# Patient Record
Sex: MALE | Race: WHITE | NOT HISPANIC OR LATINO | Employment: OTHER | URBAN - METROPOLITAN AREA
[De-identification: names, ages, dates, MRNs, and addresses within clinical notes are randomized per-mention and may not be internally consistent; named-entity substitution may affect disease eponyms.]

---

## 2017-01-18 RX ORDER — AMLODIPINE BESYLATE AND OLMESARTAN MEDOXOMIL 10; 20 MG/1; MG/1
TABLET, FILM COATED ORAL
Qty: 30 TABLET | Refills: 0 | Status: SHIPPED | OUTPATIENT
Start: 2017-01-18 | End: 2017-02-18 | Stop reason: SDUPTHER

## 2017-02-18 RX ORDER — AMLODIPINE AND OLMESARTAN MEDOXOMIL 10; 20 MG/1; MG/1
TABLET ORAL
Qty: 30 TABLET | Refills: 0 | Status: SHIPPED | OUTPATIENT
Start: 2017-02-18 | End: 2017-03-28 | Stop reason: SDUPTHER

## 2017-03-28 RX ORDER — AMLODIPINE AND OLMESARTAN MEDOXOMIL 10; 20 MG/1; MG/1
TABLET ORAL
Qty: 30 TABLET | Refills: 0 | Status: SHIPPED | OUTPATIENT
Start: 2017-03-28 | End: 2017-05-22 | Stop reason: SDUPTHER

## 2017-05-22 ENCOUNTER — TELEPHONE (OUTPATIENT)
Dept: FAMILY MEDICINE CLINIC | Facility: CLINIC | Age: 62
End: 2017-05-22

## 2017-05-22 RX ORDER — AMLODIPINE AND OLMESARTAN MEDOXOMIL 10; 20 MG/1; MG/1
1 TABLET ORAL DAILY
Qty: 30 TABLET | Refills: 0 | Status: SHIPPED | OUTPATIENT
Start: 2017-05-22 | End: 2017-05-31 | Stop reason: SDUPTHER

## 2017-05-31 ENCOUNTER — OFFICE VISIT (OUTPATIENT)
Dept: FAMILY MEDICINE CLINIC | Facility: CLINIC | Age: 62
End: 2017-05-31

## 2017-05-31 VITALS
BODY MASS INDEX: 38.94 KG/M2 | SYSTOLIC BLOOD PRESSURE: 130 MMHG | OXYGEN SATURATION: 98 % | TEMPERATURE: 98 F | HEIGHT: 62 IN | WEIGHT: 211.6 LBS | HEART RATE: 84 BPM | DIASTOLIC BLOOD PRESSURE: 78 MMHG

## 2017-05-31 DIAGNOSIS — I10 ESSENTIAL HYPERTENSION: Primary | ICD-10-CM

## 2017-05-31 DIAGNOSIS — G89.29 CHRONIC PAIN OF BOTH KNEES: ICD-10-CM

## 2017-05-31 DIAGNOSIS — Z12.11 COLON CANCER SCREENING: ICD-10-CM

## 2017-05-31 DIAGNOSIS — R73.03 PREDIABETES: ICD-10-CM

## 2017-05-31 DIAGNOSIS — E66.09 NON MORBID OBESITY DUE TO EXCESS CALORIES: ICD-10-CM

## 2017-05-31 DIAGNOSIS — M25.561 CHRONIC PAIN OF BOTH KNEES: ICD-10-CM

## 2017-05-31 DIAGNOSIS — M25.562 CHRONIC PAIN OF BOTH KNEES: ICD-10-CM

## 2017-05-31 DIAGNOSIS — M51.36 DDD (DEGENERATIVE DISC DISEASE), LUMBAR: ICD-10-CM

## 2017-05-31 DIAGNOSIS — E78.5 HYPERLIPIDEMIA, UNSPECIFIED HYPERLIPIDEMIA TYPE: ICD-10-CM

## 2017-05-31 DIAGNOSIS — G56.03 BILATERAL CARPAL TUNNEL SYNDROME: ICD-10-CM

## 2017-05-31 DIAGNOSIS — R29.898 DECREASED GRIP STRENGTH: ICD-10-CM

## 2017-05-31 PROCEDURE — 99214 OFFICE O/P EST MOD 30 MIN: CPT | Performed by: NURSE PRACTITIONER

## 2017-05-31 RX ORDER — MELOXICAM 15 MG/1
15 TABLET ORAL DAILY
Qty: 30 TABLET | Refills: 2 | Status: SHIPPED | OUTPATIENT
Start: 2017-05-31 | End: 2017-11-07 | Stop reason: SDUPTHER

## 2017-05-31 RX ORDER — AMLODIPINE AND OLMESARTAN MEDOXOMIL 10; 20 MG/1; MG/1
1 TABLET ORAL DAILY
Qty: 90 TABLET | Refills: 3 | Status: SHIPPED | OUTPATIENT
Start: 2017-05-31 | End: 2019-03-21

## 2017-06-08 ENCOUNTER — TELEPHONE (OUTPATIENT)
Dept: FAMILY MEDICINE CLINIC | Facility: CLINIC | Age: 62
End: 2017-06-08

## 2017-06-08 DIAGNOSIS — R74.8 ELEVATED LIVER ENZYMES: Primary | ICD-10-CM

## 2017-06-08 LAB
ALBUMIN SERPL-MCNC: 4.4 G/DL (ref 3.5–5.2)
ALBUMIN/GLOB SERPL: 1.3 G/DL
ALP SERPL-CCNC: 76 U/L (ref 39–117)
ALT SERPL W P-5'-P-CCNC: 93 U/L (ref 1–41)
ANION GAP SERPL CALCULATED.3IONS-SCNC: 13.2 MMOL/L
AST SERPL-CCNC: 66 U/L (ref 1–40)
BILIRUB SERPL-MCNC: 0.6 MG/DL (ref 0.1–1.2)
BUN BLD-MCNC: 12 MG/DL (ref 8–23)
BUN/CREAT SERPL: 12.6 (ref 7–25)
CALCIUM SPEC-SCNC: 9.6 MG/DL (ref 8.6–10.5)
CHLORIDE SERPL-SCNC: 98 MMOL/L (ref 98–107)
CHOLEST SERPL-MCNC: 227 MG/DL (ref 0–200)
CO2 SERPL-SCNC: 25.8 MMOL/L (ref 22–29)
CREAT BLD-MCNC: 0.95 MG/DL (ref 0.76–1.27)
ERYTHROCYTE [DISTWIDTH] IN BLOOD BY AUTOMATED COUNT: 14.6 % (ref 4.5–15)
GFR SERPL CREATININE-BSD FRML MDRD: 80 ML/MIN/1.73
GLOBULIN UR ELPH-MCNC: 3.3 GM/DL
GLUCOSE BLD-MCNC: 112 MG/DL (ref 65–99)
HBA1C MFR BLD: 6.2 % (ref 4.8–5.6)
HCT VFR BLD AUTO: 47.3 % (ref 35–60)
HDLC SERPL-MCNC: 24 MG/DL (ref 40–60)
HGB BLD-MCNC: 15.2 G/DL (ref 13.5–18)
LDLC SERPL CALC-MCNC: 154 MG/DL (ref 0–100)
LDLC/HDLC SERPL: 6.41 {RATIO}
LYMPHOCYTES # BLD AUTO: 2.6 10*3/MM3 (ref 1.2–3.4)
LYMPHOCYTES NFR BLD AUTO: 31.7 % (ref 21–51)
MCH RBC QN AUTO: 29.5 PG (ref 26.1–33.1)
MCHC RBC AUTO-ENTMCNC: 32.3 G/DL (ref 33–37)
MCV RBC AUTO: 91.4 FL (ref 80–99)
MONOCYTES # BLD AUTO: 0.5 10*3/MM3 (ref 0.1–0.6)
MONOCYTES NFR BLD AUTO: 6.5 % (ref 2–9)
NEUTROPHILS # BLD AUTO: 5.1 10*3/MM3 (ref 1.4–6.5)
NEUTROPHILS NFR BLD AUTO: 61.8 % (ref 42–75)
PLATELET # BLD AUTO: 242 10*3/MM3 (ref 150–450)
PMV BLD AUTO: 7.7 FL (ref 7.1–10.5)
POTASSIUM BLD-SCNC: 4.2 MMOL/L (ref 3.5–5.2)
PROT SERPL-MCNC: 7.7 G/DL (ref 6–8.5)
RBC # BLD AUTO: 5.17 10*6/MM3 (ref 4–6)
SODIUM BLD-SCNC: 137 MMOL/L (ref 136–145)
TRIGL SERPL-MCNC: 246 MG/DL (ref 0–150)
VLDLC SERPL-MCNC: 49.2 MG/DL (ref 5–40)
WBC NRBC COR # BLD: 8.2 10*3/MM3 (ref 4.5–10)

## 2017-06-08 PROCEDURE — 85025 COMPLETE CBC W/AUTO DIFF WBC: CPT | Performed by: NURSE PRACTITIONER

## 2017-06-08 PROCEDURE — 80061 LIPID PANEL: CPT | Performed by: NURSE PRACTITIONER

## 2017-06-08 PROCEDURE — 80053 COMPREHEN METABOLIC PANEL: CPT | Performed by: NURSE PRACTITIONER

## 2017-06-08 PROCEDURE — 83036 HEMOGLOBIN GLYCOSYLATED A1C: CPT | Performed by: NURSE PRACTITIONER

## 2017-06-08 NOTE — TELEPHONE ENCOUNTER
Chol increased 227 goal < 200  goal < 100 triglycerides 246 goal < 150. Recommend you start fish oil 1000 mg BID and lipitor 10 mg HS, can I erx?  Liver enzymes are elevated, RTC lab only recheck.  BS elevated 112, still prediabetic but improving, cont healthy diet and regular exercise for wt loss.

## 2017-06-09 RX ORDER — ATORVASTATIN CALCIUM 10 MG/1
10 TABLET, FILM COATED ORAL NIGHTLY
Qty: 30 TABLET | Refills: 2 | Status: SHIPPED | OUTPATIENT
Start: 2017-06-09 | End: 2019-03-21 | Stop reason: SDUPTHER

## 2017-11-07 RX ORDER — MELOXICAM 15 MG/1
TABLET ORAL
Qty: 30 TABLET | Refills: 0 | Status: SHIPPED | OUTPATIENT
Start: 2017-11-07 | End: 2018-05-23 | Stop reason: SDUPTHER

## 2017-11-13 ENCOUNTER — TELEPHONE (OUTPATIENT)
Dept: FAMILY MEDICINE CLINIC | Facility: CLINIC | Age: 62
End: 2017-11-13

## 2017-11-13 RX ORDER — LOSARTAN POTASSIUM 50 MG/1
50 TABLET ORAL DAILY
Qty: 30 TABLET | Refills: 0 | Status: SHIPPED | OUTPATIENT
Start: 2017-11-13 | End: 2017-11-13 | Stop reason: SDUPTHER

## 2017-11-13 RX ORDER — AMLODIPINE BESYLATE 10 MG/1
10 TABLET ORAL DAILY
Qty: 30 TABLET | Refills: 0 | Status: SHIPPED | OUTPATIENT
Start: 2017-11-13 | End: 2017-11-13 | Stop reason: SDUPTHER

## 2017-11-13 RX ORDER — AMLODIPINE BESYLATE 10 MG/1
TABLET ORAL
Qty: 90 TABLET | Refills: 0 | Status: SHIPPED | OUTPATIENT
Start: 2017-11-13 | End: 2018-02-20 | Stop reason: SDUPTHER

## 2017-11-13 RX ORDER — LOSARTAN POTASSIUM 50 MG/1
TABLET ORAL
Qty: 90 TABLET | Refills: 0 | Status: SHIPPED | OUTPATIENT
Start: 2017-11-13 | End: 2018-02-20 | Stop reason: SDUPTHER

## 2017-11-13 NOTE — TELEPHONE ENCOUNTER
Patient informed insurance will not pay for combo medication amlodipine/olmes 10-20mg anymore . I spoke with Meli she said it was ok to send over two separate RX's Amlodipine 10mg QD and losartin 50 mg QD.

## 2018-02-20 RX ORDER — LOSARTAN POTASSIUM 50 MG/1
TABLET ORAL
Qty: 90 TABLET | Refills: 0 | Status: SHIPPED | OUTPATIENT
Start: 2018-02-20 | End: 2018-05-17 | Stop reason: SDUPTHER

## 2018-02-20 RX ORDER — AMLODIPINE BESYLATE 10 MG/1
TABLET ORAL
Qty: 90 TABLET | Refills: 0 | Status: SHIPPED | OUTPATIENT
Start: 2018-02-20 | End: 2018-05-17 | Stop reason: SDUPTHER

## 2018-05-17 RX ORDER — AMLODIPINE BESYLATE 10 MG/1
TABLET ORAL
Qty: 90 TABLET | Refills: 0 | Status: SHIPPED | OUTPATIENT
Start: 2018-05-17 | End: 2018-09-21 | Stop reason: SDUPTHER

## 2018-05-17 RX ORDER — LOSARTAN POTASSIUM 50 MG/1
TABLET ORAL
Qty: 90 TABLET | Refills: 0 | Status: SHIPPED | OUTPATIENT
Start: 2018-05-17 | End: 2018-09-21 | Stop reason: SDUPTHER

## 2018-05-24 RX ORDER — MELOXICAM 15 MG/1
TABLET ORAL
Qty: 30 TABLET | Refills: 0 | Status: SHIPPED | OUTPATIENT
Start: 2018-05-24 | End: 2018-09-21 | Stop reason: SDUPTHER

## 2018-09-21 RX ORDER — LOSARTAN POTASSIUM 50 MG/1
TABLET ORAL
Qty: 21 TABLET | Refills: 0 | Status: SHIPPED | OUTPATIENT
Start: 2018-09-21 | End: 2018-09-21 | Stop reason: SDUPTHER

## 2018-09-21 RX ORDER — AMLODIPINE BESYLATE 10 MG/1
TABLET ORAL
Qty: 21 TABLET | Refills: 0 | Status: SHIPPED | OUTPATIENT
Start: 2018-09-21 | End: 2018-09-21 | Stop reason: SDUPTHER

## 2018-09-21 RX ORDER — MELOXICAM 15 MG/1
TABLET ORAL
Qty: 21 TABLET | Refills: 0 | Status: SHIPPED | OUTPATIENT
Start: 2018-09-21 | End: 2018-10-16 | Stop reason: SDUPTHER

## 2018-09-24 RX ORDER — LOSARTAN POTASSIUM 50 MG/1
TABLET ORAL
Qty: 90 TABLET | Refills: 0 | Status: SHIPPED | OUTPATIENT
Start: 2018-09-24 | End: 2018-12-26 | Stop reason: SDUPTHER

## 2018-09-24 RX ORDER — AMLODIPINE BESYLATE 10 MG/1
TABLET ORAL
Qty: 90 TABLET | Refills: 0 | Status: SHIPPED | OUTPATIENT
Start: 2018-09-24 | End: 2018-12-26 | Stop reason: SDUPTHER

## 2018-10-17 RX ORDER — MELOXICAM 15 MG/1
TABLET ORAL
Qty: 21 TABLET | Refills: 0 | Status: SHIPPED | OUTPATIENT
Start: 2018-10-17 | End: 2019-02-02 | Stop reason: SDUPTHER

## 2018-12-27 RX ORDER — AMLODIPINE BESYLATE 10 MG/1
TABLET ORAL
Qty: 30 TABLET | Refills: 0 | Status: SHIPPED | OUTPATIENT
Start: 2018-12-27 | End: 2019-02-02 | Stop reason: SDUPTHER

## 2018-12-27 RX ORDER — LOSARTAN POTASSIUM 50 MG/1
TABLET ORAL
Qty: 30 TABLET | Refills: 0 | Status: SHIPPED | OUTPATIENT
Start: 2018-12-27 | End: 2019-02-02 | Stop reason: SDUPTHER

## 2019-02-04 RX ORDER — AMLODIPINE BESYLATE 10 MG/1
TABLET ORAL
Qty: 14 TABLET | Refills: 0 | Status: SHIPPED | OUTPATIENT
Start: 2019-02-04 | End: 2019-02-28 | Stop reason: SDUPTHER

## 2019-02-04 RX ORDER — MELOXICAM 15 MG/1
TABLET ORAL
Qty: 14 TABLET | Refills: 0 | Status: SHIPPED | OUTPATIENT
Start: 2019-02-04 | End: 2019-02-28 | Stop reason: SDUPTHER

## 2019-02-04 RX ORDER — LOSARTAN POTASSIUM 50 MG/1
TABLET ORAL
Qty: 14 TABLET | Refills: 0 | Status: SHIPPED | OUTPATIENT
Start: 2019-02-04 | End: 2019-02-28 | Stop reason: SDUPTHER

## 2019-02-28 RX ORDER — MELOXICAM 15 MG/1
TABLET ORAL
Qty: 14 TABLET | Refills: 0 | Status: CANCELLED | OUTPATIENT
Start: 2019-02-28

## 2019-02-28 RX ORDER — LOSARTAN POTASSIUM 50 MG/1
TABLET ORAL
Qty: 14 TABLET | Refills: 0 | Status: CANCELLED | OUTPATIENT
Start: 2019-02-28

## 2019-02-28 RX ORDER — LOSARTAN POTASSIUM 50 MG/1
50 TABLET ORAL DAILY
Qty: 14 TABLET | Refills: 0 | Status: SHIPPED | OUTPATIENT
Start: 2019-02-28 | End: 2019-03-19 | Stop reason: SDUPTHER

## 2019-02-28 RX ORDER — AMLODIPINE BESYLATE 10 MG/1
10 TABLET ORAL DAILY
Qty: 14 TABLET | Refills: 0 | Status: SHIPPED | OUTPATIENT
Start: 2019-02-28 | End: 2019-03-19 | Stop reason: SDUPTHER

## 2019-02-28 RX ORDER — MELOXICAM 15 MG/1
15 TABLET ORAL DAILY
Qty: 14 TABLET | Refills: 0 | Status: SHIPPED | OUTPATIENT
Start: 2019-02-28 | End: 2019-03-19 | Stop reason: SDUPTHER

## 2019-02-28 RX ORDER — AMLODIPINE BESYLATE 10 MG/1
TABLET ORAL
Qty: 14 TABLET | Refills: 0 | Status: CANCELLED | OUTPATIENT
Start: 2019-02-28

## 2019-03-19 RX ORDER — LOSARTAN POTASSIUM 50 MG/1
50 TABLET ORAL DAILY
Qty: 14 TABLET | Refills: 0 | Status: CANCELLED | OUTPATIENT
Start: 2019-03-19

## 2019-03-19 RX ORDER — MELOXICAM 15 MG/1
15 TABLET ORAL DAILY
Qty: 14 TABLET | Refills: 0 | Status: CANCELLED | OUTPATIENT
Start: 2019-03-19

## 2019-03-19 RX ORDER — AMLODIPINE BESYLATE 10 MG/1
10 TABLET ORAL DAILY
Qty: 7 TABLET | Refills: 0 | Status: SHIPPED | OUTPATIENT
Start: 2019-03-19 | End: 2019-03-21 | Stop reason: SDUPTHER

## 2019-03-19 RX ORDER — MELOXICAM 15 MG/1
15 TABLET ORAL DAILY
Qty: 7 TABLET | Refills: 0 | Status: SHIPPED | OUTPATIENT
Start: 2019-03-19 | End: 2019-07-24

## 2019-03-19 RX ORDER — AMLODIPINE BESYLATE 10 MG/1
10 TABLET ORAL DAILY
Qty: 14 TABLET | Refills: 0 | Status: CANCELLED | OUTPATIENT
Start: 2019-03-19

## 2019-03-19 RX ORDER — LOSARTAN POTASSIUM 50 MG/1
50 TABLET ORAL DAILY
Qty: 7 TABLET | Refills: 0 | Status: SHIPPED | OUTPATIENT
Start: 2019-03-19 | End: 2019-03-21 | Stop reason: SDUPTHER

## 2019-03-19 NOTE — TELEPHONE ENCOUNTER
Gave patient #7 on refills. PATIENT MUST MAKE APPT NO EXCEPTIONS LAST SEEN 5/31/17 [facility]

## 2019-03-21 ENCOUNTER — OFFICE VISIT (OUTPATIENT)
Dept: FAMILY MEDICINE CLINIC | Facility: CLINIC | Age: 64
End: 2019-03-21

## 2019-03-21 VITALS
DIASTOLIC BLOOD PRESSURE: 80 MMHG | HEIGHT: 65 IN | HEART RATE: 83 BPM | OXYGEN SATURATION: 95 % | WEIGHT: 210 LBS | BODY MASS INDEX: 34.99 KG/M2 | SYSTOLIC BLOOD PRESSURE: 144 MMHG | TEMPERATURE: 97.7 F

## 2019-03-21 DIAGNOSIS — Z12.11 ENCOUNTER FOR SCREENING COLONOSCOPY: ICD-10-CM

## 2019-03-21 DIAGNOSIS — I10 ESSENTIAL HYPERTENSION: ICD-10-CM

## 2019-03-21 DIAGNOSIS — E78.5 HYPERLIPIDEMIA, UNSPECIFIED HYPERLIPIDEMIA TYPE: ICD-10-CM

## 2019-03-21 DIAGNOSIS — R07.9 CHEST PAIN, UNSPECIFIED TYPE: Primary | ICD-10-CM

## 2019-03-21 PROCEDURE — 99214 OFFICE O/P EST MOD 30 MIN: CPT | Performed by: NURSE PRACTITIONER

## 2019-03-21 RX ORDER — AMLODIPINE BESYLATE 10 MG/1
10 TABLET ORAL DAILY
Qty: 90 TABLET | Refills: 0 | Status: SHIPPED | OUTPATIENT
Start: 2019-03-21 | End: 2019-07-24 | Stop reason: SDUPTHER

## 2019-03-21 RX ORDER — LOSARTAN POTASSIUM 50 MG/1
50 TABLET ORAL DAILY
Qty: 90 TABLET | Refills: 0 | Status: SHIPPED | OUTPATIENT
Start: 2019-03-21 | End: 2019-07-08 | Stop reason: SDUPTHER

## 2019-03-21 RX ORDER — ATORVASTATIN CALCIUM 10 MG/1
10 TABLET, FILM COATED ORAL NIGHTLY
Qty: 30 TABLET | Refills: 2 | Status: SHIPPED | OUTPATIENT
Start: 2019-03-21 | End: 2019-07-24

## 2019-03-21 NOTE — PATIENT INSTRUCTIONS
Monitor BP at home call with elevated readings >140/90.  Refilled meds, cont BP meds as prescribed,   He will return for fasting labs.   He will william lto schedule over due f/u with cardiology and urology, he will call as he is established,   Refer for screening colonoscopy.   Increase fluid intake, get plenty of rest.   Patient agrees with plan of care and understands instructions. Call if worsening symptoms or any problems or concerns.

## 2019-03-21 NOTE — PROGRESS NOTES
Subjective   Kevin Garsia is a 63 y.o. male.     History of Present Illness   Here today to f/u from ER visit, he went to [facility] ER yesterday after having SOA when he woke up yesterday, BP in /90, cxr normal, EKG with no changes, hx of RBBB, please refer to H&P and d/c mike for details. troponins and BNP negative. Taking amlodipine 10mg daily and losartan 50mg daily. Last visit in office 5/2017, no recent labs. He also went to [facility] in 1/19 for chest pain, please refer to H&P and d/c mike for details. He was found to have RBBB, normal stress test, given amlodipine and losartan increase at that time, denies CP, SOA. HA, LE edema. States BP at home usually 170s/80s. He was told to f/u outpatient with cardiology but sharma not made appt d/t finances, motivated for changes today. Denies smoking. He has been out of his medications.   he also had low hemoglobin, told to see GI, never had colonoscopy. Would like referral today. He is not fasting today.   History of prostate cancer, sees Dr. Chaudhari, over due for f/u.     The following portions of the patient's history were reviewed and updated as appropriate: allergies, current medications, past family history, past medical history, past social history, past surgical history and problem list.    Review of Systems   Constitutional: Negative for chills, diaphoresis and fever.   Respiratory: Negative for cough, shortness of breath and wheezing.    Cardiovascular: Negative for chest pain, palpitations and leg swelling.   Musculoskeletal: Negative for arthralgias and myalgias.   Neurological: Negative for dizziness, light-headedness and headache.   All other systems reviewed and are negative.      Objective   Physical Exam   Constitutional: He is oriented to person, place, and time. He appears well-developed and well-nourished.   HENT:   Head: Normocephalic.   Eyes: Pupils are equal, round, and reactive to light.   Neck: Normal range of motion.    Cardiovascular: Normal rate, regular rhythm, normal heart sounds and intact distal pulses.   Pulses:       Radial pulses are 2+ on the right side, and 2+ on the left side.        Dorsalis pedis pulses are 2+ on the right side, and 2+ on the left side.        Posterior tibial pulses are 2+ on the right side, and 2+ on the left side.   Pulmonary/Chest: Effort normal and breath sounds normal.   Musculoskeletal: Normal range of motion.   Lymphadenopathy:     He has no cervical adenopathy.   Neurological: He is alert and oriented to person, place, and time.   Skin: Skin is warm and dry.   Psychiatric: He has a normal mood and affect. His behavior is normal.   Nursing note and vitals reviewed.        Assessment/Plan   Kevin was seen today for follow-up, med dose change and colonoscopy.    Diagnoses and all orders for this visit:    Chest pain, unspecified type  -     CBC & Differential; Future  -     Comprehensive Metabolic Panel; Future  -     TSH; Future  -     Lipid Panel; Future  -     Urinalysis With Microscopic - Urine, Clean Catch; Future  -     Vitamin D 25 Hydroxy; Future    Hyperlipidemia, unspecified hyperlipidemia type  -     CBC & Differential; Future  -     Comprehensive Metabolic Panel; Future  -     TSH; Future  -     Lipid Panel; Future  -     Urinalysis With Microscopic - Urine, Clean Catch; Future  -     Vitamin D 25 Hydroxy; Future    Essential hypertension  -     CBC & Differential; Future  -     Comprehensive Metabolic Panel; Future  -     TSH; Future  -     Lipid Panel; Future  -     Urinalysis With Microscopic - Urine, Clean Catch; Future  -     Vitamin D 25 Hydroxy; Future    Encounter for screening colonoscopy  -     Ambulatory Referral For Screening Colonoscopy    Other orders  -     Cancel: PSA Screen; Future  -     amLODIPine (NORVASC) 10 MG tablet; Take 1 tablet by mouth Daily.  -     atorvastatin (LIPITOR) 10 MG tablet; Take 1 tablet by mouth Every Night.  -     losartan (COZAAR) 50 MG  tablet; Take 1 tablet by mouth Daily.      Monitor BP at home call with elevated readings >140/90.  Refilled meds, cont BP meds as prescribed,   He will return for fasting labs.   He will william lto schedule over due f/u with cardiology and urology, he will call as he is established,   Refer for screening colonoscopy.   Increase fluid intake, get plenty of rest.   Patient agrees with plan of care and understands instructions. Call if worsening symptoms or any problems or concerns.

## 2019-04-04 ENCOUNTER — LAB (OUTPATIENT)
Dept: FAMILY MEDICINE CLINIC | Facility: CLINIC | Age: 64
End: 2019-04-04

## 2019-04-04 ENCOUNTER — TELEPHONE (OUTPATIENT)
Dept: FAMILY MEDICINE CLINIC | Facility: CLINIC | Age: 64
End: 2019-04-04

## 2019-04-04 DIAGNOSIS — I10 ESSENTIAL HYPERTENSION: ICD-10-CM

## 2019-04-04 DIAGNOSIS — R73.9 ELEVATED BLOOD SUGAR: ICD-10-CM

## 2019-04-04 DIAGNOSIS — E55.9 VITAMIN D DEFICIENCY: Primary | ICD-10-CM

## 2019-04-04 DIAGNOSIS — E78.5 HYPERLIPIDEMIA, UNSPECIFIED HYPERLIPIDEMIA TYPE: ICD-10-CM

## 2019-04-04 DIAGNOSIS — R07.9 CHEST PAIN, UNSPECIFIED TYPE: ICD-10-CM

## 2019-04-04 LAB
25(OH)D3 SERPL-MCNC: 18.4 NG/ML (ref 30–100)
ALBUMIN SERPL-MCNC: 4.3 G/DL (ref 3.5–5.2)
ALBUMIN/GLOB SERPL: 1.3 G/DL
ALP SERPL-CCNC: 74 U/L (ref 39–117)
ALT SERPL W P-5'-P-CCNC: 30 U/L (ref 1–41)
ANION GAP SERPL CALCULATED.3IONS-SCNC: 9.4 MMOL/L
AST SERPL-CCNC: 20 U/L (ref 1–40)
BACTERIA UR QL AUTO: NORMAL /HPF
BILIRUB SERPL-MCNC: 0.5 MG/DL (ref 0.2–1.2)
BILIRUB UR QL STRIP: NEGATIVE
BUN BLD-MCNC: 11 MG/DL (ref 8–23)
BUN/CREAT SERPL: 10.9 (ref 7–25)
CALCIUM SPEC-SCNC: 10 MG/DL (ref 8.6–10.5)
CHLORIDE SERPL-SCNC: 103 MMOL/L (ref 98–107)
CHOLEST SERPL-MCNC: 146 MG/DL (ref 0–200)
CLARITY UR: CLEAR
CO2 SERPL-SCNC: 26.6 MMOL/L (ref 22–29)
COLOR UR: YELLOW
CREAT BLD-MCNC: 1.01 MG/DL (ref 0.76–1.27)
ERYTHROCYTE [DISTWIDTH] IN BLOOD BY AUTOMATED COUNT: 14.5 % (ref 12.3–15.4)
GFR SERPL CREATININE-BSD FRML MDRD: 74 ML/MIN/1.73
GLOBULIN UR ELPH-MCNC: 3.3 GM/DL
GLUCOSE BLD-MCNC: 124 MG/DL (ref 65–99)
GLUCOSE UR STRIP-MCNC: NEGATIVE MG/DL
HCT VFR BLD AUTO: 44.2 % (ref 37.5–51)
HDLC SERPL-MCNC: 31 MG/DL (ref 40–60)
HGB BLD-MCNC: 14.8 G/DL (ref 13–17.7)
HGB UR QL STRIP.AUTO: ABNORMAL
KETONES UR QL STRIP: NEGATIVE
LDLC SERPL CALC-MCNC: 89 MG/DL (ref 0–100)
LDLC/HDLC SERPL: 2.86 {RATIO}
LEUKOCYTE ESTERASE UR QL STRIP.AUTO: NEGATIVE
LYMPHOCYTES # BLD AUTO: 2.1 10*3/MM3 (ref 0.7–3.1)
LYMPHOCYTES NFR BLD AUTO: 27.5 % (ref 19.6–45.3)
MCH RBC QN AUTO: 30.4 PG (ref 26.6–33)
MCHC RBC AUTO-ENTMCNC: 33.5 G/DL (ref 31.5–35.7)
MCV RBC AUTO: 90.5 FL (ref 79–97)
MONOCYTES # BLD AUTO: 0.4 10*3/MM3 (ref 0.1–0.9)
MONOCYTES NFR BLD AUTO: 4.7 % (ref 5–12)
NEUTROPHILS # BLD AUTO: 5.3 10*3/MM3 (ref 1.4–7)
NEUTROPHILS NFR BLD AUTO: 67.8 % (ref 42.7–76)
NITRITE UR QL STRIP: NEGATIVE
PH UR STRIP.AUTO: 5.5 [PH] (ref 4.6–8)
PLATELET # BLD AUTO: 247 10*3/MM3 (ref 140–450)
PMV BLD AUTO: 8.1 FL (ref 6–12)
POTASSIUM BLD-SCNC: 4.8 MMOL/L (ref 3.5–5.2)
PROT SERPL-MCNC: 7.6 G/DL (ref 6–8.5)
PROT UR QL STRIP: ABNORMAL
RBC # BLD AUTO: 4.89 10*6/MM3 (ref 4.14–5.8)
RBC # UR: NORMAL /HPF
REF LAB TEST METHOD: NORMAL
SODIUM BLD-SCNC: 139 MMOL/L (ref 136–145)
SP GR UR STRIP: 1.02 (ref 1–1.03)
SQUAMOUS #/AREA URNS HPF: NORMAL /HPF
TRIGL SERPL-MCNC: 132 MG/DL (ref 0–150)
TSH SERPL DL<=0.05 MIU/L-ACNC: 3.59 MIU/ML (ref 0.27–4.2)
UROBILINOGEN UR QL STRIP: ABNORMAL
VLDLC SERPL-MCNC: 26.4 MG/DL (ref 5–40)
WBC NRBC COR # BLD: 7.8 10*3/MM3 (ref 3.4–10.8)
WBC UR QL AUTO: NORMAL /HPF

## 2019-04-04 PROCEDURE — 81001 URINALYSIS AUTO W/SCOPE: CPT | Performed by: NURSE PRACTITIONER

## 2019-04-04 PROCEDURE — 82306 VITAMIN D 25 HYDROXY: CPT | Performed by: NURSE PRACTITIONER

## 2019-04-04 PROCEDURE — 80061 LIPID PANEL: CPT | Performed by: NURSE PRACTITIONER

## 2019-04-04 PROCEDURE — 36415 COLL VENOUS BLD VENIPUNCTURE: CPT | Performed by: NURSE PRACTITIONER

## 2019-04-04 PROCEDURE — 80053 COMPREHEN METABOLIC PANEL: CPT | Performed by: NURSE PRACTITIONER

## 2019-04-04 PROCEDURE — 85025 COMPLETE CBC W/AUTO DIFF WBC: CPT | Performed by: NURSE PRACTITIONER

## 2019-04-04 PROCEDURE — 84443 ASSAY THYROID STIM HORMONE: CPT | Performed by: NURSE PRACTITIONER

## 2019-04-04 RX ORDER — ERGOCALCIFEROL 1.25 MG/1
50000 CAPSULE ORAL WEEKLY
Qty: 12 CAPSULE | Refills: 0 | Status: SHIPPED | OUTPATIENT
Start: 2019-04-04 | End: 2021-02-23 | Stop reason: SDUPTHER

## 2019-04-04 NOTE — TELEPHONE ENCOUNTER
No answer  ----- Message from BOBBY Gomez sent at 4/4/2019 12:57 PM EDT -----  Thyroid normal, vit D is low. Called in vit d 50,000 IU weekly for 12 weeks, kidney and liver func normal, BS elevated will check a1c, chol good.

## 2019-07-05 ENCOUNTER — TELEPHONE (OUTPATIENT)
Dept: FAMILY MEDICINE CLINIC | Facility: CLINIC | Age: 64
End: 2019-07-05

## 2019-07-05 NOTE — TELEPHONE ENCOUNTER
PATIENTS BLOOD PRESSURE YESTERDAY WAS 99/100. PATIENT TOOK HIS BLOOD PRESSURE MEDICATION AND IT WENT DOWN A LITTLE AND THIS MORNING ITS BACK TO 99/100. WHAT DOES PATIENT NEED TO DO TO BRING BLOOD PRESSURE DOWN

## 2019-07-05 NOTE — TELEPHONE ENCOUNTER
Please check the blood pressure values do you mean 199/100 if so let me know also see what his current blood pressure medicines are both what he is on an milligrams strength.

## 2019-07-08 RX ORDER — LOSARTAN POTASSIUM 50 MG/1
50 TABLET ORAL DAILY
Qty: 90 TABLET | Refills: 1 | Status: SHIPPED | OUTPATIENT
Start: 2019-07-08 | End: 2019-07-24

## 2019-07-09 ENCOUNTER — TELEPHONE (OUTPATIENT)
Dept: FAMILY MEDICINE CLINIC | Facility: CLINIC | Age: 64
End: 2019-07-09

## 2019-07-16 ENCOUNTER — TELEPHONE (OUTPATIENT)
Dept: FAMILY MEDICINE CLINIC | Facility: CLINIC | Age: 64
End: 2019-07-16

## 2019-07-16 NOTE — TELEPHONE ENCOUNTER
IMER  Pt had order for physical therapy. He doesn't feel that he needs it at the time. He will let you know if that changes.

## 2019-07-24 ENCOUNTER — OFFICE VISIT (OUTPATIENT)
Dept: FAMILY MEDICINE CLINIC | Facility: CLINIC | Age: 64
End: 2019-07-24

## 2019-07-24 VITALS
WEIGHT: 202.8 LBS | DIASTOLIC BLOOD PRESSURE: 78 MMHG | HEART RATE: 105 BPM | HEIGHT: 65 IN | OXYGEN SATURATION: 97 % | SYSTOLIC BLOOD PRESSURE: 156 MMHG | TEMPERATURE: 98.1 F | BODY MASS INDEX: 33.79 KG/M2

## 2019-07-24 DIAGNOSIS — I10 ESSENTIAL HYPERTENSION: ICD-10-CM

## 2019-07-24 DIAGNOSIS — G45.9 TIA (TRANSIENT ISCHEMIC ATTACK): ICD-10-CM

## 2019-07-24 DIAGNOSIS — E78.5 HYPERLIPIDEMIA, UNSPECIFIED HYPERLIPIDEMIA TYPE: Primary | ICD-10-CM

## 2019-07-24 DIAGNOSIS — Z98.890 S/P CAROTID ENDARTERECTOMY: ICD-10-CM

## 2019-07-24 DIAGNOSIS — E11.9 TYPE 2 DIABETES MELLITUS WITHOUT COMPLICATION, WITHOUT LONG-TERM CURRENT USE OF INSULIN (HCC): ICD-10-CM

## 2019-07-24 PROCEDURE — 99214 OFFICE O/P EST MOD 30 MIN: CPT | Performed by: NURSE PRACTITIONER

## 2019-07-24 RX ORDER — AMLODIPINE BESYLATE 10 MG/1
10 TABLET ORAL DAILY
Qty: 90 TABLET | Refills: 1 | Status: SHIPPED | OUTPATIENT
Start: 2019-07-24 | End: 2020-02-03

## 2019-07-24 RX ORDER — LOSARTAN POTASSIUM 100 MG/1
100 TABLET ORAL DAILY
Qty: 90 TABLET | Refills: 1 | Status: SHIPPED | OUTPATIENT
Start: 2019-07-24 | End: 2020-02-03

## 2019-07-24 RX ORDER — LOSARTAN POTASSIUM 100 MG/1
TABLET ORAL
COMMUNITY
Start: 2019-07-12 | End: 2019-07-24 | Stop reason: SDUPTHER

## 2019-07-24 RX ORDER — BLOOD-GLUCOSE METER
1 EACH MISCELLANEOUS 4 TIMES DAILY
Qty: 1 KIT | Refills: 1 | Status: SHIPPED | OUTPATIENT
Start: 2019-07-24

## 2019-07-24 RX ORDER — ATORVASTATIN CALCIUM 40 MG/1
40 TABLET, FILM COATED ORAL DAILY
Qty: 90 TABLET | Refills: 1 | Status: SHIPPED | OUTPATIENT
Start: 2019-07-24 | End: 2020-02-03

## 2019-07-24 RX ORDER — ASPIRIN 81 MG/1
TABLET, CHEWABLE ORAL
COMMUNITY
Start: 2019-07-12

## 2019-07-24 RX ORDER — ATORVASTATIN CALCIUM 40 MG/1
TABLET, FILM COATED ORAL
COMMUNITY
Start: 2019-07-12 | End: 2019-07-24 | Stop reason: SDUPTHER

## 2019-07-24 NOTE — PROGRESS NOTES
Subjective   eKvin Garsia is a 64 y.o. male.     History of Present Illness   Here today for f/u, with hx of HTN, taking amlodipine 10mg daily, losartan 100mg daily. States BP at home usually 150s-170s/80s, denies CP, SOA, HA, LE edema.   Hx of DM taking metformin 1000mg bid, lipitor 40mg daily, last a1c 7/11/19 6.80, he does not check BS at home.   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 Started on asa and plavix, sees neurology . D/c home on eliquis 5mg bid. Has f/u with neurology on 7/29/19, he denies dizziness, confusion slurred speech, he is no longer taking PT.   Hx of vit d def, needs refill today.   Hx of sleep apnea, does not wear cpap. Saw pulm in hospital. was told to f/u with pulm. Needs fmla completed today  Also c/o left great toe pain, stands a lot at work. Had xray at [place], degenerative changes in foot, states uric normal. States pain at base of toes, pain with walking, can no longer take meloxicam does not see podiatry. States he changed shoes.       The following portions of the patient's history were reviewed and updated as appropriate: allergies, current medications, past family history, past medical history, past social history, past surgical history and problem list.    Review of Systems   Constitutional: Negative for chills, diaphoresis and fever.   Respiratory: Negative for cough and shortness of breath.    Cardiovascular: Negative for chest pain.   Musculoskeletal: Positive for arthralgias. Negative for myalgias.   Neurological: Negative for dizziness, seizures, syncope, facial asymmetry, speech difficulty, weakness, light-headedness, numbness, headache, memory problem and confusion.   All other systems reviewed and are  negative.      Objective   Physical Exam   Constitutional: He is oriented to person, place, and time. He appears well-developed and well-nourished.   HENT:   Head: Normocephalic.   Eyes: Pupils are equal, round, and reactive to light.   Neck: Normal range of motion.   Cardiovascular: Normal rate, regular rhythm, normal heart sounds and intact distal pulses.   Pulses:       Radial pulses are 2+ on the right side, and 2+ on the left side.        Dorsalis pedis pulses are 2+ on the right side, and 2+ on the left side.        Posterior tibial pulses are 2+ on the right side, and 2+ on the left side.   Pulmonary/Chest: Effort normal and breath sounds normal. He has no decreased breath sounds. He has no wheezes. He has no rhonchi. He has no rales.   Musculoskeletal: Normal range of motion.     Skin Integrity  - His left foot skin is intact..     Neurological: He is alert and oriented to person, place, and time. He has normal strength. No cranial nerve deficit or sensory deficit. He displays a negative Romberg sign.   No facial asymmetry or weakness, no speech changes noted today.    Skin: Skin is warm and dry.   Psychiatric: He has a normal mood and affect. His behavior is normal.   Nursing note and vitals reviewed.        Assessment/Plan   Kevin was seen today for follow-up, diabetes, hypertension and toe pain.    Diagnoses and all orders for this visit:    Hyperlipidemia, unspecified hyperlipidemia type    Essential hypertension    TIA (transient ischemic attack)    S/P carotid endarterectomy    Type 2 diabetes mellitus without complication, without long-term current use of insulin (CMS/[facility])    Other orders  -     Blood Glucose Monitoring Suppl (ACCU-CHEK DIONTE PLUS) w/Device kit; 1 kit 4 (Four) Times a Day.  -     amLODIPine (NORVASC) 10 MG tablet; Take 1 tablet by mouth Daily.  -     atorvastatin (LIPITOR) 40 MG tablet; Take 1 tablet by mouth Daily.  -     losartan (COZAAR) 100 MG tablet; Take 1 tablet by mouth  Daily.  -     metFORMIN (GLUCOPHAGE) 1000 MG tablet; Take 1 tablet by mouth 2 (Two) Times a Day With Meals.  -     metoprolol tartrate (LOPRESSOR) 25 MG tablet; Take 1 tablet by mouth Daily.      Deferred labs today,   FMLA completed today,   Cont amlodipine and losartan, begin metoprolol 25mg daily in am, monitor BP at home call with elevated readings,   Refilled chronic meds,   Cont f/u with neurology as scheduled.   Low sugar and chol diet, monitor BS at home, device kit called in,   F/u in office for recheck 1 month,   He will call for f/u with pulm.  Educated about s/s stroke, advised ER or call 911 with any worsening symptoms.   Increase fluid intake, get plenty of rest.   Patient agrees with plan of care and understands instructions. Call if worsening symptoms or any problems or concerns.

## 2019-07-24 NOTE — PATIENT INSTRUCTIONS
Deferred labs today,   FMLA completed today,   Cont amlodipine and losartan, begin metoprolol 25mg daily in am, monitor BP at home call with elevated readings,   Refilled chronic meds,   Cont f/u with neurology as scheduled.   Low sugar and chol diet, monitor BS at home, device kit called in,   F/u in office for recheck 1 month,   He will call for f/u with pulm.  Educated about s/s stroke, advised ER or call 911 with any worsening symptoms.   Increase fluid intake, get plenty of rest.   Patient agrees with plan of care and understands instructions. Call if worsening symptoms or any problems or concerns.

## 2019-09-25 RX ORDER — ERGOCALCIFEROL 1.25 MG/1
CAPSULE ORAL
Qty: 12 CAPSULE | Refills: 0 | OUTPATIENT
Start: 2019-09-25

## 2020-02-03 RX ORDER — LOSARTAN POTASSIUM 100 MG/1
100 TABLET ORAL DAILY
Qty: 90 TABLET | Refills: 1 | Status: SHIPPED | OUTPATIENT
Start: 2020-02-03 | End: 2020-08-03

## 2020-02-03 RX ORDER — AMLODIPINE BESYLATE 10 MG/1
10 TABLET ORAL DAILY
Qty: 90 TABLET | Refills: 1 | Status: SHIPPED | OUTPATIENT
Start: 2020-02-03 | End: 2020-08-03

## 2020-02-03 RX ORDER — ATORVASTATIN CALCIUM 40 MG/1
40 TABLET, FILM COATED ORAL DAILY
Qty: 90 TABLET | Refills: 1 | Status: SHIPPED | OUTPATIENT
Start: 2020-02-03 | End: 2020-08-03

## 2020-08-03 RX ORDER — ATORVASTATIN CALCIUM 40 MG/1
40 TABLET, FILM COATED ORAL DAILY
Qty: 90 TABLET | Refills: 1 | Status: SHIPPED | OUTPATIENT
Start: 2020-08-03 | End: 2021-02-03

## 2020-08-03 RX ORDER — LOSARTAN POTASSIUM 100 MG/1
100 TABLET ORAL DAILY
Qty: 90 TABLET | Refills: 1 | Status: SHIPPED | OUTPATIENT
Start: 2020-08-03 | End: 2021-02-03

## 2020-08-03 RX ORDER — AMLODIPINE BESYLATE 10 MG/1
10 TABLET ORAL DAILY
Qty: 90 TABLET | Refills: 1 | Status: SHIPPED | OUTPATIENT
Start: 2020-08-03 | End: 2021-02-03

## 2020-08-17 ENCOUNTER — OFFICE VISIT (OUTPATIENT)
Dept: FAMILY MEDICINE CLINIC | Facility: CLINIC | Age: 65
End: 2020-08-17

## 2020-08-17 VITALS
TEMPERATURE: 97.5 F | SYSTOLIC BLOOD PRESSURE: 120 MMHG | BODY MASS INDEX: 30.86 KG/M2 | HEIGHT: 65 IN | DIASTOLIC BLOOD PRESSURE: 60 MMHG | HEART RATE: 71 BPM | WEIGHT: 185.2 LBS | OXYGEN SATURATION: 96 %

## 2020-08-17 DIAGNOSIS — Z11.3 SCREEN FOR STD (SEXUALLY TRANSMITTED DISEASE): ICD-10-CM

## 2020-08-17 DIAGNOSIS — E11.9 TYPE 2 DIABETES MELLITUS WITHOUT COMPLICATION, WITHOUT LONG-TERM CURRENT USE OF INSULIN (HCC): ICD-10-CM

## 2020-08-17 DIAGNOSIS — Z00.00 MEDICARE ANNUAL WELLNESS VISIT, SUBSEQUENT: Primary | ICD-10-CM

## 2020-08-17 DIAGNOSIS — F33.8 OTHER RECURRENT DEPRESSIVE DISORDERS (HCC): ICD-10-CM

## 2020-08-17 DIAGNOSIS — F17.211 CIGARETTE NICOTINE DEPENDENCE IN REMISSION: ICD-10-CM

## 2020-08-17 DIAGNOSIS — E55.9 VITAMIN D DEFICIENCY: ICD-10-CM

## 2020-08-17 DIAGNOSIS — Z87.891 FORMER SMOKER: ICD-10-CM

## 2020-08-17 DIAGNOSIS — I10 ESSENTIAL HYPERTENSION: ICD-10-CM

## 2020-08-17 DIAGNOSIS — Z11.59 NEED FOR HEPATITIS C SCREENING TEST: ICD-10-CM

## 2020-08-17 DIAGNOSIS — E78.5 HYPERLIPIDEMIA, UNSPECIFIED HYPERLIPIDEMIA TYPE: ICD-10-CM

## 2020-08-17 DIAGNOSIS — G47.33 OSA (OBSTRUCTIVE SLEEP APNEA): ICD-10-CM

## 2020-08-17 DIAGNOSIS — Z72.53 HIGH RISK BISEXUAL BEHAVIOR: ICD-10-CM

## 2020-08-17 DIAGNOSIS — G45.9 TIA (TRANSIENT ISCHEMIC ATTACK): ICD-10-CM

## 2020-08-17 DIAGNOSIS — Z12.11 SCREEN FOR COLON CANCER: ICD-10-CM

## 2020-08-17 DIAGNOSIS — Z23 NEED FOR VACCINATION: ICD-10-CM

## 2020-08-17 DIAGNOSIS — R80.9 PROTEINURIA, UNSPECIFIED TYPE: Primary | ICD-10-CM

## 2020-08-17 LAB
25(OH)D3 SERPL-MCNC: 29.2 NG/ML (ref 30–100)
ALBUMIN SERPL-MCNC: 4.8 G/DL (ref 3.5–5.2)
ALBUMIN UR-MCNC: 50 MG/L (ref 0–20)
ALBUMIN/GLOB SERPL: 1.6 G/DL
ALP SERPL-CCNC: 86 U/L (ref 39–117)
ALT SERPL W P-5'-P-CCNC: 20 U/L (ref 1–41)
ANION GAP SERPL CALCULATED.3IONS-SCNC: 9.8 MMOL/L (ref 5–15)
AST SERPL-CCNC: 15 U/L (ref 1–40)
BACTERIA UR QL AUTO: NORMAL /HPF
BILIRUB SERPL-MCNC: 0.6 MG/DL (ref 0–1.2)
BILIRUB UR QL STRIP: NEGATIVE
BUN SERPL-MCNC: 14 MG/DL (ref 8–23)
BUN/CREAT SERPL: 13.9 (ref 7–25)
CALCIUM SPEC-SCNC: 10.3 MG/DL (ref 8.6–10.5)
CHLORIDE SERPL-SCNC: 100 MMOL/L (ref 98–107)
CHOLEST SERPL-MCNC: 91 MG/DL (ref 0–200)
CLARITY UR: CLEAR
CO2 SERPL-SCNC: 30.2 MMOL/L (ref 22–29)
COLOR UR: YELLOW
CREAT SERPL-MCNC: 1.01 MG/DL (ref 0.76–1.27)
ERYTHROCYTE [DISTWIDTH] IN BLOOD BY AUTOMATED COUNT: 14.9 % (ref 12.3–15.4)
GFR SERPL CREATININE-BSD FRML MDRD: 74 ML/MIN/1.73
GLOBULIN UR ELPH-MCNC: 3 GM/DL
GLUCOSE SERPL-MCNC: 105 MG/DL (ref 65–99)
GLUCOSE UR STRIP-MCNC: NEGATIVE MG/DL
HBA1C MFR BLD: 6.08 % (ref 4.8–5.6)
HCT VFR BLD AUTO: 41.7 % (ref 37.5–51)
HCV AB SER DONR QL: NORMAL
HDLC SERPL-MCNC: 31 MG/DL (ref 40–60)
HGB BLD-MCNC: 14 G/DL (ref 13–17.7)
HGB UR QL STRIP.AUTO: ABNORMAL
HIV1+2 AB SER QL: NORMAL
KETONES UR QL STRIP: NEGATIVE
LDLC SERPL CALC-MCNC: 30 MG/DL (ref 0–100)
LDLC/HDLC SERPL: 0.97 {RATIO}
LEUKOCYTE ESTERASE UR QL STRIP.AUTO: NEGATIVE
LYMPHOCYTES # BLD AUTO: 2.4 10*3/MM3 (ref 0.7–3.1)
LYMPHOCYTES NFR BLD AUTO: 29.4 % (ref 19.6–45.3)
MCH RBC QN AUTO: 30.1 PG (ref 26.6–33)
MCHC RBC AUTO-ENTMCNC: 33.5 G/DL (ref 31.5–35.7)
MCV RBC AUTO: 89.6 FL (ref 79–97)
MONOCYTES # BLD AUTO: 0.6 10*3/MM3 (ref 0.1–0.9)
MONOCYTES NFR BLD AUTO: 7.5 % (ref 5–12)
NEUTROPHILS NFR BLD AUTO: 5.1 10*3/MM3 (ref 1.7–7)
NEUTROPHILS NFR BLD AUTO: 63.1 % (ref 42.7–76)
NITRITE UR QL STRIP: NEGATIVE
PH UR STRIP.AUTO: 6.5 [PH] (ref 4.6–8)
PLATELET # BLD AUTO: 301 10*3/MM3 (ref 140–450)
PMV BLD AUTO: 7 FL (ref 6–12)
POTASSIUM SERPL-SCNC: 4.5 MMOL/L (ref 3.5–5.2)
PROT SERPL-MCNC: 7.8 G/DL (ref 6–8.5)
PROT UR QL STRIP: ABNORMAL
RBC # BLD AUTO: 4.65 10*6/MM3 (ref 4.14–5.8)
RBC # UR: NORMAL /HPF
REF LAB TEST METHOD: NORMAL
RPR SER QL: NORMAL
SODIUM SERPL-SCNC: 140 MMOL/L (ref 136–145)
SP GR UR STRIP: 1.02 (ref 1–1.03)
SQUAMOUS #/AREA URNS HPF: NORMAL /HPF
TRIGL SERPL-MCNC: 150 MG/DL (ref 0–150)
TSH SERPL DL<=0.05 MIU/L-ACNC: 2.3 UIU/ML (ref 0.27–4.2)
UROBILINOGEN UR QL STRIP: ABNORMAL
VLDLC SERPL-MCNC: 30 MG/DL (ref 5–40)
WBC # BLD AUTO: 8.1 10*3/MM3 (ref 3.4–10.8)
WBC UR QL AUTO: NORMAL /HPF

## 2020-08-17 PROCEDURE — 36415 COLL VENOUS BLD VENIPUNCTURE: CPT | Performed by: NURSE PRACTITIONER

## 2020-08-17 PROCEDURE — 99214 OFFICE O/P EST MOD 30 MIN: CPT | Performed by: NURSE PRACTITIONER

## 2020-08-17 PROCEDURE — 84443 ASSAY THYROID STIM HORMONE: CPT | Performed by: NURSE PRACTITIONER

## 2020-08-17 PROCEDURE — 83036 HEMOGLOBIN GLYCOSYLATED A1C: CPT | Performed by: NURSE PRACTITIONER

## 2020-08-17 PROCEDURE — 90732 PPSV23 VACC 2 YRS+ SUBQ/IM: CPT | Performed by: NURSE PRACTITIONER

## 2020-08-17 PROCEDURE — 82043 UR ALBUMIN QUANTITATIVE: CPT | Performed by: NURSE PRACTITIONER

## 2020-08-17 PROCEDURE — G0009 ADMIN PNEUMOCOCCAL VACCINE: HCPCS | Performed by: NURSE PRACTITIONER

## 2020-08-17 PROCEDURE — 80053 COMPREHEN METABOLIC PANEL: CPT | Performed by: NURSE PRACTITIONER

## 2020-08-17 PROCEDURE — 81001 URINALYSIS AUTO W/SCOPE: CPT | Performed by: NURSE PRACTITIONER

## 2020-08-17 PROCEDURE — G0439 PPPS, SUBSEQ VISIT: HCPCS | Performed by: NURSE PRACTITIONER

## 2020-08-17 PROCEDURE — 82306 VITAMIN D 25 HYDROXY: CPT | Performed by: NURSE PRACTITIONER

## 2020-08-17 PROCEDURE — 80061 LIPID PANEL: CPT | Performed by: NURSE PRACTITIONER

## 2020-08-17 PROCEDURE — 86592 SYPHILIS TEST NON-TREP QUAL: CPT | Performed by: NURSE PRACTITIONER

## 2020-08-17 PROCEDURE — 85025 COMPLETE CBC W/AUTO DIFF WBC: CPT | Performed by: NURSE PRACTITIONER

## 2020-08-17 PROCEDURE — G0432 EIA HIV-1/HIV-2 SCREEN: HCPCS | Performed by: NURSE PRACTITIONER

## 2020-08-17 PROCEDURE — 86803 HEPATITIS C AB TEST: CPT | Performed by: NURSE PRACTITIONER

## 2020-08-17 RX ORDER — TAMSULOSIN HYDROCHLORIDE 0.4 MG/1
1 CAPSULE ORAL DAILY
COMMUNITY
End: 2022-09-14

## 2020-08-17 RX ORDER — CIPROFLOXACIN 500 MG/1
500 TABLET, FILM COATED ORAL 2 TIMES DAILY
Qty: 28 TABLET | Refills: 0 | Status: SHIPPED | OUTPATIENT
Start: 2020-08-17 | End: 2021-02-22

## 2020-08-17 NOTE — PROGRESS NOTES
The ABCs of the Annual Wellness Visit  Subsequent Medicare Wellness Visit    Chief Complaint   Patient presents with   • discuss urologist visit   • Med Refill   • colonoscopy referal   • Hyperlipidemia   • Hypertension   • Diabetes       Subjective   History of Present Illness:  Kevin Garsia is a 65 y.o. male who presents for a Subsequent Medicare Wellness Visit.    HEALTH RISK ASSESSMENT    Recent Hospitalizations:  No hospitalization(s) within the last year.    Current Medical Providers:  Patient Care Team:  Meli Felipe APRN as PCP - General (Family Medicine)  Wellington Chaudhari MD as Consulting Physician (Urology)    Smoking Status:  Social History     Tobacco Use   Smoking Status Former Smoker   • Packs/day: 3.00   • Years: 30.00   • Pack years: 90.00   Smokeless Tobacco Never Used       Alcohol Consumption:  Social History     Substance and Sexual Activity   Alcohol Use No       Depression Screen:   PHQ-2/PHQ-9 Depression Screening 8/17/2020   Little interest or pleasure in doing things 3   Feeling down, depressed, or hopeless 3   Trouble falling or staying asleep, or sleeping too much 3   Feeling tired or having little energy 3   Poor appetite or overeating 0   Feeling bad about yourself - or that you are a failure or have let yourself or your family down 3   Trouble concentrating on things, such as reading the newspaper or watching television 0   Moving or speaking so slowly that other people could have noticed. Or the opposite - being so fidgety or restless that you have been moving around a lot more than usual 0   Thoughts that you would be better off dead, or of hurting yourself in some way 0   Total Score 15   If you checked off any problems, how difficult have these problems made it for you to do your work, take care of things at home, or get along with other people? Not difficult at all       Fall Risk Screen:  STEADI Fall Risk Assessment was completed, and patient is at LOW risk for  falls.Assessment completed on:8/17/2020    Health Habits and Functional and Cognitive Screening:  Functional & Cognitive Status 8/17/2020   Do you have difficulty preparing food and eating? No   Do you have difficulty bathing yourself, getting dressed or grooming yourself? No   Do you have difficulty using the toilet? No   Do you have difficulty moving around from place to place? No   Do you have trouble with steps or getting out of a bed or a chair? No   Current Diet Well Balanced Diet   Dental Exam Not up to date   Eye Exam Not up to date   Exercise (times per week) 5 times per week   Current Exercise Activities Include Walking   Do you need help using the phone?  No   Are you deaf or do you have serious difficulty hearing?  No   Do you need help with transportation? Yes   Do you need help shopping? No   Do you need help preparing meals?  No   Do you need help with housework?  No   Do you need help with laundry? No   Do you need help taking your medications? No   Do you need help managing money? No   Do you ever drive or ride in a car without wearing a seat belt? No   Have you felt unusual stress, anger or loneliness in the last month? No   Who do you live with? Alone   If you need help, do you have trouble finding someone available to you? No   Have you been bothered in the last four weeks by sexual problems? No   Do you have difficulty concentrating, remembering or making decisions? No         Does the patient have evidence of cognitive impairment? No    Asprin use counseling:Taking ASA appropriately as indicated    Age-appropriate Screening Schedule:  Refer to the list below for future screening recommendations based on patient's age, sex and/or medical conditions. Orders for these recommended tests are listed in the plan section. The patient has been provided with a written plan.    Health Maintenance   Topic Date Due   • TDAP/TD VACCINES (1 - Tdap) 06/24/1964   • ZOSTER VACCINE (1 of 2) 06/24/2003   •  DIABETIC FOOT EXAM  06/06/2016   • COLONOSCOPY  06/06/2016   • URINE MICROALBUMIN  06/07/2017   • HEMOGLOBIN A1C  01/11/2020   • LIPID PANEL  07/09/2020   • INFLUENZA VACCINE  08/01/2020   • DIABETIC EYE EXAM  10/05/2020 (Originally 6/6/2016)          The following portions of the patient's history were reviewed and updated as appropriate: allergies, current medications, past family history, past medical history, past social history, past surgical history and problem list.    Outpatient Medications Prior to Visit   Medication Sig Dispense Refill   • amLODIPine (NORVASC) 10 MG tablet TAKE 1 TABLET BY MOUTH DAILY 90 tablet 1   • apixaban (ELIQUIS) 5 MG tablet tablet Take 5 mg by mouth Every 12 (Twelve) Hours.     • aspirin 81 MG chewable tablet      • atorvastatin (LIPITOR) 40 MG tablet TAKE 1 TABLET BY MOUTH DAILY 90 tablet 1   • Blood Glucose Monitoring Suppl (ACCU-CHEK DIONTE PLUS) w/Device kit 1 kit 4 (Four) Times a Day. 1 kit 1   • CBD (cannabidiol) oral oil Take  by mouth 2 (Two) Times a Day. Half a dropper twice daily     • losartan (COZAAR) 100 MG tablet TAKE 1 TABLET BY MOUTH DAILY 90 tablet 1   • metFORMIN (GLUCOPHAGE) 1000 MG tablet TAKE 1 TABLET BY MOUTH TWICE DAILY WITH MEALS 120 tablet 0   • metoprolol tartrate (LOPRESSOR) 25 MG tablet TAKE 1 TABLET BY MOUTH DAILY 14 tablet 0   • tamsulosin (FLOMAX) 0.4 MG capsule 24 hr capsule Take 1 capsule by mouth Daily.     • vitamin D (ERGOCALCIFEROL) 90758 units capsule capsule Take 1 capsule by mouth 1 (One) Time Per Week. 12 capsule 0     No facility-administered medications prior to visit.        Patient Active Problem List   Diagnosis   • Hypertension   • Hyperlipidemia   • History of prostate cancer   • Non morbid obesity due to excess calories   • Vitamin D deficiency   • TIA (transient ischemic attack)   • S/P carotid endarterectomy   • Type 2 diabetes mellitus without complication, without long-term current use of insulin (CMS/[facility])       Advanced Care  "Planning:  ACP discussion was held with the patient during this visit. Patient does not have an advance directive, information provided.    Review of Systems    Compared to one year ago, the patient feels his physical health is better.  Compared to one year ago, the patient feels his mental health is better.    Reviewed chart for potential of high risk medication in the elderly: yes  Reviewed chart for potential of harmful drug interactions in the elderly:yes    Objective         Vitals:    08/17/20 1050   BP: 148/68   Pulse: 71   Temp: 97.5 °F (36.4 °C)   SpO2: 96%   Weight: 84 kg (185 lb 3.2 oz)   Height: 165.1 cm (65\")       Body mass index is 30.82 kg/m².  Discussed the patient's BMI with him. The BMI is in the acceptable range.    Physical Exam          Assessment/Plan   Medicare Risks and Personalized Health Plan  CMS Preventative Services Quick Reference  Advance Directive Discussion  Colon Cancer Screening  Depression/Dysphoria  Diabetic Lab Screening   Fall Risk  Prostate Cancer Screening     The above risks/problems have been discussed with the patient.  Pertinent information has been shared with the patient in the After Visit Summary.  Follow up plans and orders are seen below in the Assessment/Plan Section.    Diagnoses and all orders for this visit:    1. Hyperlipidemia, unspecified hyperlipidemia type (Primary)    2. Essential hypertension    3. TIA (transient ischemic attack)    4. Type 2 diabetes mellitus without complication, without long-term current use of insulin (CMS/[facility])    5. Screen for colon cancer    Other orders  -     Comprehensive Metabolic Panel  -     CBC & Differential  -     Lipid Panel  -     TSH  -     Vitamin D 25 Hydroxy  -     Hemoglobin A1c  -     MicroAlbumin, Urine, Random - Urine, Clean Catch      Follow Up:  No follow-ups on file.     An After Visit Summary and PPPS were given to the patient.             "

## 2020-08-17 NOTE — PATIENT INSTRUCTIONS
Advance Care Planning and Advance Directives     You make decisions on a daily basis - decisions about where you want to live, your career, your home, your life. Perhaps one of the most important decisions you face is your choice for future medical care. Take time to talk with your family and your healthcare team and start planning today.  Advance Care Planning is a process that can help you:  · Understand possible future healthcare decisions in light of your own experiences  · Reflect on those decision in light of your goals and values  · Discuss your decisions with those closest to you and the healthcare professionals that care for you  · Make a plan by creating a document that reflects your wishes    Surrogate Decision Maker  In the event of a medical emergency, which has left you unable to communicate or to make your own decisions, you would need someone to make decisions for you.  It is important to discuss your preferences for medical treatment with this person while you are in good health.     Qualities of a surrogate decision maker:  • Willing to take on this role and responsibility  • Knows what you want for future medical care  • Willing to follow your wishes even if they don't agree with them  • Able to make difficult medical decisions under stressful circumstances    Advance Directives  These are legal documents you can create that will guide your healthcare team and decision maker(s) when needed. These documents can be stored in the electronic medical record.    · Living Will - a legal document to guide your care if you have a terminal condition or a serious illness and are unable to communicate. States vary by statute in document names/types, but most forms may include one or more of the following:        -  Directions regarding life-prolonging treatments        -  Directions regarding artificially provided nutrition/hydration        -  Choosing a healthcare decision maker        -  Direction  regarding organ/tissue donation    · Durable Power of  for Healthcare - this document names an -in-fact to make medical decisions for you, but it may also allow this person to make personal and financial decisions for you. Please seek the advice of an  if you need this type of document.    **Advance Directives are not required and no one may discriminate against you if you do not sign one.    Medical Orders  Many states allow specific forms/orders signed by your physician to record your wishes for medical treatment in your current state of health. This form, signed in personal communication with your physician, addresses resuscitation and other medical interventions that you may or may not want.      For more information or to schedule a time with a [facility] Advance Care Planning Facilitator contact: [facility], [place] or call [phone] and someone will contact you directly.  Medicare Wellness  Personal Prevention Plan of Service     Date of Office Visit:  2020  Encounter Provider:  BOBBY Gomez  Place of Service:  [facility]  Patient Name: Kevin Garsia  :  1955    As part of the Medicare Wellness portion of your visit today, we are providing you with this personalized preventive plan of services (PPPS). This plan is based upon recommendations of the [place] Preventive Services Task Force (USPSTF) and the Advisory Committee on Immunization Practices (ACIP).    This lists the preventive care services that should be considered, and provides dates of when you are due. Items listed as completed are up-to-date and do not require any further intervention.    Health Maintenance   Topic Date Due   • TDAP/TD VACCINES (1 - Tdap) 1964   • ZOSTER VACCINE (1 of 2) 2003   • HEPATITIS C SCREENING  2016   • COLONOSCOPY  2016   • URINE MICROALBUMIN  2017   • AAA SCREEN (ONE-TIME)  2019   •  HEMOGLOBIN A1C  01/11/2020   • LIPID PANEL  07/09/2020   • INFLUENZA VACCINE  08/01/2020   • DIABETIC EYE EXAM  10/05/2020 (Originally 6/6/2016)   • MEDICARE ANNUAL WELLNESS  08/17/2021   • DIABETIC FOOT EXAM  08/17/2021   • Pneumococcal Vaccine Once at 65 Years Old  Completed       Orders Placed This Encounter   Procedures   • Chlamydia trachomatis, Neisseria gonorrhoeae, PCR - , Urine, Clean Catch   • CT Chest Low Dose Wo     Standing Status:   Future     Standing Expiration Date:   8/17/2021     Order Specific Question:   The patient is age 55-77:     Answer:   65     Order Specific Question:   The patient is a current smoker?     Answer:   No     Order Specific Question:   The patient is a former smoker who has quit within the last 15 years?     Answer:   No     Order Specific Question:   The patient has a smoking history of 30 pack-years or greater:     Answer:   Yes     Order Specific Question:   Actual pack - year smoking history (number):     Answer:   60     Order Specific Question:   Has the Patient had a Chest CT scan within the past 12 months?     Answer:   No     Order Specific Question:   Does the patient have any clinical signs/symptoms of lung cancer?     Answer:   No     Order Specific Question:   The patient was engaged in shared decision-making for this test:     Answer:   Yes   • US AAA Screen Limited     Standing Status:   Future     Standing Expiration Date:   8/17/2021     Order Specific Question:   Is the patient a male between 65-75 years old and have smoked at least 100 cigarettes in their lifetime OR a male or female Medicare patient who has a family history of abdominal aoritc aneurysm?     Answer:   Yes     Order Specific Question:   Reason for Exam:     Answer:   screening, former smoker   • Comprehensive Metabolic Panel   • Lipid Panel   • TSH   • Vitamin D 25 Hydroxy   • Hemoglobin A1c   • MicroAlbumin, Urine, Random - Urine, Clean Catch   • HIV-1 / O / 2 Ag / Antibody 4th Generation    • RPR   • Hepatitis C Antibody   • Ambulatory Referral to Sleep Medicine     Referral Priority:   Routine     Referral Type:   Consultation     Referral Reason:   Specialty Services Required     Referred to Provider:   Kemi Fontenot APRN     Requested Specialty:   Sleep Medicine     Number of Visits Requested:   1   • Ambulatory Referral For Screening Colonoscopy     Referral Priority:   Routine     Referral Type:   Diagnostic Medical     Referral Reason:   Specialty Services Required     Number of Visits Requested:   1   • Ambulatory Referral to Infectious Disease     Referral Priority:   Urgent     Referral Type:   Consultation     Referral Reason:   Specialty Services Required     Requested Specialty:   Infectious Diseases     Number of Visits Requested:   1   • Ambulatory Referral to Neurology     Referral Priority:   Urgent     Referral Type:   Consultation     Referral Reason:   Specialty Services Required     Referred to Provider:   Emmett Coffman II, MD     Requested Specialty:   Neurology     Number of Visits Requested:   1   • CBC & Differential     Order Specific Question:   Manual Differential     Answer:   No   • Urinalysis With Microscopic - Urine, Clean Catch       No follow-ups on file.      Labs today will call with results.   Medicare wellness today,   Pneumovax 23 today, he will check with pharmacy for tdap and shingles vaccines.   Refer to ID, neurology, sleep med will call with appt.   Cont f/u wit urology as scheduled.   Refer for screening colonoscopy,   AAA screen and lung cancer screenings ordered.   Discussed truvada or prep options, encouraged protection with intercourse every encounter, refer to ID for consult.   Cont diet and exercise,   Cont to monitor BS and BP call with problems. He will make eye appt f/u.   Increase fluid intake, get plenty of rest.   Patient agrees with plan of care and understands instructions. Call if worsening symptoms or any problems or concerns.

## 2020-08-17 NOTE — PROGRESS NOTES
Subjective   Kevin Garsia is a 65 y.o. male.     History of Present Illness   Here today for f/u, with HTN, taking amlodpine 10mg daily, losartan 100mg daily, metoprolol 25mg daily, he does not check BP at home, denies CP, SOA, HA, LE edema. He does exercise with resistance bands and walking daily, he states he lost about 30lbs in the past year.   With HLD, taking lipitor 40mg daily, hx of TIA, also taking asa 81mg daily, he is fasting today.   With IGOR, sees pulm, no longer using cpap, states he does not have machine.   With DM, taking metformin 1000mg bid, last a1c 7/2019 6.8. He does not check BS at home, he does have machine.   With vit d def, prev taking vit d 50,000 weekly dose. No longer taking this.   Sees urology Dr. Chaudhari, hx of prostate cancer, UTD psa per urology, now taking flomax, states recently had US testicle and cystoscopy, states normal from urology.   He has never had colonoscopy, would like referral today.   States going through divorce, worried about covid, lost job due to covid, does have some depression, phq 9 score 15, he states handling well. Never taken medications before, he has tried counselor before. He states he tried OTC sleep aid.   States he is wondering about Truvada, states he does have high risk sex at times. He state wife also high risk sex. He does have new partners, no recent std screening. Denies any urinary symptoms.       The following portions of the patient's history were reviewed and updated as appropriate: allergies, current medications, past family history, past medical history, past social history, past surgical history and problem list.    Review of Systems   Constitutional: Negative for chills, diaphoresis and fever.   Respiratory: Negative for cough and shortness of breath.    Cardiovascular: Negative for chest pain, palpitations and leg swelling.   Musculoskeletal: Negative for arthralgias and myalgias.   Neurological: Negative for dizziness,  light-headedness and headache.   All other systems reviewed and are negative.      Objective   Physical Exam   Constitutional: He is oriented to person, place, and time. He appears well-developed and well-nourished.   HENT:   Head: Normocephalic.   Eyes: Pupils are equal, round, and reactive to light.   Cardiovascular: Normal rate, regular rhythm and normal heart sounds.   Pulmonary/Chest: Effort normal and breath sounds normal.   Musculoskeletal: Normal range of motion.    Kevin had a diabetic foot exam performed today.  Vascular Status -  His right foot exhibits normal foot vasculature  and no edema. His left foot exhibits normal foot vasculature  and no edema.  Skin Integrity  -  His right foot skin is intact.His left foot skin is intact..  Neurological: He is alert and oriented to person, place, and time.   Skin: Skin is warm and dry.   Psychiatric: He has a normal mood and affect. His behavior is normal.   Nursing note and vitals reviewed.        Assessment/Plan   Kevin was seen today for discuss urologist visit, med refill, colonoscopy referal, hyperlipidemia, hypertension and diabetes.    Diagnoses and all orders for this visit:    Medicare annual wellness visit, subsequent  -     Comprehensive Metabolic Panel  -     CBC & Differential  -     Lipid Panel  -     TSH  -     Urinalysis With Microscopic - Urine, Clean Catch  -     CBC Auto Differential  -     Urinalysis without microscopic (no culture) - Urine, Clean Catch  -     Urinalysis, Microscopic Only - Urine, Clean Catch    Hyperlipidemia, unspecified hyperlipidemia type  -     Comprehensive Metabolic Panel  -     CBC & Differential  -     Lipid Panel  -     TSH  -     Urinalysis With Microscopic - Urine, Clean Catch  -     CBC Auto Differential  -     Urinalysis without microscopic (no culture) - Urine, Clean Catch  -     Urinalysis, Microscopic Only - Urine, Clean Catch    Essential hypertension  -     Comprehensive Metabolic Panel  -     CBC &  Differential  -     Lipid Panel  -     TSH  -     Urinalysis With Microscopic - Urine, Clean Catch  -     Miscellaneous DME  -     CBC Auto Differential  -     Urinalysis without microscopic (no culture) - Urine, Clean Catch  -     Urinalysis, Microscopic Only - Urine, Clean Catch    TIA (transient ischemic attack)  -     Comprehensive Metabolic Panel  -     CBC & Differential  -     Lipid Panel  -     TSH  -     Urinalysis With Microscopic - Urine, Clean Catch  -     Ambulatory Referral to Neurology  -     CBC Auto Differential  -     Urinalysis without microscopic (no culture) - Urine, Clean Catch  -     Urinalysis, Microscopic Only - Urine, Clean Catch    Type 2 diabetes mellitus without complication, without long-term current use of insulin (CMS/[facility])  -     Comprehensive Metabolic Panel  -     CBC & Differential  -     Lipid Panel  -     TSH  -     Hemoglobin A1c  -     MicroAlbumin, Urine, Random - Urine, Clean Catch  -     Urinalysis With Microscopic - Urine, Clean Catch  -     CBC Auto Differential  -     Urinalysis without microscopic (no culture) - Urine, Clean Catch  -     Urinalysis, Microscopic Only - Urine, Clean Catch    Screen for colon cancer  -     Ambulatory Referral For Screening Colonoscopy  -     Urinalysis With Microscopic - Urine, Clean Catch  -     Urinalysis without microscopic (no culture) - Urine, Clean Catch  -     Urinalysis, Microscopic Only - Urine, Clean Catch    Screen for STD (sexually transmitted disease)  -     Chlamydia trachomatis, Neisseria gonorrhoeae, PCR - , Urine, Clean Catch  -     HIV-1 / O / 2 Ag / Antibody 4th Generation  -     RPR  -     Urinalysis With Microscopic - Urine, Clean Catch  -     Urinalysis without microscopic (no culture) - Urine, Clean Catch  -     Urinalysis, Microscopic Only - Urine, Clean Catch    Vitamin D deficiency  -     Vitamin D 25 Hydroxy  -     Urinalysis With Microscopic - Urine, Clean Catch  -     Urinalysis without microscopic (no culture)  - Urine, Clean Catch  -     Urinalysis, Microscopic Only - Urine, Clean Catch    High risk bisexual behavior  -     Urinalysis With Microscopic - Urine, Clean Catch  -     Ambulatory Referral to Infectious Disease  -     Urinalysis without microscopic (no culture) - Urine, Clean Catch  -     Urinalysis, Microscopic Only - Urine, Clean Catch    IGOR (obstructive sleep apnea)  -     Ambulatory Referral to Sleep Medicine  -     Urinalysis With Microscopic - Urine, Clean Catch  -     Urinalysis without microscopic (no culture) - Urine, Clean Catch  -     Urinalysis, Microscopic Only - Urine, Clean Catch    Former smoker  -     CT Chest Low Dose Wo; Future  -     [place] AAA Screen Limited; Future    Cigarette nicotine dependence in remission  -     CT Chest Low Dose Wo; Future  -     [place] AAA Screen Limited; Future    Other recurrent depressive disorders (CMS/HCC)    Need for vaccination    Need for hepatitis C screening test  -     Hepatitis C Antibody    Other orders  -     metoprolol tartrate (LOPRESSOR) 25 MG tablet; Take 1 tablet by mouth Daily.  -     Pneumococcal Polysaccharide Vaccine 23-Valent (PPSV23) Greater Than or Equal To 1yo Subcutaneous / IM        Labs today will call with results.   Medicare wellness today,   Pneumovax 23 today, he will check with pharmacy for tdap and shingles vaccines.   Refer to ID, neurology, sleep med will call with appt.   Cont f/u with urology as scheduled.   Refer for screening colonoscopy,   AAA screen and lung cancer screenings ordered.   Discussed truvada or prep options, encouraged protection with intercourse every encounter, refer to ID for consult.   Cont diet and exercise,   Cont to monitor BS and BP call with problems. He will make eye appt f/u.   Increase fluid intake, get plenty of rest.   Patient agrees with plan of care and understands instructions. Call if worsening symptoms or any problems or concerns.       Patient screened positive for depression based on a PHQ-9 score  of 15 on 8/17/2020. Follow-up recommendations include: PCP managing depression.

## 2020-08-18 LAB
C TRACH RRNA SPEC DONR QL NAA+PROBE: NEGATIVE
N GONORRHOEA DNA SPEC QL NAA+PROBE: NEGATIVE

## 2020-08-20 ENCOUNTER — DOCUMENTATION (OUTPATIENT)
Dept: FAMILY MEDICINE CLINIC | Facility: CLINIC | Age: 65
End: 2020-08-20

## 2020-08-25 ENCOUNTER — TELEPHONE (OUTPATIENT)
Dept: FAMILY MEDICINE CLINIC | Facility: CLINIC | Age: 65
End: 2020-08-25

## 2020-08-25 NOTE — TELEPHONE ENCOUNTER
Caller: Kevin Garsia    Relationship: Self    Best call back number: [phone]    What test was performed: BLOOD WORK    When was the test performed: 8/17    Where was the test performed: [facility]

## 2020-09-24 ENCOUNTER — APPOINTMENT (OUTPATIENT)
Dept: SLEEP MEDICINE | Facility: HOSPITAL | Age: 65
End: 2020-09-24

## 2020-10-29 ENCOUNTER — APPOINTMENT (OUTPATIENT)
Dept: SLEEP MEDICINE | Facility: HOSPITAL | Age: 65
End: 2020-10-29

## 2020-11-05 ENCOUNTER — APPOINTMENT (OUTPATIENT)
Dept: SLEEP MEDICINE | Facility: HOSPITAL | Age: 65
End: 2020-11-05

## 2021-02-01 ENCOUNTER — OFFICE VISIT (OUTPATIENT)
Dept: INFECTIOUS DISEASES | Facility: CLINIC | Age: 66
End: 2021-02-01

## 2021-02-01 ENCOUNTER — LAB (OUTPATIENT)
Dept: LAB | Facility: HOSPITAL | Age: 66
End: 2021-02-01

## 2021-02-01 VITALS
SYSTOLIC BLOOD PRESSURE: 158 MMHG | WEIGHT: 190.6 LBS | HEART RATE: 76 BPM | BODY MASS INDEX: 31.75 KG/M2 | TEMPERATURE: 97.7 F | DIASTOLIC BLOOD PRESSURE: 80 MMHG | HEIGHT: 65 IN

## 2021-02-01 DIAGNOSIS — Z11.59 ENCOUNTER FOR SCREENING FOR OTHER VIRAL DISEASES: ICD-10-CM

## 2021-02-01 DIAGNOSIS — Z72.53 HIGH RISK BISEXUAL BEHAVIOR: Primary | ICD-10-CM

## 2021-02-01 LAB
CREAT SERPL-MCNC: 0.87 MG/DL (ref 0.76–1.27)
GFR SERPL CREATININE-BSD FRML MDRD: 88 ML/MIN/1.73
HCV AB SER DONR QL: NORMAL
HIV1+2 AB SER QL: NORMAL
RPR SER QL: NORMAL

## 2021-02-01 PROCEDURE — 36415 COLL VENOUS BLD VENIPUNCTURE: CPT | Performed by: INTERNAL MEDICINE

## 2021-02-01 PROCEDURE — 87491 CHLMYD TRACH DNA AMP PROBE: CPT | Performed by: INTERNAL MEDICINE

## 2021-02-01 PROCEDURE — 86707 HEPATITIS BE ANTIBODY: CPT | Performed by: INTERNAL MEDICINE

## 2021-02-01 PROCEDURE — 82565 ASSAY OF CREATININE: CPT | Performed by: INTERNAL MEDICINE

## 2021-02-01 PROCEDURE — 86592 SYPHILIS TEST NON-TREP QUAL: CPT | Performed by: INTERNAL MEDICINE

## 2021-02-01 PROCEDURE — 87350 HEPATITIS BE AG IA: CPT | Performed by: INTERNAL MEDICINE

## 2021-02-01 PROCEDURE — 80074 ACUTE HEPATITIS PANEL: CPT | Performed by: INTERNAL MEDICINE

## 2021-02-01 PROCEDURE — 87591 N.GONORRHOEAE DNA AMP PROB: CPT | Performed by: INTERNAL MEDICINE

## 2021-02-01 PROCEDURE — G0499 HEPB SCREEN HIGH RISK INDIV: HCPCS | Performed by: INTERNAL MEDICINE

## 2021-02-01 PROCEDURE — 99204 OFFICE O/P NEW MOD 45 MIN: CPT | Performed by: INTERNAL MEDICINE

## 2021-02-01 PROCEDURE — G0432 EIA HIV-1/HIV-2 SCREEN: HCPCS | Performed by: INTERNAL MEDICINE

## 2021-02-01 RX ORDER — EMTRICITABINE AND TENOFOVIR DISOPROXIL FUMARATE 200; 300 MG/1; MG/1
1 TABLET, FILM COATED ORAL DAILY
Qty: 30 TABLET | Refills: 2 | Status: SHIPPED | OUTPATIENT
Start: 2021-02-01 | End: 2021-05-11 | Stop reason: SDUPTHER

## 2021-02-01 NOTE — PROGRESS NOTES
"cc: Here for evaluation preexposure prophylaxis    Patient reports he is in his usual state of health.  He is recently  from his wife and is looking to engage in sexual activity.  Says he has sex with men and women both.  He has a history of chlamydia but tested negative for HIV, chlamydia and hepatitis C back in August 2020.  He has not been sexually active for about 6 months due to COVID but thinks there is a reasonable certainty that he is going to start resuming high risk sexual activity here in the next month or 2.  He has not had any fevers or chills or night sweats or discharge.  He feels well.  His ex-wife is recommended that he start preexposure prophylaxis since they may run in the same circles.  She is tolerating it well.  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  He  from his wife.  Former smoker but quit 10 years ago.  No IV drug use.  No alcohol use.      Review of Systems: All other reviewed and negative except as per HPI    Blood pressure 158/80, pulse 76, temperature 97.7 °F (36.5 °C), height 165.3 cm (65.06\"), weight 86.5 kg (190 lb 9.6 oz).  GENERAL: Awake and alert, in no acute distress.   HEENT: Oropharynx is clear. Hearing is grossly normal.   EYES: PERRL. No conjunctival injection. No lid lag.   LYMPHATICS: No lymphadenopathy of the neck or axillary regions.   HEART: Regular rate and rhythm. No peripheral edema.   LUNGS: Clear to auscultation anteriorly with normal respiratory effort.   ABDOMEN: Soft, nontender, nondistended. No appreciable organomegaly.   SKIN: Warm and dry without cutaneous eruptions   PSYCHIATRIC: Appropriate mood, affect, insight, " and judgment.       Assessment and Plan  High risk bisexual behavior  -     Chlamydia trachomatis, Neisseria gonorrhoeae, PCR - Urine, Urine, Clean Catch  -     Hepatitis C Antibody  -     HIV-1 / O / 2 Ag / Antibody 4th Generation  -     RPR  -     Creatinine, Serum  -     Hepatitis B Virus Profile  -     Hepatitis A Antibody, Total    Encounter for screening for other viral diseases   -     Hepatitis B Virus Profile      Discussed with patient preexposure prophylaxis.  He understands that this is not a guarantee to protect against HIV and he must take it every day.  We will prescribe Truvada since that is what his insurance will pay for he says.  He understands common side effects including dyspepsia as well as bone mineral density problems as well as nephrotoxicity.  He also understands that he should not abandon safe sex practices and will not protect against other STIs.  Having carefully considered all the above, he is agreeable to proceed.  We therefore prescribed Truvada 30-day fill with 2 refills and we will see him back in clinic in 3 months or sooner if necessary

## 2021-02-02 LAB
HAV AB SER QL IA: NEGATIVE
HBV CORE AB SERPL QL IA: NEGATIVE
HBV CORE IGM SERPL QL IA: NEGATIVE
HBV E AB SERPL QL IA: NEGATIVE
HBV E AG SERPL QL IA: NEGATIVE
HBV SURFACE AB SER QL: NON REACTIVE
HBV SURFACE AG SERPL QL IA: NEGATIVE

## 2021-02-03 LAB
C TRACH RRNA SPEC QL NAA+PROBE: NEGATIVE
N GONORRHOEA RRNA SPEC QL NAA+PROBE: NEGATIVE

## 2021-02-03 RX ORDER — LOSARTAN POTASSIUM 100 MG/1
100 TABLET ORAL DAILY
Qty: 90 TABLET | Refills: 1 | Status: SHIPPED | OUTPATIENT
Start: 2021-02-03 | End: 2021-07-26

## 2021-02-03 RX ORDER — AMLODIPINE BESYLATE 10 MG/1
10 TABLET ORAL DAILY
Qty: 90 TABLET | Refills: 1 | Status: SHIPPED | OUTPATIENT
Start: 2021-02-03 | End: 2021-08-26

## 2021-02-03 RX ORDER — ATORVASTATIN CALCIUM 40 MG/1
40 TABLET, FILM COATED ORAL DAILY
Qty: 90 TABLET | Refills: 1 | Status: SHIPPED | OUTPATIENT
Start: 2021-02-03 | End: 2021-07-26

## 2021-02-22 ENCOUNTER — OFFICE VISIT (OUTPATIENT)
Dept: FAMILY MEDICINE CLINIC | Facility: CLINIC | Age: 66
End: 2021-02-22

## 2021-02-22 VITALS
SYSTOLIC BLOOD PRESSURE: 134 MMHG | HEIGHT: 65 IN | WEIGHT: 189 LBS | RESPIRATION RATE: 20 BRPM | OXYGEN SATURATION: 98 % | TEMPERATURE: 97.8 F | DIASTOLIC BLOOD PRESSURE: 68 MMHG | HEART RATE: 78 BPM | BODY MASS INDEX: 31.49 KG/M2

## 2021-02-22 DIAGNOSIS — Z72.820 POOR SLEEP: ICD-10-CM

## 2021-02-22 DIAGNOSIS — E66.09 CLASS 1 OBESITY DUE TO EXCESS CALORIES WITH SERIOUS COMORBIDITY AND BODY MASS INDEX (BMI) OF 31.0 TO 31.9 IN ADULT: ICD-10-CM

## 2021-02-22 DIAGNOSIS — Z99.89 OSA ON CPAP: ICD-10-CM

## 2021-02-22 DIAGNOSIS — E78.5 HYPERLIPIDEMIA, UNSPECIFIED HYPERLIPIDEMIA TYPE: ICD-10-CM

## 2021-02-22 DIAGNOSIS — G47.33 OSA ON CPAP: ICD-10-CM

## 2021-02-22 DIAGNOSIS — E11.9 TYPE 2 DIABETES MELLITUS WITHOUT COMPLICATION, WITHOUT LONG-TERM CURRENT USE OF INSULIN (HCC): ICD-10-CM

## 2021-02-22 DIAGNOSIS — E55.9 VITAMIN D DEFICIENCY: ICD-10-CM

## 2021-02-22 DIAGNOSIS — I10 ESSENTIAL HYPERTENSION: Primary | ICD-10-CM

## 2021-02-22 PROBLEM — I50.32 CHRONIC HEART FAILURE WITH PRESERVED EJECTION FRACTION (HCC): Status: ACTIVE | Noted: 2019-07-06

## 2021-02-22 LAB
25(OH)D3 SERPL-MCNC: 25.8 NG/ML (ref 30–100)
ALBUMIN SERPL-MCNC: 4.8 G/DL (ref 3.5–5.2)
ALBUMIN/GLOB SERPL: 1.8 G/DL
ALP SERPL-CCNC: 97 U/L (ref 39–117)
ALT SERPL W P-5'-P-CCNC: 18 U/L (ref 1–41)
ANION GAP SERPL CALCULATED.3IONS-SCNC: 13.3 MMOL/L (ref 5–15)
AST SERPL-CCNC: 16 U/L (ref 1–40)
BILIRUB SERPL-MCNC: 0.4 MG/DL (ref 0–1.2)
BUN SERPL-MCNC: 15 MG/DL (ref 8–23)
BUN/CREAT SERPL: 14.7 (ref 7–25)
CALCIUM SPEC-SCNC: 10.3 MG/DL (ref 8.6–10.5)
CHLORIDE SERPL-SCNC: 101 MMOL/L (ref 98–107)
CHOLEST SERPL-MCNC: 117 MG/DL (ref 0–200)
CO2 SERPL-SCNC: 25.7 MMOL/L (ref 22–29)
CREAT SERPL-MCNC: 1.02 MG/DL (ref 0.76–1.27)
ERYTHROCYTE [DISTWIDTH] IN BLOOD BY AUTOMATED COUNT: 14.4 % (ref 12.3–15.4)
GFR SERPL CREATININE-BSD FRML MDRD: 73 ML/MIN/1.73
GLOBULIN UR ELPH-MCNC: 2.6 GM/DL
GLUCOSE SERPL-MCNC: 96 MG/DL (ref 65–99)
HBA1C MFR BLD: 6.08 % (ref 4.8–5.6)
HCT VFR BLD AUTO: 41.9 % (ref 37.5–51)
HDLC SERPL-MCNC: 30 MG/DL (ref 40–60)
HGB BLD-MCNC: 13.8 G/DL (ref 13–17.7)
LDLC SERPL CALC-MCNC: 49 MG/DL (ref 0–100)
LDLC/HDLC SERPL: 1.31 {RATIO}
LYMPHOCYTES # BLD AUTO: 2.5 10*3/MM3 (ref 0.7–3.1)
LYMPHOCYTES NFR BLD AUTO: 27.5 % (ref 19.6–45.3)
MCH RBC QN AUTO: 29.6 PG (ref 26.6–33)
MCHC RBC AUTO-ENTMCNC: 32.9 G/DL (ref 31.5–35.7)
MCV RBC AUTO: 89.8 FL (ref 79–97)
MONOCYTES # BLD AUTO: 0.8 10*3/MM3 (ref 0.1–0.9)
MONOCYTES NFR BLD AUTO: 8.7 % (ref 5–12)
NEUTROPHILS NFR BLD AUTO: 5.9 10*3/MM3 (ref 1.7–7)
NEUTROPHILS NFR BLD AUTO: 63.8 % (ref 42.7–76)
PLATELET # BLD AUTO: 311 10*3/MM3 (ref 140–450)
PMV BLD AUTO: 7.3 FL (ref 6–12)
POTASSIUM SERPL-SCNC: 5 MMOL/L (ref 3.5–5.2)
PROT SERPL-MCNC: 7.4 G/DL (ref 6–8.5)
RBC # BLD AUTO: 4.67 10*6/MM3 (ref 4.14–5.8)
SODIUM SERPL-SCNC: 140 MMOL/L (ref 136–145)
TRIGL SERPL-MCNC: 238 MG/DL (ref 0–150)
VLDLC SERPL-MCNC: 38 MG/DL (ref 5–40)
WBC # BLD AUTO: 9.2 10*3/MM3 (ref 3.4–10.8)

## 2021-02-22 PROCEDURE — 83036 HEMOGLOBIN GLYCOSYLATED A1C: CPT | Performed by: NURSE PRACTITIONER

## 2021-02-22 PROCEDURE — 80061 LIPID PANEL: CPT | Performed by: NURSE PRACTITIONER

## 2021-02-22 PROCEDURE — 85025 COMPLETE CBC W/AUTO DIFF WBC: CPT | Performed by: NURSE PRACTITIONER

## 2021-02-22 PROCEDURE — 80053 COMPREHEN METABOLIC PANEL: CPT | Performed by: NURSE PRACTITIONER

## 2021-02-22 PROCEDURE — 82306 VITAMIN D 25 HYDROXY: CPT | Performed by: NURSE PRACTITIONER

## 2021-02-22 PROCEDURE — 99214 OFFICE O/P EST MOD 30 MIN: CPT | Performed by: NURSE PRACTITIONER

## 2021-02-22 RX ORDER — HYDROXYZINE HYDROCHLORIDE 10 MG/1
TABLET, FILM COATED ORAL
Qty: 60 TABLET | Refills: 1 | Status: SHIPPED | OUTPATIENT
Start: 2021-02-22 | End: 2022-04-11

## 2021-02-22 NOTE — PATIENT INSTRUCTIONS
Check labs and call with results, cont chronic dz meds and check BP and BS at home, DM foot exam today encourage overdue DM eye exam, Enc healthy diet and regular exercise for wt loss, discussed sleep hygiene and recommend mediation and yoga, defer counseling, trial hydroxyzine prn and FU with sleep medicine overdue, encourage overdue colonoscopy

## 2021-02-22 NOTE — PROGRESS NOTES
Chief Complaint  Hypertension, Insomnia (trouble falling to sleep/ has tried Melatonin and other otc RX), and Shoulder Pain (right/ worse at night)    Subjective          Kevin Garsia presents to [facility]  History of Present Illness  Here to FU on chronic well controlled HTN, chol and TIA on amlodipine 10 mg, ASA 81 mg, losartan 100 mg, metoprolol 25 mg and lipitor 10 mg, no missed doses, no CP dizziness HA LE edema, fasting for recheck, with chronic well controlled DM2 on metformin 1000 mg BID last A1C 6 08/20, Pending eye apt this month, no feet pain, plans to schedule overdue colonoscopy, PGF hx of colon ca  Saw Dr Kwon ID not taking truvada d/t cost, recently  and living alone  C/o poor sleep and difficulty falling asleep has tried melatonin not much help, with chronic IGOR on CPAP overdue FU apt, no prev mood or sleep medicine and notes has improved with meditation, increasing exercise, 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    Cardiovascular: Negative for chest pain and palpitations.   Gastrointestinal: Negative for abdominal distention, abdominal pain, anal bleeding, blood in stool, constipation, diarrhea, nausea, rectal pain and vomiting.   Endocrine: Negative for cold intolerance, heat intolerance, polydipsia, polyphagia and polyuria.   Musculoskeletal: Positive for arthralgias.   Neurological: Negative for dizziness and confusion.   Psychiatric/Behavioral: Positive for behavioral problems, dysphoric mood and sleep disturbance. Negative for agitation, decreased concentration, hallucinations, self-injury and suicidal ideas. The  "patient is nervous/anxious. The patient is not hyperactive.    All other systems reviewed and are negative.    Objective   Vital Signs:   /68 (BP Location: Left arm, Patient Position: Sitting)   Pulse 78   Temp 97.8 °F (36.6 °C) (Infrared)   Resp 20   Ht 165.1 cm (65\")   Wt 85.7 kg (189 lb)   SpO2 98%   BMI 31.45 kg/m²     Physical Exam  Vitals signs reviewed.   Constitutional:       Appearance: He is well-developed.   HENT:      Head: Normocephalic and atraumatic.   Eyes:      Conjunctiva/sclera: Conjunctivae normal.      Pupils: Pupils are equal, round, and reactive to light.   Neck:      Musculoskeletal: Normal range of motion.   Cardiovascular:      Rate and Rhythm: Normal rate and regular rhythm.      Pulses: Normal pulses.      Heart sounds: Normal heart sounds.   Pulmonary:      Effort: Pulmonary effort is normal.      Breath sounds: Normal breath sounds.   Abdominal:      General: There is no distension.      Palpations: Abdomen is soft. There is no mass.      Tenderness: There is no abdominal tenderness. There is no right CVA tenderness, left CVA tenderness, guarding or rebound.      Hernia: No hernia is present.   Musculoskeletal: Normal range of motion.   Skin:     General: Skin is warm and dry.   Neurological:      Mental Status: He is alert and oriented to person, place, and time.   Psychiatric:         Mood and Affect: Mood normal.         Behavior: Behavior normal.         Thought Content: Thought content normal.         Judgment: Judgment normal.     Diabetic foot exam: sensation intact, pulses strong, well hydrated no ulcers or fungal toenails    Result Review :                 Assessment and Plan    Diagnoses and all orders for this visit:    1. Essential hypertension (Primary)  -     CBC & Differential  -     Lipid Panel  -     Comprehensive Metabolic Panel  -     CBC Auto Differential    2. Hyperlipidemia, unspecified hyperlipidemia type  -     CBC & Differential  -     Lipid " Panel  -     Comprehensive Metabolic Panel  -     CBC Auto Differential    3. Vitamin D deficiency  -     Vitamin D 25 Hydroxy    4. Type 2 diabetes mellitus without complication, without long-term current use of insulin (CMS/[facility])  -     CBC & Differential  -     Lipid Panel  -     Comprehensive Metabolic Panel  -     Hemoglobin A1c  -     CBC Auto Differential    5. IGOR on CPAP    6. Poor sleep    7. Class 1 obesity due to excess calories with serious comorbidity and body mass index (BMI) of 31.0 to 31.9 in adult    Other orders  -     hydrOXYzine (ATARAX) 10 MG tablet; 1-2  PO HS  Dispense: 60 tablet; Refill: 1        Follow Up   Return in about 6 months (around 8/22/2021).  Patient was given instructions and counseling regarding his condition or for health maintenance advice. Please see specific information pulled into the AVS if appropriate.   Check labs and call with results, cont chronic dz meds and check BP and BS at home, DM foot exam today encourage overdue DM eye exam, Enc healthy diet and regular exercise for wt loss, discussed sleep hygiene and recommend mediation and yoga, defer counseling, trial hydroxyzine prn and FU with sleep medicine overdue, encourage overdue colonoscopy

## 2021-02-23 RX ORDER — ERGOCALCIFEROL 1.25 MG/1
50000 CAPSULE ORAL WEEKLY
Qty: 12 CAPSULE | Refills: 0 | Status: SHIPPED | OUTPATIENT
Start: 2021-02-23 | End: 2021-05-17

## 2021-04-30 ENCOUNTER — TELEPHONE (OUTPATIENT)
Dept: INFECTIOUS DISEASES | Facility: CLINIC | Age: 66
End: 2021-04-30

## 2021-05-07 ENCOUNTER — OFFICE VISIT (OUTPATIENT)
Dept: FAMILY MEDICINE CLINIC | Facility: CLINIC | Age: 66
End: 2021-05-07

## 2021-05-07 VITALS
BODY MASS INDEX: 29.32 KG/M2 | HEART RATE: 59 BPM | TEMPERATURE: 97.3 F | WEIGHT: 176 LBS | SYSTOLIC BLOOD PRESSURE: 140 MMHG | OXYGEN SATURATION: 99 % | HEIGHT: 65 IN | DIASTOLIC BLOOD PRESSURE: 80 MMHG | RESPIRATION RATE: 16 BRPM

## 2021-05-07 DIAGNOSIS — G45.9 TIA (TRANSIENT ISCHEMIC ATTACK): ICD-10-CM

## 2021-05-07 DIAGNOSIS — R42 DIZZINESS: Primary | ICD-10-CM

## 2021-05-07 DIAGNOSIS — E11.9 TYPE 2 DIABETES MELLITUS WITHOUT COMPLICATION, WITHOUT LONG-TERM CURRENT USE OF INSULIN (HCC): ICD-10-CM

## 2021-05-07 DIAGNOSIS — I10 ESSENTIAL HYPERTENSION: ICD-10-CM

## 2021-05-07 PROCEDURE — 93000 ELECTROCARDIOGRAM COMPLETE: CPT | Performed by: INTERNAL MEDICINE

## 2021-05-07 PROCEDURE — 99213 OFFICE O/P EST LOW 20 MIN: CPT | Performed by: INTERNAL MEDICINE

## 2021-05-07 NOTE — PROGRESS NOTES
ECG 12 Lead    Date/Time: 5/7/2021 5:51 PM  Performed by: Paul Palma MD  Authorized by: Paul Palma MD   Rhythm: sinus bradycardia  Rate: bradycardic  Conduction: right bundle branch block  QRS axis: left  Other: no other findings    Clinical impression: abnormal EKG  Comments: EKG Interpretation Report    Heart rate:    59 beats/min, ME interval:  151 msec, QRS duration:  166 msec  QTu:476 msec, QTc:  476 msec

## 2021-05-07 NOTE — PROGRESS NOTES
Subjective   Kevin Garsia is a 65 y.o. male.     Vitals:    05/07/21 1308   BP: 140/80   Pulse: 59   Resp: 16   Temp: 97.3 °F (36.3 °C)   SpO2: 99%      Body mass index is 29.29 kg/m².     History of Present Illness   Patient was seen for dizziness.  Patient had an episode of dizziness when he went to work.  Patient did not have any vertigo or chest pain or palpitations.  Patient was ataxic and was leaning to his left.  This occurred a week ago.  Patient's had no symptoms before or after.  Patient went home and checked his blood pressure which was 120/80 and blood sugar was 90.  Patient was seen in the office today orthostatic blood pressures were lying down blood pressure 120/70 pulse 68 and standing blood pressure 152/70 pulse 65.  Patient's EKG showed a right bundle branch block which according to the patient was not new.  Patient did have a prior TIA but cranial nerve exam was all normal.  Patient had good motor strength in both lower extremities.  Patient had a CT scan ordered and will follow-up after scan.  Patient's blood sugar has been running in the 120s and he was following a regular diet and exercise activity.  Patient's blood pressures been running 140s over 80s.  Patient will continue to monitor blood pressure at home.  Patient will follow-up after CT scan.    Dictated utilizing Dragon dictation. If there are questions or for further clarification, please contact me.  The following portions of the patient's history were reviewed and updated as appropriate: allergies, current medications, past family history, past medical history, past social history, past surgical history and problem list.    Review of Systems   Constitutional: Negative for fatigue and fever.   HENT: Positive for congestion. Negative for trouble swallowing.    Eyes: Negative for discharge and visual disturbance.   Respiratory: Negative for choking and shortness of breath.    Cardiovascular: Negative for chest pain and palpitations.    Gastrointestinal: Negative for abdominal pain and blood in stool.   Endocrine: Negative.    Genitourinary: Negative for genital sores and hematuria.   Musculoskeletal: Negative for gait problem and joint swelling.   Skin: Negative for color change, pallor, rash and wound.   Allergic/Immunologic: Positive for environmental allergies. Negative for immunocompromised state.   Neurological: Positive for dizziness. Negative for facial asymmetry and speech difficulty.   Psychiatric/Behavioral: Negative for hallucinations and suicidal ideas.       Objective   Physical Exam  Vitals and nursing note reviewed.   Constitutional:       Appearance: Normal appearance. He is well-developed.   HENT:      Head: Normocephalic and atraumatic.      Nose: Nose normal.      Mouth/Throat:      Mouth: Mucous membranes are moist.      Pharynx: Oropharynx is clear.   Eyes:      Extraocular Movements: Extraocular movements intact.      Conjunctiva/sclera: Conjunctivae normal.      Pupils: Pupils are equal, round, and reactive to light.   Cardiovascular:      Rate and Rhythm: Normal rate and regular rhythm.      Heart sounds: Normal heart sounds. No murmur heard.   No friction rub. No gallop.    Pulmonary:      Effort: Pulmonary effort is normal. No respiratory distress.      Breath sounds: Normal breath sounds. No stridor. No wheezing, rhonchi or rales.   Chest:      Chest wall: No tenderness.   Abdominal:      General: Bowel sounds are normal.      Palpations: Abdomen is soft.   Musculoskeletal:         General: Normal range of motion.      Cervical back: Normal range of motion and neck supple.   Skin:     General: Skin is warm and dry.   Neurological:      General: No focal deficit present.      Mental Status: He is alert and oriented to person, place, and time. Mental status is at baseline.   Psychiatric:         Mood and Affect: Mood normal.         Behavior: Behavior normal.         Thought Content: Thought content normal.          Judgment: Judgment normal.         Assessment/Plan #1 CT scan of the head #2 monitor blood pressure blood sugar #3 continue aspirin  Problems Addressed this Visit     Cardiac and Vasculature            Hypertension          Endocrine and Metabolic            Type 2 diabetes mellitus without complication, without long-term current use of insulin (CMS/[facility])          Neuro            TIA (transient ischemic attack)    Relevant Orders    CT Head Without Contrast            Other Visit Diagnoses     Dizziness    -  Primary    Relevant Orders    CT Head Without Contrast      Diagnoses     Diagnosis Codes Comments    Dizziness    -  Primary ICD-10-CM: R42  ICD-9-CM: 780.4     Essential hypertension     ICD-10-CM: I10  ICD-9-CM: 401.9     Type 2 diabetes mellitus without complication, without long-term current use of insulin (CMS/HCC)     ICD-10-CM: E11.9  ICD-9-CM: 250.00     TIA (transient ischemic attack)     ICD-10-CM: G45.9  ICD-9-CM: 435.9

## 2021-05-10 NOTE — TELEPHONE ENCOUNTER
Pt states that he is okay for Dr. Kwon to submit a new script to [facility] and for Tabatha to obtain his financials to try to get Truvada covered at a much lower cost.

## 2021-05-11 RX ORDER — EMTRICITABINE AND TENOFOVIR DISOPROXIL FUMARATE 200; 300 MG/1; MG/1
1 TABLET, FILM COATED ORAL DAILY
Qty: 30 TABLET | Refills: 2 | Status: SHIPPED | OUTPATIENT
Start: 2021-05-11 | End: 2021-08-25

## 2021-05-11 NOTE — TELEPHONE ENCOUNTER
Katherine w/Tabatha munoz/[facility].  She states pts copay is only $34.64.  She is going to contact the patient to see if he can afford this amount.

## 2021-05-17 RX ORDER — ERGOCALCIFEROL 1.25 MG/1
CAPSULE ORAL
Qty: 12 CAPSULE | Refills: 0 | Status: SHIPPED | OUTPATIENT
Start: 2021-05-17 | End: 2021-07-26

## 2021-05-19 ENCOUNTER — TELEPHONE (OUTPATIENT)
Dept: INFECTIOUS DISEASES | Facility: CLINIC | Age: 66
End: 2021-05-19

## 2021-05-19 NOTE — TELEPHONE ENCOUNTER
Per Tabatha munoz/[facility] pt was approved for assistance and will receive shipment of Truvada on 05/21/21

## 2021-06-07 ENCOUNTER — HOSPITAL ENCOUNTER (OUTPATIENT)
Dept: CT IMAGING | Facility: HOSPITAL | Age: 66
Discharge: HOME | End: 2021-06-07
Admitting: INTERNAL MEDICINE

## 2021-06-07 DIAGNOSIS — G45.9 TIA (TRANSIENT ISCHEMIC ATTACK): ICD-10-CM

## 2021-06-07 DIAGNOSIS — R42 DIZZINESS: ICD-10-CM

## 2021-06-07 PROCEDURE — 70450 CT HEAD/BRAIN W/O DYE: CPT

## 2021-06-08 DIAGNOSIS — R42 DIZZINESS: Primary | ICD-10-CM

## 2021-06-08 DIAGNOSIS — G45.9 TIA (TRANSIENT ISCHEMIC ATTACK): ICD-10-CM

## 2021-07-09 ENCOUNTER — APPOINTMENT (OUTPATIENT)
Dept: MRI IMAGING | Facility: HOSPITAL | Age: 66
End: 2021-07-09

## 2021-07-19 ENCOUNTER — HOSPITAL ENCOUNTER (OUTPATIENT)
Dept: MRI IMAGING | Facility: HOSPITAL | Age: 66
Discharge: HOME | End: 2021-07-19
Admitting: INTERNAL MEDICINE

## 2021-07-19 DIAGNOSIS — R42 DIZZINESS: ICD-10-CM

## 2021-07-19 DIAGNOSIS — G45.9 TIA (TRANSIENT ISCHEMIC ATTACK): Primary | ICD-10-CM

## 2021-07-19 DIAGNOSIS — G45.9 TIA (TRANSIENT ISCHEMIC ATTACK): ICD-10-CM

## 2021-07-19 PROCEDURE — A9577 INJ MULTIHANCE: HCPCS | Performed by: INTERNAL MEDICINE

## 2021-07-19 PROCEDURE — 70553 MRI BRAIN STEM W/O & W/DYE: CPT

## 2021-07-19 PROCEDURE — 0 GADOBENATE DIMEGLUMINE 529 MG/ML SOLUTION: Performed by: INTERNAL MEDICINE

## 2021-07-19 PROCEDURE — 82565 ASSAY OF CREATININE: CPT

## 2021-07-19 RX ADMIN — GADOBENATE DIMEGLUMINE 16 ML: 529 INJECTION, SOLUTION INTRAVENOUS at 17:19

## 2021-07-24 LAB — CREAT BLDA-MCNC: 1.1 MG/DL (ref 0.6–1.3)

## 2021-07-26 RX ORDER — LOSARTAN POTASSIUM 100 MG/1
100 TABLET ORAL DAILY
Qty: 90 TABLET | Refills: 1 | Status: SHIPPED | OUTPATIENT
Start: 2021-07-26 | End: 2022-02-07 | Stop reason: SDUPTHER

## 2021-07-26 RX ORDER — ATORVASTATIN CALCIUM 40 MG/1
40 TABLET, FILM COATED ORAL DAILY
Qty: 90 TABLET | Refills: 1 | Status: SHIPPED | OUTPATIENT
Start: 2021-07-26 | End: 2021-08-24 | Stop reason: DRUGHIGH

## 2021-07-26 RX ORDER — ERGOCALCIFEROL 1.25 MG/1
CAPSULE ORAL
Qty: 12 CAPSULE | Refills: 0 | Status: SHIPPED | OUTPATIENT
Start: 2021-07-26 | End: 2022-02-07 | Stop reason: SDUPTHER

## 2021-08-24 ENCOUNTER — OFFICE VISIT (OUTPATIENT)
Dept: FAMILY MEDICINE CLINIC | Facility: CLINIC | Age: 66
End: 2021-08-24

## 2021-08-24 VITALS
BODY MASS INDEX: 28.02 KG/M2 | HEIGHT: 65 IN | HEART RATE: 77 BPM | TEMPERATURE: 98 F | SYSTOLIC BLOOD PRESSURE: 120 MMHG | DIASTOLIC BLOOD PRESSURE: 60 MMHG | OXYGEN SATURATION: 98 % | WEIGHT: 168.2 LBS

## 2021-08-24 DIAGNOSIS — I10 ESSENTIAL HYPERTENSION: ICD-10-CM

## 2021-08-24 DIAGNOSIS — G47.33 OSA (OBSTRUCTIVE SLEEP APNEA): ICD-10-CM

## 2021-08-24 DIAGNOSIS — E11.9 TYPE 2 DIABETES MELLITUS WITHOUT COMPLICATION, WITHOUT LONG-TERM CURRENT USE OF INSULIN (HCC): ICD-10-CM

## 2021-08-24 DIAGNOSIS — G45.9 TIA (TRANSIENT ISCHEMIC ATTACK): ICD-10-CM

## 2021-08-24 DIAGNOSIS — E78.5 HYPERLIPIDEMIA, UNSPECIFIED HYPERLIPIDEMIA TYPE: ICD-10-CM

## 2021-08-24 DIAGNOSIS — Z00.00 MEDICARE ANNUAL WELLNESS VISIT, SUBSEQUENT: Primary | ICD-10-CM

## 2021-08-24 DIAGNOSIS — E55.9 VITAMIN D DEFICIENCY: ICD-10-CM

## 2021-08-24 PROBLEM — G47.30 SLEEP APNEA: Status: ACTIVE | Noted: 2019-07-30

## 2021-08-24 PROBLEM — M19.90 ARTHRITIS: Status: ACTIVE | Noted: 2019-07-30

## 2021-08-24 PROCEDURE — 1170F FXNL STATUS ASSESSED: CPT | Performed by: NURSE PRACTITIONER

## 2021-08-24 PROCEDURE — 99214 OFFICE O/P EST MOD 30 MIN: CPT | Performed by: NURSE PRACTITIONER

## 2021-08-24 PROCEDURE — G0439 PPPS, SUBSEQ VISIT: HCPCS | Performed by: NURSE PRACTITIONER

## 2021-08-24 PROCEDURE — 1159F MED LIST DOCD IN RCRD: CPT | Performed by: NURSE PRACTITIONER

## 2021-08-24 PROCEDURE — 1126F AMNT PAIN NOTED NONE PRSNT: CPT | Performed by: NURSE PRACTITIONER

## 2021-08-24 RX ORDER — ATORVASTATIN CALCIUM 80 MG/1
80 TABLET, FILM COATED ORAL DAILY
Qty: 90 TABLET | Refills: 3 | Status: SHIPPED | OUTPATIENT
Start: 2021-08-24 | End: 2022-02-07 | Stop reason: SDUPTHER

## 2021-08-24 NOTE — PROGRESS NOTES
"Chief Complaint  Follow-up (MRI AND CT)    Subjective          Kevin Garsia presents to [facility]  History of Present Illness  Here to FU on recent episode of dizziness 05/21 with leaning to the left, was at work and went home but didn't go to hospital, saw Dr Palma in office a week later without sx with hx of CVA and TIA and ordered update CT head 06/21 and MRI 07/21 showing chronic infarcts but no current neurologist, With chronic HTN and chol on ASA 81 mg, amlodipine 10 mg, losartan 100 mg, metoprolol 25 mg and lipitor 40 mg last LDL 48 02/21 nonfasting today, Checking BP at home 134/71 HR 60-80s and notes BP elevated more in AM, weight loss 10 lbs since 05/21 d/t healthy diet and portion control and increased walking and biking, with sleep apnea and hasn't seen scheduled sleep study overdue, overdue colonoscopy plans to schedule overdue vision and dental    Objective   Vital Signs:   /60 (BP Location: Left arm, Patient Position: Sitting, Cuff Size: Adult)   Pulse 77   Temp 98 °F (36.7 °C) (Temporal)   Ht 165.1 cm (65\")   Wt 76.3 kg (168 lb 3.2 oz)   SpO2 98%   BMI 27.99 kg/m²     Physical Exam  Vitals reviewed.   Constitutional:       Appearance: He is well-developed.   HENT:      Head: Normocephalic and atraumatic.   Eyes:      Conjunctiva/sclera: Conjunctivae normal.      Pupils: Pupils are equal, round, and reactive to light.   Cardiovascular:      Rate and Rhythm: Normal rate and regular rhythm.      Pulses: Normal pulses.      Heart sounds: Normal heart sounds.   Pulmonary:      Effort: Pulmonary effort is normal.      Breath sounds: Normal breath sounds.   Musculoskeletal:         General: Normal range of motion.      Cervical back: Normal range of motion.      Right lower leg: No edema.      Left lower leg: No edema.   Skin:     General: Skin is warm and dry.   Neurological:      Mental Status: He is alert and oriented to person, place, and time. "   Psychiatric:         Mood and Affect: Mood normal.         Behavior: Behavior normal.         Thought Content: Thought content normal.         Judgment: Judgment normal.        Result Review :{Labs  Result Review  Imaging  Med Tab  Media  Procedures :23}                 Assessment and Plan    Diagnoses and all orders for this visit:    1. Medicare annual wellness visit, subsequent (Primary)    2. TIA (transient ischemic attack)  -     Ambulatory Referral to Neurology    3. Essential hypertension  -     Ambulatory Referral to Neurology  -     Comprehensive Metabolic Panel  -     Lipid Panel  -     TSH  -     Urinalysis With Microscopic - Urine, Clean Catch    4. Hyperlipidemia, unspecified hyperlipidemia type  -     Ambulatory Referral to Neurology  -     Comprehensive Metabolic Panel  -     Lipid Panel    5. Type 2 diabetes mellitus without complication, without long-term current use of insulin (CMS/[facility])  -     Comprehensive Metabolic Panel  -     Lipid Panel  -     TSH  -     Urinalysis With Microscopic - Urine, Clean Catch  -     Hemoglobin A1c  -     MicroAlbumin, Urine, Random - Urine, Clean Catch    6. Vitamin D deficiency  -     Vitamin D 25 Hydroxy    7. IGOR (obstructive sleep apnea)    Other orders  -     atorvastatin (Lipitor) 80 MG tablet; Take 1 tablet by mouth Daily. New dose for cholesterol  Dispense: 90 tablet; Refill: 3        Follow Up   No follow-ups on file.  Patient was given instructions and counseling regarding his condition or for health maintenance advice. Please see specific information pulled into the AVS if appropriate.   Medicare wellness today, reviewed MRI and CT head refer neurology and increase lipitor 80 mg HS, discussed untreated sleep apnea and encourage schedule overdue sleep medicine and colonoscopy, check BP and BS at home, congratulated on weight loss continue healthy diet and exercise RTC fasting couple months check labs and call with results

## 2021-08-24 NOTE — PATIENT INSTRUCTIONS
Medicare wellness today, reviewed MRI and CT head refer neurology and increase lipitor 80 mg HS, discussed untreated sleep apnea and encourage schedule overdue sleep medicine and colonoscopy, check BP and BS at home, congratulated on weight loss continue healthy diet and exercise RTC fasting couple months check labs and call with results  Advance Care Planning and Advance Directives     You make decisions on a daily basis - decisions about where you want to live, your career, your home, your life. Perhaps one of the most important decisions you face is your choice for future medical care. Take time to talk with your family and your healthcare team and start planning today.  Advance Care Planning is a process that can help you:  · Understand possible future healthcare decisions in light of your own experiences  · Reflect on those decision in light of your goals and values  · Discuss your decisions with those closest to you and the healthcare professionals that care for you  · Make a plan by creating a document that reflects your wishes    Surrogate Decision Maker  In the event of a medical emergency, which has left you unable to communicate or to make your own decisions, you would need someone to make decisions for you.  It is important to discuss your preferences for medical treatment with this person while you are in good health.     Qualities of a surrogate decision maker:  • Willing to take on this role and responsibility  • Knows what you want for future medical care  • Willing to follow your wishes even if they don't agree with them  • Able to make difficult medical decisions under stressful circumstances    Advance Directives  These are legal documents you can create that will guide your healthcare team and decision maker(s) when needed. These documents can be stored in the electronic medical record.    · Living Will - a legal document to guide your care if you have a terminal condition or a serious illness  and are unable to communicate. States vary by statute in document names/types, but most forms may include one or more of the following:        -  Directions regarding life-prolonging treatments        -  Directions regarding artificially provided nutrition/hydration        -  Choosing a healthcare decision maker        -  Direction regarding organ/tissue donation    · Durable Power of  for Healthcare - this document names an -in-fact to make medical decisions for you, but it may also allow this person to make personal and financial decisions for you. Please seek the advice of an  if you need this type of document.    **Advance Directives are not required and no one may discriminate against you if you do not sign one.    Medical Orders  Many states allow specific forms/orders signed by your physician to record your wishes for medical treatment in your current state of health. This form, signed in personal communication with your physician, addresses resuscitation and other medical interventions that you may or may not want.      For more information or to schedule a time with a [facility] Advance Care Planning Facilitator contact: [facility] or call [phone] and someone will contact you directly.    Medicare Wellness  Personal Prevention Plan of Service     Date of Office Visit:  2021  Encounter Provider:  BBOBY Bass  Place of Service:  [facility]  Patient Name: Kevin Garsia  :  1955    As part of the Medicare Wellness portion of your visit today, we are providing you with this personalized preventive plan of services (PPPS). This plan is based upon recommendations of the [place] Preventive Services Task Force (USPSTF) and the Advisory Committee on Immunization Practices (ACIP).    This lists the preventive care services that should be considered, and provides dates of when you are due. Items listed as  completed are up-to-date and do not require any further intervention.    Health Maintenance   Topic Date Due   • COLORECTAL CANCER SCREENING  Never done   • TDAP/TD VACCINES (1 - Tdap) Never done   • ZOSTER VACCINE (1 of 2) Never done   • DIABETIC EYE EXAM  Never done   • AAA SCREEN (ONE-TIME)  Never done   • DIABETIC FOOT EXAM  08/17/2021   • URINE MICROALBUMIN  08/17/2021   • HEMOGLOBIN A1C  08/22/2021   • INFLUENZA VACCINE  10/01/2021   • LIPID PANEL  02/22/2022   • ANNUAL WELLNESS VISIT  08/24/2022   • HEPATITIS C SCREENING  Completed   • COVID-19 Vaccine  Completed   • Pneumococcal Vaccine 65+  Completed       Orders Placed This Encounter   Procedures   • Comprehensive Metabolic Panel     Order Specific Question:   Release to patient     Answer:   Immediate   • Lipid Panel   • TSH     Order Specific Question:   Release to patient     Answer:   Immediate   • Hemoglobin A1c     Order Specific Question:   Release to patient     Answer:   Immediate   • MicroAlbumin, Urine, Random - Urine, Clean Catch     Order Specific Question:   Release to patient     Answer:   Immediate   • Vitamin D 25 Hydroxy     Order Specific Question:   Release to patient     Answer:   Immediate   • Ambulatory Referral to Neurology     Referral Priority:   Routine     Referral Type:   Consultation     Referral Reason:   Specialty Services Required     Referred to Provider:   Tiffani Ward MD     Requested Specialty:   Neurology     Number of Visits Requested:   1   • Urinalysis With Microscopic - Urine, Clean Catch     Order Specific Question:   Release to patient     Answer:   Immediate       No follow-ups on file.

## 2021-08-24 NOTE — PROGRESS NOTES
The ABCs of the Annual Wellness Visit  Subsequent Medicare Wellness Visit    Chief Complaint   Patient presents with   • Follow-up     MRI AND CT     Subjective   History of Present Illness:  Kevin Garsia is a 66 y.o. male who presents for a Subsequent Medicare Wellness Visit.    HEALTH RISK ASSESSMENT    Recent Hospitalizations:  No hospitalization(s) within the last year.    Current Medical Providers:  Patient Care Team:  Meli Felipe APRN as PCP - General (Family Medicine)  Wellington Chaudhari MD as Consulting Physician (Urology)  Jimmy Kwon MD as Consulting Physician (Infectious Diseases)    Smoking Status:  Social History     Tobacco Use   Smoking Status Former Smoker   • Packs/day: 3.00   • Years: 30.00   • Pack years: 90.00   Smokeless Tobacco Never Used     Alcohol Consumption:  Social History     Substance and Sexual Activity   Alcohol Use No     Depression Screen:   PHQ-2/PHQ-9 Depression Screening 8/24/2021   Little interest or pleasure in doing things 0   Feeling down, depressed, or hopeless 0   Trouble falling or staying asleep, or sleeping too much 3   Feeling tired or having little energy 0   Poor appetite or overeating 0   Feeling bad about yourself - or that you are a failure or have let yourself or your family down 0   Trouble concentrating on things, such as reading the newspaper or watching television 0   Moving or speaking so slowly that other people could have noticed. Or the opposite - being so fidgety or restless that you have been moving around a lot more than usual 0   Thoughts that you would be better off dead, or of hurting yourself in some way 0   Total Score 3   If you checked off any problems, how difficult have these problems made it for you to do your work, take care of things at home, or get along with other people? Not difficult at all     Fall Risk Screen:  STEADI Fall Risk Assessment was completed, and patient is at LOW risk for falls.Assessment completed  on:2/22/2021    Health Habits and Functional and Cognitive Screening:  Functional & Cognitive Status 8/24/2021   Do you have difficulty preparing food and eating? No   Do you have difficulty bathing yourself, getting dressed or grooming yourself? No   Do you have difficulty using the toilet? No   Do you have difficulty moving around from place to place? No   Do you have trouble with steps or getting out of a bed or a chair? No   Current Diet Well Balanced Diet   Dental Exam Not up to date   Eye Exam Not up to date   Exercise (times per week) 5 times per week   Current Exercises Include Walking   Current Exercise Activities Include -   Do you need help using the phone?  No   Are you deaf or do you have serious difficulty hearing?  No   Do you need help with transportation? No   Do you need help shopping? No   Do you need help preparing meals?  No   Do you need help with housework?  No   Do you need help with laundry? No   Do you need help taking your medications? No   Do you need help managing money? No   Do you ever drive or ride in a car without wearing a seat belt? No   Have you felt unusual stress, anger or loneliness in the last month? No   Who do you live with? Alone   If you need help, do you have trouble finding someone available to you? No   Have you been bothered in the last four weeks by sexual problems? No   Do you have difficulty concentrating, remembering or making decisions? No     Does the patient have evidence of cognitive impairment? No    Asprin use counseling:Taking ASA appropriately as indicated    Age-appropriate Screening Schedule:  Refer to the list below for future screening recommendations based on patient's age, sex and/or medical conditions. Orders for these recommended tests are listed in the plan section. The patient has been provided with a written plan.    Health Maintenance   Topic Date Due   • TDAP/TD VACCINES (1 - Tdap) Never done   • ZOSTER VACCINE (1 of 2) Never done   • DIABETIC  EYE EXAM  Never done   • DIABETIC FOOT EXAM  08/17/2021   • URINE MICROALBUMIN  08/17/2021   • HEMOGLOBIN A1C  08/22/2021   • INFLUENZA VACCINE  10/01/2021   • LIPID PANEL  02/22/2022        The following portions of the patient's history were reviewed and updated as appropriate: allergies, current medications, past family history, past medical history, past social history, past surgical history and problem list.    Outpatient Medications Prior to Visit   Medication Sig Dispense Refill   • amLODIPine (NORVASC) 10 MG tablet TAKE 1 TABLET BY MOUTH DAILY 90 tablet 1   • aspirin 81 MG chewable tablet      • Blood Glucose Monitoring Suppl (ACCU-CHEK DIONTE PLUS) w/Device kit 1 kit 4 (Four) Times a Day. 1 kit 1   • CBD (cannabidiol) oral oil Take  by mouth 2 (Two) Times a Day. Half a dropper twice daily     • emtricitabine-tenofovir (TRUVADA) 200-300 MG per tablet Take 1 tablet by mouth Daily. 30 tablet 2   • hydrOXYzine (ATARAX) 10 MG tablet 1-2  PO HS 60 tablet 1   • losartan (COZAAR) 100 MG tablet TAKE 1 TABLET BY MOUTH DAILY 90 tablet 1   • metFORMIN (GLUCOPHAGE) 1000 MG tablet TAKE 1 TABLET BY MOUTH TWICE DAILY WITH MEALS 120 tablet 5   • metoprolol tartrate (LOPRESSOR) 25 MG tablet TAKE 1 TABLET BY MOUTH DAILY 90 tablet 1   • vitamin D (ERGOCALCIFEROL) 1.25 MG (26191 UT) capsule capsule TAKE 1 CAPSULE BY MOUTH 1 TIME EVERY WEEK 12 capsule 0   • atorvastatin (LIPITOR) 40 MG tablet TAKE 1 TABLET BY MOUTH DAILY 90 tablet 1   • tamsulosin (FLOMAX) 0.4 MG capsule 24 hr capsule Take 1 capsule by mouth Daily.       No facility-administered medications prior to visit.     Patient Active Problem List   Diagnosis   • Hypertension   • Hyperlipidemia   • History of prostate cancer   • Non morbid obesity due to excess calories   • Vitamin D deficiency   • TIA (transient ischemic attack)   • S/P carotid endarterectomy   • Type 2 diabetes mellitus without complication, without long-term current use of insulin (CMS/HCC)   • Chronic  "heart failure with preserved ejection fraction (CMS/[facility])   • Arthritis   • Sleep apnea     Advanced Care Planning:  ACP discussion was held with the patient during this visit. Patient does not have an advance directive, information provided.    Review of Systems    Compared to one year ago, the patient feels his physical health is better. Mobility and weight loss  Compared to one year ago, the patient feels his mental health is better. Working     Reviewed chart for potential of high risk medication in the elderly: yes  Reviewed chart for potential of harmful drug interactions in the elderly:yes    Objective         Vitals:    08/24/21 1601   BP: 120/60   BP Location: Left arm   Patient Position: Sitting   Cuff Size: Adult   Pulse: 77   Temp: 98 °F (36.7 °C)   TempSrc: Temporal   SpO2: 98%   Weight: 76.3 kg (168 lb 3.2 oz)   Height: 165.1 cm (65\")   PainSc: 0-No pain       Body mass index is 27.99 kg/m².  Discussed the patient's BMI with him. The BMI is above average; BMI management plan is completed.    Physical Exam          Assessment/Plan   Medicare Risks and Personalized Health Plan  CMS Preventative Services Quick Reference  Advance Directive Discussion  Cardiovascular risk  Diabetic Lab Screening   Obesity/Overweight     The above risks/problems have been discussed with the patient.  Pertinent information has been shared with the patient in the After Visit Summary.  Follow up plans and orders are seen below in the Assessment/Plan Section.    Diagnoses and all orders for this visit:    1. Medicare annual wellness visit, subsequent (Primary)    2. TIA (transient ischemic attack)  -     Ambulatory Referral to Neurology    3. Essential hypertension  -     Ambulatory Referral to Neurology  -     Comprehensive Metabolic Panel  -     Lipid Panel  -     TSH  -     Urinalysis With Microscopic - Urine, Clean Catch    4. Hyperlipidemia, unspecified hyperlipidemia type  -     Ambulatory Referral to Neurology  -     " Comprehensive Metabolic Panel  -     Lipid Panel    5. Type 2 diabetes mellitus without complication, without long-term current use of insulin (CMS/[facility])  -     Comprehensive Metabolic Panel  -     Lipid Panel  -     TSH  -     Urinalysis With Microscopic - Urine, Clean Catch  -     Hemoglobin A1c  -     MicroAlbumin, Urine, Random - Urine, Clean Catch    6. Vitamin D deficiency  -     Vitamin D 25 Hydroxy    7. IGOR (obstructive sleep apnea)    Other orders  -     atorvastatin (Lipitor) 80 MG tablet; Take 1 tablet by mouth Daily. New dose for cholesterol  Dispense: 90 tablet; Refill: 3      Follow Up:  No follow-ups on file.     An After Visit Summary and PPPS were given to the patient.

## 2021-08-25 RX ORDER — EMTRICITABINE AND TENOFOVIR DISOPROXIL FUMARATE 200; 300 MG/1; MG/1
1 TABLET, FILM COATED ORAL DAILY
Qty: 30 TABLET | Refills: 0 | Status: SHIPPED | OUTPATIENT
Start: 2021-08-25 | End: 2021-09-13 | Stop reason: SDUPTHER

## 2021-08-26 RX ORDER — AMLODIPINE BESYLATE 10 MG/1
10 TABLET ORAL DAILY
Qty: 90 TABLET | Refills: 1 | Status: SHIPPED | OUTPATIENT
Start: 2021-08-26 | End: 2022-02-07 | Stop reason: SDUPTHER

## 2021-09-13 ENCOUNTER — OFFICE VISIT (OUTPATIENT)
Dept: INFECTIOUS DISEASES | Facility: CLINIC | Age: 66
End: 2021-09-13

## 2021-09-13 ENCOUNTER — LAB (OUTPATIENT)
Dept: LAB | Facility: HOSPITAL | Age: 66
End: 2021-09-13

## 2021-09-13 VITALS
BODY MASS INDEX: 28.46 KG/M2 | TEMPERATURE: 98.1 F | HEIGHT: 65 IN | DIASTOLIC BLOOD PRESSURE: 78 MMHG | WEIGHT: 170.8 LBS | SYSTOLIC BLOOD PRESSURE: 118 MMHG

## 2021-09-13 DIAGNOSIS — Z79.2 LONG TERM (CURRENT) USE OF ANTIBIOTICS: ICD-10-CM

## 2021-09-13 DIAGNOSIS — Z72.53 HIGH RISK BISEXUAL BEHAVIOR: Primary | ICD-10-CM

## 2021-09-13 LAB
CREAT SERPL-MCNC: 1.02 MG/DL (ref 0.76–1.27)
GFR SERPL CREATININE-BSD FRML MDRD: 73 ML/MIN/1.73
HCV AB SER DONR QL: NORMAL
HIV1+2 AB SER QL: NORMAL
RPR SER QL: NORMAL

## 2021-09-13 PROCEDURE — 99214 OFFICE O/P EST MOD 30 MIN: CPT | Performed by: INTERNAL MEDICINE

## 2021-09-13 PROCEDURE — 87491 CHLMYD TRACH DNA AMP PROBE: CPT | Performed by: INTERNAL MEDICINE

## 2021-09-13 PROCEDURE — 87591 N.GONORRHOEAE DNA AMP PROB: CPT | Performed by: INTERNAL MEDICINE

## 2021-09-13 PROCEDURE — 86592 SYPHILIS TEST NON-TREP QUAL: CPT | Performed by: INTERNAL MEDICINE

## 2021-09-13 PROCEDURE — G0432 EIA HIV-1/HIV-2 SCREEN: HCPCS | Performed by: INTERNAL MEDICINE

## 2021-09-13 PROCEDURE — 82565 ASSAY OF CREATININE: CPT | Performed by: INTERNAL MEDICINE

## 2021-09-13 PROCEDURE — 86803 HEPATITIS C AB TEST: CPT | Performed by: INTERNAL MEDICINE

## 2021-09-13 PROCEDURE — 36415 COLL VENOUS BLD VENIPUNCTURE: CPT | Performed by: INTERNAL MEDICINE

## 2021-09-13 RX ORDER — EMTRICITABINE AND TENOFOVIR DISOPROXIL FUMARATE 200; 300 MG/1; MG/1
1 TABLET, FILM COATED ORAL DAILY
Qty: 30 TABLET | Refills: 2 | Status: SHIPPED | OUTPATIENT
Start: 2021-09-13 | End: 2022-02-21 | Stop reason: SDUPTHER

## 2021-09-13 NOTE — PROGRESS NOTES
"cc: Here for evaluation preexposure prophylaxis    Per initial clinic note from February 2021: Patient reports he is in his usual state of health.  He is recently  from his wife and is looking to engage in sexual activity.  Says he has sex with men and women both.  He has a history of chlamydia but tested negative for HIV, chlamydia and hepatitis C back in August 2020.  He has not been sexually active for about 6 months due to COVID but thinks there is a reasonable certainty that he is going to start resuming high risk sexual activity here in the next month or 2.  He has not had any fevers or chills or night sweats or discharge.  He feels well.  His ex-wife is recommended that he start preexposure prophylaxis since they may run in the same circles.  She is tolerating it well.  He is acutely concerned because he is potentially having sex with with commercial sex workers\"    At initial clinic visit he was started on Truvada.  He denies any missed doses or side effects.  No STI symptoms.  He has been intermittently sexually active, but is thinking that he may become more active here lately.     Past medical history: Hypertension, peripheral vascular disease, diabetes mellitus type 2, hyperlipidemia, peptic ulcer disease, prostate cancer, chlamydia, cervical stenosis, stroke, anxiety/depression, CVA  No family history of infectious diseases  Social history: Retired.  He  from his wife.  Former smoker but quit 10 years ago.  No IV drug use.  No alcohol use.      Review of Systems: All other reviewed and negative except as per HPI    Blood pressure 118/78, temperature 98.1 °F (36.7 °C), height 165.1 cm (65\"), weight 77.5 kg (170 lb 12.8 oz).  GENERAL: Awake and alert, in no acute distress.   HEENT: Oropharynx is clear. Hearing is grossly normal.   EYES: . No conjunctival injection. No lid lag.   LUNGS: normal respiratory effort.   ABDOMEN: Soft, nontender, nondistended. No appreciable organomegaly. "   SKIN: Warm and dry without cutaneous eruptions   PSYCHIATRIC: Appropriate mood, affect, insight, and judgment.     2/1/2021 HIV, RPR, urine GC and hepatitis C antibody negative; creatinine 0.87  Hepatitis B profile nonreactive; hepatitis A total antibody negative    Assessment and Plan  High risk bisexual behavior    Vaccines: Consider hepatitis A and B vaccine  Long-term current use antibiotics          Continue preexposure prophylaxis with Truvada.  Checking urine GC, RPR, hep C and serum creatinine along with HIV today  He understands that this is not a guarantee to protect against HIV and he must take it every day.  Also understands that he should not abandon safe sex practices and will not protect against other STIs. We therefore prescribed Truvada 30-day fill with 2 refills and we will see him back in clinic in 3 months or sooner if necessary

## 2021-09-15 LAB
C TRACH RRNA SPEC QL NAA+PROBE: NEGATIVE
N GONORRHOEA RRNA SPEC QL NAA+PROBE: NEGATIVE

## 2021-09-17 ENCOUNTER — TELEPHONE (OUTPATIENT)
Dept: INFECTIOUS DISEASES | Facility: CLINIC | Age: 66
End: 2021-09-17

## 2021-09-17 NOTE — TELEPHONE ENCOUNTER
----- Message from Jimmy Kwon MD sent at 9/17/2021  2:13 PM EDT -----  Can we let him know that his lab work was all acceptable and to keep up the good work

## 2021-11-03 ENCOUNTER — DOCUMENTATION (OUTPATIENT)
Dept: FAMILY MEDICINE CLINIC | Facility: CLINIC | Age: 66
End: 2021-11-03

## 2021-11-03 NOTE — PROGRESS NOTES
11/3/2021 4:45 PM    Patient called and had an elevated blood pressure of 150/85 this morning.  Patient try to calm down and get his blood pressure down to 120/70.  Patient states he has fullness in his head and does not feel right.  Patient had a prior history of hypertension with TIA.  Patient was advised to go the emergency room.

## 2021-12-20 ENCOUNTER — TELEPHONE (OUTPATIENT)
Dept: NEUROLOGY | Facility: CLINIC | Age: 66
End: 2021-12-20

## 2021-12-20 NOTE — TELEPHONE ENCOUNTER
LM FOR PATIENT , WE ARE NEEDING TO R/S HIS 12.28.21 APPT WITH DR YEBOAH - HE IS SCHEDULED FOR TIA AND SHE DOES NOT SEE THAT.

## 2022-01-10 ENCOUNTER — LAB (OUTPATIENT)
Dept: LAB | Facility: HOSPITAL | Age: 67
End: 2022-01-10

## 2022-01-10 ENCOUNTER — OFFICE VISIT (OUTPATIENT)
Dept: NEUROLOGY | Facility: CLINIC | Age: 67
End: 2022-01-10

## 2022-01-10 VITALS
RESPIRATION RATE: 16 BRPM | WEIGHT: 170 LBS | HEIGHT: 65 IN | HEART RATE: 72 BPM | DIASTOLIC BLOOD PRESSURE: 66 MMHG | BODY MASS INDEX: 28.32 KG/M2 | SYSTOLIC BLOOD PRESSURE: 120 MMHG

## 2022-01-10 DIAGNOSIS — E11.9 TYPE 2 DIABETES MELLITUS WITHOUT COMPLICATION, WITHOUT LONG-TERM CURRENT USE OF INSULIN: ICD-10-CM

## 2022-01-10 DIAGNOSIS — G56.03 BILATERAL CARPAL TUNNEL SYNDROME: ICD-10-CM

## 2022-01-10 DIAGNOSIS — G45.9 TIA (TRANSIENT ISCHEMIC ATTACK): ICD-10-CM

## 2022-01-10 DIAGNOSIS — G45.9 TIA (TRANSIENT ISCHEMIC ATTACK): Primary | ICD-10-CM

## 2022-01-10 DIAGNOSIS — M54.10 RADICULOPATHY, UNSPECIFIED SPINAL REGION: ICD-10-CM

## 2022-01-10 LAB
CHOLEST SERPL-MCNC: 83 MG/DL (ref 0–200)
HBA1C MFR BLD: 6.64 % (ref 4.8–5.6)
HDLC SERPL-MCNC: 29 MG/DL (ref 40–60)
LDLC SERPL CALC-MCNC: 26 MG/DL (ref 0–100)
LDLC/HDLC SERPL: 0.69 {RATIO}
TRIGL SERPL-MCNC: 170 MG/DL (ref 0–150)
VLDLC SERPL-MCNC: 28 MG/DL (ref 5–40)

## 2022-01-10 PROCEDURE — 83036 HEMOGLOBIN GLYCOSYLATED A1C: CPT

## 2022-01-10 PROCEDURE — 36415 COLL VENOUS BLD VENIPUNCTURE: CPT | Performed by: STUDENT IN AN ORGANIZED HEALTH CARE EDUCATION/TRAINING PROGRAM

## 2022-01-10 PROCEDURE — 80061 LIPID PANEL: CPT | Performed by: STUDENT IN AN ORGANIZED HEALTH CARE EDUCATION/TRAINING PROGRAM

## 2022-01-10 PROCEDURE — 99205 OFFICE O/P NEW HI 60 MIN: CPT | Performed by: STUDENT IN AN ORGANIZED HEALTH CARE EDUCATION/TRAINING PROGRAM

## 2022-01-10 NOTE — PROGRESS NOTES
Chief Complaint   Patient presents with   • Transient Ischemic Attack     patient states the last time he had on middle of last year ,Was seen in the past by Galen and had a TIA        Patient ID: Kevin Garsia is a 66 y.o. male.    HPI:    The following portions of the patient's history were reviewed and updated as appropriate: allergies, current medications, past family history, past medical history, past social history, past surgical history and problem list.    Review of Systems   Constitutional: Negative for activity change, chills, fatigue and unexpected weight change.   HENT: Negative for ear pain, hearing loss, nosebleeds, sinus pressure, sinus pain, sneezing, sore throat, tinnitus and trouble swallowing.    Eyes: Positive for photophobia. Negative for visual disturbance.   Respiratory: Positive for shortness of breath. Negative for cough, choking, chest tightness and wheezing.    Cardiovascular: Negative for chest pain, palpitations and leg swelling.   Gastrointestinal: Negative for abdominal pain, blood in stool, constipation, diarrhea, nausea and vomiting.   Endocrine: Negative for cold intolerance and heat intolerance.   Genitourinary: Positive for flank pain, frequency and urgency. Negative for decreased urine volume, difficulty urinating, penile discharge and penile swelling.   Musculoskeletal: Positive for back pain, neck pain and neck stiffness. Negative for gait problem.   Neurological: Positive for headaches. Negative for dizziness, tremors, seizures, syncope, weakness and light-headedness.   Psychiatric/Behavioral: Positive for sleep disturbance. Negative for agitation, behavioral problems, confusion and hallucinations. The patient is nervous/anxious. The patient is not hyperactive.    Mr. Garsia is a 67 yo male who presents for evaluation of  TIA like event, prior stroke, and neck and back issues.   In 2019, slurred a few words and sought medical care. Was found to have 80% blockage in  carotid. In the recovery room, had stroke like symptoms and found to have strokes. Last carotid ultrasound was middle of last year, and was not told he needed another CEA. Was at work in July 2021 and lost balance and kept listing over to left side as well as his head didn't feel right for a few hours. No definite vision loss events.  Has mild aphasia at times.   Takes aspirin. BP monitor 2-3 times has detected irregular heartbeat in the last year. For few weeks to the month before that had left-sided headaches around December 2021; has never had headaches like that before and not currently having headache; BP was not abnormally high. Averages out to around 130 systolic.  Works as a .    ECHO 2019   Right ventricular systolic pressure of 17 mmHg.    The ejection fraction biplane was calculated at 57%.    Left ventricle size is normal.    Mild left ventricular hypertrophy.    Global left ventricular wall motion and contractility are within normal    limits.    Abnormal left ventricular diastolic filling consistent with impaired    relaxation.    There is no evidence of intra-atrial shunting by agitated saline injections.       Has had spinal stenosis for years. When he noticed this first, he was reaching and had pain from right shoulder down to arm. Had PT for this. Referred to neurosurgeon and had specialized PT. Was told he had issues with 3 cervical vertebra. Can have lower back pain with maneuvering. Difficulty lifting objects sometimes. Pain in the right wrist when tapping. Notes history of carpal tunnel and that splints help.    Vitals:    01/10/22 1412   BP: 120/66   Pulse: 72   Resp: 16       Neurologic Exam     Mental Status   Attention: normal. Concentration: normal.   Level of consciousness: alert  Word finding difficulty - describes definition atrophy     Cranial Nerves     CN II   Visual fields full to confrontation.   Visual acuity: normal    CN III, IV, VI   Pupils are equal, round, and reactive to  light.  Extraocular motions are normal.     CN V   Facial sensation intact.     CN VII   Facial expression full, symmetric.     CN VIII   Hearing: intact    CN IX, X   Palate: symmetric    CN XI   Right trapezius strength: normal  Left trapezius strength: normal    CN XII   Tongue: not atrophic  Fasciculations: absent  Tongue deviation: none    Motor Exam   Muscle bulk: normal    Strength   Right deltoid: 5/5  Left deltoid: 5/5  Right biceps: 5/5  Left biceps: 5/5  Right triceps: 5/5  Left triceps: 5/5  Right iliopsoas: 5/5  Left iliopsoas: 5/5  Right quadriceps: 5/5  Left quadriceps: 5/5  Right hamstrin/5  Left hamstrin/5  Right anterior tibial: 5/5  Left anterior tibial: 5/5  Right gastroc: 5/5  Left gastroc: 5/5Grip 5 out of 5 bilaterally     Sensory Exam   Right arm light touch: normal  Left arm light touch: normal  Left leg light touch: normal  Numbness to light touch at top of right thigh     Gait, Coordination, and Reflexes     Coordination   Finger to nose coordination: normal  Heel to shin coordination: normal    Reflexes   Right brachioradialis: 1+  Left brachioradialis: 1+  Right biceps: 2+  Left biceps: 1+  Right patellar: 0  Left patellar: 0  Right achilles: 0  Left achilles: 0Shaking note with right finger as it touches the nose       Physical Exam  Eyes:      Extraocular Movements: EOM normal.      Pupils: Pupils are equal, round, and reactive to light.   Neurological:      Coordination: Finger-Nose-Finger Test and Heel to Shin Test normal.      Deep Tendon Reflexes:      Reflex Scores:       Bicep reflexes are 2+ on the right side and 1+ on the left side.       Brachioradialis reflexes are 1+ on the right side and 1+ on the left side.       Patellar reflexes are 0 on the right side and 0 on the left side.       Achilles reflexes are 0 on the right side and 0 on the left side.        Procedures    Assessment/Plan:    Pt with TIA with possible left sided weakness after left CEA; would expect  localization to right side in contrast to prior strokes on left.   -cardiac monitor  -repeat cardiac echo to look for cardiac change  -A1c  -Lipid panel  -optimization of A1c, LDL<70, BP per PCP  -continue aspirin    Concern from his clinical history about potential radiculopathy and carpal tunnel syndrome  -recommended nightly splinting of wrists  -MRI cervical and lumbar spine w and wo contrast; cervical spine MRI may reveal information about radiculopathy and carpal tunnel if symptoms are due to more upstream causes  -EMG/NCS    RTC 3 months    I spent at least 63 minutes caring for this patient on this date of service. This time includes time spent by me in the following activities as necessary: preparing for the visit, reviewing tests, medical records and previous visits, obtaining and/or reviewing a separately obtained history, performing a medically appropriate exam and/or evaluation, counseling and educating the patient, family, caregiver, referring and/or communicating with other healthcare professionals, documenting information in the medical record, independently interpreting results and communicating that information with the patient, family, caregiver, and developing a medically appropriate treatment plan with consideration of other conditions, medications, and treatments.         Diagnoses and all orders for this visit:    1. TIA (transient ischemic attack) (Primary)  -     Adult Transthoracic Echo Complete W/ Cont if Necessary Per Protocol; Future  -     Cardiac Event Monitor; Future  -     Hemoglobin A1c; Future  -     Lipid Panel    2. Radiculopathy, unspecified spinal region  -     MRI Cervical Spine With & Without Contrast; Future  -     MRI Lumbar Spine With & Without Contrast; Future  -     EMG & Nerve Conduction Test; Future    3. Bilateral carpal tunnel syndrome  -     MRI Cervical Spine With & Without Contrast; Future  -     EMG & Nerve Conduction Test; Future    4. Type 2 diabetes mellitus  without complication, without long-term current use of insulin (HCC)   -     Hemoglobin A1c; Future           Jd Hay MD

## 2022-01-18 ENCOUNTER — TELEPHONE (OUTPATIENT)
Dept: NEUROLOGY | Facility: CLINIC | Age: 67
End: 2022-01-18

## 2022-01-18 DIAGNOSIS — R51.9 NONINTRACTABLE HEADACHE, UNSPECIFIED CHRONICITY PATTERN, UNSPECIFIED HEADACHE TYPE: ICD-10-CM

## 2022-01-18 DIAGNOSIS — M54.10 RADICULOPATHY, UNSPECIFIED SPINAL REGION: Primary | ICD-10-CM

## 2022-01-18 RX ORDER — METHYLPREDNISOLONE 4 MG/1
TABLET ORAL
Qty: 1 EACH | Refills: 0 | Status: SHIPPED | OUTPATIENT
Start: 2022-01-18 | End: 2022-02-07

## 2022-01-18 NOTE — TELEPHONE ENCOUNTER
The patient was called and states that he has bad arm pain on the right side and it goes into his chest ,he explains he has trouble focusing and he is feeling weak. His headache is suddenly constant ,I advised the patient with these kind symptome's to go to the ER and get checked because you never know if you have a heart problem that developed .I informed Dr Hay that I send him to  ER to get him self checked out ,Patient voiced understanding ,Kira Omalley

## 2022-01-18 NOTE — TELEPHONE ENCOUNTER
Caller: JULI Eagle call back number: [phone]    What was the call regarding: PT IS HAVING MORE NECK AND  SHOULDER PAIN  ALSO A CONSTANT  HEADACHE (ABOUT A 7-8 ) MOSTLY RT SIDE HE HAS NOT HAD THAT BEFORE . ALSO TROUBLE FOCUSING WITH BOTH EYES / WEAKNESS IN RT ARM   WONDERING IF CAN PRESCRIBE A  ANI INFLAMMATORY RX ? ALSO  IS THERE ANY SUGGESTION ON GETTING  A MRI SOONER THAT 02.28.22 ?     Do you require a callback:  YES     [facility] [address] [place], [place] - [address] - [phone]  - [phone]   [phone]

## 2022-01-20 NOTE — TELEPHONE ENCOUNTER
Thanks, agree that if he has pain involving safest option is to get this checked out via ER evaluation in case it is due to a serious cardiac cause.

## 2022-01-24 RX ORDER — EMTRICITABINE AND TENOFOVIR DISOPROXIL FUMARATE 200; 300 MG/1; MG/1
1 TABLET, FILM COATED ORAL DAILY
Qty: 30 TABLET | Refills: 2 | OUTPATIENT
Start: 2022-01-24

## 2022-02-04 DIAGNOSIS — M54.12 RADICULOPATHY OF CERVICAL REGION: Primary | ICD-10-CM

## 2022-02-04 RX ORDER — METHYLPREDNISOLONE 4 MG/1
TABLET ORAL
Qty: 1 EACH | Refills: 0 | OUTPATIENT
Start: 2022-02-04

## 2022-02-04 RX ORDER — GABAPENTIN 300 MG/1
300 CAPSULE ORAL 3 TIMES DAILY
Qty: 90 CAPSULE | Refills: 3 | Status: SHIPPED | OUTPATIENT
Start: 2022-02-04 | End: 2022-09-12 | Stop reason: ALTCHOICE

## 2022-02-04 NOTE — TELEPHONE ENCOUNTER
Caller: JULI Eagle call back number: [phone]    Requested Prescriptions: MEDROL 4 MG PACK   Requested Prescriptions      No prescriptions requested or ordered in this encounter        Pharmacy where request should be [facility] [address] [place], [place] - [address] - [phone] PH - [phone] FX  [phone]:      Additional details provided by patient: PACK  WORKS GREAT AND WOULD LIKE TO GET MORE IF POSSIBLE? WHILE ON RX  NO PAIN , BUT NOW SINCE OFF OF RX  PAIN IS COMING BACK.     Does the patient have less than a 3 day supply:  [x] Yes  [] No    PLEASE ADVISE     Raji BOLIVAR Rep   02/04/22 13:55 EST

## 2022-02-04 NOTE — TELEPHONE ENCOUNTER
Yes he would love to try the Gebapentin, I explained the side effects and told him ,to call me if he has any problems ,He thanks you micah Omalley

## 2022-02-07 ENCOUNTER — OFFICE VISIT (OUTPATIENT)
Dept: FAMILY MEDICINE CLINIC | Facility: CLINIC | Age: 67
End: 2022-02-07

## 2022-02-07 VITALS
HEIGHT: 65 IN | BODY MASS INDEX: 27.99 KG/M2 | SYSTOLIC BLOOD PRESSURE: 120 MMHG | HEART RATE: 62 BPM | OXYGEN SATURATION: 99 % | TEMPERATURE: 97.1 F | WEIGHT: 168 LBS | DIASTOLIC BLOOD PRESSURE: 60 MMHG

## 2022-02-07 DIAGNOSIS — M48.02 CERVICAL SPINAL STENOSIS: ICD-10-CM

## 2022-02-07 DIAGNOSIS — E55.9 VITAMIN D DEFICIENCY: ICD-10-CM

## 2022-02-07 DIAGNOSIS — Z09 HOSPITAL DISCHARGE FOLLOW-UP: Primary | ICD-10-CM

## 2022-02-07 DIAGNOSIS — I10 ESSENTIAL HYPERTENSION: ICD-10-CM

## 2022-02-07 DIAGNOSIS — E78.5 HYPERLIPIDEMIA, UNSPECIFIED HYPERLIPIDEMIA TYPE: ICD-10-CM

## 2022-02-07 DIAGNOSIS — E11.9 TYPE 2 DIABETES MELLITUS WITHOUT COMPLICATION, WITHOUT LONG-TERM CURRENT USE OF INSULIN: ICD-10-CM

## 2022-02-07 PROCEDURE — 99214 OFFICE O/P EST MOD 30 MIN: CPT | Performed by: NURSE PRACTITIONER

## 2022-02-07 RX ORDER — LOSARTAN POTASSIUM 100 MG/1
100 TABLET ORAL DAILY
Qty: 90 TABLET | Refills: 1 | Status: SHIPPED | OUTPATIENT
Start: 2022-02-07 | End: 2023-01-05 | Stop reason: SDUPTHER

## 2022-02-07 RX ORDER — ATORVASTATIN CALCIUM 80 MG/1
80 TABLET, FILM COATED ORAL DAILY
Qty: 90 TABLET | Refills: 3 | Status: SHIPPED | OUTPATIENT
Start: 2022-02-07

## 2022-02-07 RX ORDER — AMLODIPINE BESYLATE 10 MG/1
10 TABLET ORAL DAILY
Qty: 90 TABLET | Refills: 1 | Status: SHIPPED | OUTPATIENT
Start: 2022-02-07 | End: 2023-01-05 | Stop reason: SDUPTHER

## 2022-02-07 RX ORDER — ERGOCALCIFEROL 1.25 MG/1
50000 CAPSULE ORAL WEEKLY
Qty: 12 CAPSULE | Refills: 1 | Status: SHIPPED | OUTPATIENT
Start: 2022-02-07 | End: 2022-11-01 | Stop reason: SDUPTHER

## 2022-02-07 NOTE — PROGRESS NOTES
"Chief Complaint  Follow-up ([facility] - neck pain)    Subjective          Kevin Garsia presents to [facility]  Patient presents to the office for a follow up on recent hospital admission at [facility]. Patient was admitted on 1/1822-1/20/22 for chronic neck pain and to rule out TIA.   Patient had multiple imaging of MRI brain, CT neck.   Patient's pain has improved. Patient denies cp soa.blood pressure is normal at 120/60.              Objective   Vital Signs:   /60 (BP Location: Left arm, Patient Position: Sitting, Cuff Size: Adult)   Pulse 62   Temp 97.1 °F (36.2 °C) (Infrared)   Ht 165.1 cm (65\")   Wt 76.2 kg (168 lb)   SpO2 99%   BMI 27.96 kg/m²     Physical Exam  Constitutional:       General: He is not in acute distress.     Appearance: Normal appearance.   Eyes:      Pupils: Pupils are equal, round, and reactive to light.   Neck:      Thyroid: No thyroid mass or thyroid tenderness.   Cardiovascular:      Rate and Rhythm: Normal rate and regular rhythm.      Pulses: Normal pulses.      Heart sounds: Normal heart sounds.   Pulmonary:      Effort: Pulmonary effort is normal.      Breath sounds: Normal breath sounds. No wheezing or rales.   Musculoskeletal:         General: Tenderness present.      Cervical back: No rigidity or crepitus. Decreased range of motion.   Lymphadenopathy:      Cervical: No cervical adenopathy.   Neurological:      Mental Status: He is alert.   Psychiatric:         Mood and Affect: Mood normal.         Behavior: Behavior normal.        Result Review :                 Assessment and Plan    Diagnoses and all orders for this visit:    1. Hospital discharge follow-up (Primary)  Assessment & Plan:  1/18/22-1/20/22   Pt reports symptoms with improvement      2. Essential hypertension  -     amLODIPine (NORVASC) 10 MG tablet; Take 1 tablet by mouth Daily.  Dispense: 90 tablet; Refill: 1  -     losartan (COZAAR) 100 MG tablet; Take 1 tablet by " mouth Daily.  Dispense: 90 tablet; Refill: 1  -     metoprolol tartrate (LOPRESSOR) 25 MG tablet; Take 1 tablet by mouth Daily.  Dispense: 90 tablet; Refill: 1    3. Hyperlipidemia, unspecified hyperlipidemia type  Assessment & Plan:  Discussed with patient to avoid fried fatty foods, eat a healthy well-balanced diet, frequent aerobic exercise, take medication as prescribed, and return to office for lab follow-up.      Orders:  -     atorvastatin (Lipitor) 80 MG tablet; Take 1 tablet by mouth Daily. New dose for cholesterol  Dispense: 90 tablet; Refill: 3    4. Type 2 diabetes mellitus without complication, without long-term current use of insulin (HCC)  -     metFORMIN (GLUCOPHAGE) 1000 MG tablet; Take 1 tablet by mouth 2 (Two) Times a Day With Meals.  Dispense: 120 tablet; Refill: 5    5. Vitamin D deficiency  -     vitamin D (ERGOCALCIFEROL) 1.25 MG (68717 UT) capsule capsule; Take 1 capsule by mouth 1 (One) Time Per Week.  Dispense: 12 capsule; Refill: 1    6. Cervical spinal stenosis  Assessment & Plan:  Stable; takes gabapentin for neuropathy        Follow Up   Return in about 3 months (around 5/7/2022).  Patient was given instructions and counseling regarding his condition or for health maintenance advice. Please see specific information pulled into the AVS if appropriate.

## 2022-02-14 ENCOUNTER — APPOINTMENT (OUTPATIENT)
Dept: CARDIOLOGY | Facility: HOSPITAL | Age: 67
End: 2022-02-14

## 2022-02-21 ENCOUNTER — LAB (OUTPATIENT)
Dept: LAB | Facility: HOSPITAL | Age: 67
End: 2022-02-21

## 2022-02-21 ENCOUNTER — OFFICE VISIT (OUTPATIENT)
Dept: INFECTIOUS DISEASES | Facility: CLINIC | Age: 67
End: 2022-02-21

## 2022-02-21 VITALS
DIASTOLIC BLOOD PRESSURE: 71 MMHG | BODY MASS INDEX: 27.99 KG/M2 | SYSTOLIC BLOOD PRESSURE: 125 MMHG | RESPIRATION RATE: 18 BRPM | HEIGHT: 65 IN | WEIGHT: 168 LBS | HEART RATE: 79 BPM | TEMPERATURE: 97.9 F

## 2022-02-21 DIAGNOSIS — Z72.53 HIGH RISK BISEXUAL BEHAVIOR: Primary | ICD-10-CM

## 2022-02-21 DIAGNOSIS — Z79.2 LONG TERM (CURRENT) USE OF ANTIBIOTICS: ICD-10-CM

## 2022-02-21 LAB
CREAT SERPL-MCNC: 1.05 MG/DL (ref 0.76–1.27)
GFR SERPL CREATININE-BSD FRML MDRD: 70 ML/MIN/1.73
HCV AB SER DONR QL: NORMAL
HIV1+2 AB SER QL: NORMAL

## 2022-02-21 PROCEDURE — 86592 SYPHILIS TEST NON-TREP QUAL: CPT | Performed by: INTERNAL MEDICINE

## 2022-02-21 PROCEDURE — 99214 OFFICE O/P EST MOD 30 MIN: CPT | Performed by: INTERNAL MEDICINE

## 2022-02-21 PROCEDURE — 86803 HEPATITIS C AB TEST: CPT | Performed by: INTERNAL MEDICINE

## 2022-02-21 PROCEDURE — G0432 EIA HIV-1/HIV-2 SCREEN: HCPCS | Performed by: INTERNAL MEDICINE

## 2022-02-21 PROCEDURE — 87591 N.GONORRHOEAE DNA AMP PROB: CPT | Performed by: INTERNAL MEDICINE

## 2022-02-21 PROCEDURE — 87491 CHLMYD TRACH DNA AMP PROBE: CPT | Performed by: INTERNAL MEDICINE

## 2022-02-21 PROCEDURE — 82565 ASSAY OF CREATININE: CPT | Performed by: INTERNAL MEDICINE

## 2022-02-21 PROCEDURE — 36415 COLL VENOUS BLD VENIPUNCTURE: CPT | Performed by: INTERNAL MEDICINE

## 2022-02-21 RX ORDER — EMTRICITABINE AND TENOFOVIR DISOPROXIL FUMARATE 200; 300 MG/1; MG/1
1 TABLET, FILM COATED ORAL DAILY
Qty: 30 TABLET | Refills: 2 | Status: SHIPPED | OUTPATIENT
Start: 2022-02-21 | End: 2022-06-08

## 2022-02-21 NOTE — PROGRESS NOTES
"cc: Here for evaluation preexposure prophylaxis    Per initial clinic note from February 2021: Patient reports he is in his usual state of health.  He is recently  from his wife and is looking to engage in sexual activity.  Says he has sex with men and women both.  He has a history of chlamydia but tested negative for HIV, chlamydia and hepatitis C back in August 2020.  He has not been sexually active for about 6 months due to COVID but thinks there is a reasonable certainty that he is going to start resuming high risk sexual activity here in the next month or 2.  He has not had any fevers or chills or night sweats or discharge.  He feels well.  His ex-wife is recommended that he start preexposure prophylaxis since they may run in the same circles.  She is tolerating it well.  He is acutely concerned because he is potentially having sex with with commercial sex workers\"    At initial clinic visit he was started on Truvada.  He denies any missed doses (until he ran out one week ago) or side effects.  No STI symptoms.  He has been sexually active and would like to continue.  He has had some spinal stenosis and joint back pain and also required hospitalization for headaches but this turned out to be benign    Past medical history: Hypertension, peripheral vascular disease, diabetes mellitus type 2, hyperlipidemia, peptic ulcer disease, prostate cancer, chlamydia, cervical stenosis, stroke, anxiety/depression, CVA  No family history of infectious diseases  Social history: Retired.  He  from his wife.  Former smoker but quit 10 years ago.  No IV drug use.  No alcohol use. Works as  in [employer].      Review of Systems: All other reviewed and negative except as per HPI    Blood pressure 125/71, pulse 79, temperature 97.9 °F (36.6 °C), resp. rate 18, height 165.1 cm (65\"), weight 76.2 kg (168 lb).  GENERAL: Awake and alert, in no acute distress.   HEENT: . Hearing is grossly normal.   LUNGS: " LCTAB normal respiratory effort.   SKIN: Warm and dry without cutaneous eruptions   PSYCHIATRIC: Appropriate mood, affect, insight, and judgment.     9/13/2021 HIV, RPR, urine GC and hepatitis C antibody negative; creatinine 1.02  Hepatitis B profile nonreactive; hepatitis A total antibody negative    Assessment and Plan  High risk bisexual behavior  Vaccines: Consider hepatitis A and B vaccine  Long-term current use antibiotics          Continue preexposure prophylaxis with Truvada.  Checking urine GC, RPR, hep C and serum creatinine along with HIV today  He understands that this is not a guarantee to protect against HIV and he must take it every day.  Also understands that he should not abandon safe sex practices and will not protect against other STIs. We therefore prescribed Truvada 30-day fill with 2 refills and we will see him back in clinic in 3 months or sooner if necessary

## 2022-02-22 LAB
C TRACH RRNA SPEC QL NAA+PROBE: NEGATIVE
N GONORRHOEA RRNA SPEC QL NAA+PROBE: NEGATIVE
RPR SER QL: NORMAL

## 2022-02-23 ENCOUNTER — TELEPHONE (OUTPATIENT)
Dept: INFECTIOUS DISEASES | Facility: CLINIC | Age: 67
End: 2022-02-23

## 2022-02-23 ENCOUNTER — TELEPHONE (OUTPATIENT)
Dept: NEUROLOGY | Facility: CLINIC | Age: 67
End: 2022-02-23

## 2022-02-23 NOTE — TELEPHONE ENCOUNTER
Provider: CECI  Caller: PATIENT  Relationship to Patient: SELF  Pharmacy: N/A  Phone Number: [phone]  Reason for Call: PATIENT TELEPHONED TO ADVISE HE WAS RECENTLY IN [facility] & HAD IMAGING COMPLETED ON 1/18/2022 FOR THE SAME AREAS THAT PROVIDER HAS ORDERED.    IMAGING REPORTS ARE IN CARE EVERYWHERE.    WILL PATIENT HAVE TO HAVE THESE MRI'S REPEATED AS PER PROVIDER ORDERS?    PLEASE ADVISE.    THANK YOU.

## 2022-02-23 NOTE — TELEPHONE ENCOUNTER
Phone with patient. Informed him, per Dr Kwon's request, that his lab tests were normal and to keep up the good work. Keep follow up appt as scheduled. RAYA, RN

## 2022-02-23 NOTE — TELEPHONE ENCOUNTER
----- Message from Jimmy Kwon MD sent at 2/23/2022  9:56 AM EST -----  Can we let him know his labs look good and to keep up the good work?

## 2022-02-24 NOTE — TELEPHONE ENCOUNTER
I send a message per My Chart to the patient and let him know that he does not have to repeat the testing right now ,Kira Omalley

## 2022-02-28 ENCOUNTER — APPOINTMENT (OUTPATIENT)
Dept: MRI IMAGING | Facility: HOSPITAL | Age: 67
End: 2022-02-28

## 2022-03-28 ENCOUNTER — HOSPITAL ENCOUNTER (OUTPATIENT)
Dept: CARDIOLOGY | Facility: HOSPITAL | Age: 67
Discharge: HOME | End: 2022-03-28
Admitting: STUDENT IN AN ORGANIZED HEALTH CARE EDUCATION/TRAINING PROGRAM

## 2022-03-28 VITALS — BODY MASS INDEX: 27.99 KG/M2 | WEIGHT: 168 LBS | HEIGHT: 65 IN

## 2022-03-28 DIAGNOSIS — G45.9 TIA (TRANSIENT ISCHEMIC ATTACK): ICD-10-CM

## 2022-03-28 LAB
AORTIC DIMENSIONLESS INDEX: 0.8 (DI)
ASCENDING AORTA: 3.1 CM
BH CV ECHO MEAS - ACS: 1.82 CM
BH CV ECHO MEAS - AO MAX PG: 11 MMHG
BH CV ECHO MEAS - AO MEAN PG: 5.4 MMHG
BH CV ECHO MEAS - AO ROOT DIAM: 3.3 CM
BH CV ECHO MEAS - AO V2 MAX: 166 CM/SEC
BH CV ECHO MEAS - AO V2 VTI: 33.5 CM
BH CV ECHO MEAS - AVA(I,D): 1.94 CM2
BH CV ECHO MEAS - CONTRAST EF 4CH: 60 CM2
BH CV ECHO MEAS - EDV(CUBED): 90.9 ML
BH CV ECHO MEAS - EDV(MOD-SP2): 126 ML
BH CV ECHO MEAS - EDV(MOD-SP4): 125 ML
BH CV ECHO MEAS - EF(MOD-BP): 59 %
BH CV ECHO MEAS - EF(MOD-SP2): 58.7 %
BH CV ECHO MEAS - EF(MOD-SP4): 60 %
BH CV ECHO MEAS - ESV(CUBED): 28.6 ML
BH CV ECHO MEAS - ESV(MOD-SP2): 52 ML
BH CV ECHO MEAS - ESV(MOD-SP4): 50 ML
BH CV ECHO MEAS - FS: 31.9 %
BH CV ECHO MEAS - IVS/LVPW: 1.04 CM
BH CV ECHO MEAS - IVSD: 0.85 CM
BH CV ECHO MEAS - LAT PEAK E' VEL: 7.1 CM/SEC
BH CV ECHO MEAS - LV MASS(C)D: 119.7 GRAMS
BH CV ECHO MEAS - LV MAX PG: 4.5 MMHG
BH CV ECHO MEAS - LV MEAN PG: 2.18 MMHG
BH CV ECHO MEAS - LV V1 MAX: 105.8 CM/SEC
BH CV ECHO MEAS - LV V1 VTI: 22.8 CM
BH CV ECHO MEAS - LVIDD: 4.5 CM
BH CV ECHO MEAS - LVIDS: 3.1 CM
BH CV ECHO MEAS - LVOT AREA: 2.9 CM2
BH CV ECHO MEAS - LVOT DIAM: 1.91 CM
BH CV ECHO MEAS - LVPWD: 0.82 CM
BH CV ECHO MEAS - MED PEAK E' VEL: 8.6 CM/SEC
BH CV ECHO MEAS - MV A DUR: 0.18 SEC
BH CV ECHO MEAS - MV A MAX VEL: 78 CM/SEC
BH CV ECHO MEAS - MV DEC SLOPE: 352.6 CM/SEC2
BH CV ECHO MEAS - MV DEC TIME: 280 MSEC
BH CV ECHO MEAS - MV E MAX VEL: 78.4 CM/SEC
BH CV ECHO MEAS - MV E/A: 1.01
BH CV ECHO MEAS - MV MAX PG: 2.8 MMHG
BH CV ECHO MEAS - MV MEAN PG: 1.2 MMHG
BH CV ECHO MEAS - MV V2 VTI: 29.6 CM
BH CV ECHO MEAS - MVA(VTI): 2.2 CM2
BH CV ECHO MEAS - PA ACC TIME: 0.13 SEC
BH CV ECHO MEAS - PA PR(ACCEL): 19.6 MMHG
BH CV ECHO MEAS - PA V2 MAX: 74.7 CM/SEC
BH CV ECHO MEAS - PULM A REVS DUR: 0.13 SEC
BH CV ECHO MEAS - PULM A REVS VEL: 29 CM/SEC
BH CV ECHO MEAS - PULM DIAS VEL: 37.6 CM/SEC
BH CV ECHO MEAS - PULM SYS VEL: 51.9 CM/SEC
BH CV ECHO MEAS - RAP SYSTOLE: 3 MMHG
BH CV ECHO MEAS - RV MAX PG: 1.5 MMHG
BH CV ECHO MEAS - RV V1 MAX: 61.3 CM/SEC
BH CV ECHO MEAS - RV V1 VTI: 13.6 CM
BH CV ECHO MEAS - RVOT DIAM: 1.61 CM
BH CV ECHO MEAS - RVSP: 16 MMHG
BH CV ECHO MEAS - SV(LVOT): 65.1 ML
BH CV ECHO MEAS - SV(MOD-SP2): 74 ML
BH CV ECHO MEAS - SV(MOD-SP4): 75 ML
BH CV ECHO MEAS - SV(RVOT): 27.6 ML
BH CV ECHO MEAS - TAPSE (>1.6): 1.9 CM
BH CV ECHO MEAS - TR MAX PG: 13.3 MMHG
BH CV ECHO MEAS - TR MAX VEL: 182.3 CM/SEC
BH CV ECHO MEASUREMENTS AVERAGE E/E' RATIO: 9.99
BH CV VAS BP LEFT ARM: NORMAL MMHG
BH CV XLRA - RV BASE: 3.5 CM
BH CV XLRA - RV LENGTH: 5.9 CM
BH CV XLRA - RV MID: 2.19 CM
BH CV XLRA - TDI S': 11.6 CM/SEC
LEFT ATRIUM VOLUME INDEX: 32.1 ML/M2
MAXIMAL PREDICTED HEART RATE: 152 BPM
SINUS: 2.9 CM
STJ: 2.8 CM
STRESS TARGET HR: 129 BPM

## 2022-03-28 PROCEDURE — 93306 TTE W/DOPPLER COMPLETE: CPT | Performed by: INTERNAL MEDICINE

## 2022-03-28 PROCEDURE — 25010000002 PERFLUTREN (DEFINITY) 8.476 MG IN SODIUM CHLORIDE (PF) 0.9 % 10 ML INJECTION: Performed by: STUDENT IN AN ORGANIZED HEALTH CARE EDUCATION/TRAINING PROGRAM

## 2022-03-28 PROCEDURE — 93306 TTE W/DOPPLER COMPLETE: CPT

## 2022-03-28 RX ADMIN — SODIUM CHLORIDE 1.5 ML: 9 INJECTION INTRAMUSCULAR; INTRAVENOUS; SUBCUTANEOUS at 15:42

## 2022-04-11 RX ORDER — HYDROXYZINE HYDROCHLORIDE 10 MG/1
TABLET, FILM COATED ORAL
Qty: 60 TABLET | Refills: 1 | Status: SHIPPED | OUTPATIENT
Start: 2022-04-11 | End: 2022-06-10

## 2022-04-15 ENCOUNTER — TELEPHONE (OUTPATIENT)
Dept: NEUROLOGY | Facility: CLINIC | Age: 67
End: 2022-04-15

## 2022-04-18 ENCOUNTER — HOSPITAL ENCOUNTER (OUTPATIENT)
Dept: INFUSION THERAPY | Facility: HOSPITAL | Age: 67
Discharge: HOME | End: 2022-04-18
Admitting: PSYCHIATRY & NEUROLOGY

## 2022-04-18 DIAGNOSIS — G56.03 BILATERAL CARPAL TUNNEL SYNDROME: ICD-10-CM

## 2022-04-18 DIAGNOSIS — M54.10 RADICULOPATHY, UNSPECIFIED SPINAL REGION: ICD-10-CM

## 2022-04-18 PROCEDURE — 95913 NRV CNDJ TEST 13/> STUDIES: CPT

## 2022-04-18 PROCEDURE — 95913 NRV CNDJ TEST 13/> STUDIES: CPT | Performed by: PSYCHIATRY & NEUROLOGY

## 2022-04-18 PROCEDURE — 95886 MUSC TEST DONE W/N TEST COMP: CPT | Performed by: PSYCHIATRY & NEUROLOGY

## 2022-04-18 PROCEDURE — 95886 MUSC TEST DONE W/N TEST COMP: CPT

## 2022-04-18 NOTE — PROCEDURES
EMG and Nerve Conduction Studies    I.      Instrument used: Neuromax 1002  II.     Please see data sheets for tabular summary of NCS and details on methods, temperatures and lab standards.   III.    EMG muscles tested for upper extremity studies include the deltoid, biceps, triceps, pronator teres, extensor digitorum communis, first dorsal interosseous and abductor pollicis brevis.    IV.   EMG muscles tested for lower extremity studies include the vastus lateralis, tibialis anterior, peroneus longus, medial gastrocnemius and extensor digitorum brevis.    V.    Additional muscles tested as needed.  Paraspinal muscles tested as needed.   VI.   Please see data sheets for tabular summary of EMG findings.   VII. The complete report includes the data sheets.      Indication: Numbness in the hands and the right lateral thigh  History: 66-year-old male with numbness in both hands as well as in the right lateral thigh.  He was diagnosed previously with carpal tunnel syndrome.  He has more trouble on the right than on the left.  He has been prediabetic for several years.  He has chronic neck and bilateral shoulder pain with worse shoulder pain with movement of the shoulders.  His back pain has been progressively worsening sometimes radiating into the right leg.    Dr. Hay requests a 3 extremity study including both arms and in the right leg.    Ht: 165.1 cm  Wt: 76.2 kg; BMI 27.96  HbA1C:   Lab Results   Component Value Date    HGBA1C 5.9 (H) 01/19/2022     TSH:   Lab Results   Component Value Date    TSH 2.300 08/17/2020       Technical summary:  Nerve conduction studies were obtained in both arms and in the right leg.  Skin temperatures were initially quite cold.  The hands were warmed prior to study and temperatures were at or above 32 °C measured on the palms.  Temperatures were cold around the foot and I was not able to correct fully but temperature correction factors were not needed since the distal latencies were  normal.  Needle examination was obtained on selected muscles in both arms and in the right leg.    Results:  1.  Severely prolonged median antidromic sensory distal latencies bilaterally at 7.3 ms on the left and 7.7 ms on the right with low amplitudes of 16.2 µV on the left and 8 µV on the right.  2.  Normal ulnar antidromic sensory distal latencies and amplitudes bilaterally.  3.  Normal right radial sensory distal latency and amplitude.  4.  Severely prolonged median motor distal latencies bilaterally at 6.5 ms on the left and 9.3 ms on the right.  The conduction velocities were also slow at 46.7 m/s on the left and slower on the right at 39.7 m/s.  The amplitudes were low at 4.1 mV on the left and 1.2 mV on the right measured at the wrists.  5.  Normal ulnar motor conduction velocities, distal latencies and amplitudes bilaterally.  6.  Normal right sural sensory distal latency and amplitude.  7.  Normal right superficial peroneal sensory distal latency and amplitude.  8.  Normal right peroneal motor conduction velocities, distal latency and amplitudes.  9.  Normal right tibial motor conduction velocity, distal latency and amplitudes.  10.  Needle examination of selected muscles in both arms and in the right leg showed a few positive sharp waves in the right abductor pollicis brevis with an increased number of large motor units increased firing rate and reduced interference pattern.  The left abductor pollicis brevis showed normal insertional activity with a mild increased number of large motor units increased firing rate and reduced interference pattern.  All other muscles tested in both arms and in the right leg showed normal insertional activities, motor units and recruitment patterns.  Cervical paraspinals at C7 showed no abnormality on either side    Impression:  Abnormal study showing bilateral median neuropathies at the wrists, severe on the left and very severe on the right.  There was slowing of the  conduction velocities in the forearms but no needle exam changes in the pronator teres or flexor pollicis longus to suggest a proximal median neuropathy on either side.  The slow conduction velocity is therefore interpreted as retrograde demyelination.  There was no evidence of a more diffuse polyneuropathy and no evidence of a cervical radiculopathy on either side or a lumbosacral radiculopathy on the right side.  Study results were discussed with the patient.    Piotr Cade M.D.      Addendum:  The patient's numbness on the right lateral thigh could be meralgia paresthetica; however, the lateral femoral cutaneous nerve is very difficult to study and was not attempted.  GNS        Dictated utilizing Dragon dictation.

## 2022-04-22 ENCOUNTER — TELEPHONE (OUTPATIENT)
Dept: NEUROLOGY | Facility: CLINIC | Age: 67
End: 2022-04-22

## 2022-04-22 ENCOUNTER — DOCUMENTATION (OUTPATIENT)
Dept: NEUROLOGY | Facility: CLINIC | Age: 67
End: 2022-04-22

## 2022-04-22 DIAGNOSIS — M54.12 RADICULOPATHY OF CERVICAL REGION: Primary | ICD-10-CM

## 2022-04-22 DIAGNOSIS — G62.9 NEUROPATHY: ICD-10-CM

## 2022-04-22 RX ORDER — GABAPENTIN 300 MG/1
600 CAPSULE ORAL 3 TIMES DAILY
Qty: 180 CAPSULE | Refills: 5 | Status: SHIPPED | OUTPATIENT
Start: 2022-04-22 | End: 2022-11-07

## 2022-04-22 NOTE — TELEPHONE ENCOUNTER
The patient's echo was done and I reviewed it.  He has no signs of severe heart failure, his left atrial size is normal, and he does not have any signs of atrial shunting on his saline test during the echo.

## 2022-04-22 NOTE — TELEPHONE ENCOUNTER
Caller: JULI    Relationship: SELF    Best call back number: [phone]    What medications are you currently taking:   Current Outpatient Medications on File Prior to Visit   Medication Sig Dispense Refill   • amLODIPine (NORVASC) 10 MG tablet Take 1 tablet by mouth Daily. 90 tablet 1   • aspirin 81 MG chewable tablet      • atorvastatin (Lipitor) 80 MG tablet Take 1 tablet by mouth Daily. New dose for cholesterol 90 tablet 3   • Blood Glucose Monitoring Suppl (ACCU-CHEK DIONTE PLUS) w/Device kit 1 kit 4 (Four) Times a Day. 1 kit 1   • CBD (cannabidiol) oral oil Take  by mouth 2 (Two) Times a Day. Half a dropper twice daily     • emtricitabine-tenofovir (TRUVADA) 200-300 MG per tablet Take 1 tablet by mouth Daily. 30 tablet 2   • gabapentin (NEURONTIN) 300 MG capsule Take 1 capsule by mouth 3 (Three) Times a Day. For the first 3 days take 1 capsule nightly, then take 1 capsule BID for 3 days, then take as prescribed 90 capsule 3   • hydrOXYzine (ATARAX) 10 MG tablet TAKE 1 TO 2 TABLETS BY MOUTH EVERY NIGHT AT BEDTIME 60 tablet 1   • losartan (COZAAR) 100 MG tablet Take 1 tablet by mouth Daily. 90 tablet 1   • metFORMIN (GLUCOPHAGE) 1000 MG tablet Take 1 tablet by mouth 2 (Two) Times a Day With Meals. 120 tablet 5   • metoprolol tartrate (LOPRESSOR) 25 MG tablet Take 1 tablet by mouth Daily. 90 tablet 1   • tamsulosin (FLOMAX) 0.4 MG capsule 24 hr capsule Take 1 capsule by mouth Daily.     • vitamin D (ERGOCALCIFEROL) 1.25 MG (92851 UT) capsule capsule Take 1 capsule by mouth 1 (One) Time Per Week. 12 capsule 1     No current facility-administered medications on file prior to visit.            Which medication are you concerned about: gabapentin (NEURONTIN) 300 MG capsule    What are your concerns: PT STATES HE IS STILL IN CONSTANT PAIN, HE WAS WONDERING IF HIS MEDICATION CAN BE INCREASED    - STIFFNESS AND PAIN IN NECK RADIATES DOWN HIS SHOULDER AND LEFT ARM TO THE ELBOW. HE IS ALSO EXPERIENCING WEAKNESS IN  BOTH ARMS LEFT GREATER THEN RIGHT.     - PAIN IN LUMBAR REGION OF HIS BACK    How long have you had these concerns: COUPLE OF WEEKS NOW

## 2022-04-25 NOTE — TELEPHONE ENCOUNTER
Called the patient and left a message on his VM that his Echo is normal and if he needs anything please to call me back ,Kira Omalley

## 2022-06-07 ENCOUNTER — OFFICE VISIT (OUTPATIENT)
Dept: NEUROLOGY | Facility: CLINIC | Age: 67
End: 2022-06-07

## 2022-06-07 VITALS
HEART RATE: 68 BPM | HEIGHT: 65 IN | SYSTOLIC BLOOD PRESSURE: 142 MMHG | RESPIRATION RATE: 16 BRPM | DIASTOLIC BLOOD PRESSURE: 68 MMHG | BODY MASS INDEX: 27.99 KG/M2 | WEIGHT: 168 LBS

## 2022-06-07 DIAGNOSIS — M48.061 SPINAL STENOSIS OF LUMBAR REGION, UNSPECIFIED WHETHER NEUROGENIC CLAUDICATION PRESENT: Primary | ICD-10-CM

## 2022-06-07 DIAGNOSIS — I47.29 VENTRICULAR TACHYCARDIA (PAROXYSMAL): ICD-10-CM

## 2022-06-07 PROCEDURE — 99212 OFFICE O/P EST SF 10 MIN: CPT | Performed by: STUDENT IN AN ORGANIZED HEALTH CARE EDUCATION/TRAINING PROGRAM

## 2022-06-07 NOTE — PROGRESS NOTES
Chief Complaint   Patient presents with   • TIA     Patient doing fine        Patient ID: Kevin Garsia is a 67 y.o. male.    HPI:    The following portions of the patient's history were reviewed and updated as appropriate: allergies, current medications, past family history, past medical history, past social history, past surgical history and problem list.    Interval history:    Review of Systems   Constitutional: Negative for activity change, chills and fatigue.   HENT: Negative for congestion, nosebleeds, sinus pain and tinnitus.    Eyes: Negative for photophobia and visual disturbance.   Respiratory: Negative for cough, chest tightness and shortness of breath.    Cardiovascular: Negative for chest pain, palpitations and leg swelling.   Gastrointestinal: Negative for abdominal pain, constipation and rectal pain.   Endocrine: Negative for cold intolerance and heat intolerance.   Genitourinary: Negative for difficulty urinating, frequency, penile swelling and urgency.   Musculoskeletal: Positive for back pain. Negative for gait problem, neck pain and neck stiffness.   Allergic/Immunologic: Negative for food allergies.   Neurological: Negative for dizziness, tremors, syncope, light-headedness and headaches.   Psychiatric/Behavioral: Negative for agitation, confusion, decreased concentration and sleep disturbance.      Mr. Garsia is a 66-year-old male with history of hypertension hyperlipidemia, diabetes, CHF, IGOR, prior TIA/stroke status post left carotid endarterectomy, bilateral severe carpal tunnel syndrome, chronic back pain with paresthesias of the right thigh, cervical stenosis. He presents back to neurology clinic for follow-up.  He was seen at [facility] for worsening neck pain and evaluated by neurosurgery with outpt follow up recommended. Neck pain is nonexistent currently. Last week had right sided lower back pain with numbness in the right thigh. Was painful to stand or walk for 2 days. Subsided but has  positional lower back pain pain when straightening back after bending over. Gabapentin helps with nerve pain significantly particularly with the neck.  In terms of his carpal tunnel syndrome, he continues to have occasional numbness in his hands.  I brought up the idea of surgery again as his findings were quite severe on the nerve conduction studies.  He states it's nothing he can't live with and when he was not working as much. He plans to use a keyboard more and will let me know if his symptoms worsen.    Vitals:    22 0959   BP: 142/68   Pulse: 68   Resp: 16       Neurologic Exam     Mental Status   Attention: normal. Concentration: normal.   Level of consciousness: alert    Cranial Nerves     CN II   Visual fields full to confrontation.   Visual acuity: normal    CN III, IV, VI   Pupils are equal, round, and reactive to light.  Extraocular motions are normal.     CN V   Facial sensation intact.     CN VII   Facial expression full, symmetric.     CN VIII   Hearing: intact    CN IX, X   Palate: symmetric    CN XI   Right trapezius strength: normal  Left trapezius strength: normal    CN XII   Tongue: not atrophic  Fasciculations: absent  Tongue deviation: none    Motor Exam   Muscle bulk: normal    Strength   Right deltoid: 5/5  Left deltoid: 5/5  Right biceps: 5/5  Left biceps: 5/5  Right triceps: 5/5  Left triceps: 5/5  Right iliopsoas: 5/5  Left iliopsoas: 5/5  Right quadriceps: 5/5  Left quadriceps: 5/5  Right hamstrin/5  Left hamstrin/5  Right anterior tibial: 5/5  Left anterior tibial: 5/5  Right gastroc: 5/5  Left gastroc: 5/5Grip 5 out of 5 bilaterally     Sensory Exam   Right arm light touch: normal  Left arm light touch: normal  Left leg light touch: normal  Numbness to light touch at top of right thigh     Gait, Coordination, and Reflexes     Coordination   Finger to nose coordination: normal  Heel to shin coordination: normal    Reflexes   Right brachioradialis: 2+  Left brachioradialis:  1+  Right biceps: 1+  Left biceps: 1+  Right patellar: 0  Left patellar: 0  Right achilles: 0  Left achilles: 0      Physical Exam  Eyes:      Extraocular Movements: EOM normal.      Pupils: Pupils are equal, round, and reactive to light.   Neurological:      Coordination: Finger-Nose-Finger Test and Heel to Shin Test normal.      Deep Tendon Reflexes:      Reflex Scores:       Bicep reflexes are 1+ on the right side and 1+ on the left side.       Brachioradialis reflexes are 2+ on the right side and 1+ on the left side.       Patellar reflexes are 0 on the right side and 0 on the left side.       Achilles reflexes are 0 on the right side and 0 on the left side.        Procedures    Assessment/Plan:    In terms of his carpal tunnel syndrome, he continues to have occasional numbness in his hands.  I brought up the idea of surgery again as his findings were quite severe on the nerve conduction studies.  He states it's nothing he can't live with and when he was not working as much. He plans to use a keyboard more and will let me know if his symptoms worsen.  -PT and NSGY referral for lumbar spinal stenosis.  - cardiology referral for abnormal heart monitor  Return in about 4 months (around 10/7/2022).  I spent 18 minutes caring for this patient on this date of service. This time includes time spent by me in the following activities as necessary: preparing for the visit, reviewing tests, medical records and previous visits, obtaining and/or reviewing a separately obtained history, performing a medically appropriate exam and/or evaluation, counseling and educating the patient, and/or communicating with other healthcare professionals, documenting information in the medical record, independently interpreting results and communicating that information with the patient, and developing a medically appropriate treatment plan with consideration of other conditions, medications, and treatments.         Diagnoses and all orders  for this visit:    1. Spinal stenosis of lumbar region, unspecified whether neurogenic claudication present (Primary)  -     Ambulatory Referral to Physical Therapy Evaluate and treat, Neuro  -     Ambulatory Referral to Neurosurgery    2. Ventricular tachycardia (paroxysmal) (HCC)  -     Ambulatory Referral to Cardiology           Jd Hay MD

## 2022-06-08 RX ORDER — EMTRICITABINE AND TENOFOVIR DISOPROXIL FUMARATE 200; 300 MG/1; MG/1
1 TABLET, FILM COATED ORAL DAILY
Qty: 30 TABLET | Refills: 0 | Status: SHIPPED | OUTPATIENT
Start: 2022-06-08 | End: 2022-07-06

## 2022-06-10 RX ORDER — HYDROXYZINE HYDROCHLORIDE 10 MG/1
TABLET, FILM COATED ORAL
Qty: 60 TABLET | Refills: 4 | Status: SHIPPED | OUTPATIENT
Start: 2022-06-10 | End: 2023-02-09

## 2022-07-06 RX ORDER — EMTRICITABINE AND TENOFOVIR DISOPROXIL FUMARATE 200; 300 MG/1; MG/1
1 TABLET, FILM COATED ORAL DAILY
Qty: 30 TABLET | Refills: 0 | Status: SHIPPED | OUTPATIENT
Start: 2022-07-06 | End: 2022-08-09

## 2022-08-05 ENCOUNTER — LAB (OUTPATIENT)
Dept: LAB | Facility: HOSPITAL | Age: 67
End: 2022-08-05

## 2022-08-05 ENCOUNTER — OFFICE VISIT (OUTPATIENT)
Dept: INFECTIOUS DISEASES | Facility: CLINIC | Age: 67
End: 2022-08-05

## 2022-08-05 VITALS
WEIGHT: 180.8 LBS | RESPIRATION RATE: 18 BRPM | SYSTOLIC BLOOD PRESSURE: 125 MMHG | DIASTOLIC BLOOD PRESSURE: 71 MMHG | HEIGHT: 65 IN | TEMPERATURE: 97.8 F | HEART RATE: 70 BPM | BODY MASS INDEX: 30.12 KG/M2

## 2022-08-05 DIAGNOSIS — Z79.2 LONG TERM (CURRENT) USE OF ANTIBIOTICS: ICD-10-CM

## 2022-08-05 DIAGNOSIS — Z72.53 HIGH RISK BISEXUAL BEHAVIOR: Primary | ICD-10-CM

## 2022-08-05 LAB
CREAT SERPL-MCNC: 1.12 MG/DL (ref 0.76–1.27)
EGFRCR SERPLBLD CKD-EPI 2021: 71.1 ML/MIN/1.73
HCV AB SER DONR QL: NORMAL
HIV1+2 AB SER QL: NORMAL
RPR SER QL: NORMAL

## 2022-08-05 PROCEDURE — 86592 SYPHILIS TEST NON-TREP QUAL: CPT | Performed by: INTERNAL MEDICINE

## 2022-08-05 PROCEDURE — 87591 N.GONORRHOEAE DNA AMP PROB: CPT | Performed by: INTERNAL MEDICINE

## 2022-08-05 PROCEDURE — 87491 CHLMYD TRACH DNA AMP PROBE: CPT | Performed by: INTERNAL MEDICINE

## 2022-08-05 PROCEDURE — 86803 HEPATITIS C AB TEST: CPT | Performed by: INTERNAL MEDICINE

## 2022-08-05 PROCEDURE — G0432 EIA HIV-1/HIV-2 SCREEN: HCPCS | Performed by: INTERNAL MEDICINE

## 2022-08-05 PROCEDURE — 36415 COLL VENOUS BLD VENIPUNCTURE: CPT | Performed by: INTERNAL MEDICINE

## 2022-08-05 PROCEDURE — 99214 OFFICE O/P EST MOD 30 MIN: CPT | Performed by: INTERNAL MEDICINE

## 2022-08-05 PROCEDURE — 82565 ASSAY OF CREATININE: CPT | Performed by: INTERNAL MEDICINE

## 2022-08-05 NOTE — PROGRESS NOTES
"cc: Here for evaluation preexposure prophylaxis    Per initial clinic note from February 2021: Patient reports he is in his usual state of health.  He is recently  from his wife and is looking to engage in sexual activity.  Says he has sex with men and women both.  He has a history of chlamydia but tested negative for HIV, chlamydia and hepatitis C back in August 2020.  He has not been sexually active for about 6 months due to COVID but thinks there is a reasonable certainty that he is going to start resuming high risk sexual activity here in the next month or 2.  He has not had any fevers or chills or night sweats or discharge.  He feels well.  His ex-wife is recommended that he start preexposure prophylaxis since they may run in the same circles.  She is tolerating it well.  He is acutely concerned because he is potentially having sex with with commercial sex workers\"    At initial clinic visit he was started on Truvada.  He denies any missed doses  or side effects.  No STI symptoms.  He has been sexually monogamous with a female partner but is interested in opening relationship to male partners and therefore would like to cont on truvada.      Past medical history: Hypertension, peripheral vascular disease, diabetes mellitus type 2, hyperlipidemia, peptic ulcer disease, prostate cancer, chlamydia, cervical stenosis, stroke, anxiety/depression, CVA  No family history of infectious diseases  Social history: Retired.  He  from his wife.  Former smoker but quit 10 years ago.  No IV drug use.  No alcohol use. Works as  in [employer].      Blood pressure 125/71, pulse 70, temperature 97.8 °F (36.6 °C), resp. rate 18, height 165.1 cm (65\"), weight 82 kg (180 lb 12.8 oz).  GENERAL: Awake and alert, in no acute distress.   HEENT: . Hearing is grossly normal.   LUNGS: LCTAB normal respiratory effort.   SKIN: Warm and dry without cutaneous eruptions   PSYCHIATRIC: Appropriate mood, affect, " insight, and judgment.     2/21/2021 HIV, RPR, urine GC and hepatitis C antibody negative; creatinine 1.05      Assessment and Plan  High risk bisexual behavior  Vaccines: Consider hepatitis A and B vaccine  Long-term current use antibiotics      Continue preexposure prophylaxis with Truvada.  Checking urine GC, RPR, hep C and serum creatinine along with HIV today  He understands that this is not a guarantee to protect against HIV and he must take it every day.  Also understands that he should not abandon safe sex practices and will not protect against other STIs.  Discussed with him possibly discontinuing preexposure prophylaxis since he is with 1 partner now, but since he would like to open the relationship to more partners, he wishes to continue.  I also discussed with him potential monkey pox vaccination should he become more sexually active.  We will see him back in clinic in 3 months or sooner if necessary

## 2022-08-08 LAB
C TRACH RRNA SPEC QL NAA+PROBE: NEGATIVE
N GONORRHOEA RRNA SPEC QL NAA+PROBE: NEGATIVE

## 2022-08-09 ENCOUNTER — TELEPHONE (OUTPATIENT)
Dept: INFECTIOUS DISEASES | Facility: CLINIC | Age: 67
End: 2022-08-09

## 2022-08-09 ENCOUNTER — HOSPITAL ENCOUNTER (OUTPATIENT)
Dept: PHYSICAL THERAPY | Facility: HOSPITAL | Age: 67
Setting detail: THERAPIES SERIES
Discharge: HOME | End: 2022-08-09

## 2022-08-09 DIAGNOSIS — M54.50 CHRONIC BILATERAL LOW BACK PAIN, UNSPECIFIED WHETHER SCIATICA PRESENT: Primary | ICD-10-CM

## 2022-08-09 DIAGNOSIS — G89.29 CHRONIC BILATERAL LOW BACK PAIN, UNSPECIFIED WHETHER SCIATICA PRESENT: Primary | ICD-10-CM

## 2022-08-09 DIAGNOSIS — R26.2 DIFFICULTY WALKING: ICD-10-CM

## 2022-08-09 PROCEDURE — 97110 THERAPEUTIC EXERCISES: CPT

## 2022-08-09 PROCEDURE — 97161 PT EVAL LOW COMPLEX 20 MIN: CPT

## 2022-08-09 RX ORDER — EMTRICITABINE AND TENOFOVIR DISOPROXIL FUMARATE 200; 300 MG/1; MG/1
1 TABLET, FILM COATED ORAL DAILY
Qty: 30 TABLET | Refills: 2 | Status: SHIPPED | OUTPATIENT
Start: 2022-08-09 | End: 2022-10-24

## 2022-08-09 NOTE — TELEPHONE ENCOUNTER
----- Message from Jimmy Kwon MD sent at 8/9/2022  1:26 PM EDT -----  Can we let him know his labs looked good and to keep up the good work?

## 2022-08-09 NOTE — TELEPHONE ENCOUNTER
Per Dr. Kwon's request, I called patient and informed him that Dr. Kwon reviewed his labwork and asked me to tell him that his labs looked good and to keep the good work. Patient stated understanding and denied having any questions.

## 2022-08-09 NOTE — THERAPY EVALUATION
Outpatient Physical Therapy Ortho Initial Evaluation  [facility]     Patient Name: Kevin Garsia  : 1955  MRN: [id number]  Today's Date: 2022      Visit Date: 2022    Patient Active Problem List   Diagnosis   • Essential hypertension   • Hyperlipidemia   • History of prostate cancer   • Non morbid obesity due to excess calories   • Vitamin D deficiency   • TIA (transient ischemic attack)   • S/P carotid endarterectomy   • Type 2 diabetes mellitus without complication, without long-term current use of insulin (HCC)   • Chronic heart failure with preserved ejection fraction (HCC)   • Arthritis   • Sleep apnea   • Hospital discharge follow-up   • Cervical spinal stenosis        Past Medical History:   Diagnosis Date   • Cancer (HCC)     prostate   • Diabetes mellitus (HCC)    • Hyperlipidemia    • Hypertension         Past Surgical History:   Procedure Laterality Date   • ANGIOPLASTY CAROTID ARTERY     • PROSTATE SURGERY      cancer removal       Visit Dx:     ICD-10-CM ICD-9-CM   1. Chronic bilateral low back pain, unspecified whether sciatica present  M54.50 724.2    G89.29 338.29   2. Difficulty walking  R26.2 719.7          Patient History     Row Name 22 1300             History    Chief Complaint Pain  -CC      Type of Pain Back pain  -CC      Brief Description of Current Complaint Kevin Garsia is a 67 y.o. male who presents today with low back pain. Reports history of lumbar MRI remarkable for spinal stenosis. Pt reports pain chronic in nature, with recent exacerbation a couple of weeks ago. 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 Pt reports radicular s/s into R LE and occasional numbness in anterior R thigh.. Kevin Garsia reports difficulty/increased pain with walking (immediately), sitting for long periods of time,  twisting (getting out of car), rolling over in bed. Pain relieving factors include long hot showers, heating pad. PMH includes neck pain, chronic back pain.  -CC      Patient/Caregiver Goals Relieve pain  -CC      Occupation/sports/leisure activities  in a kitchen  -CC      What clinical tests have you had for this problem? MRI  -CC      Results of Clinical Tests reports lumbar spinal stenosis  -CC              Pain     Pain Location Back  -CC      Pain at Present 3  -CC      Pain at Best 0  -CC      Pain at Worst 10  -CC      Tolerance Time- Walking 0 min without pain  -CC      Is your sleep disturbed? No  not from back pain  -CC              Fall Risk Assessment    Any falls in the past year: No  -CC              Daily Activities    Primary Language English  -CC      Pt Participated in POC and Goals Yes  -CC            User Key  (r) = Recorded By, (t) = Taken By, (c) = Cosigned By    Initials Name Provider Type    CC Kirtsen Person, PT Physical Therapist                 PT Ortho     Row Name 08/09/22 1300       Posture/Observations    Posture/Observations Comments mildly rounded shoulders/incr thoracic kyphosis  -CC       Neural Tension Signs- Lower Quarter Clearing    Slump Bilateral:;Negative  significant decr posterior chain flexibility  -CC       Sensory Screen for Light Touch- Lower Quarter Clearing    L2 (anterior mid thigh) Intact  -CC    L3 (distal anterior thigh) Intact  -CC    L4 (medial lower leg/foot) Intact  -CC    L5 (lateral lower leg/great toe) Intact  -CC    S1 (bottom of foot) Intact  -CC       Myotomal Screen- Lower Quarter Clearing    Hip flexion (L2) Bilateral:;5 (Normal)  -CC    Knee extension (L3) Bilateral:;5 (Normal)  -CC    Ankle DF (L4) Bilateral:;5 (Normal)  -CC    Knee flexion (S2) Bilateral:;5 (Normal)  -CC       Lumbar ROM Screen- Lower Quarter Clearing    Lumbar Flexion Normal  75% ROM  -CC    Lumbar Extension Impaired  <25% ROM, pain  -CC    Lumbar Lateral Flexion  Impaired  <25% ROM, pain on R with L lateral flex  -CC    Lumbar Rotation Impaired  <25%  ROM, pain with R rot  -CC       SI/Hip Screen- Lower Quarter Clearing    Viki's/Tu's test Right:;Positive  -CC       Lumbosacral Palpation    Lumbosacral Palpation? No Tenderness/Abnormality  -CC       General ROM    RT Lower Ext Rt Hip External Rotation;Rt Hip Internal Rotation  -CC    LT Lower Ext Lt Hip External Rotation;Lt Hip Internal Rotation  -CC       Right Lower Ext    Rt Hip External Rotation PROM significantly limited  -CC    Rt Hip Internal Rotation PROM <5 deg in supine  -CC       Left Lower Ext    Lt Hip External Rotation PROM significantly limited  -CC    Lt Hip Internal Rotation PROM <5 deg in supine  -CC       MMT (Manual Muscle Testing)    Rt Lower Ext Rt Hip ABduction  -CC    Lt Lower Ext Lt Hip ABduction  -CC       MMT Right Lower Ext    Rt Hip ABduction MMT, Gross Movement (3+/5) fair plus  -CC       MMT Left Lower Ext    Lt Hip ABduction MMT, Gross Movement (3+/5) fair plus  -CC       Sensation    Sensation WNL? 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   -CC     STG 1 Progress New  -     STG 2 Pt will report/demo ability to perform bed mobility with </=2/10 LBP  -CC     STG 2 Progress New  -            Long Term Goals    LTG Date to Achieve 10/08/22  -     LTG 1 Patient will be independent with education for symptom management and advanced HEP to decrease LBP pain.  -CC     LTG 1 Progress New  -CC     LTG 2 Pt will improve B hip strength to at least 4/5.  -CC     LTG 2 Progress New  -CC     LTG 3 Patient will improve walking tolerance from 0 min to >20 min with </=2/10 LBP to improve participation in community activities.  -CC     LTG 3 Progress New  -     LTG 4 Pt will report ability to participate in one full shift at work with </=3/10 LBP.  -     LTG 4 Progress New  -     LTG 5 Pt will report ability to perform car transfers with </=3/10.  -     LTG 5 Progress New  -            Time Calculation    PT Goal Re-Cert Due Date 11/07/22  -           User Key  (r) = Recorded By, (t) = Taken By, (c) = Cosigned By    Initials Name Provider Type    CC Kirsten Person, PT Physical Therapist                 PT Assessment/Plan     Row Name 08/09/22 1300          PT Assessment    Functional Limitations Performance in work activities;Performance in self-care ADL;Performance in leisure activities;Limitations in functional capacity and performance;Limitations in community activities;Limitation in home management  -     Impairments Range of motion;Posture;Poor body mechanics;Pain;Muscle strength;Joint mobility;Impaired flexibility  -     Assessment Comments Kevin Garsia is a 67 y.o. male referred to physical therapy for chronic low back pain. He presents with a stable clinical presentation, along with no remarkable comorbidities and personal factors of chronicity of pain that may impact his progress in the plan of care. Pt presents today with decreased lumbar ROM, significantly decreased hip mobility and posterior chain flexibility, and impairments  in core and hip strength . His signs and symptoms are consistent with referring diagnosis. 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 Pt will benefit from skilled PT to address the previous impairments and return to OF.  -CC     Please refer to paper survey for additional self-reported information Yes  -CC     Rehab Potential Good  -CC     Patient/caregiver participated in establishment of treatment plan and goals Yes  -CC     Patient would benefit from skilled therapy intervention Yes  -CC            PT Plan    PT Frequency 1x/week;2x/week  -CC     Predicted Duration of Therapy Intervention (PT) 4-8 visits  -CC     Planned CPT's? PT EVAL LOW COMPLEXITY: 81895;PT RE-EVAL: 49288;PT THER PROC EA 15 MIN: 39117;PT THER ACT EA 15 MIN: 66221;PT MANUAL THERAPY EA 15 MIN: 91769;PT NEUROMUSC RE-EDUCATION EA 15 MIN: 34643;PT GAIT TRAINING EA 15 MIN: 78441;PT SELF CARE/HOME MGMT/TRAIN EA 15: 53869;PT HOT OR COLD PACK TREAT MCARE;PT ELECTRICAL STIM UNATTEND:   -CC     PT Plan Comments next visit: nu step, review HEP, add HL hip abd and PPT, s/l hip abd, SB roll out  -CC           User Key  (r) = Recorded By, (t) = Taken By, (c) = Cosigned By    Initials Name Provider Type    Kirsten Muhammad, PT Physical Therapist                   OP Exercises     Row Name 08/09/22 1300             Total Minutes    71504 - PT Therapeutic Exercise Minutes 15  -CC              Exercise 1    Exercise Name 1 nu step  -CC      Additional Comments next visit  -CC              Exercise 2    Exercise Name 2 LTR  -CC      Cueing 2 Verbal  -CC      Reps 2 10 kofi  -CC      Time 2 3 sec  -CC              Exercise 3    Exercise Name 3 piriformis str  -CC      Cueing 3 Verbal  -CC      Reps 3 3b  -CC      Time 3 20 sec  -CC              Exercise 4    Exercise Name 4 seated HS str  -CC      Cueing 4 Verbal  -CC      Reps 4 3b  -CC       Time 4 20 sec  -CC              Exercise 5    Exercise Name 5 seated lumbar flex  -CC      Cueing 5 Verbal  -CC      Reps 5 10  -CC      Time 5 3 sec  -CC            User Key  (r) = Recorded By, (t) = Taken By, (c) = Cosigned By    Initials Name Provider Type    CC Kirsten Person, PT Physical Therapist                              Outcome Measure Options: Modified Oswestry  Modified Oswestry  Modified Oswestry Score/Comments: 24/50 = 38% disability      Time Calculation:     Start Time: 1300  Stop Time: 1330  Time Calculation (min): 30 min  Total Timed Code Minutes- PT: 15 minute(s)  Timed Charges  43433 - PT Therapeutic Exercise Minutes: 15  Untimed Charges  PT Eval/Re-eval Minutes: 15  Total Minutes  Timed Charges Total Minutes: 15  Untimed Charges Total Minutes: 15   Total Minutes: 30     Therapy Charges for Today     Code Description Service Date Service Provider Modifiers Qty    92365904174 HC PT THER PROC EA 15 MIN 8/9/2022 Kirsten Person, PT GP 1    17813384475 HC PT EVAL LOW COMPLEXITY 1 8/9/2022 Kirsten Person, PT GP 1          PT G-Codes  Outcome Measure Options: Modified Oswestry  Modified Oswestry Score/Comments: 24/50 = 38% disability         Kirsten Person, GENOVEVA  8/9/2022

## 2022-08-22 ENCOUNTER — HOSPITAL ENCOUNTER (OUTPATIENT)
Dept: PHYSICAL THERAPY | Facility: HOSPITAL | Age: 67
Setting detail: THERAPIES SERIES
Discharge: HOME | End: 2022-08-22

## 2022-08-22 DIAGNOSIS — R26.2 DIFFICULTY WALKING: ICD-10-CM

## 2022-08-22 DIAGNOSIS — G89.29 CHRONIC BILATERAL LOW BACK PAIN, UNSPECIFIED WHETHER SCIATICA PRESENT: Primary | ICD-10-CM

## 2022-08-22 DIAGNOSIS — M54.50 CHRONIC BILATERAL LOW BACK PAIN, UNSPECIFIED WHETHER SCIATICA PRESENT: Primary | ICD-10-CM

## 2022-08-22 PROCEDURE — 97110 THERAPEUTIC EXERCISES: CPT

## 2022-08-22 NOTE — THERAPY TREATMENT NOTE
Outpatient Physical Therapy Ortho Treatment Note  [facility]     Patient Name: Kevin Garsia  : 1955  MRN: [id number]  Today's Date: 2022      Visit Date: 2022    Visit Dx:    ICD-10-CM ICD-9-CM   1. Chronic bilateral low back pain, unspecified whether sciatica present  M54.50 724.2    G89.29 338.29   2. Difficulty walking  R26.2 719.7       Patient Active Problem List   Diagnosis   • Essential hypertension   • Hyperlipidemia   • History of prostate cancer   • Non morbid obesity due to excess calories   • Vitamin D deficiency   • TIA (transient ischemic attack)   • S/P carotid endarterectomy   • Type 2 diabetes mellitus without complication, without long-term current use of insulin (HCC)   • Chronic heart failure with preserved ejection fraction (HCC)   • Arthritis   • Sleep apnea   • Hospital discharge follow-up   • Cervical spinal stenosis        Past Medical History:   Diagnosis Date   • Cancer (HCC)     prostate   • Diabetes mellitus (HCC)    • Hyperlipidemia    • Hypertension         Past Surgical History:   Procedure Laterality Date   • ANGIOPLASTY CAROTID ARTERY     • PROSTATE SURGERY      cancer removal                        PT Assessment/Plan     Row Name 22 1200          PT Assessment    Assessment Comments Pt returns for first f/u visit since eval, reports good HEP compliance, although no noted changes since starting. Reports incr pain since eval, and has now taken off work indefinitely. Had to cancel appt with neurosurgeon as still attempted to obtain copy of MRI from [facility]. Reviewed current HEP with only minor cues for form, and initiated hip and core strength challenges.  -CC            PT Plan    PT Plan Comments Add s/l hip abd, AR Press. F/u on appt rescheduled w/ neurosurgery?  -CC           User Key  (r) = Recorded By, (t) = Taken By, (c) = Cosigned By    Initials Name Provider Type    CC Kirsten Person, PT Physical Therapist                   OP  Exercises     Row Name 08/22/22 1200             Subjective Comments    Subjective Comments Pt reports pain has worsened since last time, had to take off work indefinitely. Had to cancel his appt with his neurosurgeon as he is still trying to obtain MRI records from [place] to take to appt.  -CC              Subjective Pain    Able to rate subjective pain? yes  -CC      Pre-Treatment Pain Level 3  -CC      Subjective Pain Comment decr pain at end of session  -CC              Total Minutes    67836 - PT Therapeutic Exercise Minutes 38  -CC              Exercise 1    Exercise Name 1 nu step  -CC      Time 1 5 min, lvl 4  -CC              Exercise 2    Exercise Name 2 LTR  -CC      Cueing 2 Verbal  -CC      Reps 2 10 kofi  -CC      Time 2 3 sec  -CC              Exercise 3    Exercise Name 3 piriformis str  -CC      Cueing 3 Verbal  -CC      Reps 3 3b  -CC      Time 3 20 sec  -CC              Exercise 4    Exercise Name 4 seated HS str  -CC      Cueing 4 Verbal  -CC      Reps 4 3b  -CC      Time 4 20 sec  -CC              Exercise 5    Exercise Name 5 SB roll out  -CC      Cueing 5 Verbal  -CC      Reps 5 10 fwd  -CC      Time 5 3 sec  -CC              Exercise 6    Exercise Name 6 HL hip abd  -CC      Cueing 6 Verbal  -CC      Sets 6 2  -CC      Reps 6 10  -CC      Time 6 GTB  -CC              Exercise 7    Exercise Name 7 bridge w/ Tband  -CC      Cueing 7 Verbal  -CC      Reps 7 10  -CC      Time 7 GTB  -CC            User Key  (r) = Recorded By, (t) = Taken By, (c) = Cosigned By    Initials Name Provider Type    Kirsten Muhammad, PT Physical Therapist                              PT OP Goals     Row Name 08/22/22 1200          PT Short Term Goals    STG Date to Achieve 09/08/22  -CC     STG 1 Patient will be independent with education for symptom management and initial HEP to decrease LBP pain.  -CC     STG 1 Progress Ongoing  -CC     STG 2 Pt will report/demo ability to perform bed mobility with </=2/10 LBP   -     STG 2 Progress Ongoing  -            Long Term Goals    LTG Date to Achieve 10/08/22  -     LTG 1 Patient will be independent with education for symptom management and advanced HEP to decrease LBP pain.  -     LTG 1 Progress Ongoing  -     LTG 2 Pt will improve B hip strength to at least 4/5.  -     LTG 2 Progress Ongoing  -     LTG 3 Patient will improve walking tolerance from 0 min to >20 min with </=2/10 LBP to improve participation in community activities.  -     LTG 3 Progress Ongoing  -     LTG 4 Pt will report ability to participate in one full shift at work with </=3/10 LBP.  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  -     LTG 5 Progress Ongoing  -           User Key  (r) = Recorded By, (t) = Taken By, (c) = Cosigned By    Initials Name Provider Type    Kirsten Muhammad, PT Physical Therapist                Therapy Education  Education Details: updated HEP; issued GTB  Given: HEP  Program: Reinforced, Progressed  How Provided: Verbal, Demonstration, Written  Provided to: Patient  Level of Understanding: Teach back education performed, Verbalized, Demonstrated              Time Calculation:   Start Time: 1218  Stop Time: 1256  Time Calculation (min): 38 min  Total Timed Code Minutes- PT: 38 minute(s)  Timed Charges  70207 - PT Therapeutic Exercise Minutes: 38  Total Minutes  Timed Charges Total Minutes: 38   Total Minutes: 38  Therapy Charges for Today     Code Description Service Date Service Provider Modifiers Qty    11503832201 HC PT THER PROC EA 15 MIN 8/22/2022 Kirsten Person, PT GP 3                    Kirsten Person, GENOVEVA  8/22/2022

## 2022-08-29 ENCOUNTER — TELEPHONE (OUTPATIENT)
Dept: NEUROLOGY | Facility: CLINIC | Age: 67
End: 2022-08-29

## 2022-08-29 NOTE — TELEPHONE ENCOUNTER
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

## 2022-08-29 NOTE — TELEPHONE ENCOUNTER
Caller: Kevin Garsia    Relationship: Self    Best call back number: [phone]    What is the best time to reach you: ANYTIME    What was the call regarding: PATIENT PHONED TO PROVIDE THE FAX NUMBER FOR HIS [facility] PAPERWORK THAT WAS DROPPED OFF LAST WEEK. YOU MAY FAX THE [facility] PAPER WORK -[phone]. PLEASE REVIEW AND ADVISE. THANK YOU.     Do you require a callback: NO, UNLESS FAX DOES NOT GO THROUGH.

## 2022-08-30 ENCOUNTER — TELEPHONE (OUTPATIENT)
Dept: NEUROLOGY | Facility: CLINIC | Age: 67
End: 2022-08-30

## 2022-08-30 NOTE — TELEPHONE ENCOUNTER
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     Patient understood and will make copies can shred paperwork

## 2022-09-06 ENCOUNTER — TELEPHONE (OUTPATIENT)
Dept: FAMILY MEDICINE CLINIC | Facility: CLINIC | Age: 67
End: 2022-09-06

## 2022-09-06 ENCOUNTER — HOSPITAL ENCOUNTER (OUTPATIENT)
Dept: PHYSICAL THERAPY | Facility: HOSPITAL | Age: 67
Setting detail: THERAPIES SERIES
Discharge: HOME | End: 2022-09-06

## 2022-09-06 DIAGNOSIS — M54.50 CHRONIC BILATERAL LOW BACK PAIN, UNSPECIFIED WHETHER SCIATICA PRESENT: Primary | ICD-10-CM

## 2022-09-06 DIAGNOSIS — G89.29 CHRONIC BILATERAL LOW BACK PAIN, UNSPECIFIED WHETHER SCIATICA PRESENT: Primary | ICD-10-CM

## 2022-09-06 DIAGNOSIS — R26.2 DIFFICULTY WALKING: ICD-10-CM

## 2022-09-06 PROCEDURE — 97110 THERAPEUTIC EXERCISES: CPT

## 2022-09-06 NOTE — TELEPHONE ENCOUNTER
Caller: Kevin Garsia    Relationship: Self    Best call back number: [phone]    Who are you requesting to speak with (clinical staff, provider,  specific staff member): CLINICAL STAFF     What was the call regarding: PATIENT DROOPED OFF PAPERWORK ON WEDNESDAY LAST WEEK TO BE FILLED OUT AND WANTING TO KNOW THE STATUS OF IT BEING FILLED OUT     Do you require a callback:YES

## 2022-09-06 NOTE — THERAPY PROGRESS REPORT/RE-CERT
Outpatient Physical Therapy Ortho Progress Note  [facility]     Patient Name: Kevin Garsia  : 1955  MRN: [id number]  Today's Date: 2022      Visit Date: 2022    Visit Dx:    ICD-10-CM ICD-9-CM   1. Chronic bilateral low back pain, unspecified whether sciatica present  M54.50 724.2    G89.29 338.29   2. Difficulty walking  R26.2 719.7       Patient Active Problem List   Diagnosis   • Essential hypertension   • Hyperlipidemia   • History of prostate cancer   • Non morbid obesity due to excess calories   • Vitamin D deficiency   • TIA (transient ischemic attack)   • S/P carotid endarterectomy   • Type 2 diabetes mellitus without complication, without long-term current use of insulin (HCC)   • Chronic heart failure with preserved ejection fraction (HCC)   • Arthritis   • Sleep apnea   • Hospital discharge follow-up   • Cervical spinal stenosis        Past Medical History:   Diagnosis Date   • Cancer (HCC)     prostate   • Diabetes mellitus (HCC)    • Hyperlipidemia    • Hypertension         Past Surgical History:   Procedure Laterality Date   • ANGIOPLASTY CAROTID ARTERY     • PROSTATE SURGERY      cancer removal                        PT Assessment/Plan     Row Name 22 1500          PT Assessment    Functional Limitations Performance in work activities;Performance in self-care ADL;Performance in leisure activities;Limitations in functional capacity and performance;Limitations in community activities;Limitation in home management  -CC     Impairments Range of motion;Posture;Poor body mechanics;Pain;Muscle strength;Joint mobility;Impaired flexibility  -CC     Assessment Comments Kevin Garsia has been seen for 3 physical therapy sessions including initial evaluation for low back pain.  Treatment has included therapeutic exercise and patient education with home exercise program . Progress to physical therapy goals is fair. Pt has met 2/2 STG and 1/5 LTG. Pt reports improvements in  function and pain since start of therapy, although some progress limited due to several appt cancellations. Continues to report limitations in sitting or walking for longer periods of time, as well as demos decreased core and kofi hip strength, as well as decreased mobility/flexibility. He will benefit from continued skilled physical therapy to address remaining impairments and functional limitations.  -CC     Please refer to paper survey for additional self-reported information No  -CC     Rehab Potential Good  -CC     Patient/caregiver participated in establishment of treatment plan and goals Yes  -CC     Patient would benefit from skilled therapy intervention Yes  -CC            PT Plan    PT Frequency 1x/week  -CC     Predicted Duration of Therapy Intervention (PT) 2-5 more visits  -CC     PT Plan Comments Add s/l clam, review monster walks  -CC           User Key  (r) = Recorded By, (t) = Taken By, (c) = Cosigned By    Initials Name Provider Type    Kirsten Muhammad, PT Physical Therapist                   OP Exercises     Row Name 09/06/22 1400             Subjective Comments    Subjective Comments Feels pain is slowly improving. Reports still difficulty with walking longer distances or sitting for long periods of time.  -CC              Subjective Pain    Able to rate subjective pain? yes  -CC      Pre-Treatment Pain Level 1  -CC              Total Minutes    07136 - PT Therapeutic Exercise Minutes 38  -CC              Exercise 1    Exercise Name 1 nu step  -CC      Time 1 5 min, lvl 4  -CC              Exercise 2    Exercise Name 2 LTR  -CC      Cueing 2 Verbal  -CC      Reps 2 10 kofi  -CC      Time 2 3 sec  -CC              Exercise 3    Exercise Name 3 piriformis str  -CC      Cueing 3 Verbal  -CC      Reps 3 3b  -CC      Time 3 20 sec  -CC              Exercise 4    Exercise Name 4 seated HS str  -CC      Cueing 4 Verbal  -CC      Reps 4 3b  -CC      Time 4 20 sec  -CC              Exercise 5     Exercise Name 5 SB roll out  -CC      Cueing 5 Verbal  -CC      Reps 5 10 fwd  -CC      Time 5 3 sec  -CC              Exercise 6    Exercise Name 6 HL hip abd  -CC      Cueing 6 Verbal  -CC      Sets 6 1 kofi, and uni ea  -CC      Reps 6 20  -CC      Time 6 GTB  -CC              Exercise 7    Exercise Name 7 bridge w/ Tband  -CC      Cueing 7 Verbal  -CC      Reps 7 10  -CC      Time 7 GTB  -CC              Exercise 8    Exercise Name 8 AR Press  -CC      Cueing 8 Verbal  -CC      Reps 8 15 kofi  -CC      Time 8 GTB doubled  -CC              Exercise 9    Exercise Name 9 tband shldr ext  -CC      Cueing 9 Verbal  -CC      Sets 9 2  -CC      Reps 9 10 alternating  -CC      Time 9 GTB  -CC      Additional Comments cues for TrA and upright posture  -CC              Exercise 10    Exercise Name 10 side steps  -CC      Cueing 10 Verbal  -CC      Reps 10 3 laps  -CC      Time 10 GTB  -CC      Additional Comments much cueing for upright posture, toes fwd  -CC            User Key  (r) = Recorded By, (t) = Taken By, (c) = Cosigned By    Initials Name Provider Type    CC Kirsten Person, PT Physical Therapist                              PT OP Goals     Row Name 09/06/22 1400          PT Short Term Goals    STG Date to Achieve 09/08/22  -CC     STG 1 Patient will be independent with education for symptom management and initial HEP to decrease LBP pain.  -CC     STG 1 Progress Met  -CC     STG 2 Pt will report/demo ability to perform bed mobility with </=2/10 LBP  -CC     STG 2 Progress Met  -CC            Long Term Goals    LTG Date to Achieve 10/08/22  -CC     LTG 1 Patient will be independent with education for symptom management and advanced HEP to decrease LBP pain.  -CC     LTG 1 Progress Ongoing  -CC     LTG 2 Pt will improve B hip strength to at least 4/5.  -CC     LTG 2 Progress Ongoing  -CC     LTG 3 Patient will improve walking tolerance from 0 min to >20 min with </=2/10 LBP to improve participation in  community activities.  -CC     LTG 3 Progress Ongoing  -     LTG 4 Pt will report ability to participate in one full shift at work with </=3/10 LBP.  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  -     LTG 5 Progress Met  -           User Key  (r) = Recorded By, (t) = Taken By, (c) = Cosigned By    Initials Name Provider Type    CC Kirsten Person, PT Physical Therapist                               Time Calculation:   Start Time: 1449  Stop Time: 1527  Time Calculation (min): 38 min  Total Timed Code Minutes- PT: 38 minute(s)  Timed Charges  50275 - PT Therapeutic Exercise Minutes: 38  Total Minutes  Timed Charges Total Minutes: 38   Total Minutes: 38  Therapy Charges for Today     Code Description Service Date Service Provider Modifiers Qty    83286045523 HC PT THER PROC EA 15 MIN 9/6/2022 Kirsten Person, PT GP 3                    Kirsten Person PT  9/6/2022

## 2022-09-12 ENCOUNTER — OFFICE VISIT (OUTPATIENT)
Dept: FAMILY MEDICINE CLINIC | Facility: CLINIC | Age: 67
End: 2022-09-12

## 2022-09-12 VITALS
WEIGHT: 181.4 LBS | BODY MASS INDEX: 30.22 KG/M2 | HEART RATE: 76 BPM | DIASTOLIC BLOOD PRESSURE: 64 MMHG | OXYGEN SATURATION: 98 % | HEIGHT: 65 IN | TEMPERATURE: 97.5 F | SYSTOLIC BLOOD PRESSURE: 118 MMHG

## 2022-09-12 DIAGNOSIS — M51.26 PROTRUDED LUMBAR DISC: ICD-10-CM

## 2022-09-12 DIAGNOSIS — M48.02 CERVICAL SPINAL STENOSIS: ICD-10-CM

## 2022-09-12 DIAGNOSIS — E78.5 HYPERLIPIDEMIA, UNSPECIFIED HYPERLIPIDEMIA TYPE: ICD-10-CM

## 2022-09-12 DIAGNOSIS — I10 ESSENTIAL HYPERTENSION: ICD-10-CM

## 2022-09-12 DIAGNOSIS — M48.062 SPINAL STENOSIS OF LUMBAR REGION WITH NEUROGENIC CLAUDICATION: Primary | ICD-10-CM

## 2022-09-12 PROBLEM — R29.90 STROKE-LIKE SYMPTOMS: Status: ACTIVE | Noted: 2022-06-07

## 2022-09-12 PROCEDURE — 99214 OFFICE O/P EST MOD 30 MIN: CPT | Performed by: NURSE PRACTITIONER

## 2022-09-12 NOTE — PROGRESS NOTES
"Chief Complaint  FMLA, Hypertension, and Hyperlipidemia    Subjective        Kevin Garsia presents to [facility]  Patient presents the office today for a follow-up on chronic back pain, hypertension and hyperlipidemia.  Patient is requesting FMLA paperwork completed at this time.  His last day of work was August 19, 2022.  Patient is experiencing back pain, decreased range of motion, difficulty with standing for long periods of time.  Patient had a recent MRI in January 2022.  Patient is to be cleared by the neurosurgeon before he can return to work.  No return to work dated.  Patient is taking Tylenol as needed.  Blood pressure is 118/64.  He denies chest pain shortness of air.              Objective   Vital Signs:  /64   Pulse 76   Temp 97.5 °F (36.4 °C)   Ht 165.1 cm (65\")   Wt 82.3 kg (181 lb 6.4 oz)   SpO2 98%   BMI 30.19 kg/m²   Estimated body mass index is 30.19 kg/m² as calculated from the following:    Height as of this encounter: 165.1 cm (65\").    Weight as of this encounter: 82.3 kg (181 lb 6.4 oz).    BMI is >= 30 and <35. (Class 1 Obesity). The following options were offered after discussion;: weight loss educational material (shared in after visit summary) and exercise counseling/recommendations      Physical Exam  Constitutional:       Appearance: Normal appearance.   HENT:      Head: Normocephalic.   Cardiovascular:      Rate and Rhythm: Normal rate and regular rhythm.   Pulmonary:      Effort: Pulmonary effort is normal. No respiratory distress.      Breath sounds: Normal breath sounds. No wheezing.   Abdominal:      General: Abdomen is flat.      Palpations: Abdomen is soft. There is no mass.      Tenderness: There is no abdominal tenderness.      Hernia: No hernia is present.   Musculoskeletal:         General: Tenderness present.      Cervical back: Normal. No spasms or tenderness. Normal range of motion.      Thoracic back: Normal. No spasms or " tenderness. Normal range of motion.      Lumbar back: Spasms and tenderness present. No edema or bony tenderness. Decreased range of motion. No scoliosis.   Neurological:      Mental Status: He is alert.        Result Review :  The following data was reviewed by: BOBBY Portillo on 09/12/2022:  Common labs    Common Labsle 6/7/22 6/8/22 6/8/22 8/5/22     0308 0308    Creatinine    1.12   WBC 9.56 7.85     Hemoglobin 12.9 (A) 11.8 (A)     Hematocrit 40.8 (A) 36.6 (A)     Platelets 334 315     Hemoglobin A1C   6.2 (A)    (A) Abnormal value       Comments are available for some flowsheets but are not being displayed.           Data reviewed: Radiologic studies MRI lumbar spine          Assessment and Plan   Diagnoses and all orders for this visit:    1. Spinal stenosis of lumbar region with neurogenic claudication (Primary)  Assessment & Plan:  FMLA completed MRI lumbar spine reviewed patient to follow-up with neurosurgeon before returning to work.      2. Essential hypertension  Assessment & Plan:  Hypertension is improving with treatment.  Continue current treatment regimen.  Dietary sodium restriction.  Weight loss.  Regular aerobic exercise.  Blood pressure will be reassessed at the next regular appointment.      3. Cervical spinal stenosis    4. Protruded lumbar disc    5. Hyperlipidemia, unspecified hyperlipidemia type  Assessment & Plan:  Patient taking Lipitor 80 mg continue healthy diet and exercise.           I spent 30 minutes caring for Kevin on this date of service. This time includes time spent by me in the following activities:preparing for the visit, reviewing tests, obtaining and/or reviewing a separately obtained history, performing a medically appropriate examination and/or evaluation , counseling and educating the patient/family/caregiver, ordering medications, tests, or procedures, referring and communicating with other health care professionals , documenting information in the medical  record, independently interpreting results and communicating that information with the patient/family/caregiver and care coordination  Follow Up   Return in about 1 month (around 10/12/2022) for Next scheduled follow up.  Patient was given instructions and counseling regarding his condition or for health maintenance advice. Please see specific information pulled into the AVS if appropriate.

## 2022-09-14 NOTE — ASSESSMENT & PLAN NOTE
FMLA completed MRI lumbar spine reviewed patient to follow-up with neurosurgeon before returning to work.

## 2022-09-15 ENCOUNTER — HOSPITAL ENCOUNTER (OUTPATIENT)
Dept: PHYSICAL THERAPY | Facility: HOSPITAL | Age: 67
Setting detail: THERAPIES SERIES
Discharge: HOME | End: 2022-09-15

## 2022-09-15 DIAGNOSIS — R26.2 DIFFICULTY WALKING: ICD-10-CM

## 2022-09-15 DIAGNOSIS — M54.50 CHRONIC BILATERAL LOW BACK PAIN, UNSPECIFIED WHETHER SCIATICA PRESENT: Primary | ICD-10-CM

## 2022-09-15 DIAGNOSIS — G89.29 CHRONIC BILATERAL LOW BACK PAIN, UNSPECIFIED WHETHER SCIATICA PRESENT: Primary | ICD-10-CM

## 2022-09-15 PROCEDURE — 97110 THERAPEUTIC EXERCISES: CPT | Performed by: PHYSICAL THERAPIST

## 2022-09-15 NOTE — THERAPY TREATMENT NOTE
Outpatient Physical Therapy Ortho Treatment Note  [facility]     Patient Name: Kevin Garsia  : 1955  MRN: [id number]  Today's Date: 9/15/2022      Visit Date: 09/15/2022    Visit Dx:    ICD-10-CM ICD-9-CM   1. Chronic bilateral low back pain, unspecified whether sciatica present  M54.50 724.2    G89.29 338.29   2. Difficulty walking  R26.2 719.7       Patient Active Problem List   Diagnosis   • Essential hypertension   • Hyperlipidemia   • History of prostate cancer   • Non morbid obesity due to excess calories   • Vitamin D deficiency   • TIA (transient ischemic attack)   • S/P carotid endarterectomy   • Type 2 diabetes mellitus without complication, without long-term current use of insulin (HCC)   • Chronic heart failure with preserved ejection fraction (HCC)   • Arthritis   • Sleep apnea   • Hospital discharge follow-up   • Cervical spinal stenosis   • Stroke-like symptoms   • Spinal stenosis of lumbar region with neurogenic claudication   • Protruded lumbar disc        Past Medical History:   Diagnosis Date   • Cancer (HCC)     prostate   • Diabetes mellitus (HCC)    • Hyperlipidemia    • Hypertension         Past Surgical History:   Procedure Laterality Date   • ANGIOPLASTY CAROTID ARTERY     • PROSTATE SURGERY      cancer removal        PT Ortho     Row Name 09/15/22 105       Subjective Comments    Subjective Comments Doing well. Decreased pain, increased ease in putting on socks.  -JS       Subjective Pain    Able to rate subjective pain? yes  -JS    Pre-Treatment Pain Level 1  -JS          User Key  (r) = Recorded By, (t) = Taken By, (c) = Cosigned By    Initials Name Provider Type    Nery Donohue, PT Physical Therapist                             PT Assessment/Plan     Row Name 09/15/22 1055          PT Assessment    Assessment Comments Pt presents with ongoing improvement in symptoms. Progression of exercises without exacerbation of symptoms. Intermittent cues for core stabilization.   Encouraged pt to initiate walking program for exercise (see education).  -JS            PT Plan    PT Plan Comments Continue PT. Consider backward monster walk.  Inquire if pt began walking for exercise.  -JS           User Key  (r) = Recorded By, (t) = Taken By, (c) = Cosigned By    Initials Name Provider Type    Nery Donohue, PT Physical Therapist                   OP Exercises     Row Name 09/15/22 1050             Subjective Comments    Subjective Comments Doing well. Decreased pain, increased ease in putting on socks.  -JS              Subjective Pain    Able to rate subjective pain? yes  -JS      Pre-Treatment Pain Level 1  -JS              Total Minutes    65879 - PT Therapeutic Exercise Minutes 40  -JS              Exercise 1    Exercise Name 1 nu step  -JS      Time 1 5 min, lvl 5  -JS              Exercise 2    Exercise Name 2 LTR  -JS      Cueing 2 Verbal  -JS      Reps 2 10 kofi  -JS      Time 2 3 sec  -JS              Exercise 3    Exercise Name 3 piriformis str  -JS      Cueing 3 Verbal  -JS      Reps 3 3b  -JS      Time 3 20 sec  -JS              Exercise 4    Exercise Name 4 seated HS str  -JS      Cueing 4 Verbal  -JS      Reps 4 3b  -JS      Time 4 20 sec  -JS              Exercise 5    Exercise Name 5 SB roll out  -JS      Cueing 5 Verbal  -JS      Reps 5 10 fwd  -JS      Time 5 3 sec  -JS              Exercise 6    Exercise Name 6 HL hip abd  -JS      Cueing 6 Verbal  -JS      Sets 6 1 kofi, and uni ea  -JS      Reps 6 20  -JS      Time 6 GTB  -JS              Exercise 7    Exercise Name 7 bridge w/ Tband  -JS      Cueing 7 Verbal  -JS      Reps 7 10  -JS      Time 7 GTB  -JS              Exercise 8    Exercise Name 8 AR Press  -JS      Cueing 8 Verbal  -JS      Reps 8 15 kofi  -JS      Time 8 GTB doubled  -JS              Exercise 9    Exercise Name 9 tband shldr ext  -JS      Cueing 9 Verbal  -JS      Sets 9 2  -JS      Reps 9 10 alternating  -JS      Time 9 GTB  -JS      Additional Comments  cues for TrA and upright posture  -JS              Exercise 10    Exercise Name 10 side steps  -JS      Cueing 10 Verbal  -JS      Reps 10 3 laps  -JS      Time 10 GTB at knees  -JS              Exercise 11    Exercise Name 11 monster walk (fwd)  -JS      Cueing 11 Verbal;Demo  -JS      Reps 11 3 laps  -JS      Time 11 GTB at knees  -JS              Exercise 12    Exercise Name 12 theraband scapular rows  -JS      Cueing 12 Verbal;Demo  -JS      Reps 12 10x2  -JS      Time 12 GTB  -JS      Additional Comments cues for upright posture, TrA  -JS              Exercise 13    Exercise Name 13 swiss ball wall squats at wall  -JS      Cueing 13 Verbal;Demo  -JS      Reps 13 10  -JS      Time 13 cues for TrA, small range squats  -JS            User Key  (r) = Recorded By, (t) = Taken By, (c) = Cosigned By    Initials Name Provider Type    Nery Donohue, PT Physical Therapist                              PT OP Goals     Row Name 09/15/22 1050          PT Short Term Goals    STG Date to Achieve 09/08/22  -JS     STG 1 Patient will be independent with education for symptom management and initial HEP to decrease LBP pain.  -JS     STG 1 Progress Met  -JS     STG 2 Pt will report/demo ability to perform bed mobility with </=2/10 LBP  -JS     STG 2 Progress Met  -JS            Long Term Goals    LTG Date to Achieve 10/08/22  -JS     LTG 1 Patient will be independent with education for symptom management and advanced HEP to decrease LBP pain.  -JS     LTG 1 Progress Ongoing  -JS     LTG 2 Pt will improve B hip strength to at least 4/5.  -JS     LTG 2 Progress Ongoing  -JS     LTG 3 Patient will improve walking tolerance from 0 min to >20 min with </=2/10 LBP to improve participation in community activities.  -JS     LTG 3 Progress Ongoing  -JS     LTG 3 Progress Comments Discussed initiating walking program  -JS     LTG 4 Pt will report ability to participate in one full shift at work with </=3/10 LBP.  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  -AKASH     LTG 5 Progress Met  -AKASH           User Key  (r) = Recorded By, (t) = Taken By, (c) = Cosigned By    Initials Name Provider Type    Nery Donohue, PT Physical Therapist                Therapy Education  Education Details: Reviewed & progressed HEP. Discussed initiating walking program, education for proper shoewear and gradual increase in time.  Given: HEP  Program: Reinforced, Progressed  How Provided: Verbal, Demonstration  Provided to: Patient  Level of Understanding: Teach back education performed, Verbalized, Demonstrated              Time Calculation:   Start Time: 1050  Stop Time: 1130  Time Calculation (min): 40 min  Timed Charges  82619 - PT Therapeutic Exercise Minutes: 40  Total Minutes  Timed Charges Total Minutes: 40   Total Minutes: 40  Therapy Charges for Today     Code Description Service Date Service Provider Modifiers Qty    23665894662 HC PT THER PROC EA 15 MIN 9/15/2022 Nery Gates, PT GP 3                    Nery Gates PT  9/15/2022

## 2022-09-22 ENCOUNTER — HOSPITAL ENCOUNTER (OUTPATIENT)
Dept: PHYSICAL THERAPY | Facility: HOSPITAL | Age: 67
Setting detail: THERAPIES SERIES
Discharge: HOME | End: 2022-09-22

## 2022-09-22 DIAGNOSIS — R26.2 DIFFICULTY WALKING: ICD-10-CM

## 2022-09-22 DIAGNOSIS — M54.50 CHRONIC BILATERAL LOW BACK PAIN, UNSPECIFIED WHETHER SCIATICA PRESENT: Primary | ICD-10-CM

## 2022-09-22 DIAGNOSIS — G89.29 CHRONIC BILATERAL LOW BACK PAIN, UNSPECIFIED WHETHER SCIATICA PRESENT: Primary | ICD-10-CM

## 2022-09-22 PROCEDURE — 97110 THERAPEUTIC EXERCISES: CPT

## 2022-09-22 NOTE — THERAPY TREATMENT NOTE
Outpatient Physical Therapy Ortho Treatment Note  [facility]     Patient Name: Kevin Garsia  : 1955  MRN: [id number]  Today's Date: 2022      Visit Date: 2022    Visit Dx:    ICD-10-CM ICD-9-CM   1. Chronic bilateral low back pain, unspecified whether sciatica present  M54.50 724.2    G89.29 338.29   2. Difficulty walking  R26.2 719.7       Patient Active Problem List   Diagnosis   • Essential hypertension   • Hyperlipidemia   • History of prostate cancer   • Non morbid obesity due to excess calories   • Vitamin D deficiency   • TIA (transient ischemic attack)   • S/P carotid endarterectomy   • Type 2 diabetes mellitus without complication, without long-term current use of insulin (HCC)   • Chronic heart failure with preserved ejection fraction (HCC)   • Arthritis   • Sleep apnea   • Hospital discharge follow-up   • Cervical spinal stenosis   • Stroke-like symptoms   • Spinal stenosis of lumbar region with neurogenic claudication   • Protruded lumbar disc        Past Medical History:   Diagnosis Date   • Cancer (HCC)     prostate   • Diabetes mellitus (HCC)    • Hyperlipidemia    • Hypertension         Past Surgical History:   Procedure Laterality Date   • ANGIOPLASTY CAROTID ARTERY     • PROSTATE SURGERY      cancer removal                        PT Assessment/Plan     Row Name 22 1100          PT Assessment    Assessment Comments Pt continues to repot overall improvement in back pain with PT interventions, thus increased strength challenges today. Pt denies increased pain at end of session, thus updated core/hip strength challenges for HEP.  -CC            PT Plan    PT Plan Comments Discuss possible trial of independent management/discharge if s/s cont to be well managed.  -CC           User Key  (r) = Recorded By, (t) = Taken By, (c) = Cosigned By    Initials Name Provider Type    Kirsten Muhammad, PT Physical Therapist                   OP Exercises     Row Name  "09/22/22 1000             Subjective Comments    Subjective Comments Reports back pain improving  -CC              Subjective Pain    Subjective Pain Comment minimal to no pain today  -CC              Total Minutes    14717 - PT Therapeutic Exercise Minutes 38  -CC              Exercise 1    Exercise Name 1 nu step  -CC      Time 1 5 min, lvl 5  -CC              Exercise 2    Exercise Name 2 LTR  -CC      Cueing 2 Verbal  -CC      Reps 2 10 kofi  -CC      Time 2 3 sec  -CC              Exercise 3    Exercise Name 3 piriformis str  -CC      Cueing 3 Verbal  -CC      Reps 3 3b  -CC      Time 3 20 sec  -CC              Exercise 4    Exercise Name 4 seated HS str  -CC      Cueing 4 Verbal  -CC      Reps 4 3b  -CC      Time 4 20 sec  -CC              Exercise 5    Exercise Name 5 hip hinge, UE support at barre  -CC      Cueing 5 Verbal  -CC      Sets 5 2  -CC      Reps 5 10  -CC      Additional Comments cues for \"back straight, butt out\" and engage glutes  -CC              Exercise 6    Exercise Name 6 s/l clam  -CC      Cueing 6 Verbal  -CC      Sets 6 2 kofi  -CC      Reps 6 10  -CC      Time 6 GTB  -CC              Exercise 7    Exercise Name 7 bridge w/ Tband  -CC      Cueing 7 Verbal  -CC      Sets 7 2  -CC      Reps 7 10  -CC      Time 7 GTB  -CC              Exercise 8    Exercise Name 8 AR Press  -CC      Cueing 8 Verbal  -CC      Reps 8 15 kofi  -CC      Time 8 GTB doubled  -CC              Exercise 9    Exercise Name 9 tband shldr ext  -CC      Cueing 9 Verbal  -CC      Sets 9 2  -CC      Reps 9 10 alternating  -CC      Time 9 GTB  -CC              Exercise 10    Exercise Name 10 side steps  -CC      Cueing 10 Verbal  -CC      Reps 10 3 laps  -CC      Time 10 GTB at knees  -CC              Exercise 11    Exercise Name 11 monster walk fwd/bkwd  -CC      Cueing 11 Verbal;Demo  -CC      Reps 11 3 laps  -CC      Time 11 GTB at knees  -CC              Exercise 13    Exercise Name 13 swiss ball wall squats at wall  -CC  "     Cueing 13 Verbal;Demo  -CC      Reps 13 10  -CC      Time 13 cues for TrA, small range squats  -CC              Exercise 14    Exercise Name 14 mountain climber at wall  -CC      Cueing 14 Verbal  -CC      Reps 14 20 alternating LE  -CC            User Key  (r) = Recorded By, (t) = Taken By, (c) = Cosigned By    Initials Name Provider Type    CC Kirsten Person, PT Physical Therapist                                                Time Calculation:   Start Time: 1052  Stop Time: 1130  Time Calculation (min): 38 min  Total Timed Code Minutes- PT: 38 minute(s)  Timed Charges  34744 - PT Therapeutic Exercise Minutes: 38  Total Minutes  Timed Charges Total Minutes: 38   Total Minutes: 38  Therapy Charges for Today     Code Description Service Date Service Provider Modifiers Qty    [id number]  PT THER PROC EA 15 MIN 9/22/2022 Kirsten Person, PT GP 3                    Kirsten Person, PT  9/22/2022

## 2022-09-29 ENCOUNTER — HOSPITAL ENCOUNTER (OUTPATIENT)
Dept: PHYSICAL THERAPY | Facility: HOSPITAL | Age: 67
Setting detail: THERAPIES SERIES
Discharge: HOME | End: 2022-09-29

## 2022-09-29 DIAGNOSIS — G89.29 CHRONIC BILATERAL LOW BACK PAIN, UNSPECIFIED WHETHER SCIATICA PRESENT: Primary | ICD-10-CM

## 2022-09-29 DIAGNOSIS — R26.2 DIFFICULTY WALKING: ICD-10-CM

## 2022-09-29 DIAGNOSIS — M54.50 CHRONIC BILATERAL LOW BACK PAIN, UNSPECIFIED WHETHER SCIATICA PRESENT: Primary | ICD-10-CM

## 2022-09-29 PROCEDURE — 97110 THERAPEUTIC EXERCISES: CPT | Performed by: PHYSICAL THERAPIST

## 2022-09-29 NOTE — THERAPY DISCHARGE NOTE
Outpatient Physical Therapy Ortho Treatment Note/Discharge Summary  [facility]     Patient Name: Kevin Garsia  : 1955  MRN: [id number]  Today's Date: 2022      Visit Date: 2022    Visit Dx:    ICD-10-CM ICD-9-CM   1. Chronic bilateral low back pain, unspecified whether sciatica present  M54.50 724.2    G89.29 338.29   2. Difficulty walking  R26.2 719.7       Patient Active Problem List   Diagnosis   • Essential hypertension   • Hyperlipidemia   • History of prostate cancer   • Non morbid obesity due to excess calories   • Vitamin D deficiency   • TIA (transient ischemic attack)   • S/P carotid endarterectomy   • Type 2 diabetes mellitus without complication, without long-term current use of insulin (HCC)   • Chronic heart failure with preserved ejection fraction (HCC)   • Arthritis   • Sleep apnea   • Hospital discharge follow-up   • Cervical spinal stenosis   • Stroke-like symptoms   • Spinal stenosis of lumbar region with neurogenic claudication   • Protruded lumbar disc        Past Medical History:   Diagnosis Date   • Cancer (HCC)     prostate   • Diabetes mellitus (HCC)    • Hyperlipidemia    • Hypertension         Past Surgical History:   Procedure Laterality Date   • ANGIOPLASTY CAROTID ARTERY     • PROSTATE SURGERY      cancer removal        PT Ortho     Row Name 22 1020       Subjective Comments    Subjective Comments No significant back pain. Walking 10-15 min/day without symptoms.  -JS       Subjective Pain    Able to rate subjective pain? yes  -JS    Pre-Treatment Pain Level 0  -JS       MMT Right Lower Ext    Rt Hip ABduction MMT, Gross Movement (4+/5) good plus  -JS       MMT Left Lower Ext    Lt Hip ABduction MMT, Gross Movement (5/5) normal  -JS          User Key  (r) = Recorded By, (t) = Taken By, (c) = Cosigned By    Initials Name Provider Type    Nery Donohue, PT Physical Therapist                             PT Assessment/Plan     Row Name 22 1020           PT Assessment    Assessment Comments Kevin Garsia was seen for 6 physical therapy sessions for LBP.  Treatment included therapeutic exercise, neuro-muscular retraining  and patient education with home exercise program . Progress to physical therapy goals was excellent with all goals met/partially met.  He was discharged to an independent HEP and provided patient education to self-manage condition.  -JS            PT Plan    PT Plan Comments D/c to continue HEP independently.  -JS           User Key  (r) = Recorded By, (t) = Taken By, (c) = Cosigned By    Initials Name Provider Type    Nery Donohue, PT Physical Therapist                     OP Exercises     Row Name 09/29/22 1020             Subjective Comments    Subjective Comments No significant back pain. Walking 10-15 min/day without symptoms.  -JS              Subjective Pain    Able to rate subjective pain? yes  -JS      Pre-Treatment Pain Level 0  -JS              Total Minutes    14386 - PT Therapeutic Exercise Minutes 45  -JS              Exercise 1    Exercise Name 1 nu step  -JS      Time 1 5 min, lvl 5  -JS              Exercise 2    Exercise Name 2 LTR  -JS      Cueing 2 Verbal  -JS      Reps 2 10 kofi  -JS      Time 2 3 sec  -JS              Exercise 3    Exercise Name 3 piriformis str  -JS      Cueing 3 Verbal  -JS      Reps 3 3b  -JS      Time 3 20 sec  -JS              Exercise 4    Exercise Name 4 seated HS str  -JS      Cueing 4 Verbal  -JS      Reps 4 3b  -JS      Time 4 20 sec  -JS              Exercise 6    Exercise Name 6 s/l clam  -JS      Cueing 6 Verbal  -JS      Sets 6 2 kofi  -JS      Reps 6 10  -JS      Time 6 GTB  -JS              Exercise 7    Exercise Name 7 bridge w/ Tband  -JS      Cueing 7 Verbal  -JS      Sets 7 2  -JS      Reps 7 10  -JS      Time 7 GTB  -JS              Exercise 8    Exercise Name 8 AR Press  -JS      Cueing 8 Verbal  -JS      Reps 8 15 kofi  -JS      Time 8 GTB doubled  -JS              Exercise 9     Exercise Name 9 tband shldr ext  -JS      Cueing 9 Verbal  -JS      Sets 9 2  -JS      Reps 9 10 alternating  -JS      Time 9 GTB  -JS              Exercise 10    Exercise Name 10 side steps  -JS      Cueing 10 Verbal  -JS      Reps 10 3 laps  -JS      Time 10 GTB at knees  -JS              Exercise 11    Exercise Name 11 monster walk fwd/bkwd  -JS      Cueing 11 Verbal;Demo  -JS      Reps 11 3 laps  -JS      Time 11 GTB at knees  -JS              Exercise 13    Exercise Name 13 swiss ball wall squats at wall  -JS      Cueing 13 Verbal;Demo  -JS      Reps 13 10  -JS      Time 13 cues for TrA, small range squats  -JS              Exercise 14    Exercise Name 14 mountain climber at wall  -JS      Cueing 14 Verbal  -JS      Reps 14 20 alternating LE  -JS            User Key  (r) = Recorded By, (t) = Taken By, (c) = Cosigned By    Initials Name Provider Type    Nery Donohue, PT Physical Therapist                                PT OP Goals     Row Name 09/29/22 1020          PT Short Term Goals    STG Date to Achieve 09/08/22  -JS     STG 1 Patient will be independent with education for symptom management and initial HEP to decrease LBP pain.  -JS     STG 1 Progress Met  -JS     STG 2 Pt will report/demo ability to perform bed mobility with </=2/10 LBP  -JS     STG 2 Progress Met  -JS            Long Term Goals    LTG Date to Achieve 10/08/22  -JS     LTG 1 Patient will be independent with education for symptom management and advanced HEP to decrease LBP pain.  -JS     LTG 1 Progress Met  -JS     LTG 2 Pt will improve B hip strength to at least 4/5.  -JS     LTG 2 Progress Met  -JS     LTG 2 Progress Comments R 5/5, L 4+/5  -JS     LTG 3 Patient will improve walking tolerance from 0 min to >20 min with </=2/10 LBP to improve participation in community activities.  -JS     LTG 3 Progress Partially Met  -JS     LTG 3 Progress Comments Walking 15-20 min without pain  -JS     LTG 4 Pt will report ability to participate in  one full shift at work with </=3/10 LBP.  -AKASH     LTG 4 Progress Partially Met  -     LTG 4 Progress Comments Pt remains on disability at this time. Working at home without issue.  -AKASH     LTG 5 Pt will report ability to perform car transfers with </=3/10.  -AKASH     LTG 5 Progress Met  -           User Key  (r) = Recorded By, (t) = Taken By, (c) = Cosigned By    Initials Name Provider Type    Nery Donohue, PT Physical Therapist                Therapy Education  Education Details: Reviewed all HEP with proper understanding of exercises  Given: HEP  Program: Reinforced  How Provided: Verbal, Demonstration  Provided to: Patient  Level of Understanding: Teach back education performed, Verbalized, Demonstrated    Outcome Measure Options: Modified Oswestry  Modified Oswestry  Modified Oswestry Score/Comments: 21/50=42%      Time Calculation:   Start Time: 1020  Stop Time: 1105  Time Calculation (min): 45 min  Timed Charges  32636 - PT Therapeutic Exercise Minutes: 45  Total Minutes  Timed Charges Total Minutes: 45   Total Minutes: 45  Therapy Charges for Today     Code Description Service Date Service Provider Modifiers Qty    35012427455 HC PT THER PROC EA 15 MIN 9/29/2022 Nery Gates, PT GP 3          PT G-Codes  Outcome Measure Options: Modified Oswestry  Modified Oswestry Score/Comments: 21/50=42%     OP PT Discharge Summary  Date of Discharge: 09/29/22  Reason for Discharge: Independent  Outcomes Achieved: Able to achieve all goals within established timeline, Patient able to partially acheive established goals  Discharge Destination: Home with home program      Nery Gates PT  9/29/2022

## 2022-10-24 RX ORDER — EMTRICITABINE AND TENOFOVIR DISOPROXIL FUMARATE 200; 300 MG/1; MG/1
1 TABLET, FILM COATED ORAL DAILY
Qty: 30 TABLET | Refills: 0 | Status: SHIPPED | OUTPATIENT
Start: 2022-10-24 | End: 2022-12-02

## 2022-11-01 DIAGNOSIS — E55.9 VITAMIN D DEFICIENCY: ICD-10-CM

## 2022-11-01 DIAGNOSIS — I10 ESSENTIAL HYPERTENSION: ICD-10-CM

## 2022-11-01 RX ORDER — ERGOCALCIFEROL 1.25 MG/1
50000 CAPSULE ORAL WEEKLY
Qty: 12 CAPSULE | Refills: 1 | Status: SHIPPED | OUTPATIENT
Start: 2022-11-01

## 2022-11-01 NOTE — TELEPHONE ENCOUNTER
Caller: Kevin Garsia    Relationship: Self    Best call back number: [phone]    Requested Prescriptions:   Requested Prescriptions     Pending Prescriptions Disp Refills   • vitamin D (ERGOCALCIFEROL) 1.25 MG (20184 UT) capsule capsule 12 capsule 1     Sig: Take 1 capsule by mouth 1 (One) Time Per Week.   • metoprolol tartrate (LOPRESSOR) 25 MG tablet 90 tablet 1     Sig: Take 1 tablet by mouth Daily.        Pharmacy where request should be sent: [facility] [id number] [facility] [address]  - [phone] [facility] [phone] FX     Additional details provided by patient: PATIENT IS OUT OF VITAMIN D    Does the patient have less than a 3 day supply:  [x] Yes  [] No    Raji Noel Rep   11/01/22 12:41 EDT

## 2022-11-04 DIAGNOSIS — M54.12 RADICULOPATHY OF CERVICAL REGION: ICD-10-CM

## 2022-11-07 ENCOUNTER — OFFICE VISIT (OUTPATIENT)
Dept: INFECTIOUS DISEASES | Facility: CLINIC | Age: 67
End: 2022-11-07

## 2022-11-07 VITALS
WEIGHT: 181.6 LBS | TEMPERATURE: 97.8 F | BODY MASS INDEX: 30.26 KG/M2 | DIASTOLIC BLOOD PRESSURE: 70 MMHG | SYSTOLIC BLOOD PRESSURE: 132 MMHG | RESPIRATION RATE: 18 BRPM | HEART RATE: 80 BPM | HEIGHT: 65 IN

## 2022-11-07 DIAGNOSIS — Z72.53 HIGH RISK BISEXUAL BEHAVIOR: Primary | ICD-10-CM

## 2022-11-07 DIAGNOSIS — Z79.2 LONG TERM (CURRENT) USE OF ANTIBIOTICS: ICD-10-CM

## 2022-11-07 PROCEDURE — 99214 OFFICE O/P EST MOD 30 MIN: CPT | Performed by: INTERNAL MEDICINE

## 2022-11-07 RX ORDER — GABAPENTIN 300 MG/1
CAPSULE ORAL
Qty: 180 CAPSULE | Refills: 5 | Status: SHIPPED | OUTPATIENT
Start: 2022-11-07

## 2022-11-07 NOTE — PROGRESS NOTES
"cc: Here for evaluation preexposure prophylaxis    Per initial clinic note from February 2021: Patient reports he is in his usual state of health.  He is recently  from his wife and is looking to engage in sexual activity.  Says he has sex with men and women both.  He has a history of chlamydia but tested negative for HIV, chlamydia and hepatitis C back in August 2020.  He has not been sexually active for about 6 months due to COVID but thinks there is a reasonable certainty that he is going to start resuming high risk sexual activity here in the next month or 2.  He has not had any fevers or chills or night sweats or discharge.  He feels well.  His ex-wife is recommended that he start preexposure prophylaxis since they may run in the same circles.  She is tolerating it well.  He is acutely concerned because he is potentially having sex with with commercial sex workers\"    At initial clinic visit he was started on Truvada.  He denies any missed doses  or side effects.  No STI symptoms.  He has been sexually active with male and female partners and therefore would like to cont on truvada.  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  He did have a flare of his degenerative disc disease/stenosis and had to leave working in the kitchen.  Otherwise has had some issues with his carpal tunnel and peripheral arterial disease but overall seems to be doing well.        Past medical history: Hypertension, peripheral vascular disease, diabetes mellitus type 2, hyperlipidemia, peptic ulcer disease, prostate cancer, chlamydia, cervical stenosis, stroke, anxiety/depression, CVA  No family history of infectious diseases  Social history: Retired.  He  from his wife.  Former smoker but quit 10 years ago.  No IV drug use.  No alcohol use.      Blood pressure 132/70, pulse 80, temperature 97.8 °F (36.6 °C), temperature source Temporal, resp. rate 18, height 165.1 cm (65\"), weight 82.4 kg (181 lb 9.6 " oz).  GENERAL: Awake and alert, in no acute distress.   HEENT: . Hearing is grossly normal.   LUNGS: LCTAB normal respiratory effort.   SKIN: Warm and dry without cutaneous eruptions   PSYCHIATRIC: Appropriate mood, affect, insight, and judgment.     8/5/2022 HIV, RPR, urine GC and hepatitis C antibody negative; creatinine 1.12      Assessment and Plan  High risk bisexual behavior  Vaccines: Consider hepatitis A and B vaccine  Long-term current use antibiotics      Continue preexposure prophylaxis with Truvada.  Checking urine GC, RPR, hep C and serum creatinine along with HIV today  He understands that this is not a guarantee to protect against HIV and he must take it every day.  Also understands that he should not abandon safe sex practices and will not protect against other STIs.      We will see him back in clinic in 3 months or sooner if necessary

## 2022-11-08 ENCOUNTER — LAB (OUTPATIENT)
Dept: LAB | Facility: HOSPITAL | Age: 67
End: 2022-11-08

## 2022-11-08 LAB
CREAT SERPL-MCNC: 1.05 MG/DL (ref 0.76–1.27)
EGFRCR SERPLBLD CKD-EPI 2021: 76.8 ML/MIN/1.73
HCV AB SER DONR QL: NORMAL
HIV1+2 AB SER QL: NORMAL
RPR SER QL: NORMAL

## 2022-11-08 PROCEDURE — 86803 HEPATITIS C AB TEST: CPT | Performed by: INTERNAL MEDICINE

## 2022-11-08 PROCEDURE — 87491 CHLMYD TRACH DNA AMP PROBE: CPT | Performed by: INTERNAL MEDICINE

## 2022-11-08 PROCEDURE — 86592 SYPHILIS TEST NON-TREP QUAL: CPT | Performed by: INTERNAL MEDICINE

## 2022-11-08 PROCEDURE — 87591 N.GONORRHOEAE DNA AMP PROB: CPT | Performed by: INTERNAL MEDICINE

## 2022-11-08 PROCEDURE — 82565 ASSAY OF CREATININE: CPT | Performed by: INTERNAL MEDICINE

## 2022-11-08 PROCEDURE — G0432 EIA HIV-1/HIV-2 SCREEN: HCPCS | Performed by: INTERNAL MEDICINE

## 2022-11-08 PROCEDURE — 36415 COLL VENOUS BLD VENIPUNCTURE: CPT | Performed by: INTERNAL MEDICINE

## 2022-11-09 LAB
C TRACH RRNA SPEC QL NAA+PROBE: NEGATIVE
N GONORRHOEA RRNA SPEC QL NAA+PROBE: NEGATIVE

## 2022-11-10 ENCOUNTER — TELEPHONE (OUTPATIENT)
Dept: INFECTIOUS DISEASES | Facility: CLINIC | Age: 67
End: 2022-11-10

## 2022-11-10 NOTE — TELEPHONE ENCOUNTER
----- Message from Jimmy Kwon MD sent at 11/10/2022  9:42 AM EST -----  Can we let him know his lab work was great and keep up the good work.

## 2022-11-10 NOTE — TELEPHONE ENCOUNTER
Patient notified lab results were good. Continue current meds and follow up as scheduled. RAYA, RN

## 2022-11-22 ENCOUNTER — OFFICE VISIT (OUTPATIENT)
Dept: NEUROSURGERY | Facility: CLINIC | Age: 67
End: 2022-11-22

## 2022-11-22 VITALS
OXYGEN SATURATION: 98 % | WEIGHT: 181.66 LBS | DIASTOLIC BLOOD PRESSURE: 80 MMHG | BODY MASS INDEX: 30.27 KG/M2 | HEART RATE: 70 BPM | HEIGHT: 65 IN | SYSTOLIC BLOOD PRESSURE: 142 MMHG

## 2022-11-22 DIAGNOSIS — G89.29 CHRONIC RIGHT-SIDED LOW BACK PAIN WITHOUT SCIATICA: ICD-10-CM

## 2022-11-22 DIAGNOSIS — M47.812 CERVICAL SPONDYLOSIS WITHOUT MYELOPATHY: ICD-10-CM

## 2022-11-22 DIAGNOSIS — G56.03 BILATERAL CARPAL TUNNEL SYNDROME: ICD-10-CM

## 2022-11-22 DIAGNOSIS — M54.50 CHRONIC RIGHT-SIDED LOW BACK PAIN WITHOUT SCIATICA: ICD-10-CM

## 2022-11-22 DIAGNOSIS — M54.2 NECK PAIN: Primary | ICD-10-CM

## 2022-11-22 DIAGNOSIS — M48.061 SPINAL STENOSIS, LUMBAR REGION, WITHOUT NEUROGENIC CLAUDICATION: ICD-10-CM

## 2022-11-22 PROCEDURE — 99204 OFFICE O/P NEW MOD 45 MIN: CPT | Performed by: NEUROLOGICAL SURGERY

## 2022-11-22 NOTE — PROGRESS NOTES
Subjective   History of Present Illness: Kevin Garsia is a 67 y.o. male is being seen for consultation today at the request of Jd Hay MD for intermittent left shoulder pain. He reports numbness and tingling in his bilateral forearms due to carpal tunnel. He reports back pain with tingling in his right thigh. He denies any loss of bowel/bladder control. He has tried physical therapy a great deal of success and he still does the exercises and some of the strengthening treatments at his local gym.  He states that he is since he has gotten out of the work environment and focused on physical therapy treatments he is really quite comfortable.    While in the room and during my examination of the patient I wore a mask and eye protection.  I washed my hands before and after this patient encounter.  The patient was also wearing a mask.    Arm Pain   The pain is present in the left clavicle. Associated symptoms include numbness and tingling. Pertinent negatives include no chest pain.   Back Pain  The problem occurs intermittently. The pain is present in the lumbar spine. The quality of the pain is described as aching. The pain does not radiate. Associated symptoms include numbness and tingling. Pertinent negatives include no bladder incontinence, bowel incontinence or chest pain.       Tobacco Use: Medium Risk   • Smoking Tobacco Use: Former   • Smokeless Tobacco Use: Never   • Passive Exposure: Not on file        The following portions of the patient's history were reviewed and updated as appropriate: allergies, current medications, past family history, past medical history, past social history, past surgical history and problem list.    Review of Systems   Respiratory: Negative for chest tightness and shortness of breath.    Cardiovascular: Negative for chest pain.   Gastrointestinal: Negative for bowel incontinence.   Genitourinary: Negative for bladder incontinence.   Musculoskeletal: Positive for back pain.  "  Neurological: Positive for tingling and numbness.       Objective     Vitals:    11/22/22 0910   BP: 142/80   BP Location: Left arm   Pulse: 70   SpO2: 98%   Weight: 82.4 kg (181 lb 10.5 oz)   Height: 165.1 cm (65\")     Body mass index is 30.23 kg/m².      Physical Exam  Neurologic Exam    Physical Exam:    CONSTITUTIONAL: This 67 year old  male appears well developed, well-nourished and in no acute distress.    HEAD & FACE: the head and face are symmetric, normocephalic and atraumatic.  Patient is bald and has a large tattoo of an insect on the back of his right head.    EYES: Inspection of the conjunctivae and lids reveals no swelling, erythema or discharge.  Pupils are round, equal and reactive to light and there is no scleral icterus.    EARS, NOSE, MOUTH & THROAT: On inspection, the ears and nose are within normal limits.    NECK: the neck is supple and symmetric. The trachea is midline with no masses.  Good range of motion of neck without exacerbation of pain.    PULMONARY: Respiratory effort is normal with no increased work of breathing or signs of respiratory distress.    CARDIOVASCULAR: Pedal pulses are +2/4 bilaterally. Examination of the extremities shows no edema or varicosities.    MUSCULOSKELETAL: Gait and station are within normal limits. The spine has no tenderness to palpation of the midline or SI joints.    SKIN: The skin is warm, dry and intact    NEUROLOGIC:   Cranial Nerves 2-12 intact  Normal motor strength noted. Muscle bulk and tone are normal.  Sensory exam is normal to fine touch to confrontational testing bilaterally  Reflexes on the right side demonstrates 1/4 Triceps Reflex, 1/4 Biceps Reflex, 0/4 Brachioradialis Reflex, 1/4 Knee Jerk Reflex, 0/4 Ankle Jerk Reflex and no ankle clonus on the right.   Reflexes on the left side demonstrates 1/4 Triceps Reflex, 1/4 Biceps Reflex, 0/4 Brachioradialis Reflex, 1/4 Knee Jerk Reflex, 0/4 Ankle Jerk Reflex and no ankle clonus on the " left.  Superficial/Primitive Reflexes: primitive reflexes were absent.  Maldonado's, Babinski, and Clonus signs all negative.  No coordination deficit observed.  Radicular testing showed a negative Tu (JHON) test and negative straight leg raise.  Spurling's maneuver is also negative.  Cortical function is intact and without deficits. Speech is normal.    PSYCHIATRIC: oriented to person, place and time. Patient's mood and affect are normal.    Assessment & Plan   Independent Review of Radiographic Studies:      EMG/NCV study done on April 18, 2022 at [facility] showed bilateral median neuropathies at the wrist severe on the left and very severe on the right.  There was no evidence of polyneuropathy, no cervical radiculopathy and no lumbosacral radiculopathy.    I personally reviewed the images from the following studies.    MRI of the cervical spine done on January 19, 2022 at [facility] reveals degenerative disc changes throughout the cervical spine without any evidence of cord signal abnormality or cord compression.  There is moderate bilateral neuroforaminal narrowing noted at C4-C5.  There is moderate to severe right neuroforaminal narrowing at C5-C6    MRI of the lumbar spine done at [facility] on January 19, 2022 reveals 3 mm anterolisthesis of L4 and L5 secondary to facet degenerative change resulting in moderate to severe left and right neuroforaminal narrowing and moderate overall spinal stenosis.    MRI of the brain without contrast done on January 18, 2022 at [facility] reveals no acute intracranial abnormality.    Medical Decision Making:      Patient states that since he has made activity modifications and gotten consistent with physical therapy exercises his neck pain and low back pain is well controlled.  He does not have any specific radicular pain other than a little bit of numbness in the right anterior lateral thigh and which might be meralgia paresthetica given the  normal EMG/NCV study.  He certainly does not have a cervical radiculopathy clinically or electrodiagnostically nor does he have a lumbar radiculopathy clinically or electrodiagnostically.  I explained to him that his cervical stenosis is fairly modest that he has more significant neuroforaminal narrowing described as moderate in the cervical spine without clinical or electrodiagnostic evidence of radiculopathy.  In the lumbar spine he does have significant lumbar stenosis also described as moderate with neuroforaminal narrowing at the L4-5 level but he does not have neurogenic claudication or radiculopathy at this time.    Therefore, in the absence of radiculopathy or myelopathy there is no role for any neurosurgical intervention.  I offered him a consultation with an interventional pain management specialist but he says that he does not feel that is necessary at this time since he is able to manage the pattern of his pain with activity modification and physical therapy exercise.  Should he develop accelerated neck or low back pain his primary physician can refer him to a and interventional pain management specialist.    He does have carpal tunnel syndrome but also feels like he is controlling the symptoms with wrist splints and does not need a hand surgery evaluation at this time.    No follow-ups on file.    Diagnoses and all orders for this visit:    1. Neck pain (Primary)    2. Chronic right-sided low back pain without sciatica    3. Spinal stenosis, lumbar region, without neurogenic claudication    4. Cervical spondylosis without myelopathy    5. Bilateral carpal tunnel syndrome             Trace Meredith MD FACS [facility]  Neurological Surgery

## 2022-11-23 ENCOUNTER — PATIENT ROUNDING (BHMG ONLY) (OUTPATIENT)
Dept: NEUROSURGERY | Facility: CLINIC | Age: 67
End: 2022-11-23

## 2022-12-02 RX ORDER — EMTRICITABINE AND TENOFOVIR DISOPROXIL FUMARATE 200; 300 MG/1; MG/1
1 TABLET, FILM COATED ORAL DAILY
Qty: 30 TABLET | Refills: 2 | Status: SHIPPED | OUTPATIENT
Start: 2022-12-02

## 2023-01-05 DIAGNOSIS — I10 ESSENTIAL HYPERTENSION: ICD-10-CM

## 2023-01-05 RX ORDER — LOSARTAN POTASSIUM 100 MG/1
100 TABLET ORAL DAILY
Qty: 90 TABLET | Refills: 1 | Status: SHIPPED | OUTPATIENT
Start: 2023-01-05

## 2023-01-05 RX ORDER — AMLODIPINE BESYLATE 10 MG/1
10 TABLET ORAL DAILY
Qty: 90 TABLET | Refills: 1 | Status: SHIPPED | OUTPATIENT
Start: 2023-01-05

## 2023-01-05 NOTE — TELEPHONE ENCOUNTER
Caller: Kevin Garsia    Relationship: Self    Best call back number: [phone]    Requested Prescriptions:   Requested Prescriptions     Pending Prescriptions Disp Refills   • amLODIPine (NORVASC) 10 MG tablet 90 tablet 1     Sig: Take 1 tablet by mouth Daily.   • losartan (COZAAR) 100 MG tablet 90 tablet 1     Sig: Take 1 tablet by mouth Daily.        Pharmacy where request should be sent: [facility] [id number] [place], [place] - [address] [facility] [phone] [place] [phone] FX     Does the patient have less than a 3 day supply:  [x] Yes  [] No    Would you like a call back once the refill request has been completed: [x] Yes [] No    If the office needs to give you a call back, can they leave a voicemail: [x] Yes [] No    Raji Giles Rep   01/05/23 15:01 EST

## 2023-02-09 RX ORDER — HYDROXYZINE HYDROCHLORIDE 10 MG/1
TABLET, FILM COATED ORAL
Qty: 60 TABLET | Refills: 4 | Status: SHIPPED | OUTPATIENT
Start: 2023-02-09

## 2023-02-16 ENCOUNTER — LAB (OUTPATIENT)
Dept: FAMILY MEDICINE CLINIC | Facility: CLINIC | Age: 68
End: 2023-02-16
Payer: MEDICARE

## 2023-02-16 ENCOUNTER — TELEPHONE (OUTPATIENT)
Dept: FAMILY MEDICINE CLINIC | Facility: CLINIC | Age: 68
End: 2023-02-16

## 2023-02-16 DIAGNOSIS — Z20.822 EXPOSURE TO COVID-19 VIRUS: Primary | ICD-10-CM

## 2023-02-16 DIAGNOSIS — Z20.822 CLOSE EXPOSURE TO COVID-19 VIRUS: Primary | ICD-10-CM

## 2023-02-16 LAB
EXPIRATION DATE: NORMAL
FLUAV AG UPPER RESP QL IA.RAPID: NOT DETECTED
FLUBV AG UPPER RESP QL IA.RAPID: NOT DETECTED
INTERNAL CONTROL: NORMAL
Lab: NORMAL
SARS-COV-2 AG UPPER RESP QL IA.RAPID: NOT DETECTED

## 2023-02-16 PROCEDURE — 87428 SARSCOV & INF VIR A&B AG IA: CPT | Performed by: INTERNAL MEDICINE

## 2023-02-16 NOTE — TELEPHONE ENCOUNTER
Caller: Kevin Garsia    Relationship: Self    Best call back number: [phone]    What orders are you requesting (i.e. lab or imaging): COVID TEST    In what timeframe would the patient need to come in: TODAY 02/16/23    Where will you receive your lab/imaging services: IN OFFICE    Additional notes: PLEASE CALL TO SCHEDULE.

## 2023-03-27 DIAGNOSIS — E11.9 TYPE 2 DIABETES MELLITUS WITHOUT COMPLICATION, WITHOUT LONG-TERM CURRENT USE OF INSULIN: ICD-10-CM

## 2023-03-28 ENCOUNTER — OFFICE VISIT (OUTPATIENT)
Dept: FAMILY MEDICINE CLINIC | Facility: CLINIC | Age: 68
End: 2023-03-28
Payer: MEDICARE

## 2023-03-28 VITALS
RESPIRATION RATE: 18 BRPM | BODY MASS INDEX: 31.32 KG/M2 | DIASTOLIC BLOOD PRESSURE: 70 MMHG | TEMPERATURE: 98 F | HEIGHT: 65 IN | WEIGHT: 188 LBS | OXYGEN SATURATION: 95 % | SYSTOLIC BLOOD PRESSURE: 132 MMHG | HEART RATE: 90 BPM

## 2023-03-28 DIAGNOSIS — G47.30 SLEEP APNEA, UNSPECIFIED TYPE: ICD-10-CM

## 2023-03-28 DIAGNOSIS — M25.50 GENERALIZED JOINT PAIN: Primary | ICD-10-CM

## 2023-03-28 DIAGNOSIS — M19.90 ARTHRITIS: ICD-10-CM

## 2023-03-28 DIAGNOSIS — Z12.11 SCREEN FOR COLON CANCER: ICD-10-CM

## 2023-03-28 DIAGNOSIS — Z00.00 ENCOUNTER FOR MEDICAL EXAMINATION TO ESTABLISH CARE: ICD-10-CM

## 2023-03-28 DIAGNOSIS — I50.32 CHRONIC HEART FAILURE WITH PRESERVED EJECTION FRACTION: ICD-10-CM

## 2023-03-28 DIAGNOSIS — M48.061 SPINAL STENOSIS, LUMBAR REGION, WITHOUT NEUROGENIC CLAUDICATION: ICD-10-CM

## 2023-03-28 DIAGNOSIS — I10 ESSENTIAL HYPERTENSION: ICD-10-CM

## 2023-03-28 DIAGNOSIS — B20 HIV INFECTION, UNSPECIFIED SYMPTOM STATUS: ICD-10-CM

## 2023-03-28 PROBLEM — Z21 HIV INFECTION: Status: ACTIVE | Noted: 2023-03-28

## 2023-03-28 RX ORDER — EMTRICITABINE AND TENOFOVIR DISOPROXIL FUMARATE 200; 300 MG/1; MG/1
1 TABLET, FILM COATED ORAL DAILY
Qty: 30 TABLET | Refills: 2 | OUTPATIENT
Start: 2023-03-28

## 2023-03-28 NOTE — PROGRESS NOTES
"Chief Complaint  Hypertension and recurring generalized pain (Days that Gabapentin doesn't help/grandaughter dx with fibromyalgia he is concerned he may have same)    Subjective        Kevin Garsia presents to [facility]  History of Present Illness  Patient presents office today for to establish care with me.  Patient is here with complaints of generalized joint pain/spinal stenosis lumbar region.  I would like to further work this up with an arthritis panel.  Patient is currently taking gabapentin without complete relief.  Patient's pain is controlled today with level of 1-2.  Patient has sleep apnea and is needing a referral to sleep study today.  Patient is due for colon cancer screening will send for colonoscopy.  Patient is following infectious disease for HIV suppression.  Blood pressure stable today 132/70.  Patient has heart failure follows cardiology.            Objective   Vital Signs:  /70 (BP Location: Left arm, Patient Position: Sitting, Cuff Size: Adult)   Pulse 90   Temp 98 °F (36.7 °C) (Infrared)   Resp 18   Ht 165.1 cm (65\")   Wt 85.3 kg (188 lb)   SpO2 95%   BMI 31.28 kg/m²   Estimated body mass index is 31.28 kg/m² as calculated from the following:    Height as of this encounter: 165.1 cm (65\").    Weight as of this encounter: 85.3 kg (188 lb).       BMI is >= 30 and <35. (Class 1 Obesity). The following options were offered after discussion;: weight loss educational material (shared in after visit summary)      Physical Exam  Constitutional:       General: He is not in acute distress.     Appearance: Normal appearance.   HENT:      Head: Normocephalic.   Eyes:      Pupils: Pupils are equal, round, and reactive to light.   Cardiovascular:      Rate and Rhythm: Normal rate.      Pulses: Normal pulses.      Heart sounds: Normal heart sounds.   Pulmonary:      Effort: Pulmonary effort is normal.      Breath sounds: Normal breath sounds.   Abdominal:     "  General: Bowel sounds are normal.      Palpations: Abdomen is soft.   Musculoskeletal:         General: Normal range of motion.      Cervical back: Normal range of motion and neck supple.   Skin:     General: Skin is warm.   Neurological:      General: No focal deficit present.      Mental Status: He is alert and oriented to person, place, and time.   Psychiatric:         Mood and Affect: Mood normal.         Behavior: Behavior normal.         Thought Content: Thought content normal.         Judgment: Judgment normal.        Result Review :  The following data was reviewed by: BOBBY Portillo on 03/28/2023:  Common labs    Common Labs 6/8/22 6/8/22 8/5/22 11/8/22    [id number]     Creatinine   1.12 1.05   WBC 7.85      Hemoglobin 11.8 (A)      Hematocrit 36.6 (A)      Platelets 315      Hemoglobin A1C  6.2 (A)     (A) Abnormal value       Comments are available for some flowsheets but are not being displayed.                        Assessment and Plan   Diagnoses and all orders for this visit:    1. Generalized joint pain (Primary)  -     FAUSTO; Future  -     C-reactive Protein; Future  -     Rheumatoid Factor; Future  -     Uric Acid; Future  -     Sedimentation Rate; Future    2. Essential hypertension    3. Arthritis  -     FAUSTO; Future  -     C-reactive Protein; Future  -     Rheumatoid Factor; Future  -     Uric Acid; Future  -     Sedimentation Rate; Future    4. Chronic heart failure with preserved ejection fraction (HCC)    5. Spinal stenosis, lumbar region, without neurogenic claudication    6. HIV infection, unspecified symptom status (HCC)    7. Sleep apnea, unspecified type  -     Ambulatory Referral to Sleep Medicine    8. Screen for colon cancer  -     Ambulatory Referral For Screening Colonoscopy    9. Encounter for medical examination to establish care    Generalized joint pain check arthritis panel.    Hypertension Disussed with patient the importance of maintaining a normal BMI.  Reviewed the  Dash diet, dietary sodium restriction.  Encourage the patient to engage in 30 minutes of regular aerobic physical activity at least 3 days a week.  Limit alcohol consumption to no more than 2 drinks per day for men, 1 drink per day for women.  Patient voiced understanding all questions answered.      Arthritis check arthritis panel    Heart failure- follows cardiology      Spinal stenosis check arthritis panel currently rates pain between 1 and 10 today.    HIV suppression follows infectious disease.           I spent 30 minutes caring for Kevin on this date of service. This time includes time spent by me in the following activities:preparing for the visit, reviewing tests, obtaining and/or reviewing a separately obtained history, performing a medically appropriate examination and/or evaluation , counseling and educating the patient/family/caregiver, ordering medications, tests, or procedures, referring and communicating with other health care professionals , documenting information in the medical record, independently interpreting results and communicating that information with the patient/family/caregiver and care coordination  Follow Up   Return in about 4 weeks (around 4/25/2023) for Medicare Wellness.  Patient was given instructions and counseling regarding his condition or for health maintenance advice. Please see specific information pulled into the AVS if appropriate.

## 2023-03-29 PROBLEM — Z12.11 SCREEN FOR COLON CANCER: Status: ACTIVE | Noted: 2023-03-29

## 2023-03-29 PROBLEM — Z00.00 ENCOUNTER FOR MEDICAL EXAMINATION TO ESTABLISH CARE: Status: ACTIVE | Noted: 2023-03-29

## 2023-04-04 ENCOUNTER — LAB (OUTPATIENT)
Dept: LAB | Facility: HOSPITAL | Age: 68
End: 2023-04-04
Payer: MEDICARE

## 2023-04-04 ENCOUNTER — OFFICE VISIT (OUTPATIENT)
Dept: INFECTIOUS DISEASES | Facility: CLINIC | Age: 68
End: 2023-04-04
Payer: MEDICARE

## 2023-04-04 VITALS
SYSTOLIC BLOOD PRESSURE: 133 MMHG | WEIGHT: 190 LBS | TEMPERATURE: 97.1 F | BODY MASS INDEX: 31.62 KG/M2 | DIASTOLIC BLOOD PRESSURE: 78 MMHG | HEART RATE: 76 BPM | RESPIRATION RATE: 20 BRPM

## 2023-04-04 DIAGNOSIS — Z79.2 LONG TERM (CURRENT) USE OF ANTIBIOTICS: ICD-10-CM

## 2023-04-04 DIAGNOSIS — Z72.53 HIGH RISK BISEXUAL BEHAVIOR: Primary | ICD-10-CM

## 2023-04-04 LAB
CREAT SERPL-MCNC: 1.22 MG/DL (ref 0.76–1.27)
EGFRCR SERPLBLD CKD-EPI 2021: 64.2 ML/MIN/1.73
HCV AB SER DONR QL: NORMAL
HIV1+2 AB SER QL: NORMAL
RPR SER QL: NORMAL

## 2023-04-04 PROCEDURE — 87591 N.GONORRHOEAE DNA AMP PROB: CPT | Performed by: INTERNAL MEDICINE

## 2023-04-04 PROCEDURE — 82565 ASSAY OF CREATININE: CPT | Performed by: INTERNAL MEDICINE

## 2023-04-04 PROCEDURE — 1159F MED LIST DOCD IN RCRD: CPT | Performed by: INTERNAL MEDICINE

## 2023-04-04 PROCEDURE — 99214 OFFICE O/P EST MOD 30 MIN: CPT | Performed by: INTERNAL MEDICINE

## 2023-04-04 PROCEDURE — 3078F DIAST BP <80 MM HG: CPT | Performed by: INTERNAL MEDICINE

## 2023-04-04 PROCEDURE — 86592 SYPHILIS TEST NON-TREP QUAL: CPT | Performed by: INTERNAL MEDICINE

## 2023-04-04 PROCEDURE — 1160F RVW MEDS BY RX/DR IN RCRD: CPT | Performed by: INTERNAL MEDICINE

## 2023-04-04 PROCEDURE — G0432 EIA HIV-1/HIV-2 SCREEN: HCPCS | Performed by: INTERNAL MEDICINE

## 2023-04-04 PROCEDURE — 87491 CHLMYD TRACH DNA AMP PROBE: CPT | Performed by: INTERNAL MEDICINE

## 2023-04-04 PROCEDURE — 3075F SYST BP GE 130 - 139MM HG: CPT | Performed by: INTERNAL MEDICINE

## 2023-04-04 PROCEDURE — 36415 COLL VENOUS BLD VENIPUNCTURE: CPT | Performed by: INTERNAL MEDICINE

## 2023-04-04 PROCEDURE — 86803 HEPATITIS C AB TEST: CPT | Performed by: INTERNAL MEDICINE

## 2023-04-04 RX ORDER — EMTRICITABINE AND TENOFOVIR ALAFENAMIDE 200; 25 MG/1; MG/1
1 TABLET ORAL DAILY
Qty: 30 TABLET | Refills: 2 | Status: SHIPPED | OUTPATIENT
Start: 2023-04-04

## 2023-04-04 NOTE — PROGRESS NOTES
"cc: Here for evaluation preexposure prophylaxis    Per initial clinic note from February 2021: Patient reports he is in his usual state of health.  He is recently  from his wife and is looking to engage in sexual activity.  Says he has sex with men and women both.  He has a history of chlamydia but tested negative for HIV, chlamydia and hepatitis C back in August 2020.  He has not been sexually active for about 6 months due to COVID but thinks there is a reasonable certainty that he is going to start resuming high risk sexual activity here in the next month or 2.  He has not had any fevers or chills or night sweats or discharge.  He feels well.  His ex-wife is recommended that he start preexposure prophylaxis since they may run in the same circles.  She is tolerating it well.  He is acutely concerned because he is potentially having sex with with commercial sex workers\"       Since his last visit in November 2022 he is done well.  He is now working for the [employer].  He denies any constitutional symptoms of infections of fever, chills, night sweats.  Main issue is a going back pain.  He is now taking a job with the [employer] which he finds very fruitful.  He is now in a monogamous relationship with a woman but would like to continue on preexposure prophylaxis until this is more stable and to make sure there are no changes.  He says he is also exposed to potentially dirty needles as part of his work      Past medical history: Hypertension, peripheral vascular disease, diabetes mellitus type 2, hyperlipidemia, peptic ulcer disease, prostate cancer, chlamydia, cervical stenosis, stroke, anxiety/depression, CVA  No family history of infectious diseases  Social history: Retired.  He  from his wife.  Former smoker but quit 10 years ago.  No IV drug use.  No alcohol use.      Blood pressure 133/78, pulse 76, temperature 97.1 °F (36.2 °C), resp. rate 20, " weight 86.2 kg (190 lb).  GENERAL: Awake and alert, in no acute distress.   HEENT: . Hearing is grossly normal.   LUNGS: LCTAB normal respiratory effort.   SKIN: Warm and dry without cutaneous eruptions   PSYCHIATRIC: Appropriate mood, affect, insight, and judgment.     1/8/2022 HIV, RPR, urine GC and hepatitis C antibody negative; creatinine 1.05      Assessment and Plan  High risk bisexual behavior  Vaccines: Consider hepatitis A and B vaccine  Long-term current use antibiotics      Continue preexposure prophylaxis.  We will submit a prescription for descovy, if not covered by insurance, can cont on truvada.  Checking urine GC, RPR, hep C and serum creatinine along with HIV today     We will see him back in clinic in 3 months or sooner if necessary

## 2023-04-05 LAB
C TRACH RRNA SPEC QL NAA+PROBE: NEGATIVE
N GONORRHOEA RRNA SPEC QL NAA+PROBE: NEGATIVE

## 2023-04-06 DIAGNOSIS — I10 ESSENTIAL HYPERTENSION: ICD-10-CM

## 2023-04-10 DIAGNOSIS — E78.5 HYPERLIPIDEMIA, UNSPECIFIED HYPERLIPIDEMIA TYPE: ICD-10-CM

## 2023-04-10 RX ORDER — ATORVASTATIN CALCIUM 80 MG/1
80 TABLET, FILM COATED ORAL DAILY
Qty: 90 TABLET | Refills: 3 | Status: SHIPPED | OUTPATIENT
Start: 2023-04-10

## 2023-04-11 ENCOUNTER — TELEPHONE (OUTPATIENT)
Dept: INFECTIOUS DISEASES | Facility: CLINIC | Age: 68
End: 2023-04-11
Payer: MEDICARE

## 2023-04-11 NOTE — TELEPHONE ENCOUNTER
Phone with patient to inform him labs are good and continue same meds. Keep up the good work and follow up as scheduled. RAYA, RN

## 2023-04-11 NOTE — TELEPHONE ENCOUNTER
----- Message from Jimmy Kwon MD sent at 4/11/2023  8:42 AM EDT -----  Can we let him know his labs were okay and to keep up the good work, thanks

## 2023-05-16 ENCOUNTER — OFFICE VISIT (OUTPATIENT)
Dept: FAMILY MEDICINE CLINIC | Facility: CLINIC | Age: 68
End: 2023-05-16
Payer: MEDICARE

## 2023-05-16 VITALS
TEMPERATURE: 97.5 F | OXYGEN SATURATION: 95 % | WEIGHT: 187.8 LBS | HEART RATE: 71 BPM | HEIGHT: 65 IN | DIASTOLIC BLOOD PRESSURE: 80 MMHG | SYSTOLIC BLOOD PRESSURE: 122 MMHG | BODY MASS INDEX: 31.29 KG/M2

## 2023-05-16 DIAGNOSIS — E11.9 TYPE 2 DIABETES MELLITUS WITHOUT COMPLICATION, WITHOUT LONG-TERM CURRENT USE OF INSULIN: ICD-10-CM

## 2023-05-16 DIAGNOSIS — G89.29 CHRONIC RIGHT-SIDED LOW BACK PAIN WITHOUT SCIATICA: ICD-10-CM

## 2023-05-16 DIAGNOSIS — G89.29 CHRONIC LEFT SHOULDER PAIN: Primary | ICD-10-CM

## 2023-05-16 DIAGNOSIS — B20 HIV INFECTION, UNSPECIFIED SYMPTOM STATUS: ICD-10-CM

## 2023-05-16 DIAGNOSIS — E78.5 HYPERLIPIDEMIA, UNSPECIFIED HYPERLIPIDEMIA TYPE: ICD-10-CM

## 2023-05-16 DIAGNOSIS — Z00.00 MEDICARE ANNUAL WELLNESS VISIT, SUBSEQUENT: ICD-10-CM

## 2023-05-16 DIAGNOSIS — R53.83 OTHER FATIGUE: ICD-10-CM

## 2023-05-16 DIAGNOSIS — M19.90 ARTHRITIS: ICD-10-CM

## 2023-05-16 DIAGNOSIS — M25.512 CHRONIC LEFT SHOULDER PAIN: Primary | ICD-10-CM

## 2023-05-16 DIAGNOSIS — I10 ESSENTIAL HYPERTENSION: ICD-10-CM

## 2023-05-16 DIAGNOSIS — M54.50 CHRONIC RIGHT-SIDED LOW BACK PAIN WITHOUT SCIATICA: ICD-10-CM

## 2023-05-16 DIAGNOSIS — E55.9 VITAMIN D DEFICIENCY: ICD-10-CM

## 2023-05-16 RX ORDER — ERGOCALCIFEROL 1.25 MG/1
50000 CAPSULE ORAL WEEKLY
Qty: 12 CAPSULE | Refills: 1 | Status: SHIPPED | OUTPATIENT
Start: 2023-05-16

## 2023-05-16 NOTE — PROGRESS NOTES
The ABCs of the Annual Wellness Visit  Subsequent Medicare Wellness Visit    Subjective    Kevin Garsia is a 67 y.o. male who presents for a Subsequent Medicare Wellness Visit.    The following portions of the patient's history were reviewed and   updated as appropriate: allergies, current medications, past family history, past medical history, past social history, past surgical history and problem list.    Compared to one year ago, the patient feels his physical   health is the same.    Compared to one year ago, the patient feels his mental   health is the same.    Recent Hospitalizations:  He was not admitted to the hospital during the last year.       Current Medical Providers:  Patient Care Team:  Priti Oliveira APRN as PCP - General (Nurse Practitioner)  Wellington Chaudhari MD as Consulting Physician (Urology)  Jimmy Kwon MD as Consulting Physician (Infectious Diseases)    Outpatient Medications Prior to Visit   Medication Sig Dispense Refill   • amLODIPine (NORVASC) 10 MG tablet Take 1 tablet by mouth Daily. 90 tablet 1   • aspirin 81 MG chewable tablet      • atorvastatin (Lipitor) 80 MG tablet Take 1 tablet by mouth Daily. New dose for cholesterol 90 tablet 3   • Blood Glucose Monitoring Suppl (ACCU-CHEK DIONTE PLUS) w/Device kit 1 kit 4 (Four) Times a Day. 1 kit 1   • CBD (cannabidiol) oral oil Take  by mouth 2 (Two) Times a Day. Half a dropper twice daily     • Emtricitabine-Tenofovir AF (Descovy) 200-25 MG per tablet Take 1 tablet by mouth Daily. 30 tablet 2   • gabapentin (NEURONTIN) 300 MG capsule TAKE 2 CAPSULES BY MOUTH THREE TIMES DAILY 180 capsule 5   • hydrOXYzine (ATARAX) 10 MG tablet TAKE 1 TO 2 TABLETS BY MOUTH EVERY NIGHT AT BEDTIME 60 tablet 4   • losartan (COZAAR) 100 MG tablet Take 1 tablet by mouth Daily. 90 tablet 1   • metoprolol tartrate (LOPRESSOR) 25 MG tablet TAKE 1 TABLET BY MOUTH DAILY 90 tablet 1   • metFORMIN (GLUCOPHAGE) 1000 MG tablet Take 1 tablet by  mouth 2 (Two) Times a Day With Meals. 120 tablet 5   • vitamin D (ERGOCALCIFEROL) 1.25 MG (53577 UT) capsule capsule Take 1 capsule by mouth 1 (One) Time Per Week. 12 capsule 1   • emtricitabine-tenofovir (TRUVADA) 200-300 MG per tablet TAKE 1 TABLET BY MOUTH DAILY (Patient not taking: Reported on 5/16/2023) 30 tablet 2     No facility-administered medications prior to visit.       No opioid medication identified on active medication list. I have reviewed chart for other potential  high risk medication/s and harmful drug interactions in the elderly.          Aspirin is on active medication list. Aspirin use is not indicated based on review of current medical condition/s. Risk of harm outweighs potential benefits. Patient instructed to discontinue this medication.  .      Patient Active Problem List   Diagnosis   • Essential hypertension   • Hyperlipidemia   • History of prostate cancer   • Non morbid obesity due to excess calories   • Vitamin D deficiency   • TIA (transient ischemic attack)   • S/P carotid endarterectomy   • Type 2 diabetes mellitus without complication, without long-term current use of insulin   • Chronic heart failure with preserved ejection fraction   • Arthritis   • Sleep apnea   • Hospital discharge follow-up   • Cervical spinal stenosis   • Stroke-like symptoms   • Spinal stenosis, lumbar region, without neurogenic claudication   • Protruded lumbar disc   • Chronic right-sided low back pain without sciatica   • Neck pain   • Cervical spondylosis without myelopathy   • Bilateral carpal tunnel syndrome   • Generalized joint pain   • HIV infection   • Screen for colon cancer   • Other fatigue   • Chronic left shoulder pain     Advance Care Planning   Advance Care Planning     Advance Directive is not on file.  ACP discussion was held with the patient during this visit. Patient has an advance directive (not in EMR), copy requested.     Objective    Vitals:    05/16/23 0813   BP: 122/80   Pulse: 71  "  Temp: 97.5 °F (36.4 °C)   SpO2: 95%   Weight: 85.2 kg (187 lb 12.8 oz)   Height: 165.1 cm (65\")   PainSc:   4   PainLoc: Comment: everywhere     Estimated body mass index is 31.25 kg/m² as calculated from the following:    Height as of this encounter: 165.1 cm (65\").    Weight as of this encounter: 85.2 kg (187 lb 12.8 oz).    BMI is >= 30 and <35. (Class 1 Obesity). The following options were offered after discussion;: weight loss educational material (shared in after visit summary) and exercise counseling/recommendations      Does the patient have evidence of cognitive impairment? No          HEALTH RISK ASSESSMENT    Smoking Status:  Social History     Tobacco Use   Smoking Status Former   • Packs/day: 3.00   • Years: 30.00   • Pack years: 90.00   • Types: Cigarettes   Smokeless Tobacco Never     Alcohol Consumption:  Social History     Substance and Sexual Activity   Alcohol Use No     Fall Risk Screen:    EVAADI Fall Risk Assessment has not been completed.    Depression Screenin/16/2023     8:00 AM   PHQ-2/PHQ-9 Depression Screening   Little Interest or Pleasure in Doing Things 1-->several days   Feeling Down, Depressed or Hopeless 1-->several days   Trouble Falling or Staying Asleep, or Sleeping Too Much 0-->not at all   Feeling Tired or Having Little Energy 3-->nearly every day   Poor Appetite or Overeating 1-->several days   Feeling Bad about Yourself - or that You are a Failure or Have Let Yourself or Your Family Down 1-->several days   Trouble Concentrating on Things, Such as Reading the Newspaper or Watching Television 1-->several days   Moving or Speaking So Slowly that Other People Could Have Noticed? Or the Opposite - Being So Fidgety 1-->several days   Thoughts that You Would be Better Off Dead or of Hurting Yourself in Some Way 0-->not at all   PHQ-9: Brief Depression Severity Measure Score 9       Health Habits and Functional and Cognitive Screenin/16/2023     8:00 AM "   Functional & Cognitive Status   Do you have difficulty preparing food and eating? No   Do you have difficulty bathing yourself, getting dressed or grooming yourself? Yes   Do you have difficulty using the toilet? No   Do you have difficulty moving around from place to place? No   Do you have trouble with steps or getting out of a bed or a chair? Yes   Current Diet Frequent Junk Food   Dental Exam Not up to date   Eye Exam Up to date   Exercise (times per week) 0 times per week   Do you need help using the phone?  No   Are you deaf or do you have serious difficulty hearing?  No   Do you need help with transportation? No   Do you need help shopping? No   Do you need help preparing meals?  No   Do you need help with housework?  No   Do you need help with laundry? No   Do you need help taking your medications? No   Do you need help managing money? No   Do you ever drive or ride in a car without wearing a seat belt? No   Have you felt unusual stress, anger or loneliness in the last month? Yes   Who do you live with? Other   If you need help, do you have trouble finding someone available to you? No   Have you been bothered in the last four weeks by sexual problems? Yes   Do you have difficulty concentrating, remembering or making decisions? Yes       Age-appropriate Screening Schedule:  Refer to the list below for future screening recommendations based on patient's age, sex and/or medical conditions. Orders for these recommended tests are listed in the plan section. The patient has been provided with a written plan.    Health Maintenance   Topic Date Due   • COLORECTAL CANCER SCREENING  Never done   • ZOSTER VACCINE (1 of 2) Never done   • AAA SCREEN (ONE-TIME)  Never done   • Pneumococcal Vaccine 65+ (2 - PCV) 08/17/2021   • DIABETIC FOOT EXAM  08/17/2021   • URINE MICROALBUMIN  08/17/2021   • COVID-19 Vaccine (4 - Booster for Moderna series) 11/17/2022   • HEMOGLOBIN A1C  12/08/2022   • Hepatitis B (1 of 3 - Risk  3-dose series) 05/16/2024 (Originally 6/24/2013)   • TDAP/TD VACCINES (1 - Tdap) 05/16/2024 (Originally 6/24/1974)   • LIPID PANEL  06/08/2023   • INFLUENZA VACCINE  08/01/2023   • DIABETIC EYE EXAM  08/20/2023   • ANNUAL WELLNESS VISIT  05/16/2024   • HEPATITIS C SCREENING  Completed                  CMS Preventative Services Quick Reference  Risk Factors Identified During Encounter  None Identified  The above risks/problems have been discussed with the patient.  Pertinent information has been shared with the patient in the After Visit Summary.  An After Visit Summary and PPPS were made available to the patient.    Follow Up:   Next Medicare Wellness visit to be scheduled in 1 year.       Additional E&M Note during same encounter follows:  Patient has multiple medical problems which are significant and separately identifiable that require additional work above and beyond the Medicare Wellness Visit.      Chief Complaint  Medicare Wellness-subsequent (Pt is here today for annual medicare visit 8/24/21. Diabetic foot exam.Pt is interested in shingles shot and covid vaccine and pneumo)    Subjective        Patient presents to the office today for left  shoulder pain and back pain. He had a normal arthritis panel.  He is not seeing anyone for pain.  He saw neurosurgeon around a year ago without treatment plan.  He has not had imaging on left shoulder. He reports pain greater than 6 months. Hot shower will help briefly but comes right back. He has decreased range of motion.,  He would like second opinion to neurosurgeon.  Last MRI January 2022.  Blood pressure 122/80.  Normal denies chest pain or shortness of air.          Kevin Garsia is also being seen today for left shoulder pain, neck and back pain. See HPI    Review of Systems   Constitutional: Negative for activity change and appetite change.   HENT: Negative for congestion, postnasal drip and sinus pressure.    Eyes: Negative for photophobia and visual  "disturbance.   Respiratory: Negative for cough, shortness of breath and wheezing.    Cardiovascular: Negative for chest pain, palpitations and leg swelling.   Gastrointestinal: Negative for abdominal pain, anal bleeding and constipation.   Endocrine: Negative for cold intolerance and heat intolerance.   Genitourinary: Negative for flank pain and frequency.   Musculoskeletal: Positive for arthralgias, back pain, neck pain and neck stiffness. Negative for gait problem, joint swelling and myalgias.   Skin: Negative for rash.   Allergic/Immunologic: Negative for environmental allergies and food allergies.   Neurological: Negative for dizziness.   Hematological: Negative for adenopathy. Does not bruise/bleed easily.   Psychiatric/Behavioral: The patient is not nervous/anxious.        Objective   Vital Signs:  /80   Pulse 71   Temp 97.5 °F (36.4 °C)   Ht 165.1 cm (65\")   Wt 85.2 kg (187 lb 12.8 oz)   SpO2 95%   BMI 31.25 kg/m²     Physical Exam  Constitutional:       General: He is not in acute distress.     Appearance: Normal appearance.   HENT:      Head: Normocephalic.      Right Ear: Tympanic membrane, ear canal and external ear normal.      Left Ear: Tympanic membrane, ear canal and external ear normal.      Nose: Nose normal. No congestion or rhinorrhea.   Eyes:      Pupils: Pupils are equal, round, and reactive to light.   Cardiovascular:      Rate and Rhythm: Normal rate.      Pulses: Normal pulses.      Heart sounds: Normal heart sounds.   Pulmonary:      Effort: Pulmonary effort is normal.      Breath sounds: Normal breath sounds.   Abdominal:      General: Bowel sounds are normal. There is no distension.      Palpations: Abdomen is soft.      Tenderness: There is no abdominal tenderness.   Musculoskeletal:      Right shoulder: Normal.      Left shoulder: Tenderness present. Decreased range of motion.      Cervical back: Neck supple. Spasms and tenderness present. Decreased range of motion.      " Lumbar back: Laceration, spasms and tenderness present. Decreased range of motion.   Skin:     General: Skin is warm.   Neurological:      General: No focal deficit present.      Mental Status: He is alert and oriented to person, place, and time.   Psychiatric:         Mood and Affect: Mood normal.         Behavior: Behavior normal.         Thought Content: Thought content normal.         Judgment: Judgment normal.          The following data was reviewed by: BOBBY Portillo on 05/16/2023:  Common labs        11/8/2022    14:16 4/4/2023    13:49 5/1/2023    08:12   Common Labs   Creatinine 1.05   1.22      Uric Acid   6.5       Data reviewed: Radiologic studies MRI lumbar spine           Assessment and Plan   Diagnoses and all orders for this visit:    1. Chronic left shoulder pain (Primary)  -     Cancel: XR Shoulder 2+ View Left; Future  -     XR Shoulder 2+ View Left; Future    2. Other fatigue  -     CBC & Differential  -     TSH    3. Medicare annual wellness visit, subsequent    4. Essential hypertension  -     Comprehensive Metabolic Panel    5. Hyperlipidemia, unspecified hyperlipidemia type  -     Lipid Panel    6. Type 2 diabetes mellitus without complication, without long-term current use of insulin  -     Hemoglobin A1c  -     MicroAlbumin, Urine, Random - Urine, Clean Catch  -     metFORMIN (GLUCOPHAGE) 1000 MG tablet; Take 1 tablet by mouth 2 (Two) Times a Day With Meals.  Dispense: 120 tablet; Refill: 5    7. HIV infection, unspecified symptom status    8. Arthritis    9. Vitamin D deficiency  -     vitamin D (ERGOCALCIFEROL) 1.25 MG (19614 UT) capsule capsule; Take 1 capsule by mouth 1 (One) Time Per Week.  Dispense: 12 capsule; Refill: 1    10. Chronic right-sided low back pain without sciatica  -     Ambulatory Referral to Neurosurgery    Chronic left shoulder pain patient to go for x-ray continue heat and cold application.    Fatigue check TSH  Hypertension controlled 122/80 continue DASH  diet    Hyperlipidemia check lipid panel    Diabetes mellitus A1c controlled continue metformin    HIV infection following infectious disease    Arthritis stable arthritis panel was negative for RA    Chronic lumbar pain-patient is requesting a new referral to neurosurgery.  Last MRI was January 2022.           I spent 30 minutes caring for Kevin on this date of service. This time includes time spent by me in the following activities:preparing for the visit, reviewing tests, obtaining and/or reviewing a separately obtained history, performing a medically appropriate examination and/or evaluation , counseling and educating the patient/family/caregiver, ordering medications, tests, or procedures, referring and communicating with other health care professionals , documenting information in the medical record, independently interpreting results and communicating that information with the patient/family/caregiver and care coordination  Follow Up   Return in about 4 months (around 9/16/2023) for Recheck.  Patient was given instructions and counseling regarding his condition or for health maintenance advice. Please see specific information pulled into the AVS if appropriate.

## 2023-05-17 LAB
ALBUMIN SERPL-MCNC: 4.8 G/DL (ref 3.5–5.2)
ALBUMIN/GLOB SERPL: 2.1 G/DL
ALP SERPL-CCNC: 117 U/L (ref 39–117)
ALT SERPL-CCNC: 18 U/L (ref 1–41)
AST SERPL-CCNC: 13 U/L (ref 1–40)
BASOPHILS # BLD AUTO: 0.08 10*3/MM3 (ref 0–0.2)
BASOPHILS NFR BLD AUTO: 1.1 % (ref 0–1.5)
BILIRUB SERPL-MCNC: 0.7 MG/DL (ref 0–1.2)
BUN SERPL-MCNC: 16 MG/DL (ref 8–23)
BUN/CREAT SERPL: 12.6 (ref 7–25)
CALCIUM SERPL-MCNC: 10.1 MG/DL (ref 8.6–10.5)
CHLORIDE SERPL-SCNC: 107 MMOL/L (ref 98–107)
CHOLEST SERPL-MCNC: 97 MG/DL (ref 0–200)
CO2 SERPL-SCNC: 30.8 MMOL/L (ref 22–29)
CREAT SERPL-MCNC: 1.27 MG/DL (ref 0.76–1.27)
EGFRCR SERPLBLD CKD-EPI 2021: 61.2 ML/MIN/1.73
EOSINOPHIL # BLD AUTO: 0.37 10*3/MM3 (ref 0–0.4)
EOSINOPHIL NFR BLD AUTO: 5.3 % (ref 0.3–6.2)
ERYTHROCYTE [DISTWIDTH] IN BLOOD BY AUTOMATED COUNT: 14.4 % (ref 12.3–15.4)
GLOBULIN SER CALC-MCNC: 2.3 GM/DL
GLUCOSE SERPL-MCNC: 102 MG/DL (ref 65–99)
HBA1C MFR BLD: 6 % (ref 4.8–5.6)
HCT VFR BLD AUTO: 41.6 % (ref 37.5–51)
HDLC SERPL-MCNC: 27 MG/DL (ref 40–60)
HGB BLD-MCNC: 13.4 G/DL (ref 13–17.7)
IMM GRANULOCYTES # BLD AUTO: 0.01 10*3/MM3 (ref 0–0.05)
IMM GRANULOCYTES NFR BLD AUTO: 0.1 % (ref 0–0.5)
LDLC SERPL CALC-MCNC: 43 MG/DL (ref 0–100)
LYMPHOCYTES # BLD AUTO: 1.88 10*3/MM3 (ref 0.7–3.1)
LYMPHOCYTES NFR BLD AUTO: 26.8 % (ref 19.6–45.3)
MCH RBC QN AUTO: 29.7 PG (ref 26.6–33)
MCHC RBC AUTO-ENTMCNC: 32.2 G/DL (ref 31.5–35.7)
MCV RBC AUTO: 92.2 FL (ref 79–97)
MICROALBUMIN UR-MCNC: 24.4 UG/ML
MONOCYTES # BLD AUTO: 0.43 10*3/MM3 (ref 0.1–0.9)
MONOCYTES NFR BLD AUTO: 6.1 % (ref 5–12)
NEUTROPHILS # BLD AUTO: 4.25 10*3/MM3 (ref 1.7–7)
NEUTROPHILS NFR BLD AUTO: 60.6 % (ref 42.7–76)
NRBC BLD AUTO-RTO: 0.1 /100 WBC (ref 0–0.2)
PLATELET # BLD AUTO: 303 10*3/MM3 (ref 140–450)
POTASSIUM SERPL-SCNC: 5.2 MMOL/L (ref 3.5–5.2)
PROT SERPL-MCNC: 7.1 G/DL (ref 6–8.5)
RBC # BLD AUTO: 4.51 10*6/MM3 (ref 4.14–5.8)
SODIUM SERPL-SCNC: 146 MMOL/L (ref 136–145)
TRIGL SERPL-MCNC: 159 MG/DL (ref 0–150)
TSH SERPL DL<=0.005 MIU/L-ACNC: 2.26 UIU/ML (ref 0.27–4.2)
VLDLC SERPL CALC-MCNC: 27 MG/DL (ref 5–40)
WBC # BLD AUTO: 7.02 10*3/MM3 (ref 3.4–10.8)

## 2023-05-29 ENCOUNTER — HOSPITAL ENCOUNTER (OUTPATIENT)
Dept: GENERAL RADIOLOGY | Facility: HOSPITAL | Age: 68
Discharge: HOME | End: 2023-05-29
Admitting: NURSE PRACTITIONER

## 2023-05-29 DIAGNOSIS — M25.512 CHRONIC LEFT SHOULDER PAIN: ICD-10-CM

## 2023-05-29 DIAGNOSIS — G89.29 CHRONIC LEFT SHOULDER PAIN: ICD-10-CM

## 2023-05-29 PROCEDURE — 73030 X-RAY EXAM OF SHOULDER: CPT

## 2023-07-24 ENCOUNTER — OFFICE VISIT (OUTPATIENT)
Dept: SLEEP MEDICINE | Facility: HOSPITAL | Age: 68
End: 2023-07-24
Payer: MEDICARE

## 2023-07-24 VITALS
HEIGHT: 65 IN | WEIGHT: 184 LBS | HEART RATE: 68 BPM | OXYGEN SATURATION: 97 % | SYSTOLIC BLOOD PRESSURE: 139 MMHG | DIASTOLIC BLOOD PRESSURE: 75 MMHG | BODY MASS INDEX: 30.66 KG/M2

## 2023-07-24 DIAGNOSIS — G47.30 SLEEP APNEA, UNSPECIFIED TYPE: Primary | ICD-10-CM

## 2023-07-24 PROCEDURE — G0463 HOSPITAL OUTPT CLINIC VISIT: HCPCS

## 2023-07-24 NOTE — PROGRESS NOTES
[facility]      Patient Care Team:  Priti Oliveira APRN as PCP - General (Nurse Practitioner)  Wellington Chaudhari MD as Consulting Physician (Urology)  Jimmy Kwon MD as Consulting Physician (Infectious Diseases)    Referring Provider: Priti Oliveira APRN    Chief complaint:   Waking up often at night and gasping for breath    History of present illness:    Subjective   This is a 68 year old male patient with hx of HTN  and prediabetes.     He reported loud snoring for any years (as long as he remembers he said). Snoring sometimes awaken him from sleep and disturbs his partners who have told him that he stops breathing. He also reported waking up with a dry mouth, restless sleep, frequent leg jerking and grinding teeth.    Sleep schedule:  -Bedtime: 11- midnight  -Sleep latency: Not long  -Wake up time: 6-7 AM , does not feel refreshed  -Nocturnal awakenin-3 times because of nocturia.  No difficulties going back to sleep.  -Perceived sleep hours: 7      ESS: Total score: 7     Review of Systems  Constitutional: No fever or chills. No changes in appetite.   ENMT: No sinus congestion, postnasal drip, hoarsness  Cardiovascular: No chest pain, palpitation or legs swelling.    Respiratory: No dyspnea, cough or wheezing.  Gastrointestinal: No constipation, diarrhea, abdominal pain but acid reflux  Neurology: No headache, weakness, numbness or dizziness.   Musculoskeletal: No joints pain, stiffness or swelling.   Psychiatry: Anxiety and depression  Hem/Lymphatic: No swollen glands or easy bruising.  Integumentary: No rash.  Endocrinology: No excessive thirst, cold or warm intolerance.   Urinary: No dysuria, bloody urine but frequent urination.     History  Past Medical History:   Diagnosis Date    Cancer     prostate    Diabetes mellitus     Hyperlipidemia     Hypertension    ,   Past Surgical History:   Procedure Laterality Date     ANGIOPLASTY CAROTID ARTERY      PROSTATE SURGERY      cancer removal   ,   Family History   Problem Relation Age of Onset    Bone cancer Mother     COPD Father     Hypertension Father     Stroke Father         TIA    Bone cancer Father         smoker    Lung cancer Father     Diabetes Father     No Known Problems Maternal Grandmother     Colon cancer Paternal Grandfather     No Known Problems Sister     No Known Problems Brother     No Known Problems Maternal Grandfather     No Known Problems Paternal Grandmother    ,   Social History     Socioeconomic History    Marital status: Significant Other   Tobacco Use    Smoking status: Former     Packs/day: 3.00     Years: 30.00     Pack years: 90.00     Types: Cigarettes    Smokeless tobacco: Never   Vaping Use    Vaping Use: Some days   Substance and Sexual Activity    Alcohol use: No    Drug use: No     E-cigarette/Vaping    E-cigarette/Vaping Use Current Some Day User     Comments cbd oil      E-cigarette/Vaping Substances     E-cigarette/Vaping Devices         , (Not in a hospital admission) , and Allergies:  Patient has no known allergies.    Medications:    Current Outpatient Medications:     amLODIPine (NORVASC) 10 MG tablet, Take 1 tablet by mouth Daily., Disp: 90 tablet, Rfl: 1    aspirin 81 MG chewable tablet, , Disp: , Rfl:     atorvastatin (Lipitor) 80 MG tablet, Take 1 tablet by mouth Daily. New dose for cholesterol, Disp: 90 tablet, Rfl: 3    Blood Glucose Monitoring Suppl (ACCU-CHEK DIONTE PLUS) w/Device kit, 1 kit 4 (Four) Times a Day., Disp: 1 kit, Rfl: 1    CBD (cannabidiol) oral oil, Take  by mouth 2 (Two) Times a Day. Half a dropper twice daily, Disp: , Rfl:     Emtricitabine-Tenofovir AF (Descovy) 200-25 MG per tablet, Take 1 tablet by mouth Daily for 90 days., Disp: 30 tablet, Rfl: 2    gabapentin (NEURONTIN) 300 MG capsule, TAKE 2 CAPSULES BY MOUTH THREE TIMES DAILY, Disp: 180 capsule, Rfl: 5    hydrOXYzine (ATARAX) 10 MG tablet, TAKE 1 TO 2 TABLETS  "BY MOUTH EVERY NIGHT AT BEDTIME, Disp: 60 tablet, Rfl: 4    losartan (COZAAR) 100 MG tablet, Take 1 tablet by mouth Daily., Disp: 90 tablet, Rfl: 1    metFORMIN (GLUCOPHAGE) 1000 MG tablet, Take 1 tablet by mouth 2 (Two) Times a Day With Meals., Disp: 120 tablet, Rfl: 5    metoprolol tartrate (LOPRESSOR) 25 MG tablet, TAKE 1 TABLET BY MOUTH DAILY, Disp: 90 tablet, Rfl: 1    vitamin D (ERGOCALCIFEROL) 1.25 MG (40320 UT) capsule capsule, Take 1 capsule by mouth 1 (One) Time Per Week., Disp: 12 capsule, Rfl: 1      Objective   Vital Signs:  Vitals:    07/24/23 1100   BP: 139/75   Pulse: 68   SpO2: 97%   Weight: 83.5 kg (184 lb)   Height: 165.1 cm (65\")     Body mass index is 30.62 kg/m².  Neck Circumference: 17.5 inches     Physical Exam:  Neck Circumference: 17.5 inches     Constitutional: Not in acute distress.  Eyes: Injected conjunctiva, EOMI. pupils equal reactive to light.  ENMT: Chaudhary score 3. Mallampati score 3. No oral thrush. Tonsils grade 1. Narrow distance in between the posterior pharyngeal pillars (<50 %). Scalloped tongue.  Neck:  No thyromegaly.  Trachea midline.  Heart: Regular rhythm and rate, no murmur  Lungs: Good and equal air entry bilaterally. No crackles or wheezing.  Nonlabored breathing.       Abdomen: Obese.  Soft.  No tenderness.  Positive bowel sounds.  Extremities: No cyanosis, clubbing or pitting edema.  Warm extremities and well-perfused.  Neuro: Conscious, alert, oriented x3.  Gait is normal.  Strength 5/5 in arms and legs.  Psych: Appropriate mood and affect.    Integumentary: No rash.  Normal skin turgor.  Lymphatic: No palpable cervical or supraclavicular lymph nodes.    Diagnostic data:  Lab Results   Component Value Date    HGBA1C 6.00 (H) 05/16/2023     Total Cholesterol   Date Value Ref Range Status   01/10/2022 83 0 - 200 mg/dL Final     Triglycerides   Date Value Ref Range Status   05/16/2023 159 (H) 0 - 150 mg/dL Final   01/10/2022 170 (H) 0 - 150 mg/dL Final   07/09/2019 " 166 (H) <150 mg/dL Final     Comment:     Triglycerides Reference Ranges:  Borderline High: 150-199  High: 200-499  Very High: >500     HDL Cholesterol   Date Value Ref Range Status   05/16/2023 27 (L) 40 - 60 mg/dL Final   01/10/2022 29 (L) 40 - 60 mg/dL Final   07/09/2019 25 (L) >40 mg/dL Final     Hemoglobin   Date Value Ref Range Status   05/16/2023 13.4 13.0 - 17.7 g/dL Final   06/08/2022 11.8 (L) 13.5 - 17.5 g/dL Final     CO2   Date Value Ref Range Status   02/22/2021 25.7 22.0 - 29.0 mmol/L Final     Total CO2   Date Value Ref Range Status   05/16/2023 30.8 (H) 22.0 - 29.0 mmol/L Final   07/12/2019 31 (H) 22 - 30 mmol/L Final        Assessment   Snoring  Obesity, BMI 30  HTN      Plan:  Check HST. We discussed the pathophysiology of sleep apnea, testing and therapy which include CPAP and weight loss.  Patient is agreeable with CPAP therapy if needed.    Counseled for weight loss.  Encouraged to exercise regularly and cut down on carbohydrates.  Discussed that losing weight may decrease the severity of sleep apnea and obviate the need of CPAP therapy.    Discussed the association between obstructive sleep apnea and cardiovascular disease including HTN and the beneficial effect of Pap therapy in reducing the risk of major cardiovascular events.          Nayana Baltazar MD  07/24/23  11:26 EDT    This note was dictated utilizing Dragon dictation

## 2023-08-03 ENCOUNTER — HOSPITAL ENCOUNTER (OUTPATIENT)
Dept: SLEEP MEDICINE | Facility: HOSPITAL | Age: 68
End: 2023-08-03
Payer: MEDICARE

## 2023-08-03 DIAGNOSIS — G47.30 SLEEP APNEA, UNSPECIFIED TYPE: ICD-10-CM

## 2023-08-03 PROCEDURE — 95806 SLEEP STUDY UNATT&RESP EFFT: CPT

## 2023-08-07 DIAGNOSIS — G47.33 OSA (OBSTRUCTIVE SLEEP APNEA): Primary | ICD-10-CM

## 2023-08-15 ENCOUNTER — TELEPHONE (OUTPATIENT)
Dept: SLEEP MEDICINE | Facility: HOSPITAL | Age: 68
End: 2023-08-15
Payer: MEDICARE

## 2023-08-15 NOTE — TELEPHONE ENCOUNTER
LV for patient to call , sending orders to [facility] unless pt requests otherwise . Will need follow up once set up

## 2023-08-29 ENCOUNTER — TELEPHONE (OUTPATIENT)
Dept: INFECTIOUS DISEASES | Facility: CLINIC | Age: 68
End: 2023-08-29
Payer: MEDICARE

## 2023-08-29 NOTE — TELEPHONE ENCOUNTER
Patient was contacted and reminded about the overdue labs that still need to be obtained per Dr. Kwon's order. Patient stated that he had forgotten and that he will have the labs done soon.

## 2023-10-03 ENCOUNTER — OFFICE VISIT (OUTPATIENT)
Dept: NEUROLOGY | Facility: CLINIC | Age: 68
End: 2023-10-03
Payer: MEDICARE

## 2023-10-03 ENCOUNTER — LAB (OUTPATIENT)
Dept: LAB | Facility: HOSPITAL | Age: 68
End: 2023-10-03
Payer: MEDICARE

## 2023-10-03 VITALS
DIASTOLIC BLOOD PRESSURE: 88 MMHG | HEIGHT: 65 IN | WEIGHT: 186 LBS | HEART RATE: 72 BPM | BODY MASS INDEX: 30.99 KG/M2 | SYSTOLIC BLOOD PRESSURE: 158 MMHG | OXYGEN SATURATION: 95 %

## 2023-10-03 DIAGNOSIS — M54.12 RADICULOPATHY OF CERVICAL REGION: ICD-10-CM

## 2023-10-03 PROCEDURE — 3079F DIAST BP 80-89 MM HG: CPT | Performed by: STUDENT IN AN ORGANIZED HEALTH CARE EDUCATION/TRAINING PROGRAM

## 2023-10-03 PROCEDURE — 99214 OFFICE O/P EST MOD 30 MIN: CPT | Performed by: STUDENT IN AN ORGANIZED HEALTH CARE EDUCATION/TRAINING PROGRAM

## 2023-10-03 PROCEDURE — 3077F SYST BP >= 140 MM HG: CPT | Performed by: STUDENT IN AN ORGANIZED HEALTH CARE EDUCATION/TRAINING PROGRAM

## 2023-10-03 PROCEDURE — 1160F RVW MEDS BY RX/DR IN RCRD: CPT | Performed by: STUDENT IN AN ORGANIZED HEALTH CARE EDUCATION/TRAINING PROGRAM

## 2023-10-03 PROCEDURE — 82565 ASSAY OF CREATININE: CPT | Performed by: INTERNAL MEDICINE

## 2023-10-03 PROCEDURE — G0432 EIA HIV-1/HIV-2 SCREEN: HCPCS | Performed by: INTERNAL MEDICINE

## 2023-10-03 PROCEDURE — 1159F MED LIST DOCD IN RCRD: CPT | Performed by: STUDENT IN AN ORGANIZED HEALTH CARE EDUCATION/TRAINING PROGRAM

## 2023-10-03 PROCEDURE — 86592 SYPHILIS TEST NON-TREP QUAL: CPT | Performed by: INTERNAL MEDICINE

## 2023-10-03 PROCEDURE — 87491 CHLMYD TRACH DNA AMP PROBE: CPT | Performed by: INTERNAL MEDICINE

## 2023-10-03 PROCEDURE — 87591 N.GONORRHOEAE DNA AMP PROB: CPT | Performed by: INTERNAL MEDICINE

## 2023-10-03 PROCEDURE — 86803 HEPATITIS C AB TEST: CPT | Performed by: INTERNAL MEDICINE

## 2023-10-03 RX ORDER — GABAPENTIN 300 MG/1
600 CAPSULE ORAL 3 TIMES DAILY
Qty: 540 CAPSULE | Refills: 3 | Status: SHIPPED | OUTPATIENT
Start: 2023-10-03

## 2023-10-03 NOTE — PROGRESS NOTES
Chief Complaint   Patient presents with    Spinal stenosis of lumbar region, unspecified whether neuro       Patient ID: Kevin Garsia is a 68 y.o. male.    HPI:    The following portions of the patient's history were reviewed and updated as appropriate: allergies, current medications, past family history, past medical history, past social history, past surgical history and problem list.    Interval history:  Mr. Garsia presents for follow-up of low back pain and bilateral carpal tunnel syndrome.  He feels like his hands and lower back overall has been the same over the course of the interval since I last saw him.  However he got the flu and COVID shot simultaneously and afterwards he had potentially due to positioning lower back pain for 1 week which is now improved.  The pain was significant though.  He feels like his hands are the same.  He feels like the symptoms in his hands are not enough to have surgery and declined surgical referral at this time.  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  He is here today with his partner Belem.  He has tolerated gabapentin well and feels like this helps with his pain.  He is requesting a refill of gabapentin today at the same dose.    Review of Systems   Neurological:  Positive for speech difficulty, weakness (hands), light-headedness, numbness (bi lat hands and legs) and headaches. Negative for dizziness, tremors, seizures, syncope and facial asymmetry.   Psychiatric/Behavioral:  Positive for agitation, decreased concentration (due to pain) and dysphoric mood. Negative for behavioral problems, confusion, hallucinations, self-injury, sleep disturbance and suicidal ideas. The patient is not nervous/anxious and is not hyperactive.         There were no vitals filed for this visit.    Neurologic Exam      Mental Status   Attention: normal. Concentration: normal.   Level of consciousness: alert    Cranial Nerves     CN II   Visual fields full to confrontation.   Visual acuity: normal    CN III, IV, VI   Pupils are equal, round, and reactive to light.  Extraocular motions are normal.     CN V   Facial sensation intact.     CN VII   Facial expression full, symmetric.     CN VIII   Hearing: intact    CN IX, X   Palate: symmetric    CN XI   Right trapezius strength: normal  Left trapezius strength: normal    CN XII   Tongue: not atrophic  Fasciculations: absent  Tongue deviation: none    Motor Exam   Muscle bulk: normal    Strength   Right deltoid: 5/5  Left deltoid: 5/5  Right biceps: 5/5  Left biceps: 5/5  Right triceps: 5/5  Left triceps: 5/5  Right iliopsoas: 5/5  Left iliopsoas: 5/5  Right quadriceps: 5/5  Left quadriceps: 5/5  Right hamstrin/5  Left hamstrin/5  Right anterior tibial: 5/5  Left anterior tibial: 5/5  Right gastroc: 5/5  Left gastroc: 5/5Grip 5 out of 5 bilaterally     Sensory Exam   Right arm light touch: normal  Left arm light touch: normal  Left leg light touch: normal  Numbness to light touch at top of right thigh     Gait, Coordination, and Reflexes     Coordination   Finger to nose coordination: normal  Heel to shin coordination: normal    Reflexes   Right brachioradialis: 2+  Left brachioradialis: 1+  Right biceps: 0  Left biceps: 0  Right patellar: 0  Left patellar: 1+  Right achilles: 0  Left achilles: 0    Physical Exam  Eyes:      Extraocular Movements: EOM normal.      Pupils: Pupils are equal, round, and reactive to light.   Neurological:      Coordination: Finger-Nose-Finger Test and Heel to Shin Test normal.      Deep Tendon Reflexes:      Reflex Scores:       Bicep reflexes are 0 on the right side and 0 on the left side.       Brachioradialis reflexes are 2+ on the right side and 1+ on the left side.       Patellar reflexes are 0 on the right side and 1+ on the left side.        Achilles reflexes are 0 on the right side and 0 on the left side.      Procedures    Assessment/Plan:    His exam appears similar with some variation in reflexes.  He has carpal tunnel syndrome that is severe but he does not want surgery at this time.  As he is no longer working as a  some of his symptoms may improve with the lack of repetitive prep but I noted that I would refer him to hand surgery if he ever felt like his symptoms are getting worse.  I discussed that the goal of surgery since he has severe carpal tunnel syndrome would be to hopefully stop or slow progression and any improvement would be a welcome benefit but not necessarily an expected one.  The patient endorsed understanding.  Regarding the lower back pain it seems like it has been well controlled for the most part before the vaccinations he received.  I would encourage him to monitor for this and I would refill his gabapentin today and be willing to refer him to pain management if he feels like his pain is worse.  I discussed that if he had acute on chronic exacerbation that was intolerable he could present to the emergency room to see if they would help treat any acute exacerbation of pain.  I discussed spinal emergency symptoms including worsening lower extremity numbness, lower extremity weakness, saddle paresthesias, and incontinence.  He should go to the emergency room if he has the symptoms.    Return in about 11 months (around 9/3/2024).  I spent 33 minutes caring for this patient on this date of service. This time includes time spent by me in the following activities as necessary: preparing for the visit, reviewing tests, medical records and previous visits, obtaining and/or reviewing a separately obtained history, performing a medically appropriate exam and/or evaluation, counseling and educating the patient, and/or communicating with other healthcare professionals, documenting information in the medical record, independently  interpreting results and communicating that information with the patient, and developing a medically appropriate treatment plan with consideration of other conditions, medications, and treatments.       There are no diagnoses linked to this encounter.       Omayra Aguiar MA

## 2023-10-16 ENCOUNTER — OFFICE VISIT (OUTPATIENT)
Dept: INFECTIOUS DISEASES | Facility: CLINIC | Age: 68
End: 2023-10-16
Payer: MEDICARE

## 2023-10-16 VITALS
BODY MASS INDEX: 31.82 KG/M2 | SYSTOLIC BLOOD PRESSURE: 146 MMHG | RESPIRATION RATE: 20 BRPM | TEMPERATURE: 97.1 F | DIASTOLIC BLOOD PRESSURE: 87 MMHG | WEIGHT: 191.2 LBS | HEART RATE: 72 BPM

## 2023-10-16 DIAGNOSIS — Z72.53 HIGH RISK BISEXUAL BEHAVIOR: Primary | ICD-10-CM

## 2023-10-16 DIAGNOSIS — Z79.2 LONG TERM (CURRENT) USE OF ANTIBIOTICS: ICD-10-CM

## 2023-10-16 RX ORDER — EMTRICITABINE AND TENOFOVIR ALAFENAMIDE 200; 25 MG/1; MG/1
1 TABLET ORAL DAILY
Qty: 30 TABLET | Refills: 2 | Status: SHIPPED | OUTPATIENT
Start: 2023-10-16

## 2023-10-16 NOTE — PROGRESS NOTES
"cc: Here for evaluation preexposure prophylaxis    Per initial clinic note from February 2021: Patient reports he is in his usual state of health.  He is recently  from his wife and is looking to engage in sexual activity.  Says he has sex with men and women both.  He has a history of chlamydia but tested negative for HIV, chlamydia and hepatitis C back in August 2020.  He has not been sexually active for about 6 months due to COVID but thinks there is a reasonable certainty that he is going to start resuming high risk sexual activity here in the next month or 2.  He has not had any fevers or chills or night sweats or discharge.  He feels well.  His ex-wife is recommended that he start preexposure prophylaxis since they may run in the same circles.  She is tolerating it well.  He is acutely concerned because he is potentially having sex with with commercial sex workers\"       Since his last visit in July 2023, he says he is doing well.    Still in relationship with his partner  Having worsening carpal tunnel and sciatica and contemplating surgery/pain mgmt  He denies any side effects or missed doses of descovy  No c/f infeciton    Past medical history: Hypertension, peripheral vascular disease, diabetes mellitus type 2, hyperlipidemia, peptic ulcer disease, prostate cancer, chlamydia, cervical stenosis, stroke, anxiety/depression, CVA  No family history of infectious diseases  Social history: Retired.  He  from his wife.  Former smoker but quit 10 years ago.  No IV drug use.  No alcohol use.      Resp. rate 20, weight 86.7 kg (191 lb 3.2 oz).  GENERAL: Awake and alert, in no acute distress.   HEENT: . Hearing is grossly normal.   LUNGS: LCTAB normal respiratory effort.   SKIN: Warm and dry without cutaneous eruptions   PSYCHIATRIC: Appropriate mood, affect, insight, and judgment.     10/3/2023 HIV, RPR, urine GC and hepatitis C antibody negative; creatinine 1.07      Assessment and Plan  High risk " bisexual behavior  Vaccines: Consider hepatitis A and B vaccine  Long-term current use antibiotics      Continue preexposure prophylaxis.  We will  descovy for 3 months.  Urine GC, RPR, hep C and serum creatinine along with HIV all okay earlier this month.  Repeat those same tests in 3 mo     We will see him back in clinic in 3 months or sooner if necessary

## 2023-10-24 ENCOUNTER — TELEPHONE (OUTPATIENT)
Dept: FAMILY MEDICINE CLINIC | Facility: CLINIC | Age: 68
End: 2023-10-24

## 2023-10-24 NOTE — TELEPHONE ENCOUNTER
Caller: Kevin Garsia    Relationship: Self    Best call back number: [phone]     What is the medical concern/diagnosis: SCREENING FOR CANCEROUS MOLES    What specialty or service is being requested: DERMATOLOGY    Any additional details: PATIENT STATED HE HAS PREVIOUSLY HAD PRECANCEROUS MOLES REMOVED, AND WOULD LIKE TO BE SCREENED FOR ANY AGAIN.    PLEASE CALL TO DISCUSS.

## 2023-10-25 DIAGNOSIS — D22.9 CHANGE IN MOLE: Primary | ICD-10-CM

## 2023-12-13 DIAGNOSIS — E55.9 VITAMIN D DEFICIENCY: ICD-10-CM

## 2023-12-13 DIAGNOSIS — I10 ESSENTIAL HYPERTENSION: ICD-10-CM

## 2023-12-13 RX ORDER — HYDROXYZINE HYDROCHLORIDE 10 MG/1
TABLET, FILM COATED ORAL
Qty: 60 TABLET | Refills: 0 | Status: SHIPPED | OUTPATIENT
Start: 2023-12-13

## 2023-12-13 RX ORDER — ERGOCALCIFEROL 1.25 MG/1
50000 CAPSULE ORAL WEEKLY
Qty: 12 CAPSULE | Refills: 0 | Status: SHIPPED | OUTPATIENT
Start: 2023-12-13

## 2023-12-13 NOTE — TELEPHONE ENCOUNTER
Caller: Kevin Garsia    Relationship: Self    Best call back number: [phone]     Requested Prescriptions:   Requested Prescriptions     Pending Prescriptions Disp Refills    hydrOXYzine (ATARAX) 10 MG tablet 60 tablet 4     Sig: TAKE 1 TO 2 TABLETS BY MOUTH EVERY NIGHT AT BEDTIME    metoprolol tartrate (LOPRESSOR) 25 MG tablet 90 tablet 1     Sig: Take 1 tablet by mouth Daily.    vitamin D (ERGOCALCIFEROL) 1.25 MG (82761 UT) capsule capsule 12 capsule 1     Sig: Take 1 capsule by mouth 1 (One) Time Per Week.        Pharmacy where request should be sent: [facility] [id number] [place], [place] - [address]  - [phone]  - [phone]     Last office visit with prescribing clinician: 5/16/2023   Last telemedicine visit with prescribing clinician: Visit date not found   Next office visit with prescribing clinician: Visit date not found     Additional details provided by patient: PHARMACY NEEDS PRIOR AUTHORIZATION.     Does the patient have less than a 3 day supply:  [x] Yes  [] No    Raji Quiroz Rep   12/13/23 09:49 EST

## 2024-01-22 RX ORDER — HYDROXYZINE HYDROCHLORIDE 10 MG/1
TABLET, FILM COATED ORAL
Qty: 60 TABLET | Refills: 1 | Status: SHIPPED | OUTPATIENT
Start: 2024-01-22

## 2024-01-23 ENCOUNTER — TELEPHONE (OUTPATIENT)
Dept: INFECTIOUS DISEASES | Facility: CLINIC | Age: 69
End: 2024-01-23
Payer: MEDICARE

## 2024-01-23 NOTE — TELEPHONE ENCOUNTER
1/23/24 Message left for patient that I saw that he had cancelled his recent appt w/ dr. Kwon and that if he is able to call back and reschedule the appt that he please have the labs done prior to his next appt w/ Dr. Kwon .

## 2024-03-13 ENCOUNTER — TELEPHONE (OUTPATIENT)
Dept: INFECTIOUS DISEASES | Facility: CLINIC | Age: 69
End: 2024-03-13
Payer: MEDICARE

## 2024-03-13 NOTE — TELEPHONE ENCOUNTER
Message left for patient that he still had overdue labs due for Dr. Kwon and that if he planned to reschedule his office appt since he had had to cancel the last appt in January that he should just call back to the office to reschedule and to please have his labs done prior to the appt.I left our office phone # for patient if he had any questions.

## 2024-03-19 DIAGNOSIS — E55.9 VITAMIN D DEFICIENCY: ICD-10-CM

## 2024-03-19 RX ORDER — ERGOCALCIFEROL 1.25 MG/1
50000 CAPSULE ORAL WEEKLY
Qty: 12 CAPSULE | Refills: 0 | Status: SHIPPED | OUTPATIENT
Start: 2024-03-19

## 2024-03-19 RX ORDER — EMTRICITABINE AND TENOFOVIR ALAFENAMIDE 200; 25 MG/1; MG/1
1 TABLET ORAL DAILY
Qty: 30 TABLET | Refills: 2 | Status: SHIPPED | OUTPATIENT
Start: 2024-03-19

## 2024-04-15 ENCOUNTER — LAB (OUTPATIENT)
Dept: LAB | Facility: HOSPITAL | Age: 69
End: 2024-04-15
Payer: MEDICARE

## 2024-04-15 ENCOUNTER — OFFICE VISIT (OUTPATIENT)
Dept: INFECTIOUS DISEASES | Facility: CLINIC | Age: 69
End: 2024-04-15
Payer: MEDICARE

## 2024-04-15 VITALS
HEART RATE: 72 BPM | HEIGHT: 65 IN | BODY MASS INDEX: 32.89 KG/M2 | RESPIRATION RATE: 18 BRPM | TEMPERATURE: 98.2 F | DIASTOLIC BLOOD PRESSURE: 74 MMHG | SYSTOLIC BLOOD PRESSURE: 148 MMHG | WEIGHT: 197.4 LBS

## 2024-04-15 DIAGNOSIS — Z72.53 HIGH RISK BISEXUAL BEHAVIOR: Primary | ICD-10-CM

## 2024-04-15 DIAGNOSIS — I10 ESSENTIAL HYPERTENSION: ICD-10-CM

## 2024-04-15 DIAGNOSIS — Z72.53 HIGH RISK BISEXUAL BEHAVIOR: ICD-10-CM

## 2024-04-15 DIAGNOSIS — Z79.2 LONG TERM (CURRENT) USE OF ANTIBIOTICS: ICD-10-CM

## 2024-04-15 LAB
CREAT SERPL-MCNC: 1.15 MG/DL (ref 0.76–1.27)
EGFRCR SERPLBLD CKD-EPI 2021: 68.5 ML/MIN/1.73
HCV AB SER DONR QL: NORMAL
HIV 1+2 AB+HIV1 P24 AG SERPL QL IA: NORMAL
RPR SER QL: NORMAL

## 2024-04-15 PROCEDURE — 36415 COLL VENOUS BLD VENIPUNCTURE: CPT

## 2024-04-15 PROCEDURE — G0432 EIA HIV-1/HIV-2 SCREEN: HCPCS

## 2024-04-15 PROCEDURE — 87491 CHLMYD TRACH DNA AMP PROBE: CPT

## 2024-04-15 PROCEDURE — 86592 SYPHILIS TEST NON-TREP QUAL: CPT

## 2024-04-15 PROCEDURE — 86803 HEPATITIS C AB TEST: CPT

## 2024-04-15 PROCEDURE — 3078F DIAST BP <80 MM HG: CPT | Performed by: INTERNAL MEDICINE

## 2024-04-15 PROCEDURE — 87591 N.GONORRHOEAE DNA AMP PROB: CPT

## 2024-04-15 PROCEDURE — 99214 OFFICE O/P EST MOD 30 MIN: CPT | Performed by: INTERNAL MEDICINE

## 2024-04-15 PROCEDURE — 3077F SYST BP >= 140 MM HG: CPT | Performed by: INTERNAL MEDICINE

## 2024-04-15 PROCEDURE — 82565 ASSAY OF CREATININE: CPT

## 2024-04-15 NOTE — PROGRESS NOTES
"cc: Here for evaluation preexposure prophylaxis    Per initial clinic note from February 2021: Patient reports he is in his usual state of health.  He is recently  from his wife and is looking to engage in sexual activity.  Says he has sex with men and women both.  He has a history of chlamydia but tested negative for HIV, chlamydia and hepatitis C back in August 2020.  He has not been sexually active for about 6 months due to COVID but thinks there is a reasonable certainty that he is going to start resuming high risk sexual activity here in the next month or 2.  He has not had any fevers or chills or night sweats or discharge.  He feels well.  His ex-wife is recommended that he start preexposure prophylaxis since they may run in the same circles.  She is tolerating it well.  He is acutely concerned because he is potentially having sex with with commercial sex workers\"       Since his last visit in Oct  2023, he says he is doing well.    Still in relationship with his partner, currently monogamous but interested in exploring options  He denies any side effects or missed doses of descovy  No c/f infeciton    Past medical history: Hypertension, peripheral vascular disease, diabetes mellitus type 2, hyperlipidemia, peptic ulcer disease, prostate cancer, chlamydia, cervical stenosis, stroke, anxiety/depression, CVA  No family history of infectious diseases  Social history: Retired.  He  from his wife.  Former smoker but quit 10 years ago.  No IV drug use.  No alcohol use.      Blood pressure 148/74, pulse 72, temperature 98.2 °F (36.8 °C), temperature source Oral, resp. rate 18, height 165.1 cm (65\"), weight 89.5 kg (197 lb 6.4 oz).  GENERAL: Awake and alert, in no acute distress.   HEENT: . Hearing is grossly normal.   LUNGS: LCTAB normal respiratory effort.   SKIN: Warm and dry without cutaneous eruptions   PSYCHIATRIC: Appropriate mood, affect, insight, and judgment.     4/15/24 HIV, RPR, urine GC " and pending; hepatitis C antibody negative; creatinine 1.15      Assessment and Plan  High risk bisexual behavior  Vaccines: Consider hepatitis A and B vaccine  Long-term current use antibiotics      Continue preexposure prophylaxis.  We will  descovy for 3 months.  Urine GC, RPR, hep C and serum creatinine along with HIV pending today  Repeat those same tests in 3 mo     We will see him back in clinic in 3 months or sooner if necessary

## 2024-04-17 LAB
C TRACH RRNA SPEC QL NAA+PROBE: NEGATIVE
N GONORRHOEA RRNA SPEC QL NAA+PROBE: NEGATIVE

## 2024-04-25 RX ORDER — HYDROXYZINE HYDROCHLORIDE 10 MG/1
TABLET, FILM COATED ORAL
Qty: 60 TABLET | Refills: 1 | Status: SHIPPED | OUTPATIENT
Start: 2024-04-25

## 2024-05-22 RX ORDER — EMTRICITABINE AND TENOFOVIR ALAFENAMIDE 200; 25 MG/1; MG/1
1 TABLET ORAL DAILY
Qty: 30 TABLET | Refills: 2 | Status: SHIPPED | OUTPATIENT
Start: 2024-05-22

## 2024-06-06 DIAGNOSIS — E11.9 TYPE 2 DIABETES MELLITUS WITHOUT COMPLICATION, WITHOUT LONG-TERM CURRENT USE OF INSULIN: ICD-10-CM

## 2024-06-13 DIAGNOSIS — E11.9 TYPE 2 DIABETES MELLITUS WITHOUT COMPLICATION, WITHOUT LONG-TERM CURRENT USE OF INSULIN: ICD-10-CM

## 2024-06-24 DIAGNOSIS — M54.12 RADICULOPATHY OF CERVICAL REGION: ICD-10-CM

## 2024-06-24 RX ORDER — GABAPENTIN 300 MG/1
600 CAPSULE ORAL 3 TIMES DAILY
Qty: 540 CAPSULE | Refills: 1 | Status: SHIPPED | OUTPATIENT
Start: 2024-06-24

## 2024-06-25 DIAGNOSIS — E78.5 HYPERLIPIDEMIA, UNSPECIFIED HYPERLIPIDEMIA TYPE: ICD-10-CM

## 2024-06-26 RX ORDER — ATORVASTATIN CALCIUM 80 MG/1
80 TABLET, FILM COATED ORAL DAILY
Qty: 30 TABLET | Refills: 0 | Status: SHIPPED | OUTPATIENT
Start: 2024-06-26

## 2024-07-01 ENCOUNTER — TELEPHONE (OUTPATIENT)
Dept: INFECTIOUS DISEASES | Facility: CLINIC | Age: 69
End: 2024-07-01
Payer: MEDICARE

## 2024-07-01 NOTE — TELEPHONE ENCOUNTER
I spoke w/ patient to remind him to have the labs that Dr. Kwon had placed that he could have done prior to his appt w/ him 7/15/24. Patient stated understanding and said he would have them done.

## 2024-07-09 DIAGNOSIS — I10 ESSENTIAL HYPERTENSION: ICD-10-CM

## 2024-07-11 ENCOUNTER — LAB (OUTPATIENT)
Dept: LAB | Facility: HOSPITAL | Age: 69
End: 2024-07-11
Payer: MEDICARE

## 2024-07-11 DIAGNOSIS — Z72.53 HIGH RISK BISEXUAL BEHAVIOR: ICD-10-CM

## 2024-07-11 DIAGNOSIS — Z79.2 LONG TERM (CURRENT) USE OF ANTIBIOTICS: ICD-10-CM

## 2024-07-11 LAB
CREAT SERPL-MCNC: 1.18 MG/DL (ref 0.76–1.27)
EGFRCR SERPLBLD CKD-EPI 2021: 66 ML/MIN/1.73
HCV AB SER QL: NORMAL
HIV 1+2 AB+HIV1 P24 AG SERPL QL IA: NORMAL
RPR SER QL: NORMAL

## 2024-07-11 PROCEDURE — 82565 ASSAY OF CREATININE: CPT

## 2024-07-11 PROCEDURE — 87491 CHLMYD TRACH DNA AMP PROBE: CPT

## 2024-07-11 PROCEDURE — 36415 COLL VENOUS BLD VENIPUNCTURE: CPT

## 2024-07-11 PROCEDURE — 86592 SYPHILIS TEST NON-TREP QUAL: CPT

## 2024-07-11 PROCEDURE — 87591 N.GONORRHOEAE DNA AMP PROB: CPT

## 2024-07-11 PROCEDURE — G0432 EIA HIV-1/HIV-2 SCREEN: HCPCS

## 2024-07-11 PROCEDURE — 86803 HEPATITIS C AB TEST: CPT

## 2024-07-12 LAB
C TRACH RRNA SPEC QL NAA+PROBE: NEGATIVE
N GONORRHOEA RRNA SPEC QL NAA+PROBE: NEGATIVE

## 2024-07-15 ENCOUNTER — OFFICE VISIT (OUTPATIENT)
Dept: INFECTIOUS DISEASES | Facility: CLINIC | Age: 69
End: 2024-07-15
Payer: MEDICARE

## 2024-07-15 VITALS
DIASTOLIC BLOOD PRESSURE: 74 MMHG | RESPIRATION RATE: 20 BRPM | BODY MASS INDEX: 32.28 KG/M2 | HEART RATE: 69 BPM | TEMPERATURE: 97.3 F | WEIGHT: 194 LBS | SYSTOLIC BLOOD PRESSURE: 147 MMHG

## 2024-07-15 DIAGNOSIS — Z72.53 HIGH RISK BISEXUAL BEHAVIOR: Primary | ICD-10-CM

## 2024-07-15 DIAGNOSIS — Z79.2 LONG TERM (CURRENT) USE OF ANTIBIOTICS: ICD-10-CM

## 2024-07-15 PROCEDURE — 3078F DIAST BP <80 MM HG: CPT | Performed by: INTERNAL MEDICINE

## 2024-07-15 PROCEDURE — 99214 OFFICE O/P EST MOD 30 MIN: CPT | Performed by: INTERNAL MEDICINE

## 2024-07-15 PROCEDURE — 3077F SYST BP >= 140 MM HG: CPT | Performed by: INTERNAL MEDICINE

## 2024-07-15 RX ORDER — EMTRICITABINE AND TENOFOVIR ALAFENAMIDE 200; 25 MG/1; MG/1
1 TABLET ORAL DAILY
Qty: 30 TABLET | Refills: 2 | Status: SHIPPED | OUTPATIENT
Start: 2024-07-15

## 2024-07-15 NOTE — PROGRESS NOTES
"cc: Here for evaluation preexposure prophylaxis    Per initial clinic note from February 2021: Patient reports he is in his usual state of health.  He is recently  from his wife and is looking to engage in sexual activity.  Says he has sex with men and women both.  He has a history of chlamydia but tested negative for HIV, chlamydia and hepatitis C back in August 2020.  He has not been sexually active for about 6 months due to COVID but thinks there is a reasonable certainty that he is going to start resuming high risk sexual activity here in the next month or 2.  He has not had any fevers or chills or night sweats or discharge.  He feels well.  His ex-wife is recommended that he start preexposure prophylaxis since they may run in the same circles.  She is tolerating it well.  He is acutely concerned because he is potentially having sex with with commercial sex workers\"     Since his last visit in April 2024, he says he is doing well.    Still in relationship with his partner, currently monogamous but interested in exploring options  He denies any side effects or missed doses of descovy  No c/f infection  Having some battles with depression but feeling better. no si/hi    Past medical history: Hypertension, peripheral vascular disease, diabetes mellitus type 2, hyperlipidemia, peptic ulcer disease, prostate cancer, chlamydia, cervical stenosis, stroke, anxiety/depression, CVA  No family history of infectious diseases  Social history: Retired.  He  from his wife.  Former smoker but quit 10 years ago.  No IV drug use.  No alcohol use.      Blood pressure 147/74, pulse 69, temperature 97.3 °F (36.3 °C), resp. rate 20, weight 88 kg (194 lb).  GENERAL: Awake and alert, in no acute distress.   HEENT: . Hearing is grossly normal.   LUNGS: normal respiratory effort.   SKIN: Warm and dry without cutaneous eruptions   PSYCHIATRIC: Appropriate mood, affect, insight, and judgment.     7/11/24 HIV, RPR, urine " GC and pending; hepatitis C antibody negative; creatinine 1.18      Assessment and Plan  High risk bisexual behavior  Vaccines: Consider hepatitis A and B vaccine  Long-term current use antibiotics      Continue preexposure prophylaxis.  We will refill descovy for 3 months.  Urine GC, RPR, hep C and serum creatinine all okay last week;   Repeat those same tests in 3 mo     We will see him back in clinic in 3 months or sooner if necessary

## 2024-07-16 ENCOUNTER — OFFICE VISIT (OUTPATIENT)
Dept: FAMILY MEDICINE CLINIC | Facility: CLINIC | Age: 69
End: 2024-07-16
Payer: MEDICARE

## 2024-07-16 VITALS
OXYGEN SATURATION: 96 % | BODY MASS INDEX: 32.42 KG/M2 | HEART RATE: 77 BPM | WEIGHT: 194.6 LBS | HEIGHT: 65 IN | SYSTOLIC BLOOD PRESSURE: 136 MMHG | DIASTOLIC BLOOD PRESSURE: 72 MMHG | TEMPERATURE: 98.2 F

## 2024-07-16 DIAGNOSIS — G89.29 CHRONIC RIGHT SHOULDER PAIN: ICD-10-CM

## 2024-07-16 DIAGNOSIS — M48.061 SPINAL STENOSIS, LUMBAR REGION, WITHOUT NEUROGENIC CLAUDICATION: ICD-10-CM

## 2024-07-16 DIAGNOSIS — M25.511 CHRONIC RIGHT SHOULDER PAIN: ICD-10-CM

## 2024-07-16 DIAGNOSIS — Z00.00 MEDICARE ANNUAL WELLNESS VISIT, SUBSEQUENT: Primary | ICD-10-CM

## 2024-07-16 DIAGNOSIS — J01.00 ACUTE NON-RECURRENT MAXILLARY SINUSITIS: ICD-10-CM

## 2024-07-16 DIAGNOSIS — E11.9 TYPE 2 DIABETES MELLITUS WITHOUT COMPLICATION, WITHOUT LONG-TERM CURRENT USE OF INSULIN: ICD-10-CM

## 2024-07-16 DIAGNOSIS — I10 ESSENTIAL HYPERTENSION: ICD-10-CM

## 2024-07-16 DIAGNOSIS — E78.5 HYPERLIPIDEMIA, UNSPECIFIED HYPERLIPIDEMIA TYPE: ICD-10-CM

## 2024-07-16 DIAGNOSIS — J30.2 SEASONAL ALLERGIES: ICD-10-CM

## 2024-07-16 DIAGNOSIS — E55.9 VITAMIN D DEFICIENCY: ICD-10-CM

## 2024-07-16 PROCEDURE — 3075F SYST BP GE 130 - 139MM HG: CPT | Performed by: NURSE PRACTITIONER

## 2024-07-16 PROCEDURE — 99214 OFFICE O/P EST MOD 30 MIN: CPT | Performed by: NURSE PRACTITIONER

## 2024-07-16 PROCEDURE — 1160F RVW MEDS BY RX/DR IN RCRD: CPT | Performed by: NURSE PRACTITIONER

## 2024-07-16 PROCEDURE — 1159F MED LIST DOCD IN RCRD: CPT | Performed by: NURSE PRACTITIONER

## 2024-07-16 PROCEDURE — G0439 PPPS, SUBSEQ VISIT: HCPCS | Performed by: NURSE PRACTITIONER

## 2024-07-16 PROCEDURE — 3044F HG A1C LEVEL LT 7.0%: CPT | Performed by: NURSE PRACTITIONER

## 2024-07-16 PROCEDURE — 3078F DIAST BP <80 MM HG: CPT | Performed by: NURSE PRACTITIONER

## 2024-07-16 PROCEDURE — 1125F AMNT PAIN NOTED PAIN PRSNT: CPT | Performed by: NURSE PRACTITIONER

## 2024-07-16 RX ORDER — ERGOCALCIFEROL 1.25 MG/1
50000 CAPSULE ORAL WEEKLY
Qty: 12 CAPSULE | Refills: 0 | Status: SHIPPED | OUTPATIENT
Start: 2024-07-16

## 2024-07-16 RX ORDER — AZITHROMYCIN 250 MG/1
TABLET, FILM COATED ORAL
Qty: 6 TABLET | Refills: 0 | Status: SHIPPED | OUTPATIENT
Start: 2024-07-16

## 2024-07-16 NOTE — PROGRESS NOTES
Subjective   The ABCs of the Annual Wellness Visit  Medicare Wellness Visit      Kevin Garsia is a 69 y.o. patient who presents for a Medicare Wellness Visit.    The following portions of the patient's history were reviewed and   updated as appropriate: allergies, current medications, past family history, past medical history, past social history, past surgical history, and problem list.    Compared to one year ago, the patient's physical   health is the same.  Compared to one year ago, the patient's mental   health is the same.    Recent Hospitalizations:  He was not admitted to the hospital during the last year.     Current Medical Providers:  Patient Care Team:  Priti Oliveira APRN as PCP - General (Nurse Practitioner)  Wellington Chaudhari MD as Consulting Physician (Urology)  Jimmy Kwon MD as Consulting Physician (Infectious Diseases)    Outpatient Medications Prior to Visit   Medication Sig Dispense Refill    aspirin 81 MG chewable tablet       atorvastatin (LIPITOR) 80 MG tablet TAKE 1 TABLET BY MOUTH DAILY 30 tablet 0    CBD (cannabidiol) oral oil Take  by mouth 2 (Two) Times a Day. Half a dropper twice daily      Emtricitabine-Tenofovir AF (Descovy) 200-25 MG per tablet Take 1 tablet by mouth Daily. 30 tablet 2    gabapentin (NEURONTIN) 300 MG capsule TAKE 2 CAPSULES BY MOUTH THREE TIMES DAILY 540 capsule 1    hydrOXYzine (ATARAX) 10 MG tablet TAKE 1 TO 2 TABLETS BY MOUTH EVERY NIGHT AT BEDTIME 60 tablet 1    losartan (COZAAR) 100 MG tablet Take 1 tablet by mouth Daily. 90 tablet 1    metFORMIN (GLUCOPHAGE) 1000 MG tablet Take 1 tablet by mouth 2 (Two) Times a Day With Meals. 30 tablet 0    metoprolol tartrate (LOPRESSOR) 25 MG tablet TAKE 1 TABLET BY MOUTH DAILY 90 tablet 0    vitamin D (ERGOCALCIFEROL) 1.25 MG (16008 UT) capsule capsule TAKE 1 CAPSULE BY MOUTH 1 TIME EVERY WEEK 12 capsule 0    amLODIPine (NORVASC) 10 MG tablet Take 1 tablet by mouth Daily. (Patient not taking:  Reported on 7/16/2024) 90 tablet 1    Blood Glucose Monitoring Suppl (ACCU-CHEK DIONTE PLUS) w/Device kit 1 kit 4 (Four) Times a Day. (Patient not taking: Reported on 7/16/2024) 1 kit 1     No facility-administered medications prior to visit.     No opioid medication identified on active medication list. I have reviewed chart for other potential  high risk medication/s and harmful drug interactions in the elderly.      Aspirin is on active medication list. Aspirin use is indicated based on review of current medical condition/s. Pros and cons of this therapy have been discussed today. Benefits of this medication outweigh potential harm.  Patient has been encouraged to continue taking this medication.  .      Patient Active Problem List   Diagnosis    Essential hypertension    Hyperlipidemia    History of prostate cancer    Non morbid obesity due to excess calories    Vitamin D deficiency    TIA (transient ischemic attack)    S/P carotid endarterectomy    Type 2 diabetes mellitus without complication, without long-term current use of insulin    Chronic heart failure with preserved ejection fraction    Arthritis    Sleep apnea    Hospital discharge follow-up    Cervical spinal stenosis    Stroke-like symptoms    Spinal stenosis, lumbar region, without neurogenic claudication    Protruded lumbar disc    Chronic right-sided low back pain without sciatica    Neck pain    Cervical spondylosis without myelopathy    Bilateral carpal tunnel syndrome    Generalized joint pain    HIV infection    Medicare annual wellness visit, subsequent    Other fatigue    Chronic left shoulder pain    Change in mole    Chronic right shoulder pain    Seasonal allergies    Acute non-recurrent maxillary sinusitis     Advance Care Planning (Click this link to access ACP Navigator)  Advance Directive is not on file.  ACP discussion was held with the patient during this visit. Patient has an advance directive (not in EMR), copy requested.   "      Objective   Vitals:    24 1308   BP: 136/72   BP Location: Left arm   Patient Position: Sitting   Cuff Size: Large Adult   Pulse: 77   Temp: 98.2 °F (36.8 °C)   SpO2: 96%   Weight: 88.3 kg (194 lb 9.6 oz)   Height: 165.1 cm (65\")       Estimated body mass index is 32.38 kg/m² as calculated from the following:    Height as of this encounter: 165.1 cm (65\").    Weight as of this encounter: 88.3 kg (194 lb 9.6 oz).    BMI is >= 30 and <35. (Class 1 Obesity). The following options were offered after discussion;: weight loss educational material (shared in after visit summary) and exercise counseling/recommendations        Does the patient have evidence of cognitive impairment? No  Lab Results   Component Value Date    CHLPL 99 (L) 2024    TRIG 232 (H) 2024    HDL 25 (L) 2024    LDL 37 2024    VLDL 37 2024    HGBA1C 6.7 (H) 2024                                                                                                Health  Risk Assessment    Smoking Status:  Social History     Tobacco Use   Smoking Status Former    Current packs/day: 3.00    Average packs/day: 3.0 packs/day for 30.0 years (90.0 ttl pk-yrs)    Types: Cigarettes   Smokeless Tobacco Never     Alcohol Consumption:  Social History     Substance and Sexual Activity   Alcohol Use No     Fall Risk Screen:  STEADI Fall Risk Assessment was completed, and patient is at LOW risk for falls.Assessment completed on:2024    Depression Screenin/16/2024     1:16 PM   PHQ-2/PHQ-9 Depression Screening   Little Interest or Pleasure in Doing Things 0-->not at all   Feeling Down, Depressed or Hopeless 2-->more than half the days   Trouble Falling or Staying Asleep, or Sleeping Too Much 0-->not at all   Feeling Tired or Having Little Energy 3-->nearly every day   Poor Appetite or Overeating 0-->not at all   Feeling Bad about Yourself - or that You are a Failure or Have Let Yourself or Your Family Down 0-->not " at all   Trouble Concentrating on Things, Such as Reading the Newspaper or Watching Television 0-->not at all   Moving or Speaking So Slowly that Other People Could Have Noticed? Or the Opposite - Being So Fidgety 0-->not at all   Thoughts that You Would be Better Off Dead or of Hurting Yourself in Some Way 0-->not at all   PHQ-9: Brief Depression Severity Measure Score 5   If You Checked Off Any Problems, How Difficult Have These Problems Made It For You to Do Your Work, Take Care of Things at Home, or Get Along with Other People? somewhat difficult     Health Habits and Functional and Cognitive Screenin/16/2024     1:18 PM   Functional & Cognitive Status   Do you have difficulty preparing food and eating? No   Do you have difficulty bathing yourself, getting dressed or grooming yourself? No   Do you have difficulty using the toilet? No   Do you have difficulty moving around from place to place? No   Do you have trouble with steps or getting out of a bed or a chair? No   Current Diet Limited Junk Food   Dental Exam Not up to date   Eye Exam Not up to date   Exercise (times per week) 3 times per week   Current Exercises Include Treadmill;Stationary Bicycling/Spin Class   Do you need help using the phone?  No   Are you deaf or do you have serious difficulty hearing?  No   Do you need help to go to places out of walking distance? Yes   Do you need help shopping? No   Do you need help preparing meals?  No   Do you need help with housework?  No   Do you need help with laundry? No   Do you need help taking your medications? No   Do you need help managing money? No   Do you ever drive or ride in a car without wearing a seat belt? No   Have you felt unusual stress, anger or loneliness in the last month? Yes   Who do you live with? Other   If you need help, do you have trouble finding someone available to you? No   Have you been bothered in the last four weeks by sexual problems? Yes   Do you have difficulty  concentrating, remembering or making decisions? No             Age-appropriate Screening Schedule:  Refer to the list below for future screening recommendations based on patient's age, sex and/or medical conditions. Orders for these recommended tests are listed in the plan section. The patient has been provided with a written plan.    Health Maintenance List  Health Maintenance   Topic Date Due    COLORECTAL CANCER SCREENING  Never done    TDAP/TD VACCINES (1 - Tdap) Never done    ZOSTER VACCINE (1 of 2) Never done    Hepatitis B (1 of 3 - Risk 3-dose series) Never done    AAA SCREEN (ONE-TIME)  Never done    Pneumococcal Vaccine 65+ (2 of 2 - PCV) 08/17/2021    DIABETIC FOOT EXAM  08/17/2021    DIABETIC EYE EXAM  08/20/2023    COVID-19 Vaccine (5 - 2023-24 season) 11/17/2023    BMI FOLLOWUP  05/16/2024    INFLUENZA VACCINE  08/01/2024    HEMOGLOBIN A1C  01/16/2025    ANNUAL WELLNESS VISIT  07/16/2025    LIPID PANEL  07/16/2025    URINE MICROALBUMIN  07/16/2025    HEPATITIS C SCREENING  Completed                                                                                                                                                CMS Preventative Services Quick Reference  Risk Factors Identified During Encounter  Immunizations Discussed/Encouraged: Influenza, Prevnar 20 (Pneumococcal 20-valent conjugate), COVID19, and RSV (Respiratory Syncytial Virus)  Dental Screening Recommended  Vision Screening Recommended    The above risks/problems have been discussed with the patient.  Pertinent information has been shared with the patient in the After Visit Summary.  An After Visit Summary and PPPS were made available to the patient.    Follow Up:   Next Medicare Wellness visit to be scheduled in 1 year.         Additional E&M Note during same encounter follows:  Patient has additional, significant, and separately identifiable condition(s)/problem(s) that require work above and beyond the Medicare Wellness Visit  "    Chief Complaint  Medicare Wellness-subsequent (Not fasting/Discuss amlodipine/fatigue)    Subjective   Patient presents to the office today with acute sinusitis. He is having sinus pressure/with minimal sputum. He denies fever or chills.  Blood pressure is 136/72.  With lumbar pain having more pain riding in car.   Chronic right shoulder pain. He is not able to raise arm over head.                 Kevin is also being seen today for additional medical problem/s.    Review of Systems   Constitutional:  Negative for activity change.   HENT:  Positive for congestion and postnasal drip.    Eyes:  Negative for discharge.   Gastrointestinal:  Negative for abdominal pain.   Endocrine: Negative for cold intolerance and heat intolerance.   Genitourinary:  Negative for dysuria and frequency.   Musculoskeletal:  Positive for arthralgias and back pain.   Skin:  Negative for rash.   Allergic/Immunologic: Negative for environmental allergies.   Psychiatric/Behavioral:  The patient is not nervous/anxious.               Objective   Vital Signs:  /72 (BP Location: Left arm, Patient Position: Sitting, Cuff Size: Large Adult)   Pulse 77   Temp 98.2 °F (36.8 °C)   Ht 165.1 cm (65\")   Wt 88.3 kg (194 lb 9.6 oz)   SpO2 96%   BMI 32.38 kg/m²   Physical Exam  Constitutional:       Appearance: Normal appearance.   HENT:      Head: Normocephalic.      Right Ear: Tympanic membrane, ear canal and external ear normal. No decreased hearing noted. No drainage or tenderness. No foreign body. Tympanic membrane is not perforated or erythematous.      Left Ear: Tympanic membrane and external ear normal. No decreased hearing noted. No drainage or tenderness. No foreign body. Tympanic membrane is not perforated or erythematous.      Nose: Nasal tenderness, mucosal edema, congestion and rhinorrhea present.      Right Turbinates: Not enlarged.      Left Turbinates: Not enlarged.      Right Sinus: Maxillary sinus tenderness and frontal " sinus tenderness present.      Left Sinus: Maxillary sinus tenderness and frontal sinus tenderness present.      Mouth/Throat:      Mouth: Mucous membranes are moist.      Pharynx: Oropharyngeal exudate and posterior oropharyngeal erythema present.   Eyes:      General:         Right eye: No discharge.         Left eye: No discharge.      Conjunctiva/sclera: Conjunctivae normal.   Cardiovascular:      Rate and Rhythm: Normal rate and regular rhythm.      Pulses: Normal pulses.      Heart sounds: Normal heart sounds. No murmur heard.  Pulmonary:      Effort: Pulmonary effort is normal. No respiratory distress.      Breath sounds: Normal breath sounds. No wheezing or rhonchi.   Chest:      Chest wall: No tenderness.   Abdominal:      General: Abdomen is flat.      Palpations: Abdomen is soft. There is no mass.      Tenderness: There is no abdominal tenderness.      Hernia: No hernia is present.   Musculoskeletal:         General: Tenderness present.      Right shoulder: Tenderness present. Decreased range of motion.      Cervical back: Normal. No rigidity, spasms or tenderness. Normal range of motion.      Thoracic back: Normal. No spasms or tenderness. Normal range of motion.      Lumbar back: Spasms and tenderness present. No edema or bony tenderness. Decreased range of motion. No scoliosis.   Skin:     General: Skin is warm and dry.      Coloration: Skin is not pale.      Findings: No erythema.   Neurological:      Mental Status: He is alert.         The following data was reviewed by: BOBBY Portillo on 07/16/2024:        Assessment and Plan Additional age appropriate preventative wellness advice topics were discussed during today's preventative wellness exam(some topics already addressed during AWV portion of the note above):                 Vitamin D deficiency    Medicare annual wellness visit, subsequent  Counseling was provided on nutrition, physical activity, development, and injury prevention,  dental health, and safe sex practices patient verbalizes understanding no additional questions were asked.    Essential hypertension  Hypertension is stable and controlled  Continue current treatment regimen.  Blood pressure will be reassessed in 6 months.  Hyperlipidemia, unspecified hyperlipidemia type   Lipid abnormalities are stable    Plan:  Continue same medication/s without change.      Discussed medication dosage, use, side effects, and goals of treatment in detail.    Counseled patient on lifestyle modifications to help control hyperlipidemia.     Patient Treatment Goals:   LDL goal is less than 70    Followup in 6 months.  Type 2 diabetes mellitus without complication, without long-term current use of insulin  Diabetes is stable.   Continue current treatment regimen.  Diabetes will be reassessed in 6 months  Seasonal allergies    Spinal stenosis, lumbar region, without neurogenic claudication  Taking gabapentin  Chronic right shoulder pain    Acute non-recurrent maxillary sinusitis    Advised patient to rest and to increase fluid intake.  Tylenol or Motrin for fever, pain or discomfort as directed.  Discussed medication instructions and side effects with patient.  Follow-up if symptoms persist or worsen.    Orders Placed This Encounter   Procedures    XR Spine Lumbar 4+ View     Standing Status:   Future     Standing Expiration Date:   7/16/2025     Order Specific Question:   Reason for Exam:     Answer:   chronic lumbar pain     Order Specific Question:   Release to patient     Answer:   Routine Release [[id number]]    XR shoulder 2+ vw right     Standing Status:   Future     Standing Expiration Date:   7/16/2025     Order Specific Question:   Reason for Exam:     Answer:   right shoulder pain     Order Specific Question:   Release to patient     Answer:   Routine Release [[id number]]    Comprehensive Metabolic Panel     Order Specific Question:   Release to patient     Answer:   Routine Release  [[id number]]     Order Specific Question:   [facility] Has the patient fasted?     Answer:   No    Lipid Panel     Order Specific Question:   Release to patient     Answer:   Routine Release [[id number]]     Order Specific Question:   [facility] Has the patient fasted?     Answer:   No    Hemoglobin A1c     Order Specific Question:   Release to patient     Answer:   Routine Release [[id number]]     Order Specific Question:   [facility] Has the patient fasted?     Answer:   No    MicroAlbumin, Urine, Random - Urine, Clean Catch     Order Specific Question:   Release to patient     Answer:   Routine Release [[id number]]     Order Specific Question:   [facility] Has the patient fasted?     Answer:   No    CBC & Differential     Order Specific Question:   Manual Differential     Answer:   No     Order Specific Question:   Release to patient     Answer:   Routine Release [[id number]]     Order Specific Question:   [facility] Has the patient fasted?     Answer:   No     New Medications Ordered This Visit   Medications    azithromycin (Zithromax Z-Luis) 250 MG tablet     Sig: Take 2 tablets by mouth on day 1, then 1 tablet daily on days 2-5     Dispense:  6 tablet     Refill:  0    vitamin D (ERGOCALCIFEROL) 1.25 MG (42929 UT) capsule capsule     Sig: Take 1 capsule by mouth 1 (One) Time Per Week.     Dispense:  12 capsule     Refill:  0        I spent 30 minutes caring for Kevin on this date of service. This time includes time spent by me in the following activities:preparing for the visit, reviewing tests, obtaining and/or reviewing a separately obtained history, performing a medically appropriate examination and/or evaluation , counseling and educating the patient/family/caregiver, ordering medications, tests, or procedures, documenting information in the medical record, independently interpreting results and communicating that information with the patient/family/caregiver, and care coordination  Follow Up   Return in about 3 months  (around 10/18/2024) for Recheck.  Patient was given instructions and counseling regarding his condition or for health maintenance advice. Please see specific information pulled into the AVS if appropriate.

## 2024-07-17 LAB
ALBUMIN SERPL-MCNC: 4.7 G/DL (ref 3.8–4.8)
ALP SERPL-CCNC: 135 IU/L (ref 44–121)
ALT SERPL-CCNC: 16 IU/L (ref 0–44)
AST SERPL-CCNC: 17 IU/L (ref 0–40)
BASOPHILS # BLD AUTO: 0.1 X10E3/UL (ref 0–0.2)
BASOPHILS NFR BLD AUTO: 1 %
BILIRUB SERPL-MCNC: 0.6 MG/DL (ref 0–1.2)
BUN SERPL-MCNC: 12 MG/DL (ref 8–27)
BUN/CREAT SERPL: 12 (ref 10–24)
CALCIUM SERPL-MCNC: 10.3 MG/DL (ref 8.6–10.2)
CHLORIDE SERPL-SCNC: 101 MMOL/L (ref 96–106)
CHOLEST SERPL-MCNC: 99 MG/DL (ref 100–199)
CO2 SERPL-SCNC: 27 MMOL/L (ref 20–29)
CREAT SERPL-MCNC: 1.02 MG/DL (ref 0.76–1.27)
EGFRCR SERPLBLD CKD-EPI 2021: 79 ML/MIN/1.73
EOSINOPHIL # BLD AUTO: 0.3 X10E3/UL (ref 0–0.4)
EOSINOPHIL NFR BLD AUTO: 3 %
ERYTHROCYTE [DISTWIDTH] IN BLOOD BY AUTOMATED COUNT: 15.6 % (ref 11.6–15.4)
GLOBULIN SER CALC-MCNC: 2.7 G/DL (ref 1.5–4.5)
GLUCOSE SERPL-MCNC: 98 MG/DL (ref 70–99)
HBA1C MFR BLD: 6.7 % (ref 4.8–5.6)
HCT VFR BLD AUTO: 40 % (ref 37.5–51)
HDLC SERPL-MCNC: 25 MG/DL
HGB BLD-MCNC: 12.3 G/DL (ref 13–17.7)
IMM GRANULOCYTES # BLD AUTO: 0 X10E3/UL (ref 0–0.1)
IMM GRANULOCYTES NFR BLD AUTO: 0 %
LDLC SERPL CALC-MCNC: 37 MG/DL (ref 0–99)
LYMPHOCYTES # BLD AUTO: 1.8 X10E3/UL (ref 0.7–3.1)
LYMPHOCYTES NFR BLD AUTO: 21 %
MCH RBC QN AUTO: 26.8 PG (ref 26.6–33)
MCHC RBC AUTO-ENTMCNC: 30.8 G/DL (ref 31.5–35.7)
MCV RBC AUTO: 87 FL (ref 79–97)
MICROALBUMIN UR-MCNC: 99.1 UG/ML
MONOCYTES # BLD AUTO: 0.7 X10E3/UL (ref 0.1–0.9)
MONOCYTES NFR BLD AUTO: 8 %
NEUTROPHILS # BLD AUTO: 5.9 X10E3/UL (ref 1.4–7)
NEUTROPHILS NFR BLD AUTO: 67 %
PLATELET # BLD AUTO: 350 X10E3/UL (ref 150–450)
POTASSIUM SERPL-SCNC: 4.7 MMOL/L (ref 3.5–5.2)
PROT SERPL-MCNC: 7.4 G/DL (ref 6–8.5)
RBC # BLD AUTO: 4.59 X10E6/UL (ref 4.14–5.8)
SODIUM SERPL-SCNC: 141 MMOL/L (ref 134–144)
TRIGL SERPL-MCNC: 232 MG/DL (ref 0–149)
VLDLC SERPL CALC-MCNC: 37 MG/DL (ref 5–40)
WBC # BLD AUTO: 8.9 X10E3/UL (ref 3.4–10.8)

## 2024-07-21 PROBLEM — J01.00 ACUTE NON-RECURRENT MAXILLARY SINUSITIS: Status: ACTIVE | Noted: 2024-07-21

## 2024-07-23 DIAGNOSIS — E11.9 TYPE 2 DIABETES MELLITUS WITHOUT COMPLICATION, WITHOUT LONG-TERM CURRENT USE OF INSULIN: ICD-10-CM

## 2024-07-24 DIAGNOSIS — E83.52 HYPERCALCEMIA: Primary | ICD-10-CM

## 2024-07-25 ENCOUNTER — PREP FOR SURGERY (OUTPATIENT)
Dept: OTHER | Facility: HOSPITAL | Age: 69
End: 2024-07-25
Payer: MEDICARE

## 2024-07-25 DIAGNOSIS — Z12.11 SCREENING FOR COLON CANCER: Primary | ICD-10-CM

## 2024-08-01 DIAGNOSIS — E78.5 HYPERLIPIDEMIA, UNSPECIFIED HYPERLIPIDEMIA TYPE: ICD-10-CM

## 2024-08-01 RX ORDER — ATORVASTATIN CALCIUM 80 MG/1
80 TABLET, FILM COATED ORAL DAILY
Qty: 30 TABLET | Refills: 3 | Status: SHIPPED | OUTPATIENT
Start: 2024-08-01

## 2024-08-08 ENCOUNTER — HOSPITAL ENCOUNTER (OUTPATIENT)
Dept: GENERAL RADIOLOGY | Facility: HOSPITAL | Age: 69
Discharge: HOME | End: 2024-08-08
Payer: MEDICARE

## 2024-08-08 ENCOUNTER — APPOINTMENT (OUTPATIENT)
Facility: HOSPITAL | Age: 69
End: 2024-08-08
Payer: MEDICARE

## 2024-08-08 DIAGNOSIS — G89.29 CHRONIC RIGHT SHOULDER PAIN: ICD-10-CM

## 2024-08-08 DIAGNOSIS — M25.511 CHRONIC RIGHT SHOULDER PAIN: ICD-10-CM

## 2024-08-08 DIAGNOSIS — M48.061 SPINAL STENOSIS, LUMBAR REGION, WITHOUT NEUROGENIC CLAUDICATION: ICD-10-CM

## 2024-08-08 PROCEDURE — 73110 X-RAY EXAM OF WRIST: CPT

## 2024-08-08 PROCEDURE — 72110 X-RAY EXAM L-2 SPINE 4/>VWS: CPT

## 2024-08-08 PROCEDURE — 73030 X-RAY EXAM OF SHOULDER: CPT

## 2024-08-14 DIAGNOSIS — M25.532 LEFT WRIST PAIN: Primary | ICD-10-CM

## 2024-08-14 DIAGNOSIS — M54.50 LUMBAR PAIN: ICD-10-CM

## 2024-08-14 DIAGNOSIS — M54.2 CERVICAL PAIN (NECK): ICD-10-CM

## 2024-08-21 PROBLEM — Z12.11 SCREENING FOR COLON CANCER: Status: ACTIVE | Noted: 2024-07-25

## 2024-09-03 ENCOUNTER — OFFICE VISIT (OUTPATIENT)
Dept: NEUROLOGY | Facility: CLINIC | Age: 69
End: 2024-09-03
Payer: MEDICARE

## 2024-09-03 VITALS
BODY MASS INDEX: 32.32 KG/M2 | SYSTOLIC BLOOD PRESSURE: 118 MMHG | WEIGHT: 194 LBS | OXYGEN SATURATION: 98 % | DIASTOLIC BLOOD PRESSURE: 64 MMHG | HEIGHT: 65 IN | HEART RATE: 65 BPM

## 2024-09-03 DIAGNOSIS — M54.50 CHRONIC LOW BACK PAIN, UNSPECIFIED BACK PAIN LATERALITY, UNSPECIFIED WHETHER SCIATICA PRESENT: ICD-10-CM

## 2024-09-03 DIAGNOSIS — G89.29 CHRONIC LOW BACK PAIN, UNSPECIFIED BACK PAIN LATERALITY, UNSPECIFIED WHETHER SCIATICA PRESENT: ICD-10-CM

## 2024-09-03 DIAGNOSIS — M54.12 RADICULOPATHY OF CERVICAL REGION: Primary | ICD-10-CM

## 2024-09-03 PROCEDURE — 3078F DIAST BP <80 MM HG: CPT | Performed by: STUDENT IN AN ORGANIZED HEALTH CARE EDUCATION/TRAINING PROGRAM

## 2024-09-03 PROCEDURE — 3074F SYST BP LT 130 MM HG: CPT | Performed by: STUDENT IN AN ORGANIZED HEALTH CARE EDUCATION/TRAINING PROGRAM

## 2024-09-03 PROCEDURE — 1159F MED LIST DOCD IN RCRD: CPT | Performed by: STUDENT IN AN ORGANIZED HEALTH CARE EDUCATION/TRAINING PROGRAM

## 2024-09-03 PROCEDURE — 99214 OFFICE O/P EST MOD 30 MIN: CPT | Performed by: STUDENT IN AN ORGANIZED HEALTH CARE EDUCATION/TRAINING PROGRAM

## 2024-09-03 PROCEDURE — 1160F RVW MEDS BY RX/DR IN RCRD: CPT | Performed by: STUDENT IN AN ORGANIZED HEALTH CARE EDUCATION/TRAINING PROGRAM

## 2024-09-03 RX ORDER — DULOXETIN HYDROCHLORIDE 60 MG/1
60 CAPSULE, DELAYED RELEASE ORAL DAILY
Qty: 90 CAPSULE | Refills: 1 | Status: SHIPPED | OUTPATIENT
Start: 2024-10-03 | End: 2025-04-01

## 2024-09-03 RX ORDER — DULOXETIN HYDROCHLORIDE 30 MG/1
30 CAPSULE, DELAYED RELEASE ORAL DAILY
Qty: 30 CAPSULE | Refills: 0 | Status: SHIPPED | OUTPATIENT
Start: 2024-09-03

## 2024-09-03 NOTE — PROGRESS NOTES
Chief Complaint   Patient presents with    Radiculopathy of cervical region       Patient ID: Kevin Garsia is a 69 y.o. male.    HPI:    The following portions of the patient's history were reviewed and updated as appropriate: allergies, current medications, past family history, past medical history, past social history, past surgical history and problem list.    Interval history:    Review of Systems   Neurological:  Positive for weakness and numbness (getting worse). Negative for dizziness, tremors, seizures, syncope, facial asymmetry, speech difficulty, light-headedness and headaches.   Psychiatric/Behavioral:  Positive for decreased concentration (worse). Negative for confusion and sleep disturbance. The patient is nervous/anxious.      History of Present Illness  The patient presents to the neurology clinic for follow-up of lower back pain and bilateral carpal tunnel syndrome.    He reports that lifting a gallon of milk exacerbated his carpal tunnel symptoms. He sustained a wrist injury while lifting the jug, which led to the discovery of a cyst in his left wrist and since his last visit he has had slight progression of his carpal tunnel syndrome which was already severe. He has been referred to a hand surgeon and has an upcoming appointment. Initially, he was hesitant about surgery due to his involvement in activities that could worsen the condition, but he is now more open to the idea.    His lower back pain has intensified. An x-ray has been conducted, and an MRI of his lumbar region is scheduled. He also plans to have his neck x-rayed, and an MRI appointment will be arranged. His last imaging was a few years ago.  He believes it's time to revisit pain management. Gabapentin has been effective in managing his lumbar pain in the past.     He also experiences shoulder discomfort. An x-ray revealed no injury but showed signs of arthritis. He has not started physical therapy yet as he wants a comprehensive  understanding of his condition. He has started experiencing issues with his left shoulder, which he attributes to poor muscle development and arthritis. He acknowledges the need for regular shoulder exercises. He has not scheduled an appointment with pain management yet.    He is struggling with the aging process and depression. He is aware of unresolved past issues that may be impacting his mental state more than he realizes. He has previously undergone intensive outpatient treatment at [facility]. He found cognitive behavioral therapy (CBT) and art therapy beneficial. He had suicidal tendencies during his early recovery period, but the thought of the impact on his children deterred him from acting on these impulses. He keeps a reminder of a friend who committed suicide to prevent him from considering such actions. He spent over 5 years in intensive outpatient treatment, which was a positive experience. He has trust issues and some anger issues. He values his current relationship with his partner and wants to handle it carefully.         SOCIAL HISTORY  He is retired.      There were no vitals filed for this visit.    Neurologic Exam     Mental Status   Level of consciousness: alert  Focus of visit was on medication adjustment, history taking, and counseling.     Motor Exam Antigravity in all extremities       Physical Exam    Procedures    Assessment/Plan:    Assessment & Plan  1. Lower back pain.  His lower back pain has worsened. A referral to pain management was made. He will continue taking gabapentin and we will start duloxetine 60mg, 30mg daily for first month.  He was advised to monitor his blood pressure regularly, as duloextine can increase blood pressure.He was advised to monitor for side effects such as changes in mood, blood pressure, and heart rate. He was also informed that duloxetine might cause erectile dysfunction and to report any issues if they are not tolerable.    2. Bilateral carpal  tunnel syndrome.  His carpal tunnel symptoms have worsened, especially after lifting a gallon of milk. He has been referred to a hand surgeon and has an appointment scheduled. The possibility of a carpal tunnel procedure was discussed to help stop or slow down the progression of symptoms.    Follow-up  The patient will follow up in 6 months.   I spent 33 minutes caring for this patient on this date of service. This time includes time spent by me in the following activities as necessary: preparing for the visit, reviewing tests, medical records and previous visits, obtaining and/or reviewing a separately obtained history, performing a medically appropriate exam and/or evaluation, counseling and educating the patient, and/or communicating with other healthcare professionals, documenting information in the medical record, independently interpreting results and communicating that information with the patient, and developing a medically appropriate treatment plan with consideration of other conditions, medications, and treatments.    Patient or patient representative verbalized consent for the use of Ambient Listening during the visit with  Jd Hay MD for chart documentation. 9/3/2024  13:10 EDT     There are no diagnoses linked to this encounter.       Yessi Reyes MA

## 2024-09-07 ENCOUNTER — HOSPITAL ENCOUNTER (OUTPATIENT)
Facility: HOSPITAL | Age: 69
Discharge: HOME | End: 2024-09-07
Payer: MEDICARE

## 2024-09-07 DIAGNOSIS — M54.50 LUMBAR PAIN: ICD-10-CM

## 2024-09-07 PROCEDURE — 72148 MRI LUMBAR SPINE W/O DYE: CPT

## 2024-09-13 ENCOUNTER — HOSPITAL ENCOUNTER (OUTPATIENT)
Dept: GENERAL RADIOLOGY | Facility: HOSPITAL | Age: 69
Discharge: HOME | End: 2024-09-13
Payer: MEDICARE

## 2024-09-13 DIAGNOSIS — M54.2 CERVICAL PAIN (NECK): ICD-10-CM

## 2024-09-13 PROCEDURE — 72040 X-RAY EXAM NECK SPINE 2-3 VW: CPT

## 2024-09-17 ENCOUNTER — OFFICE VISIT (OUTPATIENT)
Dept: FAMILY MEDICINE CLINIC | Facility: CLINIC | Age: 69
End: 2024-09-17
Payer: MEDICARE

## 2024-09-17 VITALS
TEMPERATURE: 98.4 F | HEIGHT: 65 IN | HEART RATE: 78 BPM | DIASTOLIC BLOOD PRESSURE: 70 MMHG | OXYGEN SATURATION: 95 % | SYSTOLIC BLOOD PRESSURE: 138 MMHG | BODY MASS INDEX: 31.59 KG/M2 | WEIGHT: 189.6 LBS

## 2024-09-17 DIAGNOSIS — I10 ESSENTIAL HYPERTENSION: ICD-10-CM

## 2024-09-17 DIAGNOSIS — M48.02 CERVICAL SPINAL STENOSIS: ICD-10-CM

## 2024-09-17 DIAGNOSIS — R22.0 LOCALIZED SWELLING, MASS, AND LUMP OF HEAD: Primary | ICD-10-CM

## 2024-09-17 DIAGNOSIS — M48.061 SPINAL STENOSIS, LUMBAR REGION, WITHOUT NEUROGENIC CLAUDICATION: ICD-10-CM

## 2024-09-17 PROCEDURE — 1125F AMNT PAIN NOTED PAIN PRSNT: CPT | Performed by: NURSE PRACTITIONER

## 2024-09-17 PROCEDURE — 3075F SYST BP GE 130 - 139MM HG: CPT | Performed by: NURSE PRACTITIONER

## 2024-09-17 PROCEDURE — 3078F DIAST BP <80 MM HG: CPT | Performed by: NURSE PRACTITIONER

## 2024-09-17 PROCEDURE — 3044F HG A1C LEVEL LT 7.0%: CPT | Performed by: NURSE PRACTITIONER

## 2024-09-17 PROCEDURE — 1159F MED LIST DOCD IN RCRD: CPT | Performed by: NURSE PRACTITIONER

## 2024-09-17 PROCEDURE — 99214 OFFICE O/P EST MOD 30 MIN: CPT | Performed by: NURSE PRACTITIONER

## 2024-09-17 PROCEDURE — G2211 COMPLEX E/M VISIT ADD ON: HCPCS | Performed by: NURSE PRACTITIONER

## 2024-09-17 PROCEDURE — 1160F RVW MEDS BY RX/DR IN RCRD: CPT | Performed by: NURSE PRACTITIONER

## 2024-09-17 RX ORDER — TADALAFIL 5 MG/1
5 TABLET ORAL DAILY PRN
COMMUNITY

## 2024-09-18 LAB
BASOPHILS # BLD AUTO: 0.07 10*3/MM3 (ref 0–0.2)
BASOPHILS NFR BLD AUTO: 1 % (ref 0–1.5)
EOSINOPHIL # BLD AUTO: 0.3 10*3/MM3 (ref 0–0.4)
EOSINOPHIL NFR BLD AUTO: 4.3 % (ref 0.3–6.2)
ERYTHROCYTE [DISTWIDTH] IN BLOOD BY AUTOMATED COUNT: 14.7 % (ref 12.3–15.4)
HCT VFR BLD AUTO: 37.4 % (ref 37.5–51)
HGB BLD-MCNC: 11.6 G/DL (ref 13–17.7)
IMM GRANULOCYTES # BLD AUTO: 0.03 10*3/MM3 (ref 0–0.05)
IMM GRANULOCYTES NFR BLD AUTO: 0.4 % (ref 0–0.5)
LYMPHOCYTES # BLD AUTO: 1.7 10*3/MM3 (ref 0.7–3.1)
LYMPHOCYTES NFR BLD AUTO: 24.2 % (ref 19.6–45.3)
MCH RBC QN AUTO: 26.9 PG (ref 26.6–33)
MCHC RBC AUTO-ENTMCNC: 31 G/DL (ref 31.5–35.7)
MCV RBC AUTO: 86.8 FL (ref 79–97)
MONOCYTES # BLD AUTO: 0.46 10*3/MM3 (ref 0.1–0.9)
MONOCYTES NFR BLD AUTO: 6.6 % (ref 5–12)
NEUTROPHILS # BLD AUTO: 4.46 10*3/MM3 (ref 1.7–7)
NEUTROPHILS NFR BLD AUTO: 63.5 % (ref 42.7–76)
NRBC BLD AUTO-RTO: 0 /100 WBC (ref 0–0.2)
PLATELET # BLD AUTO: 335 10*3/MM3 (ref 140–450)
RBC # BLD AUTO: 4.31 10*6/MM3 (ref 4.14–5.8)
WBC # BLD AUTO: 7.02 10*3/MM3 (ref 3.4–10.8)

## 2024-09-23 ENCOUNTER — OFFICE VISIT (OUTPATIENT)
Dept: SLEEP MEDICINE | Facility: HOSPITAL | Age: 69
End: 2024-09-23
Payer: MEDICARE

## 2024-09-23 VITALS
HEIGHT: 65 IN | SYSTOLIC BLOOD PRESSURE: 136 MMHG | WEIGHT: 188 LBS | DIASTOLIC BLOOD PRESSURE: 80 MMHG | OXYGEN SATURATION: 94 % | BODY MASS INDEX: 31.32 KG/M2 | HEART RATE: 67 BPM

## 2024-09-23 DIAGNOSIS — G47.33 OBSTRUCTIVE SLEEP APNEA, ADULT: Primary | ICD-10-CM

## 2024-09-23 PROCEDURE — G0463 HOSPITAL OUTPT CLINIC VISIT: HCPCS

## 2024-10-03 ENCOUNTER — HOSPITAL ENCOUNTER (OUTPATIENT)
Dept: ULTRASOUND IMAGING | Facility: HOSPITAL | Age: 69
Discharge: HOME | End: 2024-10-03
Payer: MEDICARE

## 2024-10-03 ENCOUNTER — HOSPITAL ENCOUNTER (OUTPATIENT)
Dept: MRI IMAGING | Facility: HOSPITAL | Age: 69
Discharge: HOME | End: 2024-10-03
Payer: MEDICARE

## 2024-10-03 DIAGNOSIS — R22.0 LOCALIZED SWELLING, MASS, AND LUMP OF HEAD: ICD-10-CM

## 2024-10-03 DIAGNOSIS — M48.02 CERVICAL SPINAL STENOSIS: ICD-10-CM

## 2024-10-03 PROCEDURE — 76536 US EXAM OF HEAD AND NECK: CPT

## 2024-10-03 PROCEDURE — 72141 MRI NECK SPINE W/O DYE: CPT

## 2024-10-08 RX ORDER — HYDROXYZINE HYDROCHLORIDE 10 MG/1
TABLET, FILM COATED ORAL
Qty: 60 TABLET | Refills: 1 | Status: SHIPPED | OUTPATIENT
Start: 2024-10-08

## 2024-10-09 ENCOUNTER — TELEPHONE (OUTPATIENT)
Dept: FAMILY MEDICINE CLINIC | Facility: CLINIC | Age: 69
End: 2024-10-09
Payer: MEDICARE

## 2024-10-09 DIAGNOSIS — I10 ESSENTIAL HYPERTENSION: ICD-10-CM

## 2024-10-09 RX ORDER — LOSARTAN POTASSIUM 100 MG/1
100 TABLET ORAL DAILY
Qty: 90 TABLET | Refills: 1 | Status: SHIPPED | OUTPATIENT
Start: 2024-10-09

## 2024-10-16 ENCOUNTER — OFFICE VISIT (OUTPATIENT)
Dept: PAIN MEDICINE | Facility: CLINIC | Age: 69
End: 2024-10-16
Payer: MEDICARE

## 2024-10-16 VITALS
HEART RATE: 70 BPM | HEIGHT: 65 IN | SYSTOLIC BLOOD PRESSURE: 149 MMHG | BODY MASS INDEX: 30.82 KG/M2 | TEMPERATURE: 98.6 F | WEIGHT: 185 LBS | RESPIRATION RATE: 12 BRPM | DIASTOLIC BLOOD PRESSURE: 72 MMHG | OXYGEN SATURATION: 97 %

## 2024-10-16 DIAGNOSIS — M48.02 CERVICAL SPINAL STENOSIS: Primary | ICD-10-CM

## 2024-10-16 DIAGNOSIS — M48.061 SPINAL STENOSIS, LUMBAR REGION, WITHOUT NEUROGENIC CLAUDICATION: ICD-10-CM

## 2024-10-16 PROCEDURE — 1125F AMNT PAIN NOTED PAIN PRSNT: CPT | Performed by: NURSE PRACTITIONER

## 2024-10-16 PROCEDURE — 3077F SYST BP >= 140 MM HG: CPT | Performed by: NURSE PRACTITIONER

## 2024-10-16 PROCEDURE — 1159F MED LIST DOCD IN RCRD: CPT | Performed by: NURSE PRACTITIONER

## 2024-10-16 PROCEDURE — 1160F RVW MEDS BY RX/DR IN RCRD: CPT | Performed by: NURSE PRACTITIONER

## 2024-10-16 PROCEDURE — 3078F DIAST BP <80 MM HG: CPT | Performed by: NURSE PRACTITIONER

## 2024-10-16 PROCEDURE — 99214 OFFICE O/P EST MOD 30 MIN: CPT | Performed by: NURSE PRACTITIONER

## 2024-10-16 NOTE — PROGRESS NOTES
CHIEF COMPLAINT  Lumbar and cervical spine pain    Subjective   Kevin Garsia is a 69 y.o. male.   He presents to the office for evaluation of neck and back pain. He was referred here by Dr. Jd Hay ([facility]).    Patient complains of chronic pain of both the neck and low back.  Reports he has had pain for the past 30 years.  No history of spine surgery.  No interventional pain management.  He has tried physical therapy for this problem without benefit.      He rates his pain today as a 1-2/10 VAS, currently manageable.  Primary pain complaint is low back pain > neck pain.   The pain is in the right lower lumbosacral area but can spread across the low back.  He complains of numbness/tingling of the right anterior/lateral thigh.  Occasional pain down the posterior legs bilaterally (left > right).  The pain is aggravated by prolonged sitting, prolonged standing, walking.  He is currently taking Gabapentin 600 tid + Cymbalta 60 mg once daily both prescribed by Neurology.  He has noticed a significant improvement of pain levels since starting the Cymbalta.  Lumbar support helps as well.      Seeing hand specialist for bilateral carpal tunnel.  Recent injections have been beneficial.      He is not on a blood thinner    Diabetes - yes - last A1c 6.7     History of Present Illness     PEG Assessment   What number best describes your pain on average in the past week?8  What number best describes how, during the past week, pain has interfered with your enjoyment of life?9  What number best describes how, during the past week, pain has interfered with your general activity?  9        Current Outpatient Medications:     aspirin 81 MG chewable tablet, , Disp: , Rfl:     atorvastatin (LIPITOR) 80 MG tablet, Take 1 tablet by mouth Daily., Disp: 30 tablet, Rfl: 3    CBD (cannabidiol) oral oil, Take  by mouth 2 (Two) Times a Day. Half a dropper twice daily, Disp: , Rfl:     Cyanocobalamin (VITAMIN B 12 PO),  Take  by mouth., Disp: , Rfl:     DULoxetine (Cymbalta) 30 MG capsule, Take 1 capsule by mouth Daily. (Patient taking differently: Take 1 capsule by mouth Daily. Take daily for 30 and then begin 60 mg dose per neurologist), Disp: 30 capsule, Rfl: 0    DULoxetine (Cymbalta) 60 MG capsule, Take 1 capsule by mouth Daily for 180 days., Disp: 90 capsule, Rfl: 1    Emtricitabine-Tenofovir AF (Descovy) 200-25 MG per tablet, Take 1 tablet by mouth Daily., Disp: 30 tablet, Rfl: 2    gabapentin (NEURONTIN) 300 MG capsule, TAKE 2 CAPSULES BY MOUTH THREE TIMES DAILY, Disp: 540 capsule, Rfl: 1    hydrOXYzine (ATARAX) 10 MG tablet, TAKE 1 TO 2 TABLETS BY MOUTH EVERY NIGHT AT BEDTIME, Disp: 60 tablet, Rfl: 1    losartan (COZAAR) 100 MG tablet, Take 1 tablet by mouth Daily., Disp: 90 tablet, Rfl: 1    metFORMIN (GLUCOPHAGE) 1000 MG tablet, Take 1 tablet by mouth 2 (Two) Times a Day With Meals., Disp: 90 tablet, Rfl: 2    metoprolol tartrate (LOPRESSOR) 25 MG tablet, TAKE 1 TABLET BY MOUTH DAILY, Disp: 90 tablet, Rfl: 0    tadalafil (CIALIS) 5 MG tablet, Take 1 tablet by mouth Daily As Needed for Erectile Dysfunction., Disp: , Rfl:     tamsulosin (FLOMAX) 0.4 MG capsule 24 hr capsule, Take 1 capsule by mouth every night at bedtime., Disp: , Rfl:     vitamin D (ERGOCALCIFEROL) 1.25 MG (21050 UT) capsule capsule, Take 1 capsule by mouth 1 (One) Time Per Week., Disp: 12 capsule, Rfl: 0    The following portions of the patient's history were reviewed and updated as appropriate: allergies, current medications, past family history, past medical history, past social history, past surgical history, and problem list.      REVIEW OF PERTINENT MEDICAL DATA            Review of Systems   Constitutional:  Negative for activity change (more), fatigue and fever.   Respiratory:  Negative for cough and chest tightness.    Cardiovascular:  Negative for chest pain.   Gastrointestinal:  Positive for constipation and diarrhea. Negative for abdominal  "pain.   Genitourinary:  Negative for difficulty urinating and dysuria.   Musculoskeletal:  Positive for back pain and neck pain.   Neurological:  Positive for weakness, numbness (hands,right thigh) and headaches. Negative for dizziness and light-headedness.   Psychiatric/Behavioral:  Positive for agitation. Negative for sleep disturbance and suicidal ideas. The patient is nervous/anxious.        I have reviewed and confirmed the accuracy of the ROS as documented by the MA/LPN/RN BOBBY Reagan      Vitals:    10/16/24 0924   BP: 149/72   BP Location: Right arm   Patient Position: Sitting   Cuff Size: Adult   Pulse: 70   Resp: 12   Temp: 98.6 °F (37 °C)   TempSrc: Oral   SpO2: 97%   Weight: 83.9 kg (185 lb)   Height: 165.1 cm (65\")   PainSc: 10-Worst pain ever         Objective       Physical Exam  Vitals and nursing note reviewed.   Constitutional:       General: He is not in acute distress.     Appearance: Normal appearance. He is not ill-appearing.   Pulmonary:      Effort: Pulmonary effort is normal. No respiratory distress.   Musculoskeletal:      Cervical back: Tenderness and bony tenderness present. Pain with movement present.      Lumbar back: Tenderness and bony tenderness present. Positive right straight leg raise test and positive left straight leg raise test.   Neurological:      Mental Status: He is alert and oriented to person, place, and time.      Motor: Weakness (4+/5 bilateral plantar flexion) present.      Gait: Gait abnormal (slowed).   Psychiatric:         Mood and Affect: Mood normal.         Behavior: Behavior normal.       Assessment & Plan   Diagnoses and all orders for this visit:    1. Cervical spinal stenosis (Primary)    2. Spinal stenosis, lumbar region, without neurogenic claudication        --- Consider LESI L4/L5 versus bilateral LTFESI.  Patient has appointment with neurosurgery next week. He prefers to wait until after this evaluation before scheduling a procedure.  We did " discuss the procedure in detail today.    --- Medications are currently managed by neurology. We can assist with this in the future if preferred. He seems to be stable with current regimen at this time.     --- Kevin Garsia reports a pain score of 10.  Given his pain assessment as noted, treatment options were discussed and the following options were decided upon as a follow-up plan to address the patient's pain: continuation of current treatment plan for pain.    --- Follow-up PRN         Dictated utilizing Dragon dictation.     I spent 34 minutes caring for Kevin on this date of service. This time includes time spent by me in the following activities: preparing for the visit, reviewing tests, performing a medically appropriate examination and/or evaluation, counseling and educating the patient/family/caregiver, and documenting information in the medical record

## 2024-10-17 RX ORDER — PNV NO.95/FERROUS FUM/FOLIC AC 28MG-0.8MG
65 TABLET ORAL
COMMUNITY

## 2024-10-18 ENCOUNTER — ANESTHESIA (OUTPATIENT)
Dept: GASTROENTEROLOGY | Facility: HOSPITAL | Age: 69
End: 2024-10-18
Payer: MEDICARE

## 2024-10-18 ENCOUNTER — ANESTHESIA EVENT (OUTPATIENT)
Dept: GASTROENTEROLOGY | Facility: HOSPITAL | Age: 69
End: 2024-10-18
Payer: MEDICARE

## 2024-10-18 ENCOUNTER — HOSPITAL ENCOUNTER (OUTPATIENT)
Facility: HOSPITAL | Age: 69
Setting detail: HOSPITAL OUTPATIENT SURGERY
Discharge: HOME | End: 2024-10-18
Attending: SURGERY | Admitting: SURGERY
Payer: MEDICARE

## 2024-10-18 VITALS
DIASTOLIC BLOOD PRESSURE: 93 MMHG | SYSTOLIC BLOOD PRESSURE: 162 MMHG | RESPIRATION RATE: 16 BRPM | HEART RATE: 71 BPM | OXYGEN SATURATION: 100 % | HEIGHT: 65 IN | WEIGHT: 184.3 LBS | BODY MASS INDEX: 30.71 KG/M2

## 2024-10-18 DIAGNOSIS — C20 RECTAL CANCER: Primary | ICD-10-CM

## 2024-10-18 DIAGNOSIS — Z12.11 SCREENING FOR COLON CANCER: ICD-10-CM

## 2024-10-18 LAB — GLUCOSE BLDC GLUCOMTR-MCNC: 86 MG/DL (ref 70–130)

## 2024-10-18 PROCEDURE — 82948 REAGENT STRIP/BLOOD GLUCOSE: CPT

## 2024-10-18 PROCEDURE — S0260 H&P FOR SURGERY: HCPCS | Performed by: SURGERY

## 2024-10-18 PROCEDURE — 25010000002 LIDOCAINE 2% SOLUTION: Performed by: NURSE ANESTHETIST, CERTIFIED REGISTERED

## 2024-10-18 PROCEDURE — 25010000002 PROPOFOL 1000 MG/100ML EMULSION: Performed by: NURSE ANESTHETIST, CERTIFIED REGISTERED

## 2024-10-18 PROCEDURE — 45380 COLONOSCOPY AND BIOPSY: CPT | Performed by: SURGERY

## 2024-10-18 PROCEDURE — 88305 TISSUE EXAM BY PATHOLOGIST: CPT | Performed by: SURGERY

## 2024-10-18 PROCEDURE — 25810000003 SODIUM CHLORIDE 0.9 % SOLUTION: Performed by: SURGERY

## 2024-10-18 RX ORDER — SODIUM CHLORIDE 9 MG/ML
50 INJECTION, SOLUTION INTRAVENOUS CONTINUOUS
Status: DISCONTINUED | OUTPATIENT
Start: 2024-10-18 | End: 2024-10-18 | Stop reason: HOSPADM

## 2024-10-18 RX ORDER — PROPOFOL 10 MG/ML
INJECTION, EMULSION INTRAVENOUS AS NEEDED
Status: DISCONTINUED | OUTPATIENT
Start: 2024-10-18 | End: 2024-10-18 | Stop reason: SURG

## 2024-10-18 RX ORDER — LIDOCAINE HYDROCHLORIDE 20 MG/ML
INJECTION, SOLUTION INFILTRATION; PERINEURAL AS NEEDED
Status: DISCONTINUED | OUTPATIENT
Start: 2024-10-18 | End: 2024-10-18 | Stop reason: SURG

## 2024-10-18 RX ADMIN — PROPOFOL INJECTABLE EMULSION 150 MG: 10 INJECTION, EMULSION INTRAVENOUS at 08:41

## 2024-10-18 RX ADMIN — SODIUM CHLORIDE 50 ML/HR: 9 INJECTION, SOLUTION INTRAVENOUS at 08:14

## 2024-10-18 RX ADMIN — LIDOCAINE HYDROCHLORIDE 100 MG: 20 INJECTION, SOLUTION INFILTRATION; PERINEURAL at 08:41

## 2024-10-18 RX ADMIN — PROPOFOL INJECTABLE EMULSION 160 MCG/KG/MIN: 10 INJECTION, EMULSION INTRAVENOUS at 08:42

## 2024-10-18 NOTE — OP NOTE
Surgeon:     Marcus Christine Jr., M.D.    Preoperative Diagnosis:     1.  Need for screening colonoscopy    Postoperative Diagnosis:     1.  Rectal cancer  2.  Diverticulosis    Procedure Performed:     1.  Colonoscopy with biopsy of rectal mass    Indications:     The patient is a pleasant 69-year-old male who presents for screening colonoscopy.  The patient understands the indications, alternatives, risks, and benefits of the procedure and wishes to proceed.    Procedure:     The patient was identified, taken to the endoscopy suite, and place in the left side down decubitus position.  The patient underwent a MAC anesthesia and was appropriately monitored through the case by the anesthesia personnel.  A rectal exam was performed that was normal on initial exam.  The colonoscope was introduced into the rectum and advanced very carefully to the cecum that was identified by the cecal strap, ileocecal valve, and the appendiceal orifice.  The scope was then slowly withdrawn with careful circumferential examination of the mucosa performed.  The bowel prep was good allowing adequate visualization of mucosal surfaces.  The scope was withdrawn.  A rectal exam was repeated and the inferior margin of the mass was palpable on aggressive finger insertion.  The patient tolerated the procedure well and was transferred the recovery area in stable condition.     Findings:    There was a circumferential mass involving the rectum that extended from about 10 cm to about 12 cm.  Multiple biopsies were taken.    There is diverticulosis involving the sigmoid colon.    Recommendations:     1.  CT scan of the abdomen and pelvis for evaluation of the rectal mass.  2.  Treatment for rectal cancer will need to be initiated based on staging.    Marcus Christine Jr., M.D.

## 2024-10-18 NOTE — DISCHARGE INSTRUCTIONS
For the next 24 hours patient needs to be with a responsible adult.    For 24 hours DO NOT drive, operate machinery, appliances, drink alcohol, make important decisions or sign legal documents.    Start with a light or bland diet if you are feeling sick to your stomach otherwise advance to regular diet as tolerated.    Follow recommendations on procedure report if provided by your doctor.    Call Dr Christine for problems [phone]    Problems may include but not limited to: large amounts of bleeding, trouble breathing, repeated vomiting, severe unrelieved pain, fever or chills.

## 2024-10-18 NOTE — ANESTHESIA POSTPROCEDURE EVALUATION
Patient: Kevin Garsia    Procedure Summary       Date: 10/18/24 Room / Location:  [facility]    Anesthesia Start: 0838 Anesthesia Stop: 0912    Procedure: COLONOSCOPY TO CECUM/TI WITH BIOPSIES Diagnosis:       Screening for colon cancer      (Screening for colon cancer [Z12.11])    Surgeons: Marcus Christine Jr., MD Provider: Adriana Nix MD    Anesthesia Type: MAC ASA Status: 3            Anesthesia Type: MAC    Vitals  Vitals Value Taken Time   /93 10/18/24 0931   Temp     Pulse 70 10/18/24 0937   Resp 16 10/18/24 0930   SpO2 94 % 10/18/24 0937   Vitals shown include unfiled device data.        Post Anesthesia Care and Evaluation    Patient location during evaluation: bedside  Patient participation: complete - patient participated  Level of consciousness: awake and alert  Pain management: adequate    Airway patency: patent  Anesthetic complications: No anesthetic complications  PONV Status: controlled  Cardiovascular status: acceptable  Respiratory status: acceptable  Hydration status: acceptable

## 2024-10-18 NOTE — ANESTHESIA PREPROCEDURE EVALUATION
Anesthesia Evaluation     Patient summary reviewed and Nursing notes reviewed   no history of anesthetic complications:   NPO Solid Status: > 8 hours  NPO Liquid Status: > 2 hours           Airway   Mallampati: II  TM distance: >3 FB  Neck ROM: full  No difficulty expected  Dental    (+) poor dentition        Pulmonary - normal exam   (+) a smoker Former,sleep apnea  Cardiovascular - normal exam    ECG reviewed  Patient on routine beta blocker    (+) hypertension, CHF (cHFpEF) , PVD, hyperlipidemia,  carotid artery disease      Neuro/Psych  (+) TIA, numbness  GI/Hepatic/Renal/Endo    (+) obesity, PUD, diabetes mellitus (6.7) type 2    Musculoskeletal     (+) neck pain  Abdominal    Substance History      OB/GYN          Other   arthritis,   history of cancer    ROS/Med Hx Other: HIV    ECHO 3/22    · Left ventricular ejection fraction appears to be 56 - 60%. Left ventricular systolic function is normal.  · Left ventricular diastolic function was normal.  · There is a false tendon consistent with a normal variant located in the left ventricular chamber.  · Normal right ventricular cavity size and systolic function noted.  · Normal left atrial cavity size noted  · Saline test results are negative.  · Normal right atrial cavity size noted. A prominent Chiari network is present.  · Calculated right ventricular systolic pressure from tricuspid regurgitation is 16 mmHg.  · There is a trivial pericardial effusion.                     Anesthesia Plan    ASA 3     MAC     (I have reviewed the patient's history and chart with the patient, including all pertinent laboratory results and imaging. I have explained the risks of anesthesia including but not limited to dental damage, corneal abrasion, nerve injury, MI, stroke, aspiration, and death. Patient has agreed to proceed.      There were no vitals taken for this visit.  )  intravenous induction     Anesthetic plan, risks, benefits, and alternatives have been provided,  discussed and informed consent has been obtained with: patient.    CODE STATUS:

## 2024-10-18 NOTE — H&P
[facility]   HISTORY AND PHYSICAL    Patient Name: Kevin Garsia  : 1955  MRN: [id number]  Primary Care Physician:  Priti Oliveira APRN  Date of admission: 10/18/2024    Subjective   Subjective     Chief Complaint: Need for screening colonoscopy    History of Present Illness    The patient is a pleasant 69-year-old male who is in need of a screening colonoscopy.  He has never had a previous colonoscopy.  He has no significant GI symptoms.  He has a family history of colon cancer in his grandfather.    Review of Systems   Constitutional:  Negative for fatigue and fever.   Respiratory:  Negative for chest tightness and shortness of breath.    Cardiovascular:  Negative for chest pain and palpitations.   Gastrointestinal:  Negative for abdominal pain, blood in stool, constipation, diarrhea, nausea and vomiting.        Personal History     Past Medical History:   Diagnosis Date    Aphasia     Cancer     prostate    Diabetes mellitus     History of radiation therapy     Hyperlipidemia     Hypertension     Multiple gastric ulcers     Sleep apnea     cpap    Stroke     TIA (transient ischemic attack)        Past Surgical History:   Procedure Laterality Date    ANGIOPLASTY CAROTID ARTERY         Family History: family history includes Bone cancer in his father and mother; COPD in his father; Colon cancer in his paternal grandfather; Diabetes in his father; Hypertension in his father; Lung cancer in his father; No Known Problems in his brother, maternal grandfather, maternal grandmother, paternal grandmother, and sister; Stroke in his father. Otherwise pertinent FHx was reviewed and not pertinent to current issue.    Social History:  reports that he has quit smoking. His smoking use included cigarettes. He has a 90 pack-year smoking history. He has never used smokeless tobacco. He reports that he does not drink alcohol and does not use drugs.    Home Medications:  CBD, Cyanocobalamin, DULoxetine,  Emtricitabine-Tenofovir AF, aspirin, atorvastatin, ferrous sulfate, gabapentin, hydrOXYzine, losartan, metFORMIN, metoprolol tartrate, tadalafil, tamsulosin, and vitamin D    Allergies:  No Known Allergies    Objective    Objective     Vitals:   Heart Rate:  [67] 67  Resp:  [18] 18  BP: (176)/(83) 176/83    Physical Exam  Constitutional:       Appearance: Normal appearance. He is well-developed. He is not toxic-appearing.   Eyes:      General: No scleral icterus.  Pulmonary:      Effort: Pulmonary effort is normal. No respiratory distress.   Abdominal:      Palpations: Abdomen is soft.      Tenderness: There is no abdominal tenderness.   Skin:     General: Skin is warm and dry.   Neurological:      Mental Status: He is alert and oriented to person, place, and time.   Psychiatric:         Behavior: Behavior normal.         Thought Content: Thought content normal.         Judgment: Judgment normal.           Assessment & Plan   Assessment / Plan     Brief Patient Summary:  Kevin Garsia is a 69 y.o. male who is in need of a screening colonoscopy.    Active Hospital Problems:  Active Hospital Problems    Diagnosis     **Screening for colon cancer      Plan: Colonoscopy.  The patient understands the indications, alternatives, risks, and benefits of the procedure and wishes to proceed.       Marcus Christine Jr., MD

## 2024-10-21 ENCOUNTER — TELEPHONE (OUTPATIENT)
Dept: SURGERY | Facility: CLINIC | Age: 69
End: 2024-10-21
Payer: MEDICARE

## 2024-10-21 NOTE — TELEPHONE ENCOUNTER
The patient was called on telephone to discuss his pathology report from the colonoscopy.  He did not answer.  He will be called back at a later time.

## 2024-10-22 ENCOUNTER — HOSPITAL ENCOUNTER (OUTPATIENT)
Dept: CT IMAGING | Facility: HOSPITAL | Age: 69
Discharge: HOME | End: 2024-10-22
Admitting: SURGERY
Payer: MEDICARE

## 2024-10-22 ENCOUNTER — TELEPHONE (OUTPATIENT)
Dept: SURGERY | Facility: CLINIC | Age: 69
End: 2024-10-22
Payer: MEDICARE

## 2024-10-22 DIAGNOSIS — C20 RECTAL CANCER: ICD-10-CM

## 2024-10-22 LAB — CREAT BLDA-MCNC: 1.2 MG/DL (ref 0.6–1.3)

## 2024-10-22 PROCEDURE — 74177 CT ABD & PELVIS W/CONTRAST: CPT

## 2024-10-22 PROCEDURE — 25510000001 IOPAMIDOL 61 % SOLUTION: Performed by: SURGERY

## 2024-10-22 PROCEDURE — 0 DIATRIZOATE MEGLUMINE & SODIUM PER 1 ML: Performed by: SURGERY

## 2024-10-22 PROCEDURE — 82565 ASSAY OF CREATININE: CPT

## 2024-10-22 RX ORDER — IOPAMIDOL 612 MG/ML
100 INJECTION, SOLUTION INTRAVASCULAR
Status: COMPLETED | OUTPATIENT
Start: 2024-10-22 | End: 2024-10-22

## 2024-10-22 RX ORDER — DIATRIZOATE MEGLUMINE AND DIATRIZOATE SODIUM 660; 100 MG/ML; MG/ML
30 SOLUTION ORAL; RECTAL
Status: COMPLETED | OUTPATIENT
Start: 2024-10-22 | End: 2024-10-22

## 2024-10-22 RX ADMIN — DIATRIZOATE MEGLUMINE AND DIATRIZOATE SODIUM 30 ML: 660; 100 LIQUID ORAL; RECTAL at 09:08

## 2024-10-22 RX ADMIN — IOPAMIDOL 85 ML: 612 INJECTION, SOLUTION INTRAVENOUS at 09:09

## 2024-10-22 NOTE — TELEPHONE ENCOUNTER
The patient was called on the telephone to discuss the pathology results as well as the CT scan of the abdomen and pelvis.  He did not answer.  I will call him later.

## 2024-10-23 ENCOUNTER — TELEPHONE (OUTPATIENT)
Dept: SURGERY | Facility: CLINIC | Age: 69
End: 2024-10-23
Payer: MEDICARE

## 2024-10-23 DIAGNOSIS — C20 RECTAL ADENOCARCINOMA: Primary | ICD-10-CM

## 2024-10-23 DIAGNOSIS — I10 ESSENTIAL HYPERTENSION: ICD-10-CM

## 2024-10-23 RX ORDER — METOPROLOL TARTRATE 25 MG/1
25 TABLET, FILM COATED ORAL DAILY
Qty: 90 TABLET | Refills: 0 | Status: SHIPPED | OUTPATIENT
Start: 2024-10-23

## 2024-10-23 NOTE — TELEPHONE ENCOUNTER
The patient was called on the telephone and the pathology from the colonoscopy was discussed with him.  The CT scan of the abdomen and pelvis is still pending but I see no liver metastases.  He will need to see colorectal surgery due to the rectal location of the tumor.

## 2024-10-24 ENCOUNTER — EXTERNAL PBMM DATA (OUTPATIENT)
Dept: PHARMACY | Facility: OTHER | Age: 69
End: 2024-10-24
Payer: MEDICARE

## 2024-10-24 RX ORDER — EMTRICITABINE AND TENOFOVIR ALAFENAMIDE 200; 25 MG/1; MG/1
1 TABLET ORAL DAILY
Qty: 30 TABLET | Refills: 0 | Status: SHIPPED | OUTPATIENT
Start: 2024-10-24

## 2024-10-25 DIAGNOSIS — E55.9 VITAMIN D DEFICIENCY: ICD-10-CM

## 2024-10-25 DIAGNOSIS — E78.5 HYPERLIPIDEMIA, UNSPECIFIED HYPERLIPIDEMIA TYPE: ICD-10-CM

## 2024-10-25 DIAGNOSIS — E11.9 TYPE 2 DIABETES MELLITUS WITHOUT COMPLICATION, WITHOUT LONG-TERM CURRENT USE OF INSULIN: ICD-10-CM

## 2024-10-25 LAB
CYTO UR: NORMAL
LAB AP CASE REPORT: NORMAL
LAB AP DIAGNOSIS COMMENT: NORMAL
LAB AP INTRADEPARTMENTAL CONSULT: NORMAL
Lab: NORMAL
PATH REPORT.ADDENDUM SPEC: NORMAL
PATH REPORT.FINAL DX SPEC: NORMAL
PATH REPORT.GROSS SPEC: NORMAL

## 2024-10-25 RX ORDER — ATORVASTATIN CALCIUM 80 MG/1
80 TABLET, FILM COATED ORAL DAILY
Qty: 30 TABLET | Refills: 3 | Status: SHIPPED | OUTPATIENT
Start: 2024-10-25

## 2024-10-25 RX ORDER — ERGOCALCIFEROL 1.25 MG/1
50000 CAPSULE, LIQUID FILLED ORAL WEEKLY
Qty: 12 CAPSULE | Refills: 0 | Status: SHIPPED | OUTPATIENT
Start: 2024-10-25

## 2024-10-25 NOTE — TELEPHONE ENCOUNTER
Caller: [facility] #[id number] @ [facility] [address] [phone] [facility] [phone] FX    Relationship: Pharmacy    Best call back number: [phone]      Requested Prescriptions:   Requested Prescriptions     Pending Prescriptions Disp Refills    metFORMIN (GLUCOPHAGE) 1000 MG tablet 90 tablet 2     Sig: Take 1 tablet by mouth 2 (Two) Times a Day With Meals.    atorvastatin (LIPITOR) 80 MG tablet 30 tablet 3     Sig: Take 1 tablet by mouth Daily.    vitamin D (ERGOCALCIFEROL) 1.25 MG (90722 UT) capsule capsule 12 capsule 0     Sig: Take 1 capsule by mouth 1 (One) Time Per Week.        Pharmacy where request should be sent: [facility] #[id number] @ [facility] [address] [place] [address] [phone] [facility] [phone] FX     Last office visit with prescribing clinician: 9/17/2024   Last telemedicine visit with prescribing clinician: Visit date not found   Next office visit with prescribing clinician: 12/17/2024     Additional details provided by patient:     Does the patient have less than a 3 day supply:  [] Yes  [x] No    Would you like a call back once the refill request has been completed: [] Yes [x] No    If the office needs to give you a call back, can they leave a voicemail: [] Yes [x] No    Raji Almendarez Rep   10/25/24 11:45 EDT

## 2024-10-30 ENCOUNTER — LAB (OUTPATIENT)
Dept: LAB | Facility: HOSPITAL | Age: 69
End: 2024-10-30
Payer: MEDICARE

## 2024-10-30 DIAGNOSIS — Z72.53 HIGH RISK BISEXUAL BEHAVIOR: ICD-10-CM

## 2024-10-30 DIAGNOSIS — Z79.2 LONG TERM (CURRENT) USE OF ANTIBIOTICS: ICD-10-CM

## 2024-10-30 DIAGNOSIS — C20 RECTAL ADENOCARCINOMA: ICD-10-CM

## 2024-10-30 LAB
ALBUMIN SERPL-MCNC: 4.2 G/DL (ref 3.5–5.2)
ALBUMIN/GLOB SERPL: 1.4 G/DL
ALP SERPL-CCNC: 130 U/L (ref 39–117)
ALT SERPL W P-5'-P-CCNC: 10 U/L (ref 1–41)
ANION GAP SERPL CALCULATED.3IONS-SCNC: 9.3 MMOL/L (ref 5–15)
AST SERPL-CCNC: 13 U/L (ref 1–40)
BILIRUB SERPL-MCNC: 0.6 MG/DL (ref 0–1.2)
BUN SERPL-MCNC: 8 MG/DL (ref 8–23)
BUN/CREAT SERPL: 7.7 (ref 7–25)
CALCIUM SPEC-SCNC: 9.9 MG/DL (ref 8.6–10.5)
CEA SERPL-MCNC: 42.4 NG/ML
CHLORIDE SERPL-SCNC: 101 MMOL/L (ref 98–107)
CO2 SERPL-SCNC: 29.7 MMOL/L (ref 22–29)
CREAT SERPL-MCNC: 1.04 MG/DL (ref 0.76–1.27)
DEPRECATED RDW RBC AUTO: 48.8 FL (ref 37–54)
EGFRCR SERPLBLD CKD-EPI 2021: 76.8 ML/MIN/1.73
ERYTHROCYTE [DISTWIDTH] IN BLOOD BY AUTOMATED COUNT: 15.5 % (ref 12.3–15.4)
GLOBULIN UR ELPH-MCNC: 3.1 GM/DL
GLUCOSE SERPL-MCNC: 105 MG/DL (ref 65–99)
HCT VFR BLD AUTO: 39.2 % (ref 37.5–51)
HCV AB SER QL: NORMAL
HGB BLD-MCNC: 12 G/DL (ref 13–17.7)
HIV 1+2 AB+HIV1 P24 AG SERPL QL IA: NORMAL
MCH RBC QN AUTO: 26.2 PG (ref 26.6–33)
MCHC RBC AUTO-ENTMCNC: 30.6 G/DL (ref 31.5–35.7)
MCV RBC AUTO: 85.6 FL (ref 79–97)
PLATELET # BLD AUTO: 353 10*3/MM3 (ref 140–450)
PMV BLD AUTO: 9.3 FL (ref 6–12)
POTASSIUM SERPL-SCNC: 5.2 MMOL/L (ref 3.5–5.2)
PROT SERPL-MCNC: 7.3 G/DL (ref 6–8.5)
RBC # BLD AUTO: 4.58 10*6/MM3 (ref 4.14–5.8)
RPR SER QL: NORMAL
SODIUM SERPL-SCNC: 140 MMOL/L (ref 136–145)
WBC NRBC COR # BLD AUTO: 8.37 10*3/MM3 (ref 3.4–10.8)

## 2024-10-30 PROCEDURE — 85027 COMPLETE CBC AUTOMATED: CPT

## 2024-10-30 PROCEDURE — 36415 COLL VENOUS BLD VENIPUNCTURE: CPT

## 2024-10-30 PROCEDURE — 86592 SYPHILIS TEST NON-TREP QUAL: CPT

## 2024-10-30 PROCEDURE — G0432 EIA HIV-1/HIV-2 SCREEN: HCPCS

## 2024-10-30 PROCEDURE — 86803 HEPATITIS C AB TEST: CPT

## 2024-10-30 PROCEDURE — 80053 COMPREHEN METABOLIC PANEL: CPT

## 2024-10-30 PROCEDURE — 87591 N.GONORRHOEAE DNA AMP PROB: CPT

## 2024-10-30 PROCEDURE — 87491 CHLMYD TRACH DNA AMP PROBE: CPT

## 2024-10-30 PROCEDURE — 82378 CARCINOEMBRYONIC ANTIGEN: CPT

## 2024-11-01 ENCOUNTER — OFFICE VISIT (OUTPATIENT)
Dept: INFECTIOUS DISEASES | Facility: CLINIC | Age: 69
End: 2024-11-01
Payer: MEDICARE

## 2024-11-01 VITALS
HEART RATE: 91 BPM | BODY MASS INDEX: 30.55 KG/M2 | DIASTOLIC BLOOD PRESSURE: 76 MMHG | SYSTOLIC BLOOD PRESSURE: 151 MMHG | TEMPERATURE: 97.1 F | RESPIRATION RATE: 14 BRPM | WEIGHT: 183.6 LBS

## 2024-11-01 DIAGNOSIS — Z79.2 LONG TERM (CURRENT) USE OF ANTIBIOTICS: ICD-10-CM

## 2024-11-01 DIAGNOSIS — Z72.53 HIGH RISK BISEXUAL BEHAVIOR: Primary | ICD-10-CM

## 2024-11-01 LAB
C TRACH RRNA SPEC QL NAA+PROBE: NEGATIVE
N GONORRHOEA RRNA SPEC QL NAA+PROBE: NEGATIVE

## 2024-11-01 NOTE — PROGRESS NOTES
"cc: Here for evaluation preexposure prophylaxis    Per initial clinic note from February 2021: Patient reports he is in his usual state of health.  He is recently  from his wife and is looking to engage in sexual activity.  Says he has sex with men and women both.  He has a history of chlamydia but tested negative for HIV, chlamydia and hepatitis C back in August 2020.  He has not been sexually active for about 6 months due to COVID but thinks there is a reasonable certainty that he is going to start resuming high risk sexual activity here in the next month or 2.  He has not had any fevers or chills or night sweats or discharge.  He feels well.  His ex-wife is recommended that he start preexposure prophylaxis since they may run in the same circles.  She is tolerating it well.  He is acutely concerned because he is potentially having sex with with commercial sex workers\"     Last seen in July 2024 and was continued on descovy.  No side effects or missed doses.  Overall he is feeling pretty well.  He was started on Cymbalta for depression and neuropathic pain in his back pain is basically resolved.  He still does have some intermittent pain and pain management is planning epidural injections eventually.  Still in relationship with his partner, currently monogamous    He had some bowel changes and underwent colonoscopy which unfortunately showed colorectal cancer.  He is going to have to have surgery and is meeting with the surgeon later this month.    Past medical history: Hypertension, peripheral vascular disease, diabetes mellitus type 2, hyperlipidemia, peptic ulcer disease, prostate cancer, chlamydia, cervical stenosis, stroke, anxiety/depression, CVA  No family history of infectious diseases  Social history: Retired.  He  from his wife.  Former smoker but quit 10 years ago.  No IV drug use.  No alcohol use.      Blood pressure 151/76, pulse 91, temperature 97.1 °F (36.2 °C), resp. rate 14, " weight 83.3 kg (183 lb 9.6 oz).  GENERAL: Awake and alert, in no acute distress.   HEENT: . Hearing is grossly normal.   LUNGS: normal respiratory effort.   SKIN: Warm and dry without cutaneous eruptions   PSYCHIATRIC: Appropriate mood, affect, insight, and judgment.     10/24 HIV, RPR, urine GC and pending; hepatitis C antibody negative; creatinine 1.04    Assessment and Plan  High risk bisexual behavior  Vaccines: Consider hepatitis A and B vaccine  Long-term current use antibiotics      With his new diagnosis of colorectal cancer, he is not going to be very active socially.  We discussed temporarily holding the Descovy and he can resume once he recovers from the surgery and the cancer treatment.  He will let me know when he is ready to resume get him scheduled in clinic at that time.  Hopefully has surgery and any treatment that he needs to go well and he can make a quick recovery  Urine GC, RPR, hep C and serum creatinine all okay last week;

## 2024-11-04 ENCOUNTER — HOSPITAL ENCOUNTER (OUTPATIENT)
Dept: MRI IMAGING | Facility: HOSPITAL | Age: 69
Discharge: HOME | End: 2024-11-04
Admitting: SURGERY
Payer: MEDICARE

## 2024-11-04 DIAGNOSIS — C20 RECTAL ADENOCARCINOMA: ICD-10-CM

## 2024-11-04 PROCEDURE — 72197 MRI PELVIS W/O & W/DYE: CPT

## 2024-11-04 PROCEDURE — A9577 INJ MULTIHANCE: HCPCS | Performed by: SURGERY

## 2024-11-04 PROCEDURE — 0 GADOBENATE DIMEGLUMINE 529 MG/ML SOLUTION: Performed by: SURGERY

## 2024-11-04 PROCEDURE — 82565 ASSAY OF CREATININE: CPT

## 2024-11-04 RX ADMIN — GADOBENATE DIMEGLUMINE 20 ML: 529 INJECTION, SOLUTION INTRAVENOUS at 19:37

## 2024-11-07 LAB — CREAT BLDA-MCNC: 1 MG/DL (ref 0.6–1.3)

## 2024-11-08 ENCOUNTER — HOSPITAL ENCOUNTER (OUTPATIENT)
Facility: HOSPITAL | Age: 69
Discharge: HOME | End: 2024-11-08
Payer: MEDICARE

## 2024-11-08 DIAGNOSIS — C20 RECTAL ADENOCARCINOMA: ICD-10-CM

## 2024-11-08 PROCEDURE — 25510000001 IOPAMIDOL 61 % SOLUTION: Performed by: SURGERY

## 2024-11-08 PROCEDURE — 71260 CT THORAX DX C+: CPT

## 2024-11-08 RX ORDER — IOPAMIDOL 612 MG/ML
100 INJECTION, SOLUTION INTRAVASCULAR
Status: COMPLETED | OUTPATIENT
Start: 2024-11-08 | End: 2024-11-08

## 2024-11-08 RX ADMIN — IOPAMIDOL 75 ML: 612 INJECTION, SOLUTION INTRAVENOUS at 16:09

## 2024-11-13 ENCOUNTER — POP HEALTH PHARMACY (OUTPATIENT)
Dept: PHARMACY | Facility: OTHER | Age: 69
End: 2024-11-13
Payer: MEDICARE

## 2024-11-14 ENCOUNTER — OFFICE VISIT (OUTPATIENT)
Dept: SURGERY | Facility: CLINIC | Age: 69
End: 2024-11-14
Payer: MEDICARE

## 2024-11-14 VITALS
BODY MASS INDEX: 31.09 KG/M2 | WEIGHT: 186.6 LBS | OXYGEN SATURATION: 93 % | HEART RATE: 74 BPM | HEIGHT: 65 IN | DIASTOLIC BLOOD PRESSURE: 88 MMHG | SYSTOLIC BLOOD PRESSURE: 140 MMHG

## 2024-11-14 DIAGNOSIS — C20 RECTAL ADENOCARCINOMA: Primary | ICD-10-CM

## 2024-11-14 PROCEDURE — 1160F RVW MEDS BY RX/DR IN RCRD: CPT | Performed by: SURGERY

## 2024-11-14 PROCEDURE — 99215 OFFICE O/P EST HI 40 MIN: CPT | Performed by: SURGERY

## 2024-11-14 PROCEDURE — 3079F DIAST BP 80-89 MM HG: CPT | Performed by: SURGERY

## 2024-11-14 PROCEDURE — 3077F SYST BP >= 140 MM HG: CPT | Performed by: SURGERY

## 2024-11-14 PROCEDURE — 1159F MED LIST DOCD IN RCRD: CPT | Performed by: SURGERY

## 2024-11-15 ENCOUNTER — PREP FOR SURGERY (OUTPATIENT)
Age: 69
End: 2024-11-15
Payer: MEDICARE

## 2024-11-15 DIAGNOSIS — C20 RECTAL ADENOCARCINOMA: Primary | ICD-10-CM

## 2024-11-18 ENCOUNTER — ANESTHESIA EVENT (OUTPATIENT)
Age: 69
End: 2024-11-18
Payer: MEDICARE

## 2024-11-18 NOTE — PROGRESS NOTES
Colorectal & General Surgery  Consultation    Patient: Kevin Garsia  YOB: 1955  MRN: [id number]      Assessment  Kevin Garsia is a 69 y.o. male with stage III mid to upper well-differentiated rectal adenocarcinoma (cT3d cN2 cM0).     From a functional standpoint, there is no indication today for emergent surgical intervention as he does not demonstrate any signs of obstruction or bleeding.    I discussed with him multiple modalities with which we treat rectal cancer, including surgery, chemotherapy, and chemoradiation.  In his situation, he has a locally advanced mid upper rectal cancer with no evidence of metastatic disease.  He is an excellent candidate for neoadjuvant chemotherapy followed by total mesorectal excision and adjuvant chemotherapy (PROSPECT Trial).  We discussed the importance of treating him with chemotherapy first given the presence of lymph node disease.  Given the location of the tumor primarily within the upper rectum but extending down into the mid rectum, I do not think that radiation would be in his best interest, especially since the tumor appears to be resectable at this point.  I discussed the role of laparoscopic low anterior resection with total mesorectal excision and the creation of a temporary diverting loop ileostomy with him.  We discussed the risk, benefits, alternatives of surgery briefly as well as the risk of permanent ostomy formation, anastomotic leak, damage to surrounding structures, including the ureter.  After undergoing resection, I would anticipate that we could close his diverting loop ileostomy within 6 to 8 weeks after surgery as long as everything goes to plan.  After that operation, he could then undergo adjuvant chemotherapy.    I will refer him to our medical oncology team for discussions of neoadjuvant chemotherapy as well as planned for port placement next week.  We discussed the risk, benefits, alternatives to port placement, including  the risk of pneumothorax.    All of his and his partner's questions were answered today.     I have discussed his care with Dr. Marcus Christine as well as Dr. Asaf Baxter today.    Plan  Refer to medical oncology  Plan for port placement next week    Referring Provider: Marcus Christine MD    Reason for Consultation: Mid rectal adenocarcinoma    History of Present Illness   Kevin Garsia is a 69 y.o. male who presents in consultation regarding newly diagnosed mid rectal adenocarcinoma.    He initially presented to Dr. Christine for screening colonoscopy and was found to have a low rectal mass.  At the time, he denied any tenesmus, hematochezia, melena, change in bowel habits, unintentional weight loss, abdominal pain.  Dr. Christine referred him to me secondary to the low location of the tumor for colorectal expertise.    Today, he does not have any complaints.    Oncologic history is significant for prostate cancer that was treated with radiation.    No prior abdominal surgery.    Most recent colonoscopy: 2024    Past Medical History   Past Medical History:   Diagnosis Date    Aphasia     Cancer     prostate    CHF (congestive heart failure)     Diabetes mellitus     GERD (gastroesophageal reflux disease)     History of radiation therapy     History of UTI     Hyperlipidemia     Hypertension     Multiple gastric ulcers     Seasonal allergies     Sleep apnea     cpap    Stroke     TIA (transient ischemic attack)         Past Surgical History   Past Surgical History:   Procedure Laterality Date    ANGIOPLASTY CAROTID ARTERY      CAROTID ENDARTERECTOMY      COLONOSCOPY N/A 10/18/2024    Procedure: COLONOSCOPY TO CECUM/TI WITH BIOPSIES;  Surgeon: Marcus Christine Jr., MD;  Location: [facility] ENDOSCOPY;  Service: General;  Laterality: N/A;  PRE-SCREENING, FAMILY HX COLON CANCER  POST-DIVERTICULOSIS, RECTAL MASS    FOOT SURGERY      PROSTATECTOMY         Social History  Social History     Socioeconomic History    Marital  status: Significant Other   Tobacco Use    Smoking status: Former     Current packs/day: 3.00     Average packs/day: 3.0 packs/day for 30.0 years (90.0 ttl pk-yrs)     Types: Cigarettes    Smokeless tobacco: Never   Vaping Use    Vaping status: Some Days    Substances: CBD    Devices: Disposable   Substance and Sexual Activity    Alcohol use: No    Drug use: No    Sexual activity: Defer       Family History  Family History   Problem Relation Age of Onset    Bone cancer Mother     COPD Father     Hypertension Father     Stroke Father         TIA    Bone cancer Father         smoker    Lung cancer Father     Diabetes Father     No Known Problems Sister     No Known Problems Brother     No Known Problems Maternal Grandmother     No Known Problems Maternal Grandfather     No Known Problems Paternal Grandmother     Colon cancer Paternal Grandfather     Malig Hyperthermia Neg Hx        Colorectal cancer family history: Paternal grandfather    Review of Systems  Negative except as documented in the HPI.     Allergies  No Known Allergies    Medications    Current Outpatient Medications:     aspirin 81 MG chewable tablet, Chew 1 tablet Daily., Disp: , Rfl:     atorvastatin (LIPITOR) 80 MG tablet, Take 1 tablet by mouth Daily., Disp: 30 tablet, Rfl: 3    CBD (cannabidiol) oral oil, Take 1 drop by mouth 2 (Two) Times a Day. Half a dropper twice daily, Disp: , Rfl:     Cyanocobalamin (VITAMIN B 12 PO), Take 1 tablet by mouth Daily., Disp: , Rfl:     DULoxetine (Cymbalta) 60 MG capsule, Take 1 capsule by mouth Daily for 180 days. (Patient taking differently: Take 1 capsule by mouth Every Night.), Disp: 90 capsule, Rfl: 1    ferrous sulfate 325 (65 Fe) MG tablet, Take 65 mg by mouth Daily With Breakfast., Disp: , Rfl:     gabapentin (NEURONTIN) 300 MG capsule, TAKE 2 CAPSULES BY MOUTH THREE TIMES DAILY, Disp: 540 capsule, Rfl: 1    losartan (COZAAR) 100 MG tablet, Take 1 tablet by mouth Daily., Disp: 90 tablet, Rfl: 1     metFORMIN (GLUCOPHAGE) 1000 MG tablet, Take 1 tablet by mouth 2 (Two) Times a Day With Meals., Disp: 90 tablet, Rfl: 2    metoprolol tartrate (LOPRESSOR) 25 MG tablet, TAKE 1 TABLET BY MOUTH DAILY, Disp: 90 tablet, Rfl: 0    tadalafil (CIALIS) 5 MG tablet, Take 1 tablet by mouth Daily As Needed for Erectile Dysfunction., Disp: , Rfl:     tamsulosin (FLOMAX) 0.4 MG capsule 24 hr capsule, Take 1 capsule by mouth every night at bedtime., Disp: , Rfl:     vitamin D (ERGOCALCIFEROL) 1.25 MG (73474 UT) capsule capsule, Take 1 capsule by mouth 1 (One) Time Per Week., Disp: 12 capsule, Rfl: 0    hydrOXYzine (ATARAX) 10 MG tablet, TAKE 1 TO 2 TABLETS BY MOUTH EVERY NIGHT AT BEDTIME (Patient not taking: Reported on 11/14/2024), Disp: 60 tablet, Rfl: 1    Vital Signs  Vitals:    11/14/24 1517   BP: 140/88   Pulse: 74   SpO2: 93%        Physical Exam  Constitutional: Resting comfortably, no acute distress  Neck: Supple, trachea midline  Respiratory: No increased work of breathing, Symmetric excursion  Cardiovascular: Well pefursed, no jugular venous distention evident   Abdominal: Soft, non-tender, non-distended  Lymphatics: No cervical or suprascapular adenopathy  Skin: Warm, dry, no rash on visualized skin surfaces  Musculoskeletal: Symmetric strength, no obvious gross abnormalities  Psychiatric: Alert and oriented ×3, normal affect   Rectal exam deferred today.  Per Dr. Christine's report, the tumor is palpable.    Laboratory Results  I have personally reviewed CBC with WBC 8, hemoglobin 12, platelets 353.  CMP with creatinine 1.0, bicarb 29, albumin 4.2.  CEA 42.4.    I have personally reviewed the pathology report from Dr. Weiner colonoscopy which demonstrates invasive well-differentiated adenocarcinoma with ulceration.    Radiology  I have personally reviewed CT scan of the chest which demonstrates no evidence of metastatic disease.    I have personally reviewed the CT scan of the abdomen pelvis which demonstrates no  hepatic disease and a tumor located within the upper to mid rectum straddling the anterior peritoneal reflection at its distal extent.  No evidence of bowel obstruction.    I have also personally reviewed the MRI of his pelvis which demonstrates a mid to upper rectal mass with multiple abnormal appearing lymph nodes consistent with T3d and 2 disease.  The tumor abuts the peritoneal reflection.    Endoscopy  I have personally reviewed Dr. Christine's colonoscopy report from October 18, 2024 which demonstrates a circumferential mass in the rectum extending from 10 to 12 cm as well as sigmoid diverticulosis.      Red Rai MD  Colorectal & General Surgery  [facility]    [address], [address]  [place], [place], [address]  P: [phone]  F: [phone]  n

## 2024-11-18 NOTE — H&P (VIEW-ONLY)
Colorectal & General Surgery  Consultation    Patient: Kevin Garsia  YOB: 1955  MRN: [id number]      Assessment  Kevin Garsia is a 69 y.o. male with stage III mid to upper well-differentiated rectal adenocarcinoma (cT3d cN2 cM0).     From a functional standpoint, there is no indication today for emergent surgical intervention as he does not demonstrate any signs of obstruction or bleeding.    I discussed with him multiple modalities with which we treat rectal cancer, including surgery, chemotherapy, and chemoradiation.  In his situation, he has a locally advanced mid upper rectal cancer with no evidence of metastatic disease.  He is an excellent candidate for neoadjuvant chemotherapy followed by total mesorectal excision and adjuvant chemotherapy (PROSPECT Trial).  We discussed the importance of treating him with chemotherapy first given the presence of lymph node disease.  Given the location of the tumor primarily within the upper rectum but extending down into the mid rectum, I do not think that radiation would be in his best interest, especially since the tumor appears to be resectable at this point.  I discussed the role of laparoscopic low anterior resection with total mesorectal excision and the creation of a temporary diverting loop ileostomy with him.  We discussed the risk, benefits, alternatives of surgery briefly as well as the risk of permanent ostomy formation, anastomotic leak, damage to surrounding structures, including the ureter.  After undergoing resection, I would anticipate that we could close his diverting loop ileostomy within 6 to 8 weeks after surgery as long as everything goes to plan.  After that operation, he could then undergo adjuvant chemotherapy.    I will refer him to our medical oncology team for discussions of neoadjuvant chemotherapy as well as planned for port placement next week.  We discussed the risk, benefits, alternatives to port placement, including  the risk of pneumothorax.    All of his and his partner's questions were answered today.     I have discussed his care with Dr. Marcus Christine as well as Dr. Asaf Baxter today.    Plan  Refer to medical oncology  Plan for port placement next week    Referring Provider: Marcus Christine MD    Reason for Consultation: Mid rectal adenocarcinoma    History of Present Illness   Kevin Garsia is a 69 y.o. male who presents in consultation regarding newly diagnosed mid rectal adenocarcinoma.    He initially presented to Dr. Christine for screening colonoscopy and was found to have a low rectal mass.  At the time, he denied any tenesmus, hematochezia, melena, change in bowel habits, unintentional weight loss, abdominal pain.  Dr. Christine referred him to me secondary to the low location of the tumor for colorectal expertise.    Today, he does not have any complaints.    Oncologic history is significant for prostate cancer that was treated with radiation.    No prior abdominal surgery.    Most recent colonoscopy: 2024    Past Medical History   Past Medical History:   Diagnosis Date    Aphasia     Cancer     prostate    CHF (congestive heart failure)     Diabetes mellitus     GERD (gastroesophageal reflux disease)     History of radiation therapy     History of UTI     Hyperlipidemia     Hypertension     Multiple gastric ulcers     Seasonal allergies     Sleep apnea     cpap    Stroke     TIA (transient ischemic attack)         Past Surgical History   Past Surgical History:   Procedure Laterality Date    ANGIOPLASTY CAROTID ARTERY      CAROTID ENDARTERECTOMY      COLONOSCOPY N/A 10/18/2024    Procedure: COLONOSCOPY TO CECUM/TI WITH BIOPSIES;  Surgeon: Marcus Christine Jr., MD;  Location: [facility] ENDOSCOPY;  Service: General;  Laterality: N/A;  PRE-SCREENING, FAMILY HX COLON CANCER  POST-DIVERTICULOSIS, RECTAL MASS    FOOT SURGERY      PROSTATECTOMY         Social History  Social History     Socioeconomic History    Marital  status: Significant Other   Tobacco Use    Smoking status: Former     Current packs/day: 3.00     Average packs/day: 3.0 packs/day for 30.0 years (90.0 ttl pk-yrs)     Types: Cigarettes    Smokeless tobacco: Never   Vaping Use    Vaping status: Some Days    Substances: CBD    Devices: Disposable   Substance and Sexual Activity    Alcohol use: No    Drug use: No    Sexual activity: Defer       Family History  Family History   Problem Relation Age of Onset    Bone cancer Mother     COPD Father     Hypertension Father     Stroke Father         TIA    Bone cancer Father         smoker    Lung cancer Father     Diabetes Father     No Known Problems Sister     No Known Problems Brother     No Known Problems Maternal Grandmother     No Known Problems Maternal Grandfather     No Known Problems Paternal Grandmother     Colon cancer Paternal Grandfather     Malig Hyperthermia Neg Hx        Colorectal cancer family history: Paternal grandfather    Review of Systems  Negative except as documented in the HPI.     Allergies  No Known Allergies    Medications    Current Outpatient Medications:     aspirin 81 MG chewable tablet, Chew 1 tablet Daily., Disp: , Rfl:     atorvastatin (LIPITOR) 80 MG tablet, Take 1 tablet by mouth Daily., Disp: 30 tablet, Rfl: 3    CBD (cannabidiol) oral oil, Take 1 drop by mouth 2 (Two) Times a Day. Half a dropper twice daily, Disp: , Rfl:     Cyanocobalamin (VITAMIN B 12 PO), Take 1 tablet by mouth Daily., Disp: , Rfl:     DULoxetine (Cymbalta) 60 MG capsule, Take 1 capsule by mouth Daily for 180 days. (Patient taking differently: Take 1 capsule by mouth Every Night.), Disp: 90 capsule, Rfl: 1    ferrous sulfate 325 (65 Fe) MG tablet, Take 65 mg by mouth Daily With Breakfast., Disp: , Rfl:     gabapentin (NEURONTIN) 300 MG capsule, TAKE 2 CAPSULES BY MOUTH THREE TIMES DAILY, Disp: 540 capsule, Rfl: 1    losartan (COZAAR) 100 MG tablet, Take 1 tablet by mouth Daily., Disp: 90 tablet, Rfl: 1     metFORMIN (GLUCOPHAGE) 1000 MG tablet, Take 1 tablet by mouth 2 (Two) Times a Day With Meals., Disp: 90 tablet, Rfl: 2    metoprolol tartrate (LOPRESSOR) 25 MG tablet, TAKE 1 TABLET BY MOUTH DAILY, Disp: 90 tablet, Rfl: 0    tadalafil (CIALIS) 5 MG tablet, Take 1 tablet by mouth Daily As Needed for Erectile Dysfunction., Disp: , Rfl:     tamsulosin (FLOMAX) 0.4 MG capsule 24 hr capsule, Take 1 capsule by mouth every night at bedtime., Disp: , Rfl:     vitamin D (ERGOCALCIFEROL) 1.25 MG (28278 UT) capsule capsule, Take 1 capsule by mouth 1 (One) Time Per Week., Disp: 12 capsule, Rfl: 0    hydrOXYzine (ATARAX) 10 MG tablet, TAKE 1 TO 2 TABLETS BY MOUTH EVERY NIGHT AT BEDTIME (Patient not taking: Reported on 11/14/2024), Disp: 60 tablet, Rfl: 1    Vital Signs  Vitals:    11/14/24 1517   BP: 140/88   Pulse: 74   SpO2: 93%        Physical Exam  Constitutional: Resting comfortably, no acute distress  Neck: Supple, trachea midline  Respiratory: No increased work of breathing, Symmetric excursion  Cardiovascular: Well pefursed, no jugular venous distention evident   Abdominal: Soft, non-tender, non-distended  Lymphatics: No cervical or suprascapular adenopathy  Skin: Warm, dry, no rash on visualized skin surfaces  Musculoskeletal: Symmetric strength, no obvious gross abnormalities  Psychiatric: Alert and oriented ×3, normal affect   Rectal exam deferred today.  Per Dr. Christine's report, the tumor is palpable.    Laboratory Results  I have personally reviewed CBC with WBC 8, hemoglobin 12, platelets 353.  CMP with creatinine 1.0, bicarb 29, albumin 4.2.  CEA 42.4.    I have personally reviewed the pathology report from Dr. Weiner colonoscopy which demonstrates invasive well-differentiated adenocarcinoma with ulceration.    Radiology  I have personally reviewed CT scan of the chest which demonstrates no evidence of metastatic disease.    I have personally reviewed the CT scan of the abdomen pelvis which demonstrates no  hepatic disease and a tumor located within the upper to mid rectum straddling the anterior peritoneal reflection at its distal extent.  No evidence of bowel obstruction.    I have also personally reviewed the MRI of his pelvis which demonstrates a mid to upper rectal mass with multiple abnormal appearing lymph nodes consistent with T3d and 2 disease.  The tumor abuts the peritoneal reflection.    Endoscopy  I have personally reviewed Dr. Christine's colonoscopy report from October 18, 2024 which demonstrates a circumferential mass in the rectum extending from 10 to 12 cm as well as sigmoid diverticulosis.      Red Rai MD  Colorectal & General Surgery  [facility]    [address], [address]  [place], [place], [address]  P: [phone]  F: [phone]  n

## 2024-11-19 ENCOUNTER — HOSPITAL ENCOUNTER (OUTPATIENT)
Dept: CARDIOLOGY | Facility: HOSPITAL | Age: 69
Discharge: HOME | End: 2024-11-19
Admitting: STUDENT IN AN ORGANIZED HEALTH CARE EDUCATION/TRAINING PROGRAM
Payer: MEDICARE

## 2024-11-19 ENCOUNTER — TELEPHONE (OUTPATIENT)
Dept: ONCOLOGY | Facility: CLINIC | Age: 69
End: 2024-11-19

## 2024-11-19 DIAGNOSIS — Z01.818 PRE-OP EVALUATION: ICD-10-CM

## 2024-11-19 LAB
QT INTERVAL: 417 MS
QTC INTERVAL: 469 MS

## 2024-11-19 PROCEDURE — 93005 ELECTROCARDIOGRAM TRACING: CPT | Performed by: STUDENT IN AN ORGANIZED HEALTH CARE EDUCATION/TRAINING PROGRAM

## 2024-11-19 NOTE — PRE-PROCEDURE INSTRUCTIONS
PAT call complete. Education provided to the patient on the following:    - Nothing to eat after 11:00 PM the night before your procedure, water and black coffee okay up to 4:30 AM, 2 hours before arrival time.  - If diabetic and procedure is after noon: No food 8 hours prior to arrival time, and only then only clear liquids 2 hours before arrival time.   - You will need to have someone drive you home after your procedure and remain with you for 24 hours after. The  will need to remain on site during your visit.  - Please remove all jewelry, including body piercing's, and leave any valuables at home. Only bring your drivers license and insurance card on day of procedure.  - Do not wear contact lenses; wear glasses and bring your case.  - Do not use any tobacco products on morning of procedure.  - Wash with antibacterial soap (such as Dial) the night before and morning of procedure.  - Be prepared to provide your last dose of all home medications.  - Coffee and vending available on the 1st and 5th floors; no cafeteria on site.  - You will need to arrive at 0630 on 11/22/24 at [facility] located at [address]. We are located on the 5th floor.  -Please be aware that arrival times may be subject to change up until the day of surgery.   - Feel free to contact us at: [phone] with any additional questions/concerns.    Patient instructed to hold Aspirin, vitamins and supplements for procedure. Patient instructed to take Gabapentin and Metoprolol the morning of the procedure and hold Losartan the morning of the procedure. Patient verbalized understanding.

## 2024-11-19 NOTE — TELEPHONE ENCOUNTER
Caller: Kevin Garsia    Relationship: Self    Best call back number: [phone]    Who are you requesting to speak with (clinical staff, provider,  specific staff member): SCHEDULING    What was the call regarding: PT RETURNING MISSED CALL TO [facility] HIS 11/22 APPT

## 2024-11-19 NOTE — DISCHARGE INSTRUCTIONS
POST OP RECOMMENDATIONS  Dr. Red Rai  [phone]  Discharge Instructions for Port Placement    Go home, rest and take it easy today; however, you should get up and move about several times today to reduce the risk of developing a blood clot in your legs.   You may experience some dizziness or memory loss from the anesthesia. This may last for the next 24 hours. Someone should plan on staying with you for the first 24 hours for your safety.   Do not make any important legal decisions or sign any legal papers for the next 24 hours.   Eat and drink lightly today. Start off with liquids, jello, soup, crackers or other bland foods at first. You may advance your diet tomorrow as tolerated as long as you do not experience any nausea or vomiting.   If present, you may remove your outer dressings in 3 days. The white tapes called steri-strips should stay in place. They will fall off on their own in 1-2 weeks. Do not worry if they come off sooner. Otherwise, glue covering your incisions will fall off in the next 1-2 weeks.  You may notice some bleeding/drainage on your outer dressings. A little bloody drainage is normal. If the bleeding/drainage is such that the bandage cannot absorb it, remove the dressing, apply clean gauze and apply firm pressure for a full 15 minutes. If the bleeding continues, please call me.   You may shower tomorrow. No tub baths until your incisions are completely healed.   You will have some pain and discomfort after surgery.  You will not be totally pain free, but tylenol and ibuprofen should make the pain more tolerable.  Unless you have been previously instructed to not take tylenol or ibuprofen, you can alternate these medications every 4 hours and take as instructed on each bottle. Please take any ibuprofen with food to avoid nausea and GI upset. If you are having severe pain that cannot be controlled by OTC pain medicine, please contact me.   You have also received a prescription for an  anti-nausea medicine. Please take this as prescribed for any nausea or vomiting. Nausea could be a result of the anesthesia. If you experience severe nausea and vomiting that cannot be controlled by the nausea medicine, please call me.   No driving for 24 hours and until you fell comfortable making sudden movements with your neck and arms.   If the port is to be used within 10-14 days of placement, no surgical follow-up is needed. Otherwise, you should call the office at [phone] to schedule a follow-up in about 1 week.   Remember to contact me for any of the following:   Fever > 101 degrees  Severe pain that cannot be controlled by taking your pain pills  Severe nausea or vomiting that cannot be controlled by taking your nausea pills  Significant bleeding of your incisions  Drainage that has a bad smell or is yellow or green in appearance  Any other questions or concerns

## 2024-11-19 NOTE — PRE-PROCEDURE NOTE
AA review per Dr. Melendez. OK to proceed at [facility] once EKG obtained and reviewed. Patient states he will go today to [facility] for EKG.

## 2024-11-19 NOTE — PROGRESS NOTES
REASON FOR CONSULTATION:  stage III mid to upper well-differentiated rectal adenocarcinoma (cT3, cN2 CM0.).  Provide an opinion on any further workup or treatment                             REQUESTING PHYSICIAN: BOBBY Portillo, Naeem Rai MD    RECORDS OBTAINED:  Records of the patients history including those obtained from the referring provider were reviewed and summarized in detail.    HISTORY OF PRESENT ILLNESS:  The patient is a 69 y.o. year old male who is here for an opinion about the above issue.    History of Present Illness   The patient is a 69-year-old male who is referred for recently diagnosed stage III mid to upper well-differentiated rectal adenocarcinoma (cT3, cN2 CM0.).    Patient undergone a screening colonoscopy 10/18/2024 demonstrating a circumferential mass involving the rectum extending from 10 cm to 12 cm with biopsy of 15 cm consistent with invasive well-differentiated adenocarcinoma with ulceration necroinflammatory debris.    This was felt to be microsatellite stable by IHC as well as including MSI by PCR.    If follow-up with infectious disease 11/1/2024 there being concern about resuming hypersexual activity and that he start preexposure prophylaxis.  He last been seen July 2024 on Descovy still in relationship with his partner and currently monogamous with been having bowel changes underwent colonoscopy with the above diagnosis.  As result of his need for therapy Descovy was discontinued and the issue to be reevaluated once he was successfully treated.    MRI of the pelvis performed 11/4/2024 demonstrates a high rectal mass extending to the proximal sigmoid colon representing a T3d N2 rectal tumor, side effect invasion on the superior aspect of the mass approximates between the mesorectum and peritoneal cavity?    CT chest 11/8 demonstrates pleural plaques along the peripheral aspect of both the right lower lobes.  These are of uncertain significance.    The patient  was next seen 11/14/2024 by colorectal surgery without evidence of obstruction or bleeding.  He was thought a excellent candidate for neoadjuvant chemotherapy followed by mesorectal excision and adjuvant chemotherapy-comparable to the prospect trial.  There was concern about the location of the tumor extending down to the rectum and the avoidance of radiation therapy since the tumor appeared to be resectable.  Was the role of laparoscopic low anterior resection total mesorectal excision and creation of a diverting loop ileostomy temporarily discussed.    As resulted above the patient is now referred to discuss this above approach.  He is seen with his significant other and we have discussed his findings in great detail from initial diagnosis and and subsequent surgical assessment leading to the recommendation of neoadjuvant chemotherapy given in the fashion of the PROSPECT Trial which was designed to determine whether neoadjuvant chemotherapy could be used as an alternative to neoadjuvant chemoRT.  This study demonstrated that similar disease-free survival and overall survival was similar to neoadjuvant CRT alone for eligible patients.  This would include the use of 6 cycles of FOLFOX chemotherapy followed by reassessment and if a clinical response and 20% or more was seen then RT would be admitted, proceeding to surgical resection and subsequent adjuvant chemotherapy.        Past Medical History:   Diagnosis Date    Aphasia     Cancer     prostate    CHF (congestive heart failure)     Diabetes mellitus     GERD (gastroesophageal reflux disease)     History of radiation therapy     History of UTI     Hyperlipidemia     Hypertension     Multiple gastric ulcers     Seasonal allergies     Sleep apnea     cpap    Stroke     TIA (transient ischemic attack)         Past Surgical History:   Procedure Laterality Date    ANGIOPLASTY CAROTID ARTERY      CAROTID ENDARTERECTOMY      COLONOSCOPY N/A 10/18/2024    Procedure:  COLONOSCOPY TO CECUM/TI WITH BIOPSIES;  Surgeon: Marcus Christine Jr., MD;  Location: [facility] ENDOSCOPY;  Service: General;  Laterality: N/A;  PRE-SCREENING, FAMILY HX COLON CANCER  POST-DIVERTICULOSIS, RECTAL MASS    FOOT SURGERY          Current Outpatient Medications on File Prior to Visit   Medication Sig Dispense Refill    aspirin 81 MG chewable tablet Chew 1 tablet Daily.      atorvastatin (LIPITOR) 80 MG tablet Take 1 tablet by mouth Daily. 30 tablet 3    CBD (cannabidiol) oral oil Take 1 drop by mouth 2 (Two) Times a Day. Half a dropper twice daily      Cyanocobalamin (VITAMIN B 12 PO) Take 1 tablet by mouth Daily.      DULoxetine (Cymbalta) 60 MG capsule Take 1 capsule by mouth Daily for 180 days. (Patient taking differently: Take 1 capsule by mouth Every Night.) 90 capsule 1    ferrous sulfate 325 (65 Fe) MG tablet Take 65 mg by mouth Daily With Breakfast.      gabapentin (NEURONTIN) 300 MG capsule TAKE 2 CAPSULES BY MOUTH THREE TIMES DAILY 540 capsule 1    hydrOXYzine (ATARAX) 10 MG tablet TAKE 1 TO 2 TABLETS BY MOUTH EVERY NIGHT AT BEDTIME 60 tablet 1    losartan (COZAAR) 100 MG tablet Take 1 tablet by mouth Daily. 90 tablet 1    metFORMIN (GLUCOPHAGE) 1000 MG tablet Take 1 tablet by mouth 2 (Two) Times a Day With Meals. 90 tablet 2    metoprolol tartrate (LOPRESSOR) 25 MG tablet TAKE 1 TABLET BY MOUTH DAILY 90 tablet 0    tadalafil (CIALIS) 5 MG tablet Take 1 tablet by mouth Daily As Needed for Erectile Dysfunction.      tamsulosin (FLOMAX) 0.4 MG capsule 24 hr capsule Take 1 capsule by mouth every night at bedtime.      vitamin D (ERGOCALCIFEROL) 1.25 MG (70325 UT) capsule capsule Take 1 capsule by mouth 1 (One) Time Per Week. 12 capsule 0     No current facility-administered medications on file prior to visit.        ALLERGIES:  No Known Allergies     Social History     Socioeconomic History    Marital status: Significant Other   Tobacco Use    Smoking status: Former     Current packs/day: 3.00      "Average packs/day: 3.0 packs/day for 30.0 years (90.0 ttl pk-yrs)     Types: Cigarettes    Smokeless tobacco: Never   Vaping Use    Vaping status: Some Days    Substances: CBD    Devices: Disposable   Substance and Sexual Activity    Alcohol use: No    Drug use: No    Sexual activity: Defer        Family History   Problem Relation Age of Onset    Bone cancer Mother     COPD Father     Hypertension Father     Stroke Father         TIA    Bone cancer Father         smoker    Lung cancer Father     Diabetes Father     No Known Problems Sister     No Known Problems Brother     No Known Problems Maternal Grandmother     No Known Problems Maternal Grandfather     No Known Problems Paternal Grandmother     Colon cancer Paternal Grandfather     Malig Hyperthermia Neg Hx         Review of Systems   Constitutional:  Negative for activity change and unexpected weight change.   HENT: Negative.     Eyes: Negative.    Respiratory: Negative.     Cardiovascular: Negative.    Gastrointestinal:  Positive for constipation.   Genitourinary: Negative.    Musculoskeletal: Negative.    Neurological: Negative.    Psychiatric/Behavioral: Negative.          Objective     Vitals:    11/20/24 1521   BP: 161/82   Pulse: 81   Resp: 16   Temp: 98.1 °F (36.7 °C)   TempSrc: Oral   SpO2: 95%   Weight: 84.6 kg (186 lb 6.4 oz)   Height: 165.1 cm (65\")   PainSc:   2   PainLoc: Back         11/20/2024     3:22 PM   Current Status   ECOG score 0       Physical Exam  Constitutional:       Appearance: He is obese.   HENT:      Head: Normocephalic and atraumatic.      Nose: Nose normal.      Mouth/Throat:      Mouth: Mucous membranes are moist.      Pharynx: Oropharynx is clear.   Eyes:      Extraocular Movements: Extraocular movements intact.      Conjunctiva/sclera: Conjunctivae normal.      Pupils: Pupils are equal, round, and reactive to light.   Cardiovascular:      Rate and Rhythm: Normal rate and regular rhythm.      Pulses: Normal pulses.      Heart " sounds: Normal heart sounds.   Pulmonary:      Effort: Pulmonary effort is normal.      Breath sounds: Normal breath sounds.   Abdominal:      General: Bowel sounds are normal.      Palpations: Abdomen is soft.   Musculoskeletal:         General: Normal range of motion.      Cervical back: Normal range of motion and neck supple.   Skin:     General: Skin is warm and dry.   Neurological:      General: No focal deficit present.      Mental Status: He is oriented to person, place, and time.   Psychiatric:         Mood and Affect: Mood normal.         Behavior: Behavior normal.           RECENT LABS:  Hematology WBC   Date Value Ref Range Status   11/20/2024 9.69 3.40 - 10.80 10*3/mm3 Final   09/17/2024 7.02 3.40 - 10.80 10*3/mm3 Final   06/08/2022 7.85 4.5 - 11.0 10*3/uL Final     RBC   Date Value Ref Range Status   11/20/2024 4.21 4.14 - 5.80 10*6/mm3 Final   09/17/2024 4.31 4.14 - 5.80 10*6/mm3 Final   06/08/2022 4.02 (L) 4.5 - 5.9 10*6/uL Final     Hemoglobin   Date Value Ref Range Status   11/20/2024 11.2 (L) 13.0 - 17.7 g/dL Final   06/08/2022 11.8 (L) 13.5 - 17.5 g/dL Final     Hematocrit   Date Value Ref Range Status   11/20/2024 36.0 (L) 37.5 - 51.0 % Final   06/08/2022 36.6 (L) 41.0 - 53.0 % Final     Platelets   Date Value Ref Range Status   11/20/2024 328 140 - 450 10*3/mm3 Final   06/08/2022 315 140 - 440 10*3/uL Final          Assessment & Plan        69-year-old male with a history of of diabetes, CHF GE reflux hyperlipidemia, hypertension, TIA, possible CVA as well as a history of prostate cancer status post XRT.  He had been recently having a change in bowel habit ultimately leading to additional assessment.  This led to a screening colonoscopy 10/18/2024demonstrating a circumferential mass involving the rectum extending from 10 cm to 12 cm with biopsy of 15 cm consistent with invasive well-differentiated adenocarcinoma with ulceration necroinflammatory debris.       This was felt to be microsatellite  stable by IHC as well as including MSI by PCR.       MRI of the pelvis performed 11/4/2024 demonstrates a high rectal mass extending to the proximal sigmoid colon representing a T3d N2 rectal tumor, side effect invasion on the superior aspect of the mass approximates between the mesorectum and peritoneal cavity?         The patient was next seen 11/14/2024 by colorectal surgery, Dr. Naeem Rai, without evidence of obstruction or bleeding.  He was thought a excellent candidate for neoadjuvant chemotherapy followed by mesorectal excision and adjuvant chemotherapy-comparable to the PROSPECT trial.  There was concern about the location of the tumor extending down to the rectum and the avoidance of radiation therapy since the tumor appeared to be resectable.  There was also the concern of his previous history of radiation therapy.  Further discussed was the role of laparoscopic low anterior resection, total mesorectal excision, and creation of a diverting loop ileostomy temporarily utilized also discussed.       The patient is seen 11/20/2024 and we have reviewed the PROSPECT Trial which was designed to determine whether neoadjuvant chemotherapy could be used as an alternative to neoadjuvant chemoRT.  This study demonstrated that similar disease-free survival and overall survival was similar to neoadjuvant CRT alone for eligible patients.  This would include the use of 6 cycles of FOLFOX chemotherapy followed by reassessment and if a clinical response and 20% or more was seen then RT would be admitted, proceeding to surgical resection and subsequent adjuvant chemotherapy.  After discussion the patient agrees to proceed.      Additional staging 11/8/2024 includes a CT of the chest demonstrating small pleural plaques along the posterior aspect of both the right lower lobes of uncertain significance.  There are no other substantial findings though we have discussed this as leading to a PET/CT to complete his  staging.      Plan:  *Return to laboratory today-CBC, CMP,DPYD genotyping    *PET/CT next available    *Teach next week-FOLFOX chemotherapy given neoadjuvantly anticipating 6 cycles followed by repeat MRI of the pelvis to be compared to study-11/24    *Port placement anticipated 11/22/2024    *FOLFOX chemotherapy due initiated the week of December 2, 2024    *The patient and his significant other agreed to this plan and follow-up.I spent 65 minutes caring for Kevin on this date of service. This time includes time spent by me in the following activities: preparing for the visit, reviewing tests, obtaining and/or reviewing a separately obtained history, performing a medically appropriate examination and/or evaluation, counseling and educating the patient/family/caregiver, ordering medications, tests, or procedures, referring and communicating with other health care professionals, documenting information in the medical record, independently interpreting results and communicating that information with the patient/family/caregiver, and care coordination.

## 2024-11-20 ENCOUNTER — LAB (OUTPATIENT)
Dept: LAB | Facility: HOSPITAL | Age: 69
End: 2024-11-20
Payer: MEDICARE

## 2024-11-20 ENCOUNTER — CONSULT (OUTPATIENT)
Dept: ONCOLOGY | Facility: CLINIC | Age: 69
End: 2024-11-20
Payer: MEDICARE

## 2024-11-20 VITALS
WEIGHT: 186.4 LBS | HEIGHT: 65 IN | HEART RATE: 81 BPM | DIASTOLIC BLOOD PRESSURE: 82 MMHG | TEMPERATURE: 98.1 F | BODY MASS INDEX: 31.06 KG/M2 | SYSTOLIC BLOOD PRESSURE: 161 MMHG | OXYGEN SATURATION: 95 % | RESPIRATION RATE: 16 BRPM

## 2024-11-20 DIAGNOSIS — R79.89 ABNORMAL CBC: Primary | ICD-10-CM

## 2024-11-20 DIAGNOSIS — C20 RECTAL CANCER: Primary | ICD-10-CM

## 2024-11-20 DIAGNOSIS — C20 RECTAL ADENOCARCINOMA: ICD-10-CM

## 2024-11-20 LAB
ALBUMIN SERPL-MCNC: 4.5 G/DL (ref 3.5–5.2)
ALBUMIN/GLOB SERPL: 1.5 G/DL
ALP SERPL-CCNC: 122 U/L (ref 39–117)
ALT SERPL W P-5'-P-CCNC: 18 U/L (ref 1–41)
ANION GAP SERPL CALCULATED.3IONS-SCNC: 10 MMOL/L (ref 5–15)
AST SERPL-CCNC: 23 U/L (ref 1–40)
BASOPHILS # BLD AUTO: 0.11 10*3/MM3 (ref 0–0.2)
BASOPHILS NFR BLD AUTO: 1.1 % (ref 0–1.5)
BILIRUB SERPL-MCNC: 0.5 MG/DL (ref 0–1.2)
BUN SERPL-MCNC: 13 MG/DL (ref 8–23)
BUN/CREAT SERPL: 13.7 (ref 7–25)
CALCIUM SPEC-SCNC: 10.4 MG/DL (ref 8.6–10.5)
CHLORIDE SERPL-SCNC: 102 MMOL/L (ref 98–107)
CO2 SERPL-SCNC: 30 MMOL/L (ref 22–29)
CREAT SERPL-MCNC: 0.95 MG/DL (ref 0.76–1.27)
DEPRECATED RDW RBC AUTO: 49.9 FL (ref 37–54)
EGFRCR SERPLBLD CKD-EPI 2021: 85.6 ML/MIN/1.73
EOSINOPHIL # BLD AUTO: 0.37 10*3/MM3 (ref 0–0.4)
EOSINOPHIL NFR BLD AUTO: 3.8 % (ref 0.3–6.2)
ERYTHROCYTE [DISTWIDTH] IN BLOOD BY AUTOMATED COUNT: 15.9 % (ref 12.3–15.4)
GLOBULIN UR ELPH-MCNC: 3.1 GM/DL
GLUCOSE SERPL-MCNC: 92 MG/DL (ref 65–99)
HCT VFR BLD AUTO: 36 % (ref 37.5–51)
HGB BLD-MCNC: 11.2 G/DL (ref 13–17.7)
IMM GRANULOCYTES # BLD AUTO: 0.08 10*3/MM3 (ref 0–0.05)
IMM GRANULOCYTES NFR BLD AUTO: 0.8 % (ref 0–0.5)
LYMPHOCYTES # BLD AUTO: 2.17 10*3/MM3 (ref 0.7–3.1)
LYMPHOCYTES NFR BLD AUTO: 22.4 % (ref 19.6–45.3)
MCH RBC QN AUTO: 26.6 PG (ref 26.6–33)
MCHC RBC AUTO-ENTMCNC: 31.1 G/DL (ref 31.5–35.7)
MCV RBC AUTO: 85.5 FL (ref 79–97)
MONOCYTES # BLD AUTO: 0.75 10*3/MM3 (ref 0.1–0.9)
MONOCYTES NFR BLD AUTO: 7.7 % (ref 5–12)
NEUTROPHILS NFR BLD AUTO: 6.21 10*3/MM3 (ref 1.7–7)
NEUTROPHILS NFR BLD AUTO: 64.2 % (ref 42.7–76)
NRBC BLD AUTO-RTO: 0.2 /100 WBC (ref 0–0.2)
PLATELET # BLD AUTO: 328 10*3/MM3 (ref 140–450)
PMV BLD AUTO: 9.5 FL (ref 6–12)
POTASSIUM SERPL-SCNC: 5.4 MMOL/L (ref 3.5–5.2)
PROT SERPL-MCNC: 7.6 G/DL (ref 6–8.5)
RBC # BLD AUTO: 4.21 10*6/MM3 (ref 4.14–5.8)
SODIUM SERPL-SCNC: 142 MMOL/L (ref 136–145)
WBC NRBC COR # BLD AUTO: 9.69 10*3/MM3 (ref 3.4–10.8)

## 2024-11-20 PROCEDURE — 82378 CARCINOEMBRYONIC ANTIGEN: CPT | Performed by: INTERNAL MEDICINE

## 2024-11-20 PROCEDURE — 85025 COMPLETE CBC W/AUTO DIFF WBC: CPT

## 2024-11-20 PROCEDURE — 36415 COLL VENOUS BLD VENIPUNCTURE: CPT

## 2024-11-20 PROCEDURE — 80053 COMPREHEN METABOLIC PANEL: CPT | Performed by: INTERNAL MEDICINE

## 2024-11-21 PROBLEM — Z45.2 FITTING AND ADJUSTMENT OF VASCULAR CATHETER: Status: ACTIVE | Noted: 2024-11-21

## 2024-11-21 LAB — CEA SERPL-MCNC: 44.7 NG/ML

## 2024-11-21 RX ORDER — SODIUM CHLORIDE 0.9 % (FLUSH) 0.9 %
10 SYRINGE (ML) INJECTION AS NEEDED
OUTPATIENT
Start: 2024-11-21

## 2024-11-21 RX ORDER — HEPARIN SODIUM (PORCINE) LOCK FLUSH IV SOLN 100 UNIT/ML 100 UNIT/ML
500 SOLUTION INTRAVENOUS AS NEEDED
OUTPATIENT
Start: 2024-11-21

## 2024-11-22 ENCOUNTER — ANESTHESIA (OUTPATIENT)
Age: 69
End: 2024-11-22
Payer: MEDICARE

## 2024-11-22 ENCOUNTER — LAB (OUTPATIENT)
Dept: LAB | Facility: HOSPITAL | Age: 69
End: 2024-11-22
Payer: MEDICARE

## 2024-11-22 ENCOUNTER — OFFICE VISIT (OUTPATIENT)
Dept: ONCOLOGY | Facility: CLINIC | Age: 69
End: 2024-11-22
Payer: MEDICARE

## 2024-11-22 ENCOUNTER — APPOINTMENT (OUTPATIENT)
Dept: LAB | Facility: HOSPITAL | Age: 69
End: 2024-11-22
Payer: MEDICARE

## 2024-11-22 ENCOUNTER — HOSPITAL ENCOUNTER (OUTPATIENT)
Age: 69
Setting detail: HOSPITAL OUTPATIENT SURGERY
Discharge: HOME | End: 2024-11-22
Attending: SURGERY | Admitting: SURGERY
Payer: MEDICARE

## 2024-11-22 ENCOUNTER — APPOINTMENT (OUTPATIENT)
Age: 69
End: 2024-11-22
Payer: MEDICARE

## 2024-11-22 ENCOUNTER — APPOINTMENT (OUTPATIENT)
Facility: HOSPITAL | Age: 69
End: 2024-11-22
Payer: MEDICARE

## 2024-11-22 VITALS
SYSTOLIC BLOOD PRESSURE: 160 MMHG | WEIGHT: 185.9 LBS | HEART RATE: 62 BPM | RESPIRATION RATE: 16 BRPM | BODY MASS INDEX: 30.97 KG/M2 | DIASTOLIC BLOOD PRESSURE: 76 MMHG | HEIGHT: 65 IN | TEMPERATURE: 97.8 F | OXYGEN SATURATION: 96 %

## 2024-11-22 VITALS
BODY MASS INDEX: 30.49 KG/M2 | OXYGEN SATURATION: 96 % | SYSTOLIC BLOOD PRESSURE: 146 MMHG | HEIGHT: 65 IN | HEART RATE: 73 BPM | WEIGHT: 183 LBS | TEMPERATURE: 97.8 F | RESPIRATION RATE: 20 BRPM | DIASTOLIC BLOOD PRESSURE: 74 MMHG

## 2024-11-22 DIAGNOSIS — D64.9 ANEMIA, UNSPECIFIED TYPE: ICD-10-CM

## 2024-11-22 DIAGNOSIS — C20 RECTAL CANCER: Primary | ICD-10-CM

## 2024-11-22 DIAGNOSIS — Z01.818 PRE-OP EVALUATION: Primary | ICD-10-CM

## 2024-11-22 DIAGNOSIS — C20 RECTAL ADENOCARCINOMA: ICD-10-CM

## 2024-11-22 LAB
FERRITIN SERPL-MCNC: 114 NG/ML (ref 30–400)
FOLATE SERPL-MCNC: 7.93 NG/ML (ref 4.78–24.2)
GLUCOSE BLDC GLUCOMTR-MCNC: 112 MG/DL (ref 70–130)
IRON 24H UR-MRATE: 42 MCG/DL (ref 59–158)
IRON SATN MFR SERPL: 10 % (ref 20–50)
TIBC SERPL-MCNC: 426 MCG/DL (ref 298–536)
TRANSFERRIN SERPL-MCNC: 286 MG/DL (ref 200–360)
VIT B12 BLD-MCNC: 604 PG/ML (ref 211–946)

## 2024-11-22 PROCEDURE — 36561 INSERT TUNNELED CV CATH: CPT | Performed by: SURGERY

## 2024-11-22 PROCEDURE — 25010000002 CEFAZOLIN PER 500 MG: Performed by: SURGERY

## 2024-11-22 PROCEDURE — 76000 FLUOROSCOPY <1 HR PHYS/QHP: CPT

## 2024-11-22 PROCEDURE — 36415 COLL VENOUS BLD VENIPUNCTURE: CPT | Performed by: NURSE PRACTITIONER

## 2024-11-22 PROCEDURE — 77001 FLUOROGUIDE FOR VEIN DEVICE: CPT | Performed by: SURGERY

## 2024-11-22 PROCEDURE — 25010000003 LABETALOL 5 MG/ML SOLUTION: Performed by: ANESTHESIOLOGY

## 2024-11-22 PROCEDURE — 25010000002 PROPOFOL 10 MG/ML EMULSION: Performed by: ANESTHESIOLOGY

## 2024-11-22 PROCEDURE — 82607 VITAMIN B-12: CPT | Performed by: NURSE PRACTITIONER

## 2024-11-22 PROCEDURE — 82746 ASSAY OF FOLIC ACID SERUM: CPT | Performed by: NURSE PRACTITIONER

## 2024-11-22 PROCEDURE — 84466 ASSAY OF TRANSFERRIN: CPT | Performed by: NURSE PRACTITIONER

## 2024-11-22 PROCEDURE — 25010000002 LIDOCAINE 2% SOLUTION: Performed by: ANESTHESIOLOGY

## 2024-11-22 PROCEDURE — 76937 US GUIDE VASCULAR ACCESS: CPT | Performed by: SURGERY

## 2024-11-22 PROCEDURE — 25810000003 LACTATED RINGERS PER 1000 ML: Performed by: ANESTHESIOLOGY

## 2024-11-22 PROCEDURE — C1788 PORT, INDWELLING, IMP: HCPCS | Performed by: SURGERY

## 2024-11-22 PROCEDURE — 25010000002 HEPARIN (PORCINE) PER 1000 UNITS: Performed by: SURGERY

## 2024-11-22 PROCEDURE — 83540 ASSAY OF IRON: CPT | Performed by: NURSE PRACTITIONER

## 2024-11-22 PROCEDURE — 82728 ASSAY OF FERRITIN: CPT | Performed by: NURSE PRACTITIONER

## 2024-11-22 RX ORDER — NALOXONE HCL 0.4 MG/ML
0.2 VIAL (ML) INJECTION AS NEEDED
Status: DISCONTINUED | OUTPATIENT
Start: 2024-11-22 | End: 2024-11-22 | Stop reason: HOSPADM

## 2024-11-22 RX ORDER — FAMOTIDINE 10 MG/ML
20 INJECTION, SOLUTION INTRAVENOUS ONCE
Status: COMPLETED | OUTPATIENT
Start: 2024-11-22 | End: 2024-11-22

## 2024-11-22 RX ORDER — SODIUM CHLORIDE, SODIUM LACTATE, POTASSIUM CHLORIDE, CALCIUM CHLORIDE 600; 310; 30; 20 MG/100ML; MG/100ML; MG/100ML; MG/100ML
9 INJECTION, SOLUTION INTRAVENOUS CONTINUOUS
Status: DISCONTINUED | OUTPATIENT
Start: 2024-11-22 | End: 2024-11-22 | Stop reason: HOSPADM

## 2024-11-22 RX ORDER — HYDRALAZINE HYDROCHLORIDE 20 MG/ML
5 INJECTION INTRAMUSCULAR; INTRAVENOUS
Status: DISCONTINUED | OUTPATIENT
Start: 2024-11-22 | End: 2024-11-22 | Stop reason: HOSPADM

## 2024-11-22 RX ORDER — HYDROMORPHONE HYDROCHLORIDE 1 MG/ML
0.25 INJECTION, SOLUTION INTRAMUSCULAR; INTRAVENOUS; SUBCUTANEOUS
Status: DISCONTINUED | OUTPATIENT
Start: 2024-11-22 | End: 2024-11-22 | Stop reason: HOSPADM

## 2024-11-22 RX ORDER — LIDOCAINE HYDROCHLORIDE 20 MG/ML
INJECTION, SOLUTION INFILTRATION; PERINEURAL AS NEEDED
Status: DISCONTINUED | OUTPATIENT
Start: 2024-11-22 | End: 2024-11-22 | Stop reason: SURG

## 2024-11-22 RX ORDER — FENTANYL CITRATE 50 UG/ML
50 INJECTION, SOLUTION INTRAMUSCULAR; INTRAVENOUS ONCE AS NEEDED
Status: DISCONTINUED | OUTPATIENT
Start: 2024-11-22 | End: 2024-11-22 | Stop reason: HOSPADM

## 2024-11-22 RX ORDER — DROPERIDOL 2.5 MG/ML
0.62 INJECTION, SOLUTION INTRAMUSCULAR; INTRAVENOUS
Status: DISCONTINUED | OUTPATIENT
Start: 2024-11-22 | End: 2024-11-22 | Stop reason: HOSPADM

## 2024-11-22 RX ORDER — FENTANYL CITRATE 50 UG/ML
25 INJECTION, SOLUTION INTRAMUSCULAR; INTRAVENOUS
Status: DISCONTINUED | OUTPATIENT
Start: 2024-11-22 | End: 2024-11-22 | Stop reason: HOSPADM

## 2024-11-22 RX ORDER — PROMETHAZINE HYDROCHLORIDE 12.5 MG/1
25 TABLET ORAL ONCE AS NEEDED
Status: DISCONTINUED | OUTPATIENT
Start: 2024-11-22 | End: 2024-11-22 | Stop reason: HOSPADM

## 2024-11-22 RX ORDER — METOPROLOL TARTRATE 1 MG/ML
INJECTION, SOLUTION INTRAVENOUS AS NEEDED
Status: DISCONTINUED | OUTPATIENT
Start: 2024-11-22 | End: 2024-11-22 | Stop reason: SURG

## 2024-11-22 RX ORDER — LABETALOL HYDROCHLORIDE 5 MG/ML
INJECTION, SOLUTION INTRAVENOUS AS NEEDED
Status: DISCONTINUED | OUTPATIENT
Start: 2024-11-22 | End: 2024-11-22 | Stop reason: SURG

## 2024-11-22 RX ORDER — SODIUM CHLORIDE 0.9 % (FLUSH) 0.9 %
3-10 SYRINGE (ML) INJECTION AS NEEDED
Status: DISCONTINUED | OUTPATIENT
Start: 2024-11-22 | End: 2024-11-22 | Stop reason: HOSPADM

## 2024-11-22 RX ORDER — FLUMAZENIL 0.1 MG/ML
0.2 INJECTION INTRAVENOUS AS NEEDED
Status: DISCONTINUED | OUTPATIENT
Start: 2024-11-22 | End: 2024-11-22 | Stop reason: HOSPADM

## 2024-11-22 RX ORDER — PROMETHAZINE HYDROCHLORIDE 25 MG/1
25 SUPPOSITORY RECTAL ONCE AS NEEDED
Status: DISCONTINUED | OUTPATIENT
Start: 2024-11-22 | End: 2024-11-22 | Stop reason: HOSPADM

## 2024-11-22 RX ORDER — ONDANSETRON 2 MG/ML
4 INJECTION INTRAMUSCULAR; INTRAVENOUS ONCE AS NEEDED
Status: DISCONTINUED | OUTPATIENT
Start: 2024-11-22 | End: 2024-11-22 | Stop reason: HOSPADM

## 2024-11-22 RX ORDER — HYDROCODONE BITARTRATE AND ACETAMINOPHEN 7.5; 325 MG/1; MG/1
1 TABLET ORAL EVERY 4 HOURS PRN
Status: DISCONTINUED | OUTPATIENT
Start: 2024-11-22 | End: 2024-11-22 | Stop reason: HOSPADM

## 2024-11-22 RX ORDER — LABETALOL HYDROCHLORIDE 5 MG/ML
5 INJECTION, SOLUTION INTRAVENOUS
Status: DISCONTINUED | OUTPATIENT
Start: 2024-11-22 | End: 2024-11-22 | Stop reason: HOSPADM

## 2024-11-22 RX ORDER — DIPHENHYDRAMINE HYDROCHLORIDE 50 MG/ML
12.5 INJECTION INTRAMUSCULAR; INTRAVENOUS
Status: DISCONTINUED | OUTPATIENT
Start: 2024-11-22 | End: 2024-11-22 | Stop reason: HOSPADM

## 2024-11-22 RX ORDER — HYDROCODONE BITARTRATE AND ACETAMINOPHEN 5; 325 MG/1; MG/1
1 TABLET ORAL ONCE AS NEEDED
Status: DISCONTINUED | OUTPATIENT
Start: 2024-11-22 | End: 2024-11-22 | Stop reason: HOSPADM

## 2024-11-22 RX ORDER — SODIUM CHLORIDE 0.9 % (FLUSH) 0.9 %
3 SYRINGE (ML) INJECTION EVERY 12 HOURS SCHEDULED
Status: DISCONTINUED | OUTPATIENT
Start: 2024-11-22 | End: 2024-11-22 | Stop reason: HOSPADM

## 2024-11-22 RX ORDER — ONDANSETRON 8 MG/1
8 TABLET, FILM COATED ORAL 3 TIMES DAILY PRN
Qty: 30 TABLET | Refills: 5 | Status: SHIPPED | OUTPATIENT
Start: 2024-12-01

## 2024-11-22 RX ORDER — PROPOFOL 10 MG/ML
VIAL (ML) INTRAVENOUS AS NEEDED
Status: DISCONTINUED | OUTPATIENT
Start: 2024-11-22 | End: 2024-11-22 | Stop reason: SURG

## 2024-11-22 RX ORDER — BUPIVACAINE HYDROCHLORIDE AND EPINEPHRINE 5; 5 MG/ML; UG/ML
INJECTION, SOLUTION EPIDURAL; INTRACAUDAL; PERINEURAL AS NEEDED
Status: DISCONTINUED | OUTPATIENT
Start: 2024-11-22 | End: 2024-11-22 | Stop reason: HOSPADM

## 2024-11-22 RX ADMIN — METOROPROLOL TARTRATE 2.5 MG: 5 INJECTION, SOLUTION INTRAVENOUS at 07:58

## 2024-11-22 RX ADMIN — FAMOTIDINE 20 MG: 10 INJECTION, SOLUTION INTRAVENOUS at 07:41

## 2024-11-22 RX ADMIN — PROPOFOL 75 MCG/KG/MIN: 10 INJECTION, EMULSION INTRAVENOUS at 07:52

## 2024-11-22 RX ADMIN — SODIUM CHLORIDE, POTASSIUM CHLORIDE, SODIUM LACTATE AND CALCIUM CHLORIDE 9 ML/HR: 600; 310; 30; 20 INJECTION, SOLUTION INTRAVENOUS at 06:54

## 2024-11-22 RX ADMIN — PROPOFOL 100 MG: 10 INJECTION, EMULSION INTRAVENOUS at 07:52

## 2024-11-22 RX ADMIN — SODIUM CHLORIDE 2000 MG: 900 INJECTION INTRAVENOUS at 07:41

## 2024-11-22 RX ADMIN — LIDOCAINE HYDROCHLORIDE 50 MG: 20 INJECTION, SOLUTION INFILTRATION; PERINEURAL at 07:52

## 2024-11-22 RX ADMIN — LABETALOL HYDROCHLORIDE 5 MG: 5 INJECTION, SOLUTION INTRAVENOUS at 08:17

## 2024-11-22 NOTE — PROGRESS NOTES
[facility] Hematology/Oncology Treatment Plan Summary    [id number]  MD: : Shona    Diagnosis:     ICD-10-CM ICD-9-CM   1. Rectal cancer  C20 154.1     Stage: III      Goal of treatment: neoadjuvant    Treatment Medication(s):   Fluorouracil  Oxaliplatin  Leucovorin    Frequency: Every 2 weeks    Number of cycles: 6 followed by imaging    Starting on: 12/2/2024    Repeat after 6 cycles:  MRI pelvis    Items for home use: Thermometer    Rx written for: [x] Nausea    [] Pre-Treatment   ondansetron 8 mg by mouth every 8 hours as needed for nausea    Notes:     Next Steps: PORT     Completing Provider: BOBBY Cervantes           Date/time: 11/22/2024      Please note: You will be seen by a provider frequently with your treatment plan. This plan may change depending on many factors, if so, this will be discussed with you by your physician.  Last update 03/2022.

## 2024-11-22 NOTE — PROGRESS NOTES
TREATMENT  PREPARATION    Kevin Garsia  [id number]  1955    Chief Complaint: Treatment preparation and needs assessment    History of present illness:  Kevin Garsia is a 69 y.o. year old male who is here today for treatment preparation and needs assessment.  The patient has been diagnosed with   Encounter Diagnosis   Name Primary?    Rectal cancer Yes    and is scheduled to begin treatment with:     Oncology History:    Oncology/Hematology History   Rectal adenocarcinoma   11/14/2024 Cancer Staged    Staging form: Colon And Rectum, AJCC 8th Edition  - Clinical stage from 11/14/2024: cT3, cN2, cM0 - Signed by Naeem Rai MD on 11/18/2024     11/15/2024 Initial Diagnosis    Rectal adenocarcinoma     12/2/2024 -  Chemotherapy    OP COLON mFOLFOX6 OXALIplatin / Leucovorin / Fluorouracil         The current medication list and allergy list were reviewed and reconciled.     Past Medical History, Past Surgical History, Social History, Family History have been reviewed and are without significant changes except as mentioned.    Physical Exam:    Vitals:    11/22/24 1457   BP: 160/76   Pulse: 62   Resp: 16   Temp: 97.8 °F (36.6 °C)   SpO2: 96%     Vitals:    11/22/24 1457   PainSc: 0-No pain        ECOG score: 0             Physical Exam  HENT:      Head: Normocephalic and atraumatic.   Eyes:      Extraocular Movements: Extraocular movements intact.      Conjunctiva/sclera: Conjunctivae normal.   Pulmonary:      Effort: Pulmonary effort is normal. No respiratory distress.   Neurological:      General: No focal deficit present.      Mental Status: He is alert and oriented to person, place, and time.   Psychiatric:         Mood and Affect: Mood normal.         Behavior: Behavior normal.           NEEDS ASSESSMENTS    Genetics  The patient's new diagnosis and family history have been reviewed for genetic counseling needs. The patient will not be referred..     Psychosocial and Barriers to care  The  patient has completed a PHQ-9 Depression Screening and the Distress Thermometer (DT) today.  PHQ-9 results show PHQ-2 Total Score:   PHQ-9 Total Score:        The patient scored their distress today as Distress Level: 2 on a scale of 0-10 with 0 being no distress and 10 being extreme distress. Problems marked by the patient as being an issue for them within the last week include Physical concerns  Fatigue: Yes  Pain: Yes  Sleep: Yes .      Results were reviewed along with psychosocial resources offered by our cancer center.  Our Supportive Oncology team will be flagged for a score of 4 or above, and a same day call will be made for a score of 9 or 10.  A mental health referral is offered at that time. Patients who score less than 4 have been educated on our support services and can be referred to our  upon request.  The patient will not be referred to our .       Nutrition  The patient has completed the malnutrition screening today. They scored Malnutrition Screening Tool  Have you recently lost weight without trying?  If yes, how much weight have you lost?: 0--> No  Have you been eating poorly because of a decreased appetite?: 0--> No  MST score: 0   with a score of 0-1 meaning not at risk in a score of 2 or greater meaning at risk.  Patients with a score of 3 or higher will be referred to our oncology dietitian for support. Patients beginning at risk treatment regimens or who have dietary concerns will also be referred to our oncology dietitian. The patient will be referred.    Functional Assessment  Persons who are age 70 or greater will be screened for qualification of a comprehensive geriatric assessment by our survivorship nurse practitioner.  Older adults with cancer face unique challenges. These may include an increased risk of drug reactions, financial burdens, and caregiver stress. The patient scored G8 Questionnaire  Has food intake declined over the past 3 months due to loss of  appetite, digestive problems, chewing or swallowing difficulties?: No decrease in food intake  Weight loss during the last 3 months: No weight loss  Mobility: Goes out  Neuropsychological Problems: No psychological problems  Body Mass Index (BMI (weight in kg) / (height in m2)): BMI 23 and > 23  Takes more than 3 medications a day: Yes, takes more than 3 prescription drugs per day  In comparison with other people of the same age, how does the patient consider his/her health status?: Not as good  Age: < 80  Total Score: 14 . Patients scoring 14 or lower will referred for an older adult functional assessment with the survivorship advanced practice registered nurse to ensure all needed support is provided as patients plan for their treatments. The patient will be referred.    Intravenous Access Assessment  The patient and I discussed planned intravenous chemo/biotherapy as well as other IV treatments that are often needed throughout the course of treatment. These may include, but are not limited to blood transfusions, antibiotics, and IV hydration. Discussed that depending on selected treatment and vein assessment, patient may require venous access device (VAD) which could include but not limited to a Mediport or PICC line. Risks and benefits of VADs reviewed. The patient will be treated via Port.    Reproductive/Sexual Activity   People should avoid becoming pregnant and should not get a partner pregnant while undergoing chemo/biotherapy.  People of childbearing age should use effective contraception during active therapy. The best recommendation for all people is to use a barrier method for a minimum of 1 week after the last infusion of chemo/biotherapy to prevent your partner being exposed to byproducts from treatment medications in bodily fluids. Effective contraception should be discussed with your oncology team to make sure it is safe to take based on your diagnosis. Possible options include oral contraceptives,  "barrier methods. Chemo/biotherapy can change your ability to reproduce children in the future.  There are options for fertility preservation. NOT APPLICABLE    Advanced Care Planning  Advance Care Planning   The patient and I discussed advanced care planning, \"Conversations that Matter\".   This service is offered for development of advance directives with a certified ACP facilitator.  The patient does not have an up-to-date advanced directive. This document is not on file with our office. The patient is interested in an appointment with one of our facilitators to create or update their advanced directives.    Have you reviewed your Advance Directive and is it valid for this stay?: Not applicable          Smoking cessation  Tobacco Use: Medium Risk (11/22/2024)    Patient History     Smoking Tobacco Use: Former     Smokeless Tobacco Use: Never     Passive Exposure: Not on file       Patient and I discussed their tobacco use history. Referral will not be made for smoking cessation.      Palliative Care  When appropriate, the patient and I discussed the availability palliative care services and when appropriate Hospice care. Palliative care is not the same as Hospice care which was explained to the patient.NOT APPLICABLE.    Survivorship   When appropriate, we discussed that we will refer the patient to survivorship clinic to discuss next steps following completion of planned treatment.  Reviewed this visit will include assessment of your physical, psychological, functional, and spiritual needs as a survivor and the need at attend this visit when scheduled.    TREATMENT EDUCATION    Today I met with the patient to discuss the chemo/biotherapy regimen recommended for treatment of Rectal cancer  .  The patient was given explanation of treatment premed side effects including office policy that prohibits patients to drive if sedating medications are administered, MD explanation given regarding benefits, side effects, " toxicities and goals of treatment.  The patient received a Chemotherapy/Biotherapy Plan Summary including diagnosis and explanation of specific treatment plan.    SIDE EFFECTS:  Common side effects were discussed with the patient and/or significant other.  Discussion included where applicable hair loss/discoloration, anemia/fatigue, infection/chills/fever, appetite, bleeding risk/precautions, constipation, diarrhea, mouth sores, taste alteration, loss of appetite, nausea/vomiting, peripheral neuropathy, skin/nail changes, rash, muscle aches/weakness, photosensitivity, weight gain/loss, hearing loss, dizziness, menopausal symptoms, menstrual irregularity, sterility, high blood pressure, heart damage, liver damage, lung damage, kidney damage, DVT/PE risk, fluid retention, pleural/pericardial effusion, somnolence, electrolyte/LFT imbalance, vein exercises and/or the possible need for vascular access/port placement.  The patient was advised that although uncommon, leakage of an infused medication from the vein or venous access device may lead to skin breakdown and/or other tissue damage.  The patient was advised that he/she may have pain, bleeding, and/or bruising from the insertion of a needle in their vein or venous access device (port).  The patient was further advised that, in spite of proper technique, infection with redness and irritation may rarely occur at the site where the needle was inserted.  The patient was advised that if complications occur, additional medical treatment is available.  Finally, where applicable we have reviewed rare but potential immune mediated side effects including shortness of breath, cough, chest pain (pneumonitis), abdominal pain, diarrhea (colitis), thyroiditis (hypothyroid or hyperthyroid), hepatitis and liver dysfunction, nephritis and renal dysfunction.    Discussion also included side effects specific to drugs in the treatment plan, specifically:    Treatment Plans       Name  Type Hold Status Plan Dates Plan Provider       Active    OP COLON mFOLFOX6 OXALIplatin / Leucovorin / Fluorouracil ONCOLOGY TREATMENT On Automatic Hold 12/1/2024 - Present Kevin Nagel MD                     Questions answered and additional information discussed on topics including:  Anemia, Thrombocytopenia, Neutropenia, Nutrition and appetite changes, Constipation, Diarrhea, Nausea & vomiting, Mouth sores, Alopecia, Intimate activity, Nervous system changes, Skin & nail changes, and Organ toxicities       Assessment and Plan:    Diagnoses and all orders for this visit:    1. Rectal cancer (Primary)      No orders of the defined types were placed in this encounter.        The patient and I have reviewed their diagnosis and scheduled treatment plan. Needs assessment was completed where applicable including genetics, psychosocial needs, barriers to care, VAD evaluation, advanced care planning, survivorship, and palliative care services where indicated. Referrals have been ordered as appropriate based upon evaluation today and patient desires.   Chemo/biotherapy teaching was completed today and consent obtained. See separate documentation for further details.  Adequate time was given to answer questions.  Patient made aware of their care team members and contact information if they have questions or problems throughout the treatment course.  Discussion held and written information provided describing frequency of office visits and ongoing monitoring throughout the treatment plan.     Reviewed with patient any prescribed medication sent to pharmacy.  Education provided regarding proper storage, safe handling, and proper disposal of unused medication.  Proper handling of body fluids and waste discussed and written information provided.  If appropriate, patient had pretreatment labs drawn today.    Learning assessment completed at initial patient encounter. See separate flowsheet. Chemo/biotherapy education  comprehension assessed at today's visit.    I spent 45 minutes caring for Kevin on this date of service. This time includes time spent by me in the following activities: preparing for the visit, reviewing tests, obtaining and/or reviewing a separately obtained history, performing a medically appropriate examination and/or evaluation, counseling and educating the patient/family/caregiver, ordering medications, tests, or procedures, referring and communicating with other health care professionals, and documenting information in the medical record.     Lynda Figueroa, APRN   11/22/24

## 2024-11-22 NOTE — OP NOTE
Colorectal & General Surgery  Operative Report    Patient: Kevin Garsia  YOB: 1955  MRN: [id number]  DATE OF PROCEDURE: 11/22/24     PREOPERATIVE DIAGNOSIS:  Rectal adenocarcinoma    POSTOPERATIVE DIAGNOSIS:  Same    PROCEDURE:  Powerport placement with fluoroscopic guidance  Ultrasound guided access of the right internal jugular vein    SURGEON:  Red Rai MD    ASSISTANT:  None    ANESTHESIA:  Monitored anesthesia care    EBL:  Minimal    SPECIMEN:  None    DESCRIPTION:  All risks, benefits, and alternatives were explained and informed consent was obtained.  The patient was taken to the operating room under the care of the nursing staff.  The patient was placed on the operating room table in the supine position where anesthesia was induced.  The patient was then prepped and draped in the usual sterile fashion.  Local anesthesic was administered.  The right internal jugular vein was accessed with an 18-gauge needle under ultrasound guiddance, and a guidewire was inserted under fluoroscopic guidance into the superior vena cava.  A subcutaneous pocket was then created with electrocautery on the right chest wall. The port was placed in the pocket and secured in two points with 2-0 PDS. The tubing was then tunneled from the subcutaneous pocket to the location of the wire. The vein dilator and sheath were introduced, and the dilator and guidewire were removed.  The port tubing was inserted to the cavoatrial junction, and position of tip was confirmed with fluoroscopic guidance.  Venous blood was easily aspirated from the port, and the port was flushed with heparinized saline. There was good hemostasis noted. The skin was then closed with 3-0 Vicryl deep dermal and 4-0 monocryl subcuticular sutures. Dermabond was placed over the wound. The patient tolerated the procedure well and was transferred to the recovery area in stable condition. Recovery room CXR was ordered to confirm placement.    Red  ANGELIKA Rai M.D.  Colorectal & General Surgery  [facility]    [address], [address]  [place], [place], [address]  P: [phone]  F: [phone]

## 2024-11-22 NOTE — ANESTHESIA POSTPROCEDURE EVALUATION
Patient: Kevin Garsia    Procedure Summary       Date: 11/22/24 Room / Location: [facility] OR 03 / [facility]    Anesthesia Start: 0748 Anesthesia Stop: 0831    Procedure: INSERTION VENOUS ACCESS DEVICE Diagnosis:       Rectal adenocarcinoma      (Rectal adenocarcinoma [C20])    Surgeons: Naeem Rai MD Provider: Tex Sellers MD    Anesthesia Type: MAC ASA Status: 3            Anesthesia Type: MAC    Vitals  Vitals Value Taken Time   /74 11/22/24 0843   Temp 36.6 °C (97.8 °F) 11/22/24 0833   Pulse 73 11/22/24 0843   Resp 20 11/22/24 0843   SpO2 96 % 11/22/24 0843           Post Anesthesia Care and Evaluation    Patient location during evaluation: bedside  Patient participation: complete - patient participated  Level of consciousness: awake  Pain management: adequate    Airway patency: patent  Anesthetic complications: No anesthetic complications  PONV Status: none  Cardiovascular status: acceptable  Respiratory status: acceptable  Hydration status: acceptable  Post Neuraxial Block status: Motor and sensory function returned to baseline

## 2024-11-22 NOTE — ANESTHESIA PREPROCEDURE EVALUATION
Anesthesia Evaluation     Patient summary reviewed   NPO Solid Status: > 8 hours  NPO Liquid Status: > 2 hours           Airway   Mallampati: III  TM distance: >3 FB  Neck ROM: full  Possible difficult intubation  Dental - normal exam     Pulmonary    (+) ,sleep apnea on CPAP  Cardiovascular   Exercise tolerance: good (4-7 METS)    Rhythm: regular    (+) hypertension, CHF Systolic <55%, hyperlipidemia      Neuro/Psych  (+) TIA  GI/Hepatic/Renal/Endo    (+) obesity, GERD, PUD, diabetes mellitus    Musculoskeletal     (+) neck pain  Abdominal    Substance History      OB/GYN          Other      history of cancer active                Anesthesia Plan    ASA 3     MAC     intravenous induction     Anesthetic plan, risks, benefits, and alternatives have been provided, discussed and informed consent has been obtained with: patient.    CODE STATUS:

## 2024-11-25 ENCOUNTER — HOSPITAL ENCOUNTER (OUTPATIENT)
Dept: PET IMAGING | Facility: HOSPITAL | Age: 69
Discharge: HOME | End: 2024-11-25
Payer: MEDICARE

## 2024-11-25 ENCOUNTER — TELEPHONE (OUTPATIENT)
Dept: OTHER | Facility: HOSPITAL | Age: 69
End: 2024-11-25
Payer: MEDICARE

## 2024-11-25 DIAGNOSIS — C20 RECTAL CANCER: ICD-10-CM

## 2024-11-25 LAB
CLINICAL INFO: NORMAL
DPYD GENE MUT ANL BLD/T: NORMAL
DPYD GENE PROD MET ACT IMP BLD/T-IMP: NORMAL
GLUCOSE BLDC GLUCOMTR-MCNC: 87 MG/DL (ref 70–130)
IMP & REVIEW OF LAB RESULTS: NORMAL
MEDICAL DIRECTOR REVIEW: NORMAL

## 2024-11-25 PROCEDURE — 78815 PET IMAGE W/CT SKULL-THIGH: CPT

## 2024-11-25 PROCEDURE — A9552 F18 FDG: HCPCS | Performed by: INTERNAL MEDICINE

## 2024-11-25 PROCEDURE — 34310000005 FLUDEOXYGLUCOSE F18 SOLUTION: Performed by: INTERNAL MEDICINE

## 2024-11-25 PROCEDURE — 82948 REAGENT STRIP/BLOOD GLUCOSE: CPT

## 2024-11-25 RX ADMIN — FLUDEOXYGLUCOSE F 18 1 DOSE: 200 INJECTION, SOLUTION INTRAVENOUS at 12:13

## 2024-11-25 NOTE — TELEPHONE ENCOUNTER
[facility]  SURVIVORSHIP SERVICES CARE COORDINATION NOTE  PHONE      Call placed to patient  RE: open referral for older adult functional assessment visit.    Left voice mail     Introduced myself and reviewed purpose and goals of functional assessment and what to expect..     older adult functional assessment information and self-report tools.

## 2024-12-02 ENCOUNTER — CLINICAL SUPPORT (OUTPATIENT)
Dept: OTHER | Facility: HOSPITAL | Age: 69
End: 2024-12-02
Payer: MEDICARE

## 2024-12-02 ENCOUNTER — INFUSION (OUTPATIENT)
Dept: ONCOLOGY | Facility: HOSPITAL | Age: 69
End: 2024-12-02
Payer: MEDICARE

## 2024-12-02 ENCOUNTER — OFFICE VISIT (OUTPATIENT)
Dept: ONCOLOGY | Facility: CLINIC | Age: 69
End: 2024-12-02
Payer: MEDICARE

## 2024-12-02 VITALS
BODY MASS INDEX: 31.54 KG/M2 | TEMPERATURE: 98 F | RESPIRATION RATE: 16 BRPM | OXYGEN SATURATION: 97 % | SYSTOLIC BLOOD PRESSURE: 167 MMHG | HEIGHT: 65 IN | WEIGHT: 189.3 LBS | DIASTOLIC BLOOD PRESSURE: 75 MMHG | HEART RATE: 82 BPM

## 2024-12-02 DIAGNOSIS — D64.9 ANEMIA, UNSPECIFIED TYPE: ICD-10-CM

## 2024-12-02 DIAGNOSIS — C20 RECTAL ADENOCARCINOMA: Primary | ICD-10-CM

## 2024-12-02 DIAGNOSIS — C20 RECTAL ADENOCARCINOMA: ICD-10-CM

## 2024-12-02 PROBLEM — D50.9 IRON DEFICIENCY ANEMIA: Status: ACTIVE | Noted: 2024-12-02

## 2024-12-02 PROBLEM — T45.4X5A ADVERSE EFFECT OF IRON: Status: ACTIVE | Noted: 2024-12-02

## 2024-12-02 LAB
ALBUMIN SERPL-MCNC: 3.9 G/DL (ref 3.5–5.2)
ALBUMIN/GLOB SERPL: 1.4 G/DL
ALP SERPL-CCNC: 106 U/L (ref 39–117)
ALT SERPL W P-5'-P-CCNC: 17 U/L (ref 1–41)
ANION GAP SERPL CALCULATED.3IONS-SCNC: 11 MMOL/L (ref 5–15)
AST SERPL-CCNC: 22 U/L (ref 1–40)
BASOPHILS # BLD AUTO: 0.07 10*3/MM3 (ref 0–0.2)
BASOPHILS NFR BLD AUTO: 0.9 % (ref 0–1.5)
BILIRUB SERPL-MCNC: 0.3 MG/DL (ref 0–1.2)
BUN SERPL-MCNC: 9 MG/DL (ref 8–23)
BUN/CREAT SERPL: 9.9 (ref 7–25)
CALCIUM SPEC-SCNC: 9 MG/DL (ref 8.6–10.5)
CHLORIDE SERPL-SCNC: 100 MMOL/L (ref 98–107)
CO2 SERPL-SCNC: 29 MMOL/L (ref 22–29)
CREAT SERPL-MCNC: 0.91 MG/DL (ref 0.76–1.27)
DEPRECATED RDW RBC AUTO: 50.8 FL (ref 37–54)
EGFRCR SERPLBLD CKD-EPI 2021: 90.1 ML/MIN/1.73
EOSINOPHIL # BLD AUTO: 0.37 10*3/MM3 (ref 0–0.4)
EOSINOPHIL NFR BLD AUTO: 4.9 % (ref 0.3–6.2)
ERYTHROCYTE [DISTWIDTH] IN BLOOD BY AUTOMATED COUNT: 16.1 % (ref 12.3–15.4)
GLOBULIN UR ELPH-MCNC: 2.8 GM/DL
GLUCOSE SERPL-MCNC: 122 MG/DL (ref 65–99)
HCT VFR BLD AUTO: 33 % (ref 37.5–51)
HGB BLD-MCNC: 9.9 G/DL (ref 13–17.7)
IMM GRANULOCYTES # BLD AUTO: 0.02 10*3/MM3 (ref 0–0.05)
IMM GRANULOCYTES NFR BLD AUTO: 0.3 % (ref 0–0.5)
LYMPHOCYTES # BLD AUTO: 1.51 10*3/MM3 (ref 0.7–3.1)
LYMPHOCYTES NFR BLD AUTO: 20.1 % (ref 19.6–45.3)
MCH RBC QN AUTO: 26.3 PG (ref 26.6–33)
MCHC RBC AUTO-ENTMCNC: 30 G/DL (ref 31.5–35.7)
MCV RBC AUTO: 87.5 FL (ref 79–97)
MONOCYTES # BLD AUTO: 0.63 10*3/MM3 (ref 0.1–0.9)
MONOCYTES NFR BLD AUTO: 8.4 % (ref 5–12)
NEUTROPHILS NFR BLD AUTO: 4.9 10*3/MM3 (ref 1.7–7)
NEUTROPHILS NFR BLD AUTO: 65.4 % (ref 42.7–76)
NRBC BLD AUTO-RTO: 0 /100 WBC (ref 0–0.2)
PLATELET # BLD AUTO: 296 10*3/MM3 (ref 140–450)
PMV BLD AUTO: 9.3 FL (ref 6–12)
POTASSIUM SERPL-SCNC: 4.1 MMOL/L (ref 3.5–5.2)
PROT SERPL-MCNC: 6.7 G/DL (ref 6–8.5)
RBC # BLD AUTO: 3.77 10*6/MM3 (ref 4.14–5.8)
SODIUM SERPL-SCNC: 140 MMOL/L (ref 136–145)
WBC NRBC COR # BLD AUTO: 7.5 10*3/MM3 (ref 3.4–10.8)

## 2024-12-02 PROCEDURE — 96411 CHEMO IV PUSH ADDL DRUG: CPT

## 2024-12-02 PROCEDURE — 25810000003 SODIUM CHLORIDE 0.9 % SOLUTION 250 ML FLEX CONT: Performed by: INTERNAL MEDICINE

## 2024-12-02 PROCEDURE — 1125F AMNT PAIN NOTED PAIN PRSNT: CPT

## 2024-12-02 PROCEDURE — G0498 CHEMO EXTEND IV INFUS W/PUMP: HCPCS

## 2024-12-02 PROCEDURE — 85025 COMPLETE CBC W/AUTO DIFF WBC: CPT

## 2024-12-02 PROCEDURE — 3078F DIAST BP <80 MM HG: CPT

## 2024-12-02 PROCEDURE — 25010000002 FLUOROURACIL PER 500 MG: Performed by: INTERNAL MEDICINE

## 2024-12-02 PROCEDURE — 25010000003 DEXTROSE 5 % SOLUTION: Performed by: INTERNAL MEDICINE

## 2024-12-02 PROCEDURE — 25010000002 FOSAPREPITANT PER 1 MG: Performed by: INTERNAL MEDICINE

## 2024-12-02 PROCEDURE — 25010000002 PALONOSETRON PER 25 MCG: Performed by: INTERNAL MEDICINE

## 2024-12-02 PROCEDURE — 80053 COMPREHEN METABOLIC PANEL: CPT

## 2024-12-02 PROCEDURE — 25010000003 DEXTROSE 5 % SOLUTION 250 ML FLEX CONT: Performed by: INTERNAL MEDICINE

## 2024-12-02 PROCEDURE — 96413 CHEMO IV INFUSION 1 HR: CPT

## 2024-12-02 PROCEDURE — 99214 OFFICE O/P EST MOD 30 MIN: CPT

## 2024-12-02 PROCEDURE — 3077F SYST BP >= 140 MM HG: CPT

## 2024-12-02 PROCEDURE — 96415 CHEMO IV INFUSION ADDL HR: CPT

## 2024-12-02 PROCEDURE — 96368 THER/DIAG CONCURRENT INF: CPT

## 2024-12-02 PROCEDURE — 25010000002 DEXAMETHASONE SODIUM PHOSPHATE 100 MG/10ML SOLUTION: Performed by: INTERNAL MEDICINE

## 2024-12-02 PROCEDURE — 25010000002 LEUCOVORIN CALCIUM PER 50 MG: Performed by: INTERNAL MEDICINE

## 2024-12-02 PROCEDURE — 96367 TX/PROPH/DG ADDL SEQ IV INF: CPT

## 2024-12-02 PROCEDURE — 96375 TX/PRO/DX INJ NEW DRUG ADDON: CPT

## 2024-12-02 PROCEDURE — 25010000002 OXALIPLATIN PER 0.5 MG: Performed by: INTERNAL MEDICINE

## 2024-12-02 RX ORDER — DIPHENHYDRAMINE HYDROCHLORIDE 50 MG/ML
50 INJECTION INTRAMUSCULAR; INTRAVENOUS AS NEEDED
Status: CANCELLED | OUTPATIENT
Start: 2024-12-02

## 2024-12-02 RX ORDER — DEXTROSE MONOHYDRATE 50 MG/ML
20 INJECTION, SOLUTION INTRAVENOUS ONCE
Status: CANCELLED | OUTPATIENT
Start: 2024-12-02

## 2024-12-02 RX ORDER — PALONOSETRON 0.05 MG/ML
0.25 INJECTION, SOLUTION INTRAVENOUS ONCE
Status: CANCELLED | OUTPATIENT
Start: 2024-12-02

## 2024-12-02 RX ORDER — PALONOSETRON 0.05 MG/ML
0.25 INJECTION, SOLUTION INTRAVENOUS ONCE
Status: COMPLETED | OUTPATIENT
Start: 2024-12-02 | End: 2024-12-02

## 2024-12-02 RX ORDER — DEXTROSE MONOHYDRATE 50 MG/ML
20 INJECTION, SOLUTION INTRAVENOUS ONCE
Status: COMPLETED | OUTPATIENT
Start: 2024-12-02 | End: 2024-12-02

## 2024-12-02 RX ORDER — FLUOROURACIL 50 MG/ML
400 INJECTION, SOLUTION INTRAVENOUS ONCE
Status: COMPLETED | OUTPATIENT
Start: 2024-12-02 | End: 2024-12-02

## 2024-12-02 RX ORDER — HYDROCORTISONE SODIUM SUCCINATE 100 MG/2ML
100 INJECTION INTRAMUSCULAR; INTRAVENOUS AS NEEDED
Status: CANCELLED | OUTPATIENT
Start: 2024-12-02

## 2024-12-02 RX ORDER — FLUOROURACIL 50 MG/ML
400 INJECTION, SOLUTION INTRAVENOUS ONCE
Status: CANCELLED | OUTPATIENT
Start: 2024-12-02

## 2024-12-02 RX ORDER — FAMOTIDINE 10 MG/ML
20 INJECTION, SOLUTION INTRAVENOUS AS NEEDED
Status: CANCELLED | OUTPATIENT
Start: 2024-12-02

## 2024-12-02 RX ADMIN — DEXTROSE MONOHYDRATE 20 ML/HR: 50 INJECTION, SOLUTION INTRAVENOUS at 09:22

## 2024-12-02 RX ADMIN — FLUOROURACIL 770 MG: 50 INJECTION, SOLUTION INTRAVENOUS at 12:22

## 2024-12-02 RX ADMIN — LEUCOVORIN CALCIUM 770 MG: 350 INJECTION, POWDER, LYOPHILIZED, FOR SUSPENSION INTRAMUSCULAR; INTRAVENOUS at 10:11

## 2024-12-02 RX ADMIN — DEXAMETHASONE SODIUM PHOSPHATE 12 MG: 10 INJECTION, SOLUTION INTRAMUSCULAR; INTRAVENOUS at 09:22

## 2024-12-02 RX ADMIN — OXALIPLATIN 165 MG: 5 INJECTION, SOLUTION INTRAVENOUS at 10:11

## 2024-12-02 RX ADMIN — FLUOROURACIL 4610 MG: 50 INJECTION, SOLUTION INTRAVENOUS at 12:22

## 2024-12-02 RX ADMIN — FOSAPREPITANT 100 ML: 150 INJECTION, POWDER, LYOPHILIZED, FOR SOLUTION INTRAVENOUS at 09:40

## 2024-12-02 RX ADMIN — PALONOSETRON HYDROCHLORIDE 0.25 MG: 0.25 INJECTION INTRAVENOUS at 09:22

## 2024-12-02 NOTE — PROGRESS NOTES
"OUTPATIENT ONCOLOGY NUTRITION ASSESSMENT    Patient Name: Kevin Garsia  YOB: 1955  MRN: [id number]  Assessment Date: 12/2/2024    COMMENTS:  Initial nutrition assessment for pt with stage III rectal adenocarcinoma. Other pertinent medical history includes anemia.  Pt is receiving his FOLFOX infusion ( # 1 of 6 treatments).  Labs/Meds from today reviewed. Hgb 9.9.    Met with pt and his wife today.  They report  a good appetite and no current GI issues. Weight hx reviewed, stable.    Explained RD role and the importance of adequate nutrition and hydration during treatment. Reinforced importance of having regular bowel movement, no constipation. Encouraged patient to focus on getting adequate calories, protein and nutrients in order to support immune function and nutrition needs. Reviewed examples of high protein foods to include at meals and snacks. Suggested small more frequent meals if appetite decreases.  Reviewed current and potential future side effects:  N/V/D, constipation, mouth sores, poor appetite, fatigue and taste alteration.     Provided the [employer] booklet \"Nutrition during Cancer Treatment\" as a resource as well as RD contact info for any questions. Will follow throughout treatment course and be available as needed. Pt very appreciative of visit.        Reason for Assessment New assessment, Nurse Practitioner referral      Diagnosis/Problem   Stage III rectal cancer   Treatment Plan Chemotherapy FOLFOX   Frequency Q 2 weeks x 6 weeks   Goal of cancer treatment Neoadjuvant   Comments:          Encounter Information        Nutrition/Diet History:  Eating a regular diet, no issues   Oral Nutrition Supplements:    Factors/Symptoms Affecting Intake: No factors at this time   Comments:      Medical/Surgical History Past Medical History:   Diagnosis Date    Aphasia     Arthritis     Cancer     prostate    CHF (congestive heart failure)     Diabetes mellitus     GERD " "(gastroesophageal reflux disease)     History of radiation therapy     History of UTI     Hyperlipidemia     Hypertension     Multiple gastric ulcers     Rectal cancer 2024    Seasonal allergies     Sleep apnea     cpap    Stroke     Tattoos     TIA (transient ischemic attack)        Past Surgical History:   Procedure Laterality Date    ANGIOPLASTY CAROTID ARTERY      CAROTID ENDARTERECTOMY      COLONOSCOPY N/A 10/18/2024    Procedure: COLONOSCOPY TO CECUM/TI WITH BIOPSIES;  Surgeon: Marcus Christine Jr., MD;  Location: [facility] ENDOSCOPY;  Service: General;  Laterality: N/A;  PRE-SCREENING, FAMILY HX COLON CANCER  POST-DIVERTICULOSIS, RECTAL MASS    FOOT SURGERY                 Anthropometrics        Current Height Ht Readings from Last 1 Encounters:   12/02/24 165.1 cm (65\")      Current Weight Wt Readings from Last 1 Encounters:   12/02/24 85.9 kg (189 lb 4.8 oz)      BMI  31.5   Usual Body Weight (UBW) 185-190lb   Weight Change/Trend Stable   Weight History Wt Readings from Last 30 Encounters:   12/02/24 0837 85.9 kg (189 lb 4.8 oz)   11/22/24 1457 84.3 kg (185 lb 14.4 oz)   11/19/24 0951 83 kg (183 lb)   11/20/24 1521 84.6 kg (186 lb 6.4 oz)   11/14/24 1517 84.6 kg (186 lb 9.6 oz)   11/01/24 1239 83.3 kg (183 lb 9.6 oz)   11/04/24 1904 83 kg (183 lb)   10/18/24 0754 83.6 kg (184 lb 4.8 oz)   10/16/24 0924 83.9 kg (185 lb)   09/23/24 1100 85.3 kg (188 lb)   09/17/24 0900 86 kg (189 lb 9.6 oz)   09/03/24 1139 88 kg (194 lb)   07/16/24 1308 88.3 kg (194 lb 9.6 oz)   07/15/24 1305 88 kg (194 lb)   04/15/24 1242 89.5 kg (197 lb 6.4 oz)   10/16/23 1302 86.7 kg (191 lb 3.2 oz)   10/03/23 1300 84.4 kg (186 lb)   07/24/23 1100 83.5 kg (184 lb)   07/14/23 1256 84.3 kg (185 lb 12.8 oz)   05/16/23 0813 85.2 kg (187 lb 12.8 oz)   04/04/23 1248 86.2 kg (190 lb)   03/28/23 1527 85.3 kg (188 lb)   11/22/22 0910 82.4 kg (181 lb 10.5 oz)   11/07/22 1259 82.4 kg (181 lb 9.6 oz)   09/12/22 1512 82.3 kg (181 lb 6.4 oz)   08/05/22 1258 " "82 kg (180 lb 12.8 oz)   06/07/22 0959 76.2 kg (168 lb)   03/28/22 1541 76.2 kg (168 lb)   02/21/22 1343 76.2 kg (168 lb)   02/07/22 1346 76.2 kg (168 lb)          Medications           Current medications: CBD, Cyanocobalamin, DULoxetine, aspirin, atorvastatin, ferrous sulfate, gabapentin, hydrOXYzine, losartan, metFORMIN, metoprolol tartrate, ondansetron, tadalafil, tamsulosin, and vitamin D                 Tests/Procedures        Tests/Procedures Chemotherapy     Labs       Pertinent Labs    Results from last 7 days   Lab Units 12/02/24  0816   SODIUM mmol/L 140   POTASSIUM mmol/L 4.1   CHLORIDE mmol/L 100   CO2 mmol/L 29.0   BUN mg/dL 9   CREATININE mg/dL 0.91   CALCIUM mg/dL 9.0   BILIRUBIN mg/dL 0.3   ALK PHOS U/L 106   ALT (SGPT) U/L 17   AST (SGOT) U/L 22   GLUCOSE mg/dL 122*     Results from last 7 days   Lab Units 12/02/24  0816   HEMOGLOBIN g/dL 9.9*   HEMATOCRIT % 33.0*   WBC 10*3/mm3 7.50   ALBUMIN g/dL 3.9     Results from last 7 days   Lab Units 12/02/24  0816   PLATELETS 10*3/mm3 296     No results found for: \"COVID19\"  Lab Results   Component Value Date    HGBA1C 6.7 (H) 07/16/2024          Physical Findings        Physical Appearance alert, oriented     Edema  not assessed   Gastrointestinal None   Tubes/Lines/Drains Implantable Port   Oral/Mouth Cavity WNL   Skin Intact       Estimated/Assessed Needs        Energy Requirements    Height for Calculation      Weight for Calculation 86 kg   Method for Estimation  25 kcal/kg   EST Needs (kcal/day) 2150 kcals per day       Protein Requirements    Weight for Calculation 86 kg   EST Protein Needs (g/kg) 1.0 - 1.2 gm/kg   EST Daily Needs (g/day)  gms per day       Fluid Requirements     Method for Estimation 1 mL/kcal    Estimated Needs (mL/day) 2100 mLs per day         NUTRITION INTERVENTION / PLAN OF CARE      Intervention Goal(s) Maintain nutrition status, Provide information, Meet estimated needs, Disease management/therapy, Tolerate PO , " Maintain intake, and No significant weight loss         RD Intervention/Action Encouraged intake, Follow Tx progress, Recommended/ordered         Recommendations:       PO Diet Consume calorie and protein dense healthy foods, 6 small meals      Supplements Boost or Ensure if needed      Snacks       Other    New Prescription Ordered? No, recommend   --      Monitor/Evaluation Follow up as needed   Education Education provided   --    Electronically signed by:  Shirley Patterson RD  12/02/24 13:20 EST

## 2024-12-02 NOTE — PROGRESS NOTES
REASON FOR CONSULTATION:  stage III mid to upper well-differentiated rectal adenocarcinoma (cT3, cN2 CM0.).  Provide an opinion on any further workup or treatment                             REQUESTING PHYSICIAN: BOBBY Portillo, Naeem Rai MD    RECORDS OBTAINED:  Records of the patients history including those obtained from the referring provider were reviewed and summarized in detail.    HISTORY OF PRESENT ILLNESS:  The patient is a 69 y.o. year old male who is here for an opinion about the above issue.    History of Present Illness   The patient is a 69-year-old male who is referred for recently diagnosed stage III mid to upper well-differentiated rectal adenocarcinoma (cT3, cN2 CM0.).    Patient undergone a screening colonoscopy 10/18/2024 demonstrating a circumferential mass involving the rectum extending from 10 cm to 12 cm with biopsy of 15 cm consistent with invasive well-differentiated adenocarcinoma with ulceration necroinflammatory debris.    This was felt to be microsatellite stable by IHC as well as including MSI by PCR.    If follow-up with infectious disease 11/1/2024 there being concern about resuming hypersexual activity and that he start preexposure prophylaxis.  He last been seen July 2024 on Descovy still in relationship with his partner and currently monogamous with been having bowel changes underwent colonoscopy with the above diagnosis.  As result of his need for therapy Descovy was discontinued and the issue to be reevaluated once he was successfully treated.    MRI of the pelvis performed 11/4/2024 demonstrates a high rectal mass extending to the proximal sigmoid colon representing a T3d N2 rectal tumor, side effect invasion on the superior aspect of the mass approximates between the mesorectum and peritoneal cavity?    CT chest 11/8 demonstrates pleural plaques along the peripheral aspect of both the right lower lobes.  These are of uncertain significance.    The patient was  next seen 11/14/2024 by colorectal surgery without evidence of obstruction or bleeding.  He was thought a excellent candidate for neoadjuvant chemotherapy followed by mesorectal excision and adjuvant chemotherapy-comparable to the prospect trial.  There was concern about the location of the tumor extending down to the rectum and the avoidance of radiation therapy since the tumor appeared to be resectable.  Was the role of laparoscopic low anterior resection total mesorectal excision and creation of a diverting loop ileostomy temporarily discussed.    As resulted above the patient is now referred to discuss this above approach.  He is seen with his significant other and we have discussed his findings in great detail from initial diagnosis and and subsequent surgical assessment leading to the recommendation of neoadjuvant chemotherapy given in the fashion of the PROSPECT Trial which was designed to determine whether neoadjuvant chemotherapy could be used as an alternative to neoadjuvant chemoRT.  This study demonstrated that similar disease-free survival and overall survival was similar to neoadjuvant CRT alone for eligible patients.  This would include the use of 6 cycles of FOLFOX chemotherapy followed by reassessment and if a clinical response and 20% or more was seen then RT would be admitted, proceeding to surgical resection and subsequent adjuvant chemotherapy.      He returns today 12/2/2024, for Cycle 1 Day 1 FOLFOX. He does not have any new concerns today. He remains fatigued and experiences intermittent lightheadedness. He denies shortness of breath. He denies abdominal pain. His stools are black, but he denies rome blood. Despite starting oral iron, his hemoglobin has decreased further. He is not tolerating the oral iron at this time.       Past Medical History:   Diagnosis Date    Aphasia     Arthritis     Cancer     prostate    CHF (congestive heart failure)     Diabetes mellitus     GERD  (gastroesophageal reflux disease)     History of radiation therapy     History of UTI     Hyperlipidemia     Hypertension     Multiple gastric ulcers     Rectal cancer 2024    Seasonal allergies     Sleep apnea     cpap    Stroke     Tattoos     TIA (transient ischemic attack)         Past Surgical History:   Procedure Laterality Date    ANGIOPLASTY CAROTID ARTERY      CAROTID ENDARTERECTOMY      COLONOSCOPY N/A 10/18/2024    Procedure: COLONOSCOPY TO CECUM/TI WITH BIOPSIES;  Surgeon: Marcus Christine Jr., MD;  Location: [facility] ENDOSCOPY;  Service: General;  Laterality: N/A;  PRE-SCREENING, FAMILY HX COLON CANCER  POST-DIVERTICULOSIS, RECTAL MASS    FOOT SURGERY          Current Outpatient Medications on File Prior to Visit   Medication Sig Dispense Refill    aspirin 81 MG chewable tablet Chew 1 tablet Daily.      atorvastatin (LIPITOR) 80 MG tablet Take 1 tablet by mouth Daily. 30 tablet 3    CBD (cannabidiol) oral oil Take 1 drop by mouth 2 (Two) Times a Day. Half a dropper twice daily      Cyanocobalamin (VITAMIN B 12 PO) Take 1 tablet by mouth Daily.      DULoxetine (Cymbalta) 60 MG capsule Take 1 capsule by mouth Daily for 180 days. (Patient taking differently: Take 1 capsule by mouth Every Night.) 90 capsule 1    ferrous sulfate 325 (65 Fe) MG tablet Take 65 mg by mouth Daily With Breakfast.      gabapentin (NEURONTIN) 300 MG capsule TAKE 2 CAPSULES BY MOUTH THREE TIMES DAILY 540 capsule 1    hydrOXYzine (ATARAX) 10 MG tablet TAKE 1 TO 2 TABLETS BY MOUTH EVERY NIGHT AT BEDTIME 60 tablet 1    losartan (COZAAR) 100 MG tablet Take 1 tablet by mouth Daily. 90 tablet 1    metFORMIN (GLUCOPHAGE) 1000 MG tablet Take 1 tablet by mouth 2 (Two) Times a Day With Meals. 90 tablet 2    metoprolol tartrate (LOPRESSOR) 25 MG tablet TAKE 1 TABLET BY MOUTH DAILY 90 tablet 0    ondansetron (ZOFRAN) 8 MG tablet Take 1 tablet by mouth 3 (Three) Times a Day As Needed for Nausea or Vomiting. 30 tablet 5    tadalafil (CIALIS) 5 MG  "tablet Take 1 tablet by mouth Daily As Needed for Erectile Dysfunction.      tamsulosin (FLOMAX) 0.4 MG capsule 24 hr capsule Take 1 capsule by mouth every night at bedtime.      vitamin D (ERGOCALCIFEROL) 1.25 MG (83258 UT) capsule capsule Take 1 capsule by mouth 1 (One) Time Per Week. 12 capsule 0     No current facility-administered medications on file prior to visit.        ALLERGIES:  No Known Allergies     Social History     Socioeconomic History    Marital status: Significant Other   Tobacco Use    Smoking status: Former     Current packs/day: 3.00     Average packs/day: 3.0 packs/day for 30.0 years (90.0 ttl pk-yrs)     Types: Cigarettes    Smokeless tobacco: Never   Vaping Use    Vaping status: Some Days    Substances: CBD    Devices: Disposable   Substance and Sexual Activity    Alcohol use: No    Drug use: Yes    Sexual activity: Defer        Family History   Problem Relation Age of Onset    Bone cancer Mother     COPD Father     Hypertension Father     Stroke Father         TIA    Bone cancer Father         smoker    Lung cancer Father     Diabetes Father     No Known Problems Sister     No Known Problems Brother     No Known Problems Maternal Grandmother     No Known Problems Maternal Grandfather     No Known Problems Paternal Grandmother     Colon cancer Paternal Grandfather     Malig Hyperthermia Neg Hx           Objective     Vitals:    12/02/24 0837   BP: 167/75   Pulse: 82   Resp: 16   Temp: 98 °F (36.7 °C)   TempSrc: Oral   SpO2: 97%   Weight: 85.9 kg (189 lb 4.8 oz)   Height: 165.1 cm (65\")   PainSc:   1   PainLoc: Back           11/22/2024     3:02 PM   Current Status   ECOG score 0       Physical Exam  Constitutional:       Appearance: He is obese.   HENT:      Head: Normocephalic and atraumatic.      Nose: Nose normal.      Mouth/Throat:      Mouth: Mucous membranes are moist.      Pharynx: Oropharynx is clear.   Eyes:      Pupils: Pupils are equal, round, and reactive to light. "   Cardiovascular:      Rate and Rhythm: Normal rate and regular rhythm.      Pulses: Normal pulses.   Pulmonary:      Effort: Pulmonary effort is normal.   Abdominal:      General: Bowel sounds are normal.      Palpations: Abdomen is soft.   Musculoskeletal:         General: Normal range of motion.      Cervical back: Normal range of motion and neck supple.   Skin:     General: Skin is warm and dry.   Neurological:      Mental Status: He is oriented to person, place, and time.   Psychiatric:         Mood and Affect: Mood normal.         Behavior: Behavior normal.         RECENT LABS:  Results from last 7 days   Lab Units 12/02/24  0816   WBC 10*3/mm3 7.50   NEUTROS ABS 10*3/mm3 4.90   HEMOGLOBIN g/dL 9.9*   HEMATOCRIT % 33.0*   PLATELETS 10*3/mm3 296     Results from last 7 days   Lab Units 12/02/24  0816   SODIUM mmol/L 140   POTASSIUM mmol/L 4.1   CHLORIDE mmol/L 100   CO2 mmol/L 29.0   BUN mg/dL 9   CREATININE mg/dL 0.91   CALCIUM mg/dL 9.0   ALBUMIN g/dL 3.9   BILIRUBIN mg/dL 0.3   ALK PHOS U/L 106   ALT (SGPT) U/L 17   AST (SGOT) U/L 22   GLUCOSE mg/dL 122*           Assessment & Plan   *Stage III mid to upper well differentiated rectal adenocarcinoma (cT3, cN2, cM0)     69-year-old male with a history of of diabetes, CHF GE reflux hyperlipidemia, hypertension, TIA, possible CVA as well as a history of prostate cancer status post XRT.  He had been recently having a change in bowel habit ultimately leading to additional assessment.  This led to a screening colonoscopy 10/18/2024demonstrating a circumferential mass involving the rectum extending from 10 cm to 12 cm with biopsy of 15 cm consistent with invasive well-differentiated adenocarcinoma with ulceration necroinflammatory debris. This was felt to be microsatellite stable by IHC as well as including MSI by PCR.  MRI of the pelvis performed 11/4/2024 demonstrates a high rectal mass extending to the proximal sigmoid colon representing a T3d N2 rectal tumor,  side effect invasion on the superior aspect of the mass approximates between the mesorectum and peritoneal cavity?  Additional staging 11/8/2024 includes a CT of the chest demonstrating small pleural plaques along the posterior aspect of both the right lower lobes of uncertain significance.  There are no other substantial findings though we have discussed this as leading to a PET/CT to complete his staging.  11/14/2024 by colorectal surgery, Dr. Naeem Rai, without evidence of obstruction or bleeding.  He was thought a excellent candidate for neoadjuvant chemotherapy followed by mesorectal excision and adjuvant chemotherapy-comparable to the PROSPECT trial.  There was concern about the location of the tumor extending down to the rectum and the avoidance of radiation therapy since the tumor appeared to be resectable.  There was also the concern of his previous history of radiation therapy.  Further discussed was the role of laparoscopic low anterior resection, total mesorectal excision, and creation of a diverting loop ileostomy temporarily utilized also discussed.  11/20/2024 and we have reviewed the PROSPECT Trial which was designed to determine whether neoadjuvant chemotherapy could be used as an alternative to neoadjuvant chemoRT.  This study demonstrated that similar disease-free survival and overall survival was similar to neoadjuvant CRT alone for eligible patients.  This would include the use of 6 cycles of FOLFOX chemotherapy followed by reassessment and if a clinical response and 20% or more was seen then RT would be admitted, proceeding to surgical resection and subsequent adjuvant chemotherapy.  After discussion the patient agrees to proceed.  12/2/2024: Proceed with C1D1 FOLFOX. Hemoglobin has declined to 9.9 today secondary to malignancy- ongoing bleeding and worsening iron deficiency. He has been taking oral iron, however, is not tolerating this well, therefore will plan for IV iron pending  insurance approval.     *Anemia   12/2/2024: Hemoglobin 9.9 today. Patient is not tolerating oral iron, therefore, will plan to proceed with IV iron.      Plan:  Proceed with Cycle 1 Day 1 FOLFOX   Return to clinic on Day 3 for unhook, possible fluids, and feraheme   Return to clinic in 1 week for NP visit, toxicity check, and cbc/cmp  Return to clinic in 2 weeks for NP or MD visit, Cycle 2 Day 1 FOLFOX, cbc/cmp, and ferraheme.   Instructed to reach out sooner with any concerns    BOBBY Samuels

## 2024-12-03 RX ORDER — FAMOTIDINE 10 MG/ML
20 INJECTION, SOLUTION INTRAVENOUS AS NEEDED
Status: CANCELLED | OUTPATIENT
Start: 2024-12-04

## 2024-12-03 RX ORDER — SODIUM CHLORIDE 9 MG/ML
20 INJECTION, SOLUTION INTRAVENOUS ONCE
OUTPATIENT
Start: 2024-12-11

## 2024-12-03 RX ORDER — DIPHENHYDRAMINE HYDROCHLORIDE 50 MG/ML
50 INJECTION INTRAMUSCULAR; INTRAVENOUS AS NEEDED
OUTPATIENT
Start: 2024-12-11

## 2024-12-03 RX ORDER — HYDROCORTISONE SODIUM SUCCINATE 100 MG/2ML
100 INJECTION INTRAMUSCULAR; INTRAVENOUS AS NEEDED
OUTPATIENT
Start: 2024-12-11

## 2024-12-03 RX ORDER — CETIRIZINE HYDROCHLORIDE 10 MG/1
10 TABLET ORAL ONCE
OUTPATIENT
Start: 2024-12-11

## 2024-12-03 RX ORDER — FAMOTIDINE 10 MG/ML
20 INJECTION, SOLUTION INTRAVENOUS AS NEEDED
OUTPATIENT
Start: 2024-12-11

## 2024-12-03 RX ORDER — DIPHENHYDRAMINE HYDROCHLORIDE 50 MG/ML
50 INJECTION INTRAMUSCULAR; INTRAVENOUS AS NEEDED
Status: CANCELLED | OUTPATIENT
Start: 2024-12-04

## 2024-12-03 RX ORDER — SODIUM CHLORIDE 9 MG/ML
20 INJECTION, SOLUTION INTRAVENOUS ONCE
Status: CANCELLED | OUTPATIENT
Start: 2024-12-04

## 2024-12-03 RX ORDER — HYDROCORTISONE SODIUM SUCCINATE 100 MG/2ML
100 INJECTION INTRAMUSCULAR; INTRAVENOUS AS NEEDED
Status: CANCELLED | OUTPATIENT
Start: 2024-12-04

## 2024-12-03 RX ORDER — FAMOTIDINE 10 MG/ML
20 INJECTION, SOLUTION INTRAVENOUS ONCE
OUTPATIENT
Start: 2024-12-11

## 2024-12-04 ENCOUNTER — INFUSION (OUTPATIENT)
Dept: ONCOLOGY | Facility: HOSPITAL | Age: 69
End: 2024-12-04
Payer: MEDICARE

## 2024-12-04 VITALS
TEMPERATURE: 98.4 F | SYSTOLIC BLOOD PRESSURE: 138 MMHG | WEIGHT: 191.8 LBS | OXYGEN SATURATION: 99 % | BODY MASS INDEX: 31.92 KG/M2 | DIASTOLIC BLOOD PRESSURE: 71 MMHG | HEART RATE: 69 BPM | RESPIRATION RATE: 16 BRPM

## 2024-12-04 DIAGNOSIS — C20 RECTAL ADENOCARCINOMA: Primary | ICD-10-CM

## 2024-12-04 DIAGNOSIS — D50.9 IRON DEFICIENCY ANEMIA, UNSPECIFIED IRON DEFICIENCY ANEMIA TYPE: ICD-10-CM

## 2024-12-04 DIAGNOSIS — T45.4X5A ADVERSE EFFECT OF IRON, INITIAL ENCOUNTER: ICD-10-CM

## 2024-12-04 DIAGNOSIS — Z45.2 FITTING AND ADJUSTMENT OF VASCULAR CATHETER: ICD-10-CM

## 2024-12-04 PROCEDURE — 25010000002 HEPARIN LOCK FLUSH PER 10 UNITS: Performed by: INTERNAL MEDICINE

## 2024-12-04 PROCEDURE — 96375 TX/PRO/DX INJ NEW DRUG ADDON: CPT

## 2024-12-04 PROCEDURE — 96374 THER/PROPH/DIAG INJ IV PUSH: CPT

## 2024-12-04 PROCEDURE — 25010000002 FERUMOXYTOL 510 MG/17ML SOLUTION 17 ML VIAL: Performed by: INTERNAL MEDICINE

## 2024-12-04 RX ORDER — CETIRIZINE HYDROCHLORIDE 10 MG/1
10 TABLET ORAL ONCE
Status: COMPLETED | OUTPATIENT
Start: 2024-12-04 | End: 2024-12-04

## 2024-12-04 RX ORDER — SODIUM CHLORIDE 0.9 % (FLUSH) 0.9 %
10 SYRINGE (ML) INJECTION AS NEEDED
Status: CANCELLED | OUTPATIENT
Start: 2024-12-04

## 2024-12-04 RX ORDER — FAMOTIDINE 10 MG/ML
20 INJECTION, SOLUTION INTRAVENOUS ONCE
Status: COMPLETED | OUTPATIENT
Start: 2024-12-04 | End: 2024-12-04

## 2024-12-04 RX ORDER — HEPARIN SODIUM (PORCINE) LOCK FLUSH IV SOLN 100 UNIT/ML 100 UNIT/ML
500 SOLUTION INTRAVENOUS AS NEEDED
Status: DISCONTINUED | OUTPATIENT
Start: 2024-12-04 | End: 2024-12-04 | Stop reason: HOSPADM

## 2024-12-04 RX ORDER — HEPARIN SODIUM (PORCINE) LOCK FLUSH IV SOLN 100 UNIT/ML 100 UNIT/ML
500 SOLUTION INTRAVENOUS AS NEEDED
Status: CANCELLED | OUTPATIENT
Start: 2024-12-04

## 2024-12-04 RX ORDER — SODIUM CHLORIDE 0.9 % (FLUSH) 0.9 %
10 SYRINGE (ML) INJECTION AS NEEDED
Status: DISCONTINUED | OUTPATIENT
Start: 2024-12-04 | End: 2024-12-04 | Stop reason: HOSPADM

## 2024-12-04 RX ADMIN — Medication 10 ML: at 10:48

## 2024-12-04 RX ADMIN — Medication 500 UNITS: at 10:48

## 2024-12-04 RX ADMIN — FAMOTIDINE 20 MG: 10 INJECTION INTRAVENOUS at 10:20

## 2024-12-04 RX ADMIN — FERUMOXYTOL 510 MG: 510 INJECTION INTRAVENOUS at 10:30

## 2024-12-04 RX ADMIN — CETIRIZINE HYDROCHLORIDE 10 MG: 10 TABLET, FILM COATED ORAL at 10:20

## 2024-12-05 ENCOUNTER — TELEPHONE (OUTPATIENT)
Dept: ONCOLOGY | Facility: CLINIC | Age: 69
End: 2024-12-05
Payer: MEDICARE

## 2024-12-05 NOTE — TELEPHONE ENCOUNTER
Called patient, no answer, left VM. Let him know per Dr. Nagel that this is to be expected and no cause for concern at the moment and to let us know if this prolongs for the next couple of days.

## 2024-12-05 NOTE — TELEPHONE ENCOUNTER
Called patient, said that he had his first chemo Monday, and this morning her had a BM that was bright red and look like it had red flesh on it. Patient said that he uploaded a photo to [employer] for us too see. Patient is wanting to make sure that this is nothing to be concerned about.

## 2024-12-05 NOTE — TELEPHONE ENCOUNTER
Provider: Shona  Caller: Patient  Relationship to Patient: self  Call Back Phone Number: [phone]  Reason for Call: patient had blood in her stool this morning

## 2024-12-09 ENCOUNTER — TELEPHONE (OUTPATIENT)
Dept: ONCOLOGY | Facility: CLINIC | Age: 69
End: 2024-12-09
Payer: MEDICARE

## 2024-12-09 ENCOUNTER — INFUSION (OUTPATIENT)
Dept: ONCOLOGY | Facility: HOSPITAL | Age: 69
End: 2024-12-09
Payer: MEDICARE

## 2024-12-09 DIAGNOSIS — Z45.2 FITTING AND ADJUSTMENT OF VASCULAR CATHETER: Primary | ICD-10-CM

## 2024-12-09 DIAGNOSIS — C20 RECTAL ADENOCARCINOMA: ICD-10-CM

## 2024-12-09 LAB
ALBUMIN SERPL-MCNC: 4 G/DL (ref 3.5–5.2)
ALBUMIN/GLOB SERPL: 1.3 G/DL
ALP SERPL-CCNC: 100 U/L (ref 39–117)
ALT SERPL W P-5'-P-CCNC: 15 U/L (ref 1–41)
ANION GAP SERPL CALCULATED.3IONS-SCNC: 13.6 MMOL/L (ref 5–15)
AST SERPL-CCNC: 28 U/L (ref 1–40)
BASOPHILS # BLD AUTO: 0.06 10*3/MM3 (ref 0–0.2)
BASOPHILS NFR BLD AUTO: 1 % (ref 0–1.5)
BILIRUB SERPL-MCNC: 0.3 MG/DL (ref 0–1.2)
BUN SERPL-MCNC: 10 MG/DL (ref 8–23)
BUN/CREAT SERPL: 11.9 (ref 7–25)
CALCIUM SPEC-SCNC: 8.9 MG/DL (ref 8.6–10.5)
CHLORIDE SERPL-SCNC: 101 MMOL/L (ref 98–107)
CO2 SERPL-SCNC: 24.4 MMOL/L (ref 22–29)
CREAT SERPL-MCNC: 0.84 MG/DL (ref 0.76–1.27)
DEPRECATED RDW RBC AUTO: 49.8 FL (ref 37–54)
EGFRCR SERPLBLD CKD-EPI 2021: 93.2 ML/MIN/1.73
EOSINOPHIL # BLD AUTO: 0.47 10*3/MM3 (ref 0–0.4)
EOSINOPHIL NFR BLD AUTO: 8 % (ref 0.3–6.2)
ERYTHROCYTE [DISTWIDTH] IN BLOOD BY AUTOMATED COUNT: 15.9 % (ref 12.3–15.4)
GLOBULIN UR ELPH-MCNC: 3.2 GM/DL
GLUCOSE SERPL-MCNC: 141 MG/DL (ref 65–99)
HCT VFR BLD AUTO: 33.6 % (ref 37.5–51)
HGB BLD-MCNC: 9.9 G/DL (ref 13–17.7)
IMM GRANULOCYTES # BLD AUTO: 0.03 10*3/MM3 (ref 0–0.05)
IMM GRANULOCYTES NFR BLD AUTO: 0.5 % (ref 0–0.5)
LYMPHOCYTES # BLD AUTO: 1.58 10*3/MM3 (ref 0.7–3.1)
LYMPHOCYTES NFR BLD AUTO: 26.9 % (ref 19.6–45.3)
MCH RBC QN AUTO: 25.3 PG (ref 26.6–33)
MCHC RBC AUTO-ENTMCNC: 29.5 G/DL (ref 31.5–35.7)
MCV RBC AUTO: 85.9 FL (ref 79–97)
MONOCYTES # BLD AUTO: 0.41 10*3/MM3 (ref 0.1–0.9)
MONOCYTES NFR BLD AUTO: 7 % (ref 5–12)
NEUTROPHILS NFR BLD AUTO: 3.32 10*3/MM3 (ref 1.7–7)
NEUTROPHILS NFR BLD AUTO: 56.6 % (ref 42.7–76)
NRBC BLD AUTO-RTO: 0.3 /100 WBC (ref 0–0.2)
PLATELET # BLD AUTO: 286 10*3/MM3 (ref 140–450)
PMV BLD AUTO: 9.2 FL (ref 6–12)
POTASSIUM SERPL-SCNC: 4.1 MMOL/L (ref 3.5–5.2)
PROT SERPL-MCNC: 7.2 G/DL (ref 6–8.5)
RBC # BLD AUTO: 3.91 10*6/MM3 (ref 4.14–5.8)
SODIUM SERPL-SCNC: 139 MMOL/L (ref 136–145)
WBC NRBC COR # BLD AUTO: 5.87 10*3/MM3 (ref 3.4–10.8)

## 2024-12-09 PROCEDURE — 85025 COMPLETE CBC W/AUTO DIFF WBC: CPT

## 2024-12-09 PROCEDURE — 80053 COMPREHEN METABOLIC PANEL: CPT

## 2024-12-09 PROCEDURE — 36591 DRAW BLOOD OFF VENOUS DEVICE: CPT

## 2024-12-09 PROCEDURE — 25010000002 HEPARIN LOCK FLUSH PER 10 UNITS: Performed by: INTERNAL MEDICINE

## 2024-12-09 RX ORDER — HEPARIN SODIUM (PORCINE) LOCK FLUSH IV SOLN 100 UNIT/ML 100 UNIT/ML
500 SOLUTION INTRAVENOUS AS NEEDED
Status: DISCONTINUED | OUTPATIENT
Start: 2024-12-09 | End: 2024-12-09 | Stop reason: HOSPADM

## 2024-12-09 RX ORDER — HEPARIN SODIUM (PORCINE) LOCK FLUSH IV SOLN 100 UNIT/ML 100 UNIT/ML
500 SOLUTION INTRAVENOUS AS NEEDED
OUTPATIENT
Start: 2024-12-09

## 2024-12-09 RX ORDER — SODIUM CHLORIDE 0.9 % (FLUSH) 0.9 %
10 SYRINGE (ML) INJECTION AS NEEDED
Status: DISCONTINUED | OUTPATIENT
Start: 2024-12-09 | End: 2024-12-09 | Stop reason: HOSPADM

## 2024-12-09 RX ORDER — SODIUM CHLORIDE 0.9 % (FLUSH) 0.9 %
10 SYRINGE (ML) INJECTION AS NEEDED
OUTPATIENT
Start: 2024-12-09

## 2024-12-09 RX ADMIN — Medication 500 UNITS: at 09:29

## 2024-12-09 RX ADMIN — Medication 10 ML: at 09:29

## 2024-12-09 NOTE — TELEPHONE ENCOUNTER
Pt was supposed to be seen for NP visit today but left after labs were drawn. Called pt with lab results. Counts are stable. Pt states he is feeling well. He did have questions about his PET scan results. Advised him I would have Dr. Nagel call about PET results. He v/u. Message sent to Dr. Nagel.

## 2024-12-11 ENCOUNTER — OFFICE VISIT (OUTPATIENT)
Dept: OTHER | Facility: HOSPITAL | Age: 69
End: 2024-12-11
Payer: MEDICARE

## 2024-12-11 VITALS
HEART RATE: 98 BPM | WEIGHT: 185.5 LBS | OXYGEN SATURATION: 98 % | SYSTOLIC BLOOD PRESSURE: 177 MMHG | BODY MASS INDEX: 30.87 KG/M2 | DIASTOLIC BLOOD PRESSURE: 83 MMHG

## 2024-12-11 DIAGNOSIS — C20 RECTAL ADENOCARCINOMA: Primary | ICD-10-CM

## 2024-12-11 PROCEDURE — G0463 HOSPITAL OUTPT CLINIC VISIT: HCPCS | Performed by: NURSE PRACTITIONER
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
## 2024-12-16 ENCOUNTER — PATIENT OUTREACH (OUTPATIENT)
Dept: ONCOLOGY | Facility: HOSPITAL | Age: 69
End: 2024-12-16
Payer: MEDICARE

## 2024-12-16 ENCOUNTER — INFUSION (OUTPATIENT)
Dept: ONCOLOGY | Facility: HOSPITAL | Age: 69
End: 2024-12-16
Payer: MEDICARE

## 2024-12-16 ENCOUNTER — CLINICAL SUPPORT (OUTPATIENT)
Dept: OTHER | Facility: HOSPITAL | Age: 69
End: 2024-12-16
Payer: MEDICARE

## 2024-12-16 ENCOUNTER — OFFICE VISIT (OUTPATIENT)
Dept: ONCOLOGY | Facility: CLINIC | Age: 69
End: 2024-12-16
Payer: MEDICARE

## 2024-12-16 VITALS
HEIGHT: 65 IN | TEMPERATURE: 98.8 F | DIASTOLIC BLOOD PRESSURE: 69 MMHG | BODY MASS INDEX: 31.07 KG/M2 | OXYGEN SATURATION: 95 % | HEART RATE: 87 BPM | SYSTOLIC BLOOD PRESSURE: 126 MMHG | WEIGHT: 186.5 LBS | RESPIRATION RATE: 16 BRPM

## 2024-12-16 DIAGNOSIS — C20 RECTAL ADENOCARCINOMA: Primary | ICD-10-CM

## 2024-12-16 DIAGNOSIS — C20 RECTAL ADENOCARCINOMA: ICD-10-CM

## 2024-12-16 DIAGNOSIS — Z79.899 HIGH RISK MEDICATION USE: ICD-10-CM

## 2024-12-16 DIAGNOSIS — D50.9 IRON DEFICIENCY ANEMIA, UNSPECIFIED IRON DEFICIENCY ANEMIA TYPE: ICD-10-CM

## 2024-12-16 DIAGNOSIS — T45.4X5A ADVERSE EFFECT OF IRON, INITIAL ENCOUNTER: ICD-10-CM

## 2024-12-16 LAB
ALBUMIN SERPL-MCNC: 3.9 G/DL (ref 3.5–5.2)
ALBUMIN/GLOB SERPL: 1.3 G/DL
ALP SERPL-CCNC: 121 U/L (ref 39–117)
ALT SERPL W P-5'-P-CCNC: 16 U/L (ref 1–41)
ANION GAP SERPL CALCULATED.3IONS-SCNC: 12.2 MMOL/L (ref 5–15)
AST SERPL-CCNC: 22 U/L (ref 1–40)
BASOPHILS # BLD AUTO: 0.08 10*3/MM3 (ref 0–0.2)
BASOPHILS NFR BLD AUTO: 1.3 % (ref 0–1.5)
BILIRUB SERPL-MCNC: 0.3 MG/DL (ref 0–1.2)
BUN SERPL-MCNC: 8 MG/DL (ref 8–23)
BUN/CREAT SERPL: 8 (ref 7–25)
CALCIUM SPEC-SCNC: 9.6 MG/DL (ref 8.6–10.5)
CHLORIDE SERPL-SCNC: 101 MMOL/L (ref 98–107)
CO2 SERPL-SCNC: 25.8 MMOL/L (ref 22–29)
CREAT SERPL-MCNC: 1 MG/DL (ref 0.76–1.27)
DEPRECATED RDW RBC AUTO: 54.7 FL (ref 37–54)
EGFRCR SERPLBLD CKD-EPI 2021: 80.5 ML/MIN/1.73
EOSINOPHIL # BLD AUTO: 0.29 10*3/MM3 (ref 0–0.4)
EOSINOPHIL NFR BLD AUTO: 4.6 % (ref 0.3–6.2)
ERYTHROCYTE [DISTWIDTH] IN BLOOD BY AUTOMATED COUNT: 19 % (ref 12.3–15.4)
GLOBULIN UR ELPH-MCNC: 2.9 GM/DL
GLUCOSE SERPL-MCNC: 164 MG/DL (ref 65–99)
HCT VFR BLD AUTO: 35.4 % (ref 37.5–51)
HGB BLD-MCNC: 10.8 G/DL (ref 13–17.7)
IMM GRANULOCYTES # BLD AUTO: 0.01 10*3/MM3 (ref 0–0.05)
IMM GRANULOCYTES NFR BLD AUTO: 0.2 % (ref 0–0.5)
LYMPHOCYTES # BLD AUTO: 1.64 10*3/MM3 (ref 0.7–3.1)
LYMPHOCYTES NFR BLD AUTO: 26.3 % (ref 19.6–45.3)
MCH RBC QN AUTO: 27.4 PG (ref 26.6–33)
MCHC RBC AUTO-ENTMCNC: 30.5 G/DL (ref 31.5–35.7)
MCV RBC AUTO: 89.8 FL (ref 79–97)
MONOCYTES # BLD AUTO: 0.5 10*3/MM3 (ref 0.1–0.9)
MONOCYTES NFR BLD AUTO: 8 % (ref 5–12)
NEUTROPHILS NFR BLD AUTO: 3.72 10*3/MM3 (ref 1.7–7)
NEUTROPHILS NFR BLD AUTO: 59.6 % (ref 42.7–76)
NRBC BLD AUTO-RTO: 0 /100 WBC (ref 0–0.2)
PLATELET # BLD AUTO: 239 10*3/MM3 (ref 140–450)
PMV BLD AUTO: 9 FL (ref 6–12)
POTASSIUM SERPL-SCNC: 4.1 MMOL/L (ref 3.5–5.2)
PROT SERPL-MCNC: 6.8 G/DL (ref 6–8.5)
RBC # BLD AUTO: 3.94 10*6/MM3 (ref 4.14–5.8)
SODIUM SERPL-SCNC: 139 MMOL/L (ref 136–145)
WBC NRBC COR # BLD AUTO: 6.24 10*3/MM3 (ref 3.4–10.8)

## 2024-12-16 PROCEDURE — 80053 COMPREHEN METABOLIC PANEL: CPT

## 2024-12-16 PROCEDURE — 25010000002 LEUCOVORIN CALCIUM PER 50 MG: Performed by: NURSE PRACTITIONER

## 2024-12-16 PROCEDURE — 25010000002 FLUOROURACIL PER 500 MG: Performed by: NURSE PRACTITIONER

## 2024-12-16 PROCEDURE — 25010000003 DEXTROSE 5 % SOLUTION: Performed by: NURSE PRACTITIONER

## 2024-12-16 PROCEDURE — 96368 THER/DIAG CONCURRENT INF: CPT

## 2024-12-16 PROCEDURE — 25010000002 DEXAMETHASONE SODIUM PHOSPHATE 100 MG/10ML SOLUTION: Performed by: NURSE PRACTITIONER

## 2024-12-16 PROCEDURE — 25810000003 SODIUM CHLORIDE 0.9 % SOLUTION 250 ML FLEX CONT: Performed by: NURSE PRACTITIONER

## 2024-12-16 PROCEDURE — 1126F AMNT PAIN NOTED NONE PRSNT: CPT | Performed by: NURSE PRACTITIONER

## 2024-12-16 PROCEDURE — 96411 CHEMO IV PUSH ADDL DRUG: CPT

## 2024-12-16 PROCEDURE — 3078F DIAST BP <80 MM HG: CPT | Performed by: NURSE PRACTITIONER

## 2024-12-16 PROCEDURE — 96415 CHEMO IV INFUSION ADDL HR: CPT

## 2024-12-16 PROCEDURE — 96375 TX/PRO/DX INJ NEW DRUG ADDON: CPT

## 2024-12-16 PROCEDURE — 25010000003 DEXTROSE 5 % SOLUTION 250 ML FLEX CONT: Performed by: NURSE PRACTITIONER

## 2024-12-16 PROCEDURE — 3074F SYST BP LT 130 MM HG: CPT | Performed by: NURSE PRACTITIONER

## 2024-12-16 PROCEDURE — 25010000002 FERUMOXYTOL 510 MG/17ML SOLUTION 17 ML VIAL: Performed by: INTERNAL MEDICINE

## 2024-12-16 PROCEDURE — 25010000002 OXALIPLATIN PER 0.5 MG: Performed by: NURSE PRACTITIONER

## 2024-12-16 PROCEDURE — 96367 TX/PROPH/DG ADDL SEQ IV INF: CPT

## 2024-12-16 PROCEDURE — 96413 CHEMO IV INFUSION 1 HR: CPT

## 2024-12-16 PROCEDURE — 25010000002 FOSAPREPITANT PER 1 MG: Performed by: NURSE PRACTITIONER

## 2024-12-16 PROCEDURE — 85025 COMPLETE CBC W/AUTO DIFF WBC: CPT

## 2024-12-16 PROCEDURE — 25010000002 PALONOSETRON PER 25 MCG: Performed by: NURSE PRACTITIONER

## 2024-12-16 PROCEDURE — 99214 OFFICE O/P EST MOD 30 MIN: CPT | Performed by: NURSE PRACTITIONER

## 2024-12-16 PROCEDURE — G0498 CHEMO EXTEND IV INFUS W/PUMP: HCPCS

## 2024-12-16 RX ORDER — PALONOSETRON 0.05 MG/ML
0.25 INJECTION, SOLUTION INTRAVENOUS ONCE
Status: CANCELLED | OUTPATIENT
Start: 2024-12-16

## 2024-12-16 RX ORDER — DIPHENHYDRAMINE HYDROCHLORIDE 50 MG/ML
50 INJECTION INTRAMUSCULAR; INTRAVENOUS AS NEEDED
Status: CANCELLED | OUTPATIENT
Start: 2024-12-16

## 2024-12-16 RX ORDER — CETIRIZINE HYDROCHLORIDE 10 MG/1
10 TABLET ORAL ONCE
Status: COMPLETED | OUTPATIENT
Start: 2024-12-16 | End: 2024-12-16

## 2024-12-16 RX ORDER — PALONOSETRON 0.05 MG/ML
0.25 INJECTION, SOLUTION INTRAVENOUS ONCE
Status: COMPLETED | OUTPATIENT
Start: 2024-12-16 | End: 2024-12-16

## 2024-12-16 RX ORDER — FLUOROURACIL 50 MG/ML
400 INJECTION, SOLUTION INTRAVENOUS ONCE
Status: COMPLETED | OUTPATIENT
Start: 2024-12-16 | End: 2024-12-16

## 2024-12-16 RX ORDER — FAMOTIDINE 10 MG/ML
20 INJECTION, SOLUTION INTRAVENOUS ONCE
Status: COMPLETED | OUTPATIENT
Start: 2024-12-16 | End: 2024-12-16

## 2024-12-16 RX ORDER — FLUOROURACIL 50 MG/ML
400 INJECTION, SOLUTION INTRAVENOUS ONCE
Status: CANCELLED | OUTPATIENT
Start: 2024-12-16

## 2024-12-16 RX ORDER — DEXTROSE MONOHYDRATE 50 MG/ML
20 INJECTION, SOLUTION INTRAVENOUS ONCE
Status: CANCELLED | OUTPATIENT
Start: 2024-12-16

## 2024-12-16 RX ORDER — FAMOTIDINE 10 MG/ML
20 INJECTION, SOLUTION INTRAVENOUS AS NEEDED
Status: CANCELLED | OUTPATIENT
Start: 2024-12-16

## 2024-12-16 RX ORDER — HYDROCORTISONE SODIUM SUCCINATE 100 MG/2ML
100 INJECTION INTRAMUSCULAR; INTRAVENOUS AS NEEDED
Status: CANCELLED | OUTPATIENT
Start: 2024-12-16

## 2024-12-16 RX ORDER — DEXTROSE MONOHYDRATE 50 MG/ML
20 INJECTION, SOLUTION INTRAVENOUS ONCE
Status: COMPLETED | OUTPATIENT
Start: 2024-12-16 | End: 2024-12-16

## 2024-12-16 RX ADMIN — FAMOTIDINE 20 MG: 10 INJECTION INTRAVENOUS at 09:20

## 2024-12-16 RX ADMIN — FLUOROURACIL 770 MG: 50 INJECTION, SOLUTION INTRAVENOUS at 12:26

## 2024-12-16 RX ADMIN — CETIRIZINE HYDROCHLORIDE 10 MG: 10 TABLET, FILM COATED ORAL at 09:21

## 2024-12-16 RX ADMIN — FOSAPREPITANT 100 ML: 150 INJECTION, POWDER, LYOPHILIZED, FOR SOLUTION INTRAVENOUS at 09:53

## 2024-12-16 RX ADMIN — FLUOROURACIL 4610 MG: 50 INJECTION, SOLUTION INTRAVENOUS at 12:27

## 2024-12-16 RX ADMIN — DEXAMETHASONE SODIUM PHOSPHATE 12 MG: 10 INJECTION, SOLUTION INTRAMUSCULAR; INTRAVENOUS at 09:20

## 2024-12-16 RX ADMIN — DEXTROSE MONOHYDRATE 20 ML/HR: 50 INJECTION, SOLUTION INTRAVENOUS at 09:53

## 2024-12-16 RX ADMIN — OXALIPLATIN 165 MG: 5 INJECTION, SOLUTION INTRAVENOUS at 10:25

## 2024-12-16 RX ADMIN — PALONOSETRON HYDROCHLORIDE 0.25 MG: 0.25 INJECTION INTRAVENOUS at 09:18

## 2024-12-16 RX ADMIN — LEUCOVORIN CALCIUM 770 MG: 350 INJECTION, POWDER, LYOPHILIZED, FOR SUSPENSION INTRAMUSCULAR; INTRAVENOUS at 10:25

## 2024-12-16 RX ADMIN — FERUMOXYTOL 510 MG: 510 INJECTION INTRAVENOUS at 09:35

## 2024-12-16 NOTE — PROGRESS NOTES
REASON FOR FOLLOW UP:  stage III mid to upper well-differentiated rectal adenocarcinoma (cT3, cN2 CM0.).  Provide an opinion on any further workup or treatment                             REQUESTING PHYSICIAN: BOBBY Portillo, Naeem Rai MD    RECORDS OBTAINED:  Records of the patients history including those obtained from the referring provider were reviewed and summarized in detail.    HISTORY OF PRESENT ILLNESS:  The patient is a 69 y.o. year old male who is here for an opinion about the above issue.    History of Present Illness   The patient is a 69-year-old male who is referred for recently diagnosed stage III mid to upper well-differentiated rectal adenocarcinoma (cT3, cN2 CM0.).    Patient undergone a screening colonoscopy 10/18/2024 demonstrating a circumferential mass involving the rectum extending from 10 cm to 12 cm with biopsy of 15 cm consistent with invasive well-differentiated adenocarcinoma with ulceration necroinflammatory debris.    This was felt to be microsatellite stable by IHC as well as including MSI by PCR.    If follow-up with infectious disease 11/1/2024 there being concern about resuming hypersexual activity and that he start preexposure prophylaxis.  He last been seen July 2024 on Descovy still in relationship with his partner and currently monogamous with been having bowel changes underwent colonoscopy with the above diagnosis.  As result of his need for therapy Descovy was discontinued and the issue to be reevaluated once he was successfully treated.    MRI of the pelvis performed 11/4/2024 demonstrates a high rectal mass extending to the proximal sigmoid colon representing a T3d N2 rectal tumor, side effect invasion on the superior aspect of the mass approximates between the mesorectum and peritoneal cavity?    CT chest 11/8 demonstrates pleural plaques along the peripheral aspect of both the right lower lobes.  These are of uncertain significance.    The patient was next  seen 11/14/2024 by colorectal surgery without evidence of obstruction or bleeding.  He was thought a excellent candidate for neoadjuvant chemotherapy followed by mesorectal excision and adjuvant chemotherapy-comparable to the prospect trial.  There was concern about the location of the tumor extending down to the rectum and the avoidance of radiation therapy since the tumor appeared to be resectable.  Was the role of laparoscopic low anterior resection total mesorectal excision and creation of a diverting loop ileostomy temporarily discussed.    As resulted above the patient is now referred to discuss this above approach.  He is seen with his significant other and we have discussed his findings in great detail from initial diagnosis and and subsequent surgical assessment leading to the recommendation of neoadjuvant chemotherapy given in the fashion of the PROSPECT Trial which was designed to determine whether neoadjuvant chemotherapy could be used as an alternative to neoadjuvant chemoRT.  This study demonstrated that similar disease-free survival and overall survival was similar to neoadjuvant CRT alone for eligible patients.  This would include the use of 6 cycles of FOLFOX chemotherapy followed by reassessment and if a clinical response and 20% or more was seen then RT would be admitted, proceeding to surgical resection and subsequent adjuvant chemotherapy.      He returns today 12/2/2024, for Cycle 1 Day 1 FOLFOX. He does not have any new concerns today. He remains fatigued and experiences intermittent lightheadedness. He denies shortness of breath. He denies abdominal pain. His stools are black, but he denies rome blood. Despite starting oral iron, his hemoglobin has decreased further. He is not tolerating the oral iron at this time.  Patient was started on Feraheme.    Patient returns on 12/16/2024 for cycle 2 FOLFOX.  He reports he is tolerated treatment well thus far and denying any nausea, vomiting,  changes to his bowels.  He did have blood in his stool following cycle 1 1 time.  That has not reoccurred.  He did start Feraheme the day of disconnect with cycle 1 and has had improvement in his hemoglobin.  He will be due for his second dose of Feraheme today.  He denies increased neuropathy.      Past Medical History:   Diagnosis Date    Aphasia     Arthritis     Cancer     prostate    CHF (congestive heart failure)     Diabetes mellitus     GERD (gastroesophageal reflux disease)     History of radiation therapy     History of UTI     Hyperlipidemia     Hypertension     Multiple gastric ulcers     Rectal cancer 2024    Seasonal allergies     Sleep apnea     cpap    Stroke     Tattoos     TIA (transient ischemic attack)         Past Surgical History:   Procedure Laterality Date    ANGIOPLASTY CAROTID ARTERY      CAROTID ENDARTERECTOMY      COLONOSCOPY N/A 10/18/2024    Procedure: COLONOSCOPY TO CECUM/TI WITH BIOPSIES;  Surgeon: Marcus Christine Jr., MD;  Location: [facility] ENDOSCOPY;  Service: General;  Laterality: N/A;  PRE-SCREENING, FAMILY HX COLON CANCER  POST-DIVERTICULOSIS, RECTAL MASS    FOOT SURGERY      VENOUS ACCESS DEVICE (PORT) INSERTION N/A 11/22/2024    Procedure: INSERTION VENOUS ACCESS DEVICE;  Surgeon: Naeem Rai MD;  Location: [facility] OR;  Service: General;  Laterality: N/A;        Current Outpatient Medications on File Prior to Visit   Medication Sig Dispense Refill    aspirin 81 MG chewable tablet Chew 1 tablet Daily.      atorvastatin (LIPITOR) 80 MG tablet Take 1 tablet by mouth Daily. 30 tablet 3    CBD (cannabidiol) oral oil Take 1 drop by mouth 2 (Two) Times a Day. Half a dropper twice daily      Cyanocobalamin (VITAMIN B 12 PO) Take 1 tablet by mouth Daily.      DULoxetine (Cymbalta) 60 MG capsule Take 1 capsule by mouth Daily for 180 days. (Patient taking differently: Take 1 capsule by mouth Every Night.) 90 capsule 1    ferrous sulfate 325 (65 Fe) MG tablet Take 65 mg by  mouth Daily With Breakfast.      gabapentin (NEURONTIN) 300 MG capsule TAKE 2 CAPSULES BY MOUTH THREE TIMES DAILY 540 capsule 1    hydrOXYzine (ATARAX) 10 MG tablet TAKE 1 TO 2 TABLETS BY MOUTH EVERY NIGHT AT BEDTIME 60 tablet 1    losartan (COZAAR) 100 MG tablet Take 1 tablet by mouth Daily. 90 tablet 1    metFORMIN (GLUCOPHAGE) 1000 MG tablet Take 1 tablet by mouth 2 (Two) Times a Day With Meals. 90 tablet 2    metoprolol tartrate (LOPRESSOR) 25 MG tablet TAKE 1 TABLET BY MOUTH DAILY 90 tablet 0    ondansetron (ZOFRAN) 8 MG tablet Take 1 tablet by mouth 3 (Three) Times a Day As Needed for Nausea or Vomiting. 30 tablet 5    tadalafil (CIALIS) 5 MG tablet Take 1 tablet by mouth Daily As Needed for Erectile Dysfunction.      tamsulosin (FLOMAX) 0.4 MG capsule 24 hr capsule Take 1 capsule by mouth every night at bedtime.      vitamin D (ERGOCALCIFEROL) 1.25 MG (53387 UT) capsule capsule Take 1 capsule by mouth 1 (One) Time Per Week. 12 capsule 0     No current facility-administered medications on file prior to visit.        ALLERGIES:  No Known Allergies     Social History     Socioeconomic History    Marital status: Significant Other   Tobacco Use    Smoking status: Former     Current packs/day: 3.00     Average packs/day: 3.0 packs/day for 30.0 years (90.0 ttl pk-yrs)     Types: Cigarettes    Smokeless tobacco: Never   Vaping Use    Vaping status: Some Days    Substances: CBD    Devices: Disposable   Substance and Sexual Activity    Alcohol use: No    Drug use: Yes    Sexual activity: Defer        Family History   Problem Relation Age of Onset    Bone cancer Mother     COPD Father     Hypertension Father     Stroke Father         TIA    Bone cancer Father         smoker    Lung cancer Father     Diabetes Father     No Known Problems Sister     No Known Problems Brother     No Known Problems Maternal Grandmother     No Known Problems Maternal Grandfather     No Known Problems Paternal Grandmother     Colon cancer  Paternal Grandfather     Malig Hyperthermia Neg Hx           Objective     There were no vitals filed for this visit.          12/4/2024    10:28 AM   Current Status   ECOG score 0       Physical Exam  Constitutional:       Appearance: He is obese.   HENT:      Head: Normocephalic and atraumatic.      Nose: Nose normal.      Mouth/Throat:      Mouth: Mucous membranes are moist.      Pharynx: Oropharynx is clear.   Eyes:      Extraocular Movements: Extraocular movements intact.      Conjunctiva/sclera: Conjunctivae normal.   Cardiovascular:      Rate and Rhythm: Normal rate and regular rhythm.      Pulses: Normal pulses.   Pulmonary:      Effort: Pulmonary effort is normal.   Abdominal:      General: Bowel sounds are normal.      Palpations: Abdomen is soft.   Musculoskeletal:         General: Normal range of motion.      Cervical back: Normal range of motion and neck supple.   Skin:     General: Skin is warm and dry.   Neurological:      General: No focal deficit present.      Mental Status: He is oriented to person, place, and time.   Psychiatric:         Mood and Affect: Mood normal.         Behavior: Behavior normal.         RECENT LABS:  Results from last 7 days   Lab Units 12/16/24  0812 12/09/24  0924   WBC 10*3/mm3 6.24 5.87   NEUTROS ABS 10*3/mm3 3.72 3.32   HEMOGLOBIN g/dL 10.8* 9.9*   HEMATOCRIT % 35.4* 33.6*   PLATELETS 10*3/mm3 239 286     Results from last 7 days   Lab Units 12/09/24  0924   SODIUM mmol/L 139   POTASSIUM mmol/L 4.1   CHLORIDE mmol/L 101   CO2 mmol/L 24.4   BUN mg/dL 10   CREATININE mg/dL 0.84   CALCIUM mg/dL 8.9   ALBUMIN g/dL 4.0   BILIRUBIN mg/dL 0.3   ALK PHOS U/L 100   ALT (SGPT) U/L 15   AST (SGOT) U/L 28   GLUCOSE mg/dL 141*           Assessment & Plan   *Stage III mid to upper well differentiated rectal adenocarcinoma (cT3, cN2, cM0)     69-year-old male with a history of of diabetes, CHF GE reflux hyperlipidemia, hypertension, TIA, possible CVA as well as a history of prostate  cancer status post XRT.  He had been recently having a change in bowel habit ultimately leading to additional assessment.  This led to a screening colonoscopy 10/18/2024demonstrating a circumferential mass involving the rectum extending from 10 cm to 12 cm with biopsy of 15 cm consistent with invasive well-differentiated adenocarcinoma with ulceration necroinflammatory debris. This was felt to be microsatellite stable by IHC as well as including MSI by PCR.  MRI of the pelvis performed 11/4/2024 demonstrates a high rectal mass extending to the proximal sigmoid colon representing a T3d N2 rectal tumor, side effect invasion on the superior aspect of the mass approximates between the mesorectum and peritoneal cavity?  Additional staging 11/8/2024 includes a CT of the chest demonstrating small pleural plaques along the posterior aspect of both the right lower lobes of uncertain significance.  There are no other substantial findings though we have discussed this as leading to a PET/CT to complete his staging.  11/14/2024 by colorectal surgery, Dr. Naeem Rai, without evidence of obstruction or bleeding.  He was thought a excellent candidate for neoadjuvant chemotherapy followed by mesorectal excision and adjuvant chemotherapy-comparable to the PROSPECT trial.  There was concern about the location of the tumor extending down to the rectum and the avoidance of radiation therapy since the tumor appeared to be resectable.  There was also the concern of his previous history of radiation therapy.  Further discussed was the role of laparoscopic low anterior resection, total mesorectal excision, and creation of a diverting loop ileostomy temporarily utilized also discussed.  11/20/2024 and we have reviewed the PROSPECT Trial which was designed to determine whether neoadjuvant chemotherapy could be used as an alternative to neoadjuvant chemoRT.  This study demonstrated that similar disease-free survival and overall survival  was similar to neoadjuvant CRT alone for eligible patients.  This would include the use of 6 cycles of FOLFOX chemotherapy followed by reassessment and if a clinical response and 20% or more was seen then RT would be admitted, proceeding to surgical resection and subsequent adjuvant chemotherapy.  After discussion the patient agrees to proceed.  12/2/2024: Proceed with C1D1 FOLFOX. Hemoglobin has declined to 9.9 today secondary to malignancy- ongoing bleeding and worsening iron deficiency. He has been taking oral iron, however, is not tolerating this well, therefore will plan for IV iron pending insurance approval.   12/16/2024: Proceed with cycle 2-day 1 FOLFOX today.  Patient is tolerating well.    *Anemia   12/2/2024: Hemoglobin 9.9 today. Patient is not tolerating oral iron, therefore, will plan to proceed with IV iron.    12/16/2024 hemoglobin has improved today to 10.8.  Will proceed with the second dose of Feraheme    Plan  Proceed with Cycle 2 Day 1 FOLFOX  Proceed with dose 2 Feaheme today  Return to clinic on Day 3 for unhook, possible fluids, and feraheme   Return to clinic in 2 weeks for MD visit, Cycle 3 Day 1 FOLFOX, cbc/cmp, and ferraheme.

## 2024-12-16 NOTE — PROGRESS NOTES
OUTPATIENT ONCOLOGY NUTRITION FOLLOW UP    Patient Name: Kevin Garsia  YOB: 1955  MRN: [id number]  Assessment Date: 12/16/2024    COMMENTS:  Follow up.  Pt is receiving his FOLFOX infusion ( # 2 of 6 treatments).  Labs/Meds from today reviewed. Hgb 10.8, Glu 164.    Met with pt and his wife today.  They report his appetite continues to be great and no current GI issues. He had a few days of fatigue after his first chemo. Weight is relatively stable.    Encouraged pt to reach out to oncology nutrition should he start experiencing any changes in appetite, bowel issues or N/V. Pt very appreciative of visit. Will be available as needed.         Reason for Assessment Follow up     Diagnosis/Problem   Stage III rectal cancer   Treatment Plan Chemotherapy FOLFOX   Frequency Q 2 weeks x 6 weeks   Goal of cancer treatment Neoadjuvant   Comments:          Encounter Information        Nutrition/Diet History:  Eating a regular diet, no nutrition issues   Oral Nutrition Supplements:    Factors/Symptoms Affecting Intake: No factors at this time   Comments:      Medical/Surgical History Past Medical History:   Diagnosis Date    Aphasia     Arthritis     Cancer     prostate    CHF (congestive heart failure)     Diabetes mellitus     GERD (gastroesophageal reflux disease)     History of radiation therapy     History of UTI     Hyperlipidemia     Hypertension     Multiple gastric ulcers     Rectal cancer 2024    Seasonal allergies     Sleep apnea     cpap    Stroke     Tattoos     TIA (transient ischemic attack)        Past Surgical History:   Procedure Laterality Date    ANGIOPLASTY CAROTID ARTERY      CAROTID ENDARTERECTOMY      COLONOSCOPY N/A 10/18/2024    Procedure: COLONOSCOPY TO CECUM/TI WITH BIOPSIES;  Surgeon: Marcus Christine Jr., MD;  Location: [facility] ENDOSCOPY;  Service: General;  Laterality: N/A;  PRE-SCREENING, FAMILY HX COLON CANCER  POST-DIVERTICULOSIS, RECTAL MASS    FOOT SURGERY      VENOUS ACCESS  "DEVICE (PORT) INSERTION N/A 11/22/2024    Procedure: INSERTION VENOUS ACCESS DEVICE;  Surgeon: Naeem Rai MD;  Location: [facility] MAIN OR;  Service: General;  Laterality: N/A;               Anthropometrics        Current Height Ht Readings from Last 1 Encounters:   12/16/24 165.1 cm (65\")      Current Weight Wt Readings from Last 1 Encounters:   12/16/24 84.6 kg (186 lb 8 oz)      BMI  31   Usual Body Weight (UBW) 185-190lb   Weight Change/Trend Stable   Weight History Wt Readings from Last 30 Encounters:   12/16/24 0837 84.6 kg (186 lb 8 oz)   12/11/24 0910 84.1 kg (185 lb 8 oz)   12/04/24 1013 87 kg (191 lb 12.8 oz)   12/02/24 0837 85.9 kg (189 lb 4.8 oz)   11/22/24 1457 84.3 kg (185 lb 14.4 oz)   11/19/24 0951 83 kg (183 lb)   11/20/24 1521 84.6 kg (186 lb 6.4 oz)   11/14/24 1517 84.6 kg (186 lb 9.6 oz)   11/01/24 1239 83.3 kg (183 lb 9.6 oz)   11/04/24 1904 83 kg (183 lb)   10/18/24 0754 83.6 kg (184 lb 4.8 oz)   10/16/24 0924 83.9 kg (185 lb)   09/23/24 1100 85.3 kg (188 lb)   09/17/24 0900 86 kg (189 lb 9.6 oz)   09/03/24 1139 88 kg (194 lb)   07/16/24 1308 88.3 kg (194 lb 9.6 oz)   07/15/24 1305 88 kg (194 lb)   04/15/24 1242 89.5 kg (197 lb 6.4 oz)   10/16/23 1302 86.7 kg (191 lb 3.2 oz)   10/03/23 1300 84.4 kg (186 lb)   07/24/23 1100 83.5 kg (184 lb)   07/14/23 1256 84.3 kg (185 lb 12.8 oz)   05/16/23 0813 85.2 kg (187 lb 12.8 oz)   04/04/23 1248 86.2 kg (190 lb)   03/28/23 1527 85.3 kg (188 lb)   11/22/22 0910 82.4 kg (181 lb 10.5 oz)   11/07/22 1259 82.4 kg (181 lb 9.6 oz)   09/12/22 1512 82.3 kg (181 lb 6.4 oz)   08/05/22 1258 82 kg (180 lb 12.8 oz)   06/07/22 0959 76.2 kg (168 lb)          Medications           Current medications: CBD, Cyanocobalamin, DULoxetine, aspirin, atorvastatin, ferrous sulfate, gabapentin, hydrOXYzine, losartan, metFORMIN, metoprolol tartrate, ondansetron, tadalafil, tamsulosin, and vitamin D                 Tests/Procedures        Tests/Procedures Chemotherapy " "    Labs       Pertinent Labs    Results from last 7 days   Lab Units 12/16/24  0812   SODIUM mmol/L 139   POTASSIUM mmol/L 4.1   CHLORIDE mmol/L 101   CO2 mmol/L 25.8   BUN mg/dL 8   CREATININE mg/dL 1.00   CALCIUM mg/dL 9.6   BILIRUBIN mg/dL 0.3   ALK PHOS U/L 121*   ALT (SGPT) U/L 16   AST (SGOT) U/L 22   GLUCOSE mg/dL 164*     Results from last 7 days   Lab Units 12/16/24  0812   HEMOGLOBIN g/dL 10.8*   HEMATOCRIT % 35.4*   WBC 10*3/mm3 6.24   ALBUMIN g/dL 3.9     Results from last 7 days   Lab Units 12/16/24  0812   PLATELETS 10*3/mm3 239     No results found for: \"COVID19\"  Lab Results   Component Value Date    HGBA1C 6.7 (H) 07/16/2024          Physical Findings        Physical Appearance alert, oriented     Edema  not assessed   Gastrointestinal None   Tubes/Lines/Drains Implantable Port   Oral/Mouth Cavity WNL   Skin Intact       Estimated/Assessed Needs        Energy Requirements    Height for Calculation      Weight for Calculation 86 kg   Method for Estimation  25 kcal/kg   EST Needs (kcal/day) 2150 kcals per day       Protein Requirements    Weight for Calculation 86 kg   EST Protein Needs (g/kg) 1.0 - 1.2 gm/kg   EST Daily Needs (g/day)  gms per day       Fluid Requirements     Method for Estimation 1 mL/kcal    Estimated Needs (mL/day) 2100 mLs per day         NUTRITION INTERVENTION / PLAN OF CARE      Intervention Goal(s) Maintain nutrition status, Provide information, Meet estimated needs, Disease management/therapy, Tolerate PO , Maintain intake, and No significant weight loss         RD Intervention/Action Encouraged intake, Follow Tx progress, Recommended/ordered         Recommendations:       PO Diet Consume calorie and protein dense healthy foods, 6 small meals      Supplements Boost or Ensure if needed      Snacks       Other    New Prescription Ordered? No, recommend   --      Monitor/Evaluation Follow up as needed   Education Education provided   --    Electronically signed by:  Shirley" MARICHUY Patterson RD  12/16/24 09:49 EST

## 2024-12-18 ENCOUNTER — INFUSION (OUTPATIENT)
Dept: ONCOLOGY | Facility: HOSPITAL | Age: 69
End: 2024-12-18
Payer: MEDICARE

## 2024-12-18 DIAGNOSIS — Z45.2 FITTING AND ADJUSTMENT OF VASCULAR CATHETER: Primary | ICD-10-CM

## 2024-12-18 PROCEDURE — 25010000002 HEPARIN LOCK FLUSH PER 10 UNITS: Performed by: INTERNAL MEDICINE

## 2024-12-18 RX ORDER — SODIUM CHLORIDE 0.9 % (FLUSH) 0.9 %
10 SYRINGE (ML) INJECTION AS NEEDED
OUTPATIENT
Start: 2024-12-18

## 2024-12-18 RX ORDER — HEPARIN SODIUM (PORCINE) LOCK FLUSH IV SOLN 100 UNIT/ML 100 UNIT/ML
500 SOLUTION INTRAVENOUS AS NEEDED
OUTPATIENT
Start: 2024-12-18

## 2024-12-18 RX ORDER — HEPARIN SODIUM (PORCINE) LOCK FLUSH IV SOLN 100 UNIT/ML 100 UNIT/ML
500 SOLUTION INTRAVENOUS AS NEEDED
Status: DISCONTINUED | OUTPATIENT
Start: 2024-12-18 | End: 2024-12-18 | Stop reason: HOSPADM

## 2024-12-18 RX ORDER — SODIUM CHLORIDE 0.9 % (FLUSH) 0.9 %
10 SYRINGE (ML) INJECTION AS NEEDED
Status: DISCONTINUED | OUTPATIENT
Start: 2024-12-18 | End: 2024-12-18 | Stop reason: HOSPADM

## 2024-12-18 RX ADMIN — Medication 10 ML: at 10:38

## 2024-12-18 RX ADMIN — Medication 500 UNITS: at 10:38

## 2024-12-27 NOTE — PROGRESS NOTES
REASON FOR FOLLOW UP:  stage III mid to upper well-differentiated rectal adenocarcinoma (cT3, cN2 CM0.).  Provide an opinion on any further workup or treatment                             REQUESTING PHYSICIAN: BOBBY Portillo, Naeem Rai MD    RECORDS OBTAINED:  Records of the patients history including those obtained from the referring provider were reviewed and summarized in detail.    HISTORY OF PRESENT ILLNESS:  The patient is a 69 y.o. year old male who is here for an opinion about the above issue.    History of Present Illness   The patient is a 69-year-old male who is referred for recently diagnosed stage III mid to upper well-differentiated rectal adenocarcinoma (cT3, cN2 CM0.).    Patient undergone a screening colonoscopy 10/18/2024 demonstrating a circumferential mass involving the rectum extending from 10 cm to 12 cm with biopsy of 15 cm consistent with invasive well-differentiated adenocarcinoma with ulceration necroinflammatory debris.    This was felt to be microsatellite stable by IHC as well as including MSI by PCR.    If follow-up with infectious disease 11/1/2024 there being concern about resuming hypersexual activity and that he start preexposure prophylaxis.  He last been seen July 2024 on Descovy still in relationship with his partner and currently monogamous with been having bowel changes underwent colonoscopy with the above diagnosis.  As result of his need for therapy Descovy was discontinued and the issue to be reevaluated once he was successfully treated.    MRI of the pelvis performed 11/4/2024 demonstrates a high rectal mass extending to the proximal sigmoid colon representing a T3d N2 rectal tumor, side effect invasion on the superior aspect of the mass approximates between the mesorectum and peritoneal cavity?    CT chest 11/8 demonstrates pleural plaques along the peripheral aspect of both the right lower lobes.  These are of uncertain significance.    The patient was next  seen 11/14/2024 by colorectal surgery without evidence of obstruction or bleeding.  He was thought a excellent candidate for neoadjuvant chemotherapy followed by mesorectal excision and adjuvant chemotherapy-comparable to the prospect trial.  There was concern about the location of the tumor extending down to the rectum and the avoidance of radiation therapy since the tumor appeared to be resectable.  Was the role of laparoscopic low anterior resection total mesorectal excision and creation of a diverting loop ileostomy temporarily discussed.    As resulted above the patient is now referred to discuss this above approach.  He is seen with his significant other and we have discussed his findings in great detail from initial diagnosis and and subsequent surgical assessment leading to the recommendation of neoadjuvant chemotherapy given in the fashion of the PROSPECT Trial which was designed to determine whether neoadjuvant chemotherapy could be used as an alternative to neoadjuvant chemoRT.  This study demonstrated that similar disease-free survival and overall survival was similar to neoadjuvant CRT alone for eligible patients.  This would include the use of 6 cycles of FOLFOX chemotherapy followed by reassessment and if a clinical response and 20% or more was seen then RT would be admitted, proceeding to surgical resection and subsequent adjuvant chemotherapy.      He returns today 12/2/2024, for Cycle 1 Day 1 FOLFOX. He does not have any new concerns today. He remains fatigued and experiences intermittent lightheadedness. He denies shortness of breath. He denies abdominal pain. His stools are black, but he denies rome blood. Despite starting oral iron, his hemoglobin has decreased further. He is not tolerating the oral iron at this time.  Patient was started on Feraheme.    Patient returns on 12/16/2024 for cycle 2 FOLFOX.  He reports he is tolerated treatment well thus far and denying any nausea, vomiting,  changes to his bowels.  He did have blood in his stool following cycle 1 1 time.  That has not reoccurred.  He did start Feraheme the day of disconnect with cycle 1 and has had improvement in his hemoglobin.  He will be due for his second dose of Feraheme today.  He denies increased neuropathy.    The patient is next evaluated 12/30/2024 for his third cycle of FOLFOX.  He has undergone Feraheme given 12/4/2024 and 12/16/2024.  He is feeling reasonably good without neuropathic symptoms.  We have discussed his scheduling and timing as he proceeds through his treatment including subsequent repeat MR pelvis after his 6 cycle of FOLFOX.    Past Medical History:   Diagnosis Date    Aphasia     Arthritis     Cancer     prostate    CHF (congestive heart failure)     Diabetes mellitus     GERD (gastroesophageal reflux disease)     History of radiation therapy     History of UTI     Hyperlipidemia     Hypertension     Multiple gastric ulcers     Rectal cancer 2024    Seasonal allergies     Sleep apnea     cpap    Stroke     Tattoos     TIA (transient ischemic attack)         Past Surgical History:   Procedure Laterality Date    ANGIOPLASTY CAROTID ARTERY      CAROTID ENDARTERECTOMY      COLONOSCOPY N/A 10/18/2024    Procedure: COLONOSCOPY TO CECUM/TI WITH BIOPSIES;  Surgeon: Marcus Christine Jr., MD;  Location: [facility] ENDOSCOPY;  Service: General;  Laterality: N/A;  PRE-SCREENING, FAMILY HX COLON CANCER  POST-DIVERTICULOSIS, RECTAL MASS    FOOT SURGERY      VENOUS ACCESS DEVICE (PORT) INSERTION N/A 11/22/2024    Procedure: INSERTION VENOUS ACCESS DEVICE;  Surgeon: Naeem Rai MD;  Location: [facility] OR;  Service: General;  Laterality: N/A;        Current Outpatient Medications on File Prior to Visit   Medication Sig Dispense Refill    aspirin 81 MG chewable tablet Chew 1 tablet Daily.      atorvastatin (LIPITOR) 80 MG tablet Take 1 tablet by mouth Daily. 30 tablet 3    CBD (cannabidiol) oral oil Take 1 drop by  mouth 2 (Two) Times a Day. Half a dropper twice daily      Cyanocobalamin (VITAMIN B 12 PO) Take 1 tablet by mouth Daily.      DULoxetine (Cymbalta) 60 MG capsule Take 1 capsule by mouth Daily for 180 days. (Patient taking differently: Take 1 capsule by mouth Every Night.) 90 capsule 1    ferrous sulfate 325 (65 Fe) MG tablet Take 65 mg by mouth Daily With Breakfast.      gabapentin (NEURONTIN) 300 MG capsule TAKE 2 CAPSULES BY MOUTH THREE TIMES DAILY 540 capsule 1    hydrOXYzine (ATARAX) 10 MG tablet TAKE 1 TO 2 TABLETS BY MOUTH EVERY NIGHT AT BEDTIME 60 tablet 1    losartan (COZAAR) 100 MG tablet Take 1 tablet by mouth Daily. 90 tablet 1    metFORMIN (GLUCOPHAGE) 1000 MG tablet Take 1 tablet by mouth 2 (Two) Times a Day With Meals. 90 tablet 2    metoprolol tartrate (LOPRESSOR) 25 MG tablet TAKE 1 TABLET BY MOUTH DAILY 90 tablet 0    ondansetron (ZOFRAN) 8 MG tablet Take 1 tablet by mouth 3 (Three) Times a Day As Needed for Nausea or Vomiting. 30 tablet 5    tadalafil (CIALIS) 5 MG tablet Take 1 tablet by mouth Daily As Needed for Erectile Dysfunction.      tamsulosin (FLOMAX) 0.4 MG capsule 24 hr capsule Take 1 capsule by mouth every night at bedtime.      vitamin D (ERGOCALCIFEROL) 1.25 MG (85017 UT) capsule capsule Take 1 capsule by mouth 1 (One) Time Per Week. 12 capsule 0     No current facility-administered medications on file prior to visit.        ALLERGIES:  No Known Allergies     Social History     Socioeconomic History    Marital status: Significant Other   Tobacco Use    Smoking status: Former     Current packs/day: 3.00     Average packs/day: 3.0 packs/day for 30.0 years (90.0 ttl pk-yrs)     Types: Cigarettes    Smokeless tobacco: Never   Vaping Use    Vaping status: Some Days    Substances: CBD    Devices: Disposable   Substance and Sexual Activity    Alcohol use: No    Drug use: Yes    Sexual activity: Defer        Family History   Problem Relation Age of Onset    Bone cancer Mother     COPD Father  "    Hypertension Father     Stroke Father         TIA    Bone cancer Father         smoker    Lung cancer Father     Diabetes Father     No Known Problems Sister     No Known Problems Brother     No Known Problems Maternal Grandmother     No Known Problems Maternal Grandfather     No Known Problems Paternal Grandmother     Colon cancer Paternal Grandfather     Malig Hyperthermia Neg Hx           Objective     Vitals:    12/30/24 0919   BP: 163/84   Pulse: 76   Resp: 16   Temp: 97.9 °F (36.6 °C)   TempSrc: Oral   SpO2: 93%   Weight: 85.2 kg (187 lb 12.8 oz)   Height: 165.1 cm (65\")   PainSc: 0-No pain             12/30/2024     9:19 AM   Current Status   ECOG score 0       Physical Exam  Constitutional:       Appearance: He is obese.   HENT:      Head: Normocephalic and atraumatic.      Nose: Nose normal.      Mouth/Throat:      Mouth: Mucous membranes are moist.      Pharynx: Oropharynx is clear.   Eyes:      Extraocular Movements: Extraocular movements intact.      Conjunctiva/sclera: Conjunctivae normal.   Cardiovascular:      Rate and Rhythm: Normal rate and regular rhythm.      Pulses: Normal pulses.   Pulmonary:      Effort: Pulmonary effort is normal.   Abdominal:      General: Bowel sounds are normal.      Palpations: Abdomen is soft.   Musculoskeletal:         General: Normal range of motion.      Cervical back: Normal range of motion and neck supple.   Skin:     General: Skin is warm and dry.   Neurological:      General: No focal deficit present.      Mental Status: He is oriented to person, place, and time.   Psychiatric:         Mood and Affect: Mood normal.         Behavior: Behavior normal.         RECENT LABS:  Results from last 7 days   Lab Units 12/30/24  0905   WBC 10*3/mm3 5.59   NEUTROS ABS 10*3/mm3 3.16   HEMOGLOBIN g/dL 11.3*   HEMATOCRIT % 36.8*   PLATELETS 10*3/mm3 172                   Assessment & Plan   *Stage III mid to upper well differentiated rectal adenocarcinoma (cT3, cN2, cM0)     " 69-year-old male with a history of of diabetes, CHF GE reflux hyperlipidemia, hypertension, TIA, possible CVA as well as a history of prostate cancer status post XRT.  He had been recently having a change in bowel habit ultimately leading to additional assessment.  This led to a screening colonoscopy 10/18/2024demonstrating a circumferential mass involving the rectum extending from 10 cm to 12 cm with biopsy of 15 cm consistent with invasive well-differentiated adenocarcinoma with ulceration necroinflammatory debris. This was felt to be microsatellite stable by IHC as well as including MSI by PCR.  MRI of the pelvis performed 11/4/2024 demonstrates a high rectal mass extending to the proximal sigmoid colon representing a T3d N2 rectal tumor, side effect invasion on the superior aspect of the mass approximates between the mesorectum and peritoneal cavity?  Additional staging 11/8/2024 includes a CT of the chest demonstrating small pleural plaques along the posterior aspect of both the right lower lobes of uncertain significance.  There are no other substantial findings though we have discussed this as leading to a PET/CT to complete his staging.  11/14/2024 by colorectal surgery, Dr. Naeem Rai, without evidence of obstruction or bleeding.  He was thought a excellent candidate for neoadjuvant chemotherapy followed by mesorectal excision and adjuvant chemotherapy-comparable to the PROSPECT trial.  There was concern about the location of the tumor extending down to the rectum and the avoidance of radiation therapy since the tumor appeared to be resectable.  There was also the concern of his previous history of radiation therapy.  Further discussed was the role of laparoscopic low anterior resection, total mesorectal excision, and creation of a diverting loop ileostomy temporarily utilized also discussed.  11/20/2024 and we have reviewed the PROSPECT Trial which was designed to determine whether neoadjuvant  chemotherapy could be used as an alternative to neoadjuvant chemoRT.  This study demonstrated that similar disease-free survival and overall survival was similar to neoadjuvant CRT alone for eligible patients.  This would include the use of 6 cycles of FOLFOX chemotherapy followed by reassessment and if a clinical response and 20% or more was seen then RT would be admitted, proceeding to surgical resection and subsequent adjuvant chemotherapy.  After discussion the patient agrees to proceed.  12/2/2024: Proceed with C1D1 FOLFOX. Hemoglobin has declined to 9.9 today secondary to malignancy- ongoing bleeding and worsening iron deficiency. He has been taking oral iron, however, is not tolerating this well, therefore will plan for IV iron pending insurance approval.   12/16/2024: Proceed with cycle 2-day 1 FOLFOX today.  Patient is tolerating well.  The patient is next evaluated 12/30/2024 for his third cycle of FOLFOX.  He has undergone Feraheme given 12/4/2024 and 12/16/2024.  He is feeling reasonably good without neuropathic symptoms.  We have discussed his scheduling and timing as he proceeds through his treatment including subsequent repeat MR pelvis after his 6 cycle of FOLFOX.    *Anemia   12/2/2024: Hemoglobin 9.9 today. Patient is not tolerating oral iron, therefore, will plan to proceed with IV iron.    12/16/2024 hemoglobin has improved today to 10.8.  Proceeded with the second dose of Feraheme    Plan  Proceed with Cycle 3 Day 1 FOLFOX  Return to clinic/hospital on Day 3 for unhook, possible fluids  Return to clinic in 2 weeks for NP visit, Cycle 4 day 1 FOLFOX at a 6 plan to be followed by repeat MRI pelvis

## 2024-12-30 ENCOUNTER — INFUSION (OUTPATIENT)
Dept: ONCOLOGY | Facility: HOSPITAL | Age: 69
End: 2024-12-30
Payer: MEDICARE

## 2024-12-30 ENCOUNTER — OFFICE VISIT (OUTPATIENT)
Dept: ONCOLOGY | Facility: CLINIC | Age: 69
End: 2024-12-30
Payer: MEDICARE

## 2024-12-30 VITALS
TEMPERATURE: 97.9 F | HEIGHT: 65 IN | OXYGEN SATURATION: 93 % | HEART RATE: 76 BPM | WEIGHT: 187.8 LBS | SYSTOLIC BLOOD PRESSURE: 163 MMHG | RESPIRATION RATE: 16 BRPM | BODY MASS INDEX: 31.29 KG/M2 | DIASTOLIC BLOOD PRESSURE: 84 MMHG

## 2024-12-30 DIAGNOSIS — C20 RECTAL ADENOCARCINOMA: ICD-10-CM

## 2024-12-30 DIAGNOSIS — C20 RECTAL ADENOCARCINOMA: Primary | ICD-10-CM

## 2024-12-30 LAB
ALBUMIN SERPL-MCNC: 3.9 G/DL (ref 3.5–5.2)
ALBUMIN/GLOB SERPL: 1.5 G/DL
ALP SERPL-CCNC: 105 U/L (ref 39–117)
ALT SERPL W P-5'-P-CCNC: 33 U/L (ref 1–41)
ANION GAP SERPL CALCULATED.3IONS-SCNC: 12.8 MMOL/L (ref 5–15)
AST SERPL-CCNC: 30 U/L (ref 1–40)
BASOPHILS # BLD AUTO: 0.07 10*3/MM3 (ref 0–0.2)
BASOPHILS NFR BLD AUTO: 1.3 % (ref 0–1.5)
BILIRUB SERPL-MCNC: 0.4 MG/DL (ref 0–1.2)
BUN SERPL-MCNC: 13 MG/DL (ref 8–23)
BUN/CREAT SERPL: 11 (ref 7–25)
CALCIUM SPEC-SCNC: 9.2 MG/DL (ref 8.6–10.5)
CHLORIDE SERPL-SCNC: 103 MMOL/L (ref 98–107)
CO2 SERPL-SCNC: 26.2 MMOL/L (ref 22–29)
CREAT SERPL-MCNC: 1.18 MG/DL (ref 0.76–1.27)
DEPRECATED RDW RBC AUTO: 69.2 FL (ref 37–54)
EGFRCR SERPLBLD CKD-EPI 2021: 66 ML/MIN/1.73
EOSINOPHIL # BLD AUTO: 0.28 10*3/MM3 (ref 0–0.4)
EOSINOPHIL NFR BLD AUTO: 5 % (ref 0.3–6.2)
ERYTHROCYTE [DISTWIDTH] IN BLOOD BY AUTOMATED COUNT: 21.7 % (ref 12.3–15.4)
GLOBULIN UR ELPH-MCNC: 2.6 GM/DL
GLUCOSE SERPL-MCNC: 137 MG/DL (ref 65–99)
HCT VFR BLD AUTO: 36.8 % (ref 37.5–51)
HGB BLD-MCNC: 11.3 G/DL (ref 13–17.7)
IMM GRANULOCYTES # BLD AUTO: 0.02 10*3/MM3 (ref 0–0.05)
IMM GRANULOCYTES NFR BLD AUTO: 0.4 % (ref 0–0.5)
LYMPHOCYTES # BLD AUTO: 1.52 10*3/MM3 (ref 0.7–3.1)
LYMPHOCYTES NFR BLD AUTO: 27.2 % (ref 19.6–45.3)
MCH RBC QN AUTO: 28.5 PG (ref 26.6–33)
MCHC RBC AUTO-ENTMCNC: 30.7 G/DL (ref 31.5–35.7)
MCV RBC AUTO: 92.7 FL (ref 79–97)
MONOCYTES # BLD AUTO: 0.54 10*3/MM3 (ref 0.1–0.9)
MONOCYTES NFR BLD AUTO: 9.7 % (ref 5–12)
NEUTROPHILS NFR BLD AUTO: 3.16 10*3/MM3 (ref 1.7–7)
NEUTROPHILS NFR BLD AUTO: 56.4 % (ref 42.7–76)
NRBC BLD AUTO-RTO: 0 /100 WBC (ref 0–0.2)
PLATELET # BLD AUTO: 172 10*3/MM3 (ref 140–450)
PMV BLD AUTO: 9.7 FL (ref 6–12)
POTASSIUM SERPL-SCNC: 4.3 MMOL/L (ref 3.5–5.2)
PROT SERPL-MCNC: 6.5 G/DL (ref 6–8.5)
RBC # BLD AUTO: 3.97 10*6/MM3 (ref 4.14–5.8)
SODIUM SERPL-SCNC: 142 MMOL/L (ref 136–145)
WBC NRBC COR # BLD AUTO: 5.59 10*3/MM3 (ref 3.4–10.8)

## 2024-12-30 PROCEDURE — 96413 CHEMO IV INFUSION 1 HR: CPT

## 2024-12-30 PROCEDURE — 96415 CHEMO IV INFUSION ADDL HR: CPT

## 2024-12-30 PROCEDURE — 3079F DIAST BP 80-89 MM HG: CPT | Performed by: INTERNAL MEDICINE

## 2024-12-30 PROCEDURE — 25010000002 OXALIPLATIN PER 0.5 MG: Performed by: INTERNAL MEDICINE

## 2024-12-30 PROCEDURE — 80053 COMPREHEN METABOLIC PANEL: CPT

## 2024-12-30 PROCEDURE — 3077F SYST BP >= 140 MM HG: CPT | Performed by: INTERNAL MEDICINE

## 2024-12-30 PROCEDURE — 25810000003 SODIUM CHLORIDE 0.9 % SOLUTION 500 ML FLEX CONT: Performed by: INTERNAL MEDICINE

## 2024-12-30 PROCEDURE — 96375 TX/PRO/DX INJ NEW DRUG ADDON: CPT

## 2024-12-30 PROCEDURE — 25010000002 PALONOSETRON PER 25 MCG: Performed by: INTERNAL MEDICINE

## 2024-12-30 PROCEDURE — 96411 CHEMO IV PUSH ADDL DRUG: CPT

## 2024-12-30 PROCEDURE — 85025 COMPLETE CBC W/AUTO DIFF WBC: CPT

## 2024-12-30 PROCEDURE — 25010000003 DEXTROSE 5 % SOLUTION 250 ML FLEX CONT: Performed by: INTERNAL MEDICINE

## 2024-12-30 PROCEDURE — 25010000002 DEXAMETHASONE SODIUM PHOSPHATE 100 MG/10ML SOLUTION: Performed by: INTERNAL MEDICINE

## 2024-12-30 PROCEDURE — 96368 THER/DIAG CONCURRENT INF: CPT

## 2024-12-30 PROCEDURE — G0498 CHEMO EXTEND IV INFUS W/PUMP: HCPCS

## 2024-12-30 PROCEDURE — 25010000002 FLUOROURACIL PER 500 MG: Performed by: INTERNAL MEDICINE

## 2024-12-30 PROCEDURE — 25010000002 LEUCOVORIN CALCIUM PER 50 MG: Performed by: INTERNAL MEDICINE

## 2024-12-30 PROCEDURE — 96367 TX/PROPH/DG ADDL SEQ IV INF: CPT

## 2024-12-30 PROCEDURE — 1126F AMNT PAIN NOTED NONE PRSNT: CPT | Performed by: INTERNAL MEDICINE

## 2024-12-30 PROCEDURE — 25010000002 FOSAPREPITANT PER 1 MG: Performed by: INTERNAL MEDICINE

## 2024-12-30 PROCEDURE — 99214 OFFICE O/P EST MOD 30 MIN: CPT | Performed by: INTERNAL MEDICINE

## 2024-12-30 RX ORDER — HYDROCORTISONE SODIUM SUCCINATE 100 MG/2ML
100 INJECTION INTRAMUSCULAR; INTRAVENOUS AS NEEDED
Status: CANCELLED | OUTPATIENT
Start: 2024-12-30

## 2024-12-30 RX ORDER — DEXTROSE MONOHYDRATE 50 MG/ML
20 INJECTION, SOLUTION INTRAVENOUS ONCE
Status: COMPLETED | OUTPATIENT
Start: 2024-12-30 | End: 2024-12-30

## 2024-12-30 RX ORDER — FAMOTIDINE 10 MG/ML
20 INJECTION, SOLUTION INTRAVENOUS AS NEEDED
Status: CANCELLED | OUTPATIENT
Start: 2024-12-30

## 2024-12-30 RX ORDER — DIPHENHYDRAMINE HYDROCHLORIDE 50 MG/ML
50 INJECTION INTRAMUSCULAR; INTRAVENOUS AS NEEDED
Status: CANCELLED | OUTPATIENT
Start: 2024-12-30

## 2024-12-30 RX ORDER — FLUOROURACIL 50 MG/ML
400 INJECTION, SOLUTION INTRAVENOUS ONCE
Status: COMPLETED | OUTPATIENT
Start: 2024-12-30 | End: 2024-12-30

## 2024-12-30 RX ORDER — PALONOSETRON 0.05 MG/ML
0.25 INJECTION, SOLUTION INTRAVENOUS ONCE
Status: COMPLETED | OUTPATIENT
Start: 2024-12-30 | End: 2024-12-30

## 2024-12-30 RX ORDER — DEXTROSE MONOHYDRATE 50 MG/ML
20 INJECTION, SOLUTION INTRAVENOUS ONCE
Status: CANCELLED | OUTPATIENT
Start: 2024-12-30

## 2024-12-30 RX ORDER — PALONOSETRON 0.05 MG/ML
0.25 INJECTION, SOLUTION INTRAVENOUS ONCE
Status: CANCELLED | OUTPATIENT
Start: 2024-12-30

## 2024-12-30 RX ORDER — FLUOROURACIL 50 MG/ML
400 INJECTION, SOLUTION INTRAVENOUS ONCE
Status: CANCELLED | OUTPATIENT
Start: 2024-12-30

## 2024-12-30 RX ADMIN — OXALIPLATIN 165 MG: 5 INJECTION, SOLUTION INTRAVENOUS at 10:42

## 2024-12-30 RX ADMIN — PALONOSETRON HYDROCHLORIDE 0.25 MG: 0.25 INJECTION INTRAVENOUS at 09:52

## 2024-12-30 RX ADMIN — FOSAPREPITANT 100 ML: 150 INJECTION, POWDER, LYOPHILIZED, FOR SOLUTION INTRAVENOUS at 10:08

## 2024-12-30 RX ADMIN — DEXTROSE 20 ML/HR: 50 INJECTION, SOLUTION INTRAVENOUS at 09:46

## 2024-12-30 RX ADMIN — FLUOROURACIL 770 MG: 50 INJECTION, SOLUTION INTRAVENOUS at 12:37

## 2024-12-30 RX ADMIN — FLUOROURACIL 4610 MG: 50 INJECTION, SOLUTION INTRAVENOUS at 12:37

## 2024-12-30 RX ADMIN — SODIUM CHLORIDE 12 MG: 9 INJECTION, SOLUTION INTRAVENOUS at 09:51

## 2024-12-30 RX ADMIN — LEUCOVORIN CALCIUM 770 MG: 350 INJECTION, POWDER, LYOPHILIZED, FOR SUSPENSION INTRAMUSCULAR; INTRAVENOUS at 10:41

## 2025-01-01 ENCOUNTER — INFUSION (OUTPATIENT)
Dept: ONCOLOGY | Facility: HOSPITAL | Age: 70
End: 2025-01-01
Payer: MEDICARE

## 2025-01-01 VITALS
OXYGEN SATURATION: 93 % | TEMPERATURE: 97.3 F | SYSTOLIC BLOOD PRESSURE: 155 MMHG | HEART RATE: 67 BPM | DIASTOLIC BLOOD PRESSURE: 76 MMHG

## 2025-01-01 DIAGNOSIS — C20 RECTAL ADENOCARCINOMA: Primary | ICD-10-CM

## 2025-01-01 DIAGNOSIS — Z45.2 FITTING AND ADJUSTMENT OF VASCULAR CATHETER: ICD-10-CM

## 2025-01-01 PROCEDURE — 25010000002 HEPARIN LOCK FLUSH PER 10 UNITS: Performed by: INTERNAL MEDICINE

## 2025-01-01 PROCEDURE — 25810000003 SODIUM CHLORIDE 0.9 % SOLUTION: Performed by: INTERNAL MEDICINE

## 2025-01-01 PROCEDURE — 96360 HYDRATION IV INFUSION INIT: CPT

## 2025-01-01 RX ORDER — HEPARIN SODIUM (PORCINE) LOCK FLUSH IV SOLN 100 UNIT/ML 100 UNIT/ML
500 SOLUTION INTRAVENOUS AS NEEDED
Status: DISCONTINUED | OUTPATIENT
Start: 2025-01-01 | End: 2025-01-01 | Stop reason: HOSPADM

## 2025-01-01 RX ORDER — SODIUM CHLORIDE 0.9 % (FLUSH) 0.9 %
10 SYRINGE (ML) INJECTION AS NEEDED
OUTPATIENT
Start: 2025-01-01

## 2025-01-01 RX ORDER — HEPARIN SODIUM (PORCINE) LOCK FLUSH IV SOLN 100 UNIT/ML 100 UNIT/ML
500 SOLUTION INTRAVENOUS AS NEEDED
OUTPATIENT
Start: 2025-01-01

## 2025-01-01 RX ORDER — SODIUM CHLORIDE 0.9 % (FLUSH) 0.9 %
10 SYRINGE (ML) INJECTION AS NEEDED
Status: DISCONTINUED | OUTPATIENT
Start: 2025-01-01 | End: 2025-01-01 | Stop reason: HOSPADM

## 2025-01-01 RX ORDER — SODIUM CHLORIDE 9 MG/ML
1000 INJECTION, SOLUTION INTRAVENOUS ONCE
Status: COMPLETED | OUTPATIENT
Start: 2025-01-01 | End: 2025-01-01

## 2025-01-01 RX ADMIN — SODIUM CHLORIDE 1000 ML: 9 INJECTION, SOLUTION INTRAVENOUS at 09:24

## 2025-01-01 RX ADMIN — Medication 10 ML: at 10:36

## 2025-01-01 RX ADMIN — HEPARIN 500 UNITS: 100 SYRINGE at 10:36

## 2025-01-06 ENCOUNTER — TELEPHONE (OUTPATIENT)
Dept: ONCOLOGY | Facility: CLINIC | Age: 70
End: 2025-01-06
Payer: MEDICARE

## 2025-01-06 RX ORDER — BENZONATATE 100 MG/1
100 CAPSULE ORAL 3 TIMES DAILY PRN
Qty: 30 CAPSULE | Refills: 0 | Status: SHIPPED | OUTPATIENT
Start: 2025-01-06

## 2025-01-06 RX ORDER — NIRMATRELVIR AND RITONAVIR 300-100 MG
3 KIT ORAL 2 TIMES DAILY
Qty: 30 TABLET | Refills: 0 | Status: SHIPPED | OUTPATIENT
Start: 2025-01-06 | End: 2025-01-11

## 2025-01-06 NOTE — TELEPHONE ENCOUNTER
Patient called in and left message reporting COVID.  Attempted to return call x 2     Called the third time.  Patient answered.  Having headache, runny nose, body aches. Dicussed with Dr Nagel will proceed with paxlovid.  Patient counseled on possible interaction with atorvastatin and tamsulosin, will hold while on paxlovid.  Will also send in tessalon to help with cough at night.

## 2025-01-13 ENCOUNTER — OFFICE VISIT (OUTPATIENT)
Dept: ONCOLOGY | Facility: CLINIC | Age: 70
End: 2025-01-13
Payer: MEDICARE

## 2025-01-13 ENCOUNTER — INFUSION (OUTPATIENT)
Dept: ONCOLOGY | Facility: HOSPITAL | Age: 70
End: 2025-01-13
Payer: MEDICARE

## 2025-01-13 VITALS
OXYGEN SATURATION: 95 % | DIASTOLIC BLOOD PRESSURE: 83 MMHG | HEART RATE: 65 BPM | TEMPERATURE: 98.9 F | BODY MASS INDEX: 30.81 KG/M2 | WEIGHT: 184.9 LBS | RESPIRATION RATE: 16 BRPM | HEIGHT: 65 IN | SYSTOLIC BLOOD PRESSURE: 164 MMHG

## 2025-01-13 DIAGNOSIS — C20 RECTAL ADENOCARCINOMA: Primary | ICD-10-CM

## 2025-01-13 DIAGNOSIS — D50.9 IRON DEFICIENCY ANEMIA, UNSPECIFIED IRON DEFICIENCY ANEMIA TYPE: ICD-10-CM

## 2025-01-13 DIAGNOSIS — Z79.899 HIGH RISK MEDICATION USE: ICD-10-CM

## 2025-01-13 DIAGNOSIS — E83.52 HYPERCALCEMIA: ICD-10-CM

## 2025-01-13 LAB
25(OH)D3 SERPL-MCNC: 67.1 NG/ML (ref 30–100)
ALBUMIN SERPL-MCNC: 4.1 G/DL (ref 3.5–5.2)
ALBUMIN/GLOB SERPL: 1.4 G/DL
ALP SERPL-CCNC: 107 U/L (ref 39–117)
ALT SERPL W P-5'-P-CCNC: 37 U/L (ref 1–41)
ANION GAP SERPL CALCULATED.3IONS-SCNC: 13.1 MMOL/L (ref 5–15)
AST SERPL-CCNC: 31 U/L (ref 1–40)
BASOPHILS # BLD AUTO: 0.09 10*3/MM3 (ref 0–0.2)
BASOPHILS NFR BLD AUTO: 1.2 % (ref 0–1.5)
BILIRUB SERPL-MCNC: 0.4 MG/DL (ref 0–1.2)
BUN SERPL-MCNC: 15 MG/DL (ref 8–23)
BUN/CREAT SERPL: 15.5 (ref 7–25)
CA-I SERPL ISE-MCNC: 1.41 MMOL/L (ref 1.15–1.35)
CALCIUM SPEC-SCNC: 11 MG/DL (ref 8.6–10.5)
CEA SERPL-MCNC: 5.27 NG/ML
CHLORIDE SERPL-SCNC: 99 MMOL/L (ref 98–107)
CO2 SERPL-SCNC: 26.9 MMOL/L (ref 22–29)
CREAT SERPL-MCNC: 0.97 MG/DL (ref 0.76–1.27)
DEPRECATED RDW RBC AUTO: 74.6 FL (ref 37–54)
EGFRCR SERPLBLD CKD-EPI 2021: 83.5 ML/MIN/1.73
EOSINOPHIL # BLD AUTO: 0.26 10*3/MM3 (ref 0–0.4)
EOSINOPHIL NFR BLD AUTO: 3.3 % (ref 0.3–6.2)
ERYTHROCYTE [DISTWIDTH] IN BLOOD BY AUTOMATED COUNT: 22.8 % (ref 12.3–15.4)
GLOBULIN UR ELPH-MCNC: 2.9 GM/DL
GLUCOSE SERPL-MCNC: 106 MG/DL (ref 65–99)
HCT VFR BLD AUTO: 41.8 % (ref 37.5–51)
HGB BLD-MCNC: 12.9 G/DL (ref 13–17.7)
IMM GRANULOCYTES # BLD AUTO: 0.05 10*3/MM3 (ref 0–0.05)
IMM GRANULOCYTES NFR BLD AUTO: 0.6 % (ref 0–0.5)
LYMPHOCYTES # BLD AUTO: 1.97 10*3/MM3 (ref 0.7–3.1)
LYMPHOCYTES NFR BLD AUTO: 25.3 % (ref 19.6–45.3)
MCH RBC QN AUTO: 28.5 PG (ref 26.6–33)
MCHC RBC AUTO-ENTMCNC: 30.9 G/DL (ref 31.5–35.7)
MCV RBC AUTO: 92.5 FL (ref 79–97)
MONOCYTES # BLD AUTO: 0.85 10*3/MM3 (ref 0.1–0.9)
MONOCYTES NFR BLD AUTO: 10.9 % (ref 5–12)
NEUTROPHILS NFR BLD AUTO: 4.57 10*3/MM3 (ref 1.7–7)
NEUTROPHILS NFR BLD AUTO: 58.7 % (ref 42.7–76)
NRBC BLD AUTO-RTO: 0 /100 WBC (ref 0–0.2)
PLATELET # BLD AUTO: 202 10*3/MM3 (ref 140–450)
PMV BLD AUTO: 9.1 FL (ref 6–12)
POTASSIUM SERPL-SCNC: 4.3 MMOL/L (ref 3.5–5.2)
PROT SERPL-MCNC: 7 G/DL (ref 6–8.5)
PTH-INTACT SERPL-MCNC: 25 PG/ML (ref 15–65)
RBC # BLD AUTO: 4.52 10*6/MM3 (ref 4.14–5.8)
SODIUM SERPL-SCNC: 139 MMOL/L (ref 136–145)
WBC NRBC COR # BLD AUTO: 7.79 10*3/MM3 (ref 3.4–10.8)

## 2025-01-13 PROCEDURE — 80053 COMPREHEN METABOLIC PANEL: CPT

## 2025-01-13 PROCEDURE — 25810000003 SODIUM CHLORIDE 0.9 % SOLUTION 250 ML FLEX CONT: Performed by: NURSE PRACTITIONER

## 2025-01-13 PROCEDURE — 25010000002 OXALIPLATIN PER 0.5 MG: Performed by: NURSE PRACTITIONER

## 2025-01-13 PROCEDURE — 96361 HYDRATE IV INFUSION ADD-ON: CPT

## 2025-01-13 PROCEDURE — 25010000002 LEUCOVORIN CALCIUM PER 50 MG: Performed by: NURSE PRACTITIONER

## 2025-01-13 PROCEDURE — G0498 CHEMO EXTEND IV INFUS W/PUMP: HCPCS

## 2025-01-13 PROCEDURE — 83970 ASSAY OF PARATHORMONE: CPT | Performed by: NURSE PRACTITIONER

## 2025-01-13 PROCEDURE — 82378 CARCINOEMBRYONIC ANTIGEN: CPT | Performed by: INTERNAL MEDICINE

## 2025-01-13 PROCEDURE — 82306 VITAMIN D 25 HYDROXY: CPT | Performed by: NURSE PRACTITIONER

## 2025-01-13 PROCEDURE — 96411 CHEMO IV PUSH ADDL DRUG: CPT

## 2025-01-13 PROCEDURE — 25810000003 SODIUM CHLORIDE 0.9 % SOLUTION: Performed by: NURSE PRACTITIONER

## 2025-01-13 PROCEDURE — 25010000002 PALONOSETRON PER 25 MCG: Performed by: NURSE PRACTITIONER

## 2025-01-13 PROCEDURE — 96415 CHEMO IV INFUSION ADDL HR: CPT

## 2025-01-13 PROCEDURE — 25010000002 FOSAPREPITANT PER 1 MG: Performed by: NURSE PRACTITIONER

## 2025-01-13 PROCEDURE — 25010000002 FLUOROURACIL PER 500 MG: Performed by: NURSE PRACTITIONER

## 2025-01-13 PROCEDURE — 85025 COMPLETE CBC W/AUTO DIFF WBC: CPT

## 2025-01-13 PROCEDURE — 25010000003 DEXTROSE 5 % SOLUTION 250 ML FLEX CONT: Performed by: NURSE PRACTITIONER

## 2025-01-13 PROCEDURE — 25010000002 DEXAMETHASONE SODIUM PHOSPHATE 100 MG/10ML SOLUTION: Performed by: NURSE PRACTITIONER

## 2025-01-13 PROCEDURE — 96367 TX/PROPH/DG ADDL SEQ IV INF: CPT

## 2025-01-13 PROCEDURE — 96368 THER/DIAG CONCURRENT INF: CPT

## 2025-01-13 PROCEDURE — 82330 ASSAY OF CALCIUM: CPT | Performed by: NURSE PRACTITIONER

## 2025-01-13 PROCEDURE — 96413 CHEMO IV INFUSION 1 HR: CPT

## 2025-01-13 PROCEDURE — 96375 TX/PRO/DX INJ NEW DRUG ADDON: CPT

## 2025-01-13 RX ORDER — PALONOSETRON 0.05 MG/ML
0.25 INJECTION, SOLUTION INTRAVENOUS ONCE
Status: COMPLETED | OUTPATIENT
Start: 2025-01-13 | End: 2025-01-13

## 2025-01-13 RX ORDER — DEXTROSE MONOHYDRATE 50 MG/ML
20 INJECTION, SOLUTION INTRAVENOUS ONCE
Status: COMPLETED | OUTPATIENT
Start: 2025-01-13 | End: 2025-01-13

## 2025-01-13 RX ORDER — HYDROCORTISONE SODIUM SUCCINATE 100 MG/2ML
100 INJECTION INTRAMUSCULAR; INTRAVENOUS AS NEEDED
Status: CANCELLED | OUTPATIENT
Start: 2025-01-13

## 2025-01-13 RX ORDER — DEXTROSE MONOHYDRATE 50 MG/ML
20 INJECTION, SOLUTION INTRAVENOUS ONCE
Status: CANCELLED | OUTPATIENT
Start: 2025-01-13

## 2025-01-13 RX ORDER — FLUOROURACIL 50 MG/ML
400 INJECTION, SOLUTION INTRAVENOUS ONCE
Status: CANCELLED | OUTPATIENT
Start: 2025-01-13

## 2025-01-13 RX ORDER — FLUOROURACIL 50 MG/ML
400 INJECTION, SOLUTION INTRAVENOUS ONCE
Status: COMPLETED | OUTPATIENT
Start: 2025-01-13 | End: 2025-01-13

## 2025-01-13 RX ORDER — FAMOTIDINE 10 MG/ML
20 INJECTION, SOLUTION INTRAVENOUS AS NEEDED
Status: CANCELLED | OUTPATIENT
Start: 2025-01-13

## 2025-01-13 RX ORDER — DIPHENHYDRAMINE HYDROCHLORIDE 50 MG/ML
50 INJECTION INTRAMUSCULAR; INTRAVENOUS AS NEEDED
Status: CANCELLED | OUTPATIENT
Start: 2025-01-13

## 2025-01-13 RX ORDER — SODIUM CHLORIDE 9 MG/ML
1000 INJECTION, SOLUTION INTRAVENOUS ONCE
Status: COMPLETED | OUTPATIENT
Start: 2025-01-13 | End: 2025-01-13

## 2025-01-13 RX ORDER — PALONOSETRON 0.05 MG/ML
0.25 INJECTION, SOLUTION INTRAVENOUS ONCE
Status: CANCELLED | OUTPATIENT
Start: 2025-01-13

## 2025-01-13 RX ADMIN — SODIUM CHLORIDE 1000 ML: 9 INJECTION, SOLUTION INTRAVENOUS at 13:01

## 2025-01-13 RX ADMIN — FLUOROURACIL 770 MG: 50 INJECTION, SOLUTION INTRAVENOUS at 14:18

## 2025-01-13 RX ADMIN — OXALIPLATIN 165 MG: 5 INJECTION, SOLUTION INTRAVENOUS at 10:41

## 2025-01-13 RX ADMIN — FOSAPREPITANT 100 ML: 150 INJECTION, POWDER, LYOPHILIZED, FOR SOLUTION INTRAVENOUS at 09:49

## 2025-01-13 RX ADMIN — FLUOROURACIL 4610 MG: 50 INJECTION, SOLUTION INTRAVENOUS at 14:18

## 2025-01-13 RX ADMIN — DEXTROSE MONOHYDRATE 20 ML/HR: 50 INJECTION, SOLUTION INTRAVENOUS at 09:49

## 2025-01-13 RX ADMIN — PALONOSETRON HYDROCHLORIDE 0.25 MG: 0.25 INJECTION INTRAVENOUS at 09:49

## 2025-01-13 RX ADMIN — LEUCOVORIN CALCIUM 770 MG: 350 INJECTION, POWDER, LYOPHILIZED, FOR SUSPENSION INTRAMUSCULAR; INTRAVENOUS at 10:41

## 2025-01-13 RX ADMIN — SODIUM CHLORIDE 12 MG: 9 INJECTION, SOLUTION INTRAVENOUS at 10:20

## 2025-01-13 NOTE — PROGRESS NOTES
REASON FOR FOLLOW UP:  stage III mid to upper well-differentiated rectal adenocarcinoma (cT3, cN2 CM0.).  Provide an opinion on any further workup or treatment                             REQUESTING PHYSICIAN: BOBBY Portillo, Naeem Rai MD    RECORDS OBTAINED:  Records of the patients history including those obtained from the referring provider were reviewed and summarized in detail.    HISTORY OF PRESENT ILLNESS:  The patient is a 69 y.o. year old male who is here for an opinion about the above issue.    History of Present Illness   The patient is a 69-year-old male who is referred for recently diagnosed stage III mid to upper well-differentiated rectal adenocarcinoma (cT3, cN2 CM0.).    Patient undergone a screening colonoscopy 10/18/2024 demonstrating a circumferential mass involving the rectum extending from 10 cm to 12 cm with biopsy of 15 cm consistent with invasive well-differentiated adenocarcinoma with ulceration necroinflammatory debris.    This was felt to be microsatellite stable by IHC as well as including MSI by PCR.    If follow-up with infectious disease 11/1/2024 there being concern about resuming hypersexual activity and that he start preexposure prophylaxis.  He last been seen July 2024 on Descovy still in relationship with his partner and currently monogamous with been having bowel changes underwent colonoscopy with the above diagnosis.  As result of his need for therapy Descovy was discontinued and the issue to be reevaluated once he was successfully treated.    MRI of the pelvis performed 11/4/2024 demonstrates a high rectal mass extending to the proximal sigmoid colon representing a T3d N2 rectal tumor, side effect invasion on the superior aspect of the mass approximates between the mesorectum and peritoneal cavity?    CT chest 11/8 demonstrates pleural plaques along the peripheral aspect of both the right lower lobes.  These are of uncertain significance.    The patient was next  seen 11/14/2024 by colorectal surgery without evidence of obstruction or bleeding.  He was thought a excellent candidate for neoadjuvant chemotherapy followed by mesorectal excision and adjuvant chemotherapy-comparable to the prospect trial.  There was concern about the location of the tumor extending down to the rectum and the avoidance of radiation therapy since the tumor appeared to be resectable.  Was the role of laparoscopic low anterior resection total mesorectal excision and creation of a diverting loop ileostomy temporarily discussed.    As resulted above the patient is now referred to discuss this above approach.  He is seen with his significant other and we have discussed his findings in great detail from initial diagnosis and and subsequent surgical assessment leading to the recommendation of neoadjuvant chemotherapy given in the fashion of the PROSPECT Trial which was designed to determine whether neoadjuvant chemotherapy could be used as an alternative to neoadjuvant chemoRT.  This study demonstrated that similar disease-free survival and overall survival was similar to neoadjuvant CRT alone for eligible patients.  This would include the use of 6 cycles of FOLFOX chemotherapy followed by reassessment and if a clinical response and 20% or more was seen then RT would be admitted, proceeding to surgical resection and subsequent adjuvant chemotherapy.      He returns today 12/2/2024, for Cycle 1 Day 1 FOLFOX. He does not have any new concerns today. He remains fatigued and experiences intermittent lightheadedness. He denies shortness of breath. He denies abdominal pain. His stools are black, but he denies rome blood. Despite starting oral iron, his hemoglobin has decreased further. He is not tolerating the oral iron at this time.  Patient was started on Feraheme.    Patient returns on 12/16/2024 for cycle 2 FOLFOX.  He reports he is tolerated treatment well thus far and denying any nausea, vomiting,  changes to his bowels.  He did have blood in his stool following cycle 1 1 time.  That has not reoccurred.  He did start Feraheme the day of disconnect with cycle 1 and has had improvement in his hemoglobin.  He will be due for his second dose of Feraheme today.  He denies increased neuropathy.    The patient is next evaluated 12/30/2024 for his third cycle of FOLFOX.  He has undergone Feraheme given 12/4/2024 and 12/16/2024.  He is feeling reasonably good without neuropathic symptoms.  We have discussed his scheduling and timing as he proceeds through his treatment including subsequent repeat MR pelvis after his 6 cycle of FOLFOX.    He returns 1/13/2025 for follow up and treatment.  He has been positive for COVID since his last office visit and called in at which time we did start him on paxlovid.  He reports on starting the Paxlovid he was much improved.  He does have a scant cough at times with no lingering shortness of breath.  Report neuropathy lasting approximately 3 days following last treatment that was not always associated with cold intolerance.  This is now resolved.  Symptoms hands only.    Past Medical History:   Diagnosis Date    Aphasia     Arthritis     Cancer     prostate    CHF (congestive heart failure)     Diabetes mellitus     GERD (gastroesophageal reflux disease)     History of radiation therapy     History of UTI     Hyperlipidemia     Hypertension     Multiple gastric ulcers     Rectal cancer 2024    Seasonal allergies     Sleep apnea     cpap    Stroke     Tattoos     TIA (transient ischemic attack)         Past Surgical History:   Procedure Laterality Date    ANGIOPLASTY CAROTID ARTERY      CAROTID ENDARTERECTOMY      COLONOSCOPY N/A 10/18/2024    Procedure: COLONOSCOPY TO CECUM/TI WITH BIOPSIES;  Surgeon: Marcus Christine Jr., MD;  Location: [facility] ENDOSCOPY;  Service: General;  Laterality: N/A;  PRE-SCREENING, FAMILY HX COLON CANCER  POST-DIVERTICULOSIS, RECTAL MASS    FOOT SURGERY       VENOUS ACCESS DEVICE (PORT) INSERTION N/A 11/22/2024    Procedure: INSERTION VENOUS ACCESS DEVICE;  Surgeon: Naeem Rai MD;  Location: [facility] MAIN OR;  Service: General;  Laterality: N/A;        Current Outpatient Medications on File Prior to Visit   Medication Sig Dispense Refill    aspirin 81 MG chewable tablet Chew 1 tablet Daily.      atorvastatin (LIPITOR) 80 MG tablet Take 1 tablet by mouth Daily. 30 tablet 3    benzonatate (Tessalon Perles) 100 MG capsule Take 1 capsule by mouth 3 (Three) Times a Day As Needed for Cough. 30 capsule 0    CBD (cannabidiol) oral oil Take 1 drop by mouth 2 (Two) Times a Day. Half a dropper twice daily      Cyanocobalamin (VITAMIN B 12 PO) Take 1 tablet by mouth Daily.      DULoxetine (Cymbalta) 60 MG capsule Take 1 capsule by mouth Daily for 180 days. (Patient taking differently: Take 1 capsule by mouth Every Night.) 90 capsule 1    ferrous sulfate 325 (65 Fe) MG tablet Take 65 mg by mouth Daily With Breakfast.      gabapentin (NEURONTIN) 300 MG capsule TAKE 2 CAPSULES BY MOUTH THREE TIMES DAILY 540 capsule 1    hydrOXYzine (ATARAX) 10 MG tablet TAKE 1 TO 2 TABLETS BY MOUTH EVERY NIGHT AT BEDTIME 60 tablet 1    losartan (COZAAR) 100 MG tablet Take 1 tablet by mouth Daily. 90 tablet 1    metFORMIN (GLUCOPHAGE) 1000 MG tablet Take 1 tablet by mouth 2 (Two) Times a Day With Meals. 90 tablet 2    metoprolol tartrate (LOPRESSOR) 25 MG tablet TAKE 1 TABLET BY MOUTH DAILY 90 tablet 0    ondansetron (ZOFRAN) 8 MG tablet Take 1 tablet by mouth 3 (Three) Times a Day As Needed for Nausea or Vomiting. 30 tablet 5    tadalafil (CIALIS) 5 MG tablet Take 1 tablet by mouth Daily As Needed for Erectile Dysfunction.      tamsulosin (FLOMAX) 0.4 MG capsule 24 hr capsule Take 1 capsule by mouth every night at bedtime.      vitamin D (ERGOCALCIFEROL) 1.25 MG (45772 UT) capsule capsule Take 1 capsule by mouth 1 (One) Time Per Week. 12 capsule 0     No current facility-administered  "medications on file prior to visit.        ALLERGIES:  No Known Allergies     Social History     Socioeconomic History    Marital status: Significant Other   Tobacco Use    Smoking status: Former     Current packs/day: 3.00     Average packs/day: 3.0 packs/day for 30.0 years (90.0 ttl pk-yrs)     Types: Cigarettes    Smokeless tobacco: Never   Vaping Use    Vaping status: Some Days    Substances: CBD    Devices: Disposable   Substance and Sexual Activity    Alcohol use: No    Drug use: Yes    Sexual activity: Defer        Family History   Problem Relation Age of Onset    Bone cancer Mother     COPD Father     Hypertension Father     Stroke Father         TIA    Bone cancer Father         smoker    Lung cancer Father     Diabetes Father     No Known Problems Sister     No Known Problems Brother     No Known Problems Maternal Grandmother     No Known Problems Maternal Grandfather     No Known Problems Paternal Grandmother     Colon cancer Paternal Grandfather     Malig Hyperthermia Neg Hx           Objective     Vitals:    01/13/25 0858   BP: 164/83   Pulse: 65   Resp: 16   Temp: 98.9 °F (37.2 °C)   TempSrc: Oral   SpO2: 95%   Weight: 83.9 kg (184 lb 14.4 oz)   Height: 165.1 cm (65\")   PainSc:   2   PainLoc: Back  Comment: Stood up wrong, and is sore               1/13/2025     8:57 AM   Current Status   ECOG score 0       Physical Exam  Constitutional:       Appearance: He is obese.   HENT:      Head: Normocephalic and atraumatic.      Nose: Nose normal.      Mouth/Throat:      Mouth: Mucous membranes are moist.      Pharynx: Oropharynx is clear.   Eyes:      Extraocular Movements: Extraocular movements intact.      Conjunctiva/sclera: Conjunctivae normal.   Cardiovascular:      Rate and Rhythm: Normal rate and regular rhythm.      Pulses: Normal pulses.   Pulmonary:      Effort: Pulmonary effort is normal.   Abdominal:      General: Bowel sounds are normal.      Palpations: Abdomen is soft.   Musculoskeletal:        "  General: Normal range of motion.      Cervical back: Normal range of motion and neck supple.   Skin:     General: Skin is warm and dry.   Neurological:      General: No focal deficit present.      Mental Status: He is oriented to person, place, and time.   Psychiatric:         Mood and Affect: Mood normal.         Behavior: Behavior normal.       RECENT LABS:  Results from last 7 days   Lab Units 01/13/25  0845   WBC 10*3/mm3 7.79   NEUTROS ABS 10*3/mm3 4.57   HEMOGLOBIN g/dL 12.9*   HEMATOCRIT % 41.8   PLATELETS 10*3/mm3 202         Results from last 7 days   Lab Units 01/13/25  0845   SODIUM mmol/L 139   POTASSIUM mmol/L 4.3   CHLORIDE mmol/L 99   CO2 mmol/L 26.9   BUN mg/dL 15   CREATININE mg/dL 0.97   CALCIUM mg/dL 11.0*   ALBUMIN g/dL 4.1   BILIRUBIN mg/dL 0.4   ALK PHOS U/L 107   ALT (SGPT) U/L 37   AST (SGOT) U/L 31   GLUCOSE mg/dL 106*             Assessment & Plan   *Stage III mid to upper well differentiated rectal adenocarcinoma (cT3, cN2, cM0)     69-year-old male with a history of of diabetes, CHF GE reflux hyperlipidemia, hypertension, TIA, possible CVA as well as a history of prostate cancer status post XRT.  He had been recently having a change in bowel habit ultimately leading to additional assessment.  This led to a screening colonoscopy 10/18/2024demonstrating a circumferential mass involving the rectum extending from 10 cm to 12 cm with biopsy of 15 cm consistent with invasive well-differentiated adenocarcinoma with ulceration necroinflammatory debris. This was felt to be microsatellite stable by IHC as well as including MSI by PCR.  MRI of the pelvis performed 11/4/2024 demonstrates a high rectal mass extending to the proximal sigmoid colon representing a T3d N2 rectal tumor, side effect invasion on the superior aspect of the mass approximates between the mesorectum and peritoneal cavity?  Additional staging 11/8/2024 includes a CT of the chest demonstrating small pleural plaques along the  posterior aspect of both the right lower lobes of uncertain significance.  There are no other substantial findings though we have discussed this as leading to a PET/CT to complete his staging.  11/14/2024 by colorectal surgery, Dr. Naeem Rai, without evidence of obstruction or bleeding.  He was thought a excellent candidate for neoadjuvant chemotherapy followed by mesorectal excision and adjuvant chemotherapy-comparable to the PROSPECT trial.  There was concern about the location of the tumor extending down to the rectum and the avoidance of radiation therapy since the tumor appeared to be resectable.  There was also the concern of his previous history of radiation therapy.  Further discussed was the role of laparoscopic low anterior resection, total mesorectal excision, and creation of a diverting loop ileostomy temporarily utilized also discussed.  11/20/2024 and we have reviewed the PROSPECT Trial which was designed to determine whether neoadjuvant chemotherapy could be used as an alternative to neoadjuvant chemoRT.  This study demonstrated that similar disease-free survival and overall survival was similar to neoadjuvant CRT alone for eligible patients.  This would include the use of 6 cycles of FOLFOX chemotherapy followed by reassessment and if a clinical response and 20% or more was seen then RT would be admitted, proceeding to surgical resection and subsequent adjuvant chemotherapy.  After discussion the patient agrees to proceed.  12/2/2024: Proceed with C1D1 FOLFOX. Hemoglobin has declined to 9.9 today secondary to malignancy- ongoing bleeding and worsening iron deficiency. He has been taking oral iron, however, is not tolerating this well, therefore will plan for IV iron pending insurance approval.   12/16/2024: Proceed with cycle 2-day 1 FOLFOX today.  Patient is tolerating well.  The patient is next evaluated 12/30/2024 for his third cycle of FOLFOX.  He has undergone Feraheme given 12/4/2024 and  12/16/2024.  He is feeling reasonably good without neuropathic symptoms.  We have discussed his scheduling and timing as he proceeds through his treatment including subsequent repeat MR pelvis after his 6 cycle of FOLFOX.  1/13/2025 Returns for cycle 4 FOLFOX today and was COVID positive 1/6/2025 and treated with Paxlovid and thereafter improved.  He has a scant cough remaining otherwise clear lung sounds.  Will proceed with treatment today.    *Anemia   12/2/2024: Hemoglobin 9.9 today. Patient is not tolerating oral iron, therefore, will plan to proceed with IV iron.    12/16/2024 hemoglobin has improved today to 10.8.  Proceeded with the second dose of Feraheme  12/30/24 Hemoglobin improved to 11.3  1/13/2025 Hemoglobin 12.9    *COVID positive 1/6/2025 trreated with paxlovid    *Hypercalcemia  1/13/2025 calcium 11 with normal albumin.  Discussed with Dr. Nagel and additional labs added including ionized calcium, PTH, PTH related peptide and vitamin D level as he has been on high-dose vitamin D.  Asked patient to hold vitamin D supplement until additional labs have resulted.  Will also recheck CMP on Wednesday at [facility].    Plan  Proceed with cycle 4 FOLFOX  Additional labs pending including ionized calcium, PTH, vitamin D, PTH related peptide  1 L normal saline today  Return to clinic on Day 3 for unhook, possible fluids and stat CMP to recheck calcium  Return to clinic in 2 weeks for MD visit, Cycle 5 day 1 FOLFOX, cycle 6 plan to be followed by repeat MRI pelvis    I spent 42 minutes caring for Kevin on this date of service. This time includes time spent by me in the following activities: preparing for the visit, reviewing tests, obtaining and/or reviewing a separately obtained history, performing a medically appropriate examination and/or evaluation, counseling and educating the patient/family/caregiver, ordering medications, tests, or procedures, referring and communicating with other health care  professionals, documenting information in the medical record, independently interpreting results and communicating that information with the patient/family/caregiver, and care coordination.

## 2025-01-13 NOTE — NURSING NOTE
Lab Results   Component Value Date    GLUCOSE 106 (H) 01/13/2025    BUN 15 01/13/2025    CREATININE 0.97 01/13/2025     01/13/2025    K 4.3 01/13/2025    CL 99 01/13/2025    CALCIUM 11.0 (C) 01/13/2025    PROTEINTOT 7.0 01/13/2025    ALBUMIN 4.1 01/13/2025    ALT 37 01/13/2025    AST 31 01/13/2025    ALKPHOS 107 01/13/2025    BILITOT 0.4 01/13/2025    GLOB 2.9 01/13/2025    AGRATIO 1.4 01/13/2025    BCR 15.5 01/13/2025    ANIONGAP 13.1 01/13/2025    EGFR 83.5 01/13/2025   Calcium 11.0.  Reviewed with Lnyda Figueroa.  Order received for 1 liter of NS and several labs drawn from port as ordered.

## 2025-01-15 ENCOUNTER — INFUSION (OUTPATIENT)
Dept: ONCOLOGY | Facility: HOSPITAL | Age: 70
End: 2025-01-15
Payer: MEDICARE

## 2025-01-15 DIAGNOSIS — C20 RECTAL ADENOCARCINOMA: Primary | ICD-10-CM

## 2025-01-15 DIAGNOSIS — Z45.2 FITTING AND ADJUSTMENT OF VASCULAR CATHETER: ICD-10-CM

## 2025-01-15 PROCEDURE — 25010000002 HEPARIN LOCK FLUSH PER 10 UNITS: Performed by: INTERNAL MEDICINE

## 2025-01-15 RX ORDER — HEPARIN SODIUM (PORCINE) LOCK FLUSH IV SOLN 100 UNIT/ML 100 UNIT/ML
500 SOLUTION INTRAVENOUS AS NEEDED
OUTPATIENT
Start: 2025-01-15

## 2025-01-15 RX ORDER — HEPARIN SODIUM (PORCINE) LOCK FLUSH IV SOLN 100 UNIT/ML 100 UNIT/ML
500 SOLUTION INTRAVENOUS AS NEEDED
Status: DISCONTINUED | OUTPATIENT
Start: 2025-01-15 | End: 2025-01-15 | Stop reason: HOSPADM

## 2025-01-15 RX ORDER — SODIUM CHLORIDE 0.9 % (FLUSH) 0.9 %
10 SYRINGE (ML) INJECTION AS NEEDED
OUTPATIENT
Start: 2025-01-15

## 2025-01-15 RX ORDER — SODIUM CHLORIDE 0.9 % (FLUSH) 0.9 %
10 SYRINGE (ML) INJECTION AS NEEDED
Status: DISCONTINUED | OUTPATIENT
Start: 2025-01-15 | End: 2025-01-15 | Stop reason: HOSPADM

## 2025-01-15 RX ADMIN — Medication 500 UNITS: at 12:52

## 2025-01-15 RX ADMIN — Medication 10 ML: at 12:52

## 2025-01-17 ENCOUNTER — TELEPHONE (OUTPATIENT)
Dept: ONCOLOGY | Facility: CLINIC | Age: 70
End: 2025-01-17
Payer: MEDICARE

## 2025-01-17 DIAGNOSIS — C20 RECTAL ADENOCARCINOMA: Primary | ICD-10-CM

## 2025-01-17 DIAGNOSIS — E83.52 HYPERCALCEMIA: ICD-10-CM

## 2025-01-17 LAB — PTH RELATED PROT SERPL-SCNC: <2 PMOL/L

## 2025-01-17 NOTE — TELEPHONE ENCOUNTER
Call to Mr. Garsia to let him know NP had messaged that he needs to have a lab drawn first of next week to recheck calcium level.  Let him know he could tell us when he can be here, and we will add him on to the lab schedule, and he can have it drawn and leave.  Patient states he can come on Monday, [place] location at 10:00 AM.  Patient very pleasant.

## 2025-01-17 NOTE — TELEPHONE ENCOUNTER
----- Message from Lynda Figueroa sent at 1/17/2025 10:31 AM EST -----  Patient was supposed to have a repeat BMP on day of disconnect to recheck his calcium but it looks like that was not obtained.  Can you see if he can come in early next week to get that done please?

## 2025-01-20 ENCOUNTER — LAB (OUTPATIENT)
Dept: LAB | Facility: HOSPITAL | Age: 70
End: 2025-01-20
Payer: MEDICARE

## 2025-01-20 DIAGNOSIS — E83.52 HYPERCALCEMIA: ICD-10-CM

## 2025-01-20 DIAGNOSIS — C20 RECTAL ADENOCARCINOMA: ICD-10-CM

## 2025-01-20 LAB
ANION GAP SERPL CALCULATED.3IONS-SCNC: 10.7 MMOL/L (ref 5–15)
BUN SERPL-MCNC: 8 MG/DL (ref 8–23)
BUN/CREAT SERPL: 9.3 (ref 7–25)
CALCIUM SPEC-SCNC: 9.1 MG/DL (ref 8.6–10.5)
CHLORIDE SERPL-SCNC: 104 MMOL/L (ref 98–107)
CO2 SERPL-SCNC: 28.3 MMOL/L (ref 22–29)
CREAT SERPL-MCNC: 0.86 MG/DL (ref 0.76–1.27)
EGFRCR SERPLBLD CKD-EPI 2021: 92.6 ML/MIN/1.73
GLUCOSE SERPL-MCNC: 117 MG/DL (ref 65–99)
POTASSIUM SERPL-SCNC: 4.2 MMOL/L (ref 3.5–5.2)
SODIUM SERPL-SCNC: 143 MMOL/L (ref 136–145)

## 2025-01-20 PROCEDURE — 36415 COLL VENOUS BLD VENIPUNCTURE: CPT

## 2025-01-20 PROCEDURE — 80048 BASIC METABOLIC PNL TOTAL CA: CPT

## 2025-01-21 DIAGNOSIS — I10 ESSENTIAL HYPERTENSION: ICD-10-CM

## 2025-01-21 RX ORDER — METOPROLOL TARTRATE 25 MG/1
25 TABLET, FILM COATED ORAL DAILY
Qty: 90 TABLET | Refills: 0 | Status: SHIPPED | OUTPATIENT
Start: 2025-01-21

## 2025-01-21 NOTE — TELEPHONE ENCOUNTER
Med refill   
Our Emergency Department Referral Coordinators will be reaching out to you in the next 24-48 hours from 9:00am to 5:00pm with a follow up appointment. Please expect a phone call from the hospital in that time frame. If you do not receive a call or if you have any questions or concerns, you can reach them at   [phone]

## 2025-01-25 NOTE — PROGRESS NOTES
REASON FOR FOLLOW UP:  stage III mid to upper well-differentiated rectal adenocarcinoma (cT3, cN2 CM0.).    History of Present Illness   The patient is a 69-year-old male who is referred for recently diagnosed stage III mid to upper well-differentiated rectal adenocarcinoma (cT3, cN2 CM0.).    Patient undergone a screening colonoscopy 10/18/2024 demonstrating a circumferential mass involving the rectum extending from 10 cm to 12 cm with biopsy of 15 cm consistent with invasive well-differentiated adenocarcinoma with ulceration necroinflammatory debris.    This was felt to be microsatellite stable by IHC as well as including MSI by PCR.    If follow-up with infectious disease 11/1/2024 there being concern about resuming hypersexual activity and that he start preexposure prophylaxis.  He last been seen July 2024 on Descovy still in relationship with his partner and currently monogamous with been having bowel changes underwent colonoscopy with the above diagnosis.  As result of his need for therapy Descovy was discontinued and the issue to be reevaluated once he was successfully treated.    MRI of the pelvis performed 11/4/2024 demonstrates a high rectal mass extending to the proximal sigmoid colon representing a T3d N2 rectal tumor, side effect invasion on the superior aspect of the mass approximates between the mesorectum and peritoneal cavity?    CT chest 11/8 demonstrates pleural plaques along the peripheral aspect of both the right lower lobes.  These are of uncertain significance.    The patient was next seen 11/14/2024 by colorectal surgery without evidence of obstruction or bleeding.  He was thought a excellent candidate for neoadjuvant chemotherapy followed by mesorectal excision and adjuvant chemotherapy-comparable to the prospect trial.  There was concern about the location of the tumor extending down to the rectum and the avoidance of radiation therapy since the tumor appeared to be resectable.  Was the  role of laparoscopic low anterior resection total mesorectal excision and creation of a diverting loop ileostomy temporarily discussed.    As resulted above the patient is now referred to discuss this above approach.  He is seen with his significant other and we have discussed his findings in great detail from initial diagnosis and and subsequent surgical assessment leading to the recommendation of neoadjuvant chemotherapy given in the fashion of the PROSPECT Trial which was designed to determine whether neoadjuvant chemotherapy could be used as an alternative to neoadjuvant chemoRT.  This study demonstrated that similar disease-free survival and overall survival was similar to neoadjuvant CRT alone for eligible patients.  This would include the use of 6 cycles of FOLFOX chemotherapy followed by reassessment and if a clinical response and 20% or more was seen then RT would be admitted, proceeding to surgical resection and subsequent adjuvant chemotherapy.      He returns 12/2/2024, for Cycle 1 Day 1 FOLFOX. He does not have any new concerns today. He remains fatigued and experiences intermittent lightheadedness. He denies shortness of breath. He denies abdominal pain. His stools are black, but he denies rome blood. Despite starting oral iron, his hemoglobin has decreased further. He is not tolerating the oral iron at this time.  Patient was started on Feraheme.    Patient returns on 12/16/2024 for cycle 2 FOLFOX.  He reports he is tolerated treatment well thus far and denying any nausea, vomiting, changes to his bowels.  He did have blood in his stool following cycle 1 1 time.  That has not reoccurred.  He did start Feraheme the day of disconnect with cycle 1 and has had improvement in his hemoglobin.  He will be due for his second dose of Feraheme today.  He denies increased neuropathy.    The patient is next evaluated 12/30/2024 for his third cycle of FOLFOX.  He has undergone Feraheme given 12/4/2024 and  12/16/2024.  He is feeling reasonably good without neuropathic symptoms.  We have discussed his scheduling and timing as he proceeds through his treatment including subsequent repeat MR pelvis after his 6 cycle of FOLFOX.    He returns 1/13/2025 for follow up and treatment.  He has been positive for COVID since his last office visit and called in at which time we did start him on paxlovid.  He reports on starting the Paxlovid he was much improved.  He does have a scant cough at times with no lingering shortness of breath.  Report neuropathy lasting approximately 3 days following last treatment that was not always associated with cold intolerance.  This is now resolved and had involved his hands primarily.  We went on to proceed with cycle 4 FOLFOX, obtained ionized calcium, PTH, vitamin D and PTH related peptide testing.  The studies include a PTH of 25, ionized calcium of 1.41, PTH related peptide less than 2.0, vitamin D level 67.1.    He is next seen 1/27/2025 for cycle #5 FOLFOX out of 6 planned prior to repeat MRI.  He is doing well with treatment with minimal neuropathy which recovers quickly, no additional fatigue and is without prolonged cytopenias.  He is trying to plan a wedding in and around his surgery and may need dental extractions soon.  He is also having back pain after recent exercising and is pursuing an epidural.    Past Surgical History:   Procedure Laterality Date    ANGIOPLASTY CAROTID ARTERY      CAROTID ENDARTERECTOMY      COLONOSCOPY N/A 10/18/2024    Procedure: COLONOSCOPY TO CECUM/TI WITH BIOPSIES;  Surgeon: Marcus Christine Jr., MD;  Location: [facility] ENDOSCOPY;  Service: General;  Laterality: N/A;  PRE-SCREENING, FAMILY HX COLON CANCER  POST-DIVERTICULOSIS, RECTAL MASS    FOOT SURGERY      VENOUS ACCESS DEVICE (PORT) INSERTION N/A 11/22/2024    Procedure: INSERTION VENOUS ACCESS DEVICE;  Surgeon: Naeem Rai MD;  Location: [facility] OR;  Service: General;  Laterality: N/A;         Current Outpatient Medications on File Prior to Visit   Medication Sig Dispense Refill    aspirin 81 MG chewable tablet Chew 1 tablet Daily.      atorvastatin (LIPITOR) 80 MG tablet Take 1 tablet by mouth Daily. 30 tablet 3    benzonatate (Tessalon Perles) 100 MG capsule Take 1 capsule by mouth 3 (Three) Times a Day As Needed for Cough. 30 capsule 0    CBD (cannabidiol) oral oil Take 1 drop by mouth 2 (Two) Times a Day. Half a dropper twice daily      Cyanocobalamin (VITAMIN B 12 PO) Take 1 tablet by mouth Daily.      DULoxetine (Cymbalta) 60 MG capsule Take 1 capsule by mouth Daily for 180 days. (Patient taking differently: Take 1 capsule by mouth Every Night.) 90 capsule 1    ferrous sulfate 325 (65 Fe) MG tablet Take 65 mg by mouth Daily With Breakfast.      gabapentin (NEURONTIN) 300 MG capsule TAKE 2 CAPSULES BY MOUTH THREE TIMES DAILY 540 capsule 1    hydrOXYzine (ATARAX) 10 MG tablet TAKE 1 TO 2 TABLETS BY MOUTH EVERY NIGHT AT BEDTIME 60 tablet 1    losartan (COZAAR) 100 MG tablet Take 1 tablet by mouth Daily. 90 tablet 1    metFORMIN (GLUCOPHAGE) 1000 MG tablet Take 1 tablet by mouth 2 (Two) Times a Day With Meals. 90 tablet 2    metoprolol tartrate (LOPRESSOR) 25 MG tablet Take 1 tablet by mouth Daily. 90 tablet 0    ondansetron (ZOFRAN) 8 MG tablet Take 1 tablet by mouth 3 (Three) Times a Day As Needed for Nausea or Vomiting. 30 tablet 5    tadalafil (CIALIS) 5 MG tablet Take 1 tablet by mouth Daily As Needed for Erectile Dysfunction.      tamsulosin (FLOMAX) 0.4 MG capsule 24 hr capsule Take 1 capsule by mouth every night at bedtime.      vitamin D (ERGOCALCIFEROL) 1.25 MG (83872 UT) capsule capsule Take 1 capsule by mouth 1 (One) Time Per Week. 12 capsule 0     No current facility-administered medications on file prior to visit.        ALLERGIES:  No Known Allergies     Social History     Socioeconomic History    Marital status: Significant Other   Tobacco Use    Smoking status: Former     Current  "packs/day: 3.00     Average packs/day: 3.0 packs/day for 30.0 years (90.0 ttl pk-yrs)     Types: Cigarettes    Smokeless tobacco: Never   Vaping Use    Vaping status: Some Days    Substances: CBD    Devices: Disposable   Substance and Sexual Activity    Alcohol use: No    Drug use: Yes    Sexual activity: Defer        Family History   Problem Relation Age of Onset    Bone cancer Mother     COPD Father     Hypertension Father     Stroke Father         TIA    Bone cancer Father         smoker    Lung cancer Father     Diabetes Father     No Known Problems Sister     No Known Problems Brother     No Known Problems Maternal Grandmother     No Known Problems Maternal Grandfather     No Known Problems Paternal Grandmother     Colon cancer Paternal Grandfather     Malig Hyperthermia Neg Hx           Objective     Vitals:    01/27/25 0753   BP: 151/85   Pulse: 80   Resp: 16   Temp: 97.5 °F (36.4 °C)   TempSrc: Oral   SpO2: 95%   Weight: 85 kg (187 lb 6.4 oz)   Height: 165.1 cm (65\")   PainSc:   5   PainLoc: Back  Comment: left side                 1/27/2025     7:53 AM   Current Status   ECOG score 0       Physical Exam  Constitutional:       Appearance: He is obese.   HENT:      Head: Normocephalic and atraumatic.      Nose: Nose normal.      Mouth/Throat:      Mouth: Mucous membranes are moist.      Pharynx: Oropharynx is clear.   Eyes:      Extraocular Movements: Extraocular movements intact.      Conjunctiva/sclera: Conjunctivae normal.   Cardiovascular:      Rate and Rhythm: Normal rate and regular rhythm.      Pulses: Normal pulses.   Pulmonary:      Effort: Pulmonary effort is normal.   Abdominal:      General: Bowel sounds are normal.      Palpations: Abdomen is soft.   Musculoskeletal:         General: Normal range of motion.      Cervical back: Normal range of motion and neck supple.   Skin:     General: Skin is warm and dry.   Neurological:      General: No focal deficit present.      Mental Status: He is " oriented to person, place, and time.   Psychiatric:         Mood and Affect: Mood normal.         Behavior: Behavior normal.         RECENT LABS:  Results from last 7 days   Lab Units 01/27/25  0742   WBC 10*3/mm3 5.01   NEUTROS ABS 10*3/mm3 2.27   HEMOGLOBIN g/dL 12.1*   HEMATOCRIT % 38.1   PLATELETS 10*3/mm3 141           Results from last 7 days   Lab Units 01/20/25  1012   SODIUM mmol/L 143   POTASSIUM mmol/L 4.2   CHLORIDE mmol/L 104   CO2 mmol/L 28.3   BUN mg/dL 8   CREATININE mg/dL 0.86   CALCIUM mg/dL 9.1   GLUCOSE mg/dL 117*             Assessment & Plan   *Stage III mid to upper well differentiated rectal adenocarcinoma (cT3, cN2, cM0)     69-year-old male with a history of of diabetes, CHF GE reflux hyperlipidemia, hypertension, TIA, possible CVA as well as a history of prostate cancer status post XRT.  He had been recently having a change in bowel habit ultimately leading to additional assessment.  This led to a screening colonoscopy 10/18/2024demonstrating a circumferential mass involving the rectum extending from 10 cm to 12 cm with biopsy of 15 cm consistent with invasive well-differentiated adenocarcinoma with ulceration necroinflammatory debris. This was felt to be microsatellite stable by IHC as well as including MSI by PCR.  MRI of the pelvis performed 11/4/2024 demonstrates a high rectal mass extending to the proximal sigmoid colon representing a T3d N2 rectal tumor, side effect invasion on the superior aspect of the mass approximates between the mesorectum and peritoneal cavity?  Additional staging 11/8/2024 includes a CT of the chest demonstrating small pleural plaques along the posterior aspect of both the right lower lobes of uncertain significance.  There are no other substantial findings though we have discussed this as leading to a PET/CT to complete his staging.  11/14/2024 by colorectal surgery, Dr. Naeem Rai, without evidence of obstruction or bleeding.  He was thought a excellent  candidate for neoadjuvant chemotherapy followed by mesorectal excision and adjuvant chemotherapy-comparable to the PROSPECT trial.  There was concern about the location of the tumor extending down to the rectum and the avoidance of radiation therapy since the tumor appeared to be resectable.  There was also the concern of his previous history of radiation therapy.  Further discussed was the role of laparoscopic low anterior resection, total mesorectal excision, and creation of a diverting loop ileostomy temporarily utilized also discussed.  11/20/2024 and we have reviewed the PROSPECT Trial which was designed to determine whether neoadjuvant chemotherapy could be used as an alternative to neoadjuvant chemoRT.  This study demonstrated that similar disease-free survival and overall survival was similar to neoadjuvant CRT alone for eligible patients.  This would include the use of 6 cycles of FOLFOX chemotherapy followed by reassessment and if a clinical response and 20% or more was seen then RT would be admitted, proceeding to surgical resection and subsequent adjuvant chemotherapy.  After discussion the patient agrees to proceed.  12/2/2024: Proceed with C1D1 FOLFOX. Hemoglobin has declined to 9.9 today secondary to malignancy- ongoing bleeding and worsening iron deficiency. He has been taking oral iron, however, is not tolerating this well, therefore will plan for IV iron pending insurance approval.   12/16/2024: Proceed with cycle 2-day 1 FOLFOX today.  Patient is tolerating well.  The patient is next evaluated 12/30/2024 for his third cycle of FOLFOX.  He has undergone Feraheme given 12/4/2024 and 12/16/2024.  He is feeling reasonably good without neuropathic symptoms.  We have discussed his scheduling and timing as he proceeds through his treatment including subsequent repeat MR pelvis after his 6 cycle of FOLFOX.  1/13/2025 Returns for cycle 4 FOLFOX today and was COVID positive 1/6/2025 and treated with  Paxlovid and thereafter improved.  He has a scant cough remaining otherwise clear lung sounds.  Will proceed with treatment today.  Patient seen 1/27/2025 recovered from COVID, generally improved performance status and plans for cycle 5 FOLFOX at a 6 planned.    *Anemia   12/2/2024: Hemoglobin 9.9 today. Patient is not tolerating oral iron, therefore, will plan to proceed with IV iron.    12/16/2024 hemoglobin has improved today to 10.8.  Proceeded with the second dose of Feraheme  12/30/24 Hemoglobin improved to 11.3  1/13/2025 Hemoglobin 12.9  Assessed 1/27/2025 with H&H 12.1 and 38.1    *COVID positive 1/6/2025 trreated with paxlovid  Resolved symptoms 1/27/2025    *Hypercalcemia  1/13/2025 calcium 11 with normal albumin.  Discussed with Dr. Nagel and additional labs added including ionized calcium, PTH, PTH related peptide and vitamin D level as he has been on high-dose vitamin D.  Asked patient to hold vitamin D supplement until additional labs have resulted.  Will also recheck CMP on Wednesday at disconnect.  Subsequent ionized calcium, PTH, vitamin D and PTH related peptide testing.  The studies include a PTH of 25, ionized calcium of 1.41, PTH related peptide less than 2.0, vitamin D level 67.1.  Repeat calcium 1/20/2025 at 9.1  Reassessment 1/27/2025 pending    Plan  Proceed with cycle 5 FOLFOX, repeat CMP-normocalcemic  Return to clinic on Day 3 for unhook  Epidural anticipated to pain management  Return to clinic in 2 weeks for MD or NP visit, Cycle 6 day 1 FOLFOX, cycle 6 plan to be followed by repeat MRI pelvis, referral to general surgery-Dr. Rai  We may be contacted by dental about tooth extraction, weekly CBC requested    .

## 2025-01-27 ENCOUNTER — INFUSION (OUTPATIENT)
Dept: ONCOLOGY | Facility: HOSPITAL | Age: 70
End: 2025-01-27
Payer: MEDICARE

## 2025-01-27 ENCOUNTER — OFFICE VISIT (OUTPATIENT)
Dept: ONCOLOGY | Facility: CLINIC | Age: 70
End: 2025-01-27
Payer: MEDICARE

## 2025-01-27 VITALS
WEIGHT: 187.4 LBS | RESPIRATION RATE: 16 BRPM | TEMPERATURE: 97.5 F | DIASTOLIC BLOOD PRESSURE: 85 MMHG | SYSTOLIC BLOOD PRESSURE: 151 MMHG | HEART RATE: 80 BPM | BODY MASS INDEX: 31.22 KG/M2 | OXYGEN SATURATION: 95 % | HEIGHT: 65 IN

## 2025-01-27 DIAGNOSIS — C20 RECTAL ADENOCARCINOMA: ICD-10-CM

## 2025-01-27 DIAGNOSIS — C20 RECTAL ADENOCARCINOMA: Primary | ICD-10-CM

## 2025-01-27 LAB
ALBUMIN SERPL-MCNC: 3.8 G/DL (ref 3.5–5.2)
ALBUMIN/GLOB SERPL: 1.5 G/DL
ALP SERPL-CCNC: 106 U/L (ref 39–117)
ALT SERPL W P-5'-P-CCNC: 83 U/L (ref 1–41)
ANION GAP SERPL CALCULATED.3IONS-SCNC: 8.2 MMOL/L (ref 5–15)
AST SERPL-CCNC: 64 U/L (ref 1–40)
BASOPHILS # BLD AUTO: 0.08 10*3/MM3 (ref 0–0.2)
BASOPHILS NFR BLD AUTO: 1.6 % (ref 0–1.5)
BILIRUB SERPL-MCNC: 0.4 MG/DL (ref 0–1.2)
BUN SERPL-MCNC: 8 MG/DL (ref 8–23)
BUN/CREAT SERPL: 9.1 (ref 7–25)
CALCIUM SPEC-SCNC: 9.2 MG/DL (ref 8.6–10.5)
CHLORIDE SERPL-SCNC: 105 MMOL/L (ref 98–107)
CO2 SERPL-SCNC: 28.8 MMOL/L (ref 22–29)
CREAT SERPL-MCNC: 0.88 MG/DL (ref 0.76–1.27)
DEPRECATED RDW RBC AUTO: 79.7 FL (ref 37–54)
EGFRCR SERPLBLD CKD-EPI 2021: 91.9 ML/MIN/1.73
EOSINOPHIL # BLD AUTO: 0.27 10*3/MM3 (ref 0–0.4)
EOSINOPHIL NFR BLD AUTO: 5.4 % (ref 0.3–6.2)
ERYTHROCYTE [DISTWIDTH] IN BLOOD BY AUTOMATED COUNT: 23.7 % (ref 12.3–15.4)
GLOBULIN UR ELPH-MCNC: 2.6 GM/DL
GLUCOSE SERPL-MCNC: 98 MG/DL (ref 65–99)
HCT VFR BLD AUTO: 38.1 % (ref 37.5–51)
HGB BLD-MCNC: 12.1 G/DL (ref 13–17.7)
IMM GRANULOCYTES # BLD AUTO: 0.01 10*3/MM3 (ref 0–0.05)
IMM GRANULOCYTES NFR BLD AUTO: 0.2 % (ref 0–0.5)
LYMPHOCYTES # BLD AUTO: 1.76 10*3/MM3 (ref 0.7–3.1)
LYMPHOCYTES NFR BLD AUTO: 35.1 % (ref 19.6–45.3)
MCH RBC QN AUTO: 30 PG (ref 26.6–33)
MCHC RBC AUTO-ENTMCNC: 31.8 G/DL (ref 31.5–35.7)
MCV RBC AUTO: 94.5 FL (ref 79–97)
MONOCYTES # BLD AUTO: 0.62 10*3/MM3 (ref 0.1–0.9)
MONOCYTES NFR BLD AUTO: 12.4 % (ref 5–12)
NEUTROPHILS NFR BLD AUTO: 2.27 10*3/MM3 (ref 1.7–7)
NEUTROPHILS NFR BLD AUTO: 45.3 % (ref 42.7–76)
NRBC BLD AUTO-RTO: 0 /100 WBC (ref 0–0.2)
PLATELET # BLD AUTO: 141 10*3/MM3 (ref 140–450)
PMV BLD AUTO: 9 FL (ref 6–12)
POTASSIUM SERPL-SCNC: 4.2 MMOL/L (ref 3.5–5.2)
PROT SERPL-MCNC: 6.4 G/DL (ref 6–8.5)
RBC # BLD AUTO: 4.03 10*6/MM3 (ref 4.14–5.8)
SODIUM SERPL-SCNC: 142 MMOL/L (ref 136–145)
WBC NRBC COR # BLD AUTO: 5.01 10*3/MM3 (ref 3.4–10.8)

## 2025-01-27 PROCEDURE — 25010000002 LEUCOVORIN CALCIUM PER 50 MG: Performed by: INTERNAL MEDICINE

## 2025-01-27 PROCEDURE — 3079F DIAST BP 80-89 MM HG: CPT | Performed by: INTERNAL MEDICINE

## 2025-01-27 PROCEDURE — 25010000002 FOSAPREPITANT PER 1 MG: Performed by: INTERNAL MEDICINE

## 2025-01-27 PROCEDURE — 96375 TX/PRO/DX INJ NEW DRUG ADDON: CPT

## 2025-01-27 PROCEDURE — 96368 THER/DIAG CONCURRENT INF: CPT

## 2025-01-27 PROCEDURE — 96411 CHEMO IV PUSH ADDL DRUG: CPT

## 2025-01-27 PROCEDURE — 1125F AMNT PAIN NOTED PAIN PRSNT: CPT | Performed by: INTERNAL MEDICINE

## 2025-01-27 PROCEDURE — G0498 CHEMO EXTEND IV INFUS W/PUMP: HCPCS

## 2025-01-27 PROCEDURE — 96415 CHEMO IV INFUSION ADDL HR: CPT

## 2025-01-27 PROCEDURE — 25010000002 FLUOROURACIL PER 500 MG: Performed by: INTERNAL MEDICINE

## 2025-01-27 PROCEDURE — 85025 COMPLETE CBC W/AUTO DIFF WBC: CPT

## 2025-01-27 PROCEDURE — 25010000002 PALONOSETRON PER 25 MCG: Performed by: INTERNAL MEDICINE

## 2025-01-27 PROCEDURE — 96413 CHEMO IV INFUSION 1 HR: CPT

## 2025-01-27 PROCEDURE — 25010000003 DEXTROSE 5 % SOLUTION 250 ML FLEX CONT: Performed by: INTERNAL MEDICINE

## 2025-01-27 PROCEDURE — 99214 OFFICE O/P EST MOD 30 MIN: CPT | Performed by: INTERNAL MEDICINE

## 2025-01-27 PROCEDURE — 25010000002 DEXAMETHASONE SODIUM PHOSPHATE 100 MG/10ML SOLUTION: Performed by: INTERNAL MEDICINE

## 2025-01-27 PROCEDURE — 96367 TX/PROPH/DG ADDL SEQ IV INF: CPT

## 2025-01-27 PROCEDURE — 25810000003 SODIUM CHLORIDE 0.9 % SOLUTION 250 ML FLEX CONT: Performed by: INTERNAL MEDICINE

## 2025-01-27 PROCEDURE — 25010000002 OXALIPLATIN PER 0.5 MG: Performed by: INTERNAL MEDICINE

## 2025-01-27 PROCEDURE — 3077F SYST BP >= 140 MM HG: CPT | Performed by: INTERNAL MEDICINE

## 2025-01-27 PROCEDURE — 80053 COMPREHEN METABOLIC PANEL: CPT

## 2025-01-27 RX ORDER — DEXTROSE MONOHYDRATE 50 MG/ML
20 INJECTION, SOLUTION INTRAVENOUS ONCE
Status: CANCELLED | OUTPATIENT
Start: 2025-01-27

## 2025-01-27 RX ORDER — FLUOROURACIL 50 MG/ML
400 INJECTION, SOLUTION INTRAVENOUS ONCE
Status: COMPLETED | OUTPATIENT
Start: 2025-01-27 | End: 2025-01-27

## 2025-01-27 RX ORDER — DIPHENHYDRAMINE HYDROCHLORIDE 50 MG/ML
50 INJECTION INTRAMUSCULAR; INTRAVENOUS AS NEEDED
Status: CANCELLED | OUTPATIENT
Start: 2025-01-27

## 2025-01-27 RX ORDER — FAMOTIDINE 10 MG/ML
20 INJECTION, SOLUTION INTRAVENOUS AS NEEDED
Status: CANCELLED | OUTPATIENT
Start: 2025-01-27

## 2025-01-27 RX ORDER — PALONOSETRON 0.05 MG/ML
0.25 INJECTION, SOLUTION INTRAVENOUS ONCE
Status: CANCELLED | OUTPATIENT
Start: 2025-01-27

## 2025-01-27 RX ORDER — DEXTROSE MONOHYDRATE 50 MG/ML
20 INJECTION, SOLUTION INTRAVENOUS ONCE
Status: COMPLETED | OUTPATIENT
Start: 2025-01-27 | End: 2025-01-27

## 2025-01-27 RX ORDER — PALONOSETRON 0.05 MG/ML
0.25 INJECTION, SOLUTION INTRAVENOUS ONCE
Status: COMPLETED | OUTPATIENT
Start: 2025-01-27 | End: 2025-01-27

## 2025-01-27 RX ORDER — FLUOROURACIL 50 MG/ML
400 INJECTION, SOLUTION INTRAVENOUS ONCE
Status: CANCELLED | OUTPATIENT
Start: 2025-01-27

## 2025-01-27 RX ORDER — HYDROCORTISONE SODIUM SUCCINATE 100 MG/2ML
100 INJECTION INTRAMUSCULAR; INTRAVENOUS AS NEEDED
Status: CANCELLED | OUTPATIENT
Start: 2025-01-27

## 2025-01-27 RX ADMIN — FLUOROURACIL 4610 MG: 50 INJECTION, SOLUTION INTRAVENOUS at 11:28

## 2025-01-27 RX ADMIN — DEXTROSE MONOHYDRATE 20 ML/HR: 5 INJECTION, SOLUTION INTRAVENOUS at 08:31

## 2025-01-27 RX ADMIN — FLUOROURACIL 770 MG: 50 INJECTION, SOLUTION INTRAVENOUS at 11:28

## 2025-01-27 RX ADMIN — FOSAPREPITANT 100 ML: 150 INJECTION, POWDER, LYOPHILIZED, FOR SOLUTION INTRAVENOUS at 08:31

## 2025-01-27 RX ADMIN — DEXAMETHASONE SODIUM PHOSPHATE 12 MG: 10 INJECTION, SOLUTION INTRAMUSCULAR; INTRAVENOUS at 09:02

## 2025-01-27 RX ADMIN — OXALIPLATIN 165 MG: 5 INJECTION, SOLUTION INTRAVENOUS at 09:22

## 2025-01-27 RX ADMIN — LEUCOVORIN CALCIUM 770 MG: 350 INJECTION, POWDER, LYOPHILIZED, FOR SUSPENSION INTRAMUSCULAR; INTRAVENOUS at 09:22

## 2025-01-27 RX ADMIN — PALONOSETRON HYDROCHLORIDE 0.25 MG: 0.25 INJECTION INTRAVENOUS at 08:30

## 2025-01-27 NOTE — LETTER
January 27, 2025     BOBBY Portillo  [address]  [facility] [address]    Patient: Kevin Garsia   YOB: 1955   Date of Visit: 1/27/2025     Dear BOBBY Portillo:       Thank you for referring Kevin Garsia to me for evaluation. Below are the relevant portions of my assessment and plan of care.    If you have questions, please do not hesitate to call me. I look forward to following Kevin along with you.         Sincerely,        Kevin Nagel MD        CC: MD Shona Pascual Michael D., MD  01/27/25 0821  Sign when Signing Visit  REASON FOR FOLLOW UP:  stage III mid to upper well-differentiated rectal adenocarcinoma (cT3, cN2 CM0.).    History of Present Illness   The patient is a 69-year-old male who is referred for recently diagnosed stage III mid to upper well-differentiated rectal adenocarcinoma (cT3, cN2 CM0.).    Patient undergone a screening colonoscopy 10/18/2024 demonstrating a circumferential mass involving the rectum extending from 10 cm to 12 cm with biopsy of 15 cm consistent with invasive well-differentiated adenocarcinoma with ulceration necroinflammatory debris.    This was felt to be microsatellite stable by IHC as well as including MSI by PCR.    If follow-up with infectious disease 11/1/2024 there being concern about resuming hypersexual activity and that he start preexposure prophylaxis.  He last been seen July 2024 on Descovy still in relationship with his partner and currently monogamous with been having bowel changes underwent colonoscopy with the above diagnosis.  As result of his need for therapy Descovy was discontinued and the issue to be reevaluated once he was successfully treated.    MRI of the pelvis performed 11/4/2024 demonstrates a high rectal mass extending to the proximal sigmoid colon representing a T3d N2 rectal tumor, side effect invasion on the superior aspect of the mass approximates between the  mesorectum and peritoneal cavity?    CT chest 11/8 demonstrates pleural plaques along the peripheral aspect of both the right lower lobes.  These are of uncertain significance.    The patient was next seen 11/14/2024 by colorectal surgery without evidence of obstruction or bleeding.  He was thought a excellent candidate for neoadjuvant chemotherapy followed by mesorectal excision and adjuvant chemotherapy-comparable to the prospect trial.  There was concern about the location of the tumor extending down to the rectum and the avoidance of radiation therapy since the tumor appeared to be resectable.  Was the role of laparoscopic low anterior resection total mesorectal excision and creation of a diverting loop ileostomy temporarily discussed.    As resulted above the patient is now referred to discuss this above approach.  He is seen with his significant other and we have discussed his findings in great detail from initial diagnosis and and subsequent surgical assessment leading to the recommendation of neoadjuvant chemotherapy given in the fashion of the PROSPECT Trial which was designed to determine whether neoadjuvant chemotherapy could be used as an alternative to neoadjuvant chemoRT.  This study demonstrated that similar disease-free survival and overall survival was similar to neoadjuvant CRT alone for eligible patients.  This would include the use of 6 cycles of FOLFOX chemotherapy followed by reassessment and if a clinical response and 20% or more was seen then RT would be admitted, proceeding to surgical resection and subsequent adjuvant chemotherapy.      He returns 12/2/2024, for Cycle 1 Day 1 FOLFOX. He does not have any new concerns today. He remains fatigued and experiences intermittent lightheadedness. He denies shortness of breath. He denies abdominal pain. His stools are black, but he denies orme blood. Despite starting oral iron, his hemoglobin has decreased further. He is not tolerating the oral  iron at this time.  Patient was started on Feraheme.    Patient returns on 12/16/2024 for cycle 2 FOLFOX.  He reports he is tolerated treatment well thus far and denying any nausea, vomiting, changes to his bowels.  He did have blood in his stool following cycle 1 1 time.  That has not reoccurred.  He did start Feraheme the day of disconnect with cycle 1 and has had improvement in his hemoglobin.  He will be due for his second dose of Feraheme today.  He denies increased neuropathy.    The patient is next evaluated 12/30/2024 for his third cycle of FOLFOX.  He has undergone Feraheme given 12/4/2024 and 12/16/2024.  He is feeling reasonably good without neuropathic symptoms.  We have discussed his scheduling and timing as he proceeds through his treatment including subsequent repeat MR pelvis after his 6 cycle of FOLFOX.    He returns 1/13/2025 for follow up and treatment.  He has been positive for COVID since his last office visit and called in at which time we did start him on paxlovid.  He reports on starting the Paxlovid he was much improved.  He does have a scant cough at times with no lingering shortness of breath.  Report neuropathy lasting approximately 3 days following last treatment that was not always associated with cold intolerance.  This is now resolved and had involved his hands primarily.  We went on to proceed with cycle 4 FOLFOX, obtained ionized calcium, PTH, vitamin D and PTH related peptide testing.  The studies include a PTH of 25, ionized calcium of 1.41, PTH related peptide less than 2.0, vitamin D level 67.1.    He is next seen 1/27/2025 for cycle #5 FOLFOX out of 6 planned prior to repeat MRI.  He is doing well with treatment with minimal neuropathy which recovers quickly, no additional fatigue and is without prolonged cytopenias.  He is trying to plan a wedding in and around his surgery and may need dental extractions soon.  He is also having back pain after recent exercising and is  pursuing an epidural.    Past Surgical History:   Procedure Laterality Date   • ANGIOPLASTY CAROTID ARTERY     • CAROTID ENDARTERECTOMY     • COLONOSCOPY N/A 10/18/2024    Procedure: COLONOSCOPY TO CECUM/TI WITH BIOPSIES;  Surgeon: Marcus Christine Jr., MD;  Location: [facility] ENDOSCOPY;  Service: General;  Laterality: N/A;  PRE-SCREENING, FAMILY HX COLON CANCER  POST-DIVERTICULOSIS, RECTAL MASS   • FOOT SURGERY     • VENOUS ACCESS DEVICE (PORT) INSERTION N/A 11/22/2024    Procedure: INSERTION VENOUS ACCESS DEVICE;  Surgeon: Naeem Rai MD;  Location: [facility] MAIN OR;  Service: General;  Laterality: N/A;        Current Outpatient Medications on File Prior to Visit   Medication Sig Dispense Refill   • aspirin 81 MG chewable tablet Chew 1 tablet Daily.     • atorvastatin (LIPITOR) 80 MG tablet Take 1 tablet by mouth Daily. 30 tablet 3   • benzonatate (Tessalon Perles) 100 MG capsule Take 1 capsule by mouth 3 (Three) Times a Day As Needed for Cough. 30 capsule 0   • CBD (cannabidiol) oral oil Take 1 drop by mouth 2 (Two) Times a Day. Half a dropper twice daily     • Cyanocobalamin (VITAMIN B 12 PO) Take 1 tablet by mouth Daily.     • DULoxetine (Cymbalta) 60 MG capsule Take 1 capsule by mouth Daily for 180 days. (Patient taking differently: Take 1 capsule by mouth Every Night.) 90 capsule 1   • ferrous sulfate 325 (65 Fe) MG tablet Take 65 mg by mouth Daily With Breakfast.     • gabapentin (NEURONTIN) 300 MG capsule TAKE 2 CAPSULES BY MOUTH THREE TIMES DAILY 540 capsule 1   • hydrOXYzine (ATARAX) 10 MG tablet TAKE 1 TO 2 TABLETS BY MOUTH EVERY NIGHT AT BEDTIME 60 tablet 1   • losartan (COZAAR) 100 MG tablet Take 1 tablet by mouth Daily. 90 tablet 1   • metFORMIN (GLUCOPHAGE) 1000 MG tablet Take 1 tablet by mouth 2 (Two) Times a Day With Meals. 90 tablet 2   • metoprolol tartrate (LOPRESSOR) 25 MG tablet Take 1 tablet by mouth Daily. 90 tablet 0   • ondansetron (ZOFRAN) 8 MG tablet Take 1 tablet by mouth 3  "(Three) Times a Day As Needed for Nausea or Vomiting. 30 tablet 5   • tadalafil (CIALIS) 5 MG tablet Take 1 tablet by mouth Daily As Needed for Erectile Dysfunction.     • tamsulosin (FLOMAX) 0.4 MG capsule 24 hr capsule Take 1 capsule by mouth every night at bedtime.     • vitamin D (ERGOCALCIFEROL) 1.25 MG (89103 UT) capsule capsule Take 1 capsule by mouth 1 (One) Time Per Week. 12 capsule 0     No current facility-administered medications on file prior to visit.        ALLERGIES:  No Known Allergies     Social History     Socioeconomic History   • Marital status: Significant Other   Tobacco Use   • Smoking status: Former     Current packs/day: 3.00     Average packs/day: 3.0 packs/day for 30.0 years (90.0 ttl pk-yrs)     Types: Cigarettes   • Smokeless tobacco: Never   Vaping Use   • Vaping status: Some Days   • Substances: CBD   • Devices: Disposable   Substance and Sexual Activity   • Alcohol use: No   • Drug use: Yes   • Sexual activity: Defer        Family History   Problem Relation Age of Onset   • Bone cancer Mother    • COPD Father    • Hypertension Father    • Stroke Father         TIA   • Bone cancer Father         smoker   • Lung cancer Father    • Diabetes Father    • No Known Problems Sister    • No Known Problems Brother    • No Known Problems Maternal Grandmother    • No Known Problems Maternal Grandfather    • No Known Problems Paternal Grandmother    • Colon cancer Paternal Grandfather    • Malig Hyperthermia Neg Hx           Objective    Vitals:    01/27/25 0753   BP: 151/85   Pulse: 80   Resp: 16   Temp: 97.5 °F (36.4 °C)   TempSrc: Oral   SpO2: 95%   Weight: 85 kg (187 lb 6.4 oz)   Height: 165.1 cm (65\")   PainSc:   5   PainLoc: Back  Comment: left side                 1/27/2025     7:53 AM   Current Status   ECOG score 0       Physical Exam  Constitutional:       Appearance: He is obese.   HENT:      Head: Normocephalic and atraumatic.      Nose: Nose normal.      Mouth/Throat:      Mouth: " Mucous membranes are moist.      Pharynx: Oropharynx is clear.   Eyes:      Extraocular Movements: Extraocular movements intact.      Conjunctiva/sclera: Conjunctivae normal.   Cardiovascular:      Rate and Rhythm: Normal rate and regular rhythm.      Pulses: Normal pulses.   Pulmonary:      Effort: Pulmonary effort is normal.   Abdominal:      General: Bowel sounds are normal.      Palpations: Abdomen is soft.   Musculoskeletal:         General: Normal range of motion.      Cervical back: Normal range of motion and neck supple.   Skin:     General: Skin is warm and dry.   Neurological:      General: No focal deficit present.      Mental Status: He is oriented to person, place, and time.   Psychiatric:         Mood and Affect: Mood normal.         Behavior: Behavior normal.         RECENT LABS:  Results from last 7 days   Lab Units 01/27/25  0742   WBC 10*3/mm3 5.01   NEUTROS ABS 10*3/mm3 2.27   HEMOGLOBIN g/dL 12.1*   HEMATOCRIT % 38.1   PLATELETS 10*3/mm3 141           Results from last 7 days   Lab Units 01/20/25  1012   SODIUM mmol/L 143   POTASSIUM mmol/L 4.2   CHLORIDE mmol/L 104   CO2 mmol/L 28.3   BUN mg/dL 8   CREATININE mg/dL 0.86   CALCIUM mg/dL 9.1   GLUCOSE mg/dL 117*             Assessment & Plan  *Stage III mid to upper well differentiated rectal adenocarcinoma (cT3, cN2, cM0)     69-year-old male with a history of of diabetes, CHF GE reflux hyperlipidemia, hypertension, TIA, possible CVA as well as a history of prostate cancer status post XRT.  He had been recently having a change in bowel habit ultimately leading to additional assessment.  This led to a screening colonoscopy 10/18/2024demonstrating a circumferential mass involving the rectum extending from 10 cm to 12 cm with biopsy of 15 cm consistent with invasive well-differentiated adenocarcinoma with ulceration necroinflammatory debris. This was felt to be microsatellite stable by IHC as well as including MSI by PCR.  MRI of the pelvis  performed 11/4/2024 demonstrates a high rectal mass extending to the proximal sigmoid colon representing a T3d N2 rectal tumor, side effect invasion on the superior aspect of the mass approximates between the mesorectum and peritoneal cavity?  Additional staging 11/8/2024 includes a CT of the chest demonstrating small pleural plaques along the posterior aspect of both the right lower lobes of uncertain significance.  There are no other substantial findings though we have discussed this as leading to a PET/CT to complete his staging.  11/14/2024 by colorectal surgery, Dr. Naeem Rai, without evidence of obstruction or bleeding.  He was thought a excellent candidate for neoadjuvant chemotherapy followed by mesorectal excision and adjuvant chemotherapy-comparable to the PROSPECT trial.  There was concern about the location of the tumor extending down to the rectum and the avoidance of radiation therapy since the tumor appeared to be resectable.  There was also the concern of his previous history of radiation therapy.  Further discussed was the role of laparoscopic low anterior resection, total mesorectal excision, and creation of a diverting loop ileostomy temporarily utilized also discussed.  11/20/2024 and we have reviewed the PROSPECT Trial which was designed to determine whether neoadjuvant chemotherapy could be used as an alternative to neoadjuvant chemoRT.  This study demonstrated that similar disease-free survival and overall survival was similar to neoadjuvant CRT alone for eligible patients.  This would include the use of 6 cycles of FOLFOX chemotherapy followed by reassessment and if a clinical response and 20% or more was seen then RT would be admitted, proceeding to surgical resection and subsequent adjuvant chemotherapy.  After discussion the patient agrees to proceed.  12/2/2024: Proceed with C1D1 FOLFOX. Hemoglobin has declined to 9.9 today secondary to malignancy- ongoing bleeding and worsening iron  deficiency. He has been taking oral iron, however, is not tolerating this well, therefore will plan for IV iron pending insurance approval.   12/16/2024: Proceed with cycle 2-day 1 FOLFOX today.  Patient is tolerating well.  The patient is next evaluated 12/30/2024 for his third cycle of FOLFOX.  He has undergone Feraheme given 12/4/2024 and 12/16/2024.  He is feeling reasonably good without neuropathic symptoms.  We have discussed his scheduling and timing as he proceeds through his treatment including subsequent repeat MR pelvis after his 6 cycle of FOLFOX.  1/13/2025 Returns for cycle 4 FOLFOX today and was COVID positive 1/6/2025 and treated with Paxlovid and thereafter improved.  He has a scant cough remaining otherwise clear lung sounds.  Will proceed with treatment today.  Patient seen 1/27/2025 recovered from COVID, generally improved performance status and plans for cycle 5 FOLFOX at a 6 planned.    *Anemia   12/2/2024: Hemoglobin 9.9 today. Patient is not tolerating oral iron, therefore, will plan to proceed with IV iron.    12/16/2024 hemoglobin has improved today to 10.8.  Proceeded with the second dose of Feraheme  12/30/24 Hemoglobin improved to 11.3  1/13/2025 Hemoglobin 12.9  Assessed 1/27/2025 with H&H 12.1 and 38.1    *COVID positive 1/6/2025 trreated with paxlovid  Resolved symptoms 1/27/2025    *Hypercalcemia  1/13/2025 calcium 11 with normal albumin.  Discussed with Dr. Nagel and additional labs added including ionized calcium, PTH, PTH related peptide and vitamin D level as he has been on high-dose vitamin D.  Asked patient to hold vitamin D supplement until additional labs have resulted.  Will also recheck CMP on Wednesday at disconnect.  Subsequent ionized calcium, PTH, vitamin D and PTH related peptide testing.  The studies include a PTH of 25, ionized calcium of 1.41, PTH related peptide less than 2.0, vitamin D level 67.1.  Repeat calcium 1/20/2025 at 9.1  Reassessment 1/27/2025  pending    Plan  Proceed with cycle 5 FOLFOX, repeat CMP pending  Return to clinic on Day 3 for unhook  Epidural anticipated to pain management  Return to clinic in 2 weeks for MD or NP visit, Cycle 6 day 1 FOLFOX, cycle 6 plan to be followed by repeat MRI pelvis, referral to general surgery-Dr. Rai  We may be contacted by dental about tooth extraction, weekly CBC requested    .

## 2025-01-28 ENCOUNTER — TELEPHONE (OUTPATIENT)
Dept: ONCOLOGY | Facility: CLINIC | Age: 70
End: 2025-01-28
Payer: MEDICARE

## 2025-01-28 NOTE — TELEPHONE ENCOUNTER
----- Message from Kevin Nagel sent at 1/28/2025  7:46 AM EST -----  Note this is fine, thanks, DAV  ----- Message -----  From: Chantal Everett RN  Sent: 1/27/2025   3:02 PM EST  To: MD Molly Young,   Dr. Ady Marlow (pt's dentist) called informing me that pt needs several teeth extractions. I advised her we are checking his counts next Monday so we would let her know if for some reason he couldn't have this done. She is going to try to get him schedule at the end of next week when hopefully his counts are decent. Let me know if you do not agree with this. Thank you!

## 2025-01-29 ENCOUNTER — INFUSION (OUTPATIENT)
Dept: ONCOLOGY | Facility: HOSPITAL | Age: 70
End: 2025-01-29
Payer: MEDICARE

## 2025-01-29 DIAGNOSIS — C20 RECTAL ADENOCARCINOMA: Primary | ICD-10-CM

## 2025-01-29 DIAGNOSIS — Z45.2 FITTING AND ADJUSTMENT OF VASCULAR CATHETER: ICD-10-CM

## 2025-01-29 PROCEDURE — 25010000002 HEPARIN LOCK FLUSH PER 10 UNITS: Performed by: INTERNAL MEDICINE

## 2025-01-29 RX ORDER — SODIUM CHLORIDE 0.9 % (FLUSH) 0.9 %
10 SYRINGE (ML) INJECTION AS NEEDED
Status: DISCONTINUED | OUTPATIENT
Start: 2025-01-29 | End: 2025-01-29 | Stop reason: HOSPADM

## 2025-01-29 RX ORDER — HEPARIN SODIUM (PORCINE) LOCK FLUSH IV SOLN 100 UNIT/ML 100 UNIT/ML
500 SOLUTION INTRAVENOUS AS NEEDED
Status: DISCONTINUED | OUTPATIENT
Start: 2025-01-29 | End: 2025-01-29 | Stop reason: HOSPADM

## 2025-01-29 RX ORDER — HEPARIN SODIUM (PORCINE) LOCK FLUSH IV SOLN 100 UNIT/ML 100 UNIT/ML
500 SOLUTION INTRAVENOUS AS NEEDED
OUTPATIENT
Start: 2025-01-29

## 2025-01-29 RX ORDER — SODIUM CHLORIDE 0.9 % (FLUSH) 0.9 %
10 SYRINGE (ML) INJECTION AS NEEDED
OUTPATIENT
Start: 2025-01-29

## 2025-01-29 RX ADMIN — Medication 500 UNITS: at 09:50

## 2025-01-29 RX ADMIN — Medication 10 ML: at 09:50

## 2025-02-03 ENCOUNTER — CLINICAL SUPPORT (OUTPATIENT)
Dept: ONCOLOGY | Facility: HOSPITAL | Age: 70
End: 2025-02-03
Payer: MEDICARE

## 2025-02-03 ENCOUNTER — LAB (OUTPATIENT)
Dept: LAB | Facility: HOSPITAL | Age: 70
End: 2025-02-03
Payer: MEDICARE

## 2025-02-03 DIAGNOSIS — C20 RECTAL ADENOCARCINOMA: ICD-10-CM

## 2025-02-03 LAB
ALBUMIN SERPL-MCNC: 4.3 G/DL (ref 3.5–5.2)
ALBUMIN/GLOB SERPL: 1.5 G/DL
ALP SERPL-CCNC: 95 U/L (ref 39–117)
ALT SERPL W P-5'-P-CCNC: 42 U/L (ref 1–41)
ANION GAP SERPL CALCULATED.3IONS-SCNC: 13.6 MMOL/L (ref 5–15)
AST SERPL-CCNC: 30 U/L (ref 1–40)
BASOPHILS # BLD AUTO: 0.05 10*3/MM3 (ref 0–0.2)
BASOPHILS NFR BLD AUTO: 1.4 % (ref 0–1.5)
BILIRUB SERPL-MCNC: 0.5 MG/DL (ref 0–1.2)
BUN SERPL-MCNC: 11 MG/DL (ref 8–23)
BUN/CREAT SERPL: 13.9 (ref 7–25)
CALCIUM SPEC-SCNC: 9.5 MG/DL (ref 8.6–10.5)
CHLORIDE SERPL-SCNC: 104 MMOL/L (ref 98–107)
CO2 SERPL-SCNC: 26.4 MMOL/L (ref 22–29)
CREAT SERPL-MCNC: 0.79 MG/DL (ref 0.76–1.27)
DEPRECATED RDW RBC AUTO: 79.9 FL (ref 37–54)
EGFRCR SERPLBLD CKD-EPI 2021: 95 ML/MIN/1.73
EOSINOPHIL # BLD AUTO: 0.27 10*3/MM3 (ref 0–0.4)
EOSINOPHIL NFR BLD AUTO: 7.3 % (ref 0.3–6.2)
ERYTHROCYTE [DISTWIDTH] IN BLOOD BY AUTOMATED COUNT: 23.1 % (ref 12.3–15.4)
GLOBULIN UR ELPH-MCNC: 2.9 GM/DL
GLUCOSE SERPL-MCNC: 118 MG/DL (ref 65–99)
HCT VFR BLD AUTO: 41.2 % (ref 37.5–51)
HGB BLD-MCNC: 13.1 G/DL (ref 13–17.7)
IMM GRANULOCYTES # BLD AUTO: 0.01 10*3/MM3 (ref 0–0.05)
IMM GRANULOCYTES NFR BLD AUTO: 0.3 % (ref 0–0.5)
LYMPHOCYTES # BLD AUTO: 1.56 10*3/MM3 (ref 0.7–3.1)
LYMPHOCYTES NFR BLD AUTO: 42.2 % (ref 19.6–45.3)
MCH RBC QN AUTO: 30.5 PG (ref 26.6–33)
MCHC RBC AUTO-ENTMCNC: 31.8 G/DL (ref 31.5–35.7)
MCV RBC AUTO: 95.8 FL (ref 79–97)
MONOCYTES # BLD AUTO: 0.35 10*3/MM3 (ref 0.1–0.9)
MONOCYTES NFR BLD AUTO: 9.5 % (ref 5–12)
NEUTROPHILS NFR BLD AUTO: 1.46 10*3/MM3 (ref 1.7–7)
NEUTROPHILS NFR BLD AUTO: 39.3 % (ref 42.7–76)
NRBC BLD AUTO-RTO: 0 /100 WBC (ref 0–0.2)
PLATELET # BLD AUTO: 151 10*3/MM3 (ref 140–450)
PMV BLD AUTO: 9.3 FL (ref 6–12)
POTASSIUM SERPL-SCNC: 3.8 MMOL/L (ref 3.5–5.2)
PROT SERPL-MCNC: 7.2 G/DL (ref 6–8.5)
RBC # BLD AUTO: 4.3 10*6/MM3 (ref 4.14–5.8)
SODIUM SERPL-SCNC: 144 MMOL/L (ref 136–145)
WBC NRBC COR # BLD AUTO: 3.7 10*3/MM3 (ref 3.4–10.8)

## 2025-02-03 PROCEDURE — 80053 COMPREHEN METABOLIC PANEL: CPT

## 2025-02-03 PROCEDURE — 36415 COLL VENOUS BLD VENIPUNCTURE: CPT

## 2025-02-03 PROCEDURE — 85025 COMPLETE CBC W/AUTO DIFF WBC: CPT

## 2025-02-03 NOTE — PROGRESS NOTES
Called pt regarding lab results. Pt denies any complaints, counts are stable. ANC 1.46 today. Pt is due to have teeth extractions at the end of the week. Discussed labs with Lynda Figueroa NP who thought his labs looked stable enough to proceed from a hematological standpoint. Faxed lab results to [phone]. Confirmation received.    details… detailed exam

## 2025-02-04 ENCOUNTER — OFFICE VISIT (OUTPATIENT)
Age: 70
End: 2025-02-04
Payer: MEDICARE

## 2025-02-04 ENCOUNTER — PREP FOR SURGERY (OUTPATIENT)
Dept: SURGERY | Facility: SURGERY CENTER | Age: 70
End: 2025-02-04
Payer: MEDICARE

## 2025-02-04 VITALS
OXYGEN SATURATION: 95 % | TEMPERATURE: 97.5 F | HEART RATE: 83 BPM | DIASTOLIC BLOOD PRESSURE: 94 MMHG | WEIGHT: 188.8 LBS | SYSTOLIC BLOOD PRESSURE: 148 MMHG | HEIGHT: 65 IN | BODY MASS INDEX: 31.46 KG/M2

## 2025-02-04 DIAGNOSIS — M54.16 LUMBAR RADICULOPATHY: ICD-10-CM

## 2025-02-04 DIAGNOSIS — M48.061 SPINAL STENOSIS, LUMBAR REGION, WITHOUT NEUROGENIC CLAUDICATION: Primary | ICD-10-CM

## 2025-02-04 DIAGNOSIS — M48.02 CERVICAL SPINAL STENOSIS: ICD-10-CM

## 2025-02-04 PROCEDURE — 1159F MED LIST DOCD IN RCRD: CPT

## 2025-02-04 PROCEDURE — 99214 OFFICE O/P EST MOD 30 MIN: CPT

## 2025-02-04 PROCEDURE — 1160F RVW MEDS BY RX/DR IN RCRD: CPT

## 2025-02-04 PROCEDURE — 3080F DIAST BP >= 90 MM HG: CPT

## 2025-02-04 PROCEDURE — 1125F AMNT PAIN NOTED PAIN PRSNT: CPT

## 2025-02-04 PROCEDURE — 3077F SYST BP >= 140 MM HG: CPT

## 2025-02-04 NOTE — H&P (VIEW-ONLY)
CHIEF COMPLAINT  Back pain    Subjective   Kevin Garsia is a 69 y.o. male  who presents for follow-up.  He has a history of chronic back and neck pain. He reports that his back pain has worsened since his last office visit. Neck pain has been stable.     Today pain is 3/10VAS in severity (severity in pain varies based on activity level). Pain is located in his low back pain and intermittently radiates down right lateral/anterior thigh terminating at the knee. He notes numbness/tingling to right anterior thigh. Describes this pain as a nearly continuous ache. Pain is worsened by prolonged standing, walking, and bending. Pain improves with rest/reposition, heat,and medications. He has completed PT in the past with some improvement.     Continues with Gabapentin 600mg TID and Cymbalta 60mg daily (managed by BOBBY Portillo). He also utilizes OTC Tylenol PRN.    Patient was recently diagnosed with stage III rectal adenocarcinoma.  He has been followed by Dr. Kevin Nagel with Oncology. He has 1 more session of chemotherapy before proceeding with surgery to remove tumor. He states he is doing well overall.     7/16/24 - A1C 6.7     Back Pain  This is a chronic problem. The current episode started more than 1 year ago. The problem occurs constantly. The problem has been worse since onset. The pain is present in the lumbar spine. The quality of the pain is described as aching. The pain radiates to the right thigh (right lateral/anterior thigh). The pain is at a severity of 3/10. The pain is mild. The symptoms are aggravated by position, standing, bending and twisting. Associated symptoms include abdominal pain, numbness (hands and feet) and weakness. Pertinent negatives include no chest pain, dysuria, fever or headaches. He has tried heat (Gabapentin, Cymbalta, Tylenol, PT) for the symptoms. The treatment provided mild relief.   Neck Pain   This is a chronic problem. The current episode started more than 1  year ago. The problem occurs intermittently. The problem has been waxing and waning. The pain is associated with an unknown factor. The pain is present in the left side and midline (pain intermittently radiates down left arm). The quality of the pain is described as aching. The pain is at a severity of 0/10. The symptoms are aggravated by twisting and position. Associated symptoms include numbness (hands and feet) and weakness. Pertinent negatives include no chest pain, fever or headaches. He has tried heat and acetaminophen (Gabapentin, Cymbalta) for the symptoms. The treatment provided mild relief.     PEG Assessment   What number best describes your pain on average in the past week?4  What number best describes how, during the past week, pain has interfered with your enjoyment of life?10  What number best describes how, during the past week, pain has interfered with your general activity?  10    Review of Pertinent Medical Data ---  Reviewed office note from BOBBY Reagan from 10/16/2024.  Patient presents with complaints of neck and low back pain.  He reports pain has been an ongoing issue for the past 30 years.  No history of spine surgery or interventional pain management.  He has tried physical therapy without improvement.  His main complaint is his low back pain.  Pain is in the right lower lumbosacral area and spreads across his low back.  He also notes numbness and tingling in his right anterior/lateral thigh.  Pain occasionally radiates down posterior bilateral legs.  He is currently taking gabapentin 600 mg 3 times daily and Cymbalta 60 mg daily.  He has noted a significant improvement since starting Cymbalta. Discussed that he could L4/L5 LESI vs Bilateral TF LESI.      The following portions of the patient's history were reviewed and updated as appropriate: allergies, current medications, past family history, past medical history, past social history, past surgical history, and problem  "list.    Review of Systems   Constitutional:  Positive for activity change (decreased) and fatigue. Negative for chills and fever.   HENT:  Negative for congestion.    Eyes:  Negative for visual disturbance.   Respiratory:  Negative for chest tightness and shortness of breath.    Cardiovascular:  Negative for chest pain.   Gastrointestinal:  Positive for abdominal pain, constipation and diarrhea.   Genitourinary:  Negative for difficulty urinating and dysuria.   Musculoskeletal:  Positive for back pain and neck pain.   Neurological:  Positive for weakness and numbness (hands and feet). Negative for dizziness, light-headedness and headaches.   Psychiatric/Behavioral:  Positive for agitation and sleep disturbance. Negative for self-injury and suicidal ideas. The patient is nervous/anxious.      I have reviewed and confirmed the accuracy of the ROS as documented by the MA/LPN/RN BOBBY Decker    Vitals:    02/04/25 0808   BP: 148/94   Pulse: 83   Temp: 97.5 °F (36.4 °C)   SpO2: 95%   Weight: 85.6 kg (188 lb 12.8 oz)   Height: 165.1 cm (65\")   PainSc:   3   PainLoc: Back     Objective   Physical Exam  Constitutional:       Appearance: Normal appearance.   HENT:      Head: Normocephalic.   Cardiovascular:      Rate and Rhythm: Normal rate and regular rhythm.   Pulmonary:      Effort: Pulmonary effort is normal.      Breath sounds: Normal breath sounds.   Musculoskeletal:         General: Normal range of motion.      Cervical back: Tenderness and bony tenderness present. Decreased range of motion.      Lumbar back: Tenderness and bony tenderness present. Decreased range of motion. Positive right straight leg raise test and positive left straight leg raise test.   Skin:     General: Skin is warm and dry.      Capillary Refill: Capillary refill takes less than 2 seconds.   Neurological:      General: No focal deficit present.      Mental Status: He is alert and oriented to person, place, and time.      Motor: " Weakness present.   Psychiatric:         Mood and Affect: Mood normal.         Behavior: Behavior normal.         Thought Content: Thought content normal.         Cognition and Memory: Cognition normal.       Assessment & Plan   Diagnoses and all orders for this visit:    1. Spinal stenosis, lumbar region, without neurogenic claudication (Primary)    2. Lumbar radiculopathy    3. Cervical spinal stenosis      Kevin Garsia reports a pain score of 3.  Given his pain assessment as noted, treatment options were discussed and the following options were decided upon as a follow-up plan to address the patient's pain: continuation of current treatment plan for pain and steroid injections.    --- L4/L5 LESI (Valium)  ---  Indications for epidural injection:  Plan is to proceed with epidural at the appropriate level.  If the patient receives significant pain reduction and improvement in function and the plan will be to repeat the epidural when the pain worsens.  If a second epidural provides at least 6 weeks of sustained improvement that includes both pain reduction and improvement in function then an epidural injection could be repeated once again at the same level.  This is a mutual decision between the clinician and the patient that includes discussions including risks and benefits in detail as well as alternative therapies.  Patient's questions were answered to their satisfaction and to their understanding.  ---   Discussed with the patient that sedation is optional for this procedure.  The sedation offered is called conscious sedation which is different from general anesthesia that is utilized in surgical procedures. The dosing of the sedation is determined by the physician and they will be monitored throughout the procedure. With conscious sedation it is possible to remember parts or all of the procedure, this is normal. They will need to have a  with them as driving is prohibited following conscious sedation.       NPO instructions for conscious sedation:  --- Do not eat 6 hours prior to the procedure.   --- Do not drink any dairy or citrus 4 hours prior to the procedure.   --- Do not drink anything, including clear liquids, 2 hours prior to procedure.      If the NPO instructions are not followed then the procedure may be performed without sedation or the procedure will need to be rescheduled.    --- Follow-up for procedure      CHRISTY REPORT  As the clinician, I personally reviewed the CHRISTY from 2/4/25 while the patient was in the office today.    Dictated utilizing Dragon dictation.

## 2025-02-06 ENCOUNTER — TRANSCRIBE ORDERS (OUTPATIENT)
Dept: SURGERY | Facility: SURGERY CENTER | Age: 70
End: 2025-02-06
Payer: MEDICARE

## 2025-02-06 DIAGNOSIS — E55.9 VITAMIN D DEFICIENCY: ICD-10-CM

## 2025-02-06 DIAGNOSIS — Z41.9 SURGERY, ELECTIVE: Primary | ICD-10-CM

## 2025-02-06 RX ORDER — ERGOCALCIFEROL 1.25 MG/1
50000 CAPSULE, LIQUID FILLED ORAL WEEKLY
Qty: 12 CAPSULE | Refills: 0 | Status: SHIPPED | OUTPATIENT
Start: 2025-02-06

## 2025-02-10 ENCOUNTER — INFUSION (OUTPATIENT)
Dept: ONCOLOGY | Facility: HOSPITAL | Age: 70
End: 2025-02-10
Payer: MEDICARE

## 2025-02-10 ENCOUNTER — OFFICE VISIT (OUTPATIENT)
Dept: ONCOLOGY | Facility: CLINIC | Age: 70
End: 2025-02-10
Payer: MEDICARE

## 2025-02-10 VITALS
BODY MASS INDEX: 31.09 KG/M2 | RESPIRATION RATE: 17 BRPM | OXYGEN SATURATION: 96 % | TEMPERATURE: 97.3 F | SYSTOLIC BLOOD PRESSURE: 147 MMHG | HEART RATE: 82 BPM | DIASTOLIC BLOOD PRESSURE: 83 MMHG | HEIGHT: 65 IN | WEIGHT: 186.6 LBS

## 2025-02-10 DIAGNOSIS — Z79.899 HIGH RISK MEDICATION USE: ICD-10-CM

## 2025-02-10 DIAGNOSIS — C20 RECTAL ADENOCARCINOMA: Primary | ICD-10-CM

## 2025-02-10 DIAGNOSIS — C20 RECTAL ADENOCARCINOMA: ICD-10-CM

## 2025-02-10 LAB
ALBUMIN SERPL-MCNC: 4 G/DL (ref 3.5–5.2)
ALBUMIN/GLOB SERPL: 1.7 G/DL
ALP SERPL-CCNC: 100 U/L (ref 39–117)
ALT SERPL W P-5'-P-CCNC: 42 U/L (ref 1–41)
ANION GAP SERPL CALCULATED.3IONS-SCNC: 11.1 MMOL/L (ref 5–15)
AST SERPL-CCNC: 39 U/L (ref 1–40)
BASOPHILS # BLD AUTO: 0.06 10*3/MM3 (ref 0–0.2)
BASOPHILS NFR BLD AUTO: 1.1 % (ref 0–1.5)
BILIRUB SERPL-MCNC: 0.5 MG/DL (ref 0–1.2)
BUN SERPL-MCNC: 9 MG/DL (ref 8–23)
BUN/CREAT SERPL: 10.1 (ref 7–25)
CALCIUM SPEC-SCNC: 9.2 MG/DL (ref 8.6–10.5)
CHLORIDE SERPL-SCNC: 106 MMOL/L (ref 98–107)
CO2 SERPL-SCNC: 27.9 MMOL/L (ref 22–29)
CREAT SERPL-MCNC: 0.89 MG/DL (ref 0.76–1.27)
DEPRECATED RDW RBC AUTO: 82.1 FL (ref 37–54)
EGFRCR SERPLBLD CKD-EPI 2021: 91.6 ML/MIN/1.73
EOSINOPHIL # BLD AUTO: 0.26 10*3/MM3 (ref 0–0.4)
EOSINOPHIL NFR BLD AUTO: 4.7 % (ref 0.3–6.2)
ERYTHROCYTE [DISTWIDTH] IN BLOOD BY AUTOMATED COUNT: 23.2 % (ref 12.3–15.4)
GLOBULIN UR ELPH-MCNC: 2.4 GM/DL
GLUCOSE SERPL-MCNC: 138 MG/DL (ref 65–99)
HCT VFR BLD AUTO: 38.8 % (ref 37.5–51)
HGB BLD-MCNC: 12.4 G/DL (ref 13–17.7)
IMM GRANULOCYTES # BLD AUTO: 0.02 10*3/MM3 (ref 0–0.05)
IMM GRANULOCYTES NFR BLD AUTO: 0.4 % (ref 0–0.5)
LYMPHOCYTES # BLD AUTO: 1.87 10*3/MM3 (ref 0.7–3.1)
LYMPHOCYTES NFR BLD AUTO: 33.5 % (ref 19.6–45.3)
MCH RBC QN AUTO: 31.1 PG (ref 26.6–33)
MCHC RBC AUTO-ENTMCNC: 32 G/DL (ref 31.5–35.7)
MCV RBC AUTO: 97.2 FL (ref 79–97)
MONOCYTES # BLD AUTO: 0.66 10*3/MM3 (ref 0.1–0.9)
MONOCYTES NFR BLD AUTO: 11.8 % (ref 5–12)
NEUTROPHILS NFR BLD AUTO: 2.71 10*3/MM3 (ref 1.7–7)
NEUTROPHILS NFR BLD AUTO: 48.5 % (ref 42.7–76)
NRBC BLD AUTO-RTO: 0 /100 WBC (ref 0–0.2)
PLATELET # BLD AUTO: 172 10*3/MM3 (ref 140–450)
PMV BLD AUTO: 9.3 FL (ref 6–12)
POTASSIUM SERPL-SCNC: 4.3 MMOL/L (ref 3.5–5.2)
PROT SERPL-MCNC: 6.4 G/DL (ref 6–8.5)
RBC # BLD AUTO: 3.99 10*6/MM3 (ref 4.14–5.8)
SODIUM SERPL-SCNC: 145 MMOL/L (ref 136–145)
WBC NRBC COR # BLD AUTO: 5.58 10*3/MM3 (ref 3.4–10.8)

## 2025-02-10 PROCEDURE — 25810000003 SODIUM CHLORIDE 0.9 % SOLUTION 250 ML FLEX CONT: Performed by: NURSE PRACTITIONER

## 2025-02-10 PROCEDURE — 96375 TX/PRO/DX INJ NEW DRUG ADDON: CPT

## 2025-02-10 PROCEDURE — 96415 CHEMO IV INFUSION ADDL HR: CPT

## 2025-02-10 PROCEDURE — G0498 CHEMO EXTEND IV INFUS W/PUMP: HCPCS

## 2025-02-10 PROCEDURE — 96366 THER/PROPH/DIAG IV INF ADDON: CPT

## 2025-02-10 PROCEDURE — 25010000002 LEUCOVORIN CALCIUM PER 50 MG: Performed by: NURSE PRACTITIONER

## 2025-02-10 PROCEDURE — 96413 CHEMO IV INFUSION 1 HR: CPT

## 2025-02-10 PROCEDURE — 25010000002 FOSAPREPITANT PER 1 MG: Performed by: NURSE PRACTITIONER

## 2025-02-10 PROCEDURE — 85025 COMPLETE CBC W/AUTO DIFF WBC: CPT

## 2025-02-10 PROCEDURE — 96368 THER/DIAG CONCURRENT INF: CPT

## 2025-02-10 PROCEDURE — 25010000002 DEXAMETHASONE SODIUM PHOSPHATE 100 MG/10ML SOLUTION: Performed by: NURSE PRACTITIONER

## 2025-02-10 PROCEDURE — 25010000002 OXALIPLATIN PER 0.5 MG: Performed by: NURSE PRACTITIONER

## 2025-02-10 PROCEDURE — 25010000003 DEXTROSE 5 % SOLUTION 250 ML FLEX CONT: Performed by: NURSE PRACTITIONER

## 2025-02-10 PROCEDURE — 96411 CHEMO IV PUSH ADDL DRUG: CPT

## 2025-02-10 PROCEDURE — 25010000002 FLUOROURACIL PER 500 MG: Performed by: NURSE PRACTITIONER

## 2025-02-10 PROCEDURE — 80053 COMPREHEN METABOLIC PANEL: CPT

## 2025-02-10 PROCEDURE — 96367 TX/PROPH/DG ADDL SEQ IV INF: CPT

## 2025-02-10 PROCEDURE — 25010000002 PALONOSETRON PER 25 MCG: Performed by: NURSE PRACTITIONER

## 2025-02-10 RX ORDER — HYDROCORTISONE SODIUM SUCCINATE 100 MG/2ML
100 INJECTION INTRAMUSCULAR; INTRAVENOUS AS NEEDED
Status: CANCELLED | OUTPATIENT
Start: 2025-02-10

## 2025-02-10 RX ORDER — DEXTROSE MONOHYDRATE 50 MG/ML
20 INJECTION, SOLUTION INTRAVENOUS ONCE
Status: CANCELLED | OUTPATIENT
Start: 2025-02-10

## 2025-02-10 RX ORDER — PALONOSETRON 0.05 MG/ML
0.25 INJECTION, SOLUTION INTRAVENOUS ONCE
Status: CANCELLED | OUTPATIENT
Start: 2025-02-10

## 2025-02-10 RX ORDER — DIPHENHYDRAMINE HYDROCHLORIDE 50 MG/ML
50 INJECTION INTRAMUSCULAR; INTRAVENOUS AS NEEDED
Status: CANCELLED | OUTPATIENT
Start: 2025-02-10

## 2025-02-10 RX ORDER — FLUOROURACIL 50 MG/ML
400 INJECTION, SOLUTION INTRAVENOUS ONCE
Status: CANCELLED | OUTPATIENT
Start: 2025-02-10

## 2025-02-10 RX ORDER — FAMOTIDINE 10 MG/ML
20 INJECTION, SOLUTION INTRAVENOUS AS NEEDED
Status: CANCELLED | OUTPATIENT
Start: 2025-02-10

## 2025-02-10 RX ORDER — FLUOROURACIL 50 MG/ML
400 INJECTION, SOLUTION INTRAVENOUS ONCE
Status: COMPLETED | OUTPATIENT
Start: 2025-02-10 | End: 2025-02-10

## 2025-02-10 RX ORDER — DEXTROSE MONOHYDRATE 50 MG/ML
20 INJECTION, SOLUTION INTRAVENOUS ONCE
Status: DISCONTINUED | OUTPATIENT
Start: 2025-02-10 | End: 2025-02-10 | Stop reason: HOSPADM

## 2025-02-10 RX ORDER — PALONOSETRON 0.05 MG/ML
0.25 INJECTION, SOLUTION INTRAVENOUS ONCE
Status: COMPLETED | OUTPATIENT
Start: 2025-02-10 | End: 2025-02-10

## 2025-02-10 RX ADMIN — OXALIPLATIN 165 MG: 5 INJECTION, SOLUTION INTRAVENOUS at 11:03

## 2025-02-10 RX ADMIN — PALONOSETRON HYDROCHLORIDE 0.25 MG: 0.25 INJECTION INTRAVENOUS at 10:28

## 2025-02-10 RX ADMIN — FLUOROURACIL 770 MG: 50 INJECTION, SOLUTION INTRAVENOUS at 13:10

## 2025-02-10 RX ADMIN — DEXAMETHASONE SODIUM PHOSPHATE 12 MG: 10 INJECTION, SOLUTION INTRAMUSCULAR; INTRAVENOUS at 10:35

## 2025-02-10 RX ADMIN — FOSAPREPITANT 100 ML: 150 INJECTION, POWDER, LYOPHILIZED, FOR SOLUTION INTRAVENOUS at 09:55

## 2025-02-10 RX ADMIN — LEUCOVORIN CALCIUM 770 MG: 350 INJECTION, POWDER, LYOPHILIZED, FOR SUSPENSION INTRAMUSCULAR; INTRAVENOUS at 11:03

## 2025-02-10 RX ADMIN — FLUOROURACIL 4610 MG: 50 INJECTION, SOLUTION INTRAVENOUS at 13:10

## 2025-02-10 NOTE — PROGRESS NOTES
REASON FOR FOLLOW UP:  stage III mid to upper well-differentiated rectal adenocarcinoma (cT3, cN2 CM0.).    History of Present Illness    The patient is a 69 y.o. male with the above-mentioned history, who returns to the office today in anticipation of his sixth cycle of FOLFOX.  He reports he tolerated cycle 5 very well.  He had no nausea or vomiting.  He does have a lingering cold sensitivity from oxaliplatin which now last 5 to 7 days.  He is without significant neuropathy otherwise.  He has no blood in his stools.  His bowels do continue to fluctuate from loose to constipated.  He has no pain with bowel movements.      ONCOLOGY HISTORY:  The patient is a 69-year-old male who is referred for recently diagnosed stage III mid to upper well-differentiated rectal adenocarcinoma (cT3, cN2 CM0.).    Patient undergone a screening colonoscopy 10/18/2024 demonstrating a circumferential mass involving the rectum extending from 10 cm to 12 cm with biopsy of 15 cm consistent with invasive well-differentiated adenocarcinoma with ulceration necroinflammatory debris.    This was felt to be microsatellite stable by IHC as well as including MSI by PCR.    If follow-up with infectious disease 11/1/2024 there being concern about resuming hypersexual activity and that he start preexposure prophylaxis.  He last been seen July 2024 on Descovy still in relationship with his partner and currently monogamous with been having bowel changes underwent colonoscopy with the above diagnosis.  As result of his need for therapy Descovy was discontinued and the issue to be reevaluated once he was successfully treated.    MRI of the pelvis performed 11/4/2024 demonstrates a high rectal mass extending to the proximal sigmoid colon representing a T3d N2 rectal tumor, side effect invasion on the superior aspect of the mass approximates between the mesorectum and peritoneal cavity?    CT chest 11/8 demonstrates pleural plaques along the peripheral  aspect of both the right lower lobes.  These are of uncertain significance.    The patient was next seen 11/14/2024 by colorectal surgery without evidence of obstruction or bleeding.  He was thought a excellent candidate for neoadjuvant chemotherapy followed by mesorectal excision and adjuvant chemotherapy-comparable to the prospect trial.  There was concern about the location of the tumor extending down to the rectum and the avoidance of radiation therapy since the tumor appeared to be resectable.  Was the role of laparoscopic low anterior resection total mesorectal excision and creation of a diverting loop ileostomy temporarily discussed.    As resulted above the patient is now referred to discuss this above approach.  He is seen with his significant other and we have discussed his findings in great detail from initial diagnosis and and subsequent surgical assessment leading to the recommendation of neoadjuvant chemotherapy given in the fashion of the PROSPECT Trial which was designed to determine whether neoadjuvant chemotherapy could be used as an alternative to neoadjuvant chemoRT.  This study demonstrated that similar disease-free survival and overall survival was similar to neoadjuvant CRT alone for eligible patients.  This would include the use of 6 cycles of FOLFOX chemotherapy followed by reassessment and if a clinical response and 20% or more was seen then RT would be admitted, proceeding to surgical resection and subsequent adjuvant chemotherapy.      He returns 12/2/2024, for Cycle 1 Day 1 FOLFOX. He does not have any new concerns today. He remains fatigued and experiences intermittent lightheadedness. He denies shortness of breath. He denies abdominal pain. His stools are black, but he denies rome blood. Despite starting oral iron, his hemoglobin has decreased further. He is not tolerating the oral iron at this time.  Patient was started on Feraheme.    Patient returns on 12/16/2024 for cycle 2  FOLFOX.  He reports he is tolerated treatment well thus far and denying any nausea, vomiting, changes to his bowels.  He did have blood in his stool following cycle 1 1 time.  That has not reoccurred.  He did start Feraheme the day of disconnect with cycle 1 and has had improvement in his hemoglobin.  He will be due for his second dose of Feraheme today.  He denies increased neuropathy.    The patient is next evaluated 12/30/2024 for his third cycle of FOLFOX.  He has undergone Feraheme given 12/4/2024 and 12/16/2024.  He is feeling reasonably good without neuropathic symptoms.  We have discussed his scheduling and timing as he proceeds through his treatment including subsequent repeat MR pelvis after his 6 cycle of FOLFOX.    He returns 1/13/2025 for follow up and treatment.  He has been positive for COVID since his last office visit and called in at which time we did start him on paxlovid.  He reports on starting the Paxlovid he was much improved.  He does have a scant cough at times with no lingering shortness of breath.  Report neuropathy lasting approximately 3 days following last treatment that was not always associated with cold intolerance.  This is now resolved and had involved his hands primarily.  We went on to proceed with cycle 4 FOLFOX, obtained ionized calcium, PTH, vitamin D and PTH related peptide testing.  The studies include a PTH of 25, ionized calcium of 1.41, PTH related peptide less than 2.0, vitamin D level 67.1.    He is next seen 1/27/2025 for cycle #5 FOLFOX out of 6 planned prior to repeat MRI.  He is doing well with treatment with minimal neuropathy which recovers quickly, no additional fatigue and is without prolonged cytopenias.  He is trying to plan a wedding in and around his surgery and may need dental extractions soon.  He is also having back pain after recent exercising and is pursuing an epidural.    Past Surgical History:   Procedure Laterality Date    ANGIOPLASTY CAROTID ARTERY       CAROTID ENDARTERECTOMY      COLONOSCOPY N/A 10/18/2024    Procedure: COLONOSCOPY TO CECUM/TI WITH BIOPSIES;  Surgeon: Marcus Christine Jr., MD;  Location: [facility] ENDOSCOPY;  Service: General;  Laterality: N/A;  PRE-SCREENING, FAMILY HX COLON CANCER  POST-DIVERTICULOSIS, RECTAL MASS    FOOT SURGERY      VENOUS ACCESS DEVICE (PORT) INSERTION N/A 11/22/2024    Procedure: INSERTION VENOUS ACCESS DEVICE;  Surgeon: Naeem Rai MD;  Location: [facility] MAIN OR;  Service: General;  Laterality: N/A;        Current Outpatient Medications on File Prior to Visit   Medication Sig Dispense Refill    aspirin 81 MG chewable tablet Chew 1 tablet Daily.      atorvastatin (LIPITOR) 80 MG tablet Take 1 tablet by mouth Daily. 30 tablet 3    benzonatate (Tessalon Perles) 100 MG capsule Take 1 capsule by mouth 3 (Three) Times a Day As Needed for Cough. 30 capsule 0    CBD (cannabidiol) oral oil Take 1 drop by mouth 2 (Two) Times a Day. Half a dropper twice daily      Cyanocobalamin (VITAMIN B 12 PO) Take 1 tablet by mouth Daily.      DULoxetine (Cymbalta) 60 MG capsule Take 1 capsule by mouth Daily for 180 days. (Patient taking differently: Take 1 capsule by mouth Every Night.) 90 capsule 1    ferrous sulfate 325 (65 Fe) MG tablet Take 65 mg by mouth Daily With Breakfast.      gabapentin (NEURONTIN) 300 MG capsule TAKE 2 CAPSULES BY MOUTH THREE TIMES DAILY 540 capsule 1    hydrOXYzine (ATARAX) 10 MG tablet TAKE 1 TO 2 TABLETS BY MOUTH EVERY NIGHT AT BEDTIME 60 tablet 1    losartan (COZAAR) 100 MG tablet Take 1 tablet by mouth Daily. 90 tablet 1    metFORMIN (GLUCOPHAGE) 1000 MG tablet Take 1 tablet by mouth 2 (Two) Times a Day With Meals. 90 tablet 2    metoprolol tartrate (LOPRESSOR) 25 MG tablet Take 1 tablet by mouth Daily. 90 tablet 0    ondansetron (ZOFRAN) 8 MG tablet Take 1 tablet by mouth 3 (Three) Times a Day As Needed for Nausea or Vomiting. 30 tablet 5    tadalafil (CIALIS) 5 MG tablet Take 1 tablet by mouth Daily  "As Needed for Erectile Dysfunction.      tamsulosin (FLOMAX) 0.4 MG capsule 24 hr capsule Take 1 capsule by mouth every night at bedtime.      vitamin D (ERGOCALCIFEROL) 1.25 MG (14735 UT) capsule capsule TAKE 1 CAPSULE BY MOUTH 1 TIME EVERY WEEK 12 capsule 0     No current facility-administered medications on file prior to visit.        ALLERGIES:  No Known Allergies     Social History     Socioeconomic History    Marital status: Significant Other   Tobacco Use    Smoking status: Former     Current packs/day: 3.00     Average packs/day: 3.0 packs/day for 30.0 years (90.0 ttl pk-yrs)     Types: Cigarettes    Smokeless tobacco: Never   Vaping Use    Vaping status: Some Days    Substances: CBD    Devices: Disposable   Substance and Sexual Activity    Alcohol use: No    Drug use: Yes    Sexual activity: Defer        Family History   Problem Relation Age of Onset    Bone cancer Mother     COPD Father     Hypertension Father     Stroke Father         TIA    Bone cancer Father         smoker    Lung cancer Father     Diabetes Father     No Known Problems Sister     No Known Problems Brother     No Known Problems Maternal Grandmother     No Known Problems Maternal Grandfather     No Known Problems Paternal Grandmother     Colon cancer Paternal Grandfather     Malig Hyperthermia Neg Hx           Objective     Vitals:    02/10/25 0902   BP: 147/83   Pulse: 82   Resp: 17   Temp: 97.3 °F (36.3 °C)   TempSrc: Oral   SpO2: 96%   Weight: 84.6 kg (186 lb 9.6 oz)   Height: 165.1 cm (65\")   PainSc:   2   PainLoc: Back         2/10/2025     9:03 AM   Current Status   ECOG score 0       Physical Exam  Constitutional:       Appearance: He is obese.   HENT:      Head: Normocephalic and atraumatic.      Nose: Nose normal.      Mouth/Throat:      Mouth: Mucous membranes are moist.      Pharynx: Oropharynx is clear.   Eyes:      Extraocular Movements: Extraocular movements intact.      Conjunctiva/sclera: Conjunctivae normal. "   Cardiovascular:      Rate and Rhythm: Normal rate and regular rhythm.      Pulses: Normal pulses.   Pulmonary:      Effort: Pulmonary effort is normal.   Abdominal:      General: Bowel sounds are normal.      Palpations: Abdomen is soft.   Musculoskeletal:         General: Normal range of motion.      Cervical back: Normal range of motion and neck supple.   Skin:     General: Skin is warm and dry.   Neurological:      General: No focal deficit present.      Mental Status: He is oriented to person, place, and time.   Psychiatric:         Mood and Affect: Mood normal.         Behavior: Behavior normal.         RECENT LABS:  Results from last 7 days   Lab Units 02/10/25  0836   WBC 10*3/mm3 5.58   NEUTROS ABS 10*3/mm3 2.71   HEMOGLOBIN g/dL 12.4*   HEMATOCRIT % 38.8   PLATELETS 10*3/mm3 172     Results from last 7 days   Lab Units 02/10/25  0836   SODIUM mmol/L 145   POTASSIUM mmol/L 4.3   CHLORIDE mmol/L 106   CO2 mmol/L 27.9   BUN mg/dL 9   CREATININE mg/dL 0.89   CALCIUM mg/dL 9.2   ALBUMIN g/dL 4.0   BILIRUBIN mg/dL 0.5   ALK PHOS U/L 100   ALT (SGPT) U/L 42*   AST (SGOT) U/L 39   GLUCOSE mg/dL 138*               Assessment & Plan   *Stage III mid to upper well differentiated rectal adenocarcinoma (cT3, cN2, cM0)     69-year-old male with a history of of diabetes, CHF GE reflux hyperlipidemia, hypertension, TIA, possible CVA as well as a history of prostate cancer status post XRT.  He had been recently having a change in bowel habit ultimately leading to additional assessment.  This led to a screening colonoscopy 10/18/2024demonstrating a circumferential mass involving the rectum extending from 10 cm to 12 cm with biopsy of 15 cm consistent with invasive well-differentiated adenocarcinoma with ulceration necroinflammatory debris. This was felt to be microsatellite stable by IHC as well as including MSI by PCR.  MRI of the pelvis performed 11/4/2024 demonstrates a high rectal mass extending to the proximal sigmoid  colon representing a T3d N2 rectal tumor, side effect invasion on the superior aspect of the mass approximates between the mesorectum and peritoneal cavity?  Additional staging 11/8/2024 includes a CT of the chest demonstrating small pleural plaques along the posterior aspect of both the right lower lobes of uncertain significance.  There are no other substantial findings though we have discussed this as leading to a PET/CT to complete his staging.  11/14/2024 by colorectal surgery, Dr. Naeem Rai, without evidence of obstruction or bleeding.  He was thought a excellent candidate for neoadjuvant chemotherapy followed by mesorectal excision and adjuvant chemotherapy-comparable to the PROSPECT trial.  There was concern about the location of the tumor extending down to the rectum and the avoidance of radiation therapy since the tumor appeared to be resectable.  There was also the concern of his previous history of radiation therapy.  Further discussed was the role of laparoscopic low anterior resection, total mesorectal excision, and creation of a diverting loop ileostomy temporarily utilized also discussed.  11/20/2024 and we have reviewed the PROSPECT Trial which was designed to determine whether neoadjuvant chemotherapy could be used as an alternative to neoadjuvant chemoRT.  This study demonstrated that similar disease-free survival and overall survival was similar to neoadjuvant CRT alone for eligible patients.  This would include the use of 6 cycles of FOLFOX chemotherapy followed by reassessment and if a clinical response and 20% or more was seen then RT would be admitted, proceeding to surgical resection and subsequent adjuvant chemotherapy.  After discussion the patient agrees to proceed.  12/2/2024: Proceed with C1D1 FOLFOX. Hemoglobin has declined to 9.9 today secondary to malignancy- ongoing bleeding and worsening iron deficiency. He has been taking oral iron, however, is not tolerating this well,  therefore will plan for IV iron pending insurance approval.   12/16/2024: Proceed with cycle 2-day 1 FOLFOX today.  Patient is tolerating well.  The patient is next evaluated 12/30/2024 for his third cycle of FOLFOX.  He has undergone Feraheme given 12/4/2024 and 12/16/2024.  He is feeling reasonably good without neuropathic symptoms.  We have discussed his scheduling and timing as he proceeds through his treatment including subsequent repeat MR pelvis after his 6 cycle of FOLFOX.  1/13/2025 Returns for cycle 4 FOLFOX today and was COVID positive 1/6/2025 and treated with Paxlovid and thereafter improved.  He has a scant cough remaining otherwise clear lung sounds.  Will proceed with treatment today.  Patient seen 1/27/2025 recovered from COVID, generally improved performance status and plans for cycle 5 FOLFOX at a 6 planned.  2/10/205 proceed with cycle 6 FOLFOX.  Following 6 cycle of neoadjuvant therapy will proceed with MRI for surgical planning.  The patient was referred today to Dr. Rai    *Anemia   12/2/2024: Hemoglobin 9.9 today. Patient is not tolerating oral iron, therefore, will plan to proceed with IV iron.    12/16/2024 hemoglobin has improved today to 10.8.  Proceeded with the second dose of Feraheme  Hemoglobin is stable today at 12.4    *COVID positive 1/6/2025 trreated with paxlovid  Resolved symptoms 1/27/2025    *Hypercalcemia  1/13/2025 calcium 11 with normal albumin.  Discussed with Dr. Nagel and additional labs added including ionized calcium, PTH, PTH related peptide and vitamin D level as he has been on high-dose vitamin D.  Asked patient to hold vitamin D supplement until additional labs have resulted.  Will also recheck CMP on Wednesday at disconnect.  Subsequent ionized calcium, PTH, vitamin D and PTH related peptide testing.  The studies include a PTH of 25, ionized calcium of 1.41, PTH related peptide less than 2.0, vitamin D level 67.1.  Repeat calcium 1/20/2025 at 9.1  2/10/2025  calcium normal at 9.2    Plan  Proceed today with cycle 6 FOLFOX.  Final planned dose of neoadjuvant therapy  Return Wednesday, 2/12/2025 for 5-FU unhook  Patient was referred to general surgery, Dr. Rai  MRI of the pelvis in 2 weeks to evaluate disease state following 6 cycles of neoadjuvant therapy  MD follow-up with Dr. Nagel in 2 to 3 weeks for review of MRI and discussion of surgical plan    The patient is on a high risk medication requiring close monitoring for toxicity    Nena Dacosta, APRN  02/10/2025

## 2025-02-11 DIAGNOSIS — M54.12 RADICULOPATHY OF CERVICAL REGION: ICD-10-CM

## 2025-02-11 RX ORDER — GABAPENTIN 300 MG/1
600 CAPSULE ORAL 3 TIMES DAILY
Qty: 540 CAPSULE | Refills: 1 | Status: SHIPPED | OUTPATIENT
Start: 2025-02-11

## 2025-02-11 NOTE — TELEPHONE ENCOUNTER
MEDICATION REFILL REQUEST    Caller: Ady Ekvin HUGO    Relationship: Self    Best call back number: [phone]    Requested Prescriptions:   Requested Prescriptions     Pending Prescriptions Disp Refills    gabapentin (NEURONTIN) 300 MG capsule 540 capsule 1     Sig: Take 2 capsules by mouth 3 (Three) Times a Day.      Pharmacy where request should be sent: [id number] @ [place], [place] - [address]  - [phone]  - [phone] FX     Last office visit with prescribing clinician: 9/3/2024   Last telemedicine visit with prescribing clinician: Visit date not found   Next office visit with prescribing clinician: 3/6/2025     Additional details provided by patient: PT REPORTS HE CURRENTLY HAS 3 DAYS OF THE GABAPENTIN MEDICATION LEFT.    Does the patient have less than a 3 day supply?:  [x] Yes  [] No    Would you like a call back once the refill request has been completed?: [] Yes  [x] No    If the office needs to give you a call back, can they leave a voicemail?: [] Yes  [x] No    PLEASE REVIEW AND ADVISE.    Raji Ramos Rep   02/11/25 11:35 EST

## 2025-02-11 NOTE — DISCHARGE INSTRUCTIONS
[facility] - Post-procedure Instructions          --  While there are no absolute restrictions, it is recommended that you do not perform strenuous activity today. In the morning, you may resume your level of activity as before your block.    --  If you have a band-aid at your injection site, please remove it later today. Observe the area for any redness, swelling, pus-like drainage, or a temperature over 101°. If any of these symptoms occur, please call your doctor at [phone]. If after office hours, leave a message and the on-call provider will return your call.    --  Ice may be applied to your injection site. It is recommended you avoid direct heat (heating pad; hot tub) for 1-2 days.    --  Call [facility] at [phone] if you experience persistent headache, persistent bleeding from the injection site, or severe pain not relieved by heat or oral medication.    --  Do not make important decisions today.    --  Due to the effects of the block and/or the I.V. Sedation, DO NOT drive or operate hazardous machinery for 12 hours.  Local anesthetics may cause numbness after procedure and precautions must be taken with regards to operating equipment as well as with walking, even if ambulating with assistance of another person or with an assistive device.    --  Do not drink alcohol for 12 hours.    -- You may return to work tomorrow, or as directed by your referring doctor.    --  Occasionally you may notice a slight increase in your pain after the procedure. This should start to improve within the next 24-48 hours. Radiofrequency ablation procedure pain may last 3-4 weeks.    --  It may take as long as 3-4 days before you notice a gradual improvement in your pain and/or other symptoms.    -- You may continue to take your prescribed pain medication as needed.    --  Some normal possible side effects of steroid use could include fluid retention, increased blood sugar, dull headache,  increased sweating, increased appetite, mood swings and flushing.    --  Diabetics are recommended to watch their blood glucose level closely for 24-48 hours after the injection.    --  Must stay in PACU for 20 min upon arrival and prove no leg weakness before being discharged.    --  IN THE EVENT OF A LIFE THREATENING EMERGENCY, (CHEST PAIN, BREATHING DIFFICULTIES, PARALYSIS…) YOU SHOULD GO TO YOUR NEAREST EMERGENCY ROOM.    --  You should be contacted by our office within 2-3 days to schedule follow up or next appointment date.  If not contacted within 7 days, please call the office at [phone]

## 2025-02-12 ENCOUNTER — INFUSION (OUTPATIENT)
Dept: ONCOLOGY | Facility: HOSPITAL | Age: 70
End: 2025-02-12
Payer: MEDICARE

## 2025-02-12 DIAGNOSIS — Z45.2 FITTING AND ADJUSTMENT OF VASCULAR CATHETER: ICD-10-CM

## 2025-02-12 DIAGNOSIS — C20 RECTAL ADENOCARCINOMA: Primary | ICD-10-CM

## 2025-02-12 PROCEDURE — 25010000002 HEPARIN LOCK FLUSH PER 10 UNITS: Performed by: INTERNAL MEDICINE

## 2025-02-12 RX ORDER — HEPARIN SODIUM (PORCINE) LOCK FLUSH IV SOLN 100 UNIT/ML 100 UNIT/ML
500 SOLUTION INTRAVENOUS AS NEEDED
Status: DISCONTINUED | OUTPATIENT
Start: 2025-02-12 | End: 2025-02-12 | Stop reason: HOSPADM

## 2025-02-12 RX ORDER — SODIUM CHLORIDE 0.9 % (FLUSH) 0.9 %
10 SYRINGE (ML) INJECTION AS NEEDED
Status: DISCONTINUED | OUTPATIENT
Start: 2025-02-12 | End: 2025-02-12 | Stop reason: HOSPADM

## 2025-02-12 RX ORDER — HEPARIN SODIUM (PORCINE) LOCK FLUSH IV SOLN 100 UNIT/ML 100 UNIT/ML
500 SOLUTION INTRAVENOUS AS NEEDED
OUTPATIENT
Start: 2025-02-12

## 2025-02-12 RX ORDER — SODIUM CHLORIDE 0.9 % (FLUSH) 0.9 %
10 SYRINGE (ML) INJECTION AS NEEDED
OUTPATIENT
Start: 2025-02-12

## 2025-02-12 RX ADMIN — Medication 10 ML: at 11:13

## 2025-02-12 RX ADMIN — Medication 500 UNITS: at 11:13

## 2025-02-12 NOTE — SIGNIFICANT NOTE
Patient educated on the following :    - If you are receiving Sedation for your procedure Nothing to Eat 6 hours and only clear liquids for 2 hours prior to your procedure.    -You will need to have someone drive you home after your PROCEDURE and remain with you for 24 hours after the PROCEDURE  - The date of your procedure, you will need your family member to remain at the facility the entire time you are here.  -You will need to arrive at 0915 on 2/14/25 PROCEDURE  -Please contact [facility] at: [phone] with any questions and/or concerns.

## 2025-02-14 ENCOUNTER — HOSPITAL ENCOUNTER (OUTPATIENT)
Dept: GENERAL RADIOLOGY | Facility: SURGERY CENTER | Age: 70
Setting detail: HOSPITAL OUTPATIENT SURGERY
End: 2025-02-14
Payer: MEDICARE

## 2025-02-14 ENCOUNTER — HOSPITAL ENCOUNTER (OUTPATIENT)
Facility: SURGERY CENTER | Age: 70
Setting detail: HOSPITAL OUTPATIENT SURGERY
Discharge: HOME | End: 2025-02-14
Attending: ANESTHESIOLOGY | Admitting: ANESTHESIOLOGY
Payer: MEDICARE

## 2025-02-14 VITALS
DIASTOLIC BLOOD PRESSURE: 99 MMHG | TEMPERATURE: 97.5 F | RESPIRATION RATE: 18 BRPM | BODY MASS INDEX: 29.79 KG/M2 | HEART RATE: 86 BPM | SYSTOLIC BLOOD PRESSURE: 163 MMHG | WEIGHT: 179 LBS | OXYGEN SATURATION: 99 %

## 2025-02-14 DIAGNOSIS — M48.061 SPINAL STENOSIS, LUMBAR REGION, WITHOUT NEUROGENIC CLAUDICATION: ICD-10-CM

## 2025-02-14 DIAGNOSIS — M54.16 LUMBAR RADICULOPATHY: ICD-10-CM

## 2025-02-14 DIAGNOSIS — Z41.9 SURGERY, ELECTIVE: ICD-10-CM

## 2025-02-14 LAB — GLUCOSE BLDC GLUCOMTR-MCNC: 114 MG/DL (ref 70–130)

## 2025-02-14 PROCEDURE — 25010000002 BUPIVACAINE (PF) 0.25 % SOLUTION 10 ML VIAL: Performed by: ANESTHESIOLOGY

## 2025-02-14 PROCEDURE — 25010000002 LIDOCAINE PF 1% 1 % SOLUTION 5 ML VIAL: Performed by: ANESTHESIOLOGY

## 2025-02-14 PROCEDURE — 76000 FLUOROSCOPY <1 HR PHYS/QHP: CPT

## 2025-02-14 PROCEDURE — 25510000001 IOPAMIDOL 61 % SOLUTION 30 ML VIAL: Performed by: ANESTHESIOLOGY

## 2025-02-14 PROCEDURE — 25010000002 DEXAMETHASONE SODIUM PHOSPHATE 100 MG/10ML SOLUTION 10 ML VIAL: Performed by: ANESTHESIOLOGY

## 2025-02-14 PROCEDURE — 62323 NJX INTERLAMINAR LMBR/SAC: CPT | Performed by: ANESTHESIOLOGY

## 2025-02-14 PROCEDURE — 77002 NEEDLE LOCALIZATION BY XRAY: CPT

## 2025-02-14 NOTE — OP NOTE
L4/L5 Interlaminar Lumbar Epidural Steroid Injection   [facility]    PREOPERATIVE DIAGNOSIS:   Lumbar Spinal Stenosis without Neurogenic Claudication and Lumbar Radiculopathy  POSTOPERATIVE DIAGNOSIS:  Same as preop diagnosis    PROCEDURE:   Lumbar Epidural Steroid Injection, Therapeutic Interlaminar Injection, with epidurogram, at  L4/L5 level    PRE-PROCEDURE DISCUSSION WITH PATIENT:    Risks and complications were discussed with the patient prior to starting the procedure and informed consent was obtained.  We discussed various topics including but not limited to bleeding, infection, injury, paralysis, nerve injury, dural puncture, coma, death, worsening of clinical picture, lack of pain relief, and postprocedural soreness.    SURGEON:  Vandana Ordonez MD    REASON FOR PROCEDURE:    Diagnostic injection at this level is needed    SEDATION:  No sedation was used for this procedure  ANESTHETIC:  Marcaine 0.25%  STEROID:   10mg dexamethasone    DESCRIPTON OF PROCEDURE:    After obtaining informed consent, I.V. was not started in the preop area.   The patient was taken to the operating room and placed in the prone position.  Heart rate, blood pressure, and pulse oximeter were monitored throughout, and sedation was provided as needed by the RN under my guidance. All pressure points were well padded.  The lumbar spine area was prepped with Chloraprep and draped in a sterile fashion.      AP fluoroscopic image was used to visualize the L4/L5 interspace.  The skin and subcutaneous tissue over the area was anesthetized with 1% Lidocaine.  An 18-Gauge Tuohy needle was then advanced through the anesthetized skin tract under fluoroscopic guidance in a coaxial view using a loss of resistance technique.  Lateral fluoroscopy was used to verify appropriate needle depth.  Once the needle tip was felt to be in the posterior epidural space, aspiration was noted to be negative for blood or CSF.  A volume of 1mL  of Isovue was then injected under live fluoroscopy in an AP view which produced good epidural spread with no evidence of loculation, vascular run-off or intrathecal spread.  Subsequently, a total volume of 5mL consisting of 10mg of dexamethasone, 0.5mL of 0.25% bupivcacaine and normal saline was injected without resistance.  The needle was removed intact.     ESTIMATED BLOOD LOSS:  <5 mL  SPECIMENS:  None    COMPLICATIONS:     No complications were noted., There was no indication of vascular uptake on live injection of contrast dye., and There was no indication of intrathecal uptake on live injection of contrast dye.    TOLERANCE & DISCHARGE CONDITION:    The patient tolerated the procedure well.  The patient was transported to the recovery area without difficulties.  The patient was discharged to home under the care of family in stable and satisfactory condition.    PLAN OF CARE:  The patient was given our standard instruction sheet.  The patient will Return to clinic 4-6 wks  The patient will resume all medications as per the medication reconciliation sheet.

## 2025-02-17 ENCOUNTER — PATIENT OUTREACH (OUTPATIENT)
Dept: OTHER | Facility: HOSPITAL | Age: 70
End: 2025-02-17
Payer: MEDICARE

## 2025-02-18 ENCOUNTER — HOSPITAL ENCOUNTER (OUTPATIENT)
Dept: MRI IMAGING | Facility: HOSPITAL | Age: 70
Discharge: HOME | End: 2025-02-18
Admitting: NURSE PRACTITIONER
Payer: MEDICARE

## 2025-02-18 DIAGNOSIS — C20 RECTAL ADENOCARCINOMA: ICD-10-CM

## 2025-02-18 DIAGNOSIS — Z45.2 FITTING AND ADJUSTMENT OF VASCULAR CATHETER: Primary | ICD-10-CM

## 2025-02-18 PROCEDURE — A9577 INJ MULTIHANCE: HCPCS | Performed by: NURSE PRACTITIONER

## 2025-02-18 PROCEDURE — 25510000002 GADOBENATE DIMEGLUMINE 529 MG/ML SOLUTION: Performed by: NURSE PRACTITIONER

## 2025-02-18 PROCEDURE — 72197 MRI PELVIS W/O & W/DYE: CPT

## 2025-02-18 RX ORDER — SODIUM CHLORIDE 0.9 % (FLUSH) 0.9 %
10 SYRINGE (ML) INJECTION AS NEEDED
Status: DISCONTINUED | OUTPATIENT
Start: 2025-02-18 | End: 2025-02-19 | Stop reason: HOSPADM

## 2025-02-18 RX ORDER — SODIUM CHLORIDE 0.9 % (FLUSH) 0.9 %
10 SYRINGE (ML) INJECTION AS NEEDED
OUTPATIENT
Start: 2025-02-18

## 2025-02-18 RX ORDER — HEPARIN SODIUM (PORCINE) LOCK FLUSH IV SOLN 100 UNIT/ML 100 UNIT/ML
500 SOLUTION INTRAVENOUS AS NEEDED
Status: DISCONTINUED | OUTPATIENT
Start: 2025-02-18 | End: 2025-02-19 | Stop reason: HOSPADM

## 2025-02-18 RX ORDER — HEPARIN SODIUM (PORCINE) LOCK FLUSH IV SOLN 100 UNIT/ML 100 UNIT/ML
500 SOLUTION INTRAVENOUS AS NEEDED
OUTPATIENT
Start: 2025-02-18

## 2025-02-18 RX ADMIN — GADOBENATE DIMEGLUMINE 16 ML: 529 INJECTION, SOLUTION INTRAVENOUS at 16:14

## 2025-02-18 NOTE — NURSING NOTE
Patient here for outpatient MRI.  [employer] tech requested I access patient mediport for MRI. I called [facility] and had them take orders for port off hold.  I spoke with Merly and she had Nena ROSALES do this.  But after I released these orders the [employer] tech told me that one of the nurses in Triage just started an IV instead in her hand.  So, I did not need to access port or flush.

## 2025-02-25 NOTE — PROGRESS NOTES
REASON FOR FOLLOW UP:  stage III mid to upper well-differentiated rectal adenocarcinoma (cT3, cN2 CM0.).    History of Present Illness    The patient is a 69 y.o. male with the above-mentioned history, who returns to the office today to reevaluate after he has undergone 6 cycles of FOLFOX chemotherapy neoadjuvantly.  he has been able to undergo repeat MRI of the pelvis 2/18/2025 demonstrating a response to therapy improved from previous from 11/4/2024 with a radiologic estimate of T3c compared to T3 DM2.  In review of this study enlarging tumor signal extends superiorly from the mass in close approximation between the mesorectum and peritoneal cavity and the previously seen suspicious lymph nodes appear to have improved from previous.     Dr. Rai is scheduled to see the patient 2/27/2025 and a call has been placed to him to discuss the findings.  He has clearly responded but is this  an adequate response?    The patient is improved from chemotherapy with resolution of neuropathic symptoms and normalization of his performance status.    ONCOLOGY HISTORY:  The patient is a 69-year-old male who is referred for recently diagnosed stage III mid to upper well-differentiated rectal adenocarcinoma (cT3, cN2 CM0.).    Patient undergone a screening colonoscopy 10/18/2024 demonstrating a circumferential mass involving the rectum extending from 10 cm to 12 cm with biopsy of 15 cm consistent with invasive well-differentiated adenocarcinoma with ulceration necroinflammatory debris.    This was felt to be microsatellite stable by IHC as well as including MSI by PCR.    If follow-up with infectious disease 11/1/2024 there being concern about resuming hypersexual activity and that he start preexposure prophylaxis.  He last been seen July 2024 on Descovy still in relationship with his partner and currently monogamous with been having bowel changes underwent colonoscopy with the above diagnosis.  As result of his need for therapy  Descovy was discontinued and the issue to be reevaluated once he was successfully treated.    MRI of the pelvis performed 11/4/2024 demonstrates a high rectal mass extending to the proximal sigmoid colon representing a T3d N2 rectal tumor, side effect invasion on the superior aspect of the mass approximates between the mesorectum and peritoneal cavity?    CT chest 11/8 demonstrates pleural plaques along the peripheral aspect of both the right lower lobes.  These are of uncertain significance.    The patient was next seen 11/14/2024 by colorectal surgery without evidence of obstruction or bleeding.  He was thought a excellent candidate for neoadjuvant chemotherapy followed by mesorectal excision and adjuvant chemotherapy-comparable to the prospect trial.  There was concern about the location of the tumor extending down to the rectum and the avoidance of radiation therapy since the tumor appeared to be resectable.  Was the role of laparoscopic low anterior resection total mesorectal excision and creation of a diverting loop ileostomy temporarily discussed.    As resulted above the patient is now referred to discuss this above approach.  He is seen with his significant other and we have discussed his findings in great detail from initial diagnosis and and subsequent surgical assessment leading to the recommendation of neoadjuvant chemotherapy given in the fashion of the PROSPECT Trial which was designed to determine whether neoadjuvant chemotherapy could be used as an alternative to neoadjuvant chemoRT.  This study demonstrated that similar disease-free survival and overall survival was similar to neoadjuvant CRT alone for eligible patients.  This would include the use of 6 cycles of FOLFOX chemotherapy followed by reassessment and if a clinical response and 20% or more was seen then RT would be admitted, proceeding to surgical resection and subsequent adjuvant chemotherapy.      He returns 12/2/2024, for Cycle 1 Day 1  FOLFOX. He does not have any new concerns today. He remains fatigued and experiences intermittent lightheadedness. He denies shortness of breath. He denies abdominal pain. His stools are black, but he denies rome blood. Despite starting oral iron, his hemoglobin has decreased further. He is not tolerating the oral iron at this time.  Patient was started on Feraheme.    Patient returns on 12/16/2024 for cycle 2 FOLFOX.  He reports he is tolerated treatment well thus far and denying any nausea, vomiting, changes to his bowels.  He did have blood in his stool following cycle 1 1 time.  That has not reoccurred.  He did start Feraheme the day of disconnect with cycle 1 and has had improvement in his hemoglobin.  He will be due for his second dose of Feraheme today.  He denies increased neuropathy.    The patient is next evaluated 12/30/2024 for his third cycle of FOLFOX.  He has undergone Feraheme given 12/4/2024 and 12/16/2024.  He is feeling reasonably good without neuropathic symptoms.  We have discussed his scheduling and timing as he proceeds through his treatment including subsequent repeat MR pelvis after his 6 cycle of FOLFOX.    He returns 1/13/2025 for follow up and treatment.  He has been positive for COVID since his last office visit and called in at which time we did start him on paxlovid.  He reports on starting the Paxlovid he was much improved.  He does have a scant cough at times with no lingering shortness of breath.  Report neuropathy lasting approximately 3 days following last treatment that was not always associated with cold intolerance.  This is now resolved and had involved his hands primarily.  We went on to proceed with cycle 4 FOLFOX, obtained ionized calcium, PTH, vitamin D and PTH related peptide testing.  The studies include a PTH of 25, ionized calcium of 1.41, PTH related peptide less than 2.0, vitamin D level 67.1.    He is next seen 1/27/2025 for cycle #5 FOLFOX out of 6 planned prior  to repeat MRI.  He is doing well with treatment with minimal neuropathy which recovers quickly, no additional fatigue and is without prolonged cytopenias.  He is trying to plan a wedding in and around his surgery and may need dental extractions soon.  He is also having back pain after recent exercising and is pursuing an epidural.    The patient is seen 2/26/2025 improved from previous and is status post MRI of the pelvis 2/18/2025 demonstrating a response to therapy improved from previous from 11/4/2024 with a radiologic estimate of T3c compared to T3 DM2.  In review of this study enlarging tumor signal extends superiorly from the mass in close approximation between the mesorectum and peritoneal cavity and the previously seen suspicious lymph nodes appear to have improved from previous.    Past Surgical History:   Procedure Laterality Date    ANGIOPLASTY CAROTID ARTERY      CAROTID ENDARTERECTOMY      COLONOSCOPY N/A 10/18/2024    Procedure: COLONOSCOPY TO CECUM/TI WITH BIOPSIES;  Surgeon: Marcus Christine Jr., MD;  Location: [facility] ENDOSCOPY;  Service: General;  Laterality: N/A;  PRE-SCREENING, FAMILY HX COLON CANCER  POST-DIVERTICULOSIS, RECTAL MASS    FOOT SURGERY      LUMBAR EPIDURAL INJECTION N/A 2/14/2025    Procedure: L4/L5 LUMBAR EPIDURAL STEROID INJECTION CPT: 17347;  Surgeon: Vandana Ordonez MD;  Location: [facility] MAIN OR;  Service: Pain Management;  Laterality: N/A;    VENOUS ACCESS DEVICE (PORT) INSERTION N/A 11/22/2024    Procedure: INSERTION VENOUS ACCESS DEVICE;  Surgeon: Naeem Rai MD;  Location: [facility] MAIN OR;  Service: General;  Laterality: N/A;        Current Outpatient Medications on File Prior to Visit   Medication Sig Dispense Refill    amoxicillin (AMOXIL) 500 MG capsule Take 1 capsule by mouth 3 (Three) Times a Day.      aspirin 81 MG chewable tablet Chew 1 tablet Daily.      atorvastatin (LIPITOR) 80 MG tablet Take 1 tablet by mouth Daily. 30 tablet 3    benzonatate (Tessalon  Perles) 100 MG capsule Take 1 capsule by mouth 3 (Three) Times a Day As Needed for Cough. 30 capsule 0    CBD (cannabidiol) oral oil Take 1 drop by mouth 2 (Two) Times a Day. Half a dropper twice daily      Cyanocobalamin (VITAMIN B 12 PO) Take 1 tablet by mouth Daily.      DULoxetine (Cymbalta) 60 MG capsule Take 1 capsule by mouth Daily for 180 days. (Patient taking differently: Take 1 capsule by mouth Every Night.) 90 capsule 1    ferrous sulfate 325 (65 Fe) MG tablet Take 65 mg by mouth Daily With Breakfast.      gabapentin (NEURONTIN) 300 MG capsule Take 2 capsules by mouth 3 (Three) Times a Day. 540 capsule 1    hydrOXYzine (ATARAX) 10 MG tablet TAKE 1 TO 2 TABLETS BY MOUTH EVERY NIGHT AT BEDTIME 60 tablet 1    losartan (COZAAR) 100 MG tablet Take 1 tablet by mouth Daily. 90 tablet 1    metFORMIN (GLUCOPHAGE) 1000 MG tablet Take 1 tablet by mouth 2 (Two) Times a Day With Meals. 90 tablet 2    metoprolol tartrate (LOPRESSOR) 25 MG tablet Take 1 tablet by mouth Daily. 90 tablet 0    ondansetron (ZOFRAN) 8 MG tablet Take 1 tablet by mouth 3 (Three) Times a Day As Needed for Nausea or Vomiting. 30 tablet 5    tadalafil (CIALIS) 5 MG tablet Take 1 tablet by mouth Daily As Needed for Erectile Dysfunction.      tamsulosin (FLOMAX) 0.4 MG capsule 24 hr capsule Take 1 capsule by mouth every night at bedtime.      vitamin D (ERGOCALCIFEROL) 1.25 MG (25282 UT) capsule capsule TAKE 1 CAPSULE BY MOUTH 1 TIME EVERY WEEK 12 capsule 0     No current facility-administered medications on file prior to visit.        ALLERGIES:  No Known Allergies     Social History     Socioeconomic History    Marital status: Significant Other   Tobacco Use    Smoking status: Former     Current packs/day: 3.00     Average packs/day: 3.0 packs/day for 30.0 years (90.0 ttl pk-yrs)     Types: Cigarettes    Smokeless tobacco: Never   Vaping Use    Vaping status: Some Days    Substances: CBD    Devices: Disposable   Substance and Sexual Activity     "Alcohol use: No    Drug use: Yes    Sexual activity: Defer        Family History   Problem Relation Age of Onset    Bone cancer Mother     COPD Father     Hypertension Father     Stroke Father         TIA    Bone cancer Father         smoker    Lung cancer Father     Diabetes Father     No Known Problems Sister     No Known Problems Brother     No Known Problems Maternal Grandmother     No Known Problems Maternal Grandfather     No Known Problems Paternal Grandmother     Colon cancer Paternal Grandfather     Malig Hyperthermia Neg Hx           Objective     Vitals:    02/26/25 1201   BP: 145/79   Pulse: 78   Temp: 97.8 °F (36.6 °C)   TempSrc: Oral   SpO2: 96%   Weight: 84.1 kg (185 lb 4.8 oz)   Height: 165.1 cm (65\")   PainSc: 0-No pain           2/26/2025    12:02 PM   Current Status   ECOG score 0       Physical Exam  Constitutional:       Appearance: He is obese.   HENT:      Head: Normocephalic and atraumatic.      Nose: Nose normal.      Mouth/Throat:      Mouth: Mucous membranes are moist.      Pharynx: Oropharynx is clear.   Eyes:      Extraocular Movements: Extraocular movements intact.      Conjunctiva/sclera: Conjunctivae normal.   Cardiovascular:      Rate and Rhythm: Normal rate and regular rhythm.      Pulses: Normal pulses.   Pulmonary:      Effort: Pulmonary effort is normal.   Abdominal:      General: Bowel sounds are normal.      Palpations: Abdomen is soft.   Musculoskeletal:         General: Normal range of motion.      Cervical back: Normal range of motion and neck supple.   Skin:     General: Skin is warm and dry.   Neurological:      General: No focal deficit present.      Mental Status: He is oriented to person, place, and time.   Psychiatric:         Mood and Affect: Mood normal.         Behavior: Behavior normal.         RECENT LABS:  Results from last 7 days   Lab Units 02/26/25  1125   WBC 10*3/mm3 4.98   NEUTROS ABS 10*3/mm3 2.52   HEMOGLOBIN g/dL 13.5   HEMATOCRIT % 40.6   PLATELETS " 10*3/mm3 155                       Assessment & Plan   *Stage III mid to upper well differentiated rectal adenocarcinoma (cT3, cN2, cM0)     69-year-old male with a history of of diabetes, CHF GE reflux hyperlipidemia, hypertension, TIA, possible CVA as well as a history of prostate cancer status post XRT.  He had been recently having a change in bowel habit ultimately leading to additional assessment.  This led to a screening colonoscopy 10/18/2024demonstrating a circumferential mass involving the rectum extending from 10 cm to 12 cm with biopsy of 15 cm consistent with invasive well-differentiated adenocarcinoma with ulceration necroinflammatory debris. This was felt to be microsatellite stable by IHC as well as including MSI by PCR.  MRI of the pelvis performed 11/4/2024 demonstrates a high rectal mass extending to the proximal sigmoid colon representing a T3d N2 rectal tumor, side effect invasion on the superior aspect of the mass approximates between the mesorectum and peritoneal cavity?  Additional staging 11/8/2024 includes a CT of the chest demonstrating small pleural plaques along the posterior aspect of both the right lower lobes of uncertain significance.  There are no other substantial findings though we have discussed this as leading to a PET/CT to complete his staging.  11/14/2024 by colorectal surgery, Dr. Naeem Rai, without evidence of obstruction or bleeding.  He was thought a excellent candidate for neoadjuvant chemotherapy followed by mesorectal excision and adjuvant chemotherapy-comparable to the PROSPECT trial.  There was concern about the location of the tumor extending down to the rectum and the avoidance of radiation therapy since the tumor appeared to be resectable.  There was also the concern of his previous history of radiation therapy.  Further discussed was the role of laparoscopic low anterior resection, total mesorectal excision, and creation of a diverting loop ileostomy  temporarily utilized also discussed.  11/20/2024 and we have reviewed the PROSPECT Trial which was designed to determine whether neoadjuvant chemotherapy could be used as an alternative to neoadjuvant chemoRT.  This study demonstrated that similar disease-free survival and overall survival was similar to neoadjuvant CRT alone for eligible patients.  This would include the use of 6 cycles of FOLFOX chemotherapy followed by reassessment and if a clinical response and 20% or more was seen then RT would be admitted, proceeding to surgical resection and subsequent adjuvant chemotherapy.  After discussion the patient agrees to proceed.  12/2/2024: Proceed with C1D1 FOLFOX. Hemoglobin has declined to 9.9 today secondary to malignancy- ongoing bleeding and worsening iron deficiency. He has been taking oral iron, however, is not tolerating this well, therefore will plan for IV iron pending insurance approval.   12/16/2024: Proceed with cycle 2-day 1 FOLFOX today.  Patient is tolerating well.  The patient is next evaluated 12/30/2024 for his third cycle of FOLFOX.  He has undergone Feraheme given 12/4/2024 and 12/16/2024.  He is feeling reasonably good without neuropathic symptoms.  We have discussed his scheduling and timing as he proceeds through his treatment including subsequent repeat MR pelvis after his 6 cycle of FOLFOX.  1/13/2025 Returns for cycle 4 FOLFOX today and was COVID positive 1/6/2025 and treated with Paxlovid and thereafter improved.  He has a scant cough remaining otherwise clear lung sounds.  Will proceed with treatment today.  Patient seen 1/27/2025 recovered from COVID, generally improved performance status and plans for cycle 5 FOLFOX at a 6 planned.  2/10/205 proceed with cycle 6 FOLFOX.  Following 6 cycle of neoadjuvant therapy will proceed with MRI for surgical planning.  The patient was referred today to Dr. Rai  Subsequent repeat repeat MRI of the pelvis 2/18/2025 demonstrating a response to  therapy improved from previous from 11/4/2024 with a radiologic estimate of T3c compared to T3 DM2.  In review of this study enlarging tumor signal extends superiorly from the mass in close approximation between the mesorectum and peritoneal cavity and the previously seen suspicious lymph nodes appear to have improved from previous.  Surgical evaluation anticipated with Dr. Rai 2/27/2025    *Anemia   12/2/2024: Hemoglobin 9.9 today. Patient is not tolerating oral iron, therefore, will plan to proceed with IV iron.    12/16/2024 hemoglobin has improved today to 10.8.  Proceeded with the second dose of Feraheme  Hemoglobin is stable today at 13.5 and 40.6    *COVID positive 1/6/2025 trreated with paxlovid  Resolved symptoms 1/27/2025    *Hypercalcemia  1/13/2025 calcium 11 with normal albumin.  Discussed with Dr. Nagel and additional labs added including ionized calcium, PTH, PTH related peptide and vitamin D level as he has been on high-dose vitamin D.  Asked patient to hold vitamin D supplement until additional labs have resulted.  Will also recheck CMP on Wednesday at disconnect.  Subsequent ionized calcium, PTH, vitamin D and PTH related peptide testing.  The studies include a PTH of 25, ionized calcium of 1.41, PTH related peptide less than 2.0, vitamin D level 67.1.  Repeat calcium 1/20/2025 at 9.1  2/10/2025 calcium normal at 9.2    Plan  Hold additional chemotherapy  Patient was referred to general surgery, Dr. [id number] visit 2/27/2025  2-4 weeks, MD or NP, CBC, CMP    Kevin Nagel MD  02/26/2025

## 2025-02-26 ENCOUNTER — OFFICE VISIT (OUTPATIENT)
Dept: ONCOLOGY | Facility: CLINIC | Age: 70
End: 2025-02-26
Payer: MEDICARE

## 2025-02-26 ENCOUNTER — LAB (OUTPATIENT)
Dept: LAB | Facility: HOSPITAL | Age: 70
End: 2025-02-26
Payer: MEDICARE

## 2025-02-26 VITALS
TEMPERATURE: 97.8 F | SYSTOLIC BLOOD PRESSURE: 145 MMHG | OXYGEN SATURATION: 96 % | DIASTOLIC BLOOD PRESSURE: 79 MMHG | HEART RATE: 78 BPM | WEIGHT: 185.3 LBS | HEIGHT: 65 IN | BODY MASS INDEX: 30.87 KG/M2

## 2025-02-26 DIAGNOSIS — C20 RECTAL ADENOCARCINOMA: ICD-10-CM

## 2025-02-26 DIAGNOSIS — C20 RECTAL ADENOCARCINOMA: Primary | ICD-10-CM

## 2025-02-26 LAB
BASOPHILS # BLD AUTO: 0.09 10*3/MM3 (ref 0–0.2)
BASOPHILS NFR BLD AUTO: 1.8 % (ref 0–1.5)
DEPRECATED RDW RBC AUTO: 79.2 FL (ref 37–54)
EOSINOPHIL # BLD AUTO: 0.28 10*3/MM3 (ref 0–0.4)
EOSINOPHIL NFR BLD AUTO: 5.6 % (ref 0.3–6.2)
ERYTHROCYTE [DISTWIDTH] IN BLOOD BY AUTOMATED COUNT: 22.9 % (ref 12.3–15.4)
HCT VFR BLD AUTO: 40.6 % (ref 37.5–51)
HGB BLD-MCNC: 13.5 G/DL (ref 13–17.7)
IMM GRANULOCYTES # BLD AUTO: 0.01 10*3/MM3 (ref 0–0.05)
IMM GRANULOCYTES NFR BLD AUTO: 0.2 % (ref 0–0.5)
LYMPHOCYTES # BLD AUTO: 1.46 10*3/MM3 (ref 0.7–3.1)
LYMPHOCYTES NFR BLD AUTO: 29.3 % (ref 19.6–45.3)
MCH RBC QN AUTO: 31.5 PG (ref 26.6–33)
MCHC RBC AUTO-ENTMCNC: 33.3 G/DL (ref 31.5–35.7)
MCV RBC AUTO: 94.9 FL (ref 79–97)
MONOCYTES # BLD AUTO: 0.62 10*3/MM3 (ref 0.1–0.9)
MONOCYTES NFR BLD AUTO: 12.4 % (ref 5–12)
NEUTROPHILS NFR BLD AUTO: 2.52 10*3/MM3 (ref 1.7–7)
NEUTROPHILS NFR BLD AUTO: 50.7 % (ref 42.7–76)
NRBC BLD AUTO-RTO: 0 /100 WBC (ref 0–0.2)
PLATELET # BLD AUTO: 155 10*3/MM3 (ref 140–450)
PMV BLD AUTO: 9.8 FL (ref 6–12)
RBC # BLD AUTO: 4.28 10*6/MM3 (ref 4.14–5.8)
WBC NRBC COR # BLD AUTO: 4.98 10*3/MM3 (ref 3.4–10.8)

## 2025-02-26 PROCEDURE — 1126F AMNT PAIN NOTED NONE PRSNT: CPT | Performed by: INTERNAL MEDICINE

## 2025-02-26 PROCEDURE — 3078F DIAST BP <80 MM HG: CPT | Performed by: INTERNAL MEDICINE

## 2025-02-26 PROCEDURE — 3077F SYST BP >= 140 MM HG: CPT | Performed by: INTERNAL MEDICINE

## 2025-02-26 PROCEDURE — 85025 COMPLETE CBC W/AUTO DIFF WBC: CPT

## 2025-02-26 PROCEDURE — 99214 OFFICE O/P EST MOD 30 MIN: CPT | Performed by: INTERNAL MEDICINE

## 2025-02-26 RX ORDER — AMOXICILLIN 500 MG/1
500 CAPSULE ORAL 3 TIMES DAILY
COMMUNITY
Start: 2025-02-19

## 2025-02-26 NOTE — LETTER
February 26, 2025     BOBBY Portillo  [address] [facility] [address]  [facility] [address]    Patient: Kevin Garsia   YOB: 1955   Date of Visit: 2/26/2025     Dear BOBBY Portillo:       Thank you for referring Kevin Garsia to me for evaluation. Below are the relevant portions of my assessment and plan of care.    If you have questions, please do not hesitate to call me. I look forward to following Kevin along with you.         Sincerely,        Kevin Nagel MD        CC: MD Shona Pascual Michael D., MD  02/26/25 8657  Sign when Signing Visit  REASON FOR FOLLOW UP:  stage III mid to upper well-differentiated rectal adenocarcinoma (cT3, cN2 CM0.).    History of Present Illness    The patient is a 69 y.o. male with the above-mentioned history, who returns to the office today to reevaluate after he has undergone 6 cycles of FOLFOX chemotherapy neoadjuvantly.  he has been able to undergo repeat MRI of the pelvis 2/18/2025 demonstrating a response to therapy improved from previous from 11/4/2024 with a radiologic estimate of T3c compared to T3 DM2.  In review of this study enlarging tumor signal extends superiorly from the mass in close approximation between the mesorectum and peritoneal cavity and the previously seen suspicious lymph nodes appear to have improved from previous.     Dr. Rai is scheduled to see the patient 2/27/2025 and a call has been placed to him to discuss the findings.  He has clearly responded but is this  an adequate response?    The patient is improved from chemotherapy with resolution of neuropathic symptoms and normalization of his performance status.    ONCOLOGY HISTORY:  The patient is a 69-year-old male who is referred for recently diagnosed stage III mid to upper well-differentiated rectal adenocarcinoma (cT3, cN2 CM0.).    Patient undergone a screening colonoscopy 10/18/2024 demonstrating a circumferential mass involving  the rectum extending from 10 cm to 12 cm with biopsy of 15 cm consistent with invasive well-differentiated adenocarcinoma with ulceration necroinflammatory debris.    This was felt to be microsatellite stable by IHC as well as including MSI by PCR.    If follow-up with infectious disease 11/1/2024 there being concern about resuming hypersexual activity and that he start preexposure prophylaxis.  He last been seen July 2024 on Descovy still in relationship with his partner and currently monogamous with been having bowel changes underwent colonoscopy with the above diagnosis.  As result of his need for therapy Descovy was discontinued and the issue to be reevaluated once he was successfully treated.    MRI of the pelvis performed 11/4/2024 demonstrates a high rectal mass extending to the proximal sigmoid colon representing a T3d N2 rectal tumor, side effect invasion on the superior aspect of the mass approximates between the mesorectum and peritoneal cavity?    CT chest 11/8 demonstrates pleural plaques along the peripheral aspect of both the right lower lobes.  These are of uncertain significance.    The patient was next seen 11/14/2024 by colorectal surgery without evidence of obstruction or bleeding.  He was thought a excellent candidate for neoadjuvant chemotherapy followed by mesorectal excision and adjuvant chemotherapy-comparable to the prospect trial.  There was concern about the location of the tumor extending down to the rectum and the avoidance of radiation therapy since the tumor appeared to be resectable.  Was the role of laparoscopic low anterior resection total mesorectal excision and creation of a diverting loop ileostomy temporarily discussed.    As resulted above the patient is now referred to discuss this above approach.  He is seen with his significant other and we have discussed his findings in great detail from initial diagnosis and and subsequent surgical assessment leading to the recommendation  of neoadjuvant chemotherapy given in the fashion of the PROSPECT Trial which was designed to determine whether neoadjuvant chemotherapy could be used as an alternative to neoadjuvant chemoRT.  This study demonstrated that similar disease-free survival and overall survival was similar to neoadjuvant CRT alone for eligible patients.  This would include the use of 6 cycles of FOLFOX chemotherapy followed by reassessment and if a clinical response and 20% or more was seen then RT would be admitted, proceeding to surgical resection and subsequent adjuvant chemotherapy.      He returns 12/2/2024, for Cycle 1 Day 1 FOLFOX. He does not have any new concerns today. He remains fatigued and experiences intermittent lightheadedness. He denies shortness of breath. He denies abdominal pain. His stools are black, but he denies rome blood. Despite starting oral iron, his hemoglobin has decreased further. He is not tolerating the oral iron at this time.  Patient was started on Feraheme.    Patient returns on 12/16/2024 for cycle 2 FOLFOX.  He reports he is tolerated treatment well thus far and denying any nausea, vomiting, changes to his bowels.  He did have blood in his stool following cycle 1 1 time.  That has not reoccurred.  He did start Feraheme the day of disconnect with cycle 1 and has had improvement in his hemoglobin.  He will be due for his second dose of Feraheme today.  He denies increased neuropathy.    The patient is next evaluated 12/30/2024 for his third cycle of FOLFOX.  He has undergone Feraheme given 12/4/2024 and 12/16/2024.  He is feeling reasonably good without neuropathic symptoms.  We have discussed his scheduling and timing as he proceeds through his treatment including subsequent repeat MR pelvis after his 6 cycle of FOLFOX.    He returns 1/13/2025 for follow up and treatment.  He has been positive for COVID since his last office visit and called in at which time we did start him on paxlovid.  He reports  on starting the Paxlovid he was much improved.  He does have a scant cough at times with no lingering shortness of breath.  Report neuropathy lasting approximately 3 days following last treatment that was not always associated with cold intolerance.  This is now resolved and had involved his hands primarily.  We went on to proceed with cycle 4 FOLFOX, obtained ionized calcium, PTH, vitamin D and PTH related peptide testing.  The studies include a PTH of 25, ionized calcium of 1.41, PTH related peptide less than 2.0, vitamin D level 67.1.    He is next seen 1/27/2025 for cycle #5 FOLFOX out of 6 planned prior to repeat MRI.  He is doing well with treatment with minimal neuropathy which recovers quickly, no additional fatigue and is without prolonged cytopenias.  He is trying to plan a wedding in and around his surgery and may need dental extractions soon.  He is also having back pain after recent exercising and is pursuing an epidural.    The patient is seen 2/26/2025 improved from previous and is status post MRI of the pelvis 2/18/2025 demonstrating a response to therapy improved from previous from 11/4/2024 with a radiologic estimate of T3c compared to T3 DM2.  In review of this study enlarging tumor signal extends superiorly from the mass in close approximation between the mesorectum and peritoneal cavity and the previously seen suspicious lymph nodes appear to have improved from previous.    Past Surgical History:   Procedure Laterality Date   • ANGIOPLASTY CAROTID ARTERY     • CAROTID ENDARTERECTOMY     • COLONOSCOPY N/A 10/18/2024    Procedure: COLONOSCOPY TO CECUM/TI WITH BIOPSIES;  Surgeon: Marcus Christine Jr., MD;  Location: [facility] ENDOSCOPY;  Service: General;  Laterality: N/A;  PRE-SCREENING, FAMILY HX COLON CANCER  POST-DIVERTICULOSIS, RECTAL MASS   • FOOT SURGERY     • LUMBAR EPIDURAL INJECTION N/A 2/14/2025    Procedure: L4/L5 LUMBAR EPIDURAL STEROID INJECTION CPT: 22385;  Surgeon: Vandana Ordonez MD;   Location: [facility] MAIN OR;  Service: Pain Management;  Laterality: N/A;   • VENOUS ACCESS DEVICE (PORT) INSERTION N/A 11/22/2024    Procedure: INSERTION VENOUS ACCESS DEVICE;  Surgeon: Naeem Rai MD;  Location: [facility] MAIN OR;  Service: General;  Laterality: N/A;        Current Outpatient Medications on File Prior to Visit   Medication Sig Dispense Refill   • amoxicillin (AMOXIL) 500 MG capsule Take 1 capsule by mouth 3 (Three) Times a Day.     • aspirin 81 MG chewable tablet Chew 1 tablet Daily.     • atorvastatin (LIPITOR) 80 MG tablet Take 1 tablet by mouth Daily. 30 tablet 3   • benzonatate (Tessalon Perles) 100 MG capsule Take 1 capsule by mouth 3 (Three) Times a Day As Needed for Cough. 30 capsule 0   • CBD (cannabidiol) oral oil Take 1 drop by mouth 2 (Two) Times a Day. Half a dropper twice daily     • Cyanocobalamin (VITAMIN B 12 PO) Take 1 tablet by mouth Daily.     • DULoxetine (Cymbalta) 60 MG capsule Take 1 capsule by mouth Daily for 180 days. (Patient taking differently: Take 1 capsule by mouth Every Night.) 90 capsule 1   • ferrous sulfate 325 (65 Fe) MG tablet Take 65 mg by mouth Daily With Breakfast.     • gabapentin (NEURONTIN) 300 MG capsule Take 2 capsules by mouth 3 (Three) Times a Day. 540 capsule 1   • hydrOXYzine (ATARAX) 10 MG tablet TAKE 1 TO 2 TABLETS BY MOUTH EVERY NIGHT AT BEDTIME 60 tablet 1   • losartan (COZAAR) 100 MG tablet Take 1 tablet by mouth Daily. 90 tablet 1   • metFORMIN (GLUCOPHAGE) 1000 MG tablet Take 1 tablet by mouth 2 (Two) Times a Day With Meals. 90 tablet 2   • metoprolol tartrate (LOPRESSOR) 25 MG tablet Take 1 tablet by mouth Daily. 90 tablet 0   • ondansetron (ZOFRAN) 8 MG tablet Take 1 tablet by mouth 3 (Three) Times a Day As Needed for Nausea or Vomiting. 30 tablet 5   • tadalafil (CIALIS) 5 MG tablet Take 1 tablet by mouth Daily As Needed for Erectile Dysfunction.     • tamsulosin (FLOMAX) 0.4 MG capsule 24 hr capsule Take 1 capsule by mouth every night  "at bedtime.     • vitamin D (ERGOCALCIFEROL) 1.25 MG (28086 UT) capsule capsule TAKE 1 CAPSULE BY MOUTH 1 TIME EVERY WEEK 12 capsule 0     No current facility-administered medications on file prior to visit.        ALLERGIES:  No Known Allergies     Social History     Socioeconomic History   • Marital status: Significant Other   Tobacco Use   • Smoking status: Former     Current packs/day: 3.00     Average packs/day: 3.0 packs/day for 30.0 years (90.0 ttl pk-yrs)     Types: Cigarettes   • Smokeless tobacco: Never   Vaping Use   • Vaping status: Some Days   • Substances: CBD   • Devices: Disposable   Substance and Sexual Activity   • Alcohol use: No   • Drug use: Yes   • Sexual activity: Defer        Family History   Problem Relation Age of Onset   • Bone cancer Mother    • COPD Father    • Hypertension Father    • Stroke Father         TIA   • Bone cancer Father         smoker   • Lung cancer Father    • Diabetes Father    • No Known Problems Sister    • No Known Problems Brother    • No Known Problems Maternal Grandmother    • No Known Problems Maternal Grandfather    • No Known Problems Paternal Grandmother    • Colon cancer Paternal Grandfather    • Malig Hyperthermia Neg Hx           Objective    Vitals:    02/26/25 1201   BP: 145/79   Pulse: 78   Temp: 97.8 °F (36.6 °C)   TempSrc: Oral   SpO2: 96%   Weight: 84.1 kg (185 lb 4.8 oz)   Height: 165.1 cm (65\")   PainSc: 0-No pain           2/26/2025    12:02 PM   Current Status   ECOG score 0       Physical Exam  Constitutional:       Appearance: He is obese.   HENT:      Head: Normocephalic and atraumatic.      Nose: Nose normal.      Mouth/Throat:      Mouth: Mucous membranes are moist.      Pharynx: Oropharynx is clear.   Eyes:      Extraocular Movements: Extraocular movements intact.      Conjunctiva/sclera: Conjunctivae normal.   Cardiovascular:      Rate and Rhythm: Normal rate and regular rhythm.      Pulses: Normal pulses.   Pulmonary:      Effort: Pulmonary " effort is normal.   Abdominal:      General: Bowel sounds are normal.      Palpations: Abdomen is soft.   Musculoskeletal:         General: Normal range of motion.      Cervical back: Normal range of motion and neck supple.   Skin:     General: Skin is warm and dry.   Neurological:      General: No focal deficit present.      Mental Status: He is oriented to person, place, and time.   Psychiatric:         Mood and Affect: Mood normal.         Behavior: Behavior normal.         RECENT LABS:  Results from last 7 days   Lab Units 02/26/25  1125   WBC 10*3/mm3 4.98   NEUTROS ABS 10*3/mm3 2.52   HEMOGLOBIN g/dL 13.5   HEMATOCRIT % 40.6   PLATELETS 10*3/mm3 155                       Assessment & Plan  *Stage III mid to upper well differentiated rectal adenocarcinoma (cT3, cN2, cM0)     69-year-old male with a history of of diabetes, CHF GE reflux hyperlipidemia, hypertension, TIA, possible CVA as well as a history of prostate cancer status post XRT.  He had been recently having a change in bowel habit ultimately leading to additional assessment.  This led to a screening colonoscopy 10/18/2024demonstrating a circumferential mass involving the rectum extending from 10 cm to 12 cm with biopsy of 15 cm consistent with invasive well-differentiated adenocarcinoma with ulceration necroinflammatory debris. This was felt to be microsatellite stable by IHC as well as including MSI by PCR.  MRI of the pelvis performed 11/4/2024 demonstrates a high rectal mass extending to the proximal sigmoid colon representing a T3d N2 rectal tumor, side effect invasion on the superior aspect of the mass approximates between the mesorectum and peritoneal cavity?  Additional staging 11/8/2024 includes a CT of the chest demonstrating small pleural plaques along the posterior aspect of both the right lower lobes of uncertain significance.  There are no other substantial findings though we have discussed this as leading to a PET/CT to complete his  staging.  11/14/2024 by colorectal surgery, Dr. Naeem Rai, without evidence of obstruction or bleeding.  He was thought a excellent candidate for neoadjuvant chemotherapy followed by mesorectal excision and adjuvant chemotherapy-comparable to the PROSPECT trial.  There was concern about the location of the tumor extending down to the rectum and the avoidance of radiation therapy since the tumor appeared to be resectable.  There was also the concern of his previous history of radiation therapy.  Further discussed was the role of laparoscopic low anterior resection, total mesorectal excision, and creation of a diverting loop ileostomy temporarily utilized also discussed.  11/20/2024 and we have reviewed the PROSPECT Trial which was designed to determine whether neoadjuvant chemotherapy could be used as an alternative to neoadjuvant chemoRT.  This study demonstrated that similar disease-free survival and overall survival was similar to neoadjuvant CRT alone for eligible patients.  This would include the use of 6 cycles of FOLFOX chemotherapy followed by reassessment and if a clinical response and 20% or more was seen then RT would be admitted, proceeding to surgical resection and subsequent adjuvant chemotherapy.  After discussion the patient agrees to proceed.  12/2/2024: Proceed with C1D1 FOLFOX. Hemoglobin has declined to 9.9 today secondary to malignancy- ongoing bleeding and worsening iron deficiency. He has been taking oral iron, however, is not tolerating this well, therefore will plan for IV iron pending insurance approval.   12/16/2024: Proceed with cycle 2-day 1 FOLFOX today.  Patient is tolerating well.  The patient is next evaluated 12/30/2024 for his third cycle of FOLFOX.  He has undergone Feraheme given 12/4/2024 and 12/16/2024.  He is feeling reasonably good without neuropathic symptoms.  We have discussed his scheduling and timing as he proceeds through his treatment including subsequent repeat MR  pelvis after his 6 cycle of FOLFOX.  1/13/2025 Returns for cycle 4 FOLFOX today and was COVID positive 1/6/2025 and treated with Paxlovid and thereafter improved.  He has a scant cough remaining otherwise clear lung sounds.  Will proceed with treatment today.  Patient seen 1/27/2025 recovered from COVID, generally improved performance status and plans for cycle 5 FOLFOX at a 6 planned.  2/10/205 proceed with cycle 6 FOLFOX.  Following 6 cycle of neoadjuvant therapy will proceed with MRI for surgical planning.  The patient was referred today to Dr. Rai  Subsequent repeat repeat MRI of the pelvis 2/18/2025 demonstrating a response to therapy improved from previous from 11/4/2024 with a radiologic estimate of T3c compared to T3 DM2.  In review of this study enlarging tumor signal extends superiorly from the mass in close approximation between the mesorectum and peritoneal cavity and the previously seen suspicious lymph nodes appear to have improved from previous.  Surgical evaluation anticipated with Dr. Rai 2/27/2025    *Anemia   12/2/2024: Hemoglobin 9.9 today. Patient is not tolerating oral iron, therefore, will plan to proceed with IV iron.    12/16/2024 hemoglobin has improved today to 10.8.  Proceeded with the second dose of Feraheme  Hemoglobin is stable today at 13.5 and 40.6    *COVID positive 1/6/2025 trreated with paxlovid  Resolved symptoms 1/27/2025    *Hypercalcemia  1/13/2025 calcium 11 with normal albumin.  Discussed with Dr. Nagel and additional labs added including ionized calcium, PTH, PTH related peptide and vitamin D level as he has been on high-dose vitamin D.  Asked patient to hold vitamin D supplement until additional labs have resulted.  Will also reche[facility] C[facility] on Wednesday at [facility].  Subsequent ionized calcium, PTH, vitamin D and PTH related peptide testing.  The studies include a PTH of 25, ionized calcium of 1.41, PTH related peptide less than 2.0, vitamin D level 67.1.  Repeat  calcium 1/20/2025 at 9.1  2/10/2025 calcium normal at 9.2    Plan  Hold additional chemotherapy  Patient was referred to general surgery, Dr. [id number] visit 2/27/2025  2-4 weeks, MD or NP, CBC, CMP    Kevin Nagel MD  02/26/2025

## 2025-02-27 ENCOUNTER — OFFICE VISIT (OUTPATIENT)
Dept: SURGERY | Facility: CLINIC | Age: 70
End: 2025-02-27
Payer: MEDICARE

## 2025-02-27 VITALS
WEIGHT: 188 LBS | HEART RATE: 83 BPM | BODY MASS INDEX: 31.32 KG/M2 | SYSTOLIC BLOOD PRESSURE: 124 MMHG | DIASTOLIC BLOOD PRESSURE: 78 MMHG | OXYGEN SATURATION: 95 % | HEIGHT: 65 IN

## 2025-02-27 DIAGNOSIS — C20 RECTAL ADENOCARCINOMA: Primary | ICD-10-CM

## 2025-02-27 RX ORDER — METRONIDAZOLE 500 MG/1
500 TABLET ORAL SEE ADMIN INSTRUCTIONS
Qty: 4 TABLET | Refills: 0 | Status: SHIPPED | OUTPATIENT
Start: 2025-02-27 | End: 2025-02-28

## 2025-02-27 RX ORDER — NEOMYCIN SULFATE 500 MG/1
TABLET ORAL
Qty: 4 TABLET | Refills: 0 | Status: SHIPPED | OUTPATIENT
Start: 2025-02-27

## 2025-02-27 NOTE — PROGRESS NOTES
Colorectal & General Surgery  Follow - Up    Patient: Kevin Garsia  YOB: 1955  MRN: [id number]      Assessment  Kevin Garsia is a 69 y.o. 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  He has tolerated chemotherapy well and continues to have no significant issues with bowel function, hematochezia, or obstructive symptoms.  I have personally reviewed the MRI of his pelvis, which demonstrates approximately 50% reduction in the size of his rectal tumor as well as reduction in the size of his metastatic lymphadenopathy.  At this point, he has demonstrated adequate response and I think it is appropriate in his situation given his upper to mid rectal tumor location to omit radiation and proceed with total mesorectal excision to be at the prospect trial.    We have discussed the role of laparoscopic possible open low anterior resection with the possibility of creating a low pelvic anastomosis with temporary diverting loop ileostomy versus creating an end colostomy.  He does have history of prostate cancer that was treated with radiation approximately 5 years ago, which could certainly render the quality of tissue within his pelvis to be incompatible with creation of an anastomosis.  We discussed the risk, benefits, alternatives to surgery, including the risk of anastomotic leak, need for temporary or permanent ileostomy or colostomy, infection, bleeding, damage to surrounding structures, including the left ureter, need for additional procedures, and the risk of difficulty from surgery secondary to his prior radiation.  Informed consent was obtained.    We will plan to proceed to the operating for laparoscopic, possible open, low anterior resection with ostomy creation, either diverting loop ileostomy or permanent end colostomy.  We will proceed  under enhanced recovery after surgery protocols with full mechanical bowel preparation and oral antibiotics.    Chief Complaint: Rectal cancer follow-up    History of Present Illness   Kevin Garsia is a 69 y.o. male who presents in rectal cancer follow-up today after undergoing neoadjuvant chemotherapy.  He has no complaints.  Denies any significant hematochezia or obstructive symptoms.    Most recent colonoscopy: 2023    Past Medical History   Past Medical History:   Diagnosis Date    Aphasia     Arthritis     Cancer     prostate    CHF (congestive heart failure)     Diabetes mellitus     GERD (gastroesophageal reflux disease)     History of radiation therapy     History of UTI     Hyperlipidemia     Hypertension     Multiple gastric ulcers     Rectal cancer 2024    Seasonal allergies     Sleep apnea     cpap    Stroke     Tattoos     TIA (transient ischemic attack)         Past Surgical History   Past Surgical History:   Procedure Laterality Date    ANGIOPLASTY CAROTID ARTERY      CAROTID ENDARTERECTOMY      COLONOSCOPY N/A 10/18/2024    Procedure: COLONOSCOPY TO CECUM/TI WITH BIOPSIES;  Surgeon: Marcus Christine Jr., MD;  Location: [facility] ENDOSCOPY;  Service: General;  Laterality: N/A;  PRE-SCREENING, FAMILY HX COLON CANCER  POST-DIVERTICULOSIS, RECTAL MASS    FOOT SURGERY      LUMBAR EPIDURAL INJECTION N/A 02/14/2025    Procedure: L4/L5 LUMBAR EPIDURAL STEROID INJECTION CPT: 77512;  Surgeon: Vandana Ordonez MD;  Location: [facility] [place] [place] MAIN OR;  Service: Pain Management;  Laterality: N/A;    TOOTH EXTRACTION  2025    VENOUS ACCESS DEVICE (PORT) INSERTION N/A 11/22/2024    Procedure: INSERTION VENOUS ACCESS DEVICE;  Surgeon: Naeem Rai MD;  Location: [facility] MAIN OR;  Service: General;  Laterality: N/A;       Social History  Social History     Socioeconomic History    Marital status: Significant Other   Tobacco Use    Smoking status: Former     Current packs/day: 3.00     Average packs/day: 3.0 packs/day  for 30.0 years (90.0 ttl pk-yrs)     Types: Cigarettes    Smokeless tobacco: Never   Vaping Use    Vaping status: Former    Substances: CBD    Devices: Disposable   Substance and Sexual Activity    Alcohol use: No    Drug use: Yes    Sexual activity: Defer       Family History  Family History   Problem Relation Age of Onset    Bone cancer Mother     COPD Father     Hypertension Father     Stroke Father         TIA    Bone cancer Father         smoker    Lung cancer Father     Diabetes Father     No Known Problems Sister     No Known Problems Brother     No Known Problems Maternal Grandmother     No Known Problems Maternal Grandfather     No Known Problems Paternal Grandmother     Colon cancer Paternal Grandfather     Malig Hyperthermia Neg Hx        Colorectal cancer family history: None    Review of Systems  Negative except as documented in the HPI.     Allergies  No Known Allergies    Medications    Current Outpatient Medications:     amoxicillin (AMOXIL) 500 MG capsule, Take 1 capsule by mouth 3 (Three) Times a Day., Disp: , Rfl:     aspirin 81 MG chewable tablet, Chew 1 tablet Daily., Disp: , Rfl:     atorvastatin (LIPITOR) 80 MG tablet, Take 1 tablet by mouth Daily., Disp: 30 tablet, Rfl: 3    Cyanocobalamin (VITAMIN B 12 PO), Take 1 tablet by mouth Daily., Disp: , Rfl:     DULoxetine (Cymbalta) 60 MG capsule, Take 1 capsule by mouth Daily for 180 days. (Patient taking differently: Take 1 capsule by mouth Every Night.), Disp: 90 capsule, Rfl: 1    ferrous sulfate 325 (65 Fe) MG tablet, Take 65 mg by mouth Daily With Breakfast., Disp: , Rfl:     gabapentin (NEURONTIN) 300 MG capsule, Take 2 capsules by mouth 3 (Three) Times a Day., Disp: 540 capsule, Rfl: 1    losartan (COZAAR) 100 MG tablet, Take 1 tablet by mouth Daily., Disp: 90 tablet, Rfl: 1    metFORMIN (GLUCOPHAGE) 1000 MG tablet, Take 1 tablet by mouth 2 (Two) Times a Day With Meals., Disp: 90 tablet, Rfl: 2    metoprolol tartrate (LOPRESSOR) 25 MG  tablet, Take 1 tablet by mouth Daily., Disp: 90 tablet, Rfl: 0    tadalafil (CIALIS) 5 MG tablet, Take 1 tablet by mouth Daily As Needed for Erectile Dysfunction., Disp: , Rfl:     vitamin D (ERGOCALCIFEROL) 1.25 MG (75373 UT) capsule capsule, TAKE 1 CAPSULE BY MOUTH 1 TIME EVERY WEEK, Disp: 12 capsule, Rfl: 0    CBD (cannabidiol) oral oil, Take 1 drop by mouth 2 (Two) Times a Day. Half a dropper twice daily (Patient not taking: Reported on 2/27/2025), Disp: , Rfl:     hydrOXYzine (ATARAX) 10 MG tablet, TAKE 1 TO 2 TABLETS BY MOUTH EVERY NIGHT AT BEDTIME (Patient not taking: Reported on 2/27/2025), Disp: 60 tablet, Rfl: 1    metroNIDAZOLE (Flagyl) 500 MG tablet, Take 1 tablet by mouth See Admin Instructions for 1 day. Take two (2) tablets at 7 PM and two (2) tablets at 11 PM the night prior to surgery., Disp: 4 tablet, Rfl: 0    neomycin (MYCIFRADIN) 500 MG tablet, Take two (2) tablets at 7 PM and two (2) tablets at 11 PM the night prior to surgery., Disp: 4 tablet, Rfl: 0    ondansetron (ZOFRAN) 8 MG tablet, Take 1 tablet by mouth 3 (Three) Times a Day As Needed for Nausea or Vomiting. (Patient not taking: Reported on 2/27/2025), Disp: 30 tablet, Rfl: 5    tamsulosin (FLOMAX) 0.4 MG capsule 24 hr capsule, Take 1 capsule by mouth every night at bedtime. (Patient not taking: Reported on 2/27/2025), Disp: , Rfl:     Vital Signs  Vitals:    02/27/25 1452   BP: 124/78   Pulse: 83   SpO2: 95%        Physical Exam  Constitutional: Resting comfortably, no acute distress  Neck: Supple, trachea midline  Respiratory: No increased work of breathing, Symmetric excursion  Cardiovascular: Well pefursed, no jugular venous distention evident   Abdominal:  Soft, non-tender, non-distended  Lymphatics: No cervical or suprascapular adenopathy  Skin: Warm, dry, no rash on visualized skin surfaces  Musculoskeletal: Symmetric strength, no obvious gross abnormalities  Psychiatric: Alert and oriented ×3, normal affect      Laboratory  Results  I have personally reviewed CBC with WC 4, hemoglobin 13, plate 25.  CMP with creatinine 0.9, albumin 4.0.    Radiology  I have personally reviewed MRI of the pelvis demonstrates significant response of his rectal tumor, which is now centered above the peritoneal reflection and measures approximately3 cm in length.  It is approximately 12 cm from the anal verge.  Mesorectal lymph nodes are noted to be enlarged but significantly less so than on prior scan.    Endoscopy  No new endoscopic studies to review    I spent 45 minutes caring for Kevin on this date of service. This time includes time spent by me in the following activities:preparing for the visit, reviewing tests, performing a medically appropriate examination and/or evaluation , counseling and educating the patient/family/caregiver, ordering medications, tests, or procedures, referring and communicating with other health care professionals , documenting information in the medical record, and independently interpreting results and communicating that information with the patient/family/caregiver     Rde Rai MD  Colorectal & General Surgery  [facility]    [address], [address]  [place], [place], [address]  P: [phone]  F: [phone]

## 2025-02-27 NOTE — H&P (VIEW-ONLY)
Colorectal & General Surgery  Follow - Up    Patient: Kevin Garsia  YOB: 1955  MRN: [id number]      Assessment  Kevin Garsia is a 69 y.o. 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  He has tolerated chemotherapy well and continues to have no significant issues with bowel function, hematochezia, or obstructive symptoms.  I have personally reviewed the MRI of his pelvis, which demonstrates approximately 50% reduction in the size of his rectal tumor as well as reduction in the size of his metastatic lymphadenopathy.  At this point, he has demonstrated adequate response and I think it is appropriate in his situation given his upper to mid rectal tumor location to omit radiation and proceed with total mesorectal excision to be at the prospect trial.    We have discussed the role of laparoscopic possible open low anterior resection with the possibility of creating a low pelvic anastomosis with temporary diverting loop ileostomy versus creating an end colostomy.  He does have history of prostate cancer that was treated with radiation approximately 5 years ago, which could certainly render the quality of tissue within his pelvis to be incompatible with creation of an anastomosis.  We discussed the risk, benefits, alternatives to surgery, including the risk of anastomotic leak, need for temporary or permanent ileostomy or colostomy, infection, bleeding, damage to surrounding structures, including the left ureter, need for additional procedures, and the risk of difficulty from surgery secondary to his prior radiation.  Informed consent was obtained.    We will plan to proceed to the operating for laparoscopic, possible open, low anterior resection with ostomy creation, either diverting loop ileostomy or permanent end colostomy.  We will proceed  under enhanced recovery after surgery protocols with full mechanical bowel preparation and oral antibiotics.    Chief Complaint: Rectal cancer follow-up    History of Present Illness   Kvein Garsia is a 69 y.o. male who presents in rectal cancer follow-up today after undergoing neoadjuvant chemotherapy.  He has no complaints.  Denies any significant hematochezia or obstructive symptoms.    Most recent colonoscopy: 2023    Past Medical History   Past Medical History:   Diagnosis Date    Aphasia     Arthritis     Cancer     prostate    CHF (congestive heart failure)     Diabetes mellitus     GERD (gastroesophageal reflux disease)     History of radiation therapy     History of UTI     Hyperlipidemia     Hypertension     Multiple gastric ulcers     Rectal cancer 2024    Seasonal allergies     Sleep apnea     cpap    Stroke     Tattoos     TIA (transient ischemic attack)         Past Surgical History   Past Surgical History:   Procedure Laterality Date    ANGIOPLASTY CAROTID ARTERY      CAROTID ENDARTERECTOMY      COLONOSCOPY N/A 10/18/2024    Procedure: COLONOSCOPY TO CECUM/TI WITH BIOPSIES;  Surgeon: Marcus Christine Jr., MD;  Location: [facility] ENDOSCOPY;  Service: General;  Laterality: N/A;  PRE-SCREENING, FAMILY HX COLON CANCER  POST-DIVERTICULOSIS, RECTAL MASS    FOOT SURGERY      LUMBAR EPIDURAL INJECTION N/A 02/14/2025    Procedure: L4/L5 LUMBAR EPIDURAL STEROID INJECTION CPT: 72121;  Surgeon: Vandana Ordonez MD;  [place] [facility] [place];  Service: Pain Management;  Laterality: N/A;    TOOTH EXTRACTION  2025    VENOUS ACCESS DEVICE (PORT) INSERTION N/A 11/22/2024    Procedure: INSERTION VENOUS ACCESS DEVICE;  Surgeon: Naeem Rai MD;  Location: [facility] MAIN OR;  Service: General;  Laterality: N/A;       Social History  Social History     Socioeconomic History    Marital status: Significant Other   Tobacco Use    Smoking status: Former     Current packs/day: 3.00     Average packs/day: 3.0 packs/day  for 30.0 years (90.0 ttl pk-yrs)     Types: Cigarettes    Smokeless tobacco: Never   Vaping Use    Vaping status: Former    Substances: CBD    Devices: Disposable   Substance and Sexual Activity    Alcohol use: No    Drug use: Yes    Sexual activity: Defer       Family History  Family History   Problem Relation Age of Onset    Bone cancer Mother     COPD Father     Hypertension Father     Stroke Father         TIA    Bone cancer Father         smoker    Lung cancer Father     Diabetes Father     No Known Problems Sister     No Known Problems Brother     No Known Problems Maternal Grandmother     No Known Problems Maternal Grandfather     No Known Problems Paternal Grandmother     Colon cancer Paternal Grandfather     Malig Hyperthermia Neg Hx        Colorectal cancer family history: None    Review of Systems  Negative except as documented in the HPI.     Allergies  No Known Allergies    Medications    Current Outpatient Medications:     amoxicillin (AMOXIL) 500 MG capsule, Take 1 capsule by mouth 3 (Three) Times a Day., Disp: , Rfl:     aspirin 81 MG chewable tablet, Chew 1 tablet Daily., Disp: , Rfl:     atorvastatin (LIPITOR) 80 MG tablet, Take 1 tablet by mouth Daily., Disp: 30 tablet, Rfl: 3    Cyanocobalamin (VITAMIN B 12 PO), Take 1 tablet by mouth Daily., Disp: , Rfl:     DULoxetine (Cymbalta) 60 MG capsule, Take 1 capsule by mouth Daily for 180 days. (Patient taking differently: Take 1 capsule by mouth Every Night.), Disp: 90 capsule, Rfl: 1    ferrous sulfate 325 (65 Fe) MG tablet, Take 65 mg by mouth Daily With Breakfast., Disp: , Rfl:     gabapentin (NEURONTIN) 300 MG capsule, Take 2 capsules by mouth 3 (Three) Times a Day., Disp: 540 capsule, Rfl: 1    losartan (COZAAR) 100 MG tablet, Take 1 tablet by mouth Daily., Disp: 90 tablet, Rfl: 1    metFORMIN (GLUCOPHAGE) 1000 MG tablet, Take 1 tablet by mouth 2 (Two) Times a Day With Meals., Disp: 90 tablet, Rfl: 2    metoprolol tartrate (LOPRESSOR) 25 MG  tablet, Take 1 tablet by mouth Daily., Disp: 90 tablet, Rfl: 0    tadalafil (CIALIS) 5 MG tablet, Take 1 tablet by mouth Daily As Needed for Erectile Dysfunction., Disp: , Rfl:     vitamin D (ERGOCALCIFEROL) 1.25 MG (30699 UT) capsule capsule, TAKE 1 CAPSULE BY MOUTH 1 TIME EVERY WEEK, Disp: 12 capsule, Rfl: 0    CBD (cannabidiol) oral oil, Take 1 drop by mouth 2 (Two) Times a Day. Half a dropper twice daily (Patient not taking: Reported on 2/27/2025), Disp: , Rfl:     hydrOXYzine (ATARAX) 10 MG tablet, TAKE 1 TO 2 TABLETS BY MOUTH EVERY NIGHT AT BEDTIME (Patient not taking: Reported on 2/27/2025), Disp: 60 tablet, Rfl: 1    metroNIDAZOLE (Flagyl) 500 MG tablet, Take 1 tablet by mouth See Admin Instructions for 1 day. Take two (2) tablets at 7 PM and two (2) tablets at 11 PM the night prior to surgery., Disp: 4 tablet, Rfl: 0    neomycin (MYCIFRADIN) 500 MG tablet, Take two (2) tablets at 7 PM and two (2) tablets at 11 PM the night prior to surgery., Disp: 4 tablet, Rfl: 0    ondansetron (ZOFRAN) 8 MG tablet, Take 1 tablet by mouth 3 (Three) Times a Day As Needed for Nausea or Vomiting. (Patient not taking: Reported on 2/27/2025), Disp: 30 tablet, Rfl: 5    tamsulosin (FLOMAX) 0.4 MG capsule 24 hr capsule, Take 1 capsule by mouth every night at bedtime. (Patient not taking: Reported on 2/27/2025), Disp: , Rfl:     Vital Signs  Vitals:    02/27/25 1452   BP: 124/78   Pulse: 83   SpO2: 95%        Physical Exam  Constitutional: Resting comfortably, no acute distress  Neck: Supple, trachea midline  Respiratory: No increased work of breathing, Symmetric excursion  Cardiovascular: Well pefursed, no jugular venous distention evident   Abdominal:  Soft, non-tender, non-distended  Lymphatics: No cervical or suprascapular adenopathy  Skin: Warm, dry, no rash on visualized skin surfaces  Musculoskeletal: Symmetric strength, no obvious gross abnormalities  Psychiatric: Alert and oriented ×3, normal affect      Laboratory  Results  I have personally reviewed CBC with WC 4, hemoglobin 13, plate 25.  CMP with creatinine 0.9, albumin 4.0.    Radiology  I have personally reviewed MRI of the pelvis demonstrates significant response of his rectal tumor, which is now centered above the peritoneal reflection and measures approximately3 cm in length.  It is approximately 12 cm from the anal verge.  Mesorectal lymph nodes are noted to be enlarged but significantly less so than on prior scan.    Endoscopy  No new endoscopic studies to review    I spent 45 minutes caring for Kevin on this date of service. This time includes time spent by me in the following activities:preparing for the visit, reviewing tests, performing a medically appropriate examination and/or evaluation , counseling and educating the patient/family/caregiver, ordering medications, tests, or procedures, referring and communicating with other health care professionals , documenting information in the medical record, and independently interpreting results and communicating that information with the patient/family/caregiver     Red Rai MD  Colorectal & General Surgery  [facility]    [address], [address]  [place], [place], [address]  P: [phone]  F: [phone]

## 2025-03-05 ENCOUNTER — TELEPHONE (OUTPATIENT)
Dept: NEUROLOGY | Facility: CLINIC | Age: 70
End: 2025-03-05
Payer: MEDICARE

## 2025-03-05 NOTE — TELEPHONE ENCOUNTER
Attempted to LVM in regard to provider being out of office. Informed on VM, PinBridgehart message, and mail reminder on the next new appointment set for patient. Patient is scheduled for March 27th at 1:00PM

## 2025-03-11 DIAGNOSIS — Z79.2 PROPHYLACTIC ANTIBIOTIC: Primary | ICD-10-CM

## 2025-03-11 RX ORDER — METRONIDAZOLE 500 MG/1
TABLET ORAL
Qty: 4 TABLET | Refills: 0 | Status: SHIPPED | OUTPATIENT
Start: 2025-03-11

## 2025-03-11 RX ORDER — NEOMYCIN SULFATE 500 MG/1
TABLET ORAL
Qty: 4 TABLET | Refills: 0 | Status: SHIPPED | OUTPATIENT
Start: 2025-03-11

## 2025-03-11 NOTE — TELEPHONE ENCOUNTER
Patient called stating that he went to [facility] pharmacy to  antibiotic prescriptions that he needs to take prior to colon surgery on 3/19 and they did not have them. I advised him that they were e-scribed on 2/27 and that if they are not picked up by a certain time, that they are put back on the self and the prescription is canceled.  New orders placed for Dr. Rai to co-sign

## 2025-03-13 ENCOUNTER — OFFICE VISIT (OUTPATIENT)
Dept: ONCOLOGY | Facility: CLINIC | Age: 70
End: 2025-03-13
Payer: MEDICARE

## 2025-03-13 ENCOUNTER — LAB (OUTPATIENT)
Dept: LAB | Facility: HOSPITAL | Age: 70
End: 2025-03-13
Payer: MEDICARE

## 2025-03-13 VITALS
HEIGHT: 65 IN | SYSTOLIC BLOOD PRESSURE: 154 MMHG | WEIGHT: 187.7 LBS | HEART RATE: 77 BPM | TEMPERATURE: 97.9 F | DIASTOLIC BLOOD PRESSURE: 79 MMHG | BODY MASS INDEX: 31.27 KG/M2 | RESPIRATION RATE: 16 BRPM | OXYGEN SATURATION: 95 %

## 2025-03-13 DIAGNOSIS — C20 RECTAL ADENOCARCINOMA: Primary | ICD-10-CM

## 2025-03-13 DIAGNOSIS — C20 RECTAL ADENOCARCINOMA: ICD-10-CM

## 2025-03-13 LAB
ALBUMIN SERPL-MCNC: 4.2 G/DL (ref 3.5–5.2)
ALBUMIN/GLOB SERPL: 1.4 G/DL
ALP SERPL-CCNC: 113 U/L (ref 39–117)
ALT SERPL W P-5'-P-CCNC: 21 U/L (ref 1–41)
ANION GAP SERPL CALCULATED.3IONS-SCNC: 12.6 MMOL/L (ref 5–15)
AST SERPL-CCNC: 28 U/L (ref 1–40)
BASOPHILS # BLD AUTO: 0.06 10*3/MM3 (ref 0–0.2)
BASOPHILS NFR BLD AUTO: 0.8 % (ref 0–1.5)
BILIRUB SERPL-MCNC: 0.5 MG/DL (ref 0–1.2)
BUN SERPL-MCNC: 13 MG/DL (ref 8–23)
BUN/CREAT SERPL: 14.9 (ref 7–25)
CALCIUM SPEC-SCNC: 10.2 MG/DL (ref 8.6–10.5)
CHLORIDE SERPL-SCNC: 102 MMOL/L (ref 98–107)
CO2 SERPL-SCNC: 27.4 MMOL/L (ref 22–29)
CREAT SERPL-MCNC: 0.87 MG/DL (ref 0.76–1.27)
DEPRECATED RDW RBC AUTO: 74.4 FL (ref 37–54)
EGFRCR SERPLBLD CKD-EPI 2021: 92.2 ML/MIN/1.73
EOSINOPHIL # BLD AUTO: 0.39 10*3/MM3 (ref 0–0.4)
EOSINOPHIL NFR BLD AUTO: 5 % (ref 0.3–6.2)
ERYTHROCYTE [DISTWIDTH] IN BLOOD BY AUTOMATED COUNT: 20.3 % (ref 12.3–15.4)
GLOBULIN UR ELPH-MCNC: 3 GM/DL
GLUCOSE SERPL-MCNC: 92 MG/DL (ref 65–99)
HCT VFR BLD AUTO: 40.6 % (ref 37.5–51)
HGB BLD-MCNC: 13.2 G/DL (ref 13–17.7)
IMM GRANULOCYTES # BLD AUTO: 0.03 10*3/MM3 (ref 0–0.05)
IMM GRANULOCYTES NFR BLD AUTO: 0.4 % (ref 0–0.5)
LYMPHOCYTES # BLD AUTO: 2.21 10*3/MM3 (ref 0.7–3.1)
LYMPHOCYTES NFR BLD AUTO: 28.6 % (ref 19.6–45.3)
MCH RBC QN AUTO: 31.9 PG (ref 26.6–33)
MCHC RBC AUTO-ENTMCNC: 32.5 G/DL (ref 31.5–35.7)
MCV RBC AUTO: 98.1 FL (ref 79–97)
MONOCYTES # BLD AUTO: 0.7 10*3/MM3 (ref 0.1–0.9)
MONOCYTES NFR BLD AUTO: 9.1 % (ref 5–12)
NEUTROPHILS NFR BLD AUTO: 4.34 10*3/MM3 (ref 1.7–7)
NEUTROPHILS NFR BLD AUTO: 56.1 % (ref 42.7–76)
NRBC BLD AUTO-RTO: 0 /100 WBC (ref 0–0.2)
PLATELET # BLD AUTO: 238 10*3/MM3 (ref 140–450)
PMV BLD AUTO: 9.7 FL (ref 6–12)
POTASSIUM SERPL-SCNC: 4.1 MMOL/L (ref 3.5–5.2)
PROT SERPL-MCNC: 7.2 G/DL (ref 6–8.5)
RBC # BLD AUTO: 4.14 10*6/MM3 (ref 4.14–5.8)
SODIUM SERPL-SCNC: 142 MMOL/L (ref 136–145)
WBC NRBC COR # BLD AUTO: 7.73 10*3/MM3 (ref 3.4–10.8)

## 2025-03-13 PROCEDURE — 85025 COMPLETE CBC W/AUTO DIFF WBC: CPT

## 2025-03-13 PROCEDURE — 80053 COMPREHEN METABOLIC PANEL: CPT

## 2025-03-13 PROCEDURE — 36415 COLL VENOUS BLD VENIPUNCTURE: CPT

## 2025-03-13 NOTE — PROGRESS NOTES
REASON FOR FOLLOW UP:  stage III mid to upper well-differentiated rectal adenocarcinoma (cT3, cN2 CM0.).    History of Present Illness   He returns today 3/13/2025 for follow up. He is scheduled for surgery with Dr. Rai on 3/19/2025. He is doing well without new concerns today. He notes his back pain significantly improved/resolved after the epidural. He continues to follow with pain management.     ONCOLOGY HISTORY:  The patient is a 69-year-old male who is referred for recently diagnosed stage III mid to upper well-differentiated rectal adenocarcinoma (cT3, cN2 CM0.).    Patient undergone a screening colonoscopy 10/18/2024 demonstrating a circumferential mass involving the rectum extending from 10 cm to 12 cm with biopsy of 15 cm consistent with invasive well-differentiated adenocarcinoma with ulceration necroinflammatory debris.    This was felt to be microsatellite stable by IHC as well as including MSI by PCR.    If follow-up with infectious disease 11/1/2024 there being concern about resuming hypersexual activity and that he start preexposure prophylaxis.  He last been seen July 2024 on Descovy still in relationship with his partner and currently monogamous with been having bowel changes underwent colonoscopy with the above diagnosis.  As result of his need for therapy Descovy was discontinued and the issue to be reevaluated once he was successfully treated.    MRI of the pelvis performed 11/4/2024 demonstrates a high rectal mass extending to the proximal sigmoid colon representing a T3d N2 rectal tumor, side effect invasion on the superior aspect of the mass approximates between the mesorectum and peritoneal cavity?    CT chest 11/8 demonstrates pleural plaques along the peripheral aspect of both the right lower lobes.  These are of uncertain significance.    The patient was next seen 11/14/2024 by colorectal surgery without evidence of obstruction or bleeding.  He was thought a excellent candidate for  neoadjuvant chemotherapy followed by mesorectal excision and adjuvant chemotherapy-comparable to the prospect trial.  There was concern about the location of the tumor extending down to the rectum and the avoidance of radiation therapy since the tumor appeared to be resectable.  Was the role of laparoscopic low anterior resection total mesorectal excision and creation of a diverting loop ileostomy temporarily discussed.    As resulted above the patient is now referred to discuss this above approach.  He is seen with his significant other and we have discussed his findings in great detail from initial diagnosis and and subsequent surgical assessment leading to the recommendation of neoadjuvant chemotherapy given in the fashion of the PROSPECT Trial which was designed to determine whether neoadjuvant chemotherapy could be used as an alternative to neoadjuvant chemoRT.  This study demonstrated that similar disease-free survival and overall survival was similar to neoadjuvant CRT alone for eligible patients.  This would include the use of 6 cycles of FOLFOX chemotherapy followed by reassessment and if a clinical response and 20% or more was seen then RT would be admitted, proceeding to surgical resection and subsequent adjuvant chemotherapy.    He returns 12/2/2024, for Cycle 1 Day 1 FOLFOX. He does not have any new concerns today. He remains fatigued and experiences intermittent lightheadedness. He denies shortness of breath. He denies abdominal pain. His stools are black, but he denies rome blood. Despite starting oral iron, his hemoglobin has decreased further. He is not tolerating the oral iron at this time. Patient was started on Feraheme.    Patient returns on 12/16/2024 for cycle 2 FOLFOX.  He reports he is tolerated treatment well thus far and denying any nausea, vomiting, changes to his bowels.  He did have blood in his stool following cycle 1 1 time.  That has not reoccurred.  He did start Feraheme the day of  disconnect with cycle 1 and has had improvement in his hemoglobin.  He will be due for his second dose of Feraheme today.  He denies increased neuropathy.    The patient is next evaluated 12/30/2024 for his third cycle of FOLFOX.  He has undergone Feraheme given 12/4/2024 and 12/16/2024.  He is feeling reasonably good without neuropathic symptoms.  We have discussed his scheduling and timing as he proceeds through his treatment including subsequent repeat MR pelvis after his 6 cycle of FOLFOX.    He returns 1/13/2025 for follow up and treatment.  He has been positive for COVID since his last office visit and called in at which time we did start him on paxlovid.  He reports on starting the Paxlovid he was much improved.  He does have a scant cough at times with no lingering shortness of breath.  Report neuropathy lasting approximately 3 days following last treatment that was not always associated with cold intolerance.  This is now resolved and had involved his hands primarily.  We went on to proceed with cycle 4 FOLFOX, obtained ionized calcium, PTH, vitamin D and PTH related peptide testing.  The studies include a PTH of 25, ionized calcium of 1.41, PTH related peptide less than 2.0, vitamin D level 67.1.    He is next seen 1/27/2025 for cycle #5 FOLFOX out of 6 planned prior to repeat MRI.  He is doing well with treatment with minimal neuropathy which recovers quickly, no additional fatigue and is without prolonged cytopenias.  He is trying to plan a wedding in and around his surgery and may need dental extractions soon.  He is also having back pain after recent exercising and is pursuing an epidural.    The patient is seen 2/26/2025 improved from previous and is status post MRI of the pelvis 2/18/2025 demonstrating a response to therapy improved from previous from 11/4/2024 with a radiologic estimate of T3c compared to T3 DM2.  In review of this study enlarging tumor signal extends superiorly from the mass in  close approximation between the mesorectum and peritoneal cavity and the previously seen suspicious lymph nodes appear to have improved from previous.    Past Surgical History:   Procedure Laterality Date    ANGIOPLASTY CAROTID ARTERY      CAROTID ENDARTERECTOMY      COLONOSCOPY N/A 10/18/2024    Procedure: COLONOSCOPY TO CECUM/TI WITH BIOPSIES;  Surgeon: Marcus Christine Jr., MD;  Location: [facility] ENDOSCOPY;  Service: General;  Laterality: N/A;  PRE-SCREENING, FAMILY HX COLON CANCER  POST-DIVERTICULOSIS, RECTAL MASS    FOOT SURGERY      LUMBAR EPIDURAL INJECTION N/A 02/14/2025    Procedure: L4/L5 LUMBAR EPIDURAL STEROID INJECTION CPT: 35761;  Surgeon: Vandana Ordonez MD;  Location: [facility] MAIN OR;  Service: Pain Management;  Laterality: N/A;    TOOTH EXTRACTION  2025    VENOUS ACCESS DEVICE (PORT) INSERTION N/A 11/22/2024    Procedure: INSERTION VENOUS ACCESS DEVICE;  Surgeon: Naeem Rai MD;  Location: [facility] MAIN OR;  Service: General;  Laterality: N/A;        Current Outpatient Medications on File Prior to Visit   Medication Sig Dispense Refill    amoxicillin (AMOXIL) 500 MG capsule Take 1 capsule by mouth 3 (Three) Times a Day.      aspirin 81 MG chewable tablet Chew 1 tablet Daily.      atorvastatin (LIPITOR) 80 MG tablet Take 1 tablet by mouth Daily. 30 tablet 3    CBD (cannabidiol) oral oil Take 1 drop by mouth 2 (Two) Times a Day. Half a dropper twice daily (Patient not taking: Reported on 2/27/2025)      Cyanocobalamin (VITAMIN B 12 PO) Take 1 tablet by mouth Daily.      DULoxetine (Cymbalta) 60 MG capsule Take 1 capsule by mouth Daily for 180 days. (Patient taking differently: Take 1 capsule by mouth Every Night.) 90 capsule 1    ferrous sulfate 325 (65 Fe) MG tablet Take 65 mg by mouth Daily With Breakfast.      gabapentin (NEURONTIN) 300 MG capsule Take 2 capsules by mouth 3 (Three) Times a Day. 540 capsule 1    hydrOXYzine (ATARAX) 10 MG tablet TAKE 1 TO 2 TABLETS BY MOUTH EVERY NIGHT AT  BEDTIME (Patient not taking: Reported on 2/27/2025) 60 tablet 1    losartan (COZAAR) 100 MG tablet Take 1 tablet by mouth Daily. 90 tablet 1    metFORMIN (GLUCOPHAGE) 1000 MG tablet Take 1 tablet by mouth 2 (Two) Times a Day With Meals. 90 tablet 2    metoprolol tartrate (LOPRESSOR) 25 MG tablet Take 1 tablet by mouth Daily. 90 tablet 0    metroNIDAZOLE (FLAGYL) 500 MG tablet Take two (2) tablets at 7 PM and two (2) tablets at 11 PM the night prior to surgery. 4 tablet 0    neomycin (MYCIFRADIN) 500 MG tablet Take two (2) tablets at 7 PM and two (2) tablets at 11 PM the night prior to surgery. 4 tablet 0    ondansetron (ZOFRAN) 8 MG tablet Take 1 tablet by mouth 3 (Three) Times a Day As Needed for Nausea or Vomiting. (Patient not taking: Reported on 2/27/2025) 30 tablet 5    tadalafil (CIALIS) 5 MG tablet Take 1 tablet by mouth Daily As Needed for Erectile Dysfunction.      tamsulosin (FLOMAX) 0.4 MG capsule 24 hr capsule Take 1 capsule by mouth every night at bedtime. (Patient not taking: Reported on 2/27/2025)      vitamin D (ERGOCALCIFEROL) 1.25 MG (56117 UT) capsule capsule TAKE 1 CAPSULE BY MOUTH 1 TIME EVERY WEEK 12 capsule 0     No current facility-administered medications on file prior to visit.        ALLERGIES:  No Known Allergies     Social History     Socioeconomic History    Marital status: Significant Other   Tobacco Use    Smoking status: Former     Current packs/day: 3.00     Average packs/day: 3.0 packs/day for 30.0 years (90.0 ttl pk-yrs)     Types: Cigarettes    Smokeless tobacco: Never   Vaping Use    Vaping status: Former    Substances: CBD    Devices: Disposable   Substance and Sexual Activity    Alcohol use: No    Drug use: Yes    Sexual activity: Defer        Family History   Problem Relation Age of Onset    Bone cancer Mother     COPD Father     Hypertension Father     Stroke Father         TIA    Bone cancer Father         smoker    Lung cancer Father     Diabetes Father     No Known  "Problems Sister     No Known Problems Brother     No Known Problems Maternal Grandmother     No Known Problems Maternal Grandfather     No Known Problems Paternal Grandmother     Colon cancer Paternal Grandfather     Malig Hyperthermia Neg Hx           Objective     Vitals:    03/13/25 1542   BP: 154/79   Pulse: 77   Resp: 16   Temp: 97.9 °F (36.6 °C)   TempSrc: Oral   SpO2: 95%   Weight: 85.1 kg (187 lb 11.2 oz)   Height: 165.1 cm (65\")   PainSc: 0-No pain         3/13/2025     3:41 PM   Current Status   ECOG score 0       Physical Exam  Constitutional:       Appearance: Normal appearance.   Cardiovascular:      Rate and Rhythm: Normal rate.      Heart sounds: Normal heart sounds.   Pulmonary:      Effort: Pulmonary effort is normal.      Breath sounds: Normal breath sounds.   Abdominal:      Palpations: Abdomen is soft.   Skin:     General: Skin is warm and dry.   Neurological:      Mental Status: He is oriented to person, place, and time.         RECENT LABS:  Results from last 7 days   Lab Units 03/13/25  1506   WBC 10*3/mm3 7.73   NEUTROS ABS 10*3/mm3 4.34   HEMOGLOBIN g/dL 13.2   HEMATOCRIT % 40.6   PLATELETS 10*3/mm3 238     Results from last 7 days   Lab Units 03/13/25  1506   SODIUM mmol/L 142   POTASSIUM mmol/L 4.1   CHLORIDE mmol/L 102   CO2 mmol/L 27.4   BUN mg/dL 13   CREATININE mg/dL 0.87   CALCIUM mg/dL 10.2   ALBUMIN g/dL 4.2   BILIRUBIN mg/dL 0.5   ALK PHOS U/L 113   ALT (SGPT) U/L 21   AST (SGOT) U/L 28   GLUCOSE mg/dL 92         Assessment & Plan   *Stage III mid to upper well differentiated rectal adenocarcinoma (cT3, cN2, cM0)     69-year-old male with a history of of diabetes, CHF GE reflux hyperlipidemia, hypertension, TIA, possible CVA as well as a history of prostate cancer status post XRT.  He had been recently having a change in bowel habit ultimately leading to additional assessment.  This led to a screening colonoscopy 10/18/2024demonstrating a circumferential mass involving the rectum " extending from 10 cm to 12 cm with biopsy of 15 cm consistent with invasive well-differentiated adenocarcinoma with ulceration necroinflammatory debris. This was felt to be microsatellite stable by IHC as well as including MSI by PCR.  MRI of the pelvis performed 11/4/2024 demonstrates a high rectal mass extending to the proximal sigmoid colon representing a T3d N2 rectal tumor, side effect invasion on the superior aspect of the mass approximates between the mesorectum and peritoneal cavity?  Additional staging 11/8/2024 includes a CT of the chest demonstrating small pleural plaques along the posterior aspect of both the right lower lobes of uncertain significance.  There are no other substantial findings though we have discussed this as leading to a PET/CT to complete his staging.  11/14/2024 by colorectal surgery, Dr. Naeem Rai, without evidence of obstruction or bleeding.  He was thought a excellent candidate for neoadjuvant chemotherapy followed by mesorectal excision and adjuvant chemotherapy-comparable to the PROSPECT trial.  There was concern about the location of the tumor extending down to the rectum and the avoidance of radiation therapy since the tumor appeared to be resectable.  There was also the concern of his previous history of radiation therapy.  Further discussed was the role of laparoscopic low anterior resection, total mesorectal excision, and creation of a diverting loop ileostomy temporarily utilized also discussed.  11/20/2024 and we have reviewed the PROSPECT Trial which was designed to determine whether neoadjuvant chemotherapy could be used as an alternative to neoadjuvant chemoRT.  This study demonstrated that similar disease-free survival and overall survival was similar to neoadjuvant CRT alone for eligible patients.  This would include the use of 6 cycles of FOLFOX chemotherapy followed by reassessment and if a clinical response and 20% or more was seen then RT would be admitted,  proceeding to surgical resection and subsequent adjuvant chemotherapy.  After discussion the patient agrees to proceed.  12/2/2024: Proceed with C1D1 FOLFOX. Hemoglobin has declined to 9.9 today secondary to malignancy- ongoing bleeding and worsening iron deficiency. He has been taking oral iron, however, is not tolerating this well, therefore will plan for IV iron pending insurance approval.   12/16/2024: Proceed with cycle 2-day 1 FOLFOX today.  Patient is tolerating well.  The patient is next evaluated 12/30/2024 for his third cycle of FOLFOX.  He has undergone Feraheme given 12/4/2024 and 12/16/2024.  He is feeling reasonably good without neuropathic symptoms.  We have discussed his scheduling and timing as he proceeds through his treatment including subsequent repeat MR pelvis after his 6 cycle of FOLFOX.  1/13/2025 Returns for cycle 4 FOLFOX today and was COVID positive 1/6/2025 and treated with Paxlovid and thereafter improved.  He has a scant cough remaining otherwise clear lung sounds.  Will proceed with treatment today.  Patient seen 1/27/2025 recovered from COVID, generally improved performance status and plans for cycle 5 FOLFOX at a 6 planned.  2/10/205 proceed with cycle 6 FOLFOX.  Following 6 cycle of neoadjuvant therapy will proceed with MRI for surgical planning.  The patient was referred today to Dr. Rai  Subsequent repeat repeat MRI of the pelvis 2/18/2025 demonstrating a response to therapy improved from previous from 11/4/2024 with a radiologic estimate of T3c compared to T3 DM2.  In review of this study enlarging tumor signal extends superiorly from the mass in close approximation between the mesorectum and peritoneal cavity and the previously seen suspicious lymph nodes appear to have improved from previous.  Surgical evaluation anticipated with Dr. Rai 2/27/2025  3/13/2025: He is scheduled for surgery with Dr. Rai on 3/19/2025    *Anemia   12/2/2024: Hemoglobin 9.9 today. Patient  is not tolerating oral iron, therefore, will plan to proceed with IV iron.    12/16/2024 hemoglobin has improved today to 10.8.  Proceeded with the second dose of Feraheme  Hemoglobin today 13.2    *COVID positive 1/6/2025 trreated with paxlovid  Resolved symptoms 1/27/2025    *Hypercalcemia  1/13/2025 calcium 11 with normal albumin.  Discussed with Dr. Nagel and additional labs added including ionized calcium, PTH, PTH related peptide and vitamin D level as he has been on high-dose vitamin D.  Asked patient to hold vitamin D supplement until additional labs have resulted.  Will also recheck CMP on Wednesday at [facility].  Subsequent ionized calcium, PTH, vitamin D and PTH related peptide testing.  The studies include a PTH of 25, ionized calcium of 1.41, PTH related peptide less than 2.0, vitamin D level 67.1.  Repeat calcium 1/20/2025 at 9.1  2/10/2025 calcium normal at 9.2    Plan:   He is scheduled for laparoscopic, possible open, low anterior resection with ostomy creation, either diverting loop ileostomy or permanent end colostomy with Dr. Rai on 3/19/2025  Return to clinic on 4/14/2025 for MD visit, adjuvant FOLFOX, cbc and cmp.   Instructed to reach out sooner with any concerns.     Kemi Chiang, BOBBY  03/13/2025

## 2025-03-14 ENCOUNTER — OFFICE VISIT (OUTPATIENT)
Dept: PAIN MEDICINE | Facility: CLINIC | Age: 70
End: 2025-03-14
Payer: MEDICARE

## 2025-03-14 VITALS
BODY MASS INDEX: 31.32 KG/M2 | HEART RATE: 90 BPM | OXYGEN SATURATION: 95 % | SYSTOLIC BLOOD PRESSURE: 147 MMHG | WEIGHT: 188 LBS | DIASTOLIC BLOOD PRESSURE: 81 MMHG | HEIGHT: 65 IN | RESPIRATION RATE: 18 BRPM | TEMPERATURE: 97.8 F

## 2025-03-14 DIAGNOSIS — M54.16 LUMBAR RADICULOPATHY: ICD-10-CM

## 2025-03-14 DIAGNOSIS — M48.02 CERVICAL SPINAL STENOSIS: ICD-10-CM

## 2025-03-14 DIAGNOSIS — M48.061 SPINAL STENOSIS, LUMBAR REGION, WITHOUT NEUROGENIC CLAUDICATION: Primary | ICD-10-CM

## 2025-03-14 PROCEDURE — 3079F DIAST BP 80-89 MM HG: CPT

## 2025-03-14 PROCEDURE — 99213 OFFICE O/P EST LOW 20 MIN: CPT

## 2025-03-14 PROCEDURE — 1159F MED LIST DOCD IN RCRD: CPT

## 2025-03-14 PROCEDURE — 1160F RVW MEDS BY RX/DR IN RCRD: CPT

## 2025-03-14 PROCEDURE — 1126F AMNT PAIN NOTED NONE PRSNT: CPT

## 2025-03-14 PROCEDURE — 3077F SYST BP >= 140 MM HG: CPT

## 2025-03-14 NOTE — PROGRESS NOTES
CHIEF COMPLAINT  Back pain     Subjective   Kevin Garsia is a 69 y.o. male  who presents to the office for follow-up of procedure.  He completed a L4/L5 LESI on 2/14/2025 performed by Dr. Ordonez for management of back pain. Patient reports over 90% ongoing relief from the procedure. He reports that he has been able to be more active and rest better since the procedure.     Today pain is 0/10VAS in severity (severity in pain varies based on activity level). When pain is present, it is located in his low back pain and intermittently radiates down right lateral/anterior thigh terminating at the knee (radicular pain has greatly improved since his procedure). He notes numbness/tingling to right anterior thigh. Describes this pain as a nearly continuous ache. Pain is worsened by prolonged standing, walking, and bending. Pain improves with rest/reposition, heat,and medications. He has completed PT in the past with some improvement.      Continues with Gabapentin 600mg TID and Cymbalta 60mg daily (managed by BOBBY Portillo). He also utilizes OTC Tylenol PRN.     Patient was recently diagnosed with stage III rectal adenocarcinoma.  He has been followed by Dr. Kevin Nagel with Oncology. He has completed chemotherapy and is scheduled to have his tumor removed on 3/19/25. He states he will need to completed 1 round of chemotherapy following his surgery. He states he is doing well overall.      7/16/24 - A1C 6.7     Procedures:  2/14/25 - L4/L5 LESI - 90% ongoing relief     Back Pain  This is a chronic problem. The current episode started more than 1 year ago. The problem occurs constantly. The problem has been improved since onset. The pain is present in the lumbar spine. The quality of the pain is described as aching. The pain radiates to the right thigh (right lateral/anterior thigh). The pain is at a severity of 0/10. The pain is mild. The symptoms are aggravated by position, standing, bending and twisting.  Associated symptoms include abdominal pain, numbness (bilateral hands) and weakness (bilateral hands). Pertinent negatives include no chest pain, dysuria, fever or headaches. He has tried heat (Gabapentin, Cymbalta, Tylenol, PT) for the symptoms. The treatment provided mild relief.   Neck Pain   This is a chronic problem. The current episode started more than 1 year ago. The problem occurs intermittently. The problem has been waxing and waning. The pain is associated with an unknown factor. The pain is present in the left side and midline (pain intermittently radiates down left arm). The quality of the pain is described as aching. The pain is at a severity of 0/10. The symptoms are aggravated by twisting and position. Associated symptoms include numbness (bilateral hands) and weakness (bilateral hands). Pertinent negatives include no chest pain, fever or headaches. He has tried heat and acetaminophen (Gabapentin, Cymbalta) for the symptoms. The treatment provided mild relief.     PEG Assessment   What number best describes your pain on average in the past week?0  What number best describes how, during the past week, pain has interfered with your enjoyment of life?0  What number best describes how, during the past week, pain has interfered with your general activity?  0    Review of Pertinent Medical Data ---  Reviewed office note from BOBBY Samuels with oncology from 3/13/2023.  Patient presents for follow-up.  She notes patient continues to follow with pain management but that his back pain has improved significantly since an epidural.  She notes that his repeat MRI of the pelvis from 2/18/2025 notes that the suspicious lymph node appears to have improved from his previous imaging.  He is scheduled for laparoscopic low anterior resection with ostomy creation with either loop ileostomy or permanent end colostomy Dr. Rai on 3/19/2025.              The following portions of the patient's history were reviewed  "and updated as appropriate: allergies, current medications, past family history, past medical history, past social history, past surgical history, and problem list.    Review of Systems   Constitutional:  Negative for fever.   Cardiovascular:  Negative for chest pain.   Gastrointestinal:  Positive for abdominal pain, constipation and diarrhea.   Genitourinary:  Negative for difficulty urinating and dysuria.   Musculoskeletal:  Negative for back pain.   Neurological:  Positive for weakness (bilateral hands) and numbness (bilateral hands). Negative for headaches.   Psychiatric/Behavioral:  Negative for sleep disturbance and suicidal ideas. The patient is not nervous/anxious.      I have reviewed and confirmed the accuracy of the ROS as documented by the MA/LPN/RN BOBBY Decker     Vitals:    03/14/25 1251   BP: 147/81   Pulse: 90   Resp: 18   Temp: 97.8 °F (36.6 °C)   SpO2: 95%   Weight: 85.3 kg (188 lb)   Height: 165.1 cm (65\")     Objective   Physical Exam  Constitutional:       Appearance: Normal appearance.   HENT:      Head: Normocephalic.   Cardiovascular:      Rate and Rhythm: Normal rate and regular rhythm.   Pulmonary:      Effort: Pulmonary effort is normal.      Breath sounds: Normal breath sounds.   Musculoskeletal:      Cervical back: Tenderness and bony tenderness present. Decreased range of motion.      Lumbar back: Tenderness and bony tenderness present. Decreased range of motion.   Skin:     General: Skin is warm and dry.      Capillary Refill: Capillary refill takes less than 2 seconds.   Neurological:      General: No focal deficit present.      Mental Status: He is alert and oriented to person, place, and time.      Motor: Weakness present.   Psychiatric:         Mood and Affect: Mood normal.         Behavior: Behavior normal.         Thought Content: Thought content normal.         Cognition and Memory: Cognition normal.       Assessment & Plan   Diagnoses and all orders for this " visit:    1. Spinal stenosis, lumbar region, without neurogenic claudication (Primary)    2. Lumbar radiculopathy    3. Cervical spinal stenosis      Kevin Garsia reports a pain score of .  Given his pain assessment as noted, treatment options were discussed and the following options were decided upon as a follow-up plan to address the patient's pain: continuation of current treatment plan for pain.    --- Follow-up as needed for worsening pain and/or to repeat BENNETT HARRISON REPORT  As the clinician, I personally reviewed the CHRISTY from 3/14/25 while the patient was in the office today.    Dictated utilizing Dragon dictation.

## 2025-03-17 ENCOUNTER — PRE-ADMISSION TESTING (OUTPATIENT)
Dept: PREADMISSION TESTING | Facility: HOSPITAL | Age: 70
DRG: 331 | End: 2025-03-17
Payer: MEDICARE

## 2025-03-17 VITALS
WEIGHT: 188.4 LBS | SYSTOLIC BLOOD PRESSURE: 119 MMHG | BODY MASS INDEX: 31.39 KG/M2 | OXYGEN SATURATION: 95 % | RESPIRATION RATE: 16 BRPM | HEIGHT: 65 IN | TEMPERATURE: 98.1 F | DIASTOLIC BLOOD PRESSURE: 78 MMHG | HEART RATE: 79 BPM

## 2025-03-17 DIAGNOSIS — C20 RECTAL ADENOCARCINOMA: ICD-10-CM

## 2025-03-17 LAB
ABO GROUP BLD: NORMAL
BLD GP AB SCN SERPL QL: NEGATIVE
RH BLD: POSITIVE
T&S EXPIRATION DATE: NORMAL

## 2025-03-17 PROCEDURE — 36415 COLL VENOUS BLD VENIPUNCTURE: CPT

## 2025-03-17 PROCEDURE — 86850 RBC ANTIBODY SCREEN: CPT

## 2025-03-17 PROCEDURE — 86901 BLOOD TYPING SEROLOGIC RH(D): CPT

## 2025-03-17 PROCEDURE — 86900 BLOOD TYPING SEROLOGIC ABO: CPT

## 2025-03-17 NOTE — DISCHARGE INSTRUCTIONS
CHLORHEXIDINE CLOTH INSTRUCTIONS  The morning of surgery follow these instructions using the Chlorhexidine cloths you've been given.  These steps reduce bacteria on the body.  Do not use the cloths near your eyes, ears mouth, genitalia or on open wounds.  Throw the cloths away after use but do not try to flush them down a toilet.      Open and remove one cloth at a time from the package.    Leave the cloth unfolded and begin the bathing.  Massage the skin with the cloths using gentle pressure to remove bacteria.  Do not scrub harshly.   Follow the steps below with one 2% CHG cloth per area (6 total cloths).  One cloth for neck, shoulders and chest.  One cloth for both arms, hands, fingers and underarms (do underarms last).  One cloth for the abdomen followed by groin.  One cloth for right leg and foot including between the toes.  One cloth for left leg and foot including between the toes.  The last cloth is to be used for the back of the neck, back and buttocks.    Allow the CHG to air dry 3 minutes on the skin which will give it time to work and decrease the chance of irritation.  The skin may feel sticky until it is dry.  Do not rinse with water or any other liquid or you will lose the beneficial effects of the CHG.  If mild skin irritation occurs, do rinse the skin to remove the CHG.  Report this to the nurse at time of admission.  Do not apply lotions, creams, ointments, deodorants or perfumes after using the clothes. Dress in clean clothes before coming to the hospital.    Take the following medications the morning of surgery:  GABAPENTIN AND METOPROLOL      If you are on prescription narcotic pain medication to control your pain you may also take that medication the morning of surgery.    General Instructions:    Follow your surgeons instructions regarding when to stop solid foods and when to stop liquids.   Verify with your surgeon if you are to complete a bowel prep and when to do so.  Patients who avoid  smoking, chewing tobacco and alcohol for 4 weeks prior to surgery have a reduced risk of post-operative complications.  Quit smoking as many days before surgery as you can.  Do not smoke, use chewing tobacco or drink alcohol the day of surgery.   If applicable bring your C-PAP/ BI-PAP machine in with you to preop day of surgery.  Bring any papers given to you in the doctor’s office.  Wear clean comfortable clothes.  Do not wear contact lenses, false eyelashes or make-up.  Bring a case for your glasses.   Bring crutches or walker if applicable.  Remove all piercings.  Leave jewelry and any other valuables at home.  Hair extensions with metal clips must be removed prior to surgery.  The Pre-Admission Testing nurse will instruct you to bring medications if unable to obtain an accurate list in Pre-Admission Testing.    Day of surgery you will need to let the preoperative nurse know the last time you took each of your medications.  To ensure a safe environment for patients and staff, we kindly ask that children under the age of 16 not accompany patients.  If you must bring a dependent child or dependent adult please ensure a responsible adult, other than yourself, is present to supervise them.        If you were given a blood bank ID arm band remember to bring it with you the day of surgery.    Preventing a Surgical Site Infection:  For 2 to 3 days before surgery, avoid shaving with a razor because the razor can irritate skin and make it easier to develop an infection.    Any areas of open skin can increase the risk of a post-operative wound infection by allowing bacteria to enter and travel throughout the body.  Notify your surgeon if you have any skin wounds / rashes even if it is not near the expected surgical site.  The area will need assessed to determine if surgery should be delayed until it is healed.  The night prior to surgery shower using a fresh bar of anti-bacterial soap (such as Dial) and clean washcloth.   Sleep in a clean bed with clean clothing.  Do not allow pets to sleep with you.  Shower on the morning of surgery using a fresh bar of anti-bacterial soap (such as Dial) and clean washcloth.  Dry with a clean towel and dress in clean clothing.  Ask your surgeon if you will be receiving antibiotics prior to surgery.  Make sure you, your family, and all healthcare providers clean their hands with soap and water or an alcohol based hand  before caring for you or your wound.    Day of surgery:  Your arrival time is approximately two hours before your scheduled surgery time.  Upon arrival, a Pre-op nurse and Anesthesiologist will review your health history, obtain vital signs, and answer questions you may have.  The only belongings needed at this time will be a list of your home medications and if applicable your C-PAP/BI-PAP machine.  A Pre-op nurse will start an IV and you may receive medication in preparation for surgery, including something to help you relax.     Please be aware that surgery does come with discomfort.  We want to make every effort to control your discomfort so please discuss any uncontrolled symptoms with your nurse.   Your doctor will most likely have prescribed pain medications.      If you are going home after surgery you will receive individualized written care instructions before being discharged.  A responsible adult must drive you to and from the hospital on the day of your surgery and stay with you for 24 hours.  Discharge prescriptions can be filled by the hospital pharmacy during regular pharmacy hours.  If you are having surgery late in the day/evening your prescription may be e-prescribed to your pharmacy.  Please verify your pharmacy hours or chose a 24 hour pharmacy to avoid not having access to your prescription because your pharmacy has closed for the day.    If you are staying overnight following surgery, you will be transported to your hospital room following the recovery  period.  [facility] has all private rooms.    If you have any questions please call Pre-Admission Testing at [phone].  Deductibles and co-payments are collected on the day of service. Please be prepared to pay the required co-pay, deductible or deposit on the day of service as defined by your plan.    Call your surgeon immediately if you experience any of the following symptoms:  Sore Throat  Shortness of Breath or difficulty breathing  Cough  Chills  Body soreness or muscle pain  Headache  Fever  New loss of taste or smell  Do not arrive for your surgery ill.  Your procedure will need to be rescheduled to another time.  You will need to call your physician before the day of surgery to avoid any unnecessary exposure to hospital staff as well as other patients.

## 2025-03-17 NOTE — NURSING NOTE
03/17/25 1344   Stoma Site Marking   Procedure Marking For colostomy;ileostomy   Site Marked RUQ: right upper quadrant;LUQ: left upper quadrant   Patient Assessment Screen rectus muscle identified;marked within patient's visual field;bony prominences avoided;waistband avoided;creases/scars avoided   How Was Patient Marked? surgical marker;transparent dressing   Stoma Marking Comments Stoma Marking, OR 3/19/24 Marked patient right and left abd quad. Ostomy web site information given to patient for resources. Discussed ostomy nurse will follow for teaching   Patient Position During Marking sitting;standing     Stoma Marking OR right and left quad. Ostomy educational website given to patient as resource.  Discussed ostomy nurse will follow after OR for teaching.

## 2025-03-19 ENCOUNTER — ANESTHESIA (OUTPATIENT)
Dept: PERIOP | Facility: HOSPITAL | Age: 70
End: 2025-03-19
Payer: MEDICARE

## 2025-03-19 ENCOUNTER — ANESTHESIA EVENT (OUTPATIENT)
Dept: PERIOP | Facility: HOSPITAL | Age: 70
End: 2025-03-19
Payer: MEDICARE

## 2025-03-19 ENCOUNTER — HOSPITAL ENCOUNTER (INPATIENT)
Facility: HOSPITAL | Age: 70
LOS: 5 days | Discharge: HOME | DRG: 331 | End: 2025-03-24
Attending: SURGERY | Admitting: SURGERY
Payer: MEDICARE

## 2025-03-19 DIAGNOSIS — C20 RECTAL ADENOCARCINOMA: ICD-10-CM

## 2025-03-19 LAB
GLUCOSE BLDC GLUCOMTR-MCNC: 159 MG/DL (ref 70–130)
GLUCOSE BLDC GLUCOMTR-MCNC: 99 MG/DL (ref 70–130)

## 2025-03-19 PROCEDURE — 82948 REAGENT STRIP/BLOOD GLUCOSE: CPT

## 2025-03-19 PROCEDURE — 88304 TISSUE EXAM BY PATHOLOGIST: CPT | Performed by: SURGERY

## 2025-03-19 PROCEDURE — 88309 TISSUE EXAM BY PATHOLOGIST: CPT | Performed by: SURGERY

## 2025-03-19 PROCEDURE — 25810000003 LACTATED RINGERS PER 1000 ML: Performed by: ANESTHESIOLOGY

## 2025-03-19 PROCEDURE — 0DTJ0ZZ RESECTION OF APPENDIX, OPEN APPROACH: ICD-10-PCS | Performed by: SURGERY

## 2025-03-19 PROCEDURE — 25010000002 ONDANSETRON PER 1 MG

## 2025-03-19 PROCEDURE — 25010000002 CEFOXITIN PER 1 G: Performed by: SURGERY

## 2025-03-19 PROCEDURE — 25010000002 SUGAMMADEX 200 MG/2ML SOLUTION

## 2025-03-19 PROCEDURE — 25010000002 HYDROMORPHONE 1 MG/ML SOLUTION

## 2025-03-19 PROCEDURE — 25010000002 BUPIVACAINE (PF) 0.25 % SOLUTION: Performed by: STUDENT IN AN ORGANIZED HEALTH CARE EDUCATION/TRAINING PROGRAM

## 2025-03-19 PROCEDURE — 25010000002 FENTANYL CITRATE (PF) 50 MCG/ML SOLUTION

## 2025-03-19 PROCEDURE — 0D1B0Z4 BYPASS ILEUM TO CUTANEOUS, OPEN APPROACH: ICD-10-PCS | Performed by: SURGERY

## 2025-03-19 PROCEDURE — 25010000002 PROPOFOL 10 MG/ML EMULSION

## 2025-03-19 PROCEDURE — 25010000002 BUPIVACAINE LIPOSOME 1.3 % SUSPENSION: Performed by: STUDENT IN AN ORGANIZED HEALTH CARE EDUCATION/TRAINING PROGRAM

## 2025-03-19 PROCEDURE — 88305 TISSUE EXAM BY PATHOLOGIST: CPT | Performed by: SURGERY

## 2025-03-19 PROCEDURE — 25010000002 DEXAMETHASONE SODIUM PHOSPHATE 20 MG/5ML SOLUTION

## 2025-03-19 PROCEDURE — 25010000002 HYDROMORPHONE PER 4 MG

## 2025-03-19 PROCEDURE — 25810000003 SODIUM CHLORIDE PER 500 ML: Performed by: SURGERY

## 2025-03-19 PROCEDURE — 44955 APPENDECTOMY ADD-ON: CPT | Performed by: SURGERY

## 2025-03-19 PROCEDURE — 44310 ILEOSTOMY/JEJUNOSTOMY: CPT | Performed by: SURGERY

## 2025-03-19 PROCEDURE — 0DBP0ZZ EXCISION OF RECTUM, OPEN APPROACH: ICD-10-PCS | Performed by: SURGERY

## 2025-03-19 PROCEDURE — 25010000002 HYDROMORPHONE PER 4 MG: Performed by: SURGERY

## 2025-03-19 PROCEDURE — 44145 PARTIAL REMOVAL OF COLON: CPT | Performed by: SURGERY

## 2025-03-19 PROCEDURE — 88342 IMHCHEM/IMCYTCHM 1ST ANTB: CPT | Performed by: SURGERY

## 2025-03-19 PROCEDURE — 25010000002 CEFOXITIN PER 1 G

## 2025-03-19 PROCEDURE — 0DBN0ZZ EXCISION OF SIGMOID COLON, OPEN APPROACH: ICD-10-PCS | Performed by: SURGERY

## 2025-03-19 PROCEDURE — 0DJD4ZZ INSPECTION OF LOWER INTESTINAL TRACT, PERCUTANEOUS ENDOSCOPIC APPROACH: ICD-10-PCS | Performed by: SURGERY

## 2025-03-19 PROCEDURE — 25010000002 LIDOCAINE 2% SOLUTION

## 2025-03-19 PROCEDURE — 25810000003 LACTATED RINGERS PER 1000 ML: Performed by: SURGERY

## 2025-03-19 PROCEDURE — 25010000002 MIDAZOLAM PER 1 MG: Performed by: ANESTHESIOLOGY

## 2025-03-19 DEVICE — ECHELON CONTOUR GST RELOAD BLUE
Type: IMPLANTABLE DEVICE | Site: ABDOMEN | Status: FUNCTIONAL
Brand: ECHELON

## 2025-03-19 DEVICE — CELLULAR STAPLER WITH TRI-STAPLE TECHNOLOGY
Type: IMPLANTABLE DEVICE | Site: ABDOMEN | Status: FUNCTIONAL
Brand: EEA

## 2025-03-19 DEVICE — CLIP LIGAT VASC HORIZON TI MD/LG GRN 6CT: Type: IMPLANTABLE DEVICE | Site: ABDOMEN | Status: FUNCTIONAL

## 2025-03-19 DEVICE — ECHELON CONTOUR W/ BLUE RELOAD
Type: IMPLANTABLE DEVICE | Site: ABDOMEN | Status: FUNCTIONAL
Brand: ECHELON

## 2025-03-19 DEVICE — ENDOPATH ECHELON ENDOSCOPIC LINEAR CUTTER RELOADS, WHITE, 60MM
Type: IMPLANTABLE DEVICE | Site: ABDOMEN | Status: FUNCTIONAL
Brand: ECHELON ENDOPATH

## 2025-03-19 RX ORDER — BUPIVACAINE HYDROCHLORIDE 2.5 MG/ML
INJECTION, SOLUTION EPIDURAL; INFILTRATION; INTRACAUDAL
Status: COMPLETED | OUTPATIENT
Start: 2025-03-19 | End: 2025-03-19

## 2025-03-19 RX ORDER — FENTANYL CITRATE 50 UG/ML
INJECTION, SOLUTION INTRAMUSCULAR; INTRAVENOUS AS NEEDED
Status: DISCONTINUED | OUTPATIENT
Start: 2025-03-19 | End: 2025-03-19 | Stop reason: SURG

## 2025-03-19 RX ORDER — ONDANSETRON 2 MG/ML
4 INJECTION INTRAMUSCULAR; INTRAVENOUS ONCE AS NEEDED
Status: COMPLETED | OUTPATIENT
Start: 2025-03-19 | End: 2025-03-19

## 2025-03-19 RX ORDER — ONDANSETRON 2 MG/ML
4 INJECTION INTRAMUSCULAR; INTRAVENOUS EVERY 6 HOURS PRN
Status: DISCONTINUED | OUTPATIENT
Start: 2025-03-19 | End: 2025-03-24 | Stop reason: HOSPADM

## 2025-03-19 RX ORDER — METHOCARBAMOL 750 MG/1
750 TABLET, FILM COATED ORAL 4 TIMES DAILY
Status: DISCONTINUED | OUTPATIENT
Start: 2025-03-19 | End: 2025-03-24 | Stop reason: HOSPADM

## 2025-03-19 RX ORDER — PROMETHAZINE HYDROCHLORIDE 25 MG/1
25 TABLET ORAL ONCE AS NEEDED
Status: DISCONTINUED | OUTPATIENT
Start: 2025-03-19 | End: 2025-03-19 | Stop reason: HOSPADM

## 2025-03-19 RX ORDER — CEFOXITIN 2 G/1
INJECTION, POWDER, FOR SOLUTION INTRAVENOUS AS NEEDED
Status: DISCONTINUED | OUTPATIENT
Start: 2025-03-19 | End: 2025-03-19 | Stop reason: SURG

## 2025-03-19 RX ORDER — MAGNESIUM HYDROXIDE 1200 MG/15ML
LIQUID ORAL AS NEEDED
Status: DISCONTINUED | OUTPATIENT
Start: 2025-03-19 | End: 2025-03-19 | Stop reason: HOSPADM

## 2025-03-19 RX ORDER — SODIUM CHLORIDE 0.9 % (FLUSH) 0.9 %
3 SYRINGE (ML) INJECTION EVERY 12 HOURS SCHEDULED
Status: DISCONTINUED | OUTPATIENT
Start: 2025-03-19 | End: 2025-03-19 | Stop reason: HOSPADM

## 2025-03-19 RX ORDER — SODIUM CHLORIDE 9 MG/ML
INJECTION, SOLUTION INTRAVENOUS AS NEEDED
Status: DISCONTINUED | OUTPATIENT
Start: 2025-03-19 | End: 2025-03-19 | Stop reason: HOSPADM

## 2025-03-19 RX ORDER — HYDROCODONE BITARTRATE AND ACETAMINOPHEN 5; 325 MG/1; MG/1
1 TABLET ORAL ONCE AS NEEDED
Status: DISCONTINUED | OUTPATIENT
Start: 2025-03-19 | End: 2025-03-19 | Stop reason: HOSPADM

## 2025-03-19 RX ORDER — MIDAZOLAM HYDROCHLORIDE 1 MG/ML
0.5 INJECTION, SOLUTION INTRAMUSCULAR; INTRAVENOUS
Status: DISCONTINUED | OUTPATIENT
Start: 2025-03-19 | End: 2025-03-19 | Stop reason: HOSPADM

## 2025-03-19 RX ORDER — FLUMAZENIL 0.1 MG/ML
0.2 INJECTION INTRAVENOUS AS NEEDED
Status: DISCONTINUED | OUTPATIENT
Start: 2025-03-19 | End: 2025-03-19 | Stop reason: HOSPADM

## 2025-03-19 RX ORDER — FENTANYL CITRATE 50 UG/ML
50 INJECTION, SOLUTION INTRAMUSCULAR; INTRAVENOUS
Status: DISCONTINUED | OUTPATIENT
Start: 2025-03-19 | End: 2025-03-19 | Stop reason: HOSPADM

## 2025-03-19 RX ORDER — SODIUM CHLORIDE, SODIUM LACTATE, POTASSIUM CHLORIDE, CALCIUM CHLORIDE 600; 310; 30; 20 MG/100ML; MG/100ML; MG/100ML; MG/100ML
50 INJECTION, SOLUTION INTRAVENOUS CONTINUOUS
Status: DISCONTINUED | OUTPATIENT
Start: 2025-03-19 | End: 2025-03-22

## 2025-03-19 RX ORDER — FENTANYL CITRATE 50 UG/ML
50 INJECTION, SOLUTION INTRAMUSCULAR; INTRAVENOUS ONCE AS NEEDED
Status: DISCONTINUED | OUTPATIENT
Start: 2025-03-19 | End: 2025-03-19 | Stop reason: HOSPADM

## 2025-03-19 RX ORDER — ACETAMINOPHEN 500 MG
1000 TABLET ORAL EVERY 6 HOURS
Status: DISCONTINUED | OUTPATIENT
Start: 2025-03-19 | End: 2025-03-24 | Stop reason: HOSPADM

## 2025-03-19 RX ORDER — OXYCODONE AND ACETAMINOPHEN 7.5; 325 MG/1; MG/1
1 TABLET ORAL EVERY 4 HOURS PRN
Status: DISCONTINUED | OUTPATIENT
Start: 2025-03-19 | End: 2025-03-19 | Stop reason: HOSPADM

## 2025-03-19 RX ORDER — DULOXETIN HYDROCHLORIDE 60 MG/1
60 CAPSULE, DELAYED RELEASE ORAL NIGHTLY
Status: DISCONTINUED | OUTPATIENT
Start: 2025-03-19 | End: 2025-03-24 | Stop reason: HOSPADM

## 2025-03-19 RX ORDER — OXYCODONE HYDROCHLORIDE 5 MG/1
5 TABLET ORAL EVERY 4 HOURS PRN
Status: DISCONTINUED | OUTPATIENT
Start: 2025-03-19 | End: 2025-03-24 | Stop reason: HOSPADM

## 2025-03-19 RX ORDER — LIDOCAINE HYDROCHLORIDE 20 MG/ML
INJECTION, SOLUTION INFILTRATION; PERINEURAL AS NEEDED
Status: DISCONTINUED | OUTPATIENT
Start: 2025-03-19 | End: 2025-03-19 | Stop reason: SURG

## 2025-03-19 RX ORDER — ASPIRIN 81 MG/1
81 TABLET, CHEWABLE ORAL DAILY
Status: DISCONTINUED | OUTPATIENT
Start: 2025-03-20 | End: 2025-03-24 | Stop reason: HOSPADM

## 2025-03-19 RX ORDER — SODIUM CHLORIDE, SODIUM LACTATE, POTASSIUM CHLORIDE, CALCIUM CHLORIDE 600; 310; 30; 20 MG/100ML; MG/100ML; MG/100ML; MG/100ML
9 INJECTION, SOLUTION INTRAVENOUS CONTINUOUS
Status: DISCONTINUED | OUTPATIENT
Start: 2025-03-19 | End: 2025-03-20

## 2025-03-19 RX ORDER — ONDANSETRON 4 MG/1
4 TABLET, ORALLY DISINTEGRATING ORAL EVERY 6 HOURS PRN
Status: DISCONTINUED | OUTPATIENT
Start: 2025-03-19 | End: 2025-03-24 | Stop reason: HOSPADM

## 2025-03-19 RX ORDER — HYDRALAZINE HYDROCHLORIDE 20 MG/ML
5 INJECTION INTRAMUSCULAR; INTRAVENOUS
Status: DISCONTINUED | OUTPATIENT
Start: 2025-03-19 | End: 2025-03-19 | Stop reason: HOSPADM

## 2025-03-19 RX ORDER — EPHEDRINE SULFATE 50 MG/ML
INJECTION INTRAVENOUS AS NEEDED
Status: DISCONTINUED | OUTPATIENT
Start: 2025-03-19 | End: 2025-03-19 | Stop reason: SURG

## 2025-03-19 RX ORDER — ATORVASTATIN CALCIUM 80 MG/1
80 TABLET, FILM COATED ORAL DAILY
Status: DISCONTINUED | OUTPATIENT
Start: 2025-03-20 | End: 2025-03-24 | Stop reason: HOSPADM

## 2025-03-19 RX ORDER — GABAPENTIN 300 MG/1
600 CAPSULE ORAL 3 TIMES DAILY
Status: DISCONTINUED | OUTPATIENT
Start: 2025-03-19 | End: 2025-03-24 | Stop reason: HOSPADM

## 2025-03-19 RX ORDER — NALOXONE HCL 0.4 MG/ML
0.2 VIAL (ML) INJECTION AS NEEDED
Status: DISCONTINUED | OUTPATIENT
Start: 2025-03-19 | End: 2025-03-19 | Stop reason: HOSPADM

## 2025-03-19 RX ORDER — HYDROMORPHONE HYDROCHLORIDE 1 MG/ML
0.5 INJECTION, SOLUTION INTRAMUSCULAR; INTRAVENOUS; SUBCUTANEOUS
Status: DISCONTINUED | OUTPATIENT
Start: 2025-03-19 | End: 2025-03-19 | Stop reason: HOSPADM

## 2025-03-19 RX ORDER — METOPROLOL TARTRATE 25 MG/1
25 TABLET, FILM COATED ORAL DAILY
Status: DISCONTINUED | OUTPATIENT
Start: 2025-03-20 | End: 2025-03-24 | Stop reason: HOSPADM

## 2025-03-19 RX ORDER — TAMSULOSIN HYDROCHLORIDE 0.4 MG/1
0.4 CAPSULE ORAL DAILY
Status: DISCONTINUED | OUTPATIENT
Start: 2025-03-20 | End: 2025-03-24 | Stop reason: HOSPADM

## 2025-03-19 RX ORDER — IPRATROPIUM BROMIDE AND ALBUTEROL SULFATE 2.5; .5 MG/3ML; MG/3ML
3 SOLUTION RESPIRATORY (INHALATION) ONCE AS NEEDED
Status: DISCONTINUED | OUTPATIENT
Start: 2025-03-19 | End: 2025-03-19 | Stop reason: HOSPADM

## 2025-03-19 RX ORDER — PROPOFOL 10 MG/ML
VIAL (ML) INTRAVENOUS AS NEEDED
Status: DISCONTINUED | OUTPATIENT
Start: 2025-03-19 | End: 2025-03-19 | Stop reason: SURG

## 2025-03-19 RX ORDER — HYDROMORPHONE HYDROCHLORIDE 1 MG/ML
0.5 INJECTION, SOLUTION INTRAMUSCULAR; INTRAVENOUS; SUBCUTANEOUS
Status: DISCONTINUED | OUTPATIENT
Start: 2025-03-19 | End: 2025-03-23

## 2025-03-19 RX ORDER — OXYCODONE HYDROCHLORIDE 5 MG/1
10 TABLET ORAL EVERY 4 HOURS PRN
Status: DISCONTINUED | OUTPATIENT
Start: 2025-03-19 | End: 2025-03-24 | Stop reason: HOSPADM

## 2025-03-19 RX ORDER — DROPERIDOL 2.5 MG/ML
0.62 INJECTION, SOLUTION INTRAMUSCULAR; INTRAVENOUS
Status: DISCONTINUED | OUTPATIENT
Start: 2025-03-19 | End: 2025-03-19 | Stop reason: HOSPADM

## 2025-03-19 RX ORDER — ATROPINE SULFATE 0.4 MG/ML
0.4 INJECTION, SOLUTION INTRAMUSCULAR; INTRAVENOUS; SUBCUTANEOUS ONCE AS NEEDED
Status: DISCONTINUED | OUTPATIENT
Start: 2025-03-19 | End: 2025-03-19 | Stop reason: HOSPADM

## 2025-03-19 RX ORDER — HYDROXYZINE HYDROCHLORIDE 10 MG/1
10 TABLET, FILM COATED ORAL NIGHTLY PRN
Status: DISCONTINUED | OUTPATIENT
Start: 2025-03-19 | End: 2025-03-24 | Stop reason: HOSPADM

## 2025-03-19 RX ORDER — ONDANSETRON 2 MG/ML
INJECTION INTRAMUSCULAR; INTRAVENOUS AS NEEDED
Status: DISCONTINUED | OUTPATIENT
Start: 2025-03-19 | End: 2025-03-19 | Stop reason: SURG

## 2025-03-19 RX ORDER — ROCURONIUM BROMIDE 10 MG/ML
INJECTION, SOLUTION INTRAVENOUS AS NEEDED
Status: DISCONTINUED | OUTPATIENT
Start: 2025-03-19 | End: 2025-03-19 | Stop reason: SURG

## 2025-03-19 RX ORDER — LOSARTAN POTASSIUM 100 MG/1
100 TABLET ORAL DAILY
Status: DISCONTINUED | OUTPATIENT
Start: 2025-03-20 | End: 2025-03-24 | Stop reason: HOSPADM

## 2025-03-19 RX ORDER — LABETALOL HYDROCHLORIDE 5 MG/ML
5 INJECTION, SOLUTION INTRAVENOUS
Status: DISCONTINUED | OUTPATIENT
Start: 2025-03-19 | End: 2025-03-19 | Stop reason: HOSPADM

## 2025-03-19 RX ORDER — SODIUM CHLORIDE 0.9 % (FLUSH) 0.9 %
3-10 SYRINGE (ML) INJECTION AS NEEDED
Status: DISCONTINUED | OUTPATIENT
Start: 2025-03-19 | End: 2025-03-19 | Stop reason: HOSPADM

## 2025-03-19 RX ORDER — ENOXAPARIN SODIUM 100 MG/ML
40 INJECTION SUBCUTANEOUS DAILY
Status: DISCONTINUED | OUTPATIENT
Start: 2025-03-20 | End: 2025-03-24 | Stop reason: HOSPADM

## 2025-03-19 RX ORDER — EPHEDRINE SULFATE 50 MG/ML
5 INJECTION, SOLUTION INTRAVENOUS ONCE AS NEEDED
Status: DISCONTINUED | OUTPATIENT
Start: 2025-03-19 | End: 2025-03-19 | Stop reason: HOSPADM

## 2025-03-19 RX ORDER — FAMOTIDINE 10 MG/ML
20 INJECTION, SOLUTION INTRAVENOUS ONCE
Status: COMPLETED | OUTPATIENT
Start: 2025-03-19 | End: 2025-03-19

## 2025-03-19 RX ORDER — PROMETHAZINE HYDROCHLORIDE 25 MG/1
25 SUPPOSITORY RECTAL ONCE AS NEEDED
Status: DISCONTINUED | OUTPATIENT
Start: 2025-03-19 | End: 2025-03-19 | Stop reason: HOSPADM

## 2025-03-19 RX ORDER — DIPHENHYDRAMINE HYDROCHLORIDE 50 MG/ML
12.5 INJECTION, SOLUTION INTRAMUSCULAR; INTRAVENOUS
Status: DISCONTINUED | OUTPATIENT
Start: 2025-03-19 | End: 2025-03-19 | Stop reason: HOSPADM

## 2025-03-19 RX ORDER — DEXAMETHASONE SODIUM PHOSPHATE 4 MG/ML
INJECTION, SOLUTION INTRA-ARTICULAR; INTRALESIONAL; INTRAMUSCULAR; INTRAVENOUS; SOFT TISSUE AS NEEDED
Status: DISCONTINUED | OUTPATIENT
Start: 2025-03-19 | End: 2025-03-19 | Stop reason: SURG

## 2025-03-19 RX ORDER — LIDOCAINE HYDROCHLORIDE 10 MG/ML
0.5 INJECTION, SOLUTION INFILTRATION; PERINEURAL ONCE AS NEEDED
Status: DISCONTINUED | OUTPATIENT
Start: 2025-03-19 | End: 2025-03-19 | Stop reason: HOSPADM

## 2025-03-19 RX ADMIN — EPHEDRINE SULFATE 10 MG: 50 INJECTION INTRAVENOUS at 16:46

## 2025-03-19 RX ADMIN — LIDOCAINE HYDROCHLORIDE 90 MG: 20 INJECTION, SOLUTION INFILTRATION; PERINEURAL at 15:52

## 2025-03-19 RX ADMIN — SODIUM CHLORIDE, POTASSIUM CHLORIDE, SODIUM LACTATE AND CALCIUM CHLORIDE: 600; 310; 30; 20 INJECTION, SOLUTION INTRAVENOUS at 15:52

## 2025-03-19 RX ADMIN — DEXAMETHASONE SODIUM PHOSPHATE 4 MG: 4 INJECTION, SOLUTION INTRAMUSCULAR; INTRAVENOUS at 16:00

## 2025-03-19 RX ADMIN — HYDROMORPHONE HYDROCHLORIDE 0.5 MG: 1 INJECTION, SOLUTION INTRAMUSCULAR; INTRAVENOUS; SUBCUTANEOUS at 20:03

## 2025-03-19 RX ADMIN — GABAPENTIN 600 MG: 300 CAPSULE ORAL at 21:33

## 2025-03-19 RX ADMIN — PROPOFOL 200 MG: 10 INJECTION, EMULSION INTRAVENOUS at 15:52

## 2025-03-19 RX ADMIN — FAMOTIDINE 20 MG: 10 INJECTION INTRAVENOUS at 14:42

## 2025-03-19 RX ADMIN — SODIUM CHLORIDE, SODIUM LACTATE, POTASSIUM CHLORIDE, CALCIUM CHLORIDE 100 ML/HR: 20; 30; 600; 310 INJECTION, SOLUTION INTRAVENOUS at 21:08

## 2025-03-19 RX ADMIN — FENTANYL CITRATE 50 MCG: 50 INJECTION, SOLUTION INTRAMUSCULAR; INTRAVENOUS at 15:42

## 2025-03-19 RX ADMIN — BUPIVACAINE 20 ML: 13.3 INJECTION, SUSPENSION, LIPOSOMAL INFILTRATION at 16:10

## 2025-03-19 RX ADMIN — ONDANSETRON 4 MG: 2 INJECTION, SOLUTION INTRAMUSCULAR; INTRAVENOUS at 20:04

## 2025-03-19 RX ADMIN — ROCURONIUM BROMIDE 20 MG: 10 INJECTION, SOLUTION INTRAVENOUS at 17:23

## 2025-03-19 RX ADMIN — HYDROMORPHONE HYDROCHLORIDE 0.5 MG: 1 INJECTION, SOLUTION INTRAMUSCULAR; INTRAVENOUS; SUBCUTANEOUS at 18:07

## 2025-03-19 RX ADMIN — ONDANSETRON 4 MG: 2 INJECTION, SOLUTION INTRAMUSCULAR; INTRAVENOUS at 18:33

## 2025-03-19 RX ADMIN — ROCURONIUM BROMIDE 20 MG: 10 INJECTION, SOLUTION INTRAVENOUS at 16:10

## 2025-03-19 RX ADMIN — SUGAMMADEX 257 MG: 100 INJECTION, SOLUTION INTRAVENOUS at 18:53

## 2025-03-19 RX ADMIN — BUPIVACAINE HYDROCHLORIDE 40 ML: 2.5 INJECTION, SOLUTION EPIDURAL; INFILTRATION; INTRACAUDAL; PERINEURAL at 16:10

## 2025-03-19 RX ADMIN — ROCURONIUM BROMIDE 80 MG: 10 INJECTION, SOLUTION INTRAVENOUS at 15:54

## 2025-03-19 RX ADMIN — METHOCARBAMOL TABLETS 750 MG: 750 TABLET, COATED ORAL at 21:33

## 2025-03-19 RX ADMIN — OXYCODONE HYDROCHLORIDE 5 MG: 5 TABLET ORAL at 20:03

## 2025-03-19 RX ADMIN — CEFOXITIN SODIUM 2000 MG: 2 POWDER, FOR SOLUTION INTRAVENOUS at 15:37

## 2025-03-19 RX ADMIN — ACETAMINOPHEN 1000 MG: 500 TABLET, FILM COATED ORAL at 21:33

## 2025-03-19 RX ADMIN — MIDAZOLAM 0.5 MG: 1 INJECTION INTRAMUSCULAR; INTRAVENOUS at 15:19

## 2025-03-19 RX ADMIN — FENTANYL CITRATE 50 MCG: 50 INJECTION, SOLUTION INTRAMUSCULAR; INTRAVENOUS at 19:00

## 2025-03-19 RX ADMIN — HYDROMORPHONE HYDROCHLORIDE 0.5 MG: 1 INJECTION, SOLUTION INTRAMUSCULAR; INTRAVENOUS; SUBCUTANEOUS at 23:00

## 2025-03-19 RX ADMIN — HYDROMORPHONE HYDROCHLORIDE 0.5 MG: 1 INJECTION, SOLUTION INTRAMUSCULAR; INTRAVENOUS; SUBCUTANEOUS at 16:57

## 2025-03-19 RX ADMIN — DULOXETINE 60 MG: 60 CAPSULE, DELAYED RELEASE ORAL at 21:32

## 2025-03-19 RX ADMIN — CEFOXITIN SODIUM 2 G: 2 POWDER, FOR SOLUTION INTRAVENOUS at 17:36

## 2025-03-19 NOTE — ANESTHESIA PREPROCEDURE EVALUATION
Anesthesia Evaluation     NPO Solid Status: > 8 hours             Airway   Dental      Pulmonary    (+) ,sleep apnea on CPAP  Cardiovascular     (+) hypertension 2 medications or greater, CHF , hyperlipidemia,  carotid artery disease left carotid      Neuro/Psych  (+) TIA, numbness, psychiatric history Depression  GI/Hepatic/Renal/Endo    (+) diabetes mellitus    Musculoskeletal     (+) neck pain  Abdominal    Substance History      OB/GYN          Other   arthritis,   history of cancer active      Other Comment: HIV h/o              Anesthesia Plan    ASA 3     general with block     intravenous induction     Anesthetic plan, risks, benefits, and alternatives have been provided, discussed and informed consent has been obtained with: patient.    CODE STATUS:

## 2025-03-19 NOTE — ANESTHESIA PROCEDURE NOTES
Peripheral IV    Patient location during procedure: OR  Start time: 3/19/2025 3:45 PM  End time: 3/19/2025 3:55 PM  Line placed for Other.  Performed By   Anesthesiologist: Jeremie Melendez MD  CRNA/CAA: Mimi España CRNA  Preanesthetic Checklist  Completed: patient identified, IV checked, site marked, risks and benefits discussed, surgical consent, monitors and equipment checked, pre-op evaluation and timeout performed  Peripheral IV Prep   Patient position: supine   Prep: alcohol swabs and ChloraPrep  Patient monitoring: heart rate, cardiac monitor and continuous pulse ox  Peripheral IV Procedure   Laterality:right  Location:  Hand  Catheter size: 20 G          Post Assessment   Dressing Type: transparent.    IV Dressing/Site: clean, dry and intact  Additional Notes  One attempt via Arsen to the left wrist

## 2025-03-19 NOTE — ANESTHESIA POSTPROCEDURE EVALUATION
"Patient: Kevin Garsia    Procedure Summary       Date: 03/19/25 Room / Location: [facility] OR [address] [place] [address] MAIN OR    Anesthesia Start: 1540 Anesthesia Stop: 1910    Procedure: Laparoscopic Converted to Open Low Anterior Resection, Ostomy Creation, and Appendetomy (Abdomen) Diagnosis:       Rectal adenocarcinoma      (Rectal adenocarcinoma [C20])    Surgeons: Naeem Rai MD Provider: Jose Monique MD    Anesthesia Type: general with block ASA Status: 3            Anesthesia Type: general with block    Vitals  Vitals Value Taken Time   /78 03/19/25 19:20   Temp 37.2 °C (98.9 °F) 03/19/25 19:10   Pulse 86 03/19/25 19:29   Resp 17 03/19/25 19:10   SpO2 98 % 03/19/25 19:29   Vitals shown include unfiled device data.        Post Anesthesia Care and Evaluation    Level of consciousness: awake and alert  Pain management: adequate    Airway patency: patent  Anesthetic complications: No anesthetic complications  PONV Status: none  Cardiovascular status: acceptable  Respiratory status: acceptable  Hydration status: acceptable    Comments: /78   Pulse 81   Temp 37.2 °C (98.9 °F) (Oral)   Resp 17   Ht 165.1 cm (65\")   SpO2 98%   BMI 31.35 kg/m²       "

## 2025-03-19 NOTE — ANESTHESIA PROCEDURE NOTES
Airway  Reason: elective    Date/Time: 3/19/2025 3:56 PM  Airway not difficult    General Information and Staff    Patient location during procedure: OR  Anesthesiologist: Jeremie Melendez MD  CRNA/CAA: Mimi España CRNA    Indications and Patient Condition  Indications for airway management: airway protection    Preoxygenated: yes  MILS maintained throughout    Mask difficulty assessment: 2 - vent by mask + OA or adjuvant +/- NMBA    Final Airway Details    Final airway type: endotracheal airway      Successful airway: ETT  Cuffed: yes   Successful intubation technique: direct laryngoscopy  Adjuncts used in placement: intubating stylet and cricoid pressure  Endotracheal tube insertion site: oral  Blade: Toya  Blade size: 4  ETT size (mm): 7.5  Cormack-Lehane Classification: grade I - full view of glottis  Placement verified by: chest auscultation and capnometry   Cuff volume (mL): 7  Measured from: lips  ETT/EBT  to lips (cm): 23  Number of attempts at approach: 1  Assessment: lips, teeth, and gum same as pre-op and atraumatic intubation

## 2025-03-19 NOTE — ANESTHESIA PROCEDURE NOTES
Peripheral Block      Patient reassessed immediately prior to procedure    Patient location during procedure: OR  Start time: 3/19/2025 4:10 PM  Reason for block: at surgeon's request and post-op pain management  Performed by  Anesthesiologist: Jeremie Melendez MD  Preanesthetic Checklist  Completed: patient identified, IV checked, site marked, risks and benefits discussed, surgical consent, monitors and equipment checked, pre-op evaluation and timeout performed  Prep:  Pt Position: supine  Sterile barriers:alcohol skin prep, cap, gloves, mask and washed/disinfected hands  Prep: ChloraPrep  Patient monitoring: blood pressure monitoring, continuous pulse oximetry and EKG  Procedure    Sedation: yes  Performed under: general  Guidance:ultrasound guided    ULTRASOUND INTERPRETATION.  Using ultrasound guidance a 21 G gauge needle was placed in close proximity to the nerve, at which point, under ultrasound guidance anesthetic was injected in the area of the nerve and spread of the anesthesia was seen on ultrasound in close proximity thereto.  There were no abnormalities seen on ultrasound; a digital image was taken; and the patient tolerated the procedure with no complications. Images:still images obtained, printed/placed on chart    Laterality:Bilateral  Block Type:TAP  Injection Technique:single-shot  Needle Type:echogenic and Tuohy  Needle Gauge:21 G  Resistance on Injection: none    Medications Used: bupivacaine PF (MARCAINE) 0.25 % injection - Injection   40 mL - 3/19/2025 4:10:00 PM  bupivacaine liposome (EXPAREL) 1.3 % injection - Infiltration   20 mL - 3/19/2025 4:10:00 PM      Medications  Comment:Total volume of local anesthetic equally divided between each side. 20cc 0.25% bupi and 10 cc exparel injected each side.     Post Assessment  Injection Assessment: negative aspiration for heme, no paresthesia on injection and incremental injection  Patient Tolerance:comfortable throughout  block  Complications:no  Performed by: Jeremie Melendez MD

## 2025-03-19 NOTE — OP NOTE
Colorectal & General Surgery  Operative Report    Patient: Kevin Garsia  YOB: 1955  MRN: [id number]  DATE OF PROCEDURE: 03/19/25     PREOPERATIVE DIAGNOSIS:  Rectal adenocarcinoma status post neoadjuvant chemotherapy  Obesity with BMI 31    POSTOPERATIVE DIAGNOSIS:  Same    PROCEDURE:  Low anterior resection with creation of low pelvic coloproctostomy  Creation of diverting loop ileostomy  Appendectomy, incidental    FINDINGS:  Laparoscopic approach was abandoned secondary to massive intra-abdominal adiposity.  Visualization of the pelvis was nearly impossible secondary to the small bowel continually falling down into the pelvis.  Tumor was noted just proximal to the anterior peritoneal reflection.  The anterior peritoneal reflection was incised and soft rectum was divided approximately 1.5 cm distal to the palpable tumor.  High ligation of the inferior mesenteric pedicle was performed. 28 mm stapled coloproctostomy was created below the anterior peritoneal reflection.  Diverting loop ileostomy created to protect the anastomosis.  Appendix was located within the pelvis so it was resected.  Benign-appearing appendix.    SURGEON:  Red Rai MD    ASSISTANT:  Charles Matias MD was present for low anterior resection.  His assistance was critical to the success of the operation.  He assisted with retraction, dissection, exposure, and held the camera.    Assistant: Anna Singh RNFA was responsible for performing the following activities: Retraction, Irrigation, and Placing Dressing and their skilled assistance was necessary for the success of this case.     ANESTHESIA:  General-endotracheal  Tap blocks    EBL:  50 mL    SPECIMEN:  Rectum  Distal rectal margin  Appendix    OPERATIVE DESCRIPTION:  The patient was brought to the operating room under the care of the nursing staff.  The patient was placed on the operating room table in the supine position where anesthesia was induced.  The  patient was then prepped and positioned in the usual sterile fashion.  A standardized timeout was then performed.    5 mm supraumbilical incision was created.  Veress needle was inserted into the peritoneal cavity and the abdomen was insufflated to 15 mmHg.  5 mm optical trocar was then used to enter the abdomen.  12 mm trocar placed in the right lower quadrant.  5 mm trocars were placed in the right upper abdomen and the left lower abdomen.  Patient was placed in steep and [place] position.  Sigmoid colon was mobilized away from the left lower quadrant and retroperitoneum.  This dissection was carried out down the lateral aspect of the pelvis as well.  The medial aspect of the pelvis was then attempted to be dissected, but the small bowel persistently precluded appropriate visualization secondary to his massive central adiposity.  Decision was made to convert to open approach at this point.  Midline laparotomy was created.  Self-retaining retractor and wound protector were placed.  Both sides of the peritoneum of the rectum were incised and dissection was carried out in the presacral space.  The presacral space was then completely developed all the way down to the pelvic floor.  The anterior peritoneal reflection was then incised, which was just distal to the tumor.  Mobilization of the rectum was then carried out from an anterior approach.  Eventually, we reached a soft point of the rectum that was approximately 1.5 cm distal to the distalmost extent of the palpable tumor.  The rectum was circumferentially skeletonized by dividing the mesorectum with the LigaSure device.  A contour stapler was then used to divide the rectum at this location.  The inferior mesenteric pedicle was then identified and skeletonized.  It was divided with suture ligation.  The mesentery of the sigmoid colon was then divided up to the level of the sigmoid colon that had appreciable triphasic arterial Doppler signals and reached the  pelvis without any tension.  The mesentery was divided with the bipolar device.  The bowel was then divided with the Bovie electrocautery.  The specimen was passed off for permanent pathology as the rectum.  A 2-0 Prolene suture was then used to create a pursestring suture and the anvil was secured in position.  The 28 mm EEA stapler was then inserted through the anal canal and advanced to the low pelvic rectal staple line.  A 28 mm stapled coloproctostomy was then created in the standard fashion.  A leak test was performed that demonstrated no evidence of leak.  Both anastomotic rings were intact and the distalmost anastomotic ring was sent as distal margin of the rectum.  A 19 [place] MARY drain was then placed in the pelvis posterior to the anastomosis.  The appendix was then identified within the pelvis.  The mesoappendix was divided with the Bovie electrocautery.  The appendix itself was divided away from the cecum with a white load of the [place] 60 stapler and passed off for permanent pathology.  The terminal ileum was then identified and approximately 20 cm proximal to the ileocecal valve, a portion of the terminal ileum was identified for creation of diverting loop ileostomy.  Ileostomy trephine was then created just to the right and above the umbilicus at the previously marked location.  The mesentery reached the skin without any significant tension.  Gloves were changed.  Wound protector was removed.  Midline fascia was closed with running 0 PDS suture.  Skin was reapproximated with surgical staples.  The fascia of the 12 mm port site was closed and the skin was reapproximated with staples there.  Loop ileostomy was then matured in standard fashion using Vandana sutures and 3-0 Vicryl's.  The ileostomy was slightly above the skin and viable.  Stoma appliance and dressings were placed.    All needle, sponge, and instrument counts were correct at the end of the case.    The patient tolerated the procedure well  and was transferred to the postanesthesia care unit in stable condition.    Synoptic portion:    The indication for total mesorectum excision was mid rectal tumor with curative intent.     Red Rai M.D.  Colorectal & General Surgery  [facility]    [address], [address]  [place], [place], [address]  P: [phone]  F: [phone]

## 2025-03-20 LAB
ANION GAP SERPL CALCULATED.3IONS-SCNC: 10.9 MMOL/L (ref 5–15)
BASOPHILS # BLD AUTO: 0.03 10*3/MM3 (ref 0–0.2)
BASOPHILS NFR BLD AUTO: 0.2 % (ref 0–1.5)
BUN SERPL-MCNC: 10 MG/DL (ref 8–23)
BUN/CREAT SERPL: 12 (ref 7–25)
CALCIUM SPEC-SCNC: 9.5 MG/DL (ref 8.6–10.5)
CHLORIDE SERPL-SCNC: 100 MMOL/L (ref 98–107)
CO2 SERPL-SCNC: 27.1 MMOL/L (ref 22–29)
CREAT SERPL-MCNC: 0.83 MG/DL (ref 0.76–1.27)
DEPRECATED RDW RBC AUTO: 69.2 FL (ref 37–54)
EGFRCR SERPLBLD CKD-EPI 2021: 93.6 ML/MIN/1.73
EOSINOPHIL # BLD AUTO: 0 10*3/MM3 (ref 0–0.4)
EOSINOPHIL NFR BLD AUTO: 0 % (ref 0.3–6.2)
ERYTHROCYTE [DISTWIDTH] IN BLOOD BY AUTOMATED COUNT: 19.1 % (ref 12.3–15.4)
GLUCOSE SERPL-MCNC: 155 MG/DL (ref 65–99)
HCT VFR BLD AUTO: 41.1 % (ref 37.5–51)
HGB BLD-MCNC: 13.9 G/DL (ref 13–17.7)
IMM GRANULOCYTES # BLD AUTO: 0.03 10*3/MM3 (ref 0–0.05)
IMM GRANULOCYTES NFR BLD AUTO: 0.2 % (ref 0–0.5)
LYMPHOCYTES # BLD AUTO: 1.1 10*3/MM3 (ref 0.7–3.1)
LYMPHOCYTES NFR BLD AUTO: 8.8 % (ref 19.6–45.3)
MCH RBC QN AUTO: 32.7 PG (ref 26.6–33)
MCHC RBC AUTO-ENTMCNC: 33.8 G/DL (ref 31.5–35.7)
MCV RBC AUTO: 96.7 FL (ref 79–97)
MONOCYTES # BLD AUTO: 0.79 10*3/MM3 (ref 0.1–0.9)
MONOCYTES NFR BLD AUTO: 6.3 % (ref 5–12)
NEUTROPHILS NFR BLD AUTO: 10.56 10*3/MM3 (ref 1.7–7)
NEUTROPHILS NFR BLD AUTO: 84.5 % (ref 42.7–76)
NRBC BLD AUTO-RTO: 0 /100 WBC (ref 0–0.2)
PLATELET # BLD AUTO: 255 10*3/MM3 (ref 140–450)
PMV BLD AUTO: 9.7 FL (ref 6–12)
POTASSIUM SERPL-SCNC: 4.2 MMOL/L (ref 3.5–5.2)
RBC # BLD AUTO: 4.25 10*6/MM3 (ref 4.14–5.8)
SODIUM SERPL-SCNC: 138 MMOL/L (ref 136–145)
WBC NRBC COR # BLD AUTO: 12.51 10*3/MM3 (ref 3.4–10.8)

## 2025-03-20 PROCEDURE — 99024 POSTOP FOLLOW-UP VISIT: CPT | Performed by: SURGERY

## 2025-03-20 PROCEDURE — 80048 BASIC METABOLIC PNL TOTAL CA: CPT | Performed by: SURGERY

## 2025-03-20 PROCEDURE — 85025 COMPLETE CBC W/AUTO DIFF WBC: CPT | Performed by: SURGERY

## 2025-03-20 PROCEDURE — 25010000002 ONDANSETRON PER 1 MG: Performed by: SURGERY

## 2025-03-20 PROCEDURE — 25810000003 LACTATED RINGERS PER 1000 ML: Performed by: SURGERY

## 2025-03-20 PROCEDURE — 25010000002 ENOXAPARIN PER 10 MG: Performed by: SURGERY

## 2025-03-20 RX ORDER — LIDOCAINE 4 G/G
1 PATCH TOPICAL
Status: DISCONTINUED | OUTPATIENT
Start: 2025-03-20 | End: 2025-03-24 | Stop reason: HOSPADM

## 2025-03-20 RX ADMIN — METHOCARBAMOL TABLETS 750 MG: 750 TABLET, COATED ORAL at 12:11

## 2025-03-20 RX ADMIN — METHOCARBAMOL TABLETS 750 MG: 750 TABLET, COATED ORAL at 20:13

## 2025-03-20 RX ADMIN — LOSARTAN POTASSIUM 100 MG: 100 TABLET, FILM COATED ORAL at 06:22

## 2025-03-20 RX ADMIN — LIDOCAINE 1 PATCH: 4 PATCH TOPICAL at 20:17

## 2025-03-20 RX ADMIN — OXYCODONE HYDROCHLORIDE 10 MG: 5 TABLET ORAL at 20:12

## 2025-03-20 RX ADMIN — ONDANSETRON 4 MG: 2 INJECTION, SOLUTION INTRAMUSCULAR; INTRAVENOUS at 02:46

## 2025-03-20 RX ADMIN — ACETAMINOPHEN 1000 MG: 500 TABLET, FILM COATED ORAL at 09:51

## 2025-03-20 RX ADMIN — OXYCODONE HYDROCHLORIDE 10 MG: 5 TABLET ORAL at 06:23

## 2025-03-20 RX ADMIN — OXYCODONE HYDROCHLORIDE 5 MG: 5 TABLET ORAL at 15:16

## 2025-03-20 RX ADMIN — ACETAMINOPHEN 1000 MG: 500 TABLET, FILM COATED ORAL at 02:46

## 2025-03-20 RX ADMIN — METHOCARBAMOL TABLETS 750 MG: 750 TABLET, COATED ORAL at 06:21

## 2025-03-20 RX ADMIN — ENOXAPARIN SODIUM 40 MG: 100 INJECTION SUBCUTANEOUS at 09:51

## 2025-03-20 RX ADMIN — DULOXETINE 60 MG: 60 CAPSULE, DELAYED RELEASE ORAL at 20:13

## 2025-03-20 RX ADMIN — METHOCARBAMOL TABLETS 750 MG: 750 TABLET, COATED ORAL at 17:55

## 2025-03-20 RX ADMIN — ASPIRIN 81 MG: 81 TABLET, CHEWABLE ORAL at 06:22

## 2025-03-20 RX ADMIN — GABAPENTIN 600 MG: 300 CAPSULE ORAL at 06:22

## 2025-03-20 RX ADMIN — SODIUM CHLORIDE, SODIUM LACTATE, POTASSIUM CHLORIDE, CALCIUM CHLORIDE 100 ML/HR: 20; 30; 600; 310 INJECTION, SOLUTION INTRAVENOUS at 20:14

## 2025-03-20 RX ADMIN — TAMSULOSIN HYDROCHLORIDE 0.4 MG: 0.4 CAPSULE ORAL at 06:23

## 2025-03-20 RX ADMIN — METOPROLOL TARTRATE 25 MG: 25 TABLET, FILM COATED ORAL at 06:22

## 2025-03-20 RX ADMIN — SODIUM CHLORIDE, SODIUM LACTATE, POTASSIUM CHLORIDE, CALCIUM CHLORIDE 100 ML/HR: 20; 30; 600; 310 INJECTION, SOLUTION INTRAVENOUS at 09:57

## 2025-03-20 RX ADMIN — ONDANSETRON 4 MG: 2 INJECTION, SOLUTION INTRAMUSCULAR; INTRAVENOUS at 17:55

## 2025-03-20 RX ADMIN — OXYCODONE HYDROCHLORIDE 10 MG: 5 TABLET ORAL at 00:08

## 2025-03-20 RX ADMIN — ACETAMINOPHEN 1000 MG: 500 TABLET, FILM COATED ORAL at 16:25

## 2025-03-20 RX ADMIN — GABAPENTIN 600 MG: 300 CAPSULE ORAL at 20:13

## 2025-03-20 RX ADMIN — GABAPENTIN 600 MG: 300 CAPSULE ORAL at 16:25

## 2025-03-20 NOTE — NURSING NOTE
"   03/20/25 1255   Ileostomy RLQ   Placement date: If unknown, DO NOT use \"Add Comment\" note/Placement time: If unknown, DO NOT use \"Add Comment\" note: 03/19/25 (c) 1900   Location: RLQ   Stomal Appliance 2 piece;Clean;Intact;Dry;Drainable   Stoma Appearance irregular;moist;red;protruding above skin level   Peristomal Assessment MAC   Stoma Function stool;flatus   Stool Color brown   Stool Consistency watery;liquid   Treatment Placement checked;Other (Comment)  (pouch emptied and instruction provided as pt and spouse observed)   Output (mL) 100 mL     Ostomy education provided to the patient/family: Patient is POD # 1 s/p loop ileostomy r/t rectal carcinoma. He is sitting up in chair with spouse at bedside. Tolerating clears diet and reports post-op pain is well controlled. His stoma is viable, red and moist. Pouch is intact and clean. I emptied 100ml of watery brown effluent and provided instruction while he and spouse observed. They both are engaged and eager to learn.      []Pouch changed   []Pt observed   []Pt participated    []CG participated            [x]Instructed in frequency of pouch change  [x]Pouch emptied and cleaned, demonstrated use of pouch closure    [x]Pt observed   []Pt participated    []CG participated    [x]Instructed to empty pouch when 1/3 to 1/2 full  []Teaching packet/handouts provided/reviewed  [x]Return to ADL's, showering, clothing  []Peristomal skin care, avoiding use of soaps with moisturizers  [x]Diet   []Adequate po fluid intake   []S/S of dehydration   [x]Foods to thicken stool   [x]Foods to avoid to prevent blockage  [x]Supplies at bedside  []Samples ordered - will await pouch change to determine.    Current Supplies: currently wearing 2 piece kimberly 2 3/4\". Samples left including 2 1/4\" with barrier rings.    WOC Team follow up plan: Plan to change pouch Friday and provide step by step instruction with spouse present. Will continue to follow for ostomy support and education " during his inpatient stay.  Recommend HH at discharge which Kevin is agreeable to.

## 2025-03-20 NOTE — PLAN OF CARE
Goal Outcome Evaluation:              Outcome Evaluation: VSS. A&Ox4. Patient is tolerating a clear liquid diet well. Pain medication given as needed. Up ambulating in the halls independently this evening. Midline incision dressing changed and replaced with abd pad and tape. Ostomy producing watery output. Plan of care ongoing, will continue to monitor for the remainder of my shift.

## 2025-03-20 NOTE — PLAN OF CARE
Goal Outcome Evaluation:         Patient alert and oriented x4, arrived to 3p from pacu at 2055, family at bedside. LR @100ml,20g in right hand, right chest port not accessed--single lumen, 2 lap sites cover with 2x2 border gauze, midline incision covered w/white island dressing. Island dressing has drainage circled w/black lolis and also has just a little more red drainage below the black outline near the bottom of the dressing. MARY Drain in LUQ with moderate amount of drainage. Berrios cath in place with yellow urine output. Clear liquid diet which pt in tolerating well and has not vomited. Bedrest at the moment. Pt voiced low back pain which he states has been ongoing for some time, pillows have been applied to low back for comfort. SCD's in place, pain meds as requested. O2@ 2L. Hx of HIV infection, DM II, CHF, TIA, HLD, HTN. Plan of care is ongoing.

## 2025-03-20 NOTE — PROGRESS NOTES
Colorectal & General Surgery  Progress Note    Patient: Kevin Garsia  YOB: 1955  MRN: [id number]      Assessment  Kevin Garsia is a 69 y.o. male with mid rectal adenocarcinoma who is postoperative day 1 from open low anterior resection with coloproctostomy creation and diverting loop ileostomy creation as well as appendectomy.    Afebrile, no tachycardia.  Mild hypertension overnight but improved once he got his oral medications this morning.  Pain is well-controlled aside from his back pain.  Ileostomy is pink above the skin is already functioning.  Incisions in good order.  Drain is serosanguineous.  Abdominal exam benign.    Plan  Discontinue Berrios catheter tomorrow  Continue MARY drain  Continue clear liquid diet  Adding lidocaine patch for back pain  Encourage ambulation  Continue maintenance intravenous fluids      Subjective  Complains of back pain but otherwise doing relatively well.    Objective    Vitals:    03/20/25 1615   BP: 142/70   Pulse: 81   Resp: 16   Temp: 98.2 °F (36.8 °C)   SpO2: 97%       Physical Exam  Constitutional: Well-developed well-nourished, no acute distress  Neck: Supple, trachea midline  Respiratory: No increased work of breathing, Symmetric excursion  Cardiovascular: Well pefursed, no jugular venous distention evident   Abdominal: Incision in good order.  Drain serosanguineous.  Ileostomy is pink and at skin level soft, non-tender, non-distended  Skin: Warm, dry, no rash on visualized skin surfaces  Psychiatric: Alert and oriented ×3, normal affect     Laboratory Results  I have personally reviewed CBC with WC 12, Humoryl 13, plates 55.  BMP with creatinine 0.3, bicarb 27.    Radiology  None for review         Red Rai MD  Colorectal & General Surgery  [facility]    [address], [address]  [place], [place], [address]  P: [phone]  F: [phone]

## 2025-03-21 PROCEDURE — 25810000003 LACTATED RINGERS PER 1000 ML: Performed by: SURGERY

## 2025-03-21 PROCEDURE — 99024 POSTOP FOLLOW-UP VISIT: CPT | Performed by: SURGERY

## 2025-03-21 PROCEDURE — 25010000002 ENOXAPARIN PER 10 MG: Performed by: SURGERY

## 2025-03-21 RX ADMIN — LOSARTAN POTASSIUM 100 MG: 100 TABLET, FILM COATED ORAL at 08:31

## 2025-03-21 RX ADMIN — METOPROLOL TARTRATE 25 MG: 25 TABLET, FILM COATED ORAL at 08:31

## 2025-03-21 RX ADMIN — LIDOCAINE 1 PATCH: 4 PATCH TOPICAL at 08:33

## 2025-03-21 RX ADMIN — ACETAMINOPHEN 1000 MG: 500 TABLET, FILM COATED ORAL at 21:11

## 2025-03-21 RX ADMIN — ATORVASTATIN CALCIUM 80 MG: 80 TABLET, FILM COATED ORAL at 08:31

## 2025-03-21 RX ADMIN — METHOCARBAMOL TABLETS 750 MG: 750 TABLET, COATED ORAL at 12:16

## 2025-03-21 RX ADMIN — GABAPENTIN 600 MG: 300 CAPSULE ORAL at 08:31

## 2025-03-21 RX ADMIN — METHOCARBAMOL TABLETS 750 MG: 750 TABLET, COATED ORAL at 21:10

## 2025-03-21 RX ADMIN — DULOXETINE 60 MG: 60 CAPSULE, DELAYED RELEASE ORAL at 21:11

## 2025-03-21 RX ADMIN — ACETAMINOPHEN 1000 MG: 500 TABLET, FILM COATED ORAL at 10:37

## 2025-03-21 RX ADMIN — ACETAMINOPHEN 1000 MG: 500 TABLET, FILM COATED ORAL at 01:46

## 2025-03-21 RX ADMIN — METHOCARBAMOL TABLETS 750 MG: 750 TABLET, COATED ORAL at 18:08

## 2025-03-21 RX ADMIN — OXYCODONE HYDROCHLORIDE 10 MG: 5 TABLET ORAL at 07:04

## 2025-03-21 RX ADMIN — OXYCODONE HYDROCHLORIDE 10 MG: 5 TABLET ORAL at 01:46

## 2025-03-21 RX ADMIN — ENOXAPARIN SODIUM 40 MG: 100 INJECTION SUBCUTANEOUS at 08:30

## 2025-03-21 RX ADMIN — OXYCODONE HYDROCHLORIDE 5 MG: 5 TABLET ORAL at 15:55

## 2025-03-21 RX ADMIN — TAMSULOSIN HYDROCHLORIDE 0.4 MG: 0.4 CAPSULE ORAL at 08:31

## 2025-03-21 RX ADMIN — ASPIRIN 81 MG: 81 TABLET, CHEWABLE ORAL at 08:31

## 2025-03-21 RX ADMIN — SODIUM CHLORIDE, SODIUM LACTATE, POTASSIUM CHLORIDE, CALCIUM CHLORIDE 100 ML/HR: 20; 30; 600; 310 INJECTION, SOLUTION INTRAVENOUS at 07:04

## 2025-03-21 RX ADMIN — GABAPENTIN 600 MG: 300 CAPSULE ORAL at 21:11

## 2025-03-21 RX ADMIN — GABAPENTIN 600 MG: 300 CAPSULE ORAL at 15:52

## 2025-03-21 RX ADMIN — OXYCODONE HYDROCHLORIDE 10 MG: 5 TABLET ORAL at 21:11

## 2025-03-21 RX ADMIN — ACETAMINOPHEN 1000 MG: 500 TABLET, FILM COATED ORAL at 15:52

## 2025-03-21 RX ADMIN — METHOCARBAMOL TABLETS 750 MG: 750 TABLET, COATED ORAL at 07:03

## 2025-03-21 NOTE — NURSING NOTE
Pt is A&Ox4, on RA, and up ad anastasia. F/C was D/C at 1200. Advanced to full liquid diet. Pt has ileostomy, MARY drain producing serous fluid, and midline incisions w/staples abd pad covering. Encouraging ambulation. Plan of care ongoing.

## 2025-03-21 NOTE — CASE MANAGEMENT/SOCIAL WORK
Discharge Planning Assessment  [facility]     Patient Name: Kevin Garsia  MRN: [id number]  Today's Date: 3/21/2025    Admit Date: 3/19/2025    Plan: Home with [facility] and family to transport.   Discharge Needs Assessment       Row Name 03/21/25 1107       Living Environment    People in Home significant other    Current Living Arrangements home    Primary Care Provided by self    Family Caregiver if Needed significant other       Resource/Environmental Concerns    Transportation Concerns none       Transition Planning    Patient/Family Anticipates Transition to home with family    Patient/Family Anticipated Services at Transition none    Transportation Anticipated family or friend will provide       Discharge Needs Assessment    Readmission Within the Last 30 Days no previous admission in last 30 days    Equipment Currently Used at Home cane, straight;cpap    Concerns to be Addressed no discharge needs identified;denies needs/concerns at this time    Anticipated Changes Related to Illness none    Equipment Needed After Discharge none    Outpatient/Agency/Support Group Needs homecare agency    Discharge Facility/Level of Care Needs home with home health                   Discharge Plan       Row Name 03/21/25 0623       Plan    Plan Home with [facility] and family to transport.    Patient/Family in Agreement with Plan yes    Plan Comments CCP met with pt at bedside. Introduced self and role. Facesheet information and pharmacy verified. Pt lives in a 2 EVA single story home with his fiancé. Pt drives, is IADLs and has a CPAP and cane at home for PRN use. Pt has no history of using HH or SNF. Pt does not have a living will. Pt is enrolled in [facility]. Pt denies trouble affording or managing medications. CCP discussed need for HH with new ostomy; pt is agreeable and has no preference on HH agency. CCP placed referral to [facility]. CCP notified Karla/[facility]. Plan will be home with [facility] referral pending and family to transport. RLutes  RN/CCP                  Continued Care and Services - Admitted Since 3/19/2025       Home Medical Care       Service Provider Request Status Services Address Phone Fax Patient Preferred    Psychiatric Pending - Request Sent -- Joana BRYSON [facility] [address] [address] [phone] --                  Expected Discharge Date and Time       Expected Discharge Date Expected Discharge Time    Mar 22, 2025            Demographic Summary       Row Name 03/21/25 1104       General Information    Admission Type inpatient    Arrived From home    Required Notices Provided Important Message from Medicare    Referral Source admission list;case finding    Preferred Language English       Contact Information    Permission Granted to Share Info With                    Functional Status       Row Name 03/21/25 1104       Functional Status    Usual Activity Tolerance good    Current Activity Tolerance good       Physical Activity    On average, how many days per week do you engage in moderate to strenuous exercise (like a brisk walk)? Pt Declined    On average, how many minutes do you engage in exercise at this level? Pt Declined       Assessment of Health Literacy    How often do you have someone help you read hospital materials? Never    How often do you have problems learning about your medical condition because of difficulty understanding written information? Never    How often do you have a problem understanding what is told to you about your medical condition? Never    How confident are you filling out medical forms by yourself? Extremely    Health Literacy Excellent                          Rody Marcial RN

## 2025-03-21 NOTE — DISCHARGE PLACEMENT REQUEST
"Kevin Pace (69 y.o. Male)       Date of Birth   1955    Social Security Number       [place]   [address]  [place] [address]    Home Phone   [phone]    MRN   [id number]       Anabaptist   None    Marital Status   Significant Other                            Admission Date   3/19/2025    Admission Type   Elective    Admitting Provider   Naeem Rai MD    Attending Provider   Naeem Rai MD    Department, Room/Bed   [address], P393/1       Discharge Date       Discharge Disposition       Discharge Destination                                 Attending Provider: Naeem Rai MD    Allergies: No Known Allergies    Isolation: None   Infection: None   Code Status: CPR    Ht: 165.1 cm (65\")   Wt: 85.5 kg (188 lb 7.9 oz)    Admission Cmt: None   Principal Problem: Rectal adenocarcinoma [C20]                   Active Insurance as of 3/19/2025       Primary Coverage       Payor Plan Insurance Group Employer/Plan Group    ANTHEM MEDICARE REPLACEMENT ANTHEM MEDICARE ADVANTAGE PPO [id number]       Payor Plan Address Payor Plan Phone Number Payor Plan Fax Number Effective Dates    [address] [phone]  1/1/2024 - None Entered    [facility] [address]         Subscriber Name Subscriber Birth Date Member ID       KEVIN PACE 1955 [id number]                     Emergency Contacts        (Rel.) Home Phone Work Phone Mobile Phone    DEMARCUS DERAS (Significant Other) [phone]    Ayo Pace (Son) [phone] -- --                "

## 2025-03-21 NOTE — NURSING NOTE
"   03/21/25 1050   Ileostomy RLQ   Placement date: If unknown, DO NOT use \"Add Comment\" note/Placement time: If unknown, DO NOT use \"Add Comment\" note: 03/19/25 (c) 1900   Location: RLQ   Stomal Appliance 2 piece;Clean;Dry;Intact;Changed;Drainable   Stoma Appearance irregular;moist;red;protruding above skin level   Peristomal Assessment Clean;Intact;Pink   Accessories/Skin Care cleansed with water;skin barrier ring   Stoma Function flatus;stool   Stool Color brown   Stool Consistency liquid   Treatment Bag change;Site care   Output (mL) 100 mL     OMS: We are seeing a patient for follow-up ileostomy care. The patient is alert. The existing appliance was removed and ostomy care was performed.  Jeannie 2-piece 2 3/4 barrier ring was used.    Ostomy education provided to the patient:    [x]Pouch changed   [x]Pt observed   [x]Pt participated            [x]Instructed in frequency of pouch change  [x]Pouch emptied and cleaned, demonstrated use of pouch closure    [x]Pt observed   [x]Pt participated   []Instructed to empty pouch when 1/3 to 1/2 full  [x]Teaching packet/handouts provided/reviewed    [x]Peristomal skin care, avoiding use of soaps with moisturizers  [x]Diet   [x]Adequate po fluid intake   [x]S/S of dehydration  [x]Supplies at bedside    WOC Team follow up plan: Ostomy care nurse will follow up him  daily.  On Monday at 9 a.m., we'll meet with his fiance for the teaching.    "

## 2025-03-21 NOTE — PLAN OF CARE
Goal Outcome Evaluation:            Patient alert and oriented, on O2@2L via nasal cannula, see MAR for c/o pain, cont rosado cath, midline dressing, MARY Drain in LUQ. Last bm 03/18, SCD's in place, Lovenox scheduled, clear liq diet, LR @ 100ml/hr. Plan of care is ongoing.

## 2025-03-21 NOTE — PROGRESS NOTES
Colorectal & General Surgery  Progress Note    Patient: Kevin Garsia  YOB: 1955  MRN: [id number]      Assessment  Kevin Garsia is a 69 y.o. male with mid rectal adenocarcinoma who is postoperative day 2 from open low anterior resection with coloproctostomy creation and diverting loop ileostomy as well as appendectomy.    Afebrile with no tachycardia.  Abdominal exam remains somewhat distended but he is having lots of flatus from his ileostomy as well as ileostomy output that is liquidy.    Drain is serous.    Overall, seems to be recovering very well.  Plan as follows:    Plan  Advance to full liquid diet  Discontinue Berrios catheter today  Continue MARY drain  Continue Lovenox for pharmacologic DVT prophylaxis  Continue current analgesia regimen  Encourage ambulation      Subjective  No significant events.  Feels much better today.    Objective    Vitals:    03/21/25 0740   BP: 171/90   Pulse: 94   Resp: 16   Temp: 98.1 °F (36.7 °C)   SpO2: 95%       Physical Exam  Constitutional: Well-developed well-nourished, no acute distress  Neck: Supple, trachea midline  Respiratory: No increased work of breathing, Symmetric excursion  Cardiovascular: Well pefursed, no jugular venous distention evident   Abdominal: Ileostomy pink and above the skin.  Incisions in good order.  Drain serous.  Skin: Warm, dry, no rash on visualized skin surfaces  Psychiatric: Alert and oriented ×3, normal affect     Laboratory Results  None to review    Radiology  None to review         Red Rai MD  Colorectal & General Surgery  [facility]    [address], [address]  [place], [place], [address]  P: [phone]  F: [phone]

## 2025-03-22 LAB
CYTO UR: NORMAL
LAB AP CASE REPORT: NORMAL
LAB AP SPECIAL STAINS: NORMAL
LAB AP SYNOPTIC CHECKLIST: NORMAL
PATH REPORT.FINAL DX SPEC: NORMAL
PATH REPORT.GROSS SPEC: NORMAL

## 2025-03-22 PROCEDURE — 99024 POSTOP FOLLOW-UP VISIT: CPT | Performed by: STUDENT IN AN ORGANIZED HEALTH CARE EDUCATION/TRAINING PROGRAM

## 2025-03-22 PROCEDURE — 25010000002 ENOXAPARIN PER 10 MG: Performed by: SURGERY

## 2025-03-22 RX ORDER — PANTOPRAZOLE SODIUM 40 MG/10ML
40 INJECTION, POWDER, LYOPHILIZED, FOR SOLUTION INTRAVENOUS
Status: DISCONTINUED | OUTPATIENT
Start: 2025-03-22 | End: 2025-03-24 | Stop reason: HOSPADM

## 2025-03-22 RX ADMIN — LOSARTAN POTASSIUM 100 MG: 100 TABLET, FILM COATED ORAL at 09:47

## 2025-03-22 RX ADMIN — METOPROLOL TARTRATE 25 MG: 25 TABLET, FILM COATED ORAL at 09:48

## 2025-03-22 RX ADMIN — METHOCARBAMOL TABLETS 750 MG: 750 TABLET, COATED ORAL at 11:41

## 2025-03-22 RX ADMIN — LIDOCAINE 1 PATCH: 4 PATCH TOPICAL at 21:37

## 2025-03-22 RX ADMIN — GABAPENTIN 600 MG: 300 CAPSULE ORAL at 15:00

## 2025-03-22 RX ADMIN — METHOCARBAMOL TABLETS 750 MG: 750 TABLET, COATED ORAL at 18:08

## 2025-03-22 RX ADMIN — METHOCARBAMOL TABLETS 750 MG: 750 TABLET, COATED ORAL at 09:47

## 2025-03-22 RX ADMIN — ENOXAPARIN SODIUM 40 MG: 100 INJECTION SUBCUTANEOUS at 09:47

## 2025-03-22 RX ADMIN — ACETAMINOPHEN 1000 MG: 500 TABLET, FILM COATED ORAL at 15:00

## 2025-03-22 RX ADMIN — DULOXETINE 60 MG: 60 CAPSULE, DELAYED RELEASE ORAL at 21:38

## 2025-03-22 RX ADMIN — GABAPENTIN 600 MG: 300 CAPSULE ORAL at 09:48

## 2025-03-22 RX ADMIN — METHOCARBAMOL TABLETS 750 MG: 750 TABLET, COATED ORAL at 21:38

## 2025-03-22 RX ADMIN — TAMSULOSIN HYDROCHLORIDE 0.4 MG: 0.4 CAPSULE ORAL at 09:48

## 2025-03-22 RX ADMIN — ACETAMINOPHEN 1000 MG: 500 TABLET, FILM COATED ORAL at 04:14

## 2025-03-22 RX ADMIN — ACETAMINOPHEN 1000 MG: 500 TABLET, FILM COATED ORAL at 21:38

## 2025-03-22 RX ADMIN — ATORVASTATIN CALCIUM 80 MG: 80 TABLET, FILM COATED ORAL at 09:49

## 2025-03-22 RX ADMIN — OXYCODONE HYDROCHLORIDE 10 MG: 5 TABLET ORAL at 15:00

## 2025-03-22 RX ADMIN — OXYCODONE HYDROCHLORIDE 10 MG: 5 TABLET ORAL at 21:38

## 2025-03-22 RX ADMIN — ASPIRIN 81 MG: 81 TABLET, CHEWABLE ORAL at 09:47

## 2025-03-22 RX ADMIN — GABAPENTIN 600 MG: 300 CAPSULE ORAL at 21:38

## 2025-03-22 RX ADMIN — OXYCODONE HYDROCHLORIDE 10 MG: 5 TABLET ORAL at 04:15

## 2025-03-22 RX ADMIN — PANTOPRAZOLE SODIUM 40 MG: 40 INJECTION, POWDER, LYOPHILIZED, FOR SOLUTION INTRAVENOUS at 11:40

## 2025-03-22 NOTE — PLAN OF CARE
Goal Outcome Evaluation:   Patient A&Ox4.  IV fluids discontinued.  Pain meds given as requested.  Ambulated halls & sat in chair several times today.  Tolerating reg diet.  Ostomy with good output.  MARY with minimal output.  VSS.

## 2025-03-22 NOTE — PLAN OF CARE
Goal Outcome Evaluation:      Patient alert and oriented x4, on 02@2L at HS, midline covered w/ABD pad, MARY drain out is minimal, colostomy output is appropriate, scd's in place, pt using incentive spirometry, LR @100 ml/hr infusing in 20g in right hand. Full liq diet. Plan of care is ongoing.

## 2025-03-23 LAB
ANION GAP SERPL CALCULATED.3IONS-SCNC: 9 MMOL/L (ref 5–15)
BUN SERPL-MCNC: 11 MG/DL (ref 8–23)
BUN/CREAT SERPL: 11.6 (ref 7–25)
CALCIUM SPEC-SCNC: 9.1 MG/DL (ref 8.6–10.5)
CHLORIDE SERPL-SCNC: 102 MMOL/L (ref 98–107)
CO2 SERPL-SCNC: 25 MMOL/L (ref 22–29)
CREAT SERPL-MCNC: 0.95 MG/DL (ref 0.76–1.27)
EGFRCR SERPLBLD CKD-EPI 2021: 85.6 ML/MIN/1.73
GLUCOSE SERPL-MCNC: 126 MG/DL (ref 65–99)
POTASSIUM SERPL-SCNC: 3.9 MMOL/L (ref 3.5–5.2)
SODIUM SERPL-SCNC: 136 MMOL/L (ref 136–145)

## 2025-03-23 PROCEDURE — 80048 BASIC METABOLIC PNL TOTAL CA: CPT | Performed by: STUDENT IN AN ORGANIZED HEALTH CARE EDUCATION/TRAINING PROGRAM

## 2025-03-23 PROCEDURE — 25010000002 ENOXAPARIN PER 10 MG: Performed by: SURGERY

## 2025-03-23 PROCEDURE — 99024 POSTOP FOLLOW-UP VISIT: CPT | Performed by: STUDENT IN AN ORGANIZED HEALTH CARE EDUCATION/TRAINING PROGRAM

## 2025-03-23 RX ORDER — LOPERAMIDE HYDROCHLORIDE 2 MG/1
2 CAPSULE ORAL
Status: DISCONTINUED | OUTPATIENT
Start: 2025-03-23 | End: 2025-03-24 | Stop reason: HOSPADM

## 2025-03-23 RX ADMIN — PANTOPRAZOLE SODIUM 40 MG: 40 INJECTION, POWDER, LYOPHILIZED, FOR SOLUTION INTRAVENOUS at 05:55

## 2025-03-23 RX ADMIN — METHOCARBAMOL TABLETS 750 MG: 750 TABLET, COATED ORAL at 08:47

## 2025-03-23 RX ADMIN — METHOCARBAMOL TABLETS 750 MG: 750 TABLET, COATED ORAL at 11:38

## 2025-03-23 RX ADMIN — ENOXAPARIN SODIUM 40 MG: 100 INJECTION SUBCUTANEOUS at 08:47

## 2025-03-23 RX ADMIN — OXYCODONE HYDROCHLORIDE 10 MG: 5 TABLET ORAL at 15:35

## 2025-03-23 RX ADMIN — OXYCODONE HYDROCHLORIDE 10 MG: 5 TABLET ORAL at 05:55

## 2025-03-23 RX ADMIN — TAMSULOSIN HYDROCHLORIDE 0.4 MG: 0.4 CAPSULE ORAL at 08:47

## 2025-03-23 RX ADMIN — AVOBENZONE, HOMOSALATE, OCTISALATE, OCTOCRYLENE, AND OXYBENZONE 1 PACKET: 29.4; 147; 49; 25.4; 58.8 LOTION TOPICAL at 08:47

## 2025-03-23 RX ADMIN — ACETAMINOPHEN 1000 MG: 500 TABLET, FILM COATED ORAL at 11:38

## 2025-03-23 RX ADMIN — LOPERAMIDE HYDROCHLORIDE 2 MG: 2 CAPSULE ORAL at 20:36

## 2025-03-23 RX ADMIN — ACETAMINOPHEN 1000 MG: 500 TABLET, FILM COATED ORAL at 20:36

## 2025-03-23 RX ADMIN — GABAPENTIN 600 MG: 300 CAPSULE ORAL at 08:47

## 2025-03-23 RX ADMIN — ACETAMINOPHEN 1000 MG: 500 TABLET, FILM COATED ORAL at 15:30

## 2025-03-23 RX ADMIN — LOSARTAN POTASSIUM 100 MG: 100 TABLET, FILM COATED ORAL at 08:47

## 2025-03-23 RX ADMIN — LIDOCAINE 1 PATCH: 4 PATCH TOPICAL at 20:42

## 2025-03-23 RX ADMIN — ASPIRIN 81 MG: 81 TABLET, CHEWABLE ORAL at 08:53

## 2025-03-23 RX ADMIN — ATORVASTATIN CALCIUM 80 MG: 80 TABLET, FILM COATED ORAL at 08:53

## 2025-03-23 RX ADMIN — METOPROLOL TARTRATE 25 MG: 25 TABLET, FILM COATED ORAL at 08:47

## 2025-03-23 RX ADMIN — METHOCARBAMOL TABLETS 750 MG: 750 TABLET, COATED ORAL at 20:36

## 2025-03-23 RX ADMIN — DULOXETINE 60 MG: 60 CAPSULE, DELAYED RELEASE ORAL at 20:36

## 2025-03-23 RX ADMIN — OXYCODONE HYDROCHLORIDE 10 MG: 5 TABLET ORAL at 01:22

## 2025-03-23 RX ADMIN — METHOCARBAMOL TABLETS 750 MG: 750 TABLET, COATED ORAL at 17:29

## 2025-03-23 RX ADMIN — GABAPENTIN 600 MG: 300 CAPSULE ORAL at 15:30

## 2025-03-23 RX ADMIN — GABAPENTIN 600 MG: 300 CAPSULE ORAL at 20:36

## 2025-03-23 RX ADMIN — OXYCODONE HYDROCHLORIDE 10 MG: 5 TABLET ORAL at 20:36

## 2025-03-23 NOTE — PROGRESS NOTES
General Surgery Note    Summary:  69-year-old gentleman with rectal cancer postoperative day #3 status post LAR and diverting loop ileostomy    Interval Status:  Doing quite well.  No issues tolerating liquids.  1 L ileostomy output.  Out of bed and ambulating.  Pain well-controlled.  Voiding spontaneously.  Stoma is a little bit dusky, however viable.    Physical Exam:    Not sick  Afebrile, heart rate 82, /56  Nonlabored breathing on room air  Abdomen soft, mildly tender, nondistended, incision clean and dry, ileostomy slightly dusky however viable with thin liquid output that it is starting to thicken  Drain output 40 cc serosanguineous    Labs:  Results from last 7 days   Lab Units 03/20/25  0632   WBC 10*3/mm3 12.51*   HEMOGLOBIN g/dL 13.9   PLATELETS 10*3/mm3 255     Results from last 7 days   Lab Units 03/20/25  0632   SODIUM mmol/L 138   POTASSIUM mmol/L 4.2   CHLORIDE mmol/L 100   CO2 mmol/L 27.1   BUN mg/dL 10   CREATININE mg/dL 0.83   CALCIUM mg/dL 9.5   GLUCOSE mg/dL 155*       Plan:  -Advance to regular diet  -Saline lock IV  -Start fiber  -Lovenox DVT prophylaxis  -Keep drain for now  - BMP in AM  -Progressing quite well, anticipate may be ready for discharge tomorrow afternoon or Monday        Ok Corea MD  [facility]  [address], [address]  [place], [place], [address]  P: [phone]  F: [phone]

## 2025-03-23 NOTE — PLAN OF CARE
Goal Outcome Evaluation:  Plan of Care Reviewed With: patient        Progress: no change  Outcome Evaluation: Midline ABEL with winter, MARY has low output, care for ostomy himself, A&O, 2LNC while sleeping, port not accessed, diabetic, jordy given for pain

## 2025-03-23 NOTE — PROGRESS NOTES
General Surgery Note    Summary:  69-year-old gentleman with rectal cancer postoperative day #4 status post LAR and diverting loop ileostomy    Interval Status:  Continues to do well.  Tolerating regular diet without any nausea or vomiting.  Pain is well-controlled.  Stoma looking better and more pink than yesterday.  Has been out of bed and ambulating and up in the chair this morning.    Physical Exam:    Not sick  Afebrile, heart rate 79, /60  Nonlabored breathing on room air  Abdomen soft, minimally tender, nondistended, incision clean and dry   Ileostomy less dusky and more pink than yesterday, with 1,375 cc thin output  Drain serosanguineous 30 cc  UOP 1150cc      Labs:  Results from last 7 days   Lab Units 03/20/25  0632   WBC 10*3/mm3 12.51*   HEMOGLOBIN g/dL 13.9   PLATELETS 10*3/mm3 255     Results from last 7 days   Lab Units 03/23/25  0701 03/20/25  0632   SODIUM mmol/L 136 138   POTASSIUM mmol/L 3.9 4.2   CHLORIDE mmol/L 102 100   CO2 mmol/L 25.0 27.1   BUN mg/dL 11 10   CREATININE mg/dL 0.95 0.83   CALCIUM mg/dL 9.1 9.5   GLUCOSE mg/dL 126* 155*       Plan:  -Continue regular diet, fiber and PPI  -Start low dose loperamide  -BMP and UOP in good order, do not think he needs additional fluids  -Lovenox DVT prophylaxis  - Keep drain for right now  -May potentially be able to go home tomorrow       Ok Corea MD  [facility] [place]  [address] [place] [address], [address]  [place], [place], [facility]  P: [phone]  F: [phone]

## 2025-03-23 NOTE — PLAN OF CARE
Goal Outcome Evaluation:   Patient A&Ox4.  Pain meds given as requested.  Ambulated halls & sat in chair several times today.  Tolerating reg diet.  Ostomy with good output.  MARY with minimal output.  Dilaudid d/c'd.  VSS.                                             IMPROVE-DD Application Not Available

## 2025-03-23 NOTE — SIGNIFICANT NOTE
03/23/25 0955   OTHER   Discipline physical therapist   Therapy Assessment/Plan (PT)   Criteria for Skilled Interventions Met (PT) no;does not meet criteria for skilled intervention  (PT consult received. Patient is up ad anastasia, ambulating halls and [facility] of 24. Confirmed mobility status with MAE Lehman via secure chat. PT eval not warranted at this time and will sign off.)

## 2025-03-24 ENCOUNTER — DOCUMENTATION (OUTPATIENT)
Dept: HOME HEALTH SERVICES | Facility: HOME HEALTHCARE | Age: 70
End: 2025-03-24
Payer: MEDICARE

## 2025-03-24 ENCOUNTER — READMISSION MANAGEMENT (OUTPATIENT)
Dept: CALL CENTER | Facility: HOSPITAL | Age: 70
End: 2025-03-24
Payer: MEDICARE

## 2025-03-24 VITALS
SYSTOLIC BLOOD PRESSURE: 126 MMHG | TEMPERATURE: 97.7 F | BODY MASS INDEX: 31.4 KG/M2 | WEIGHT: 188.49 LBS | RESPIRATION RATE: 16 BRPM | OXYGEN SATURATION: 94 % | DIASTOLIC BLOOD PRESSURE: 66 MMHG | HEART RATE: 98 BPM | HEIGHT: 65 IN

## 2025-03-24 PROCEDURE — 25010000002 ENOXAPARIN PER 10 MG: Performed by: SURGERY

## 2025-03-24 PROCEDURE — 99024 POSTOP FOLLOW-UP VISIT: CPT | Performed by: SURGERY

## 2025-03-24 RX ORDER — METHOCARBAMOL 750 MG/1
750 TABLET, FILM COATED ORAL 4 TIMES DAILY
Qty: 28 TABLET | Refills: 0 | Status: SHIPPED | OUTPATIENT
Start: 2025-03-24 | End: 2025-03-31

## 2025-03-24 RX ORDER — LOPERAMIDE HYDROCHLORIDE 2 MG/1
2 CAPSULE ORAL
Qty: 120 CAPSULE | Refills: 0 | Status: SHIPPED | OUTPATIENT
Start: 2025-03-24 | End: 2025-04-23

## 2025-03-24 RX ORDER — ENOXAPARIN SODIUM 100 MG/ML
40 INJECTION SUBCUTANEOUS DAILY
Qty: 9.2 ML | Refills: 0 | Status: SHIPPED | OUTPATIENT
Start: 2025-03-24 | End: 2025-04-16

## 2025-03-24 RX ORDER — OXYCODONE HYDROCHLORIDE 5 MG/1
5 TABLET ORAL EVERY 4 HOURS PRN
Qty: 15 TABLET | Refills: 0 | Status: SHIPPED | OUTPATIENT
Start: 2025-03-24 | End: 2025-03-31

## 2025-03-24 RX ORDER — ACETAMINOPHEN 500 MG
1000 TABLET ORAL EVERY 6 HOURS PRN
Start: 2025-03-24

## 2025-03-24 RX ADMIN — OXYCODONE HYDROCHLORIDE 10 MG: 5 TABLET ORAL at 05:39

## 2025-03-24 RX ADMIN — METOPROLOL TARTRATE 25 MG: 25 TABLET, FILM COATED ORAL at 08:22

## 2025-03-24 RX ADMIN — ACETAMINOPHEN 1000 MG: 500 TABLET, FILM COATED ORAL at 05:38

## 2025-03-24 RX ADMIN — OXYCODONE HYDROCHLORIDE 10 MG: 5 TABLET ORAL at 10:29

## 2025-03-24 RX ADMIN — LOPERAMIDE HYDROCHLORIDE 2 MG: 2 CAPSULE ORAL at 05:39

## 2025-03-24 RX ADMIN — GABAPENTIN 600 MG: 300 CAPSULE ORAL at 08:22

## 2025-03-24 RX ADMIN — AVOBENZONE, HOMOSALATE, OCTISALATE, OCTOCRYLENE, AND OXYBENZONE 1 PACKET: 29.4; 147; 49; 25.4; 58.8 LOTION TOPICAL at 08:25

## 2025-03-24 RX ADMIN — TAMSULOSIN HYDROCHLORIDE 0.4 MG: 0.4 CAPSULE ORAL at 08:22

## 2025-03-24 RX ADMIN — LOSARTAN POTASSIUM 100 MG: 100 TABLET, FILM COATED ORAL at 08:22

## 2025-03-24 RX ADMIN — ASPIRIN 81 MG: 81 TABLET, CHEWABLE ORAL at 08:22

## 2025-03-24 RX ADMIN — PANTOPRAZOLE SODIUM 40 MG: 40 INJECTION, POWDER, LYOPHILIZED, FOR SOLUTION INTRAVENOUS at 05:39

## 2025-03-24 RX ADMIN — ENOXAPARIN SODIUM 40 MG: 100 INJECTION SUBCUTANEOUS at 08:22

## 2025-03-24 RX ADMIN — METHOCARBAMOL TABLETS 750 MG: 750 TABLET, COATED ORAL at 08:22

## 2025-03-24 NOTE — CASE MANAGEMENT/SOCIAL WORK
Case Management Discharge Note      Final Note: Home with [facility]. Family to transport.         Selected Continued Care - Admitted Since 3/19/2025       Destination    No services have been selected for the patient.                Durable Medical Equipment    No services have been selected for the patient.                Dialysis/Infusion    No services have been selected for the patient.                Home Medical Care       Service Provider Services Address Phone Fax Patient Preferred    [facility], Home Health Services [address] [place] [address] [phone] --              Therapy    No services have been selected for the patient.                Community Resources    No services have been selected for the patient.                Community & DME    No services have been selected for the patient.                    Transportation Services  Private: Car    Final Discharge Disposition Code: 06 - home with home health care

## 2025-03-24 NOTE — NURSING NOTE
"   03/24/25 0935   Ileostomy RLQ   Placement date: If unknown, DO NOT use \"Add Comment\" note/Placement time: If unknown, DO NOT use \"Add Comment\" note: 03/19/25 (c) 1900   Location: RLQ   Stomal Appliance 2 piece;Clean;Dry;Intact;Changed;Drainable   Stoma Appearance round;moist;red;protruding above skin level   Peristomal Assessment Clean;Intact;Pink   Accessories/Skin Care cleansed with water;skin barrier ring   Stoma Function flatus;stool   Stool Color brown   Stool Consistency liquid   Treatment Tape changed   Output (mL) 300 mL     OMS: We are seeing a patient for a colostomy follow-up. The patient is sitting in a chair, looking well, with a brace at the bedside.  The existing ostomy appliance has been removed, the area has been cleaned, and the new one has been placed.  Ostomy education provided to the patient/family:    [x]Pouch changed   [x]Pt observed   [x]Pt participated            [x]Instructed in frequency of pouch change  [x]Pouch emptied and cleaned, demonstrated use of pouch closure    [x]Pt observed   [x]Pt participated   [x]Instructed to empty pouch when 1/3 to 1/2 full  [x]Teaching packet/handouts reviewed  [x]Return to ADL's, showering, clothing  [x]Peristomal skin care, avoiding use of soaps with moisturizers  [x]Diet   [x]Adequate po fluid intake   [x]S/S of dehydration   [x]Foods to thicken stool  [x]Supplies at bedside.  Current Supplies: [place] 2-piece 2 1/4, barrier ring used.  Teaching was also approved to her fiancé, questions answered.  WOC Team follow up plan: OCN follow 3 time a wk    "

## 2025-03-24 NOTE — DISCHARGE INSTRUCTIONS
POST OP RECOMMENDATIONS  Dr. Red Rai  [phone]  Discharge Instructions    Activity  You should get up and move about several times daily to reduce the risk of developing a blood clot in your legs.   No lifting more than 10 pounds for 6 weeks  Diet  Avoid raw fruits and vegetables  Avoid tough meats like steak and pork  If you have an ileostomy, avoid concentrated sugary drinks (Gatorade, soft drinks, milk products)  At your follow-up appointment, we can discuss returning to a normal diet  Dressings  The glue on your incisions will fall off on its own in 1-2 weeks.  You do not need to pack any of your incisions  Bathing  It's ok to shower anytime.   Do not submerge your incisions (bath, pool, lake, ocean, etc.) for 3 weeks.  You can wash your incisions with warm soapy water very gently and pat dry  Pain Management  Unless you have been told otherwise, it's ok to take Tylenol 1000 mg every 6 hours as needed.  I have provided you a prescription for a narcotic pain medication. Take this as needed.   I have also provided you a muscle relaxer if you were still using it in the hospital. Take this scheduled for a couple days, and then you can transition to taking it only as needed.   Please call the office if you have intense pain that is not relieved.   Blood clot prevention  You should be up walking multiple times each day to prevent blood clots from forming.   If I sent you home on a low-dose blood thinner shot, please take those every day until you run out.   Driving  Do not drive while taking narcotic pain medications.   Before driving, make sure that you are able to move and rotate appropriately to keep you and the rest of us safe on the road.   Follow up appointment  My office will call you with a follow up appointment if you do not have one already. It will be in 2-3 weeks.   If you do not hear from my office in 1 week, please call [phone] to ask about scheduling.   Remember to contact me for any of  the following:   Fever > 101 degrees  Severe pain that cannot be controlled by taking your pain pills  Severe nausea or vomiting that cannot be controlled by taking your nausea pills  Significant bleeding of your incisions  Drainage that has a bad smell or is yellow or green in appearance  Any other questions or concerns

## 2025-03-24 NOTE — DISCHARGE SUMMARY
Colorectal & General Surgery  Discharge Summary    DATE OF ADMIT:    02/27/2025     DATE OF DISCHARGE:    03/24/25     DIAGNOSIS:    Rectal adenocarcinoma    PROCEDURES:    Low anterior resection with diverting loop ileostomy and appendectomy    FINAL PATHOLOGY:    pT2 N0 well-differentiated mid rectal adenocarcinoma with treatment effect    SUMMARY OF HOSPITAL COURSE:     His postoperative course was relatively uneventful.  He did develop a slightly high output ileostomy that required loperamide to slow it down.  Otherwise, no significant postoperative events.    PHYSICAL EXAM  Constitutional: Resting comfortably, no acute distress  Neck: Supple, trachea midline  Respiratory: No increased work of breathing, Symmetric excursion  Cardiovascular: Well pefursed, no jugular venous distention evident   Abdominal: Incision in good order.  Soft, non-tender, non-distended  Lymphatics: No cervical or suprascapular adenopathy  Skin: Warm, dry, no rash on visualized skin surfaces  Musculoskeletal: Symmetric strength, no obvious gross abnormalities  Psychiatric: Alert and oriented ×3, normal affect      DIET: Regular, as tolerated    ACTIVITY: No lifting more than 10 pounds for 6 weeks    MEDICATIONS: Refer to MAR    FOLLOW-UP: 1 week in the office    Red Rai MD  Colorectal & General Surgery  [facility]    [address], [address]  [place], [place], [address]  P: [phone]  F: [phone]

## 2025-03-24 NOTE — PLAN OF CARE
Goal Outcome Evaluation:      Patient alert and oriented, on room air, continent of bladder, colostomy with output, right chest port, IV access to right hand, dilaudid and oxycodone ordered for pain. Up to chair, incentive spirometer utilized, annamaria drain with minimal output, midline open to air with staples. Possible discharge today. Plan of care is ongoing.

## 2025-03-24 NOTE — OUTREACH NOTE
Prep Survey      Flowsheet Row Responses   [facility] patient discharged from? [facility]   Is LACE score < 7 ? No   Eligibility [facility]   Date of Admission 03/19/25   Date of Discharge 03/24/25   Discharge diagnosis Laparoscopic Converted to Open Low Anterior Resection, Ostomy Creation, and Appendetomy   Does the patient have one of the following disease processes/diagnoses(primary or secondary)? General Surgery   Does the patient have Home health ordered? Yes   What is the Home health agency?  [facility]   Prep survey completed? Yes            Adriana FLORES - Registered Nurse

## 2025-03-24 NOTE — PROGRESS NOTES
Start PACC Note    Home Health Referral    Evaluated patient  on Home Care and services available. Patient offered choice of available HHC and agreeable to SN services with [facility].    Care Types:   Isolation Precautions: [unfilled]    Social Determinants of Health:  Tobacco Use: Medium Risk (3/19/2025)    Patient History     Smoking Tobacco Use: Former     Smokeless Tobacco Use: Never     Passive Exposure: Not on file     Social History     Substance and Sexual Activity   Alcohol Use No     Social History     Substance and Sexual Activity   Drug Use Not Currently       Does the patient have any financial resource strain?   Does the patient have any food insecurities?   Does the patient have any housing instabilities?     If any of the above is noted as yes - consider a MSW evaluation once the patient returns home.    START PATIENT REGISTRATION INFORMATION  Order Information  Order Signing Physician: No att. providers found  Service Ordered RN?: Yes  Service Ordered PT?: No  Service Ordered OT?: No  Service Ordered ST?: No  Service Ordered MSW?: No  Service Ordered HHA?: No  Following Physician: Dr. Naeem Ria  Following Physician Phone: [phone]  Overseeing Physician: Dr. Naeem Rai  (Required for Residents Only)  Agreeable to Follow? Yes  Date/Time of Call 03/24/25 09:41 EDT, Spoke with: Orders in Epic per  Fredi    Care Coordination  Same Day SOC?: No  Primary Care Physician: Priti Oliveira APRN  Primary Care Physician Phone: [phone]  Primary Care Physician Address: [address] [place] [address] / [place] [id number]  Visit Instructions: Please call significant other Belem for visit scheduling- [phone]  Service Discharge Location Type: Home  Service Facility Name:   Service Floor Facility:   Service Room No:     Demographics  Patient Last Name: Ady  Patient First Name: Kevin  Language/Communication Barrier: none  Service Address: [facility] [address]  Service City:  [place]  Service State: [place]  Service Zip: [address]  Service Home Phone: [phone]  Other Phone Numbers:   Telephone Information:   Mobile [phone]     Emergency Contact:   Extended Emergency Contact Information  Primary Emergency Contact: DEMARCUS DERAS  Address: [facility] [address]            [place] [address] [facility]  Home Phone: [phone]  Work Phone: [phone]  Mobile Phone: [phone]  Relation: Significant Other  Secondary Emergency Contact: Ayo Garsia   [facility]  Home Phone: [phone]  Relation: Son    Admission Information  Admit Date: 03/19/2025  Patient Status at Discharge:   Admitting Diagnosis: Rectal Adenocarcinoma, S/P colon resection and ileostomy  Caregiver Information  Caregiver First Name:   Caregiver Last Name:   Caregiver Relationship to Patient:   Caregiver Phone Number:   Caregiver Notes:     HITECH  [employer]  HIGHTECH: HI TECH - NEW COLOSTOMY  Order: Ostomy site care every 3-5 days and as needed. Empty pouch when 1/3 full to prevent pulling and leakage.  Date of procedure: 03/19/2025  Physician/Surgeon: Dr Naeem Rai  Ostomy teaching done?: Yes      END PATIENT REGISTRATION INFORMATION    Start PACC Summary    Additional Comments:     END PACC Summary    Discharge Date: Pending    Referral Source: [facility]    Signed By: Kayleigh Donis RN, 3/24/2025, 09:41 EDT     Date/Time: 03/24/25 09:41 EDT    End PACC Note

## 2025-03-24 NOTE — CASE MANAGEMENT/SOCIAL WORK
Continued Stay Note  [facility]     Patient Name: Kevin Garsia  MRN: [id number]  Today's Date: 3/24/2025    Admit Date: 3/19/2025    Plan: Home with [facility]. Family to transport.   Discharge Plan       Row Name 03/24/25 0927       Plan    Plan Home with [facility]. Family to transport.    Plan Comments DC orders noted. [facility] accepted pt. CCP notified [facility] that pt has DC orders. Plan will be home with [facility]. Family to transport. RLutes RN/CCP    Final Discharge Disposition Code 06 - home with home health care    Final Note Home with [facility]. Family to transport.                   Discharge Codes    No documentation.                 Expected Discharge Date and Time       Expected Discharge Date Expected Discharge Time    Mar 24, 2025               Rody Marcial RN

## 2025-03-25 ENCOUNTER — TRANSITIONAL CARE MANAGEMENT TELEPHONE ENCOUNTER (OUTPATIENT)
Dept: CALL CENTER | Facility: HOSPITAL | Age: 70
End: 2025-03-25
Payer: MEDICARE

## 2025-03-25 DIAGNOSIS — E78.5 HYPERLIPIDEMIA, UNSPECIFIED HYPERLIPIDEMIA TYPE: ICD-10-CM

## 2025-03-25 RX ORDER — ATORVASTATIN CALCIUM 80 MG/1
80 TABLET, FILM COATED ORAL DAILY
Qty: 30 TABLET | Refills: 3 | Status: SHIPPED | OUTPATIENT
Start: 2025-03-25

## 2025-03-25 NOTE — OUTREACH NOTE
Call Center TCM Note      Flowsheet Row Responses   [facility] patient discharged from? [place]   Does the patient have one of the following disease processes/diagnoses(primary or secondary)? General Surgery   TCM attempt successful? No   Unsuccessful attempts Attempt 1  [Ayo son]            Tara BOWERS - Registered Nurse    3/25/2025, 15:44 EDT

## 2025-03-25 NOTE — OUTREACH NOTE
Call Center TCM Note      Flowsheet Row Responses   [facility] [place] patient discharged from? [place]   Does the patient have one of the following disease processes/diagnoses(primary or secondary)? General Surgery   TCM attempt successful? No   Unsuccessful attempts Attempt 2  [Ayo/[place] on verbal release,  no answer]            Tara BOWERS - Registered Nurse    3/25/2025, 15:56 EDT

## 2025-03-25 NOTE — TELEPHONE ENCOUNTER
Caller: [facility] #[id number] @ [facility] [address] - [phone] [facility] [phone] FX    Relationship: Pharmacy    Best call back number: [phone]     Requested Prescriptions:   Requested Prescriptions     Pending Prescriptions Disp Refills    atorvastatin (LIPITOR) 80 MG tablet 30 tablet 3     Sig: Take 1 tablet by mouth Daily.        Pharmacy where request should be sent: [facility] #[id number] @ [facility] [address] - [phone]  - [phone] FX     Last office visit with prescribing clinician: 9/17/2024   Last telemedicine visit with prescribing clinician: Visit date not found   Next office visit with prescribing clinician: Visit date not found     Additional details provided by patient: PATIENT IS OUT OF MEDICATION    Does the patient have less than a 3 day supply:  [x] Yes  [] No    Would you like a call back once the refill request has been completed: [] Yes [] No    If the office needs to give you a call back, can they leave a voicemail: [] Yes [] No    Raji Manuel Rep   03/25/25 11:44 EDT            no

## 2025-03-26 ENCOUNTER — TRANSITIONAL CARE MANAGEMENT TELEPHONE ENCOUNTER (OUTPATIENT)
Dept: CALL CENTER | Facility: HOSPITAL | Age: 70
End: 2025-03-26
Payer: MEDICARE

## 2025-03-26 NOTE — OUTREACH NOTE
Call Center TCM Note      Flowsheet Row Responses   [facility] patient discharged from? [place]   Does the patient have one of the following disease processes/diagnoses(primary or secondary)? General Surgery   TCM attempt successful? No   Unsuccessful attempts Attempt 3            Marshall STERN - Registered Nurse    3/26/2025, 12:33 EDT

## 2025-04-03 ENCOUNTER — OFFICE VISIT (OUTPATIENT)
Dept: SURGERY | Facility: CLINIC | Age: 70
End: 2025-04-03
Payer: MEDICARE

## 2025-04-03 VITALS
OXYGEN SATURATION: 96 % | DIASTOLIC BLOOD PRESSURE: 50 MMHG | BODY MASS INDEX: 28.62 KG/M2 | WEIGHT: 171.8 LBS | HEART RATE: 78 BPM | SYSTOLIC BLOOD PRESSURE: 90 MMHG | HEIGHT: 65 IN

## 2025-04-03 DIAGNOSIS — C20 RECTAL ADENOCARCINOMA: Primary | ICD-10-CM

## 2025-04-03 PROCEDURE — 99024 POSTOP FOLLOW-UP VISIT: CPT | Performed by: SURGERY

## 2025-04-03 RX ORDER — METRONIDAZOLE 500 MG/1
500 TABLET ORAL SEE ADMIN INSTRUCTIONS
Qty: 4 TABLET | Refills: 0 | Status: SHIPPED | OUTPATIENT
Start: 2025-04-03 | End: 2025-04-04

## 2025-04-03 RX ORDER — NEOMYCIN SULFATE 500 MG/1
TABLET ORAL
Qty: 4 TABLET | Refills: 0 | Status: SHIPPED | OUTPATIENT
Start: 2025-04-03 | End: 2025-04-07

## 2025-04-03 NOTE — PROGRESS NOTES
Colorectal & General Surgery  Post - Op    Patient: Kevin Garsia  YOB: 1955  MRN: [id number]      Assessment  Kevin Garsia is a 69 y.o. male s/p low anterior resection with creation of low pelvic coloproctostomy, diverting loop ileostomy, and incidental appendectomy on 3/19/25 due to stage III rectal adenocarcinoma s/p neoadjuvant chemotherapy.  Today, he is doing very well.  He is having approximately 1000 mL of liquid ostomy output daily.  His midline incision is healing well without any signs of infection.  At this time, I think it would be reasonable to move forward with closure of his diverting loop ileostomy in approximately 4 weeks.  1 week prior to surgery, we will order a barium enema study to evaluate the colorectal anastomosis.  He should continue to follow-up with Dr. Nagel.  Risks benefits and alternatives of the procedure were discussed, and patient was willing to proceed with this plan of care.    Plan  Return to office in 1 week for staple removal  Barium enema in 3 weeks  Closure of diverting loop ileostomy in 4 weeks  Continue following with Dr. Hong    History of Present Illness   Kevin Garsia is a 69 y.o. male who presents today for follow-up after low anterior resection with diverting loop ileostomy on 3/19/2025.  Overall, he is doing very well.  He is having approximately 1000 mL of liquid ostomy output daily.  His pain is well-controlled.  He denies any fevers.    Vital Signs  Vitals:    04/03/25 1101   BP: 90/50   Pulse: 78   SpO2: 96%        Physical Exam  Constitutional: Resting comfortably, no acute distress  Neck: Supple, trachea midline  Respiratory: No increased work of breathing, Symmetric excursion  Cardiovascular: Well pefursed, no jugular venous distention evident   Abdominal: Midline incision healing well without any signs of infection.  Ostomy site pink and viable with liquid green output.  Soft, non-tender, non-distended  Lymphatics: No cervical or  suprascapular adenopathy  Skin: Warm, dry, no rash on visualized skin surfaces  Musculoskeletal: Symmetric strength, no obvious gross abnormalities  Psychiatric: Alert and oriented ×3, normal affect           Red Rai MD  Colorectal & General Surgery  [facility]    [address], [address]  [place], [place], [address]  P: [phone]  F: [phone]

## 2025-04-07 ENCOUNTER — OFFICE VISIT (OUTPATIENT)
Dept: FAMILY MEDICINE CLINIC | Facility: CLINIC | Age: 70
End: 2025-04-07
Payer: MEDICARE

## 2025-04-07 VITALS
DIASTOLIC BLOOD PRESSURE: 86 MMHG | HEIGHT: 65 IN | TEMPERATURE: 98 F | WEIGHT: 174 LBS | OXYGEN SATURATION: 95 % | SYSTOLIC BLOOD PRESSURE: 140 MMHG | BODY MASS INDEX: 28.99 KG/M2 | HEART RATE: 108 BPM

## 2025-04-07 DIAGNOSIS — I45.10 RBBB: ICD-10-CM

## 2025-04-07 DIAGNOSIS — I50.32 CHRONIC HEART FAILURE WITH PRESERVED EJECTION FRACTION: ICD-10-CM

## 2025-04-07 DIAGNOSIS — I10 ESSENTIAL HYPERTENSION: Primary | ICD-10-CM

## 2025-04-07 DIAGNOSIS — R00.2 PALPITATION: ICD-10-CM

## 2025-04-07 RX ORDER — DULOXETIN HYDROCHLORIDE 60 MG/1
60 CAPSULE, DELAYED RELEASE ORAL DAILY
Qty: 90 CAPSULE | Refills: 0 | Status: SHIPPED | OUTPATIENT
Start: 2025-04-07

## 2025-04-07 RX ORDER — LOSARTAN POTASSIUM 50 MG/1
50 TABLET ORAL DAILY
Qty: 30 TABLET | Refills: 0 | Status: CANCELLED | OUTPATIENT
Start: 2025-04-07

## 2025-04-07 RX ORDER — LOSARTAN POTASSIUM 25 MG/1
25 TABLET ORAL DAILY
Qty: 30 TABLET | Refills: 0 | Status: SHIPPED | OUTPATIENT
Start: 2025-04-07 | End: 2025-04-08 | Stop reason: SDUPTHER

## 2025-04-07 NOTE — PROGRESS NOTES
"Chief Complaint  Hypertension (Discuss BP medication, dropping too low at home/Brought home readings)    Subjective        Kevin Garsia presents to [facility]  History of Present Illness  Patient presents to the office today for a follow up on hypertension. Blood pressure today is 140/86. With HTN patient has previously been taking metoprolol 25mg daily, losartan 100mg daily.  Patient was discharged from hospital March 24.  Patient was found to have low blood pressure in hospital.  Patient resumed all meds started to have lower blood pressure.  He has since stopped all blood pressure medication, has not had blood pressure medicine in 4 days.  He denies chest pain shortness of air.  Today heart rate is elevated at 101.  He denies chest pain, reports having intermittent episodes of palpitations.  I explained to patient about the need to wean off of metoprolol due to the effects of rebound hypertension.  With elevated heart rate I have advised patient to check blood pressure and continue his metoprolol.  I would like to restart losartan at 25 mg for patient to have only if blood pressure readings start to increase since he has been off medication.  Patient is to follow-up with me in 2 to 3 weeks.  With history of heart failure not currently following cardiology.    Note addendum:  called patient to 4/8/2025 this a.m. to check blood pressure Home health nurse is coming and patient will call back with his blood pressure and heart rate reading.  Blood pressure reading called 4/8/2025 at 9:18 AM blood pressure 131/85 heart rate 95.  Instructed patient to take metoprolol 12.5 mg.  Patient to continue to monitor blood pressure and heart rate.          Objective   Vital Signs:  /86 (BP Location: Left arm, Patient Position: Sitting, Cuff Size: Adult)   Pulse 108   Temp 98 °F (36.7 °C)   Ht 165.1 cm (65\")   Wt 78.9 kg (174 lb)   SpO2 95%   BMI 28.96 kg/m²   Estimated body mass " "index is 28.96 kg/m² as calculated from the following:    Height as of this encounter: 165.1 cm (65\").    Weight as of this encounter: 78.9 kg (174 lb).            Physical Exam  Constitutional:       General: He is not in acute distress.     Appearance: Normal appearance.   HENT:      Head: Normocephalic.   Eyes:      Pupils: Pupils are equal, round, and reactive to light.   Cardiovascular:      Rate and Rhythm: Normal rate.      Pulses: Normal pulses.      Heart sounds: Normal heart sounds.   Pulmonary:      Effort: Pulmonary effort is normal.      Breath sounds: Normal breath sounds.   Abdominal:      General: Bowel sounds are normal.      Palpations: Abdomen is soft.   Musculoskeletal:         General: Normal range of motion.      Cervical back: Normal range of motion and neck supple.   Skin:     General: Skin is warm.   Neurological:      General: No focal deficit present.      Mental Status: He is alert and oriented to person, place, and time.   Psychiatric:         Mood and Affect: Mood normal.         Behavior: Behavior normal.         Thought Content: Thought content normal.         Judgment: Judgment normal.        Result Review :  The following data was reviewed by: BOBBY Portillo on 04/07/2025:  Common labs          3/13/2025    15:06 3/20/2025    06:32 3/23/2025    07:01   Common Labs   Glucose 92  155  126    BUN 13  10  11    Creatinine 0.87  0.83  0.95    Sodium 142  138  136    Potassium 4.1  4.2  3.9    Chloride 102  100  102    Calcium 10.2  9.5  9.1    Albumin 4.2      Total Bilirubin 0.5      Alkaline Phosphatase 113      AST (SGOT) 28      ALT (SGPT) 21      WBC 7.73  12.51     Hemoglobin 13.2  13.9     Hematocrit 40.6  41.1     Platelets 238  255       Data reviewed : Radiologic studies EKG, MRI pelvis      ECG 12 Lead    Date/Time: 4/7/2025 8:53 AM  Performed by: Priti Oliveira APRN    Authorized by: Priti Oliveira APRN  Comparison: compared with previous ECG from " 11/19/2024  Similar to previous ECG  Rhythm: sinus tachycardia  Rate: tachycardic  BPM: 101  Conduction: right bundle branch block and left anterior fascicular block    Clinical impression: abnormal EKG  Comments: Refer cardiology             Assessment and Plan   Diagnoses and all orders for this visit:    1. Essential hypertension (Primary)  Assessment & Plan:  Blood pressure 140/86.  Patient has not had medication in 4 days.  Heart rate is at 101.  Patient currently prescribed losartan 100 mg and metoprolol 25 mg.  Patient has been checking blood pressure at home with hypotensive and symptomatic blood pressures.  Patient instructed to restart metoprolol 25 mg tomorrow morning, checking blood pressure before taking and then 30 minutes after taking.  I am restarting losartan at 25 mg if blood pressure is staying elevated.  Patient has been instructed not to resume losartan unless blood pressure readings are greater than 150/90.  Patient verbalized understanding.  Medication changes per orders.  Dietary sodium restriction.  Weight loss.  Regular aerobic exercise.  Ambulatory blood pressure monitoring.  Referral to cardiology  Blood pressure will be reassessedat the next regular appointment.    Orders:  -     losartan (COZAAR) 25 MG tablet; Take 1 tablet by mouth Daily.  Dispense: 30 tablet; Refill: 0  -     Ambulatory Referral to Cardiology    2. Chronic heart failure with preserved ejection fraction  Assessment & Plan:  Stable    Orders:  -     Ambulatory Referral to Cardiology    3. Palpitation  Assessment & Plan:  No EKG changes of acute ischemia, no clinical evidence of acute MI, responsibility rests with patient to return for follow up for any CP/SOA, go to ER for any further signs or symptoms      Orders:  -     ECG 12 Lead  -     Holter Monitor - 72 Hour Up To 15 Days; Future  -     Ambulatory Referral to Cardiology    4. RBBB  -     Holter Monitor - 72 Hour Up To 15 Days; Future  -     Ambulatory Referral to  Cardiology      Patient directed to go to the emergency room for chest pain shortness of air dizziness high per tensive urgency and/or hypotension.  Patient verbalized understanding.      Note addendum:  called patient to 4/8/2025 this a.m. to check blood pressure Home health nurse is coming and patient will call back with his blood pressure and heart rate reading.  Blood pressure reading called 4/8/2025 at 9:18 AM blood pressure 131/85 heart rate 95.  Instructed patient to take metoprolol 12.5 mg.  Patient to continue to monitor blood pressure and heart rate.         I spent 30 minutes caring for Kevin on this date of service. This time includes time spent by me in the following activities:preparing for the visit, reviewing tests, obtaining and/or reviewing a separately obtained history, performing a medically appropriate examination and/or evaluation , counseling and educating the patient/family/caregiver, ordering medications, tests, or procedures, referring and communicating with other health care professionals , documenting information in the medical record, independently interpreting results and communicating that information with the patient/family/caregiver, and care coordination  Follow Up   Return in about 3 weeks (around 4/28/2025) for Recheck.  Patient was given instructions and counseling regarding his condition or for health maintenance advice. Please see specific information pulled into the AVS if appropriate.

## 2025-04-08 DIAGNOSIS — I10 ESSENTIAL HYPERTENSION: ICD-10-CM

## 2025-04-08 PROBLEM — I45.10 RBBB: Status: ACTIVE | Noted: 2025-04-08

## 2025-04-08 PROBLEM — R00.2 PALPITATION: Status: ACTIVE | Noted: 2025-04-08

## 2025-04-08 NOTE — ASSESSMENT & PLAN NOTE
Blood pressure 140/86.  Patient has not had medication in 4 days.  Heart rate is at 101.  Patient currently prescribed losartan 100 mg and metoprolol 25 mg.  Patient has been checking blood pressure at home with hypotensive and symptomatic blood pressures.  Patient instructed to restart metoprolol 25 mg tomorrow morning, checking blood pressure before taking and then 30 minutes after taking.  I am restarting losartan at 25 mg if blood pressure is staying elevated.  Patient has been instructed not to resume losartan unless blood pressure readings are greater than 150/90.  Patient verbalized understanding.  Medication changes per orders.  Dietary sodium restriction.  Weight loss.  Regular aerobic exercise.  Ambulatory blood pressure monitoring.  Referral to cardiology  Blood pressure will be reassessedat the next regular appointment.

## 2025-04-08 NOTE — ASSESSMENT & PLAN NOTE
Patient is stable, cancer free, completing additional radiation to ensure cancer is no longer there.

## 2025-04-09 ENCOUNTER — CLINICAL SUPPORT (OUTPATIENT)
Dept: SURGERY | Facility: CLINIC | Age: 70
End: 2025-04-09
Payer: MEDICARE

## 2025-04-09 RX ORDER — LOSARTAN POTASSIUM 25 MG/1
25 TABLET ORAL DAILY
Qty: 90 TABLET | Refills: 1 | Status: SHIPPED | OUTPATIENT
Start: 2025-04-09

## 2025-04-09 NOTE — PROGRESS NOTES
Patient presented to the office this morning for staple removal.  He tolerated well removal of staples.  Incisions appeared clean and no sign of infection.  Mastisol and steri strips applied to incisions.

## 2025-04-14 ENCOUNTER — APPOINTMENT (OUTPATIENT)
Dept: ONCOLOGY | Facility: HOSPITAL | Age: 70
End: 2025-04-14
Payer: MEDICARE

## 2025-04-14 ENCOUNTER — CLINICAL SUPPORT (OUTPATIENT)
Dept: OTHER | Facility: HOSPITAL | Age: 70
End: 2025-04-14
Payer: MEDICARE

## 2025-04-14 ENCOUNTER — OFFICE VISIT (OUTPATIENT)
Dept: ONCOLOGY | Facility: CLINIC | Age: 70
End: 2025-04-14
Payer: MEDICARE

## 2025-04-14 ENCOUNTER — INFUSION (OUTPATIENT)
Dept: ONCOLOGY | Facility: HOSPITAL | Age: 70
End: 2025-04-14
Payer: MEDICARE

## 2025-04-14 VITALS
SYSTOLIC BLOOD PRESSURE: 121 MMHG | OXYGEN SATURATION: 97 % | HEART RATE: 73 BPM | WEIGHT: 172.7 LBS | BODY MASS INDEX: 28.78 KG/M2 | HEIGHT: 65 IN | TEMPERATURE: 98.3 F | RESPIRATION RATE: 17 BRPM | DIASTOLIC BLOOD PRESSURE: 74 MMHG

## 2025-04-14 DIAGNOSIS — C20 RECTAL ADENOCARCINOMA: Primary | ICD-10-CM

## 2025-04-14 DIAGNOSIS — Z45.2 FITTING AND ADJUSTMENT OF VASCULAR CATHETER: ICD-10-CM

## 2025-04-14 DIAGNOSIS — C20 RECTAL ADENOCARCINOMA: ICD-10-CM

## 2025-04-14 LAB
ALBUMIN SERPL-MCNC: 3.9 G/DL (ref 3.5–5.2)
ALBUMIN/GLOB SERPL: 1.4 G/DL
ALP SERPL-CCNC: 117 U/L (ref 39–117)
ALT SERPL W P-5'-P-CCNC: 14 U/L (ref 1–41)
ANION GAP SERPL CALCULATED.3IONS-SCNC: 10 MMOL/L (ref 5–15)
AST SERPL-CCNC: 18 U/L (ref 1–40)
BASOPHILS # BLD AUTO: 0.06 10*3/MM3 (ref 0–0.2)
BASOPHILS NFR BLD AUTO: 0.8 % (ref 0–1.5)
BILIRUB SERPL-MCNC: 0.4 MG/DL (ref 0–1.2)
BUN SERPL-MCNC: 19 MG/DL (ref 8–23)
BUN/CREAT SERPL: 16.1 (ref 7–25)
CALCIUM SPEC-SCNC: 9.8 MG/DL (ref 8.6–10.5)
CHLORIDE SERPL-SCNC: 109 MMOL/L (ref 98–107)
CO2 SERPL-SCNC: 21 MMOL/L (ref 22–29)
CREAT SERPL-MCNC: 1.18 MG/DL (ref 0.76–1.27)
DEPRECATED RDW RBC AUTO: 53.5 FL (ref 37–54)
EGFRCR SERPLBLD CKD-EPI 2021: 66 ML/MIN/1.73
EOSINOPHIL # BLD AUTO: 0.49 10*3/MM3 (ref 0–0.4)
EOSINOPHIL NFR BLD AUTO: 6.4 % (ref 0.3–6.2)
ERYTHROCYTE [DISTWIDTH] IN BLOOD BY AUTOMATED COUNT: 14.7 % (ref 12.3–15.4)
GLOBULIN UR ELPH-MCNC: 2.8 GM/DL
GLUCOSE SERPL-MCNC: 113 MG/DL (ref 65–99)
HCT VFR BLD AUTO: 35.2 % (ref 37.5–51)
HGB BLD-MCNC: 11.5 G/DL (ref 13–17.7)
IMM GRANULOCYTES # BLD AUTO: 0.02 10*3/MM3 (ref 0–0.05)
IMM GRANULOCYTES NFR BLD AUTO: 0.3 % (ref 0–0.5)
LYMPHOCYTES # BLD AUTO: 1.95 10*3/MM3 (ref 0.7–3.1)
LYMPHOCYTES NFR BLD AUTO: 25.5 % (ref 19.6–45.3)
MCH RBC QN AUTO: 32.9 PG (ref 26.6–33)
MCHC RBC AUTO-ENTMCNC: 32.7 G/DL (ref 31.5–35.7)
MCV RBC AUTO: 100.6 FL (ref 79–97)
MONOCYTES # BLD AUTO: 0.49 10*3/MM3 (ref 0.1–0.9)
MONOCYTES NFR BLD AUTO: 6.4 % (ref 5–12)
NEUTROPHILS NFR BLD AUTO: 4.65 10*3/MM3 (ref 1.7–7)
NEUTROPHILS NFR BLD AUTO: 60.6 % (ref 42.7–76)
NRBC BLD AUTO-RTO: 0 /100 WBC (ref 0–0.2)
PLATELET # BLD AUTO: 219 10*3/MM3 (ref 140–450)
PMV BLD AUTO: 10.1 FL (ref 6–12)
POTASSIUM SERPL-SCNC: 5.2 MMOL/L (ref 3.5–5.2)
PROT SERPL-MCNC: 6.7 G/DL (ref 6–8.5)
RBC # BLD AUTO: 3.5 10*6/MM3 (ref 4.14–5.8)
SODIUM SERPL-SCNC: 140 MMOL/L (ref 136–145)
WBC NRBC COR # BLD AUTO: 7.66 10*3/MM3 (ref 3.4–10.8)

## 2025-04-14 PROCEDURE — 25010000002 HEPARIN LOCK FLUSH PER 10 UNITS: Performed by: INTERNAL MEDICINE

## 2025-04-14 PROCEDURE — 3078F DIAST BP <80 MM HG: CPT | Performed by: INTERNAL MEDICINE

## 2025-04-14 PROCEDURE — 36591 DRAW BLOOD OFF VENOUS DEVICE: CPT

## 2025-04-14 PROCEDURE — 1126F AMNT PAIN NOTED NONE PRSNT: CPT | Performed by: INTERNAL MEDICINE

## 2025-04-14 PROCEDURE — 3074F SYST BP LT 130 MM HG: CPT | Performed by: INTERNAL MEDICINE

## 2025-04-14 PROCEDURE — 80053 COMPREHEN METABOLIC PANEL: CPT

## 2025-04-14 PROCEDURE — 99214 OFFICE O/P EST MOD 30 MIN: CPT | Performed by: INTERNAL MEDICINE

## 2025-04-14 PROCEDURE — 85025 COMPLETE CBC W/AUTO DIFF WBC: CPT

## 2025-04-14 RX ORDER — HEPARIN SODIUM (PORCINE) LOCK FLUSH IV SOLN 100 UNIT/ML 100 UNIT/ML
500 SOLUTION INTRAVENOUS AS NEEDED
OUTPATIENT
Start: 2025-04-14

## 2025-04-14 RX ORDER — SODIUM CHLORIDE 0.9 % (FLUSH) 0.9 %
10 SYRINGE (ML) INJECTION AS NEEDED
OUTPATIENT
Start: 2025-04-14

## 2025-04-14 RX ORDER — SODIUM CHLORIDE 0.9 % (FLUSH) 0.9 %
10 SYRINGE (ML) INJECTION AS NEEDED
Status: DISCONTINUED | OUTPATIENT
Start: 2025-04-14 | End: 2025-04-14 | Stop reason: HOSPADM

## 2025-04-14 RX ORDER — HEPARIN SODIUM (PORCINE) LOCK FLUSH IV SOLN 100 UNIT/ML 100 UNIT/ML
500 SOLUTION INTRAVENOUS AS NEEDED
Status: DISCONTINUED | OUTPATIENT
Start: 2025-04-14 | End: 2025-04-14 | Stop reason: HOSPADM

## 2025-04-14 RX ADMIN — Medication 500 UNITS: at 09:06

## 2025-04-14 RX ADMIN — Medication 10 ML: at 09:06

## 2025-04-14 NOTE — PROGRESS NOTES
REASON FOR FOLLOW UP:  stage III mid to upper well-differentiated rectal adenocarcinoma (cT3, cN2 CM0.).    History of Present Illness   He returns today April 14, 2025.  On 3/20/2025 he underwent a low anterior resection with diverting loop ileostomy and appendectomy.  Fortunately his postoperative course was uneventful though he developed a high output ileostomy that required loperamide to slow it down.  Pathology revealed a pT2 N0 well-differentiated mid rectal adenocarcinoma with treatment effect.    He was seen back April 3, 2025 having high output through his ostomy and it was felt that it would be reasonable to move forward with closure of his diverting loop ileostomy in 4 weeks.  It was discussed that chemotherapy adjuvantly would be held until he recovered postoperatively.    He is next seen April 14, 2025.  His ostomy still has variable output but he has been able to manage this and he is quite willing to proceed as above per his subsequent surgery and reconsideration of adjuvant therapy postop.    ONCOLOGY HISTORY:  The patient is a 69-year-old male who is referred for recently diagnosed stage III mid to upper well-differentiated rectal adenocarcinoma (cT3, cN2 CM0.).    Patient undergone a screening colonoscopy 10/18/2024 demonstrating a circumferential mass involving the rectum extending from 10 cm to 12 cm with biopsy of 15 cm consistent with invasive well-differentiated adenocarcinoma with ulceration necroinflammatory debris.    This was felt to be microsatellite stable by IHC as well as including MSI by PCR.    If follow-up with infectious disease 11/1/2024 there being concern about resuming hypersexual activity and that he start preexposure prophylaxis.  He last been seen July 2024 on Descovy still in relationship with his partner and currently monogamous with been having bowel changes underwent colonoscopy with the above diagnosis.  As result of his need for therapy Descovy was discontinued and the  issue to be reevaluated once he was successfully treated.    MRI of the pelvis performed 11/4/2024 demonstrates a high rectal mass extending to the proximal sigmoid colon representing a T3d N2 rectal tumor, side effect invasion on the superior aspect of the mass approximates between the mesorectum and peritoneal cavity?    CT chest 11/8 demonstrates pleural plaques along the peripheral aspect of both the right lower lobes.  These are of uncertain significance.    The patient was next seen 11/14/2024 by colorectal surgery without evidence of obstruction or bleeding.  He was thought a excellent candidate for neoadjuvant chemotherapy followed by mesorectal excision and adjuvant chemotherapy-comparable to the prospect trial.  There was concern about the location of the tumor extending down to the rectum and the avoidance of radiation therapy since the tumor appeared to be resectable.  Was the role of laparoscopic low anterior resection total mesorectal excision and creation of a diverting loop ileostomy temporarily discussed.    As resulted above the patient is now referred to discuss this above approach.  He is seen with his significant other and we have discussed his findings in great detail from initial diagnosis and and subsequent surgical assessment leading to the recommendation of neoadjuvant chemotherapy given in the fashion of the PROSPECT Trial which was designed to determine whether neoadjuvant chemotherapy could be used as an alternative to neoadjuvant chemoRT.  This study demonstrated that similar disease-free survival and overall survival was similar to neoadjuvant CRT alone for eligible patients.  This would include the use of 6 cycles of FOLFOX chemotherapy followed by reassessment and if a clinical response and 20% or more was seen then RT would be admitted, proceeding to surgical resection and subsequent adjuvant chemotherapy.    He returns 12/2/2024, for Cycle 1 Day 1 FOLFOX. He does not have any new  concerns today. He remains fatigued and experiences intermittent lightheadedness. He denies shortness of breath. He denies abdominal pain. His stools are black, but he denies rome blood. Despite starting oral iron, his hemoglobin has decreased further. He is not tolerating the oral iron at this time. Patient was started on Feraheme.    Patient returns on 12/16/2024 for cycle 2 FOLFOX.  He reports he is tolerated treatment well thus far and denying any nausea, vomiting, changes to his bowels.  He did have blood in his stool following cycle 1 1 time.  That has not reoccurred.  He did start Feraheme the day of disconnect with cycle 1 and has had improvement in his hemoglobin.  He will be due for his second dose of Feraheme today.  He denies increased neuropathy.    The patient is next evaluated 12/30/2024 for his third cycle of FOLFOX.  He has undergone Feraheme given 12/4/2024 and 12/16/2024.  He is feeling reasonably good without neuropathic symptoms.  We have discussed his scheduling and timing as he proceeds through his treatment including subsequent repeat MR pelvis after his 6 cycle of FOLFOX.    He returns 1/13/2025 for follow up and treatment.  He has been positive for COVID since his last office visit and called in at which time we did start him on paxlovid.  He reports on starting the Paxlovid he was much improved.  He does have a scant cough at times with no lingering shortness of breath.  Report neuropathy lasting approximately 3 days following last treatment that was not always associated with cold intolerance.  This is now resolved and had involved his hands primarily.  We went on to proceed with cycle 4 FOLFOX, obtained ionized calcium, PTH, vitamin D and PTH related peptide testing.  The studies include a PTH of 25, ionized calcium of 1.41, PTH related peptide less than 2.0, vitamin D level 67.1.    He is next seen 1/27/2025 for cycle #5 FOLFOX out of 6 planned prior to repeat MRI.  He is doing well  with treatment with minimal neuropathy which recovers quickly, no additional fatigue and is without prolonged cytopenias.  He is trying to plan a wedding in and around his surgery and may need dental extractions soon.  He is also having back pain after recent exercising and is pursuing an epidural.    The patient is seen 2/26/2025 improved from previous and is status post MRI of the pelvis 2/18/2025 demonstrating a response to therapy improved from previous from 11/4/2024 with a radiologic estimate of T3c compared to T3 DM2.  In review of this study enlarging tumor signal extends superiorly from the mass in close approximation between the mesorectum and peritoneal cavity and the previously seen suspicious lymph nodes appear to have improved from previous.    He returns today April 14, 2025.  On 3/20/2025 he underwent a low anterior resection with diverting loop ileostomy and appendectomy.  Fortunately his postoperative course was uneventful though he developed a high output ileostomy that required loperamide to slow it down.  Pathology revealed a pT2 N0 well-differentiated mid rectal adenocarcinoma with treatment effect.    He was seen back April 3, 2025 having high output through his ostomy and it was felt that it would be reasonable to move forward with closure of his diverting loop ileostomy in 4 weeks.  It was discussed that chemotherapy adjuvantly would be held until he recovered postoperatively.    He is next seen April 14, 2025.  His ostomy still has variable output but he has been able to manage this and he is quite willing to proceed as above per his subsequent surgery and reconsideration of adjuvant therapy postop.    Past Surgical History:   Procedure Laterality Date    ANGIOPLASTY CAROTID ARTERY      CAROTID ENDARTERECTOMY Left     COLON RESECTION N/A 03/19/2025    Procedure: Laparoscopic Converted to Open Low Anterior Resection, Ostomy Creation, and Appendetomy;  Surgeon: Naeem Rai MD;   Location: [facility] MAIN OR;  Service: General;  Laterality: N/A;    COLONOSCOPY N/A 10/18/2024    Procedure: COLONOSCOPY TO CECUM/TI WITH BIOPSIES;  Surgeon: Marcus Christine Jr., MD;  Location: [facility] ENDOSCOPY;  Service: General;  Laterality: N/A;  PRE-SCREENING, FAMILY HX COLON CANCER  POST-DIVERTICULOSIS, RECTAL MASS    FOOT SURGERY      INSERTION PROSTATE RADIATION SEED      LUMBAR EPIDURAL INJECTION N/A 02/14/2025    Procedure: L4/L5 LUMBAR EPIDURAL STEROID INJECTION CPT: 21078;  Surgeon: Vandana Ordonez MD;  Location: [facility] MAIN OR;  Service: Pain Management;  Laterality: N/A;    TOOTH EXTRACTION  2025    VENOUS ACCESS DEVICE (PORT) INSERTION N/A 11/22/2024    Procedure: INSERTION VENOUS ACCESS DEVICE;  Surgeon: Naeem Rai MD;  Location: [facility] MAIN OR;  Service: General;  Laterality: N/A;        Current Outpatient Medications on File Prior to Visit   Medication Sig Dispense Refill    acetaminophen (TYLENOL) 500 MG tablet Take 2 tablets by mouth Every 6 (Six) Hours As Needed for Mild Pain.      aspirin 81 MG chewable tablet Chew 1 tablet Daily. HELD FOR OR      atorvastatin (LIPITOR) 80 MG tablet Take 1 tablet by mouth Daily. 30 tablet 3    Cyanocobalamin (VITAMIN B 12 PO) Take 1 tablet by mouth Daily.      DULoxetine (CYMBALTA) 60 MG capsule TAKE 1 CAPSULE BY MOUTH DAILY 90 capsule 0    enoxaparin sodium (LOVENOX) 40 MG/0.4ML solution prefilled syringe syringe Inject 0.4 mL under the skin into the appropriate area as directed Daily for 23 days. Indications: Prevention of Unwanted Clot in Veins 9.2 mL 0    ferrous sulfate 325 (65 Fe) MG tablet Take 1 tablet by mouth Daily With Breakfast.      gabapentin (NEURONTIN) 300 MG capsule Take 2 capsules by mouth 3 (Three) Times a Day. 540 capsule 1    loperamide (IMODIUM) 2 MG capsule Take 1 capsule by mouth 4 (Four) Times a Day Before Meals & at Bedtime for 30 days. 120 capsule 0    losartan (COZAAR) 25 MG tablet Take 1 tablet by mouth Daily. 90 tablet 1     metFORMIN (GLUCOPHAGE) 1000 MG tablet Take 1 tablet by mouth 2 (Two) Times a Day With Meals. 90 tablet 2    metoprolol tartrate (LOPRESSOR) 25 MG tablet Take 1 tablet by mouth Daily. 90 tablet 0    ondansetron (ZOFRAN) 8 MG tablet Take 1 tablet by mouth 3 (Three) Times a Day As Needed for Nausea or Vomiting. 30 tablet 5    Psyllium (METAMUCIL MULTIHEALTH FIBER) 51.7 % packet Take 1 packet by mouth Daily for 30 days. 30 packet 0    tamsulosin (FLOMAX) 0.4 MG capsule 24 hr capsule Take 1 capsule by mouth every night at bedtime.      vitamin D (ERGOCALCIFEROL) 1.25 MG (40955 UT) capsule capsule TAKE 1 CAPSULE BY MOUTH 1 TIME EVERY WEEK (Patient taking differently: Take 1 capsule by mouth 1 (One) Time Per Week. SATURDAYS) 12 capsule 0     No current facility-administered medications on file prior to visit.        ALLERGIES:  No Known Allergies     Social History     Socioeconomic History    Marital status: Significant Other   Tobacco Use    Smoking status: Former     Types: Cigarettes    Smokeless tobacco: Never    Tobacco comments:     QUIT 1990     1 TO 3 PPD X 20 YEARS    Vaping Use    Vaping status: Former    Substances: CBD    Devices: Disposable   Substance and Sexual Activity    Alcohol use: No    Drug use: Not Currently    Sexual activity: Defer        Family History   Problem Relation Age of Onset    Bone cancer Mother     COPD Father     Hypertension Father     Stroke Father         TIA    Bone cancer Father         smoker    Lung cancer Father     Diabetes Father     No Known Problems Sister     No Known Problems Brother     No Known Problems Maternal Grandmother     No Known Problems Maternal Grandfather     No Known Problems Paternal Grandmother     Colon cancer Paternal Grandfather     Mental retardation Brother     Malig Hyperthermia Neg Hx           Objective     Vitals:    04/14/25 0834   BP: 121/74   Pulse: 73   Resp: 17   Temp: 98.3 °F (36.8 °C)   TempSrc: Oral   SpO2: 97%   Weight: 78.3 kg (172 lb  "11.2 oz)   Height: 165.1 cm (65\")   PainSc: 0-No pain           4/14/2025     8:34 AM   Current Status   ECOG score 0       Physical Exam  Constitutional:       Appearance: Normal appearance.   Cardiovascular:      Rate and Rhythm: Normal rate.      Heart sounds: Normal heart sounds.   Pulmonary:      Effort: Pulmonary effort is normal.      Breath sounds: Normal breath sounds.   Abdominal:      Palpations: Abdomen is soft.   Skin:     General: Skin is warm and dry.   Neurological:      Mental Status: He is oriented to person, place, and time.         RECENT LABS:  Results from last 7 days   Lab Units 04/14/25  0817   WBC 10*3/mm3 7.66   NEUTROS ABS 10*3/mm3 4.65   HEMOGLOBIN g/dL 11.5*   HEMATOCRIT % 35.2*   PLATELETS 10*3/mm3 219                 Assessment & Plan   *Stage III mid to upper well differentiated rectal adenocarcinoma (cT3, cN2, cM0)     69-year-old male with a history of of diabetes, CHF GE reflux hyperlipidemia, hypertension, TIA, possible CVA as well as a history of prostate cancer status post XRT.  He had been recently having a change in bowel habit ultimately leading to additional assessment.  This led to a screening colonoscopy 10/18/2024demonstrating a circumferential mass involving the rectum extending from 10 cm to 12 cm with biopsy of 15 cm consistent with invasive well-differentiated adenocarcinoma with ulceration necroinflammatory debris. This was felt to be microsatellite stable by IHC as well as including MSI by PCR.  MRI of the pelvis performed 11/4/2024 demonstrates a high rectal mass extending to the proximal sigmoid colon representing a T3d N2 rectal tumor, side effect invasion on the superior aspect of the mass approximates between the mesorectum and peritoneal cavity?  Additional staging 11/8/2024 includes a CT of the chest demonstrating small pleural plaques along the posterior aspect of both the right lower lobes of uncertain significance.  There are no other substantial findings " though we have discussed this as leading to a PET/CT to complete his staging.  11/14/2024 by colorectal surgery, Dr. Naeem Rai, without evidence of obstruction or bleeding.  He was thought a excellent candidate for neoadjuvant chemotherapy followed by mesorectal excision and adjuvant chemotherapy-comparable to the PROSPECT trial.  There was concern about the location of the tumor extending down to the rectum and the avoidance of radiation therapy since the tumor appeared to be resectable.  There was also the concern of his previous history of radiation therapy.  Further discussed was the role of laparoscopic low anterior resection, total mesorectal excision, and creation of a diverting loop ileostomy temporarily utilized also discussed.  11/20/2024 and we have reviewed the PROSPECT Trial which was designed to determine whether neoadjuvant chemotherapy could be used as an alternative to neoadjuvant chemoRT.  This study demonstrated that similar disease-free survival and overall survival was similar to neoadjuvant CRT alone for eligible patients.  This would include the use of 6 cycles of FOLFOX chemotherapy followed by reassessment and if a clinical response and 20% or more was seen then RT would be admitted, proceeding to surgical resection and subsequent adjuvant chemotherapy.  After discussion the patient agrees to proceed.  12/2/2024: Proceed with C1D1 FOLFOX. Hemoglobin has declined to 9.9 today secondary to malignancy- ongoing bleeding and worsening iron deficiency. He has been taking oral iron, however, is not tolerating this well, therefore will plan for IV iron pending insurance approval.   12/16/2024: Proceed with cycle 2-day 1 FOLFOX today.  Patient is tolerating well.  The patient is next evaluated 12/30/2024 for his third cycle of FOLFOX.  He has undergone Feraheme given 12/4/2024 and 12/16/2024.  He is feeling reasonably good without neuropathic symptoms.  We have discussed his scheduling and  timing as he proceeds through his treatment including subsequent repeat MR pelvis after his 6 cycle of FOLFOX.  1/13/2025 Returns for cycle 4 FOLFOX today and was COVID positive 1/6/2025 and treated with Paxlovid and thereafter improved.  He has a scant cough remaining otherwise clear lung sounds.  Will proceed with treatment today.  Patient seen 1/27/2025 recovered from COVID, generally improved performance status and plans for cycle 5 FOLFOX at a 6 planned.  2/10/205 proceed with cycle 6 FOLFOX.  Following 6 cycle of neoadjuvant therapy will proceed with MRI for surgical planning.  The patient was referred today to Dr. Rai  Subsequent repeat repeat MRI of the pelvis 2/18/2025 demonstrating a response to therapy improved from previous from 11/4/2024 with a radiologic estimate of T3c compared to T3 DM2.  In review of this study enlarging tumor signal extends superiorly from the mass in close approximation between the mesorectum and peritoneal cavity and the previously seen suspicious lymph nodes appear to have improved from previous.  Surgical evaluation anticipated with Dr. Rai 2/27/2025  3/13/2025: He is scheduled for surgery with Dr. Rai on 3/19/2025  He returns today April 14, 2025.  On 3/20/2025 he underwent a low anterior resection with diverting loop ileostomy and appendectomy.  Fortunately his postoperative course was uneventful though he developed a high output ileostomy that required loperamide to slow it down.  Pathology revealed a pT2 N0 well-differentiated mid rectal adenocarcinoma with treatment effect.  He was seen back April 3, 2025 having high output through his ostomy and it was felt that it would be reasonable to move forward with closure of his diverting loop ileostomy in 4 weeks.  It was discussed that chemotherapy adjuvantly would be held until he recovered postoperatively.  He is next seen April 14, 2025.  His ostomy still has variable output but he has been able to manage this and  he is quite willing to proceed as above per his subsequent surgery and reconsideration of adjuvant therapy postop.    *Anemia   12/2/2024: Hemoglobin 9.9 today. Patient is not tolerating oral iron, therefore, will plan to proceed with IV iron.    12/16/2024 hemoglobin has improved today to 10.8.  Proceeded with the second dose of Feraheme  Reassessment 414/25-H&H 11.5 and 35.2    *COVID positive 1/6/2025 trreated with paxlovid  Resolved symptoms 1/27/2025    *Hypercalcemia  1/13/2025 calcium 11 with normal albumin.  Discussed with Dr. Nagel and additional labs added including ionized calcium, PTH, PTH related peptide and vitamin D level as he has been on high-dose vitamin D.  Asked patient to hold vitamin D supplement until additional labs have resulted.  Will also recheck CMP on Wednesday at disconnect.  Subsequent ionized calcium, PTH, vitamin D and PTH related peptide testing.  The studies include a PTH of 25, ionized calcium of 1.41, PTH related peptide less than 2.0, vitamin D level 67.1.  Repeat calcium 1/20/2025 at 9.1  2/10/2025 calcium normal at 9.2    Plan:     *Currently hold additional adjuvant chemotherapy  *Hematology studies are acceptable  *6-week follow-up MD, FOLFOX adjuvant chemotherapy to proceed pending patient's status.  *Patient agreeable this plan and follow-up      Kevin Nagel MD  04/14/2025

## 2025-04-14 NOTE — PROGRESS NOTES
OUTPATIENT ONCOLOGY NUTRITION FOLLOW UP    Patient Name: Kevin Garsia  YOB: 1955  MRN: [id number]  Assessment Date: 4/14/2025    COMMENTS:  Follow up.  Pt is S/P lower anterior resection with diverting loop ileostomy and appy on 3/20. He is taking loperamide to slow down his ostomy output and states it is working well. Plan is closure of loop ileostomy in 4 weeks.  Adjuvant therapy is being held. Labs and meds today reviewed. Hgb 11.5, Na+ 140, Glu 113.    Met with pt today in the infusion area.  He reports his appetite continues to be great and he is feeling really good. Has dry mouth and uses Biotene and tries to stay well hydrated.  Drinks hydration beverages to help. Loperamide is helping slow his ostomy output and he has quit eating sugar. Weight is down to 172lbs which is is happy about.  Wants to lost about 10 more pounds.  Has started walking more on the treadmill.    Advised importance of healthy weight loss and to continue with exercise and eating healthy. Reinforced importance of hydration. Encouraged pt to reach out to oncology nutrition should he start experiencing any changes in appetite, bowel issues or N/V. Pt very appreciative of visit. Will be available as needed.         Reason for Assessment Follow up     Diagnosis/Problem   Stage III rectal cancer   Treatment Plan Chemotherapy FOLFOX   Frequency Q 2 weeks x 6 weeks   Goal of cancer treatment Neoadjuvant   Comments:          Encounter Information        Nutrition/Diet History:  Eating a sugar free diet, no nutrition issues   Oral Nutrition Supplements:    Factors/Symptoms Affecting Intake: Other   Comments:      Medical/Surgical History Past Medical History:   Diagnosis Date    Anemia     Aphasia     Arthritis     CTS (carpal tunnel syndrome)     Depression     Diabetes mellitus     GERD (gastroesophageal reflux disease)     History of carotid stenosis     HX LEFT CAROTID ENDARTERECTOMY    History of prostate cancer 2012    HX  "SEED, RADIATION    History of radiation therapy     History of transient ischemic attack (TIA)     S/P CAROTID ENDARTERECTOMY 2019    Hyperlipidemia     Hypertension     Multiple gastric ulcers     Neuropathy     Rectal adenocarcinoma 2024    Seasonal allergies     Sleep apnea     cpap    Stroke     Tattoos     TIA (transient ischemic attack)        Past Surgical History:   Procedure Laterality Date    ANGIOPLASTY CAROTID ARTERY      CAROTID ENDARTERECTOMY Left     COLON RESECTION N/A 03/19/2025    Procedure: Laparoscopic Converted to Open Low Anterior Resection, Ostomy Creation, and Appendetomy;  Surgeon: Naeem Rai MD;  Location: [facility] MAIN OR;  Service: General;  Laterality: N/A;    COLONOSCOPY N/A 10/18/2024    Procedure: COLONOSCOPY TO CECUM/TI WITH BIOPSIES;  Surgeon: Marcus Christine Jr., MD;  Location: [facility] ENDOSCOPY;  Service: General;  Laterality: N/A;  PRE-SCREENING, FAMILY HX COLON CANCER  POST-DIVERTICULOSIS, RECTAL MASS    FOOT SURGERY      INSERTION PROSTATE RADIATION SEED      LUMBAR EPIDURAL INJECTION N/A 02/14/2025    Procedure: L4/L5 LUMBAR EPIDURAL STEROID INJECTION CPT: 34247;  Surgeon: Vandana Ordonez MD;  Location: [facility] MAIN OR;  Service: Pain Management;  Laterality: N/A;    TOOTH EXTRACTION  2025    VENOUS ACCESS DEVICE (PORT) INSERTION N/A 11/22/2024    Procedure: INSERTION VENOUS ACCESS DEVICE;  Surgeon: Naeem Rai MD;  Location: [facility] MAIN OR;  Service: General;  Laterality: N/A;               Anthropometrics        Current Height Ht Readings from Last 1 Encounters:   04/14/25 165.1 cm (65\")      Current Weight Wt Readings from Last 1 Encounters:   04/14/25 78.3 kg (172 lb 11.2 oz)      BMI  23.5   Usual Body Weight (UBW) 185-190lb   Weight Change/Trend Loss   Weight History Wt Readings from Last 30 Encounters:   04/14/25 0834 78.3 kg (172 lb 11.2 oz)   04/07/25 1614 78.9 kg (174 lb)   04/03/25 1101 77.9 kg (171 lb 12.8 oz)   03/21/25 0507 85.5 kg (188 lb " "7.9 oz)   03/17/25 1254 85.5 kg (188 lb 6.4 oz)   03/14/25 1251 85.3 kg (188 lb)   03/13/25 1542 85.1 kg (187 lb 11.2 oz)   02/27/25 1452 85.3 kg (188 lb)   02/26/25 1201 84.1 kg (185 lb 4.8 oz)   02/14/25 0935 81.2 kg (179 lb)   02/10/25 0902 84.6 kg (186 lb 9.6 oz)   02/04/25 0808 85.6 kg (188 lb 12.8 oz)   01/27/25 0753 85 kg (187 lb 6.4 oz)   01/13/25 0858 83.9 kg (184 lb 14.4 oz)   12/30/24 0919 85.2 kg (187 lb 12.8 oz)   12/16/24 0837 84.6 kg (186 lb 8 oz)   12/11/24 0910 84.1 kg (185 lb 8 oz)   12/04/24 1013 87 kg (191 lb 12.8 oz)   12/02/24 0837 85.9 kg (189 lb 4.8 oz)   11/22/24 1457 84.3 kg (185 lb 14.4 oz)   11/19/24 0951 83 kg (183 lb)   11/20/24 1521 84.6 kg (186 lb 6.4 oz)   11/14/24 1517 84.6 kg (186 lb 9.6 oz)   11/01/24 1239 83.3 kg (183 lb 9.6 oz)   11/04/24 1904 83 kg (183 lb)   10/18/24 0754 83.6 kg (184 lb 4.8 oz)   10/16/24 0924 83.9 kg (185 lb)   09/23/24 1100 85.3 kg (188 lb)   09/17/24 0900 86 kg (189 lb 9.6 oz)   09/03/24 1139 88 kg (194 lb)          Medications           Current medications: Cyanocobalamin, DULoxetine, Psyllium, acetaminophen, aspirin, atorvastatin, enoxaparin sodium, ferrous sulfate, gabapentin, loperamide, losartan, metFORMIN, metoprolol tartrate, ondansetron, tamsulosin, and vitamin D                 Tests/Procedures        Tests/Procedures No new tests/procedures     Labs       Pertinent Labs    Results from last 7 days   Lab Units 04/14/25  0817   SODIUM mmol/L 140   POTASSIUM mmol/L 5.2   CHLORIDE mmol/L 109*   CO2 mmol/L 21.0*   BUN mg/dL 19   CREATININE mg/dL 1.18   CALCIUM mg/dL 9.8   BILIRUBIN mg/dL 0.4   ALK PHOS U/L 117   ALT (SGPT) U/L 14   AST (SGOT) U/L 18   GLUCOSE mg/dL 113*     Results from last 7 days   Lab Units 04/14/25  0817   HEMOGLOBIN g/dL 11.5*   HEMATOCRIT % 35.2*   WBC 10*3/mm3 7.66   ALBUMIN g/dL 3.9     Results from last 7 days   Lab Units 04/14/25  0817   PLATELETS 10*3/mm3 219     No results found for: \"COVID19\"  Lab Results   Component " Value Date    HGBA1C 6.7 (H) 07/16/2024          Physical Findings        Physical Appearance alert, oriented     Edema  not assessed   Gastrointestinal colostomy   Tubes/Lines/Drains Implantable Port   Oral/Mouth Cavity WNL   Skin Rash       Estimated/Assessed Needs        Energy Requirements    Height for Calculation      Weight for Calculation 86 kg   Method for Estimation  25 kcal/kg   EST Needs (kcal/day) 2150 kcals per day       Protein Requirements    Weight for Calculation 86 kg   EST Protein Needs (g/kg) 1.0 - 1.2 gm/kg   EST Daily Needs (g/day)  gms per day       Fluid Requirements     Method for Estimation 1 mL/kcal    Estimated Needs (mL/day) 2100 mLs per day         NUTRITION INTERVENTION / PLAN OF CARE      Intervention Goal(s) Maintain nutrition status, Provide information, Meet estimated needs, Disease management/therapy, Tolerate PO , Maintain intake, and No significant weight loss         RD Intervention/Action Encouraged intake, Follow Tx progress, Recommended/ordered         Recommendations:       PO Diet Consume calorie and protein dense healthy, sugar free foods, 6 small meals      Supplements Hydration beverages      Snacks       Other    New Prescription Ordered? No, recommend   --      Monitor/Evaluation Follow up as needed   Education Education provided   --    Electronically signed by:  Shirley Patterson RD  04/14/25 10:23 EDT

## 2025-04-16 ENCOUNTER — OFFICE VISIT (OUTPATIENT)
Dept: NEUROLOGY | Facility: CLINIC | Age: 70
End: 2025-04-16
Payer: MEDICARE

## 2025-04-16 VITALS
OXYGEN SATURATION: 98 % | DIASTOLIC BLOOD PRESSURE: 64 MMHG | WEIGHT: 171 LBS | HEART RATE: 72 BPM | BODY MASS INDEX: 28.49 KG/M2 | HEIGHT: 65 IN | SYSTOLIC BLOOD PRESSURE: 102 MMHG

## 2025-04-16 DIAGNOSIS — G89.29 CHRONIC LOW BACK PAIN, UNSPECIFIED BACK PAIN LATERALITY, UNSPECIFIED WHETHER SCIATICA PRESENT: Primary | ICD-10-CM

## 2025-04-16 DIAGNOSIS — M54.12 RADICULOPATHY OF CERVICAL REGION: ICD-10-CM

## 2025-04-16 DIAGNOSIS — G56.03 BILATERAL CARPAL TUNNEL SYNDROME: ICD-10-CM

## 2025-04-16 DIAGNOSIS — M54.50 CHRONIC LOW BACK PAIN, UNSPECIFIED BACK PAIN LATERALITY, UNSPECIFIED WHETHER SCIATICA PRESENT: Primary | ICD-10-CM

## 2025-04-16 RX ORDER — DULOXETIN HYDROCHLORIDE 60 MG/1
60 CAPSULE, DELAYED RELEASE ORAL DAILY
Qty: 90 CAPSULE | Refills: 3 | Status: SHIPPED | OUTPATIENT
Start: 2025-04-16

## 2025-04-16 RX ORDER — GABAPENTIN 300 MG/1
600 CAPSULE ORAL 3 TIMES DAILY
Qty: 540 CAPSULE | Refills: 3 | Status: SHIPPED | OUTPATIENT
Start: 2025-04-16

## 2025-04-16 NOTE — PROGRESS NOTES
Chief Complaint   Patient presents with    Radiculopathy of cervical region     Has not had any pain, has been doing great on medication        Patient ID: Kevin Garsia is a 69 y.o. male.    HPI:    The following portions of the patient's history were reviewed and updated as appropriate: allergies, current medications, past family history, past medical history, past social history, past surgical history and problem list.    Interval history:    Review of Systems   ***    Vitals:    04/16/25 1408   BP: 102/64   Pulse: 72   SpO2: 98%       Neurological Exam    Physical Exam    Procedures    Assessment/Plan:         There are no diagnoses linked to this encounter.       Jd Hay MD

## 2025-04-16 NOTE — PROGRESS NOTES
Chief Complaint   Patient presents with    Back Pain       Patient ID: Kevin Garsia is a 69 y.o. male.    HPI:    The following portions of the patient's history were reviewed and updated as appropriate: allergies, current medications, past family history, past medical history, past social history, past surgical history and problem list.    Interval history:    Review of Systems   History of Present Illness  The patient is a 69-year-old male who presents for follow-up of cervical radiculopathy, lower back pain, and carpal tunnel syndrome.     No current pain is reported, and he is managing well with gabapentin 600 mg three times daily, which is tolerated without side effects. Lower back pain has improved with gabapentin, and he has been under the care of a pain management specialist, receiving an epidural injection. The combination of gabapentin, Cymbalta, and the epidural injection has significantly alleviated his pain. No adverse effects from Cymbalta or gabapentin are reported.     Carpal tunnel syndrome has been severe, but he has declined surgical intervention. Steroid injections received approximately 6 months ago have been beneficial, although a slight decrease in fine motor skills has been noticed recently. A follow-up with his hand surgeon  is acknowledged by pt as necessary.      Blood pressure has improved, averaging in the 120s following adjustments to his medication regimen. Consultation with a cardiologist is planned due to a previous diagnosis of congestive heart failure. An EKG performed a week ago revealed a signal issue on the left side of his heart and a known bundle branch block on the right side.     Successful cancer surgery was performed on 03/19/2025, with pathology results indicating complete removal of the cancer and no lymph node involvement. The surgeon has declared him cancer-free. An ileostomy reversal is scheduled for 05/05/2025, pending confirmation of no leakage. Lab results,  including white blood cell count, have remained stable. A good appetite, slight weight loss, and overall good health are reported.         Hobbies: Creating abstract acrylic art    Vitals:    04/16/25 1408   BP: 102/64   Pulse: 72   SpO2: 98%       Neurological Exam  Mental Status  Alert.    Cranial Nerves  CN II: Right normal visual field. Left normal visual field.  CN III, IV, VI: Extraocular movements intact bilaterally. Pupils equal round and reactive to light bilaterally.  CN V:  Right: Facial sensation is normal.  Left: Facial sensation is normal on the left.  CN VII:  Left: There is no facial weakness.  CN IX, X:  Right: Palate is normal.  Left: Palate is normal.  CN XI:  Right: Trapezius strength is normal.  Left: Trapezius strength is normal.  CN XII: Tongue midline without atrophy or fasciculations.  Normal hearing to finger rub bilaterally.    Motor                                               Right                     Left  Deltoid                                   5                          5   Biceps                                   5                          5   Triceps                                  5                          5   Iliopsoas                               5                          5   Quadriceps                           5                          5   Hamstring                             5                          5   Gastrocnemius                     5                           5   Anterior tibialis                      5                          5    Sensory  Light touch is normal in upper and lower extremities.     Reflexes                                            Right                      Left  Brachioradialis                    2+                         2+  Biceps                                 0                         0  Patellar                                2+                         2+  Achilles                                0                          0    Coordination  Right: Finger-to-nose normal. Heel-to-shin normal.Left: Finger-to-nose normal. Heel-to-shin normal.    Gait    Normal unstressed gait.      Physical Exam  Eyes:      Extraocular Movements: Extraocular movements intact.      Pupils: Pupils are equal, round, and reactive to light.   Neurological:      Mental Status: He is alert.      Deep Tendon Reflexes:      Reflex Scores:       Bicep reflexes are 0 on the right side and 0 on the left side.       Brachioradialis reflexes are 2+ on the right side and 2+ on the left side.       Patellar reflexes are 2+ on the right side and 2+ on the left side.       Achilles reflexes are 0 on the right side and 0 on the left side.      Procedures    Assessment/Plan:    Assessment & Plan     1. Cervical radiculopathy.  He reports no pain and is doing well on gabapentin 600 mg three times a day. The current treatment plan will be maintained. A prescription refill for gabapentin has been provided to [facility]. If there is a significant decrease in kidney function, the gabapentin dosage may need to be reduced.    2. Lower back pain.  He reports significant improvement in lower back pain with the combination of gabapentin, duloxetine, and an epidural injection. No side effects have been noted. The current treatment plan will be continued.    3. Carpal tunnel syndrome.  He has severe carpal tunnel syndrome but prefers not to undergo surgery. He received steroid injections approximately six months ago, which provided significant relief. He will follow up with the hand surgeon and pain management to reassess his condition and determine the next steps.       Follow-up  The patient will follow up in 11 months.     Patient or patient representative verbalized consent for the use of Ambient Listening during the visit with  Jd Hay MD for chart documentation. 4/20/2025  23:25 EDT     Diagnoses and all orders for this visit:    1. Chronic low back  pain, unspecified back pain laterality, unspecified whether sciatica present (Primary)    2. Radiculopathy of cervical region  -     gabapentin (NEURONTIN) 300 MG capsule; Take 2 capsules by mouth 3 (Three) Times a Day.  Dispense: 540 capsule; Refill: 3    3. Bilateral carpal tunnel syndrome    Other orders  -     DULoxetine (CYMBALTA) 60 MG capsule; Take 1 capsule by mouth Daily.  Dispense: 90 capsule; Refill: 3    At least Two chronic  problems that are  stable, Rx management done       Jd Hay MD

## 2025-04-16 NOTE — PROGRESS NOTES
Chief Complaint   Patient presents with    Radiculopathy of cervical region     Has not had any pain, has been doing great on medication        Patient ID: Kevin Garsia is a 69 y.o. male.    HPI:    The following portions of the patient's history were reviewed and updated as appropriate: allergies, current medications, past family history, past medical history, past social history, past surgical history and problem list.    Interval history:    Review of Systems   History of Present Illness  The patient is a 69-year-old male who presents for follow-up of cervical radiculopathy, lower back pain, and carpal tunnel syndrome.    No current pain is reported, and he is managing well with gabapentin 600 mg three times daily, which is tolerated without side effects. Lower back pain has improved with gabapentin, and he has been under the care of a pain management specialist, receiving an epidural injection. The combination of gabapentin, Cymbalta, and the epidural injection has significantly alleviated his pain. No adverse effects from Cymbalta or gabapentin are reported.    Carpal tunnel syndrome has been severe, but he has declined surgical intervention. Steroid injections received approximately 6 months ago have been beneficial, although a slight decrease in fine motor skills has been noticed recently. A follow-up with his hand surgeon  is acknowledged by pt as necessary.     Blood pressure has improved, averaging in the 120s following adjustments to his medication regimen. Consultation with a cardiologist is planned due to a previous diagnosis of congestive heart failure. An EKG performed a week ago revealed a signal issue on the left side of his heart and a known bundle branch block on the right side.    Successful cancer surgery was performed on 03/19/2025, with pathology results indicating complete removal of the cancer and no lymph node involvement. The surgeon has declared him cancer-free. An ileostomy reversal  is scheduled for 05/05/2025, pending confirmation of no leakage. Lab results, including white blood cell count, have remained stable. A good appetite, slight weight loss, and overall good health are reported.        Zhanna: Creating abstract acrylic art           Vitals:    04/16/25 1408   BP: 102/64   Pulse: 72   SpO2: 98%       Neurological Exam    Physical Exam    Procedures    Assessment/Plan:    Assessment & Plan  1. Cervical radiculopathy.  He reports no pain and is doing well on gabapentin 600 mg three times a day. The current treatment plan will be maintained. A prescription refill for gabapentin has been provided to [facility]. If there is a significant decrease in kidney function, the gabapentin dosage may need to be reduced.    2. Lower back pain.  He reports significant improvement in lower back pain with the combination of gabapentin, duloxetine, and an epidural injection. No side effects have been noted. The current treatment plan will be continued.    3. Carpal tunnel syndrome.  He has severe carpal tunnel syndrome but prefers not to undergo surgery. He received steroid injections approximately six months ago, which provided significant relief. He will follow up with the hand surgeon and pain management to reassess his condition and determine the next steps.    4. Blood pressure management.  His blood pressure has improved, now averaging in the 120s. He will continue with the current medication regimen.    5. Congestive heart failure.  He has a history of congestive heart failure, but an ultrasound in 2022 did not indicate active heart failure. He will follow up with a cardiologist to ensure proper management of his condition.    Follow-up  The patient will follow up in 11 months.     {[employer] Provider Statement:94807}     There are no diagnoses linked to this encounter.       Jd Hay MD

## 2025-04-16 NOTE — PROGRESS NOTES
Chief Complaint  Radiculopathy of cervical region    Subjective     {CC  Problem List  Visit Diagnosis   Encounters  Notes  Medications  Labs  Result Review Imaging  Media :23}      Kevin Garsia presents to [facility] for   HISTORY OF PRESENT ILLNESS:    Past Medical History:   Diagnosis Date    Anemia     Aphasia     Arthritis     CTS (carpal tunnel syndrome)     Depression     Diabetes mellitus     GERD (gastroesophageal reflux disease)     History of carotid stenosis     HX LEFT CAROTID ENDARTERECTOMY    History of prostate cancer 2012    HX SEED, RADIATION    History of radiation therapy     History of transient ischemic attack (TIA)     S/P CAROTID ENDARTERECTOMY 2019    Hyperlipidemia     Hypertension     Multiple gastric ulcers     Neuropathy     Rectal adenocarcinoma 2024    Seasonal allergies     Sleep apnea     cpap    Stroke     Tattoos     TIA (transient ischemic attack)         Family History   Problem Relation Age of Onset    Bone cancer Mother     COPD Father     Hypertension Father     Stroke Father         TIA    Bone cancer Father         smoker    Lung cancer Father     Diabetes Father     No Known Problems Sister     No Known Problems Brother     No Known Problems Maternal Grandmother     No Known Problems Maternal Grandfather     No Known Problems Paternal Grandmother     Colon cancer Paternal Grandfather     Mental retardation Brother     Malig Hyperthermia Neg Hx         Social History     Socioeconomic History    Marital status: Significant Other   Tobacco Use    Smoking status: Former     Types: Cigarettes    Smokeless tobacco: Never    Tobacco comments:     QUIT 1990     1 TO 3 PPD X 20 YEARS    Vaping Use    Vaping status: Former    Substances: CBD    Devices: Disposable   Substance and Sexual Activity    Alcohol use: No    Drug use: Not Currently    Sexual activity: Defer        ***  Review of Systems   Neurological:  Negative for dizziness,  tremors, seizures, syncope, facial asymmetry, speech difficulty, weakness, light-headedness, numbness, headache, memory problem and confusion.   Psychiatric/Behavioral:  Negative for agitation, behavioral problems, decreased concentration, dysphoric mood, hallucinations, self-injury, sleep disturbance, suicidal ideas, negative for hyperactivity, depressed mood and stress. The patient is not nervous/anxious.       Objective   Vital Signs:   There were no vitals taken for this visit.      PHYSICAL EXAM:  ***       Result Review :{ Labs  Result Review  Imaging  Med Tab  Media :23}   {The following data was reviewed by (Optional):00980}  {Ambulatory Labs (Optional):77575}  {Data reviewed (Optional):28082:::1}          Assessment and Plan {CC Problem List  Visit Diagnosis  ROS  Review (Popup)  Health Maintenance  Quality  BestPractice  Medications  SmartSets  SnapShot Encounters  Media :23}   Problem List Items Addressed This Visit    None      {Time Spent (Optional):87532}    Follow Up {Instructions Charge Capture  Follow-up Communications :23}  No follow-ups on file.  Patient was given instructions and counseling regarding his condition or for health maintenance advice. Please see specific information pulled into the AVS if appropriate.

## 2025-04-24 ENCOUNTER — PRE-ADMISSION TESTING (OUTPATIENT)
Dept: PREADMISSION TESTING | Facility: HOSPITAL | Age: 70
DRG: 641 | End: 2025-04-24
Payer: MEDICARE

## 2025-04-24 ENCOUNTER — HOSPITAL ENCOUNTER (OUTPATIENT)
Dept: GENERAL RADIOLOGY | Facility: HOSPITAL | Age: 70
Discharge: HOME | End: 2025-04-24
Payer: MEDICARE

## 2025-04-24 VITALS
HEIGHT: 65 IN | WEIGHT: 170.2 LBS | TEMPERATURE: 98.6 F | OXYGEN SATURATION: 98 % | RESPIRATION RATE: 16 BRPM | DIASTOLIC BLOOD PRESSURE: 59 MMHG | HEART RATE: 73 BPM | BODY MASS INDEX: 28.36 KG/M2 | SYSTOLIC BLOOD PRESSURE: 100 MMHG

## 2025-04-24 DIAGNOSIS — C20 RECTAL ADENOCARCINOMA: ICD-10-CM

## 2025-04-24 PROCEDURE — 25510000002 DIATRIZOATE MEGLUMINE & SODIUM PER 1 ML: Performed by: SURGERY

## 2025-04-24 PROCEDURE — 74270 X-RAY XM COLON 1CNTRST STD: CPT

## 2025-04-24 RX ORDER — DIATRIZOATE MEGLUMINE AND DIATRIZOATE SODIUM 660; 100 MG/ML; MG/ML
240 SOLUTION ORAL; RECTAL
Status: COMPLETED | OUTPATIENT
Start: 2025-04-24 | End: 2025-04-24

## 2025-04-24 RX ADMIN — DIATRIZOATE MEGLUMINE AND DIATRIZOATE SODIUM 480 ML: 660; 100 LIQUID ORAL; RECTAL at 11:35

## 2025-04-24 NOTE — DISCHARGE INSTRUCTIONS
Take the following medications the morning of surgery:    METOPROLOL  GABAPENTIN  DULOXETINE    If you are on prescription narcotic pain medication to control your pain you may also take that medication the morning of surgery.      General Instructions:     Do not eat solid food after midnight the night before surgery.  Clear liquids day of surgery are allowed but must be stopped at least two hours before your hospital arrival time.       Allowed clear liquids      Water, sodas, and tea or coffee with no cream or milk added.       12 to 20 ounces of a clear liquid that contains carbohydrates is recommended.  If non-diabetic, have Gatorade or Powerade.  If diabetic, have G2 or Powerade Zero.     Do not have liquids red in color.  Do not consume chicken, beef, pork or vegetable broth or bouillon cubes of any variety as they are not considered clear liquids and are not allowed.      Infants may have breast milk up to four hours before surgery.  Infants drinking formula may drink formula up to six hours before surgery.   Patients who avoid smoking, chewing tobacco and alcohol for 4 weeks prior to surgery have a reduced risk of post-operative complications.  Quit smoking as many days before surgery as you can.  Do not smoke, use chewing tobacco or drink alcohol the day of surgery.   If applicable bring your C-PAP/ BI-PAP machine in with you to preop day of surgery.  Bring any papers given to you in the doctor’s office.  Wear clean comfortable clothes.  Do not wear contact lenses, false eyelashes or make-up.  Bring a case for your glasses.   Bring crutches or walker if applicable.  Remove all piercings.  Leave jewelry and any other valuables at home.  Hair extensions with metal clips must be removed prior to surgery.  The Pre-Admission Testing nurse will instruct you to bring medications if unable to obtain an accurate list in Pre-Admission Testing.    Day of surgery you will need to let the preoperative nurse know the  last time you took each of your medications.  To ensure a safe environment for patients and staff, we kindly ask that children under the age of 16 not accompany patients.  If you must bring a dependent child or dependent adult please ensure a responsible adult, other than yourself, is present to supervise them.      If you were given a blood bank ID arm band remember to bring it with you the day of surgery.    Preventing a Surgical Site Infection:  For 2 to 3 days before surgery, avoid shaving with a razor because the razor can irritate skin and make it easier to develop an infection.    Any areas of open skin can increase the risk of a post-operative wound infection by allowing bacteria to enter and travel throughout the body.  Notify your surgeon if you have any skin wounds / rashes even if it is not near the expected surgical site.  The area will need assessed to determine if surgery should be delayed until it is healed.  The night prior to surgery shower using a fresh bar of anti-bacterial soap (such as Dial) and clean washcloth.  Sleep in a clean bed with clean clothing.  Do not allow pets to sleep with you.  Shower on the morning of surgery using a fresh bar of anti-bacterial soap (such as Dial) and clean washcloth.  Dry with a clean towel and dress in clean clothing.  Ask your surgeon if you will be receiving antibiotics prior to surgery.  Make sure you, your family, and all healthcare providers clean their hands with soap and water or an alcohol based hand  before caring for you or your wound.    Day of surgery:  Your arrival time is approximately two hours before your scheduled surgery time.  Please note if you have an early arrival time the surgery doors do not open before 5:00 AM.  Upon arrival, a Pre-op nurse and Anesthesiologist will review your health history, obtain vital signs, and answer questions you may have.  The only belongings needed at this time will be a list of your home medications  and if applicable your C-PAP/BI-PAP machine.  A Pre-op nurse will start an IV and you may receive medication in preparation for surgery, including something to help you relax.     Please be aware that surgery does come with discomfort.  We want to make every effort to control your discomfort so please discuss any uncontrolled symptoms with your nurse.   Your doctor will most likely have prescribed pain medications.      If you are going home after surgery you will receive individualized written care instructions before being discharged.  A responsible adult must drive you to and from the hospital on the day of your surgery and ideally stay with you through the night.   .  Discharge prescriptions can be filled by the hospital pharmacy during regular pharmacy hours.  If you are having surgery late in the day/evening your prescription may be e-prescribed to your pharmacy.  Please verify your pharmacy hours or chose a 24 hour pharmacy to avoid not having access to your prescription because your pharmacy has closed for the day.    If you are staying overnight following surgery, you will be transported to your hospital room following the recovery period.  [facility] has all private rooms.    If you have any questions please call Pre-Admission Testing at [phone].  Deductibles and co-payments are collected on the day of service. Please be prepared to pay the required co-pay, deductible or deposit on the day of service as defined by your plan.    Call your surgeon immediately if you experience any of the following symptoms:  Sore Throat  Shortness of Breath or difficulty breathing  Cough  Chills  Body soreness or muscle pain  Headache  Fever  New loss of taste or smell  Do not arrive for your surgery ill.  Your procedure will need to be rescheduled to another time.  You will need to call your physician before the day of surgery to avoid any unnecessary exposure to hospital staff as well as other  patients.        CHLORHEXIDINE CLOTH INSTRUCTIONS  The morning of surgery follow these instructions using the Chlorhexidine cloths you've been given.  These steps reduce bacteria on the body.  Do not use the cloths near your eyes, ears mouth, genitalia or on open wounds.  Throw the cloths away after use but do not try to flush them down a toilet.      Open and remove one cloth at a time from the package.    Leave the cloth unfolded and begin the bathing.  Massage the skin with the cloths using gentle pressure to remove bacteria.  Do not scrub harshly.   Follow the steps below with one 2% CHG cloth per area (6 total cloths).  One cloth for neck, shoulders and chest.  One cloth for both arms, hands, fingers and underarms (do underarms last).  One cloth for the abdomen followed by groin.  One cloth for right leg and foot including between the toes.  One cloth for left leg and foot including between the toes.  The last cloth is to be used for the back of the neck, back and buttocks.    Allow the CHG to air dry 3 minutes on the skin which will give it time to work and decrease the chance of irritation.  The skin may feel sticky until it is dry.  Do not rinse with water or any other liquid or you will lose the beneficial effects of the CHG.  If mild skin irritation occurs, do rinse the skin to remove the CHG.  Report this to the nurse at time of admission.  Do not apply lotions, creams, ointments, deodorants or perfumes after using the clothes. Dress in clean clothes before coming to the hospital.

## 2025-04-25 ENCOUNTER — HOSPITAL ENCOUNTER (INPATIENT)
Facility: HOSPITAL | Age: 70
LOS: 2 days | Discharge: HOME | End: 2025-04-28
Attending: STUDENT IN AN ORGANIZED HEALTH CARE EDUCATION/TRAINING PROGRAM | Admitting: HOSPITALIST
Payer: MEDICARE

## 2025-04-25 ENCOUNTER — APPOINTMENT (OUTPATIENT)
Dept: GENERAL RADIOLOGY | Facility: HOSPITAL | Age: 70
End: 2025-04-25
Payer: MEDICARE

## 2025-04-25 DIAGNOSIS — N17.9 AKI (ACUTE KIDNEY INJURY): ICD-10-CM

## 2025-04-25 DIAGNOSIS — I95.1 ORTHOSTATIC HYPOTENSION: Primary | ICD-10-CM

## 2025-04-25 DIAGNOSIS — I10 ESSENTIAL HYPERTENSION: ICD-10-CM

## 2025-04-25 DIAGNOSIS — E87.5 HYPERKALEMIA: ICD-10-CM

## 2025-04-25 LAB
ALBUMIN SERPL-MCNC: 4.6 G/DL (ref 3.5–5.2)
ALBUMIN/GLOB SERPL: 1.4 G/DL
ALP SERPL-CCNC: 139 U/L (ref 39–117)
ALT SERPL W P-5'-P-CCNC: 22 U/L (ref 1–41)
ANION GAP SERPL CALCULATED.3IONS-SCNC: 12.2 MMOL/L (ref 5–15)
AST SERPL-CCNC: 19 U/L (ref 1–40)
BASOPHILS # BLD AUTO: 0.04 10*3/MM3 (ref 0–0.2)
BASOPHILS NFR BLD AUTO: 0.6 % (ref 0–1.5)
BILIRUB SERPL-MCNC: 0.6 MG/DL (ref 0–1.2)
BUN SERPL-MCNC: 35 MG/DL (ref 8–23)
BUN/CREAT SERPL: 15.3 (ref 7–25)
CALCIUM SPEC-SCNC: 10.7 MG/DL (ref 8.6–10.5)
CHLORIDE SERPL-SCNC: 103 MMOL/L (ref 98–107)
CO2 SERPL-SCNC: 20.8 MMOL/L (ref 22–29)
CREAT SERPL-MCNC: 2.29 MG/DL (ref 0.76–1.27)
DEPRECATED RDW RBC AUTO: 53.8 FL (ref 37–54)
EGFRCR SERPLBLD CKD-EPI 2021: 29.8 ML/MIN/1.73
EOSINOPHIL # BLD AUTO: 0.55 10*3/MM3 (ref 0–0.4)
EOSINOPHIL NFR BLD AUTO: 7.8 % (ref 0.3–6.2)
ERYTHROCYTE [DISTWIDTH] IN BLOOD BY AUTOMATED COUNT: 14.7 % (ref 12.3–15.4)
GEN 5 1HR TROPONIN T REFLEX: 17 NG/L
GLOBULIN UR ELPH-MCNC: 3.4 GM/DL
GLUCOSE BLDC GLUCOMTR-MCNC: 102 MG/DL (ref 70–130)
GLUCOSE BLDC GLUCOMTR-MCNC: 117 MG/DL (ref 70–130)
GLUCOSE BLDC GLUCOMTR-MCNC: 122 MG/DL (ref 70–130)
GLUCOSE SERPL-MCNC: 135 MG/DL (ref 65–99)
HCT VFR BLD AUTO: 42.3 % (ref 37.5–51)
HGB BLD-MCNC: 14.2 G/DL (ref 13–17.7)
IMM GRANULOCYTES # BLD AUTO: 0.02 10*3/MM3 (ref 0–0.05)
IMM GRANULOCYTES NFR BLD AUTO: 0.3 % (ref 0–0.5)
LYMPHOCYTES # BLD AUTO: 1.56 10*3/MM3 (ref 0.7–3.1)
LYMPHOCYTES NFR BLD AUTO: 22.1 % (ref 19.6–45.3)
MAGNESIUM SERPL-MCNC: 2.1 MG/DL (ref 1.6–2.4)
MCH RBC QN AUTO: 33.2 PG (ref 26.6–33)
MCHC RBC AUTO-ENTMCNC: 33.6 G/DL (ref 31.5–35.7)
MCV RBC AUTO: 98.8 FL (ref 79–97)
MONOCYTES # BLD AUTO: 0.38 10*3/MM3 (ref 0.1–0.9)
MONOCYTES NFR BLD AUTO: 5.4 % (ref 5–12)
NEUTROPHILS NFR BLD AUTO: 4.5 10*3/MM3 (ref 1.7–7)
NEUTROPHILS NFR BLD AUTO: 63.8 % (ref 42.7–76)
NRBC BLD AUTO-RTO: 0 /100 WBC (ref 0–0.2)
PLATELET # BLD AUTO: 286 10*3/MM3 (ref 140–450)
PMV BLD AUTO: 9.7 FL (ref 6–12)
POTASSIUM SERPL-SCNC: 6.2 MMOL/L (ref 3.5–5.2)
PROT SERPL-MCNC: 8 G/DL (ref 6–8.5)
RBC # BLD AUTO: 4.28 10*6/MM3 (ref 4.14–5.8)
SODIUM SERPL-SCNC: 136 MMOL/L (ref 136–145)
TROPONIN T NUMERIC DELTA: -2 NG/L
TROPONIN T SERPL HS-MCNC: 19 NG/L
WBC NRBC COR # BLD AUTO: 7.05 10*3/MM3 (ref 3.4–10.8)

## 2025-04-25 PROCEDURE — G0378 HOSPITAL OBSERVATION PER HR: HCPCS

## 2025-04-25 PROCEDURE — 82948 REAGENT STRIP/BLOOD GLUCOSE: CPT

## 2025-04-25 PROCEDURE — 25010000002 ONDANSETRON PER 1 MG: Performed by: INTERNAL MEDICINE

## 2025-04-25 PROCEDURE — 99291 CRITICAL CARE FIRST HOUR: CPT

## 2025-04-25 PROCEDURE — 93005 ELECTROCARDIOGRAM TRACING: CPT | Performed by: STUDENT IN AN ORGANIZED HEALTH CARE EDUCATION/TRAINING PROGRAM

## 2025-04-25 PROCEDURE — 85025 COMPLETE CBC W/AUTO DIFF WBC: CPT | Performed by: STUDENT IN AN ORGANIZED HEALTH CARE EDUCATION/TRAINING PROGRAM

## 2025-04-25 PROCEDURE — 94799 UNLISTED PULMONARY SVC/PX: CPT

## 2025-04-25 PROCEDURE — 63710000001 INSULIN REGULAR HUMAN PER 5 UNITS: Performed by: STUDENT IN AN ORGANIZED HEALTH CARE EDUCATION/TRAINING PROGRAM

## 2025-04-25 PROCEDURE — 25810000003 SODIUM CHLORIDE 0.9 % SOLUTION

## 2025-04-25 PROCEDURE — 94640 AIRWAY INHALATION TREATMENT: CPT

## 2025-04-25 PROCEDURE — 36415 COLL VENOUS BLD VENIPUNCTURE: CPT

## 2025-04-25 PROCEDURE — 25010000002 CALCIUM GLUCONATE-NACL 1-0.675 GM/50ML-% SOLUTION: Performed by: STUDENT IN AN ORGANIZED HEALTH CARE EDUCATION/TRAINING PROGRAM

## 2025-04-25 PROCEDURE — 93010 ELECTROCARDIOGRAM REPORT: CPT | Performed by: INTERNAL MEDICINE

## 2025-04-25 PROCEDURE — 83735 ASSAY OF MAGNESIUM: CPT | Performed by: STUDENT IN AN ORGANIZED HEALTH CARE EDUCATION/TRAINING PROGRAM

## 2025-04-25 PROCEDURE — 84484 ASSAY OF TROPONIN QUANT: CPT | Performed by: STUDENT IN AN ORGANIZED HEALTH CARE EDUCATION/TRAINING PROGRAM

## 2025-04-25 PROCEDURE — 80053 COMPREHEN METABOLIC PANEL: CPT | Performed by: STUDENT IN AN ORGANIZED HEALTH CARE EDUCATION/TRAINING PROGRAM

## 2025-04-25 PROCEDURE — 25810000003 SODIUM CHLORIDE 0.9 % SOLUTION: Performed by: STUDENT IN AN ORGANIZED HEALTH CARE EDUCATION/TRAINING PROGRAM

## 2025-04-25 PROCEDURE — 71045 X-RAY EXAM CHEST 1 VIEW: CPT

## 2025-04-25 PROCEDURE — 94664 DEMO&/EVAL PT USE INHALER: CPT

## 2025-04-25 RX ORDER — ACETAMINOPHEN 160 MG/5ML
650 SOLUTION ORAL EVERY 4 HOURS PRN
Status: DISCONTINUED | OUTPATIENT
Start: 2025-04-25 | End: 2025-04-28 | Stop reason: HOSPADM

## 2025-04-25 RX ORDER — ASPIRIN 81 MG/1
81 TABLET, CHEWABLE ORAL DAILY
Status: DISCONTINUED | OUTPATIENT
Start: 2025-04-25 | End: 2025-04-28 | Stop reason: HOSPADM

## 2025-04-25 RX ORDER — TAMSULOSIN HYDROCHLORIDE 0.4 MG/1
0.4 CAPSULE ORAL NIGHTLY
Status: DISCONTINUED | OUTPATIENT
Start: 2025-04-26 | End: 2025-04-25

## 2025-04-25 RX ORDER — ACETAMINOPHEN 650 MG/1
650 SUPPOSITORY RECTAL EVERY 4 HOURS PRN
Status: DISCONTINUED | OUTPATIENT
Start: 2025-04-25 | End: 2025-04-28 | Stop reason: HOSPADM

## 2025-04-25 RX ORDER — POLYETHYLENE GLYCOL 3350 17 G/17G
17 POWDER, FOR SOLUTION ORAL DAILY PRN
Status: DISCONTINUED | OUTPATIENT
Start: 2025-04-25 | End: 2025-04-28 | Stop reason: HOSPADM

## 2025-04-25 RX ORDER — BISACODYL 10 MG
10 SUPPOSITORY, RECTAL RECTAL DAILY PRN
Status: DISCONTINUED | OUTPATIENT
Start: 2025-04-25 | End: 2025-04-28 | Stop reason: HOSPADM

## 2025-04-25 RX ORDER — DULOXETIN HYDROCHLORIDE 60 MG/1
60 CAPSULE, DELAYED RELEASE ORAL NIGHTLY
Status: DISCONTINUED | OUTPATIENT
Start: 2025-04-25 | End: 2025-04-28 | Stop reason: HOSPADM

## 2025-04-25 RX ORDER — TAMSULOSIN HYDROCHLORIDE 0.4 MG/1
0.4 CAPSULE ORAL DAILY
Status: DISCONTINUED | OUTPATIENT
Start: 2025-04-25 | End: 2025-04-25

## 2025-04-25 RX ORDER — SODIUM CHLORIDE 9 MG/ML
100 INJECTION, SOLUTION INTRAVENOUS CONTINUOUS
Status: ACTIVE | OUTPATIENT
Start: 2025-04-25 | End: 2025-04-26

## 2025-04-25 RX ORDER — ONDANSETRON 4 MG/1
4 TABLET, ORALLY DISINTEGRATING ORAL EVERY 6 HOURS PRN
Status: DISCONTINUED | OUTPATIENT
Start: 2025-04-25 | End: 2025-04-28 | Stop reason: HOSPADM

## 2025-04-25 RX ORDER — DEXTROSE MONOHYDRATE 25 G/50ML
25 INJECTION, SOLUTION INTRAVENOUS ONCE
Status: COMPLETED | OUTPATIENT
Start: 2025-04-25 | End: 2025-04-25

## 2025-04-25 RX ORDER — AMOXICILLIN 250 MG
2 CAPSULE ORAL 2 TIMES DAILY PRN
Status: DISCONTINUED | OUTPATIENT
Start: 2025-04-25 | End: 2025-04-28 | Stop reason: HOSPADM

## 2025-04-25 RX ORDER — ALBUTEROL SULFATE 5 MG/ML
10 SOLUTION RESPIRATORY (INHALATION) ONCE
Status: COMPLETED | OUTPATIENT
Start: 2025-04-25 | End: 2025-04-25

## 2025-04-25 RX ORDER — DULOXETIN HYDROCHLORIDE 60 MG/1
60 CAPSULE, DELAYED RELEASE ORAL NIGHTLY
Status: DISCONTINUED | OUTPATIENT
Start: 2025-04-26 | End: 2025-04-25

## 2025-04-25 RX ORDER — GABAPENTIN 300 MG/1
300 CAPSULE ORAL 3 TIMES DAILY
Status: DISCONTINUED | OUTPATIENT
Start: 2025-04-25 | End: 2025-04-28 | Stop reason: HOSPADM

## 2025-04-25 RX ORDER — DULOXETIN HYDROCHLORIDE 60 MG/1
60 CAPSULE, DELAYED RELEASE ORAL DAILY
Status: DISCONTINUED | OUTPATIENT
Start: 2025-04-25 | End: 2025-04-25

## 2025-04-25 RX ORDER — CALCIUM GLUCONATE 20 MG/ML
1000 INJECTION, SOLUTION INTRAVENOUS ONCE
Status: COMPLETED | OUTPATIENT
Start: 2025-04-25 | End: 2025-04-25

## 2025-04-25 RX ORDER — ACETAMINOPHEN 325 MG/1
650 TABLET ORAL EVERY 4 HOURS PRN
Status: DISCONTINUED | OUTPATIENT
Start: 2025-04-25 | End: 2025-04-28 | Stop reason: HOSPADM

## 2025-04-25 RX ORDER — INSULIN LISPRO 100 [IU]/ML
2-7 INJECTION, SOLUTION INTRAVENOUS; SUBCUTANEOUS
Status: DISCONTINUED | OUTPATIENT
Start: 2025-04-25 | End: 2025-04-28 | Stop reason: HOSPADM

## 2025-04-25 RX ORDER — ONDANSETRON 2 MG/ML
4 INJECTION INTRAMUSCULAR; INTRAVENOUS EVERY 6 HOURS PRN
Status: DISCONTINUED | OUTPATIENT
Start: 2025-04-25 | End: 2025-04-28 | Stop reason: HOSPADM

## 2025-04-25 RX ORDER — IBUPROFEN 600 MG/1
1 TABLET ORAL
Status: DISCONTINUED | OUTPATIENT
Start: 2025-04-25 | End: 2025-04-28 | Stop reason: HOSPADM

## 2025-04-25 RX ORDER — NICOTINE POLACRILEX 4 MG
15 LOZENGE BUCCAL
Status: DISCONTINUED | OUTPATIENT
Start: 2025-04-25 | End: 2025-04-28 | Stop reason: HOSPADM

## 2025-04-25 RX ORDER — ATORVASTATIN CALCIUM 80 MG/1
80 TABLET, FILM COATED ORAL DAILY
Status: DISCONTINUED | OUTPATIENT
Start: 2025-04-26 | End: 2025-04-28 | Stop reason: HOSPADM

## 2025-04-25 RX ORDER — DEXTROSE MONOHYDRATE 25 G/50ML
25 INJECTION, SOLUTION INTRAVENOUS
Status: DISCONTINUED | OUTPATIENT
Start: 2025-04-25 | End: 2025-04-28 | Stop reason: HOSPADM

## 2025-04-25 RX ORDER — BISACODYL 5 MG/1
5 TABLET, DELAYED RELEASE ORAL DAILY PRN
Status: DISCONTINUED | OUTPATIENT
Start: 2025-04-25 | End: 2025-04-28 | Stop reason: HOSPADM

## 2025-04-25 RX ORDER — TAMSULOSIN HYDROCHLORIDE 0.4 MG/1
0.4 CAPSULE ORAL NIGHTLY
Status: DISCONTINUED | OUTPATIENT
Start: 2025-04-25 | End: 2025-04-28 | Stop reason: HOSPADM

## 2025-04-25 RX ORDER — FERROUS SULFATE 325(65) MG
325 TABLET ORAL
Status: DISCONTINUED | OUTPATIENT
Start: 2025-04-26 | End: 2025-04-28 | Stop reason: HOSPADM

## 2025-04-25 RX ADMIN — SODIUM CHLORIDE 1000 ML: 9 INJECTION, SOLUTION INTRAVENOUS at 18:27

## 2025-04-25 RX ADMIN — DULOXETINE 60 MG: 60 CAPSULE, DELAYED RELEASE ORAL at 21:56

## 2025-04-25 RX ADMIN — ALBUTEROL SULFATE 10 MG: 2.5 SOLUTION RESPIRATORY (INHALATION) at 19:18

## 2025-04-25 RX ADMIN — ONDANSETRON 4 MG: 2 INJECTION, SOLUTION INTRAMUSCULAR; INTRAVENOUS at 23:13

## 2025-04-25 RX ADMIN — SODIUM CHLORIDE 100 ML/HR: 9 INJECTION, SOLUTION INTRAVENOUS at 21:56

## 2025-04-25 RX ADMIN — DEXTROSE MONOHYDRATE 25 G: 25 INJECTION, SOLUTION INTRAVENOUS at 18:52

## 2025-04-25 RX ADMIN — TAMSULOSIN HYDROCHLORIDE 0.4 MG: 0.4 CAPSULE ORAL at 22:28

## 2025-04-25 RX ADMIN — INSULIN HUMAN 5 UNITS: 100 INJECTION, SOLUTION PARENTERAL at 18:55

## 2025-04-25 RX ADMIN — CALCIUM GLUCONATE 1000 MG: 20 INJECTION, SOLUTION INTRAVENOUS at 18:53

## 2025-04-25 RX ADMIN — GABAPENTIN 300 MG: 300 CAPSULE ORAL at 21:56

## 2025-04-25 NOTE — ED PROVIDER NOTES
EMERGENCY DEPARTMENT ENCOUNTER  Room Number:  27/27  PCP: Priti Oliveira APRN  Independent Historians: Patient      HPI:  Chief Complaint: had concerns including Hypotension and Dizziness.     Context: Kevin Garsia is a 69 y.o. male with a medical history of HTN, HLD, TIA, diabetes, colon cancer in remission who presents to the ED c/o acute lightheadedness and low blood pressure.  Patient states today has had a little bit of reflux and some lightheadedness as well as a pressure sensation in the back of his neck.  Patient noted blood pressure to be as low as 80s systolic.  Currently states symptoms are essentially resolved.  Fiancé relays that patient has not been eating and drinking much lately and they are concerned that he could be dehydrated.  Patient has planned reversal of ostomy in the next couple weeks.      Review of prior external notes (non-ED) -and- Review of prior external test results outside of this encounter: Office visit with neurology from 4/16/2025 reviewed and notable for visit secondary to back pain.  Patient was noted to have chronic low back pain with radiculopathy of the cervical region.  Plan was for patient to continue with Cymbalta and gabapentin.    Prescription drug monitoring program review:         PAST MEDICAL HISTORY  Active Ambulatory Problems     Diagnosis Date Noted    Essential hypertension     Hyperlipidemia     History of prostate cancer     Non morbid obesity due to excess calories 05/31/2017    Vitamin D deficiency 04/04/2019    TIA (transient ischemic attack) 07/24/2019    S/P carotid endarterectomy 07/24/2019    Type 2 diabetes mellitus without complication, without long-term current use of insulin 07/24/2019    Chronic heart failure with preserved ejection fraction 07/06/2019    Arthritis 07/30/2019    Sleep apnea 07/30/2019    Hospital discharge follow-up 02/07/2022    Cervical spinal stenosis 02/07/2022    Stroke-like symptoms 06/07/2022    Spinal stenosis,  lumbar region, without neurogenic claudication 09/12/2022    Protruded lumbar disc 09/12/2022    Chronic right-sided low back pain without sciatica 11/22/2022    Neck pain 11/22/2022    Cervical spondylosis without myelopathy 11/22/2022    Bilateral carpal tunnel syndrome 11/22/2022    Generalized joint pain 03/28/2023    HIV infection 03/28/2023    Medicare annual wellness visit, subsequent 03/29/2023    Other fatigue 05/16/2023    Chronic left shoulder pain 05/16/2023    Change in mole 10/25/2023    Chronic right shoulder pain 07/16/2024    Seasonal allergies 07/16/2024    Acute non-recurrent maxillary sinusitis 07/21/2024    Hypercalcemia 07/24/2024    Screening for colon cancer 07/25/2024    Localized swelling, mass, and lump of head 09/17/2024    Rectal adenocarcinoma 11/15/2024    Fitting and adjustment of vascular catheter 11/21/2024    Iron deficiency anemia 12/02/2024    Adverse effect of iron 12/02/2024    Lumbar radiculopathy 02/04/2025    RBBB 04/08/2025    Palpitation 04/08/2025     Resolved Ambulatory Problems     Diagnosis Date Noted    No Resolved Ambulatory Problems     Past Medical History:   Diagnosis Date    Anemia     Aphasia     CTS (carpal tunnel syndrome)     Depression     Diabetes mellitus     GERD (gastroesophageal reflux disease)     History of carotid stenosis     History of radiation therapy     History of transient ischemic attack (TIA)     Hypertension     Multiple gastric ulcers     Neuropathy     Stroke     Tattoos          PAST SURGICAL HISTORY  Past Surgical History:   Procedure Laterality Date    ANGIOPLASTY CAROTID ARTERY      APPENDECTOMY      CAROTID ENDARTERECTOMY Left     COLON RESECTION N/A 03/19/2025    Procedure: Laparoscopic Converted to Open Low Anterior Resection, Ostomy Creation, and Appendetomy;  Surgeon: Naeem Rai MD;  Location: [facility];  Service: General;  Laterality: N/A;    COLONOSCOPY N/A 10/18/2024    Procedure: COLONOSCOPY TO CECUM/TI  WITH BIOPSIES;  Surgeon: Marcus Christine Jr., MD;  Location:  [facility];  Service: General;  Laterality: N/A;  PRE-SCREENING, FAMILY HX COLON CANCER  POST-DIVERTICULOSIS, RECTAL MASS    ENDOSCOPY      FOOT SURGERY      INSERTION PROSTATE RADIATION SEED      LUMBAR EPIDURAL INJECTION N/A 02/14/2025    Procedure: L4/L5 LUMBAR EPIDURAL STEROID INJECTION CPT: 71374;  Surgeon: Vandana Ordonez MD;  Location: [facility];  Service: Pain Management;  Laterality: N/A;    TOOTH EXTRACTION  2025    VENOUS ACCESS DEVICE (PORT) INSERTION N/A 11/22/2024    Procedure: INSERTION VENOUS ACCESS DEVICE;  Surgeon: Naeem Rai MD;  Location: [facility] MA[facility] OR;  Service: General;  Laterality: N/A;         FAMILY HISTORY  Family History   Problem Relation Age of Onset    Bone cancer Mother     COPD Father     Hypertension Father     Stroke Father         TIA    Bone cancer Father         smoker    Lung cancer Father     Diabetes Father     No Known Problems Sister     No Known Problems Brother     No Known Problems Maternal Grandmother     No Known Problems Maternal Grandfather     No Known Problems Paternal Grandmother     Colon cancer Paternal Grandfather     Mental retardation Brother     Malig Hyperthermia Neg Hx          SOCIAL HISTORY  Social History     Socioeconomic History    Marital status: Significant Other   Tobacco Use    Smoking status: Former     Types: Cigarettes    Smokeless tobacco: Never    Tobacco comments:     QUIT 1990     1 TO 3 PPD X 20 YEARS    Vaping Use    Vaping status: Former    Substances: CBD    Devices: Disposable   Substance and Sexual Activity    Alcohol use: No    Drug use: Not Currently    Sexual activity: Defer         ALLERGIES  Patient has no known allergies.      REVIEW OF SYSTEMS  Review of Systems  Included in HPI  All systems reviewed and negative except for those discussed in HPI.      PHYSICAL EXAM    I have reviewed the triage vital signs and nursing notes.    ED Triage  Vitals   Temp Heart Rate Resp BP SpO2   04/25/25 1659 04/25/25 1659 04/25/25 1659 04/25/25 1701 04/25/25 1659   98.6 °F (37 °C) 83 18 99/59 98 %      Temp src Heart Rate Source Patient Position BP Location FiO2 (%)   04/25/25 1659 -- 04/25/25 1701 04/25/25 1701 --   Tympanic  Sitting Right arm        Physical Exam  GENERAL: alert, no acute distress  SKIN: Warm, dry  HENT: Normocephalic, atraumatic  EYES: no scleral icterus  CV: regular rhythm, regular rate  RESPIRATORY: normal effort, lungs clear  ABDOMEN: soft, nontender, nondistended  MUSCULOSKELETAL: no deformity  NEURO: alert, moves all extremities, follows commands            LAB RESULTS  Recent Results (from the past 24 hours)   ECG 12 Lead Electrolyte Imbalance    Collection Time: 04/25/25  5:19 PM   Result Value Ref Range    QT Interval 384 ms    QTC Interval 439 ms   Comprehensive Metabolic Panel    Collection Time: 04/25/25  5:41 PM    Specimen: Blood   Result Value Ref Range    Glucose 135 (H) 65 - 99 mg/dL    BUN 35 (H) 8 - 23 mg/dL    Creatinine 2.29 (H) 0.76 - 1.27 mg/dL    Sodium 136 136 - 145 mmol/L    Potassium 6.2 (C) 3.5 - 5.2 mmol/L    Chloride 103 98 - 107 mmol/L    CO2 20.8 (L) 22.0 - 29.0 mmol/L    Calcium 10.7 (H) 8.6 - 10.5 mg/dL    Total Protein 8.0 6.0 - 8.5 g/dL    Albumin 4.6 3.5 - 5.2 g/dL    ALT (SGPT) 22 1 - 41 U/L    AST (SGOT) 19 1 - 40 U/L    Alkaline Phosphatase 139 (H) 39 - 117 U/L    Total Bilirubin 0.6 0.0 - 1.2 mg/dL    Globulin 3.4 gm/dL    A/G Ratio 1.4 g/dL    BUN/Creatinine Ratio 15.3 7.0 - 25.0    Anion Gap 12.2 5.0 - 15.0 mmol/L    eGFR 29.8 (L) >60.0 mL/min/1.73   High Sensitivity Troponin T    Collection Time: 04/25/25  5:41 PM    Specimen: Blood   Result Value Ref Range    HS Troponin T 19 <22 ng/L   Magnesium    Collection Time: 04/25/25  5:41 PM    Specimen: Blood   Result Value Ref Range    Magnesium 2.1 1.6 - 2.4 mg/dL   CBC Auto Differential    Collection Time: 04/25/25  5:41 PM    Specimen: Blood   Result Value  Ref Range    WBC 7.05 3.40 - 10.80 10*3/mm3    RBC 4.28 4.14 - 5.80 10*6/mm3    Hemoglobin 14.2 13.0 - 17.7 g/dL    Hematocrit 42.3 37.5 - 51.0 %    MCV 98.8 (H) 79.0 - 97.0 fL    MCH 33.2 (H) 26.6 - 33.0 pg    MCHC 33.6 31.5 - 35.7 g/dL    RDW 14.7 12.3 - 15.4 %    RDW-SD 53.8 37.0 - 54.0 fl    MPV 9.7 6.0 - 12.0 fL    Platelets 286 140 - 450 10*3/mm3    Neutrophil % 63.8 42.7 - 76.0 %    Lymphocyte % 22.1 19.6 - 45.3 %    Monocyte % 5.4 5.0 - 12.0 %    Eosinophil % 7.8 (H) 0.3 - 6.2 %    Basophil % 0.6 0.0 - 1.5 %    Immature Grans % 0.3 0.0 - 0.5 %    Neutrophils, Absolute 4.50 1.70 - 7.00 10*3/mm3    Lymphocytes, Absolute 1.56 0.70 - 3.10 10*3/mm3    Monocytes, Absolute 0.38 0.10 - 0.90 10*3/mm3    Eosinophils, Absolute 0.55 (H) 0.00 - 0.40 10*3/mm3    Basophils, Absolute 0.04 0.00 - 0.20 10*3/mm3    Immature Grans, Absolute 0.02 0.00 - 0.05 10*3/mm3    nRBC 0.0 0.0 - 0.2 /100 WBC         RADIOLOGY  XR Chest 1 View  Result Date: 4/25/2025  XR CHEST 1 VW-  INDICATION: Near syncope  COMPARISON: Chest radiograph 11/22/2024 and FDG PET/CT 11/25/2024      Small dense left upper lobe nodular opacity corresponding with a calcified granuloma on prior FDG PET/CT. No other focal consolidation. No pleural effusion or pneumothorax. Normal size cardiomediastinal select. Right IJ port with tip overlying the caudal SVC. No focal osseous abnormality.  This report was finalized on 4/25/2025 5:53 PM by Dr. Félix Smith M.D on Workstation: [id number]          MEDICATIONS GIVEN IN ER  Medications   sodium chloride 0.9 % bolus 1,000 mL (1,000 mL Intravenous New Bag 4/25/25 3864)   calcium gluconate 1000 Mg/50ml 0.675% NaCl IV SOLN (has no administration in time range)   insulin regular (humuLIN R,novoLIN R) injection 5 Units (has no administration in time range)   dextrose (D50W) (25 g/50 mL) IV injection 25 g (has no administration in time range)   albuterol (PROVENTIL) nebulizer solution 0.5% 2.5 mg/0.5mL (has no administration  in time range)         ORDERS PLACED DURING THIS VISIT:  Orders Placed This Encounter   Procedures    XR Chest 1 View    Comprehensive Metabolic Panel    Urinalysis With Microscopic If Indicated (No Culture) - Urine, Clean Catch    High Sensitivity Troponin T    Magnesium    CBC Auto Differential    High Sensitivity Troponin T 1Hr    Orthostatic Vitals    POC Glucose Q1H    ECG 12 Lead Electrolyte Imbalance    Initiate Observation Status    CBC & Differential         OUTPATIENT MEDICATION MANAGEMENT:  Current Facility-Administered Medications Ordered in Epic   Medication Dose Route Frequency Provider Last Rate Last Admin    albuterol (PROVENTIL) nebulizer solution 0.5% 2.5 mg/0.5mL  10 mg Nebulization Once Félix Sykes MD        calcium gluconate 1000 Mg/50ml 0.675% NaCl IV SOLN  1,000 mg Intravenous Once Félix Sykes MD        dextrose (D50W) (25 g/50 mL) IV injection 25 g  25 g Intravenous Once Félix Sykes MD        insulin regular (humuLIN R,novoLIN R) injection 5 Units  5 Units Intravenous Once Félix Sykes MD        sodium chloride 0.9 % bolus 1,000 mL  1,000 mL Intravenous Once Félix Sykes MD 2,000 mL/hr at 04/25/25 1827 1,000 mL at 04/25/25 1827     Current Outpatient Medications Ordered in Epic   Medication Sig Dispense Refill    acetaminophen (TYLENOL) 500 MG tablet Take 2 tablets by mouth Every 6 (Six) Hours As Needed for Mild Pain.      aspirin 81 MG chewable tablet Chew 1 tablet Daily. HELD FOR OR      atorvastatin (LIPITOR) 80 MG tablet Take 1 tablet by mouth Daily. 30 tablet 3    Cyanocobalamin (VITAMIN B 12 PO) Take 1 tablet by mouth Daily. HOLD PER MD INSTR      DULoxetine (CYMBALTA) 60 MG capsule Take 1 capsule by mouth Daily. 90 capsule 3    ferrous sulfate 325 (65 Fe) MG tablet Take 1 tablet by mouth Daily With Breakfast.      gabapentin (NEURONTIN) 300 MG capsule Take 2 capsules by mouth 3 (Three) Times a Day. 540 capsule 3    losartan (COZAAR) 25 MG tablet Take 1  tablet by mouth Daily. 90 tablet 1    metFORMIN (GLUCOPHAGE) 1000 MG tablet Take 1 tablet by mouth 2 (Two) Times a Day With Meals. 90 tablet 2    metoprolol tartrate (LOPRESSOR) 25 MG tablet Take 1 tablet by mouth Daily. (Patient taking differently: Take 0.5 tablets by mouth Daily.) 90 tablet 0    ondansetron (ZOFRAN) 8 MG tablet Take 1 tablet by mouth 3 (Three) Times a Day As Needed for Nausea or Vomiting. 30 tablet 5    tamsulosin (FLOMAX) 0.4 MG capsule 24 hr capsule Take 1 capsule by mouth every night at bedtime.      vitamin D (ERGOCALCIFEROL) 1.25 MG (33366 UT) capsule capsule TAKE 1 CAPSULE BY MOUTH 1 TIME EVERY WEEK (Patient taking differently: Take 1 capsule by mouth 1 (One) Time Per Week. SATURDAYS) 12 capsule 0         PROCEDURES  Procedures      Critical care provider statement:    Critical care time (minutes): 35.   Critical care time was exclusive of:  Separately billable procedures and treating other patients   Critical care was necessary to treat or prevent imminent or life-threatening deterioration of the following conditions:  Metabolic Failure   Critical care was time spent personally by me on the following activities:  Development of treatment plan with patient or surrogate, discussions with consultants, evaluation of patient's response to treatment, examination of patient, obtaining history from patient or surrogate, ordering and performing treatments and interventions, ordering and review of laboratory studies, ordering and review of radiographic studies, pulse oximetry, re-evaluation of patient's condition and review of old charts. Critical Care indicators: Hyperkalemia with calcium or bicarb treatment       PROGRESS, DATA ANALYSIS, CONSULTS, AND MEDICAL DECISION MAKING  All labs have been independently interpreted by me.  All radiology studies have been reviewed by me. All EKG's have been independently viewed and interpreted by me.  Discussion below represents my analysis of pertinent  findings related to patient's condition, differential diagnosis, treatment plan and final disposition.    Differential diagnosis includes but is not limited to near syncope, hypotension, orthostatic hypotension, electrolyte derangement, dehydration, ACS.    Clinical Scores:                                       ED Course as of 04/25/25 1840 Fri Apr 25, 2025   1728 EKG interpreted by me demonstrates sinus rhythm, rate of 79, no WA/QT prolongation, no ST elevation, right bundle branch block [MW]   1804 Patient noted to be severely orthostatic hypotensive with a drop from 102 systolic to 66 systolic.  Given 1 L IV fluids.  Will plan on admission for further evaluation and management. [MW]   1839 Discussed with Dr. Gallego who agrees to admit.  CMP is notable for MARY with creatinine of 2.29 and hyperkalemia with a potassium of 6.2.  Believe this will improve with IV fluids however will give some temporizing medications for hyperkalemia as well. [MW]      ED Course User Index  [MW] Félix Sykes MD             AS OF 18:40 EDT VITALS:    BP - 92/63  HR - 81  TEMP - 98.6 °F (37 °C) (Tympanic)  O2 SATS - 98%    COMPLEXITY OF CARE  The patient requires admission.      DIAGNOSIS  Final diagnoses:   Orthostatic hypotension   Hyperkalemia   MARY (acute kidney injury)         DISPOSITION  ED Disposition       ED Disposition   Decision to Admit    Condition   --    Comment   Level of Care: Telemetry [5]   Diagnosis: Orthostatic hypotension [458.0.ICD-9-CM]   Admitting Physician: DEUCE GALLEGO [[id number]]   Attending Physician: DEUCE GALLEGO [[id number]]   Is patient appropriate for Inpatient Observation Unit?: Yes [1]                  Please note that portions of this document were completed with a voice recognition program.    Note Disclaimer: At [facility], we believe that sharing information builds trust and better relationships. You are receiving this note because you recently visited [facility]. It is  possible you will see health information before a provider has talked with you about it. This kind of information can be easy to misunderstand. To help you fully understand what it means for your health, we urge you to discuss this note with your provider.         Félix Sykes MD  04/25/25 0729

## 2025-04-25 NOTE — H&P
[facility] AND [facility]        Date of Admission: 2025  Patient Identification:  Name: Kevin Garsia  Age: 69 y.o.  Sex: male  :  1955  MRN: [id number]                     Primary Care Physician: Priti Oliveira APRN    Chief Complaint:  69 year old gentleman presented to the emergency room with dizziness, weakness and low blood pressure at home; he has had decreased po intake; symptoms are resolved at rest but much worse with standing; he was orthostatic in the ED; denies fever or chills; he has a history of recently diagnosed stage 3 rectal cancer; he has had decreased appetite and intake; denies sick contacts    History of Present Illness:   As above    Past Medical History:  Past Medical History:   Diagnosis Date    Anemia     Aphasia     Arthritis     CTS (carpal tunnel syndrome)     Depression     Diabetes mellitus     GERD (gastroesophageal reflux disease)     History of carotid stenosis     HX LEFT CAROTID ENDARTERECTOMY    History of prostate cancer     HX SEED, RADIATION    History of radiation therapy     History of transient ischemic attack (TIA)     S/P CAROTID ENDARTERECTOMY     Hyperlipidemia     Hypertension     Multiple gastric ulcers     Neuropathy     Rectal adenocarcinoma     Seasonal allergies     Sleep apnea     cpap    Stroke     Tattoos     TIA (transient ischemic attack)      Past Surgical History:  Past Surgical History:   Procedure Laterality Date    ANGIOPLASTY CAROTID ARTERY      APPENDECTOMY      CAROTID ENDARTERECTOMY Left     COLON RESECTION N/A 2025    Procedure: Laparoscopic Converted to Open Low Anterior Resection, Ostomy Creation, and Appendetomy;  Surgeon: Naeem Rai MD;  Location: [facility] MAIN OR;  Service: General;  Laterality: N/A;    COLONOSCOPY N/A 10/18/2024    Procedure: COLONOSCOPY TO CECUM/TI WITH BIOPSIES;  Surgeon: Marcus Christine Jr., MD;  Location: [facility] ENDOSCOPY;  Service: General;   Laterality: N/A;  PRE-SCREENING, FAMILY HX COLON CANCER  POST-DIVERTICULOSIS, RECTAL MASS    ENDOSCOPY      FOOT SURGERY      INSERTION PROSTATE RADIATION SEED      LUMBAR EPIDURAL INJECTION N/A 2025    Procedure: L4/L5 LUMBAR EPIDURAL STEROID INJECTION CPT: 77744;  Surgeon: Vandana Ordonez MD;  Location: [facility] MAIN OR;  Service: Pain Management;  Laterality: N/A;    TOOTH EXTRACTION      VENOUS ACCESS DEVICE (PORT) INSERTION N/A 2024    Procedure: INSERTION VENOUS ACCESS DEVICE;  Surgeon: Naeem Rai MD;  Location: [facility] MAIN OR;  Service: General;  Laterality: N/A;      Home Meds:  Medications Prior to Admission   Medication Sig Dispense Refill Last Dose/Taking    acetaminophen (TYLENOL) 500 MG tablet Take 2 tablets by mouth Every 6 (Six) Hours As Needed for Mild Pain.       aspirin 81 MG chewable tablet Chew 1 tablet Daily. HELD FOR OR       atorvastatin (LIPITOR) 80 MG tablet Take 1 tablet by mouth Daily. 30 tablet 3     Cyanocobalamin (VITAMIN B 12 PO) Take 1 tablet by mouth Daily. HOLD PER MD INSTR       DULoxetine (CYMBALTA) 60 MG capsule Take 1 capsule by mouth Daily. 90 capsule 3     ferrous sulfate 325 (65 Fe) MG tablet Take 1 tablet by mouth Daily With Breakfast.       gabapentin (NEURONTIN) 300 MG capsule Take 2 capsules by mouth 3 (Three) Times a Day. 540 capsule 3     [] loperamide (IMODIUM) 2 MG capsule Take 1 capsule by mouth 4 (Four) Times a Day Before Meals & at Bedtime for 30 days. 120 capsule 0     losartan (COZAAR) 25 MG tablet Take 1 tablet by mouth Daily. 90 tablet 1     metFORMIN (GLUCOPHAGE) 1000 MG tablet Take 1 tablet by mouth 2 (Two) Times a Day With Meals. 90 tablet 2     metoprolol tartrate (LOPRESSOR) 25 MG tablet Take 1 tablet by mouth Daily. (Patient taking differently: Take 0.5 tablets by mouth Daily.) 90 tablet 0     ondansetron (ZOFRAN) 8 MG tablet Take 1 tablet by mouth 3 (Three) Times a Day As Needed for Nausea or Vomiting. 30 tablet 5      tamsulosin (FLOMAX) 0.4 MG capsule 24 hr capsule Take 1 capsule by mouth every night at bedtime.       vitamin D (ERGOCALCIFEROL) 1.25 MG (64283 UT) capsule capsule TAKE 1 CAPSULE BY MOUTH 1 TIME EVERY WEEK (Patient taking differently: Take 1 capsule by mouth 1 (One) Time Per Week. SATURDAYS) 12 capsule 0        Allergies:  No Known Allergies  Immunizations:  Immunization History   Administered Date(s) Administered    COVID-19 (MODERNA) 12YRS+ (SPIKEVAX) 09/22/2023, 10/19/2024    COVID-19 (MODERNA) 1st,2nd,3rd Dose Monovalent 03/02/2021, 03/30/2021    COVID-19 (MODERNA) BIVALENT 12+YRS 09/22/2022    Fluad Quad 65+ 09/22/2023    Fluzone High-Dose 65+yrs 01/19/2022, 09/22/2022    Pneumococcal Polysaccharide (PPSV23) 08/17/2020     Social History:   Social History     Social History Narrative    Not on file     Social History     Socioeconomic History    Marital status: Significant Other   Tobacco Use    Smoking status: Former     Types: Cigarettes    Smokeless tobacco: Never    Tobacco comments:     QUIT 1990     1 TO 3 PPD X 20 YEARS    Vaping Use    Vaping status: Former    Substances: CBD    Devices: Disposable   Substance and Sexual Activity    Alcohol use: No    Drug use: Not Currently    Sexual activity: Defer       Family History:  Family History   Problem Relation Age of Onset    Bone cancer Mother     COPD Father     Hypertension Father     Stroke Father         TIA    Bone cancer Father         smoker    Lung cancer Father     Diabetes Father     No Known Problems Sister     No Known Problems Brother     No Known Problems Maternal Grandmother     No Known Problems Maternal Grandfather     No Known Problems Paternal Grandmother     Colon cancer Paternal Grandfather     Mental retardation Brother     Malig Hyperthermia Neg Hx         Review of Systems  See history of present illness and past medical history.  Patient denies headache,  syncope, falls, trauma, change in vision, change in hearing, change in  taste, changes in weight, changes in appetite, focal weakness, numbness, or paresthesia.  Patient denies chest pain, palpitations, dyspnea, orthopnea, PND, cough, sinus pressure, rhinorrhea, epistaxis, hemoptysis, nausea, vomiting,hematemesis,   constipation or hematochezia.  Denies cold or heat intolerance, polydipsia, polyuria, polyphagia. Denies hematuria, pyuria, dysuria, hesitancy, frequency or urgency. Denies consumption of raw and under cooked meats foods or change in water source.  Denies fever, chills, sweats, night sweats.      Objective:  T Max 24 hrs: Temp (24hrs), Av.6 °F (37 °C), Min:98.6 °F (37 °C), Max:98.6 °F (37 °C)    Vitals Ranges:   Temp:  [98.6 °F (37 °C)] 98.6 °F (37 °C)  Heart Rate:  [73-90] 75  Resp:  [18] 18  BP: ()/(42-63) 109/45      Exam:  /45   Pulse 75   Temp 98.6 °F (37 °C) (Tympanic)   Resp 18   SpO2 95%     General Appearance:    Alert, cooperative, no distress, appears stated age   Head:    Normocephalic, without obvious abnormality, atraumatic   Eyes:    PERRL, conjunctivae/corneas clear, EOM's intact, both eyes   Ears:    Normal external ear canals, both ears   Nose:   Nares normal, septum midline, mucosa normal, no drainage    or sinus tenderness   Throat:   Lips, mucosa, and tongue normal   Neck:   Supple, symmetrical, trachea midline, no adenopathy;     thyroid:  no enlargement/tenderness/nodules; no carotid    bruit or JVD   Back:     Symmetric, no curvature, ROM normal, no CVA tenderness   Lungs:     Decreased breaths sounds bilaterally, respirations unlabored   Chest Wall:    No tenderness or deformity    Heart:    Regular rate and rhythm, S1 and S2 normal, no murmur, rub   or gallop   Abdomen:     Soft, nontender, bowel sounds active all four quadrants,     no masses, no hepatomegaly, no splenomegaly   Extremities:   Extremities normal, atraumatic, no cyanosis or edema                       .    Data Review:  Labs in chart were reviewed.  WBC   Date  Value Ref Range Status   04/25/2025 7.05 3.40 - 10.80 10*3/mm3 Final     Hemoglobin   Date Value Ref Range Status   04/25/2025 14.2 13.0 - 17.7 g/dL Final     Hematocrit   Date Value Ref Range Status   04/25/2025 42.3 37.5 - 51.0 % Final     Platelets   Date Value Ref Range Status   04/25/2025 286 140 - 450 10*3/mm3 Final     Sodium   Date Value Ref Range Status   04/25/2025 136 136 - 145 mmol/L Final     Potassium   Date Value Ref Range Status   04/25/2025 6.2 (C) 3.5 - 5.2 mmol/L Final     Chloride   Date Value Ref Range Status   04/25/2025 103 98 - 107 mmol/L Final     CO2   Date Value Ref Range Status   04/25/2025 20.8 (L) 22.0 - 29.0 mmol/L Final     BUN   Date Value Ref Range Status   04/25/2025 35 (H) 8 - 23 mg/dL Final     Creatinine   Date Value Ref Range Status   04/25/2025 2.29 (H) 0.76 - 1.27 mg/dL Final     Glucose   Date Value Ref Range Status   04/25/2025 135 (H) 65 - 99 mg/dL Final     Calcium   Date Value Ref Range Status   04/25/2025 10.7 (H) 8.6 - 10.5 mg/dL Final     Magnesium   Date Value Ref Range Status   04/25/2025 2.1 1.6 - 2.4 mg/dL Final     AST (SGOT)   Date Value Ref Range Status   04/25/2025 19 1 - 40 U/L Final     ALT (SGPT)   Date Value Ref Range Status   04/25/2025 22 1 - 41 U/L Final     Alkaline Phosphatase   Date Value Ref Range Status   04/25/2025 139 (H) 39 - 117 U/L Final                Imaging Results (All)       Procedure Component Value Units Date/Time    XR Chest 1 View [[id number]] Collected: 04/25/25 1750     Updated: 04/25/25 1756    Narrative:      XR CHEST 1 VW-     INDICATION: Near syncope     COMPARISON: Chest radiograph 11/22/2024 and FDG PET/CT 11/25/2024       Impression:      Small dense left upper lobe nodular opacity corresponding  with a calcified granuloma on prior FDG PET/CT. No other focal  consolidation. No pleural effusion or pneumothorax. Normal size  cardiomediastinal select. Right IJ port with tip overlying the caudal  SVC. No focal osseous  abnormality.     This report was finalized on 4/25/2025 5:53 PM by Dr. Félix Smith M.D on Workstation: [id number]                 Assessment:  Active Hospital Problems    Diagnosis  POA    **Orthostatic hypotension [I95.1]  Yes      Resolved Hospital Problems   No resolved problems to display.   Rectal cancer  Hyperlipidemia  Diabetes  Hyperkalemia  Acute kidney injury  Sleep apnea  dizziness    Plan:  Ask nephrology to see him  Iv fluids  Monitor on telemetry  Oncology to see  Trend labs  Dw patient and ed provider  Patient is full code  Na Gallego MD  4/25/2025  19:46 EDT

## 2025-04-25 NOTE — ED TRIAGE NOTES
Patient to er from home with c/o has hx of colon CA. Reported low b/p at home along with dizziness.

## 2025-04-25 NOTE — ED NOTES
Nursing report ED to floor  Kevin Garsia  69 y.o.  male    HPI : 69 y.o. male with a medical history of HTN, HLD, TIA, diabetes, colon cancer in remission who presents to the ED c/o acute lightheadedness and low blood pressure.  Patient states today has had a little bit of reflux and some lightheadedness as well as a pressure sensation in the back of his neck.  Patient noted blood pressure to be as low as 80s systolic.  Currently states symptoms are essentially resolved.  Fiancé relays that patient has not been eating and drinking much lately and they are concerned that he could be dehydrated.  Patient has planned reversal of ostomy in the next couple weeks.   HPI  Stated Reason for Visit: hypotension/ dizzy    Chief Complaint  Chief Complaint   Patient presents with    Hypotension    Dizziness       Admitting doctor:   Na Gallego MD    Admitting diagnosis:   The encounter diagnosis was Orthostatic hypotension.    Code status:   Current Code Status       Date Active Code Status Order ID Comments User Context       Prior            Allergies:   Patient has no known allergies.    Isolation:   No active isolations    Intake and Output  No intake or output data in the 24 hours ending 04/25/25 1828    Weight:   There were no vitals filed for this visit.    Most recent vitals:   Vitals:    04/25/25 1701 04/25/25 1746 04/25/25 1747 04/25/25 1749   BP: 99/59 102/54 (!) 66/42 92/63   BP Location: Right arm      Patient Position: Sitting Lying Standing Lying   Pulse:  77 90 81   Resp:       Temp:       TempSrc:       SpO2:           Active LDAs/IV Access:   Lines, Drains & Airways       Active LDAs       Name Placement date Placement time Site Days    Peripheral IV 04/25/25 1826 20 G Right Antecubital 04/25/25  1826  Antecubital  less than 1                    Labs (abnormal labs have a star):   Labs Reviewed   CBC WITH AUTO DIFFERENTIAL - Abnormal; Notable for the following components:       Result Value     MCV 98.8 (*)     MCH 33.2 (*)     Eosinophil % 7.8 (*)     Eosinophils, Absolute 0.55 (*)     All other components within normal limits   TROPONIN - Normal    Narrative:     High Sensitive Troponin T Reference Range:  <14.0 ng/L- Negative Female for AMI  <22.0 ng/L- Negative Male for AMI  >=14 - Abnormal Female indicating possible myocardial injury.  >=22 - Abnormal Male indicating possible myocardial injury.   Clinicians would have to utilize clinical acumen, EKG, Troponin, and serial changes to determine if it is an Acute Myocardial Infarction or myocardial injury due to an underlying chronic condition.        COMPREHENSIVE METABOLIC PANEL   URINALYSIS W/ MICROSCOPIC IF INDICATED (NO CULTURE)   MAGNESIUM   HIGH SENSITIVITIY TROPONIN T 1HR   CBC AND DIFFERENTIAL    Narrative:     The following orders were created for panel order CBC & Differential.  Procedure                               Abnormality         Status                     ---------                               -----------         ------                     CBC Auto Differential[[id number]]        Abnormal            Final result                 Please view results for these tests on the individual orders.       EKG:   ECG 12 Lead Electrolyte Imbalance   Preliminary Result   HEART RATE=79  bpm   RR Dqgucxsy=821  ms   PA Mbzlsuwq=500  ms   P Horizontal Axis=-4  deg   P Front Axis=43  deg   QRSD Rqxfokea=508  ms   QT Tfywqzkf=584  ms   ZEbQ=905  ms   QRS Axis=-23  deg   T Wave Axis=12  deg   - ABNORMAL ECG -   Sinus rhythm   Right bundle branch block   Inferior infarct, old   Date and Time of Study:2025-04-25 17:19:29          Meds given in ED:   Medications   sodium chloride 0.9 % bolus 1,000 mL (1,000 mL Intravenous New Bag 4/25/25 0133)       Imaging results:  XR Chest 1 View  Result Date: 4/25/2025  Small dense left upper lobe nodular opacity corresponding with a calcified granuloma on prior FDG PET/CT. No other focal consolidation. No pleural  effusion or pneumothorax. Normal size cardiomediastinal select. Right IJ port with tip overlying the caudal SVC. No focal osseous abnormality.  This report was finalized on 4/25/2025 5:53 PM by Dr. Félix Smith M.D on Workstation: [id number]      FL Barium Enema Water Soluble Single Contrast  Result Date: 4/25/2025   1. Colorectal anastomosis site is intact without obstruction. Just inferior to the anastomosis site along the left lateral wall there is a small outpouching of contrast. This may be related to postsurgical changes but cannot exclude a contained leak. Please correlate with the clinical findings. If further evaluation is indicated, follow-up CT of the abdomen and pelvis and/or short-term follow-up contrast enema could be obtained. 2. Sigmoid diverticulosis.     This report was finalized on 4/25/2025 5:16 PM by Dr. Sanchez Mancuso M.D on Workstation: [id number]        Ambulatory status:   - ad anastasia    Social issues:   Social History     Socioeconomic History    Marital status: Significant Other   Tobacco Use    Smoking status: Former     Types: Cigarettes    Smokeless tobacco: Never    Tobacco comments:     QUIT 1990     1 TO 3 PPD X 20 YEARS    Vaping Use    Vaping status: Former    Substances: CBD    Devices: Disposable   Substance and Sexual Activity    Alcohol use: No    Drug use: Not Currently    Sexual activity: Defer       Peripheral Neurovascular  Peripheral Neurovascular (Adult)  Peripheral Neurovascular WDL: WDL    Neuro Cognitive  Neuro Cognitive (Adult)  Cognitive/Neuro/Behavioral WDL: WDL    Learning  Learning Assessment  Learning Readiness and Ability: no barriers identified    Respiratory  Respiratory WDL  Respiratory WDL: WDL    Abdominal Pain       Pain Assessments  Pain (Adult)  (0-10) Pain Rating: Rest: 0  (0-10) Pain Rating: Activity: 0    NIH Stroke Scale       Juni Valdivia RN  04/25/25 18:28 EDT

## 2025-04-26 ENCOUNTER — APPOINTMENT (OUTPATIENT)
Dept: GENERAL RADIOLOGY | Facility: HOSPITAL | Age: 70
End: 2025-04-26
Payer: MEDICARE

## 2025-04-26 ENCOUNTER — APPOINTMENT (OUTPATIENT)
Dept: ULTRASOUND IMAGING | Facility: HOSPITAL | Age: 70
End: 2025-04-26
Payer: MEDICARE

## 2025-04-26 PROBLEM — N17.9 AKI (ACUTE KIDNEY INJURY): Status: ACTIVE | Noted: 2025-04-26

## 2025-04-26 PROBLEM — R55 NEAR SYNCOPE: Status: ACTIVE | Noted: 2025-04-26

## 2025-04-26 PROBLEM — R33.9 URINE RETENTION: Status: ACTIVE | Noted: 2025-04-26

## 2025-04-26 PROBLEM — E87.5 HYPERKALEMIA: Status: ACTIVE | Noted: 2025-04-26

## 2025-04-26 PROBLEM — Z85.048 HISTORY OF RECTAL CANCER: Status: ACTIVE | Noted: 2024-11-15

## 2025-04-26 LAB
ANION GAP SERPL CALCULATED.3IONS-SCNC: 6 MMOL/L (ref 5–15)
BILIRUB UR QL STRIP: NEGATIVE
BUN SERPL-MCNC: 35 MG/DL (ref 8–23)
BUN/CREAT SERPL: 17.9 (ref 7–25)
CALCIUM SPEC-SCNC: 9.9 MG/DL (ref 8.6–10.5)
CHLORIDE SERPL-SCNC: 107 MMOL/L (ref 98–107)
CLARITY UR: CLEAR
CO2 SERPL-SCNC: 18 MMOL/L (ref 22–29)
COLOR UR: YELLOW
CREAT SERPL-MCNC: 1.95 MG/DL (ref 0.76–1.27)
DEPRECATED RDW RBC AUTO: 53 FL (ref 37–54)
EGFRCR SERPLBLD CKD-EPI 2021: 36.1 ML/MIN/1.73
ERYTHROCYTE [DISTWIDTH] IN BLOOD BY AUTOMATED COUNT: 14.4 % (ref 12.3–15.4)
GLUCOSE BLDC GLUCOMTR-MCNC: 118 MG/DL (ref 70–130)
GLUCOSE BLDC GLUCOMTR-MCNC: 148 MG/DL (ref 70–130)
GLUCOSE BLDC GLUCOMTR-MCNC: 149 MG/DL (ref 70–130)
GLUCOSE BLDC GLUCOMTR-MCNC: 151 MG/DL (ref 70–130)
GLUCOSE BLDC GLUCOMTR-MCNC: 154 MG/DL (ref 70–130)
GLUCOSE BLDC GLUCOMTR-MCNC: 169 MG/DL (ref 70–130)
GLUCOSE BLDC GLUCOMTR-MCNC: 237 MG/DL (ref 70–130)
GLUCOSE BLDC GLUCOMTR-MCNC: 251 MG/DL (ref 70–130)
GLUCOSE SERPL-MCNC: 169 MG/DL (ref 65–99)
GLUCOSE UR STRIP-MCNC: ABNORMAL MG/DL
HCT VFR BLD AUTO: 39 % (ref 37.5–51)
HGB BLD-MCNC: 13 G/DL (ref 13–17.7)
HGB UR QL STRIP.AUTO: NEGATIVE
KETONES UR QL STRIP: NEGATIVE
LEUKOCYTE ESTERASE UR QL STRIP.AUTO: NEGATIVE
MCH RBC QN AUTO: 33.1 PG (ref 26.6–33)
MCHC RBC AUTO-ENTMCNC: 33.3 G/DL (ref 31.5–35.7)
MCV RBC AUTO: 99.2 FL (ref 79–97)
NITRITE UR QL STRIP: NEGATIVE
PH UR STRIP.AUTO: <=5 [PH] (ref 5–8)
PLATELET # BLD AUTO: 221 10*3/MM3 (ref 140–450)
PMV BLD AUTO: 9.8 FL (ref 6–12)
POTASSIUM SERPL-SCNC: 4.4 MMOL/L (ref 3.5–5.2)
POTASSIUM SERPL-SCNC: 7.3 MMOL/L (ref 3.5–5.2)
PROT UR QL STRIP: ABNORMAL
QT INTERVAL: 384 MS
QT INTERVAL: 384 MS
QTC INTERVAL: 439 MS
QTC INTERVAL: 455 MS
RBC # BLD AUTO: 3.93 10*6/MM3 (ref 4.14–5.8)
SODIUM SERPL-SCNC: 131 MMOL/L (ref 136–145)
SP GR UR STRIP: 1.02 (ref 1–1.03)
UROBILINOGEN UR QL STRIP: ABNORMAL
WBC NRBC COR # BLD AUTO: 8.01 10*3/MM3 (ref 3.4–10.8)

## 2025-04-26 PROCEDURE — 99222 1ST HOSP IP/OBS MODERATE 55: CPT | Performed by: INTERNAL MEDICINE

## 2025-04-26 PROCEDURE — 81003 URINALYSIS AUTO W/O SCOPE: CPT | Performed by: STUDENT IN AN ORGANIZED HEALTH CARE EDUCATION/TRAINING PROGRAM

## 2025-04-26 PROCEDURE — 25010000002 CALCIUM GLUCONATE-NACL 1-0.675 GM/50ML-% SOLUTION: Performed by: STUDENT IN AN ORGANIZED HEALTH CARE EDUCATION/TRAINING PROGRAM

## 2025-04-26 PROCEDURE — 72070 X-RAY EXAM THORAC SPINE 2VWS: CPT

## 2025-04-26 PROCEDURE — 84132 ASSAY OF SERUM POTASSIUM: CPT | Performed by: HOSPITALIST

## 2025-04-26 PROCEDURE — 80048 BASIC METABOLIC PNL TOTAL CA: CPT | Performed by: HOSPITALIST

## 2025-04-26 PROCEDURE — 93010 ELECTROCARDIOGRAM REPORT: CPT | Performed by: INTERNAL MEDICINE

## 2025-04-26 PROCEDURE — 63710000001 INSULIN REGULAR HUMAN PER 5 UNITS: Performed by: STUDENT IN AN ORGANIZED HEALTH CARE EDUCATION/TRAINING PROGRAM

## 2025-04-26 PROCEDURE — 25810000003 SODIUM CHLORIDE 0.9 % SOLUTION

## 2025-04-26 PROCEDURE — 82948 REAGENT STRIP/BLOOD GLUCOSE: CPT

## 2025-04-26 PROCEDURE — 25010000002 PROCHLORPERAZINE 10 MG/2ML SOLUTION: Performed by: NURSE PRACTITIONER

## 2025-04-26 PROCEDURE — 85027 COMPLETE CBC AUTOMATED: CPT | Performed by: HOSPITALIST

## 2025-04-26 PROCEDURE — 76775 US EXAM ABDO BACK WALL LIM: CPT

## 2025-04-26 PROCEDURE — 93005 ELECTROCARDIOGRAM TRACING: CPT | Performed by: STUDENT IN AN ORGANIZED HEALTH CARE EDUCATION/TRAINING PROGRAM

## 2025-04-26 PROCEDURE — 63710000001 INSULIN LISPRO (HUMAN) PER 5 UNITS: Performed by: INTERNAL MEDICINE

## 2025-04-26 RX ORDER — NITROGLYCERIN 0.4 MG/1
0.4 TABLET SUBLINGUAL
Status: DISCONTINUED | OUTPATIENT
Start: 2025-04-26 | End: 2025-04-28 | Stop reason: HOSPADM

## 2025-04-26 RX ORDER — SODIUM CHLORIDE 9 MG/ML
40 INJECTION, SOLUTION INTRAVENOUS AS NEEDED
Status: DISCONTINUED | OUTPATIENT
Start: 2025-04-26 | End: 2025-04-28 | Stop reason: HOSPADM

## 2025-04-26 RX ORDER — HEPARIN SODIUM (PORCINE) LOCK FLUSH IV SOLN 100 UNIT/ML 100 UNIT/ML
5 SOLUTION INTRAVENOUS AS NEEDED
Status: DISCONTINUED | OUTPATIENT
Start: 2025-04-26 | End: 2025-04-28 | Stop reason: HOSPADM

## 2025-04-26 RX ORDER — SODIUM CHLORIDE 0.9 % (FLUSH) 0.9 %
10 SYRINGE (ML) INJECTION EVERY 12 HOURS SCHEDULED
Status: DISCONTINUED | OUTPATIENT
Start: 2025-04-26 | End: 2025-04-28 | Stop reason: HOSPADM

## 2025-04-26 RX ORDER — CALCIUM GLUCONATE 20 MG/ML
1000 INJECTION, SOLUTION INTRAVENOUS ONCE
Status: COMPLETED | OUTPATIENT
Start: 2025-04-26 | End: 2025-04-26

## 2025-04-26 RX ORDER — DEXTROSE MONOHYDRATE 25 G/50ML
25 INJECTION, SOLUTION INTRAVENOUS ONCE
Status: COMPLETED | OUTPATIENT
Start: 2025-04-26 | End: 2025-04-26

## 2025-04-26 RX ORDER — PROCHLORPERAZINE EDISYLATE 5 MG/ML
5 INJECTION INTRAMUSCULAR; INTRAVENOUS ONCE
Status: COMPLETED | OUTPATIENT
Start: 2025-04-26 | End: 2025-04-26

## 2025-04-26 RX ORDER — LOPERAMIDE HYDROCHLORIDE 2 MG/1
2 CAPSULE ORAL
Status: DISCONTINUED | OUTPATIENT
Start: 2025-04-26 | End: 2025-04-28 | Stop reason: HOSPADM

## 2025-04-26 RX ORDER — SODIUM CHLORIDE 0.9 % (FLUSH) 0.9 %
20 SYRINGE (ML) INJECTION AS NEEDED
Status: DISCONTINUED | OUTPATIENT
Start: 2025-04-26 | End: 2025-04-28 | Stop reason: HOSPADM

## 2025-04-26 RX ORDER — SODIUM CHLORIDE 0.9 % (FLUSH) 0.9 %
10 SYRINGE (ML) INJECTION AS NEEDED
Status: DISCONTINUED | OUTPATIENT
Start: 2025-04-26 | End: 2025-04-28 | Stop reason: HOSPADM

## 2025-04-26 RX ORDER — INDOMETHACIN 25 MG/1
50 CAPSULE ORAL ONCE
Status: COMPLETED | OUTPATIENT
Start: 2025-04-26 | End: 2025-04-26

## 2025-04-26 RX ADMIN — Medication 10 ML: at 20:33

## 2025-04-26 RX ADMIN — SODIUM ZIRCONIUM CYCLOSILICATE 10 G: 10 POWDER, FOR SUSPENSION ORAL at 07:54

## 2025-04-26 RX ADMIN — TAMSULOSIN HYDROCHLORIDE 0.4 MG: 0.4 CAPSULE ORAL at 20:33

## 2025-04-26 RX ADMIN — GABAPENTIN 300 MG: 300 CAPSULE ORAL at 20:32

## 2025-04-26 RX ADMIN — INSULIN HUMAN 10 UNITS: 100 INJECTION, SOLUTION PARENTERAL at 07:22

## 2025-04-26 RX ADMIN — LOPERAMIDE HYDROCHLORIDE 2 MG: 2 CAPSULE ORAL at 12:02

## 2025-04-26 RX ADMIN — LOPERAMIDE HYDROCHLORIDE 2 MG: 2 CAPSULE ORAL at 20:33

## 2025-04-26 RX ADMIN — LOPERAMIDE HYDROCHLORIDE 2 MG: 2 CAPSULE ORAL at 18:10

## 2025-04-26 RX ADMIN — GABAPENTIN 300 MG: 300 CAPSULE ORAL at 09:13

## 2025-04-26 RX ADMIN — DEXTROSE MONOHYDRATE 25 G: 25 INJECTION, SOLUTION INTRAVENOUS at 07:22

## 2025-04-26 RX ADMIN — SODIUM BICARBONATE 50 MEQ: 84 INJECTION INTRAVENOUS at 07:22

## 2025-04-26 RX ADMIN — FERROUS SULFATE TAB 325 MG (65 MG ELEMENTAL FE) 325 MG: 325 (65 FE) TAB at 09:13

## 2025-04-26 RX ADMIN — GABAPENTIN 300 MG: 300 CAPSULE ORAL at 16:09

## 2025-04-26 RX ADMIN — ATORVASTATIN CALCIUM 80 MG: 80 TABLET, FILM COATED ORAL at 09:13

## 2025-04-26 RX ADMIN — ASPIRIN 81 MG CHEWABLE TABLET 81 MG: 81 TABLET CHEWABLE at 09:13

## 2025-04-26 RX ADMIN — CALCIUM GLUCONATE 1000 MG: 20 INJECTION, SOLUTION INTRAVENOUS at 07:29

## 2025-04-26 RX ADMIN — SODIUM CHLORIDE 100 ML/HR: 9 INJECTION, SOLUTION INTRAVENOUS at 07:29

## 2025-04-26 RX ADMIN — DULOXETINE 60 MG: 60 CAPSULE, DELAYED RELEASE ORAL at 20:33

## 2025-04-26 RX ADMIN — PROCHLORPERAZINE EDISYLATE 5 MG: 5 INJECTION INTRAMUSCULAR; INTRAVENOUS at 00:33

## 2025-04-26 RX ADMIN — Medication 10 ML: at 11:25

## 2025-04-26 RX ADMIN — INSULIN LISPRO 2 UNITS: 100 INJECTION, SOLUTION INTRAVENOUS; SUBCUTANEOUS at 12:02

## 2025-04-26 NOTE — CONSULTS
[facility]                                                                                             Nephrology Initial Consult Note    Patient Identification:  Name: Kevin Garsia MRN: [id number]  Age: 69 y.o. : 1955  Sex: male  Date:2025    Requesting Physician: As per consult order.  Reason for Consultation: mary   Information from:patient/ family/ chart      History of Present Illness: This is a 69 y.o. year old male  came to ER for dizziness, weakness and low bp.   He has  a hx of stage 3 colorectal cancer.   He recently noted increase outpt from his colostomy.  Cr upon arrival was 2.29 with K at 6.2.   He was found to have urinary retention and rosado was placed. He was hydrated with IV fluids.   Cr is now at 1.95 with K at 4.4.        The following medical history and medications personally reviewed by me:    Problem List:   Patient Active Problem List    Diagnosis     *Orthostatic hypotension [I95.1]     Hyperkalemia [E87.5]     MARY (acute kidney injury) [N17.9]     Urine retention [R33.9]     Near syncope [R55]     RBBB [I45.10]     Palpitation [R00.2]     Lumbar radiculopathy [M54.16]     Iron deficiency anemia [D50.9]     Adverse effect of iron [T45.4X5A]     Fitting and adjustment of vascular catheter [Z45.2]     Rectal adenocarcinoma [C20]     Localized swelling, mass, and lump of head [R22.0]     Screening for colon cancer [Z12.11]     Hypercalcemia [E83.52]     Acute non-recurrent maxillary sinusitis [J01.00]     Chronic right shoulder pain [M25.511, G89.29]     Seasonal allergies [J30.2]     Change in mole [D22.9]     Other fatigue [R53.83]     Chronic left shoulder pain [M25.512, G89.29]     Medicare annual wellness visit, subsequent [Z00.00]     Generalized joint pain [M25.50]     HIV infection [Z21]     Chronic right-sided low back pain without sciatica [M54.50, G89.29]     Neck pain [M54.2]     Cervical spondylosis without myelopathy [M47.812]     Bilateral  carpal tunnel syndrome [G56.03]     Spinal stenosis, lumbar region, without neurogenic claudication [M48.061]     Protruded lumbar disc [M51.26]     Stroke-like symptoms [R29.90]     Hospital discharge follow-up [Z09]     Cervical spinal stenosis [M48.02]     Arthritis [M19.90]     Sleep apnea [G47.30]     TIA (transient ischemic attack) [G45.9]     S/P carotid endarterectomy [Z98.890]     Type 2 diabetes mellitus without complication, without long-term current use of insulin [E11.9]     Chronic heart failure with preserved ejection fraction [I50.32]     Vitamin D deficiency [E55.9]     Non morbid obesity due to excess calories [E66.09]     Essential hypertension [I10]     Hyperlipidemia [E78.5]     History of prostate cancer [Z85.46]        Past Medical History:  Past Medical History:   Diagnosis Date    Anemia     Aphasia     Arthritis     CTS (carpal tunnel syndrome)     Depression     Diabetes mellitus     GERD (gastroesophageal reflux disease)     History of carotid stenosis     HX LEFT CAROTID ENDARTERECTOMY    History of prostate cancer 2012    HX SEED, RADIATION    History of radiation therapy     History of transient ischemic attack (TIA)     S/P CAROTID ENDARTERECTOMY 2019    Hyperlipidemia     Hypertension     Multiple gastric ulcers     Neuropathy     Rectal adenocarcinoma 2024    Seasonal allergies     Sleep apnea     cpap    Stroke     Tattoos     TIA (transient ischemic attack)        Past Surgical History:  Past Surgical History:   Procedure Laterality Date    ANGIOPLASTY CAROTID ARTERY      APPENDECTOMY      CAROTID ENDARTERECTOMY Left     COLON RESECTION N/A 03/19/2025    Procedure: Laparoscopic Converted to Open Low Anterior Resection, Ostomy Creation, and Appendetomy;  Surgeon: Naeem Rai MD;  Location: [facility];  Service: General;  Laterality: N/A;    COLONOSCOPY N/A 10/18/2024    Procedure: COLONOSCOPY TO CECUM/TI WITH BIOPSIES;  Surgeon: Marcus Christine Jr., MD;  Location:  [facility] ENDOSCOPY;  Service: General;  Laterality: N/A;  PRE-SCREENING, FAMILY HX COLON CANCER  POST-DIVERTICULOSIS, RECTAL MASS    ENDOSCOPY      FOOT SURGERY      INSERTION PROSTATE RADIATION SEED      LUMBAR EPIDURAL INJECTION N/A 2025    Procedure: L4/L5 LUMBAR EPIDURAL STEROID INJECTION CPT: 90914;  Surgeon: Vandana Ordonez MD;  Location: [facility] MAIN OR;  Service: Pain Management;  Laterality: N/A;    TOOTH EXTRACTION      VENOUS ACCESS DEVICE (PORT) INSERTION N/A 2024    Procedure: INSERTION VENOUS ACCESS DEVICE;  Surgeon: Naeem Rai MD;  Location: [facility] MAIN OR;  Service: General;  Laterality: N/A;        Home Meds:   Medications Prior to Admission   Medication Sig Dispense Refill Last Dose/Taking    acetaminophen (TYLENOL) 500 MG tablet Take 2 tablets by mouth Every 6 (Six) Hours As Needed for Mild Pain.   2025    atorvastatin (LIPITOR) 80 MG tablet Take 1 tablet by mouth Daily. 30 tablet 3 2025    DULoxetine (CYMBALTA) 60 MG capsule Take 1 capsule by mouth Daily. 90 capsule 3 2025    ferrous sulfate 325 (65 Fe) MG tablet Take 1 tablet by mouth Daily With Breakfast.   3/25/2025    gabapentin (NEURONTIN) 300 MG capsule Take 2 capsules by mouth 3 (Three) Times a Day. 540 capsule 3 2025    [] loperamide (IMODIUM) 2 MG capsule Take 1 capsule by mouth 4 (Four) Times a Day Before Meals & at Bedtime for 30 days. 120 capsule 0 Taking    losartan (COZAAR) 25 MG tablet Take 1 tablet by mouth Daily. 90 tablet 1 2025    metFORMIN (GLUCOPHAGE) 1000 MG tablet Take 1 tablet by mouth 2 (Two) Times a Day With Meals. 90 tablet 2 2025    ondansetron (ZOFRAN) 8 MG tablet Take 1 tablet by mouth 3 (Three) Times a Day As Needed for Nausea or Vomiting. 30 tablet 5 2025    tamsulosin (FLOMAX) 0.4 MG capsule 24 hr capsule Take 1 capsule by mouth every night at bedtime.   2025    vitamin D (ERGOCALCIFEROL) 1.25 MG (62115 UT) capsule capsule TAKE 1 CAPSULE BY  MOUTH 1 TIME EVERY WEEK (Patient taking differently: Take 1 capsule by mouth 1 (One) Time Per Week. SATURDAYS) 12 capsule 0 4/19/2025    aspirin 81 MG chewable tablet Chew 1 tablet Daily. HELD FOR OR       Cyanocobalamin (VITAMIN B 12 PO) Take 1 tablet by mouth Daily. HOLD PER MD JIMÉNEZ       metoprolol tartrate (LOPRESSOR) 25 MG tablet Take 1 tablet by mouth Daily. (Patient taking differently: Take 0.5 tablets by mouth Daily.) 90 tablet 0        Current Meds:   Current Facility-Administered Medications   Medication Dose Route Frequency Provider Last Rate Last Admin    acetaminophen (TYLENOL) tablet 650 mg  650 mg Oral Q4H PRN Na Gallego MD        Or    acetaminophen (TYLENOL) 160 MG/5ML oral solution 650 mg  650 mg Oral Q4H PRN Na Gallego MD        Or    acetaminophen (TYLENOL) suppository 650 mg  650 mg Rectal Q4H PRN Na Gallego MD        aspirin chewable tablet 81 mg  81 mg Oral Daily Na Gallego MD   81 mg at 04/26/25 0913    atorvastatin (LIPITOR) tablet 80 mg  80 mg Oral Daily Na Gallego MD   80 mg at 04/26/25 0913    sennosides-docusate (PERICOLACE) 8.6-50 MG per tablet 2 tablet  2 tablet Oral BID PRN Na Gallego MD        And    polyethylene glycol (MIRALAX) packet 17 g  17 g Oral Daily PRN Na Gallego MD        And    bisacodyl (DULCOLAX) EC tablet 5 mg  5 mg Oral Daily PRN Na Gallego MD        And    bisacodyl (DULCOLAX) suppository 10 mg  10 mg Rectal Daily PRN Na Gallego MD        dextrose (D50W) (25 g/50 mL) IV injection 25 g  25 g Intravenous Q15 Min PRN Na Gallego MD        dextrose (GLUTOSE) oral gel 15 g  15 g Oral Q15 Min PRN Na Gallego MD        DULoxetine (CYMBALTA) DR capsule 60 mg  60 mg Oral Nightly Na Gallego MD   60 mg at 04/25/25 2156    ferrous sulfate tablet 325 mg  325 mg Oral Daily With Breakfast Na Gallego MD   325 mg at 04/26/25 0959     gabapentin (NEURONTIN) capsule 300 mg  300 mg Oral TID Na Gallego MD   300 mg at 04/26/25 1609    glucagon (GLUCAGEN) injection 1 mg  1 mg Intramuscular Q15 Min PRN Na Gallego MD        heparin injection 500 Units  5 mL Intravenous PRN Félix De La Rosa MD        insulin lispro (HUMALOG/ADMELOG) injection 2-7 Units  2-7 Units Subcutaneous 4x Daily AC & at Bedtime Na Gallego MD   2 Units at 04/26/25 1202    loperamide (IMODIUM) capsule 2 mg  2 mg Oral 4x Daily AC & at Bedtime Fareed Gardner MD   2 mg at 04/26/25 1202    melatonin tablet 2.5 mg  2.5 mg Oral Nightly PRN Na Gallego MD        nitroglycerin (NITROSTAT) SL tablet 0.4 mg  0.4 mg Sublingual Q5 Min PRN Ann Olivares MD        ondansetron ODT (ZOFRAN-ODT) disintegrating tablet 4 mg  4 mg Oral Q6H PRN Na Gallego MD        Or    ondansetron (ZOFRAN) injection 4 mg  4 mg Intravenous Q6H PRN Na Gallego MD   4 mg at 04/25/25 2313    sodium chloride 0.9 % flush 10 mL  10 mL Intravenous Q12H Félix De La Rosa MD   10 mL at 04/26/25 1125    sodium chloride 0.9 % flush 10 mL  10 mL Intravenous PRN Félix De La Rosa MD        sodium chloride 0.9 % flush 20 mL  20 mL Intravenous PRN Félix De La Rosa MD        sodium chloride 0.9 % infusion 40 mL  40 mL Intravenous PRN Félix De La Rosa MD        tamsulosin (FLOMAX) 24 hr capsule 0.4 mg  0.4 mg Oral Nightly Na Gallego MD   0.4 mg at 04/25/25 2228       Allergies:  No Known Allergies    Social History:   Social History     Socioeconomic History    Marital status: Significant Other   Tobacco Use    Smoking status: Former     Types: Cigarettes    Smokeless tobacco: Never    Tobacco comments:     QUIT 1990 1 TO 3 PPD X 20 YEARS    Vaping Use    Vaping status: Former    Substances: CBD   Substance and Sexual Activity    Alcohol use: No    Drug use: Not Currently    Sexual activity:  "Defer        Family History:  Family History   Problem Relation Age of Onset    Bone cancer Mother     COPD Father     Hypertension Father     Stroke Father         TIA    Bone cancer Father         smoker    Lung cancer Father     Diabetes Father     No Known Problems Sister     No Known Problems Brother     No Known Problems Maternal Grandmother     No Known Problems Maternal Grandfather     No Known Problems Paternal Grandmother     Colon cancer Paternal Grandfather     Mental retardation Brother     Malig Hyperthermia Neg Hx         Review of Systems: as per HPI, in addition:    General:      + weakness / fatigue,                       No fevers / chills                       no weight loss  HEENT:       no dysphagia / odynophagia  Neck:           normal range of motion, no swelling  Respiratory: no cough / congestion                      No shortness of air                       No wheezing  CV:              No chest pain                       No palpitations  Abdomen/GI: no nausea / vomiting                      No  constipation                      No abdominal pain  :             no dysuria / urinary frequency                       No urgency, normal output  Endocrine:   no polyuria / polydipsia,                      No heat or cold intolerance  Skin:           no rashes or skin breakdown   Vascular:   + edema                     No claudication  Psych:        no depression/ anxiety  Neuro:        no focal weakness, no seizures  Musculoskeletal: no joint pain or deformities      Physical Exam:  Vitals:   Temp (24hrs), Av.8 °F (36.6 °C), Min:97.2 °F (36.2 °C), Max:98.6 °F (37 °C)    /65 (BP Location: Left arm, Patient Position: Lying)   Pulse 71   Temp 97.9 °F (36.6 °C) (Oral)   Resp 19   Ht 165.1 cm (65\")   Wt 77.3 kg (170 lb 6.4 oz)   SpO2 99%   BMI 28.36 kg/m²   Intake/Output:     Intake/Output Summary (Last 24 hours) at 2025 1623  Last data filed at 2025 1000  Gross per 24 " hour   Intake 240 ml   Output 3030 ml   Net -2790 ml        Wt Readings from Last 1 Encounters:   04/26/25 0512 77.3 kg (170 lb 6.4 oz)   04/25/25 2112 77.3 kg (170 lb 6.4 oz)       Exam:  Alert and oriented NAD   eomi no icterus   Moist mucus membranes   No jvd reg s1s2 no murmur   Clear anteriorly no rales/rhonchi/wheeze   Soft NTND + bs   Trace edema   Warm dry no rash   Normal mood and judgement         DATA:  Radiology and Labs:  The following labs and radiology results independently reviewed by me              Labs:   Recent Results (from the past 24 hours)   ECG 12 Lead Electrolyte Imbalance    Collection Time: 04/25/25  5:19 PM   Result Value Ref Range    QT Interval 384 ms    QTC Interval 439 ms   Comprehensive Metabolic Panel    Collection Time: 04/25/25  5:41 PM    Specimen: Blood   Result Value Ref Range    Glucose 135 (H) 65 - 99 mg/dL    BUN 35 (H) 8 - 23 mg/dL    Creatinine 2.29 (H) 0.76 - 1.27 mg/dL    Sodium 136 136 - 145 mmol/L    Potassium 6.2 (C) 3.5 - 5.2 mmol/L    Chloride 103 98 - 107 mmol/L    CO2 20.8 (L) 22.0 - 29.0 mmol/L    Calcium 10.7 (H) 8.6 - 10.5 mg/dL    Total Protein 8.0 6.0 - 8.5 g/dL    Albumin 4.6 3.5 - 5.2 g/dL    ALT (SGPT) 22 1 - 41 U/L    AST (SGOT) 19 1 - 40 U/L    Alkaline Phosphatase 139 (H) 39 - 117 U/L    Total Bilirubin 0.6 0.0 - 1.2 mg/dL    Globulin 3.4 gm/dL    A/G Ratio 1.4 g/dL    BUN/Creatinine Ratio 15.3 7.0 - 25.0    Anion Gap 12.2 5.0 - 15.0 mmol/L    eGFR 29.8 (L) >60.0 mL/min/1.73   High Sensitivity Troponin T    Collection Time: 04/25/25  5:41 PM    Specimen: Blood   Result Value Ref Range    HS Troponin T 19 <22 ng/L   Magnesium    Collection Time: 04/25/25  5:41 PM    Specimen: Blood   Result Value Ref Range    Magnesium 2.1 1.6 - 2.4 mg/dL   CBC Auto Differential    Collection Time: 04/25/25  5:41 PM    Specimen: Blood   Result Value Ref Range    WBC 7.05 3.40 - 10.80 10*3/mm3    RBC 4.28 4.14 - 5.80 10*6/mm3    Hemoglobin 14.2 13.0 - 17.7 g/dL     Hematocrit 42.3 37.5 - 51.0 %    MCV 98.8 (H) 79.0 - 97.0 fL    MCH 33.2 (H) 26.6 - 33.0 pg    MCHC 33.6 31.5 - 35.7 g/dL    RDW 14.7 12.3 - 15.4 %    RDW-SD 53.8 37.0 - 54.0 fl    MPV 9.7 6.0 - 12.0 fL    Platelets 286 140 - 450 10*3/mm3    Neutrophil % 63.8 42.7 - 76.0 %    Lymphocyte % 22.1 19.6 - 45.3 %    Monocyte % 5.4 5.0 - 12.0 %    Eosinophil % 7.8 (H) 0.3 - 6.2 %    Basophil % 0.6 0.0 - 1.5 %    Immature Grans % 0.3 0.0 - 0.5 %    Neutrophils, Absolute 4.50 1.70 - 7.00 10*3/mm3    Lymphocytes, Absolute 1.56 0.70 - 3.10 10*3/mm3    Monocytes, Absolute 0.38 0.10 - 0.90 10*3/mm3    Eosinophils, Absolute 0.55 (H) 0.00 - 0.40 10*3/mm3    Basophils, Absolute 0.04 0.00 - 0.20 10*3/mm3    Immature Grans, Absolute 0.02 0.00 - 0.05 10*3/mm3    nRBC 0.0 0.0 - 0.2 /100 WBC   High Sensitivity Troponin T 1Hr    Collection Time: 04/25/25  6:47 PM    Specimen: Blood   Result Value Ref Range    HS Troponin T 17 <22 ng/L    Troponin T Numeric Delta -2 Abnormal if >/=3 ng/L   POC Glucose Once    Collection Time: 04/25/25  7:43 PM    Specimen: Blood   Result Value Ref Range    Glucose 117 70 - 130 mg/dL   POC Glucose Once    Collection Time: 04/25/25  8:40 PM    Specimen: Blood   Result Value Ref Range    Glucose 102 70 - 130 mg/dL   POC Glucose Once    Collection Time: 04/25/25  9:55 PM    Specimen: Blood   Result Value Ref Range    Glucose 122 70 - 130 mg/dL   Basic Metabolic Panel    Collection Time: 04/26/25  5:11 AM    Specimen: Blood   Result Value Ref Range    Glucose 169 (H) 65 - 99 mg/dL    BUN 35 (H) 8 - 23 mg/dL    Creatinine 1.95 (H) 0.76 - 1.27 mg/dL    Sodium 131 (L) 136 - 145 mmol/L    Potassium 7.3 (C) 3.5 - 5.2 mmol/L    Chloride 107 98 - 107 mmol/L    CO2 18.0 (L) 22.0 - 29.0 mmol/L    Calcium 9.9 8.6 - 10.5 mg/dL    BUN/Creatinine Ratio 17.9 7.0 - 25.0    Anion Gap 6.0 5.0 - 15.0 mmol/L    eGFR 36.1 (L) >60.0 mL/min/1.73   CBC (No Diff)    Collection Time: 04/26/25  5:11 AM    Specimen: Blood   Result Value  Ref Range    WBC 8.01 3.40 - 10.80 10*3/mm3    RBC 3.93 (L) 4.14 - 5.80 10*6/mm3    Hemoglobin 13.0 13.0 - 17.7 g/dL    Hematocrit 39.0 37.5 - 51.0 %    MCV 99.2 (H) 79.0 - 97.0 fL    MCH 33.1 (H) 26.6 - 33.0 pg    MCHC 33.3 31.5 - 35.7 g/dL    RDW 14.4 12.3 - 15.4 %    RDW-SD 53.0 37.0 - 54.0 fl    MPV 9.8 6.0 - 12.0 fL    Platelets 221 140 - 450 10*3/mm3   POC Glucose Once    Collection Time: 04/26/25  6:04 AM    Specimen: Blood   Result Value Ref Range    Glucose 151 (H) 70 - 130 mg/dL   ECG 12 Lead Electrolyte Imbalance    Collection Time: 04/26/25  7:13 AM   Result Value Ref Range    QT Interval 384 ms    QTC Interval 455 ms   POC Glucose Once    Collection Time: 04/26/25  7:27 AM    Specimen: Blood   Result Value Ref Range    Glucose 251 (H) 70 - 130 mg/dL   POC Glucose Once    Collection Time: 04/26/25  8:37 AM    Specimen: Blood   Result Value Ref Range    Glucose 237 (H) 70 - 130 mg/dL   Urinalysis With Microscopic If Indicated (No Culture) - Indwelling Urethral Catheter    Collection Time: 04/26/25  9:47 AM    Specimen: Indwelling Urethral Catheter; Urine   Result Value Ref Range    Color, UA Yellow Yellow, Straw    Appearance, UA Clear Clear    pH, UA <=5.0 5.0 - 8.0    Specific Gravity, UA 1.019 1.005 - 1.030    Glucose,  mg/dL (Trace) (A) Negative    Ketones, UA Negative Negative    Bilirubin, UA Negative Negative    Blood, UA Negative Negative    Protein, UA Trace (A) Negative    Leuk Esterase, UA Negative Negative    Nitrite, UA Negative Negative    Urobilinogen, UA 0.2 E.U./dL 0.2 - 1.0 E.U./dL   POC Glucose Once    Collection Time: 04/26/25 10:01 AM    Specimen: Blood   Result Value Ref Range    Glucose 149 (H) 70 - 130 mg/dL   POC Glucose Once    Collection Time: 04/26/25 10:50 AM    Specimen: Blood   Result Value Ref Range    Glucose 169 (H) 70 - 130 mg/dL   Potassium    Collection Time: 04/26/25 11:00 AM    Specimen: Blood, Central Line   Result Value Ref Range    Potassium 4.4 3.5 - 5.2  mmol/L   POC Glucose Once    Collection Time: 04/26/25 11:42 AM    Specimen: Blood   Result Value Ref Range    Glucose 154 (H) 70 - 130 mg/dL       Radiology:  Imaging Results (Last 24 Hours)       Procedure Component Value Units Date/Time    US Renal Bilateral [[id number]] Collected: 04/26/25 1619     Updated: 04/26/25 1619    Narrative:      RENAL SONOGRAM     HISTORY: Urinary retention.     COMPARISON: CT abdomen and pelvis 10/22/2024.     FINDINGS: The right kidney measures 10 x 5.6 x 5.4 cm. There is a 2.6 m  right lower pole renal cyst. A 4 mm right interpolar stone is present.  There is no hydronephrosis.     The left kidney measures 11.9 x 4.8 x 5.2 cm. There is a left posterior  1.4 cm cyst. No hydronephrosis. Urinary bladder is mostly decompressed  about a Berrios catheter.       Impression:      No hydronephrosis or evidence for acute abnormality.  There  are bilateral renal cysts and there is a 4 mm right interpolar renal  stone. Urinary bladder is mostly decompressed about a Berrios catheter.       XR Spine Thoracic 2 View [[id number]] Collected: 04/26/25 1417     Updated: 04/26/25 1426    Narrative:      XR SPINE THORACIC 2 VW-     HISTORY: Fall. Hit back.     COMPARISON: CT chest 11/08/2024, CT abdomen and pelvis 10/22/2024.     FINDINGS: There is anterior endplate bridging spur formation throughout  the thoracic spine which is ankylosed. The vertebral body heights appear  within normal limits. No fracture plane is demonstrated and there is no  evidence for defect in the ankylosis. There is a right Mediport with tip  in the right atrium. Aortic vascular calcifications are present.       Impression:      No evidence for fracture or acute abnormality of the  thoracic spine. Multilevel bridging endplate spur formation within the  thoracic spine with partial ankylosis of the thoracic spine.        This report was finalized on 4/26/2025 2:22 PM by Truong Delatorre M.D  on Workstation: [id number]       XR  Chest 1 View [[id number]] Collected: 04/25/25 1750     Updated: 04/25/25 1756    Narrative:      XR CHEST 1 VW-     INDICATION: Near syncope     COMPARISON: Chest radiograph 11/22/2024 and FDG PET/CT 11/25/2024       Impression:      Small dense left upper lobe nodular opacity corresponding  with a calcified granuloma on prior FDG PET/CT. No other focal  consolidation. No pleural effusion or pneumothorax. Normal size  cardiomediastinal select. Right IJ port with tip overlying the caudal  SVC. No focal osseous abnormality.     This report was finalized on 4/25/2025 5:53 PM by Dr. Félix Smith M.D on Workstation: [id number]                    ASSESSMENT:   Problem List:   MARY   Hyperkalemia   Hyponatremia   Metabolic acidosis   Urinary retention - rosado in place   Dizziness       Orthostatic hypotension    History of prostate cancer    Type 2 diabetes mellitus without complication, without long-term current use of insulin    Chronic heart failure with preserved ejection fraction    Rectal adenocarcinoma    Hyperkalemia    MARY (acute kidney injury)    Urine retention    Near syncope        PLAN:   MARY likely due to retention, hypotension and high ostomy outpt   Renal US with no hydro   He has been hydrated with IV fluids   Will monitor response to oral intake   Rosado in place   Remain off ace/arb and metformin at this time   Check urine studies     Monitor electrolytes and volume closely   Dose all medications for GFR <20  Limit IV dye, NSAIDS and nephrotoxic medications   I appreciate the opportunity to participate in this patient's care.  Please call with any questions or concerns.     Nena Abbasi M.D  [facility]  Office phone number: [phone]  Answering service phone number: [phone]        4/26/2025

## 2025-04-26 NOTE — PROGRESS NOTES
Name: Kevin Garsia ADMIT: 2025   : 1955  PCP: Priti Oliveira APRN    MRN: [id number] LOS: 0 days   AGE/SEX: 69 y.o. male  ROOM: [facility]     Subjective   Subjective   He is feeling a little better.  Still dizzy when he sits up.  Apparently fell in the bathroom last night but did not hurt himself    Reports increased ostomy output for several days.  Apparently takes Imodium at home.    Reports that his primary had recently decreased his blood pressure meds a bit.  He is on losartan at home    Patient had been unable to urinate since yesterday afternoon.  Berrios placed this morning due to nearly 500 mL on bladder scan.  Remote history of prostate cancer.  On Flomax at home       Objective   Objective   Vital Signs  Temp:  [97.2 °F (36.2 °C)-98.6 °F (37 °C)] 97.9 °F (36.6 °C)  Heart Rate:  [69-90] 71  Resp:  [16-19] 19  BP: ()/(42-75) 132/65  SpO2:  [95 %-99 %] 99 %  on   ;   Device (Oxygen Therapy): room air  Body mass index is 28.36 kg/m².  Physical Exam  Vitals reviewed.   Constitutional:       General: He is not in acute distress.  Cardiovascular:      Rate and Rhythm: Normal rate and regular rhythm.   Pulmonary:      Effort: No respiratory distress.      Breath sounds: Normal breath sounds. No wheezing.   Abdominal:      General: There is no distension.      Palpations: Abdomen is soft.      Tenderness: There is no abdominal tenderness.      Comments: Ostomy present   Musculoskeletal:      Right lower leg: No edema.      Left lower leg: No edema.   Skin:     General: Skin is warm and dry.   Neurological:      Mental Status: He is alert and oriented to person, place, and time.   Psychiatric:         Mood and Affect: Mood normal.       Results Review     I reviewed the patient's new clinical results.  Results from last 7 days   Lab Units 25  0511 25  1741   WBC 10*3/mm3 8.01 7.05   HEMOGLOBIN g/dL 13.0 14.2   PLATELETS 10*3/mm3 221 286     Results from last 7 days   Lab Units  04/26/25  0511 04/25/25  1741   SODIUM mmol/L 131* 136   POTASSIUM mmol/L 7.3* 6.2*   CHLORIDE mmol/L 107 103   CO2 mmol/L 18.0* 20.8*   BUN mg/dL 35* 35*   CREATININE mg/dL 1.95* 2.29*   GLUCOSE mg/dL 169* 135*   EGFR mL/min/1.73 36.1* 29.8*     Results from last 7 days   Lab Units 04/25/25  1741   ALBUMIN g/dL 4.6   BILIRUBIN mg/dL 0.6   ALK PHOS U/L 139*   AST (SGOT) U/L 19   ALT (SGPT) U/L 22     Results from last 7 days   Lab Units 04/26/25  0511 04/25/25  1741   CALCIUM mg/dL 9.9 10.7*   ALBUMIN g/dL  --  4.6   MAGNESIUM mg/dL  --  2.1       Glucose   Date/Time Value Ref Range Status   04/26/2025 1050 169 (H) 70 - 130 mg/dL Final   04/26/2025 1001 149 (H) 70 - 130 mg/dL Final   04/26/2025 0837 237 (H) 70 - 130 mg/dL Final   04/26/2025 0727 251 (H) 70 - 130 mg/dL Final   04/26/2025 0604 151 (H) 70 - 130 mg/dL Final   04/25/2025 2155 122 70 - 130 mg/dL Final   04/25/2025 2040 102 70 - 130 mg/dL Final       XR Chest 1 View  Result Date: 4/25/2025  Small dense left upper lobe nodular opacity corresponding with a calcified granuloma on prior FDG PET/CT. No other focal consolidation. No pleural effusion or pneumothorax. Normal size cardiomediastinal select. Right IJ port with tip overlying the caudal SVC. No focal osseous abnormality.  This report was finalized on 4/25/2025 5:53 PM by Dr. Félix Smith M.D on Workstation: [id number]      FL Barium Enema Water Soluble Single Contrast  Result Date: 4/25/2025   1. Colorectal anastomosis site is intact without obstruction. Just inferior to the anastomosis site along the left lateral wall there is a small outpouching of contrast. This may be related to postsurgical changes but cannot exclude a contained leak. Please correlate with the clinical findings. If further evaluation is indicated, follow-up CT of the abdomen and pelvis and/or short-term follow-up contrast enema could be obtained. 2. Sigmoid diverticulosis.     This report was finalized on 4/25/2025 5:16 PM by  Dr. Sanchez Mancuso M.D on Workstation: [id number]        I have personally reviewed all medications:  Scheduled Medications  aspirin, 81 mg, Oral, Daily  atorvastatin, 80 mg, Oral, Daily  DULoxetine, 60 mg, Oral, Nightly  ferrous sulfate, 325 mg, Oral, Daily With Breakfast  gabapentin, 300 mg, Oral, TID  insulin lispro, 2-7 Units, Subcutaneous, 4x Daily AC & at Bedtime  loperamide, 2 mg, Oral, 4x Daily AC & at Bedtime  sodium chloride, 10 mL, Intravenous, Q12H  tamsulosin, 0.4 mg, Oral, Nightly    Infusions   Diet  Diet: Liquid; Full Liquid; Fluid Consistency: Thin (IDDSI 0)    I have personally reviewed:  [x]  Laboratory   [x]  Microbiology   [x]  Radiology   [x]  EKG/Telemetry  [x]  Cardiology/Vascular   []  Pathology    []  Records       Assessment/Plan     Active Hospital Problems    Diagnosis  POA    **Orthostatic hypotension [I95.1]  Yes    Hyperkalemia [E87.5]  Yes    MARY (acute kidney injury) [N17.9]  Yes    Urine retention [R33.9]  Yes    Rectal adenocarcinoma [C20]  Yes    Type 2 diabetes mellitus without complication, without long-term current use of insulin [E11.9]  Yes    Chronic heart failure with preserved ejection fraction [I50.32]  Yes    History of prostate cancer [Z85.46]  Not Applicable      Resolved Hospital Problems   No resolved problems to display.       69 y.o. male with history of rectal cancer status post recent LAR with diverting loop ileostomy 3/19 admitted with Orthostatic hypotension, near syncope and MARY with hyperkalemia    Hyperkalemia worse on a.m. labs though there was some hemolysis.  EKG repeated showing no peaked T waves but chronic right bundle branch block.  Appropriate IV treatments initiated/repeated urgently this morning.  Clarified that he was able to swallow liquids and were able to give him Lokelma as well  - Repeat K pending.  Will give additional Lokelma if needed  - Nephrology to see  - Would permanently discontinue losartan.    AMRY likely multifactorial.  Could  be some postobstructive uropathy along with some prerenal cause from high output ostomy.  - Continue hydration  - Nephrology to see  - Reordering Imodium for ostomy output  - Continue Berrios catheter and Flomax.  Will ask urology to evaluate and help direct voiding trial/further management.    Orthostatic hypotension/near syncope should resolve with hydration.  Will recheck every shift    DM 2, somewhat uncontrolled at times but will treat with only correctional insulin for now.  He is on metformin at home which needs to be held due to above issues    History of diastolic CHF so we will monitor fluid status very closely.    Rectal cancer status post neoadjuvant chemo followed by recent low anterior resection and ostomy placement.  Oncology is consulted to follow.    Initial history given was of some dysphagia but he did not have any issues with bedside swallow eval so we will advance diet as tolerated but continue low K diet.      SCDs  Disposition TBD pending response to above but anticipate a couple days at least    Will await lab results and make further treatment plans for hyperkalemia at that point.    Félix De La Rosa MD  [facility]  04/26/25  11:11 EDT

## 2025-04-26 NOTE — CONSULTS
[facility]  Initial Inpatient Consult Note    Referring Provider: Yannick    Reason for Consultation: High output ostomy    Subjective     History of present illness:    69 y.o. male diagnosed with rectal cancer 2024, underwent preoperative chemotherapy, low anterior resection with ileostomy creation in March 2025.  He is due to have ostomy reversed May 5 with Dr. Rai.  He is having issues with dehydration and high ostomy output.  He does respond well to Imodium.  He has not been taking his Imodium regularly.  There has been no rectal bleeding abdominal pain or fevers.  Admitted with hypotension, acute kidney injury.  Near syncope.  He has received IV fluids he is feeling much improved today.    Past Medical History:  Past Medical History:   Diagnosis Date    Anemia     Aphasia     Arthritis     CTS (carpal tunnel syndrome)     Depression     Diabetes mellitus     GERD (gastroesophageal reflux disease)     History of carotid stenosis     HX LEFT CAROTID ENDARTERECTOMY    History of prostate cancer 2012    HX SEED, RADIATION    History of radiation therapy     History of transient ischemic attack (TIA)     S/P CAROTID ENDARTERECTOMY 2019    Hyperlipidemia     Hypertension     Multiple gastric ulcers     Neuropathy     Rectal adenocarcinoma 2024    Seasonal allergies     Sleep apnea     cpap    Stroke     Tattoos     TIA (transient ischemic attack)      Past Surgical History:  Past Surgical History:   Procedure Laterality Date    ANGIOPLASTY CAROTID ARTERY      APPENDECTOMY      CAROTID ENDARTERECTOMY Left     COLON RESECTION N/A 03/19/2025    Procedure: Laparoscopic Converted to Open Low Anterior Resection, Ostomy Creation, and Appendetomy;  Surgeon: Naeem Rai MD;  Location: [facility] MAIN OR;  Service: General;  Laterality: N/A;    COLONOSCOPY N/A 10/18/2024    Procedure: COLONOSCOPY TO CECUM/TI WITH BIOPSIES;  Surgeon: Marcus Christine Jr., MD;  Location: [facility] ENDOSCOPY;   Service: General;  Laterality: N/A;  PRE-SCREENING, FAMILY HX COLON CANCER  POST-DIVERTICULOSIS, RECTAL MASS    ENDOSCOPY      FOOT SURGERY      INSERTION PROSTATE RADIATION SEED      LUMBAR EPIDURAL INJECTION N/A 02/14/2025    Procedure: L4/L5 LUMBAR EPIDURAL STEROID INJECTION CPT: 96641;  Surgeon: Vandana Ordonez MD;  Location: [facility];  Service: Pain Management;  Laterality: N/A;    TOOTH EXTRACTION  2025    VENOUS ACCESS DEVICE (PORT) INSERTION N/A 11/22/2024    Procedure: INSERTION VENOUS ACCESS DEVICE;  Surgeon: Naeem Rai MD;  Location: [facility] MAIN OR;  Service: General;  Laterality: N/A;      Social History:   Social History     Tobacco Use    Smoking status: Former     Types: Cigarettes    Smokeless tobacco: Never    Tobacco comments:     QUIT 1990     1 TO 3 PPD X 20 YEARS    Substance Use Topics    Alcohol use: No      Family History:  Family History   Problem Relation Age of Onset    Bone cancer Mother     COPD Father     Hypertension Father     Stroke Father         TIA    Bone cancer Father         smoker    Lung cancer Father     Diabetes Father     No Known Problems Sister     No Known Problems Brother     No Known Problems Maternal Grandmother     No Known Problems Maternal Grandfather     No Known Problems Paternal Grandmother     Colon cancer Paternal Grandfather     Mental retardation Brother     Malig Hyperthermia Neg Hx        Home Meds:  Medications Prior to Admission   Medication Sig Dispense Refill Last Dose/Taking    acetaminophen (TYLENOL) 500 MG tablet Take 2 tablets by mouth Every 6 (Six) Hours As Needed for Mild Pain.   4/24/2025    atorvastatin (LIPITOR) 80 MG tablet Take 1 tablet by mouth Daily. 30 tablet 3 4/25/2025    DULoxetine (CYMBALTA) 60 MG capsule Take 1 capsule by mouth Daily. 90 capsule 3 4/24/2025    ferrous sulfate 325 (65 Fe) MG tablet Take 1 tablet by mouth Daily With Breakfast.   3/25/2025    gabapentin (NEURONTIN) 300 MG capsule Take 2 capsules by  mouth 3 (Three) Times a Day. 540 capsule 3 2025    [] loperamide (IMODIUM) 2 MG capsule Take 1 capsule by mouth 4 (Four) Times a Day Before Meals & at Bedtime for 30 days. 120 capsule 0 Taking    losartan (COZAAR) 25 MG tablet Take 1 tablet by mouth Daily. 90 tablet 1 2025    metFORMIN (GLUCOPHAGE) 1000 MG tablet Take 1 tablet by mouth 2 (Two) Times a Day With Meals. 90 tablet 2 2025    ondansetron (ZOFRAN) 8 MG tablet Take 1 tablet by mouth 3 (Three) Times a Day As Needed for Nausea or Vomiting. 30 tablet 5 2025    tamsulosin (FLOMAX) 0.4 MG capsule 24 hr capsule Take 1 capsule by mouth every night at bedtime.   2025    vitamin D (ERGOCALCIFEROL) 1.25 MG (57934 UT) capsule capsule TAKE 1 CAPSULE BY MOUTH 1 TIME EVERY WEEK (Patient taking differently: Take 1 capsule by mouth 1 (One) Time Per Week. ) 12 capsule 0 2025    aspirin 81 MG chewable tablet Chew 1 tablet Daily. HELD FOR OR       Cyanocobalamin (VITAMIN B 12 PO) Take 1 tablet by mouth Daily. HOLD PER MD JIMÉNEZ       metoprolol tartrate (LOPRESSOR) 25 MG tablet Take 1 tablet by mouth Daily. (Patient taking differently: Take 0.5 tablets by mouth Daily.) 90 tablet 0      Current Meds:   aspirin, 81 mg, Oral, Daily  atorvastatin, 80 mg, Oral, Daily  DULoxetine, 60 mg, Oral, Nightly  ferrous sulfate, 325 mg, Oral, Daily With Breakfast  gabapentin, 300 mg, Oral, TID  insulin lispro, 2-7 Units, Subcutaneous, 4x Daily AC & at Bedtime  loperamide, 2 mg, Oral, 4x Daily AC & at Bedtime  sodium chloride, 10 mL, Intravenous, Q12H  tamsulosin, 0.4 mg, Oral, Nightly      Allergies:  No Known Allergies  Review of Systems  There is weakness and fatigue all other systems reviewed and negative     Objective     Vital Signs  Temp:  [97.2 °F (36.2 °C)-98.6 °F (37 °C)] 97.9 °F (36.6 °C)  Heart Rate:  [69-90] 71  Resp:  [16-19] 19  BP: ()/(42-75) 132/65  Physical Exam:  General Appearance:    Alert, cooperative, in no acute  "distress   Head:    Normocephalic, without obvious abnormality, atraumatic   Eyes:          Conjunctivae and sclerae normal, no icterus   Throat:   No thrush, oral mucosa moist   Neck:   Supple, no adenopathy   Lungs:     Clear to auscultation bilaterally    Heart:    Regular rhythm and normal rate    Chest Wall:    No abnormalities observed   Abdomen:     Soft, nondistended, nontender; normal bowel sounds   Extremities:   No edema, no redness   Skin:   No bruising or rash   Psychiatric:   Normal mood and insight     Results Review:   I reviewed the patient's new clinical results.    Results from last 7 days   Lab Units 04/26/25  0511 04/25/25  1741   WBC 10*3/mm3 8.01 7.05   HEMOGLOBIN g/dL 13.0 14.2   HEMATOCRIT % 39.0 42.3   PLATELETS 10*3/mm3 221 286     Results from last 7 days   Lab Units 04/26/25  1100 04/26/25  0511 04/25/25  1741   SODIUM mmol/L  --  131* 136   POTASSIUM mmol/L 4.4 7.3* 6.2*   CHLORIDE mmol/L  --  107 103   CO2 mmol/L  --  18.0* 20.8*   BUN mg/dL  --  35* 35*   CREATININE mg/dL  --  1.95* 2.29*   CALCIUM mg/dL  --  9.9 10.7*   BILIRUBIN mg/dL  --   --  0.6   ALK PHOS U/L  --   --  139*   ALT (SGPT) U/L  --   --  22   AST (SGOT) U/L  --   --  19   GLUCOSE mg/dL  --  169* 135*         No results found for: \"LIPASE\"    Radiology:  XR Chest 1 View   Final Result   Small dense left upper lobe nodular opacity corresponding   with a calcified granuloma on prior FDG PET/CT. No other focal   consolidation. No pleural effusion or pneumothorax. Normal size   cardiomediastinal select. Right IJ port with tip overlying the caudal   SVC. No focal osseous abnormality.       This report was finalized on 4/25/2025 5:53 PM by Dr. Félix Smith M.D on Workstation: [id number]          XR Spine Thoracic 1 View    (Results Pending)       Assessment & Plan   Active Hospital Problems    Diagnosis     **Orthostatic hypotension     Hyperkalemia     MARY (acute kidney injury)     Urine retention     Near syncope     " Rectal adenocarcinoma     Type 2 diabetes mellitus without complication, without long-term current use of insulin     Chronic heart failure with preserved ejection fraction     History of prostate cancer        Assessment:  T3 rectal cancer status post low anterior resection with neoadjuvant chemotherapy  He is due for reversal of ostomy May 5, 2025  High output ostomy  Hypotension and dehydration  Acute kidney injury    Plan:  Schedule Imodium as this works well for him, we need to just bridge him with Imodium until May 5 when he is having his reversal  Await nephrology opinion on acute kidney injury  Correction of electrolytes per A      I discussed the patient's findings and my recommendations with patient and nursing staff.    Fareed Gardner MD

## 2025-04-26 NOTE — NURSING NOTE
RN contacted CCP; spoke with Patt.     Pt needs more critical monitoring and is not appropriate for [facility].    Bed Board contacted to transfer to another telemetry floor.

## 2025-04-26 NOTE — PLAN OF CARE
Goal Outcome Evaluation:                               Swallow evaluation orders received post vomiting episode. Patient reports he feels this was a 1x occurrence. He and his fiance as well as RN feel that swallowing is at baseline and swallow evaluation is no longer warranted. SLP to sign off at this time; please re-consult as needed.

## 2025-04-26 NOTE — CONSULTS
.     REASON FOR CONSULTATION:     Provide an opinion on any further workup or treatment of rectal cancer.                             REQUESTING PHYSICIAN: Na Gallego MD    RECORDS OBTAINED:  Records of the patient's history including those obtained from the referring provider were reviewed and summarized in detail.    HISTORY OF PRESENT ILLNESS:  The patient is a 69 y.o. year old male whom we were consulted for management of rectal cancer.    This patient has 3 of stage III rectal cancer, diabetes, history of prostate cancer, hypertension, hyperlipidemia, who was admitted to hospital for further evaluation, after he presented to the [facility] emergency room yesterday on 4/25/2025 because of new onset lightheadedness and hypotension.    Patient reports his blood pressure at home was in the 80s/50s,.  Patient also had a lightheadedness however denies syncope.  Patient admits he was not eating and drinking properly.  Patient also has a persistent large quantity of output from his colostomy bag.  He has been using Imodium however with not much help.    Laboratory studies yesterday in the ER 4/25/2025 reported hypokalemia with potassium 6.2, creatinine 2.29, BUN 35, glucose 135, alk phos 139 otherwise normal liver function panel, also mildly elevated calcium 10.7 but normal sodium 136.  His CBC reported hemoglobin 14.2, MCV 98.8, platelets 286,000 WBC 7050 including neutrophils 4500 lymphocytes 1560.    Patient was given IV hydration and admitted to hospital for further evaluation.      Laboratory study this morning on 4/26/2025 reported worsening hypokalemia with a potassium of 7.3.  Creatinine was 1.95 and BUN 35.  Sodium 131, and a calcium 9.9 glucose 169.  CBC study showed hemoglobin 13.0, MCV 99.2, platelets 221,000 WBC 8010 without differentiation.      This patient was originally diagnosed of stage III [id number] rectal cancer in October 2024.  Patient had neoadjuvant chemotherapy  with FOLFOX 6 regimen for 6 cycles.  Imaging study showed improved response, and patient had surgery for resection of the primary rectal cancer on 3/19/2025 by Dr. Rai.  Pathology evaluation reported residual tumor 2 cm, pT2 N0.  Benign appendix.  All 14 lymph nodes were negative for metastatic disease.  All margins were clear.  Negative for lymphovascular invasion.    Patient was recently seen by his primary oncologist Dr. Nagel on 4/14/2025.  Because of high output from the ostomy, patient was advised not to resume adjuvant chemotherapy until he has further recovered from the surgery.  Currently patient has no evidence of active cancer.      Past Medical History:   Diagnosis Date    Anemia     Aphasia     Arthritis     CTS (carpal tunnel syndrome)     Depression     Diabetes mellitus     GERD (gastroesophageal reflux disease)     History of carotid stenosis     HX LEFT CAROTID ENDARTERECTOMY    History of prostate cancer 2012    HX SEED, RADIATION    History of radiation therapy     History of transient ischemic attack (TIA)     S/P CAROTID ENDARTERECTOMY 2019    Hyperlipidemia     Hypertension     Multiple gastric ulcers     Neuropathy     Rectal adenocarcinoma 2024    Seasonal allergies     Sleep apnea     cpap    Stroke     Tattoos     TIA (transient ischemic attack)      Past Surgical History:   Procedure Laterality Date    ANGIOPLASTY CAROTID ARTERY      APPENDECTOMY      CAROTID ENDARTERECTOMY Left     COLON RESECTION N/A 03/19/2025    Procedure: Laparoscopic Converted to Open Low Anterior Resection, Ostomy Creation, and Appendetomy;  Surgeon: Naeem Rai MD;  Location: [facility] MAIN OR;  Service: General;  Laterality: N/A;    COLONOSCOPY N/A 10/18/2024    Procedure: COLONOSCOPY TO CECUM/TI WITH BIOPSIES;  Surgeon: Marcus Christine Jr., MD;  Location: [facility] ENDOSCOPY;  Service: General;  Laterality: N/A;  PRE-SCREENING, FAMILY HX COLON CANCER  POST-DIVERTICULOSIS, RECTAL MASS    ENDOSCOPY       FOOT SURGERY      INSERTION PROSTATE RADIATION SEED      LUMBAR EPIDURAL INJECTION N/A 02/14/2025    Procedure: L4/L5 LUMBAR EPIDURAL STEROID INJECTION CPT: 63521;  Surgeon: Vandana Ordonez MD;  Location: [facility] MAIN OR;  Service: Pain Management;  Laterality: N/A;    TOOTH EXTRACTION  2025    VENOUS ACCESS DEVICE (PORT) INSERTION N/A 11/22/2024    Procedure: INSERTION VENOUS ACCESS DEVICE;  Surgeon: Naeem Rai MD;  Location: [facility] MAIN OR;  Service: General;  Laterality: N/A;       MEDICATIONS    Current Facility-Administered Medications:     acetaminophen (TYLENOL) tablet 650 mg, 650 mg, Oral, Q4H PRN **OR** acetaminophen (TYLENOL) 160 MG/5ML oral solution 650 mg, 650 mg, Oral, Q4H PRN **OR** acetaminophen (TYLENOL) suppository 650 mg, 650 mg, Rectal, Q4H PRN, Na Gallego MD    aspirin chewable tablet 81 mg, 81 mg, Oral, Daily, Na Gallego MD    atorvastatin (LIPITOR) tablet 80 mg, 80 mg, Oral, Daily, Na Gallego MD    sennosides-docusate (PERICOLACE) 8.6-50 MG per tablet 2 tablet, 2 tablet, Oral, BID PRN **AND** polyethylene glycol (MIRALAX) packet 17 g, 17 g, Oral, Daily PRN **AND** bisacodyl (DULCOLAX) EC tablet 5 mg, 5 mg, Oral, Daily PRN **AND** bisacodyl (DULCOLAX) suppository 10 mg, 10 mg, Rectal, Daily PRN, Na Gallego MD    dextrose (D50W) (25 g/50 mL) IV injection 25 g, 25 g, Intravenous, Q15 Min PRN, Na Gallego MD    dextrose (GLUTOSE) oral gel 15 g, 15 g, Oral, Q15 Min PRN, Na Gallego MD    DULoxetine (CYMBALTA) DR capsule 60 mg, 60 mg, Oral, Nightly, Na Gallego MD, 60 mg at 04/25/25 2156    ferrous sulfate tablet 325 mg, 325 mg, Oral, Daily With Breakfast, Na Gallego MD    gabapentin (NEURONTIN) capsule 300 mg, 300 mg, Oral, TID, Na Gallego MD, 300 mg at 04/25/25 2156    glucagon (GLUCAGEN) injection 1 mg, 1 mg, Intramuscular, Q15 Min PRN, Na Gallego MD    insulin lispro  (HUMALOG/ADMELOG) injection 2-7 Units, 2-7 Units, Subcutaneous, 4x Daily AC & at Bedtime, Na Gallego MD    melatonin tablet 2.5 mg, 2.5 mg, Oral, Nightly PRN, Na Gallego MD    nitroglycerin (NITROSTAT) SL tablet 0.4 mg, 0.4 mg, Sublingual, Q5 Min PRN, Ann Olivares MD    ondansetron ODT (ZOFRAN-ODT) disintegrating tablet 4 mg, 4 mg, Oral, Q6H PRN **OR** ondansetron (ZOFRAN) injection 4 mg, 4 mg, Intravenous, Q6H PRN, Na Gallego MD, 4 mg at 04/25/25 2313    tamsulosin (FLOMAX) 24 hr capsule 0.4 mg, 0.4 mg, Oral, Nightly, Na Gallego MD, 0.4 mg at 04/25/25 2228    ALLERGIES:   No Known Allergies    SOCIAL HISTORY:       Social History     Socioeconomic History    Marital status: Significant Other   Tobacco Use    Smoking status: Former     Types: Cigarettes    Smokeless tobacco: Never    Tobacco comments:     QUIT 1990     1 TO 3 PPD X 20 YEARS    Vaping Use    Vaping status: Former    Substances: CBD   Substance and Sexual Activity    Alcohol use: No    Drug use: Not Currently    Sexual activity: Defer         FAMILY HISTORY:  Family History   Problem Relation Age of Onset    Bone cancer Mother     COPD Father     Hypertension Father     Stroke Father         TIA    Bone cancer Father         smoker    Lung cancer Father     Diabetes Father     No Known Problems Sister     No Known Problems Brother     No Known Problems Maternal Grandmother     No Known Problems Maternal Grandfather     No Known Problems Paternal Grandmother     Colon cancer Paternal Grandfather     Mental retardation Brother     Malig Hyperthermia Neg Hx        REVIEW OF SYSTEMS:  Review of Systems  See HPI           Vitals:    04/26/25 0512 04/26/25 0700 04/26/25 0730 04/26/25 0839   BP:   128/55 136/72   BP Location:    Left arm   Patient Position:    Lying   Pulse:       Resp:  18  19   Temp:       TempSrc:  Oral     SpO2:       Weight: 77.3 kg (170 lb 6.4 oz)      Height:              4/14/2025     8:34 AM   Current Status   ECOG score 0      PHYSICAL EXAM:      CONSTITUTIONAL:  Vital signs reviewed.  No distress, looks comfortable.  EYES:  Conjunctivae and lids unremarkable.    EARS,NOSE,MOUTH,THROAT:  Ears and nose appear unremarkable.  Lips appear unremarkable.  RESPIRATORY:  Normal respiratory effort.  Lungs clear to auscultation bilaterally.  CARDIOVASCULAR:  Normal S1, S2.  No murmurs rubs or gallops.  No significant lower extremity edema.  GASTROINTESTINAL: Abdomen appears unremarkable.  Nontender.  No hepatomegaly.  No splenomegaly.  LYMPHATIC:  No cervical, supraclavicular, axillary lymphadenopathy.  MUSCULOSKELETAL: Unremarkable digits/nails.  No cyanosis or clubbing.  SKIN:  Warm.  No rashes.  PSYCHIATRIC:  Normal judgment and insight.  Normal mood and affect.      RECENT LABS:  CBC:      Lab 04/26/25  0511 04/25/25  1741   WBC 8.01 7.05   HEMOGLOBIN 13.0 14.2   HEMATOCRIT 39.0 42.3   PLATELETS 221 286   NEUTROS ABS  --  4.50   IMMATURE GRANS (ABS)  --  0.02   LYMPHS ABS  --  1.56   MONOS ABS  --  0.38   EOS ABS  --  0.55*   MCV 99.2* 98.8*     CMP:        Lab 04/26/25  0511 04/25/25  1741   SODIUM 131* 136   POTASSIUM 7.3* 6.2*   CHLORIDE 107 103   CO2 18.0* 20.8*   ANION GAP 6.0 12.2   BUN 35* 35*   CREATININE 1.95* 2.29*   EGFR 36.1* 29.8*   GLUCOSE 169* 135*   CALCIUM 9.9 10.7*   MAGNESIUM  --  2.1   TOTAL PROTEIN  --  8.0   ALBUMIN  --  4.6   GLOBULIN  --  3.4   ALT (SGPT)  --  22   AST (SGOT)  --  19   BILIRUBIN  --  0.6   ALK PHOS  --  139*           Assessment & Plan     *.  Severe hypokalemia and acute renal injury.  Waiting for nephrology consultation.  According to nursing staff patient already has been given D50 IV, and also insulin.  Hopefully this will drive down his potassium.  Patient also has been given IV hydration.    *.  Hypotension.  This likely due to dehydration.  Patient has been given IV hydration.    *.  Rectal cancer.  Stage III at the diagnosis.  Finish  neoadjuvant chemotherapy with good response by imaging studies.  Surgery in March 2025 reported residual tumor, however T2 N0 with all 15 lymph nodes negative.  Patient was seen by his primary oncologist Dr. Nagel on 4/14/2025 and recommended to postpone adjuvant chemotherapy until his bowel movement become more regular.  Discussed with the patient today 4/26/2025, will postpone resumption of adjuvant chemotherapy.  There is no evidence he has active rectal cancer.      *.  Large output from his ostomy.  Patient reports today on 4/26/2025 Imodium is not really helping him much.  I discussed with the patient, recommended to consult the GI service to see if they could recommend any management plan to decrease the production in the also decreased severity of hyponatremia.    *.     PLAN:  Patient has no active cancer.    Pending nephrology consultation due to severe hyperkalemia 7.3.  Consult GI service for evaluation and management of large quantity output from his ostomy bag.  We do not have active role for care in this patient current treatment.  Patient will follow-up with Dr. Nagel in about 1 month after recovery and to further discuss management plan.      I discussed with the patient about laboratory results and further management plan.  Patient voiced understanding and agreeable.    Discussed with nursing staff today.    We will sign off.  Please call if further questions.      JOSE ROBERTO NOLAN M.D., Ph.D.

## 2025-04-26 NOTE — PLAN OF CARE
Goal Outcome Evaluation:  Plan of Care Reviewed With: patient        Progress: no change  Outcome Evaluation: Pt alert and oriented X4. Positive orthostatics. Pt up to BR and fel, wittnessed by wife. Did not head head. Potassium increased from 6.2 to 7.3 this AM despite treatment- awaiting call back from attending.

## 2025-04-26 NOTE — CONSULTS
Kevin HUGO Ady  [id number]    Urology Consult note    Referring provider: Félix De La Rosa*    CC/reason for consult:     HPI: [facility]  for complaints of hypotension and dizziness after taking HTN meds 4/25/25. Pt with recent colon ca diagnoses, decreased appetite and intake. Pt is only flomax, catheter placed 4/25/25      ROS:   Gen: Afeb, VSS, NAD  HEENT: NCAT, normal conjunctivae  Psych: normal mood, normal affect  Resp: nonlabored breathing, normal to percussion  CV: RRR, no c/c/e  Abd: soft/nt/nd  Ext: moves all extremities well, no calf tenderness  Skin: no rashes or lesions on exposed skin.   Neuro: normal sensation to light touch, normal speech.   :  no flank pain or dysuria, no bladder spasms    Past Medical History:   Diagnosis Date    Anemia     Aphasia     Arthritis     CTS (carpal tunnel syndrome)     Depression     Diabetes mellitus     GERD (gastroesophageal reflux disease)     History of carotid stenosis     HX LEFT CAROTID ENDARTERECTOMY    History of prostate cancer 2012    HX SEED, RADIATION    History of radiation therapy     History of transient ischemic attack (TIA)     S/P CAROTID ENDARTERECTOMY 2019    Hyperlipidemia     Hypertension     Multiple gastric ulcers     Neuropathy     Rectal adenocarcinoma 2024    Seasonal allergies     Sleep apnea     cpap    Stroke     Tattoos     TIA (transient ischemic attack)        Past Surgical History:   Procedure Laterality Date    ANGIOPLASTY CAROTID ARTERY      APPENDECTOMY      CAROTID ENDARTERECTOMY Left     COLON RESECTION N/A 03/19/2025    Procedure: Laparoscopic Converted to Open Low Anterior Resection, Ostomy Creation, and Appendetomy;  Surgeon: Naeem Rai MD;  Location: [facility] MAIN OR;  Service: General;  Laterality: N/A;    COLONOSCOPY N/A 10/18/2024    Procedure: COLONOSCOPY TO CECUM/TI WITH BIOPSIES;  Surgeon: Marcus Christine Jr., MD;  Location: [facility] ENDOSCOPY;  Service: General;  Laterality:  N/A;  PRE-SCREENING, FAMILY HX COLON CANCER  POST-DIVERTICULOSIS, RECTAL MASS    ENDOSCOPY      FOOT SURGERY      INSERTION PROSTATE RADIATION SEED      LUMBAR EPIDURAL INJECTION N/A 2025    Procedure: L4/L5 LUMBAR EPIDURAL STEROID INJECTION CPT: 37014;  Surgeon: Vandana Ordonez MD;  Location: [facility] MAIN OR;  Service: Pain Management;  Laterality: N/A;    TOOTH EXTRACTION      VENOUS ACCESS DEVICE (PORT) INSERTION N/A 2024    Procedure: INSERTION VENOUS ACCESS DEVICE;  Surgeon: Naeem Rai MD;  Location: [facility] MAIN OR;  Service: General;  Laterality: N/A;       No current facility-administered medications on file prior to encounter.     Current Outpatient Medications on File Prior to Encounter   Medication Sig Dispense Refill    acetaminophen (TYLENOL) 500 MG tablet Take 2 tablets by mouth Every 6 (Six) Hours As Needed for Mild Pain.      atorvastatin (LIPITOR) 80 MG tablet Take 1 tablet by mouth Daily. 30 tablet 3    DULoxetine (CYMBALTA) 60 MG capsule Take 1 capsule by mouth Daily. 90 capsule 3    ferrous sulfate 325 (65 Fe) MG tablet Take 1 tablet by mouth Daily With Breakfast.      gabapentin (NEURONTIN) 300 MG capsule Take 2 capsules by mouth 3 (Three) Times a Day. 540 capsule 3    [] loperamide (IMODIUM) 2 MG capsule Take 1 capsule by mouth 4 (Four) Times a Day Before Meals & at Bedtime for 30 days. 120 capsule 0    losartan (COZAAR) 25 MG tablet Take 1 tablet by mouth Daily. 90 tablet 1    metFORMIN (GLUCOPHAGE) 1000 MG tablet Take 1 tablet by mouth 2 (Two) Times a Day With Meals. 90 tablet 2    ondansetron (ZOFRAN) 8 MG tablet Take 1 tablet by mouth 3 (Three) Times a Day As Needed for Nausea or Vomiting. 30 tablet 5    tamsulosin (FLOMAX) 0.4 MG capsule 24 hr capsule Take 1 capsule by mouth every night at bedtime.      vitamin D (ERGOCALCIFEROL) 1.25 MG (96375 UT) capsule capsule TAKE 1 CAPSULE BY MOUTH 1 TIME EVERY WEEK (Patient taking differently: Take 1 capsule by  mouth 1 (One) Time Per Week. SATURDAYS) 12 capsule 0    aspirin 81 MG chewable tablet Chew 1 tablet Daily. HELD FOR OR      Cyanocobalamin (VITAMIN B 12 PO) Take 1 tablet by mouth Daily. HOLD PER MD JESSY      metoprolol tartrate (LOPRESSOR) 25 MG tablet Take 1 tablet by mouth Daily. (Patient taking differently: Take 0.5 tablets by mouth Daily.) 90 tablet 0       Current Facility-Administered Medications   Medication Dose Route Frequency Provider Last Rate Last Admin    acetaminophen (TYLENOL) tablet 650 mg  650 mg Oral Q4H PRN Na Gallego MD        Or    acetaminophen (TYLENOL) 160 MG/5ML oral solution 650 mg  650 mg Oral Q4H PRN Na Gallego MD        Or    acetaminophen (TYLENOL) suppository 650 mg  650 mg Rectal Q4H PRN Na Gallego MD        aspirin chewable tablet 81 mg  81 mg Oral Daily Na Gallego MD   81 mg at 04/26/25 0913    atorvastatin (LIPITOR) tablet 80 mg  80 mg Oral Daily Na Gallego MD   80 mg at 04/26/25 0913    sennosides-docusate (PERICOLACE) 8.6-50 MG per tablet 2 tablet  2 tablet Oral BID PRN Na Gallego MD        And    polyethylene glycol (MIRALAX) packet 17 g  17 g Oral Daily PRN Na Gallego MD        And    bisacodyl (DULCOLAX) EC tablet 5 mg  5 mg Oral Daily PRN Na Gallego MD        And    bisacodyl (DULCOLAX) suppository 10 mg  10 mg Rectal Daily PRN Na Gallego MD        dextrose (D50W) (25 g/50 mL) IV injection 25 g  25 g Intravenous Q15 Min PRN Na Gallego MD        dextrose (GLUTOSE) oral gel 15 g  15 g Oral Q15 Min PRN Na Gallego MD        DULoxetine (CYMBALTA) DR capsule 60 mg  60 mg Oral Nightly Na Gallego MD   60 mg at 04/25/25 2156    ferrous sulfate tablet 325 mg  325 mg Oral Daily With Breakfast Na Gallego MD   325 mg at 04/26/25 0913    gabapentin (NEURONTIN) capsule 300 mg  300 mg Oral TID StinglNa MD   300 mg at  04/26/25 0913    glucagon (GLUCAGEN) injection 1 mg  1 mg Intramuscular Q15 Min PRN Na Gallego MD        heparin injection 500 Units  5 mL Intravenous PRN Félix De La Rosa MD        insulin lispro (HUMALOG/ADMELOG) injection 2-7 Units  2-7 Units Subcutaneous 4x Daily AC & at Bedtime Na Gallego MD   2 Units at 04/26/25 1202    loperamide (IMODIUM) capsule 2 mg  2 mg Oral 4x Daily AC & at Bedtime Félix De La Rosa MD   2 mg at 04/26/25 1202    melatonin tablet 2.5 mg  2.5 mg Oral Nightly PRN aN Gallego MD        nitroglycerin (NITROSTAT) SL tablet 0.4 mg  0.4 mg Sublingual Q5 Min PRN Ann Olivares MD        ondansetron ODT (ZOFRAN-ODT) disintegrating tablet 4 mg  4 mg Oral Q6H PRN Na Gallego MD        Or    ondansetron (ZOFRAN) injection 4 mg  4 mg Intravenous Q6H PRN Na Gallego MD   4 mg at 04/25/25 2313    sodium chloride 0.9 % flush 10 mL  10 mL Intravenous Q12H Félix De La Rosa MD   10 mL at 04/26/25 1125    sodium chloride 0.9 % flush 10 mL  10 mL Intravenous PRN Félix De La Rosa MD        sodium chloride 0.9 % flush 20 mL  20 mL Intravenous PRN Félix De La Rosa MD        sodium chloride 0.9 % infusion 40 mL  40 mL Intravenous PRN Félix De La Rosa MD        tamsulosin (FLOMAX) 24 hr capsule 0.4 mg  0.4 mg Oral Nightly Na Gallego MD   0.4 mg at 04/25/25 2228       No Known Allergies    Social History     Socioeconomic History    Marital status: Significant Other   Tobacco Use    Smoking status: Former     Types: Cigarettes    Smokeless tobacco: Never    Tobacco comments:     QUIT 1990     1 TO 3 PPD X 20 YEARS    Vaping Use    Vaping status: Former    Substances: CBD   Substance and Sexual Activity    Alcohol use: No    Drug use: Not Currently    Sexual activity: Defer       Family History   Problem Relation Age of Onset    Bone cancer Mother     COPD Father     Hypertension  Father     Stroke Father         TIA    Bone cancer Father         smoker    Lung cancer Father     Diabetes Father     No Known Problems Sister     No Known Problems Brother     No Known Problems Maternal Grandmother     No Known Problems Maternal Grandfather     No Known Problems Paternal Grandmother     Colon cancer Paternal Grandfather     Mental retardation Brother     Malig Hyperthermia Neg Hx          Exam:   Gen: Afeb, VSS, NAD  HEENT: NCAT, normal conjunctivae  Psych: normal mood, normal affect  Resp: unlabored breathing  CV: RRR, no c/c/e  Abd: soft/nt/nd  Ext: moves all extremities well, no calf tenderness  Skin: no rashes or lesions on exposed skin.   : clear urine,     Labs and imaging reviewed  Pertinent in HPI    Assessment:    Retention/MARY  - stable  - Cr 1.18 -> 2.28  - WBC 7.04  - Tmax 98.6F    PLAN:   Cont flomax  Will sign off  Cont catheter for now, VT in 3 days or prior to discharge  - replace catheter for bladder scans > 250cc  Will see as BOBBY Velarde   [facility]  [phone]

## 2025-04-27 LAB
ANION GAP SERPL CALCULATED.3IONS-SCNC: 10 MMOL/L (ref 5–15)
BACTERIA UR QL AUTO: ABNORMAL /HPF
BILIRUB UR QL STRIP: NEGATIVE
BUN SERPL-MCNC: 20 MG/DL (ref 8–23)
BUN/CREAT SERPL: 16.4 (ref 7–25)
CALCIUM SPEC-SCNC: 9.8 MG/DL (ref 8.6–10.5)
CHLORIDE SERPL-SCNC: 104 MMOL/L (ref 98–107)
CLARITY UR: ABNORMAL
CO2 SERPL-SCNC: 20 MMOL/L (ref 22–29)
COLOR UR: YELLOW
CREAT SERPL-MCNC: 1.22 MG/DL (ref 0.76–1.27)
CREAT UR-MCNC: 174 MG/DL
DEPRECATED RDW RBC AUTO: 51.6 FL (ref 37–54)
EGFRCR SERPLBLD CKD-EPI 2021: 63.4 ML/MIN/1.73
ERYTHROCYTE [DISTWIDTH] IN BLOOD BY AUTOMATED COUNT: 14.1 % (ref 12.3–15.4)
GLUCOSE BLDC GLUCOMTR-MCNC: 119 MG/DL (ref 70–130)
GLUCOSE BLDC GLUCOMTR-MCNC: 120 MG/DL (ref 70–130)
GLUCOSE BLDC GLUCOMTR-MCNC: 131 MG/DL (ref 70–130)
GLUCOSE BLDC GLUCOMTR-MCNC: 138 MG/DL (ref 70–130)
GLUCOSE SERPL-MCNC: 138 MG/DL (ref 65–99)
GLUCOSE UR STRIP-MCNC: NEGATIVE MG/DL
HCT VFR BLD AUTO: 36.7 % (ref 37.5–51)
HGB BLD-MCNC: 12.5 G/DL (ref 13–17.7)
HGB UR QL STRIP.AUTO: ABNORMAL
HYALINE CASTS UR QL AUTO: ABNORMAL /LPF
KETONES UR QL STRIP: ABNORMAL
LEUKOCYTE ESTERASE UR QL STRIP.AUTO: NEGATIVE
MCH RBC QN AUTO: 33 PG (ref 26.6–33)
MCHC RBC AUTO-ENTMCNC: 34.1 G/DL (ref 31.5–35.7)
MCV RBC AUTO: 96.8 FL (ref 79–97)
MUCOUS THREADS URNS QL MICRO: ABNORMAL /HPF
NITRITE UR QL STRIP: NEGATIVE
PH UR STRIP.AUTO: <=5 [PH] (ref 5–8)
PLATELET # BLD AUTO: 209 10*3/MM3 (ref 140–450)
PMV BLD AUTO: 10 FL (ref 6–12)
POTASSIUM SERPL-SCNC: 5.4 MMOL/L (ref 3.5–5.2)
PROT ?TM UR-MCNC: 35.2 MG/DL
PROT UR QL STRIP: ABNORMAL
RBC # BLD AUTO: 3.79 10*6/MM3 (ref 4.14–5.8)
RBC # UR STRIP: ABNORMAL /HPF
REF LAB TEST METHOD: ABNORMAL
SODIUM SERPL-SCNC: 134 MMOL/L (ref 136–145)
SODIUM UR-SCNC: 23 MMOL/L
SP GR UR STRIP: 1.02 (ref 1–1.03)
SQUAMOUS #/AREA URNS HPF: ABNORMAL /HPF
UROBILINOGEN UR QL STRIP: ABNORMAL
WBC # UR STRIP: ABNORMAL /HPF
WBC NRBC COR # BLD AUTO: 10.69 10*3/MM3 (ref 3.4–10.8)

## 2025-04-27 PROCEDURE — 97530 THERAPEUTIC ACTIVITIES: CPT

## 2025-04-27 PROCEDURE — 84300 ASSAY OF URINE SODIUM: CPT | Performed by: INTERNAL MEDICINE

## 2025-04-27 PROCEDURE — 85027 COMPLETE CBC AUTOMATED: CPT | Performed by: HOSPITALIST

## 2025-04-27 PROCEDURE — 84156 ASSAY OF PROTEIN URINE: CPT | Performed by: INTERNAL MEDICINE

## 2025-04-27 PROCEDURE — 97162 PT EVAL MOD COMPLEX 30 MIN: CPT

## 2025-04-27 PROCEDURE — 82570 ASSAY OF URINE CREATININE: CPT | Performed by: INTERNAL MEDICINE

## 2025-04-27 PROCEDURE — 81001 URINALYSIS AUTO W/SCOPE: CPT | Performed by: INTERNAL MEDICINE

## 2025-04-27 PROCEDURE — 80048 BASIC METABOLIC PNL TOTAL CA: CPT | Performed by: HOSPITALIST

## 2025-04-27 PROCEDURE — 99232 SBSQ HOSP IP/OBS MODERATE 35: CPT | Performed by: INTERNAL MEDICINE

## 2025-04-27 PROCEDURE — 82948 REAGENT STRIP/BLOOD GLUCOSE: CPT

## 2025-04-27 RX ORDER — PANTOPRAZOLE SODIUM 40 MG/1
40 TABLET, DELAYED RELEASE ORAL
Status: DISCONTINUED | OUTPATIENT
Start: 2025-04-27 | End: 2025-04-28 | Stop reason: HOSPADM

## 2025-04-27 RX ORDER — CALCIUM CARBONATE 500 MG/1
2 TABLET, CHEWABLE ORAL 3 TIMES DAILY PRN
Status: DISCONTINUED | OUTPATIENT
Start: 2025-04-27 | End: 2025-04-28 | Stop reason: HOSPADM

## 2025-04-27 RX ORDER — HYDRALAZINE HYDROCHLORIDE 20 MG/ML
10 INJECTION INTRAMUSCULAR; INTRAVENOUS EVERY 6 HOURS PRN
Status: DISCONTINUED | OUTPATIENT
Start: 2025-04-27 | End: 2025-04-28 | Stop reason: HOSPADM

## 2025-04-27 RX ADMIN — LOPERAMIDE HYDROCHLORIDE 2 MG: 2 CAPSULE ORAL at 11:30

## 2025-04-27 RX ADMIN — GABAPENTIN 300 MG: 300 CAPSULE ORAL at 08:01

## 2025-04-27 RX ADMIN — ASPIRIN 81 MG CHEWABLE TABLET 81 MG: 81 TABLET CHEWABLE at 08:01

## 2025-04-27 RX ADMIN — Medication 10 ML: at 08:30

## 2025-04-27 RX ADMIN — DULOXETINE 60 MG: 60 CAPSULE, DELAYED RELEASE ORAL at 20:34

## 2025-04-27 RX ADMIN — LOPERAMIDE HYDROCHLORIDE 2 MG: 2 CAPSULE ORAL at 17:00

## 2025-04-27 RX ADMIN — GABAPENTIN 300 MG: 300 CAPSULE ORAL at 20:35

## 2025-04-27 RX ADMIN — ATORVASTATIN CALCIUM 80 MG: 80 TABLET, FILM COATED ORAL at 08:01

## 2025-04-27 RX ADMIN — ACETAMINOPHEN 325MG 650 MG: 325 TABLET ORAL at 09:34

## 2025-04-27 RX ADMIN — GABAPENTIN 300 MG: 300 CAPSULE ORAL at 16:57

## 2025-04-27 RX ADMIN — TAMSULOSIN HYDROCHLORIDE 0.4 MG: 0.4 CAPSULE ORAL at 20:35

## 2025-04-27 RX ADMIN — PANTOPRAZOLE SODIUM 40 MG: 40 TABLET, DELAYED RELEASE ORAL at 11:07

## 2025-04-27 RX ADMIN — FERROUS SULFATE TAB 325 MG (65 MG ELEMENTAL FE) 325 MG: 325 (65 FE) TAB at 08:01

## 2025-04-27 RX ADMIN — LOPERAMIDE HYDROCHLORIDE 2 MG: 2 CAPSULE ORAL at 20:34

## 2025-04-27 RX ADMIN — LOPERAMIDE HYDROCHLORIDE 2 MG: 2 CAPSULE ORAL at 08:01

## 2025-04-27 RX ADMIN — SODIUM ZIRCONIUM CYCLOSILICATE 10 G: 10 POWDER, FOR SUSPENSION ORAL at 14:50

## 2025-04-27 RX ADMIN — Medication 2 TABLET: at 10:23

## 2025-04-27 RX ADMIN — Medication 10 ML: at 20:35

## 2025-04-27 NOTE — PROGRESS NOTES
[facility]  Inpatient Progress Note    Reason for Follow Up: Ileostomy output increased    Subjective     Interval History:   Stool is firming with loperamide last liquid stool    Current Facility-Administered Medications:     acetaminophen (TYLENOL) tablet 650 mg, 650 mg, Oral, Q4H PRN, 650 mg at 04/27/25 0934 **OR** acetaminophen (TYLENOL) 160 MG/5ML oral solution 650 mg, 650 mg, Oral, Q4H PRN **OR** acetaminophen (TYLENOL) suppository 650 mg, 650 mg, Rectal, Q4H PRN, Na Gallego MD    aspirin chewable tablet 81 mg, 81 mg, Oral, Daily, Na Gallego MD, 81 mg at 04/27/25 0801    atorvastatin (LIPITOR) tablet 80 mg, 80 mg, Oral, Daily, Na Gallego MD, 80 mg at 04/27/25 0801    sennosides-docusate (PERICOLACE) 8.6-50 MG per tablet 2 tablet, 2 tablet, Oral, BID PRN **AND** polyethylene glycol (MIRALAX) packet 17 g, 17 g, Oral, Daily PRN **AND** bisacodyl (DULCOLAX) EC tablet 5 mg, 5 mg, Oral, Daily PRN **AND** bisacodyl (DULCOLAX) suppository 10 mg, 10 mg, Rectal, Daily PRN, Na Gallego MD    calcium carbonate (TUMS) chewable tablet 500 mg (200 mg elemental), 2 tablet, Oral, TID PRN, Félix De La Rosa MD, 2 tablet at 04/27/25 1023    dextrose (D50W) (25 g/50 mL) IV injection 25 g, 25 g, Intravenous, Q15 Min PRN, Na Gallego MD    dextrose (GLUTOSE) oral gel 15 g, 15 g, Oral, Q15 Min PRN, Na Gallego MD    DULoxetine (CYMBALTA) DR capsule 60 mg, 60 mg, Oral, Nightly, Na Gallego MD, 60 mg at 04/26/25 2033    ferrous sulfate tablet 325 mg, 325 mg, Oral, Daily With Breakfast, Na Gallego MD, 325 mg at 04/27/25 0801    gabapentin (NEURONTIN) capsule 300 mg, 300 mg, Oral, TID, Na Gallego MD, 300 mg at 04/27/25 0801    glucagon (GLUCAGEN) injection 1 mg, 1 mg, Intramuscular, Q15 Min PRN, Na Gallego MD    heparin injection 500 Units, 5 mL, Intravenous, PRN, Félix De La Rosa  MD Asif    hydrALAZINE (APRESOLINE) injection 10 mg, 10 mg, Intravenous, Q6H PRN, Ok Abbasi MD    insulin lispro (HUMALOG/ADMELOG) injection 2-7 Units, 2-7 Units, Subcutaneous, 4x Daily AC & at Bedtime, Na Gallego MD, 2 Units at 04/26/25 1202    loperamide (IMODIUM) capsule 2 mg, 2 mg, Oral, 4x Daily AC & at Bedtime, Fareed Gardner MD, 2 mg at 04/27/25 0801    melatonin tablet 2.5 mg, 2.5 mg, Oral, Nightly PRN, Na Gallego MD    nitroglycerin (NITROSTAT) SL tablet 0.4 mg, 0.4 mg, Sublingual, Q5 Min PRN, Ann Olivares MD    ondansetron ODT (ZOFRAN-ODT) disintegrating tablet 4 mg, 4 mg, Oral, Q6H PRN **OR** ondansetron (ZOFRAN) injection 4 mg, 4 mg, Intravenous, Q6H PRN, Na Gallego MD, 4 mg at 04/25/25 2313    pantoprazole (PROTONIX) EC tablet 40 mg, 40 mg, Oral, Q AM, Félix De La Rosa MD, 40 mg at 04/27/25 1107    sodium chloride 0.9 % flush 10 mL, 10 mL, Intravenous, Q12H, Félix De La Rosa MD, 10 mL at 04/27/25 0830    sodium chloride 0.9 % flush 10 mL, 10 mL, Intravenous, PRN, Félix De La Rosa MD    sodium chloride 0.9 % flush 20 mL, 20 mL, Intravenous, PRN, Félix De La Rosa MD    sodium chloride 0.9 % infusion 40 mL, 40 mL, Intravenous, PRN, Félix De La Rosa MD    sodium zirconium cyclosilicate (LOKELMA) packet 10 g, 10 g, Oral, Once, Ok Abbasi MD    tamsulosin (FLOMAX) 24 hr capsule 0.4 mg, 0.4 mg, Oral, Nightly, Na Gallegoa, MD, 0.4 mg at 04/26/25 2033  Review of Systems:    There is weakness and fatigue all other systems reviewed and negative    Objective     Vital Signs  Temp:  [97.9 °F (36.6 °C)-98.6 °F (37 °C)] 97.9 °F (36.6 °C)  Heart Rate:  [] 96  Resp:  [18] 18  BP: (154-162)/(78-86) 154/78  Body mass index is 28.36 kg/m².    Intake/Output Summary (Last 24 hours) at 4/27/2025 1138  Last data filed at 4/26/2025 2035  Gross per 24 hour   Intake 120 ml   Output 800 ml   Net  "-680 ml     No intake/output data recorded.     Physical Exam:   General: patient awake, alert and cooperative   Eyes: Normal lids and lashes, no scleral icterus   Neck: supple, normal ROM   Skin: warm and dry, not jaundiced   Cardiovascular: regular rhythm and rate, no murmurs auscultated   Pulm: clear to auscultation bilaterally, regular and unlabored   Abdomen: soft, nontender, nondistended; normal bowel sounds   Extremities: no rash or edema   Psychiatric: Normal mood and behavior; memory intact     Results Review:     I reviewed the patient's new clinical results.    Results from last 7 days   Lab Units 04/27/25  0404 04/26/25  0511 04/25/25  1741   WBC 10*3/mm3 10.69 8.01 7.05   HEMOGLOBIN g/dL 12.5* 13.0 14.2   HEMATOCRIT % 36.7* 39.0 42.3   PLATELETS 10*3/mm3 209 221 286     Results from last 7 days   Lab Units 04/27/25  0404 04/26/25  1100 04/26/25  0511 04/25/25  1741   SODIUM mmol/L 134*  --  131* 136   POTASSIUM mmol/L 5.4* 4.4 7.3* 6.2*   CHLORIDE mmol/L 104  --  107 103   CO2 mmol/L 20.0*  --  18.0* 20.8*   BUN mg/dL 20  --  35* 35*   CREATININE mg/dL 1.22  --  1.95* 2.29*   CALCIUM mg/dL 9.8  --  9.9 10.7*   BILIRUBIN mg/dL  --   --   --  0.6   ALK PHOS U/L  --   --   --  139*   ALT (SGPT) U/L  --   --   --  22   AST (SGOT) U/L  --   --   --  19   GLUCOSE mg/dL 138*  --  169* 135*         No results found for: \"LIPASE\"    Radiology:  US Renal Bilateral   Final Result   No hydronephrosis or evidence for acute abnormality.  There   are bilateral renal cysts and there is a 4 mm right interpolar renal   stone. Urinary bladder is mostly decompressed about a Berrios catheter.       This report was finalized on 4/26/2025 4:31 PM by Truong Delatorre M.D   on Workstation: [id number]          XR Spine Thoracic 2 View   Final Result   No evidence for fracture or acute abnormality of the   thoracic spine. Multilevel bridging endplate spur formation within the   thoracic spine with partial ankylosis of the " thoracic spine.           This report was finalized on 4/26/2025 2:22 PM by Truong Delatorre M.D   on Workstation: [id number]          XR Chest 1 View   Final Result   Small dense left upper lobe nodular opacity corresponding   with a calcified granuloma on prior FDG PET/CT. No other focal   consolidation. No pleural effusion or pneumothorax. Normal size   cardiomediastinal select. Right IJ port with tip overlying the caudal   SVC. No focal osseous abnormality.       This report was finalized on 4/25/2025 5:53 PM by Dr. Félix Smith M.D on Workstation: [id number]              Assessment & Plan     Active Hospital Problems    Diagnosis     **Orthostatic hypotension     Hyperkalemia     MARY (acute kidney injury)     Urine retention     Near syncope     History of rectal cancer     Type 2 diabetes mellitus without complication, without long-term current use of insulin     Chronic heart failure with preserved ejection fraction     History of prostate cancer        Assessment:  T3 rectal cancer status post low anterior resection with neoadjuvant chemotherapy  He is due for reversal of ostomy May 5, 2025  High output ostomy  Hypotension and dehydration  Acute kidney injury     Plan:  Schedule Imodium as this works well for him with regard to slowing down ostomy output, we need to just bridge him with Imodium until May 5 when he is having his reversal of the ostomy  Nephrology following for electrolyte imbalance and acute kidney injury  Correction of electrolytes per [facility]  I discussed the patients findings and my recommendations with patient and nursing staff.    Fareed Gardner MD

## 2025-04-27 NOTE — PLAN OF CARE
Goal Outcome Evaluation:  Plan of Care Reviewed With: patient, significant other        Progress: improving  Outcome Evaluation: Pt is a 69 y.o. male admitted to [facility] with c/o hypotension and dizziness on 4/25/2025. Work up reveals orthostatic hypotension. PMHx includes DM, malignant neoplasm, heart failure, acute kidney injury, HTN, TIA. Prior to hospital admission, pt reports residing in house with fiancee and 2 EVA. 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 Prior to hospital stay, pt was I with ADLs and utilized cane AD at baseline as needed. Did not test bed mobility on this date due to patient being upright in chair but patient required SBA for STS transfers, and CGA for ambulation with no AD for 20 ft. Pt is able to complete STS without onset of dizziness and then complete ambulation around the room with only a complaint of mild dizziness. Pt presents today with below baseline strength, dec endurance, and decreased functional mobility. Pt will benefit from skilled PT to address the previous deficits and improve overall safety with functional mobility. Anticipate pt will D/C to home with assist as needed pending progress.    Anticipated Discharge Disposition (PT): home, home with assist

## 2025-04-27 NOTE — THERAPY EVALUATION
Patient Name: Kevin Garsia  : 1955    MRN: [id number]                              Today's Date: 2025       Admit Date: 2025    Visit Dx:     ICD-10-CM ICD-9-CM   1. Orthostatic hypotension  I95.1 458.0   2. Hyperkalemia  E87.5 276.7   3. MARY (acute kidney injury)  N17.9 584.9     Patient Active Problem List   Diagnosis    Essential hypertension    Hyperlipidemia    History of prostate cancer    Non morbid obesity due to excess calories    Vitamin D deficiency    TIA (transient ischemic attack)    S/P carotid endarterectomy    Type 2 diabetes mellitus without complication, without long-term current use of insulin    Chronic heart failure with preserved ejection fraction    Arthritis    Sleep apnea    Hospital discharge follow-up    Cervical spinal stenosis    Stroke-like symptoms    Spinal stenosis, lumbar region, without neurogenic claudication    Protruded lumbar disc    Chronic right-sided low back pain without sciatica    Neck pain    Cervical spondylosis without myelopathy    Bilateral carpal tunnel syndrome    Generalized joint pain    HIV infection    Medicare annual wellness visit, subsequent    Other fatigue    Chronic left shoulder pain    Change in mole    Chronic right shoulder pain    Seasonal allergies    Acute non-recurrent maxillary sinusitis    Hypercalcemia    Screening for colon cancer    Localized swelling, mass, and lump of head    History of rectal cancer    Fitting and adjustment of vascular catheter    Iron deficiency anemia    Adverse effect of iron    Lumbar radiculopathy    RBBB    Palpitation    Orthostatic hypotension    Hyperkalemia    MARY (acute kidney injury)    Urine retention    Near syncope     Past Medical History:   Diagnosis Date    Anemia     Aphasia     Arthritis     CTS (carpal tunnel syndrome)     Depression     Diabetes mellitus     GERD (gastroesophageal reflux disease)     History of carotid stenosis     HX LEFT CAROTID ENDARTERECTOMY    History of  prostate cancer 2012    HX SEED, RADIATION    History of radiation therapy     History of transient ischemic attack (TIA)     S/P CAROTID ENDARTERECTOMY 2019    Hyperlipidemia     Hypertension     Multiple gastric ulcers     Neuropathy     Rectal adenocarcinoma 2024    Seasonal allergies     Sleep apnea     cpap    Stroke     Tattoos     TIA (transient ischemic attack)      Past Surgical History:   Procedure Laterality Date    ANGIOPLASTY CAROTID ARTERY      APPENDECTOMY      CAROTID ENDARTERECTOMY Left     COLON RESECTION N/A 03/19/2025    Procedure: Laparoscopic Converted to Open Low Anterior Resection, Ostomy Creation, and Appendetomy;  Surgeon: Naeem Rai MD;  Location:  [facility];  Service: General;  Laterality: N/A;    COLONOSCOPY N/A 10/18/2024    Procedure: COLONOSCOPY TO CECUM/TI WITH BIOPSIES;  Surgeon: Marcus Christine Jr., MD;  Location: [facility] ENDOSCOPY;  Service: General;  Laterality: N/A;  PRE-SCREENING, FAMILY HX COLON CANCER  POST-DIVERTICULOSIS, RECTAL MASS    ENDOSCOPY      FOOT SURGERY      INSERTION PROSTATE RADIATION SEED      LUMBAR EPIDURAL INJECTION N/A 02/14/2025    Procedure: L4/L5 LUMBAR EPIDURAL STEROID INJECTION CPT: 11420;  Surgeon: Vandana Ordonez MD;  Location: [facility] MAIN OR;  Service: Pain Management;  Laterality: N/A;    TOOTH EXTRACTION  2025    VENOUS ACCESS DEVICE (PORT) INSERTION N/A 11/22/2024    Procedure: INSERTION VENOUS ACCESS DEVICE;  Surgeon: Naeem Rai MD;  Location: [facility] MAIN OR;  Service: General;  Laterality: N/A;      General Information       Row Name 04/27/25 1309          Physical Therapy Time and Intention    Document Type evaluation  -MH     Mode of Treatment individual therapy;physical therapy  -       Row Name 04/27/25 9871          General Information    Patient Profile Reviewed yes  -MH     Prior Level of Function independent:;ADL's  -MH     Existing Precautions/Restrictions fall;orthostatic hypotension  -MH     Barriers  to Rehab none identified  -       Row Name 04/27/25 1309          Living Environment    Current Living Arrangements home  -     People in Home significant other  -       Row Name 04/27/25 1309          Home Main Entrance    Number of Stairs, Main Entrance two  -       Row Name 04/27/25 1309          Stairs Within Home, Primary    Number of Stairs, Within Home, Primary none  -MH     Stair Railings, Within Home, Primary none  -       Row Name 04/27/25 1309          Cognition    Orientation Status (Cognition) oriented x 4  -       Row Name 04/27/25 1309          Safety Issues/Impairments Affecting Functional Mobility    Impairments Affecting Function (Mobility) balance;pain;endurance/activity tolerance  -     Comment, Safety Issues/Impairments (Mobility) Gait belt and non skid socks  -               User Key  (r) = Recorded By, (t) = Taken By, (c) = Cosigned By      Initials Name Provider Type    Félix Moreira, GENOVEVA Physical Therapist                   Mobility       Row Name 04/27/25 1310          Bed Mobility    Comment, (Bed Mobility) NT; patient UIC  -       Row Name 04/27/25 1310          Sit-Stand Transfer    Sit-Stand Foster (Transfers) standby assist  -       Row Name 04/27/25 1310          Gait/Stairs (Locomotion)    Foster Level (Gait) contact guard;1 person assist  -     Assistive Device (Gait) walker, front-wheeled  -     Patient was able to Ambulate yes  -     Distance in Feet (Gait) 30  -     Deviations/Abnormal Patterns (Gait) gait speed decreased;stride length decreased;weight shifting decreased;guero decreased  -     Bilateral Gait Deviations weight shift ability decreased;forward flexed posture;heel strike decreased  -               User Key  (r) = Recorded By, (t) = Taken By, (c) = Cosigned By      Initials Name Provider Type    Félix Moreira, PT Physical Therapist                   Obj/Interventions       Row Name 04/27/25 1311          Range of  Motion Comprehensive    General Range of Motion bilateral lower extremity ROM WFL;bilateral upper extremity ROM WFL  -       Row Name 04/27/25 1311          Strength Comprehensive (MMT)    Comment, General Manual Muscle Testing (MMT) Assessment Grossly 4/5 BLE  -       Row Name 04/27/25 1311          Balance    Balance Assessment sitting static balance;sitting dynamic balance;standing static balance;standing dynamic balance  -     Static Sitting Balance independent  -     Dynamic Sitting Balance independent  -     Position, Sitting Balance sitting in chair  -     Static Standing Balance contact guard  -     Dynamic Standing Balance contact guard  -     Balance Interventions sitting;standing;sit to stand;static;dynamic;minimal challenge  -       Row Name 04/27/25 1311          Sensory Assessment (Somatosensory)    Sensory Assessment (Somatosensory) sensation intact  -               User Key  (r) = Recorded By, (t) = Taken By, (c) = Cosigned By      Initials Name Provider Type     Félix Vargas, PT Physical Therapist                   Goals/Plan       [facility] Name 04/27/25 1312          Bed Mobility Goal 1 (PT)    Activity/Assistive Device (Bed Mobility Goal 1, PT) bed mobility activities, all  -     Howard Level/Cues Needed (Bed Mobility Goal 1, PT) independent  -     Time Frame (Bed Mobility Goal 1, PT) short term goal (STG);1 week  -     Progress/Outcomes (Bed Mobility Goal 1, PT) new goal  -[facility] Name 04/27/25 1312          Transfer Goal 1 (PT)    Activity/Assistive Device (Transfer Goal 1, PT) transfers, all  -MH     Howard Level/Cues Needed (Transfer Goal 1, PT) independent  -     Time Frame (Transfer Goal 1, PT) 1 week;short term goal (STG)  -MH     Progress/Outcome (Transfer Goal 1, PT) new goal  -[facility] Name 04/27/25 1312          Gait Training Goal 1 (PT)    Activity/Assistive Device (Gait Training Goal 1, PT) gait (walking locomotion)  -     Howard  Level (Gait Training Goal 1, PT) contact guard required  -     Distance (Gait Training Goal 1, PT) 100  -MH     Time Frame (Gait Training Goal 1, PT) short term goal (STG);1 week  -     Progress/Outcome (Gait Training Goal 1, PT) new goal  -       Row Name 04/27/25 1312          Therapy Assessment/Plan (PT)    Planned Therapy Interventions (PT) balance training;bed mobility training;gait training;home exercise program;neuromuscular re-education;strengthening;stretching;patient/family education;transfer training;postural re-education;ROM (range of motion);stair training;motor coordination training  -               User Key  (r) = Recorded By, (t) = Taken By, (c) = Cosigned By      Initials Name Provider Type     Félix Vargas, PT Physical Therapist                   Clinical Impression       Row Name 04/27/25 1311          Pain    Pretreatment Pain Rating 6/10  -     Posttreatment Pain Rating 6/10  -     Pain Location back  -     Pain Side/Orientation lower  -       Row Name 04/27/25 1311          Plan of Care Review    Plan of Care Reviewed With patient;significant other  -     Progress improving  -     Outcome Evaluation Pt is a 69 y.o. male admitted to [facility] with c/o hypotension and dizziness on 4/25/2025. Work up reveals orthostatic hypotension. PMHx includes DM, malignant neoplasm, heart failure, acute kidney injury, HTN, TIA. Prior to hospital admission, pt reports residing in house with fiancee and 2 EVA. 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 Prior to hospital stay, pt was I with ADLs and utilized cane AD at baseline as needed. Did not test bed mobility on this date due to patient being upright in chair but patient required SBA for STS transfers, and CGA for ambulation with no AD for 20 ft. Pt is able to complete STS without onset of dizziness and then complete ambulation around the room with only a complaint of mild dizziness. Pt  presents today with below baseline strength, dec endurance, and decreased functional mobility. Pt will benefit from skilled PT to address the previous deficits and improve overall safety with functional mobility. Anticipate pt will D/C to home with assist as needed pending progress.  -       Row Name 04/27/25 1311          Therapy Assessment/Plan (PT)    Patient/Family Therapy Goals Statement (PT) return home  -     Rehab Potential (PT) good  -     Criteria for Skilled Interventions Met (PT) yes  -     Therapy Frequency (PT) 5 times/wk  -       Row Name 04/27/25 1311          Vital Signs    O2 Delivery Pre Treatment room air  -     O2 Delivery Intra Treatment room air  -MH     O2 Delivery Post Treatment room air  -     Pre Patient Position Sitting  -     Intra Patient Position Standing  -MH     Post Patient Position Sitting  -       Row Name 04/27/25 1311          Positioning and Restraints    Pre-Treatment Position sitting in chair/recliner  -MH     Post Treatment Position chair  -MH     In Chair notified nsg;call light within reach;encouraged to call for assist;with family/caregiver;sitting  -               User Key  (r) = Recorded By, (t) = Taken By, (c) = Cosigned By      Initials Name Provider Type     Félix Vargas, PT Physical Therapist                   Outcome Measures       Row Name 04/27/25 1312          How much help from another person do you currently need...    Turning from your back to your side while in flat bed without using bedrails? 4  -MH     Moving from lying on back to sitting on the side of a flat bed without bedrails? 4  -MH     Moving to and from a bed to a chair (including a wheelchair)? 3  -MH     Standing up from a chair using your arms (e.g., wheelchair, bedside chair)? 3  -MH     Climbing 3-5 steps with a railing? 3  -MH     To walk in hospital room? 3  -MH     AM-PAC 6 Clicks Score (PT) 20  -     Highest Level of Mobility Goal 6 --> Walk 10 steps or more  -        Row Name 04/27/25 1312          Functional Assessment    Outcome Measure Options AM-PAC 6 Clicks Basic Mobility (PT)  -               User Key  (r) = Recorded By, (t) = Taken By, (c) = Cosigned By      Initials Name Provider Type     Félix Vargas, PT Physical Therapist                                 Physical Therapy Education       Title: PT OT SLP Therapies (In Progress)       Topic: Physical Therapy (Done)       Point: Mobility training (Done)       Learning Progress Summary            Patient Acceptance, E, VU,NR by  at 4/27/2025 1313                      Point: Home exercise program (Done)       Learning Progress Summary            Patient Acceptance, E, VU,NR by  at 4/27/2025 1313                      Point: Body mechanics (Done)       Learning Progress Summary            Patient Acceptance, E, VU,NR by  at 4/27/2025 1313                      Point: Precautions (Done)       Learning Progress Summary            Patient Acceptance, E, VU,NR by  at 4/27/2025 1313                                      User Key       Initials Effective Dates Name Provider Type Discipline     09/11/24 -  Félix Vargas, PT Physical Therapist PT                  PT Recommendation and Plan  Planned Therapy Interventions (PT): balance training, bed mobility training, gait training, home exercise program, neuromuscular re-education, strengthening, stretching, patient/family education, transfer training, postural re-education, ROM (range of motion), stair training, motor coordination training  Progress: improving  Outcome Evaluation: Pt is a 69 y.o. male admitted to [facility] with c/o hypotension and dizziness on 4/25/2025. Work up reveals orthostatic hypotension. PMHx includes DM, malignant neoplasm, heart failure, acute kidney injury, HTN, TIA. Prior to hospital admission, pt reports residing in house with santo and 2 EVA. 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  Prior to hospital stay, pt was I with ADLs and utilized cane AD at baseline as needed. Did not test bed mobility on this date due to patient being upright in chair but patient required SBA for STS transfers, and CGA for ambulation with no AD for 20 ft. Pt is able to complete STS without onset of dizziness and then complete ambulation around the room with only a complaint of mild dizziness. Pt presents today with below baseline strength, dec endurance, and decreased functional mobility. Pt will benefit from skilled PT to address the previous deficits and improve overall safety with functional mobility. Anticipate pt will D/C to home with assist as needed pending progress.     Time Calculation:         PT Charges       Row Name 04/27/25 1309             Time Calculation    Start Time 0952  -      Stop Time 1005  -      Time Calculation (min) 13 min  -MH      PT Received On 04/27/25  -      PT - Next Appointment 04/28/25  -      PT Goal Re-Cert Due Date 05/04/25  -         Time Calculation- PT    Total Timed Code Minutes- PT 8 minute(s)  -         Timed Charges    41777 - PT Therapeutic Activity Minutes 8  -MH         Untimed Charges    PT Eval/Re-eval Minutes 5  -MH         Total Minutes    Timed Charges Total Minutes 8  -MH      Untimed Charges Total Minutes 5  -MH       Total Minutes 13  -MH                User Key  (r) = Recorded By, (t) = Taken By, (c) = Cosigned By      Initials Name Provider Type     Félix Vargas, PT Physical Therapist                  Therapy Charges for Today       Code Description Service Date Service Provider Modifiers Qty    04124232423  PT EVAL MOD COMPLEXITY 2 4/27/2025 Félix Vargas, PT GP 1    [id number]  PT THERAPEUTIC ACT EA 15 MIN 4/27/2025 Félix Vargas, PT GP 1            PT G-Codes  Outcome Measure Options: AM-PAC 6 Clicks Basic Mobility (PT)  AM-PAC 6 Clicks Score (PT): 20  PT Discharge Summary  Anticipated Discharge Disposition (PT): home, home with  assist    Félix Vargas, PT  4/27/2025

## 2025-04-27 NOTE — PROGRESS NOTES
"   LOS: 1 day     Chief Complaint/ Reason for encounter: Acute renal failure    Subjective   04/27/25 : He is feeling better today, still dizzy with standing but denies any complaints  Still with increased ostomy output Berrios remains in place for retention, urology consult noted  No nausea or vomiting  No shortness of breath chest pain fevers or chills      Medical history reviewed:  History of Present Illness    Subjective    History taken from: Chart and patient/family as able    Vital Signs  Temp:  [97.9 °F (36.6 °C)-98.6 °F (37 °C)] 97.9 °F (36.6 °C)  Heart Rate:  [] 96  Resp:  [18] 18  BP: (154-162)/(78-86) 154/78       Wt Readings from Last 1 Encounters:   04/26/25 0512 77.3 kg (170 lb 6.4 oz)   04/25/25 2112 77.3 kg (170 lb 6.4 oz)       Objective:  Vital signs: (most recent): Blood pressure 154/78, pulse 96, temperature 97.9 °F (36.6 °C), temperature source Oral, resp. rate 18, height 165.1 cm (65\"), weight 77.3 kg (170 lb 6.4 oz), SpO2 96%.                Objective:  General Appearance:  Comfortable, well-appearing, no acute distress   HEENT: Mucous membranes moist, atraumatic  Lungs:  Normal effort and normal respiratory rate.  Breath sounds clear to auscultation: No rhonchi/Rales.  No  respiratory distress.   Heart:  S1, S2 normal.   Abdomen: Abdomen is soft, nontender/nondistended  Extremities: Trace edema of bilateral lower extremities  Skin:  Warm and dry       Results Review:    Intake/Output:     Intake/Output Summary (Last 24 hours) at 4/27/2025 1129  Last data filed at 4/26/2025 2035  Gross per 24 hour   Intake 120 ml   Output 800 ml   Net -680 ml         DATA:  Radiology and Labs:  The following labs independently reviewed by me. Additional labs ordered for tomorrow a.m.  Interval notes, renal ultrasound with normal-sized kidneys no hydronephrosis bilateral renal cysts chart personally reviewed by me.   Old records independently reviewed showing baseline creatinine looks to be around " 0.8-1.0  The following radiologic studies independently viewed by me, findings, 4 mm nonobstructive stone bladder decompressed with a Berrios  New problems include MARY, hyperkalemia  Discussed with patient and family at bedside    Risk/ complexity of medical care/ medical decision making high complexity, MARY with electrolyte abnormalities    Labs:   Recent Results (from the past 24 hours)   POC Glucose Once    Collection Time: 04/26/25 11:42 AM    Specimen: Blood   Result Value Ref Range    Glucose 154 (H) 70 - 130 mg/dL   POC Glucose Once    Collection Time: 04/26/25  5:54 PM    Specimen: Blood   Result Value Ref Range    Glucose 148 (H) 70 - 130 mg/dL   POC Glucose Once    Collection Time: 04/26/25  8:36 PM    Specimen: Blood   Result Value Ref Range    Glucose 118 70 - 130 mg/dL   CBC (No Diff)    Collection Time: 04/27/25  4:04 AM    Specimen: Blood   Result Value Ref Range    WBC 10.69 3.40 - 10.80 10*3/mm3    RBC 3.79 (L) 4.14 - 5.80 10*6/mm3    Hemoglobin 12.5 (L) 13.0 - 17.7 g/dL    Hematocrit 36.7 (L) 37.5 - 51.0 %    MCV 96.8 79.0 - 97.0 fL    MCH 33.0 26.6 - 33.0 pg    MCHC 34.1 31.5 - 35.7 g/dL    RDW 14.1 12.3 - 15.4 %    RDW-SD 51.6 37.0 - 54.0 fl    MPV 10.0 6.0 - 12.0 fL    Platelets 209 140 - 450 10*3/mm3   Basic Metabolic Panel    Collection Time: 04/27/25  4:04 AM    Specimen: Blood   Result Value Ref Range    Glucose 138 (H) 65 - 99 mg/dL    BUN 20 8 - 23 mg/dL    Creatinine 1.22 0.76 - 1.27 mg/dL    Sodium 134 (L) 136 - 145 mmol/L    Potassium 5.4 (H) 3.5 - 5.2 mmol/L    Chloride 104 98 - 107 mmol/L    CO2 20.0 (L) 22.0 - 29.0 mmol/L    Calcium 9.8 8.6 - 10.5 mg/dL    BUN/Creatinine Ratio 16.4 7.0 - 25.0    Anion Gap 10.0 5.0 - 15.0 mmol/L    eGFR 63.4 >60.0 mL/min/1.73   Sodium, Urine, Random - Urine, Clean Catch    Collection Time: 04/27/25  5:27 AM    Specimen: Urine, Clean Catch   Result Value Ref Range    Sodium, Urine 23 mmol/L   Urinalysis With Microscopic If Indicated (No Culture) - Urine,  Clean Catch    Collection Time: 04/27/25  5:27 AM    Specimen: Urine, Clean Catch   Result Value Ref Range    Color, UA Yellow Yellow, Straw    Appearance, UA Hazy (A) Clear    pH, UA <=5.0 5.0 - 8.0    Specific Gravity, UA 1.019 1.005 - 1.030    Glucose, UA Negative Negative    Ketones, UA Trace (A) Negative    Bilirubin, UA Negative Negative    Blood, UA Large (3+) (A) Negative    Protein, UA 30 mg/dL (1+) (A) Negative    Leuk Esterase, UA Negative Negative    Nitrite, UA Negative Negative    Urobilinogen, UA 0.2 E.U./dL 0.2 - 1.0 E.U./dL   Creatinine Urine Random (kidney function) GFR component - Urine, Clean Catch    Collection Time: 04/27/25  5:27 AM    Specimen: Urine, Clean Catch   Result Value Ref Range    Creatinine, Urine 174.0 mg/dL   Protein, Urine, Random - Urine, Clean Catch    Collection Time: 04/27/25  5:27 AM    Specimen: Urine, Clean Catch   Result Value Ref Range    Total Protein, Urine 35.2 mg/dL   Urinalysis, Microscopic Only - Urine, Clean Catch    Collection Time: 04/27/25  5:27 AM    Specimen: Urine, Clean Catch   Result Value Ref Range    RBC, UA Too Numerous to Count (A) None Seen, 0-2 /HPF    WBC, UA 0-2 None Seen, 0-2 /HPF    Bacteria, UA Trace (A) None Seen /HPF    Squamous Epithelial Cells, UA None Seen None Seen, 0-2 /HPF    Hyaline Casts, UA None Seen None Seen /LPF    Mucus, UA Small/1+ (A) None Seen, Trace /HPF    Methodology Manual Light Microscopy    POC Glucose Once    Collection Time: 04/27/25  6:22 AM    Specimen: Blood   Result Value Ref Range    Glucose 131 (H) 70 - 130 mg/dL       Radiology:  Pertinent radiology studies were reviewed as described above      Medications have been reviewed separately in chart review medication tab      ASSESSMENT:  MARY, prerenal, improving  Hyperkalemia, much improved from 7.3, now 5.4 today  Hyponatremia, hypovolemic, improved  Metabolic acidosis, stabilizing  Urinary retention - rosado in place.  Urology following  Dizziness, with near  syncope    Orthostatic hypotension    History of prostate cancer    Type 2 diabetes mellitus without complication, without long-term current use of insulin    Chronic heart failure with preserved ejection fraction    Rectal adenocarcinoma with high ostomy output    Hyperkalemia           PLAN:   MARY likely due to retention, hypotension and high ostomy outpt   Creatinine significantly improved with hydration  Nearing baseline creatinine of 0.8-0.9  Potassium still elevated today but much improved  Give a one-time dose of Lokelma  Renal US with no hydro   He has been hydrated with IV fluids.  Encourage oral fluid intake today  Low potassium diet for now  Berrios in place, voiding trial in a few days per urology  Remain off ace/arb and metformin at this time and monitor BP  BP currently acceptable but can use as needed hydralazine for systolic above 160  Urine studies with minimal proteinuria, prerenal electrolytes     Monitor electrolytes and volume closely   Dose all medications for GFR around 60    Limit IV dye, NSAIDS and nephrotoxic medications   Please call with any questions or concerns.       Ok Abbasi MD  [facility]   Office phone number: [phone]  Answering service phone number: [phone]    04/27/25  11:29 EDT    Dictation performed using Dragon dictation software

## 2025-04-27 NOTE — PLAN OF CARE
Goal Outcome Evaluation:         Pt alert and oriented. On room air with stable vital signs. Berrios catheter care completed. Prescribed meds given. Plan of care ongoing

## 2025-04-28 ENCOUNTER — TELEPHONE (OUTPATIENT)
Dept: FAMILY MEDICINE CLINIC | Facility: CLINIC | Age: 70
End: 2025-04-28
Payer: MEDICARE

## 2025-04-28 ENCOUNTER — READMISSION MANAGEMENT (OUTPATIENT)
Dept: CALL CENTER | Facility: HOSPITAL | Age: 70
End: 2025-04-28
Payer: MEDICARE

## 2025-04-28 VITALS
OXYGEN SATURATION: 99 % | WEIGHT: 168.54 LBS | BODY MASS INDEX: 28.08 KG/M2 | DIASTOLIC BLOOD PRESSURE: 79 MMHG | HEIGHT: 65 IN | RESPIRATION RATE: 18 BRPM | SYSTOLIC BLOOD PRESSURE: 131 MMHG | TEMPERATURE: 97.3 F | HEART RATE: 102 BPM

## 2025-04-28 PROBLEM — E87.5 HYPERKALEMIA: Status: RESOLVED | Noted: 2025-04-26 | Resolved: 2025-04-28

## 2025-04-28 PROBLEM — N17.9 AKI (ACUTE KIDNEY INJURY): Status: RESOLVED | Noted: 2025-04-26 | Resolved: 2025-04-28

## 2025-04-28 LAB
ALBUMIN SERPL-MCNC: 3.9 G/DL (ref 3.5–5.2)
ANION GAP SERPL CALCULATED.3IONS-SCNC: 10 MMOL/L (ref 5–15)
BUN SERPL-MCNC: 16 MG/DL (ref 8–23)
BUN/CREAT SERPL: 13.2 (ref 7–25)
CALCIUM SPEC-SCNC: 9.8 MG/DL (ref 8.6–10.5)
CHLORIDE SERPL-SCNC: 104 MMOL/L (ref 98–107)
CO2 SERPL-SCNC: 22 MMOL/L (ref 22–29)
CREAT SERPL-MCNC: 1.21 MG/DL (ref 0.76–1.27)
EGFRCR SERPLBLD CKD-EPI 2021: 64 ML/MIN/1.73
GLUCOSE BLDC GLUCOMTR-MCNC: 113 MG/DL (ref 70–130)
GLUCOSE BLDC GLUCOMTR-MCNC: 130 MG/DL (ref 70–130)
GLUCOSE SERPL-MCNC: 115 MG/DL (ref 65–99)
MAGNESIUM SERPL-MCNC: 1.4 MG/DL (ref 1.6–2.4)
PHOSPHATE SERPL-MCNC: 3.3 MG/DL (ref 2.5–4.5)
POTASSIUM SERPL-SCNC: 4.5 MMOL/L (ref 3.5–5.2)
SODIUM SERPL-SCNC: 136 MMOL/L (ref 136–145)

## 2025-04-28 PROCEDURE — 25010000002 MAGNESIUM SULFATE 2 GM/50ML SOLUTION: Performed by: INTERNAL MEDICINE

## 2025-04-28 PROCEDURE — 80069 RENAL FUNCTION PANEL: CPT | Performed by: INTERNAL MEDICINE

## 2025-04-28 PROCEDURE — 82948 REAGENT STRIP/BLOOD GLUCOSE: CPT

## 2025-04-28 PROCEDURE — 99232 SBSQ HOSP IP/OBS MODERATE 35: CPT | Performed by: NURSE PRACTITIONER

## 2025-04-28 PROCEDURE — 25010000002 HEPARIN LOCK FLUSH PER 10 UNITS: Performed by: HOSPITALIST

## 2025-04-28 PROCEDURE — 83735 ASSAY OF MAGNESIUM: CPT | Performed by: INTERNAL MEDICINE

## 2025-04-28 RX ORDER — LOPERAMIDE HYDROCHLORIDE 2 MG/1
2 CAPSULE ORAL
Status: ON HOLD
Start: 2025-04-28 | End: 2025-05-05

## 2025-04-28 RX ORDER — MAGNESIUM SULFATE HEPTAHYDRATE 40 MG/ML
2 INJECTION, SOLUTION INTRAVENOUS ONCE
Status: COMPLETED | OUTPATIENT
Start: 2025-04-28 | End: 2025-04-28

## 2025-04-28 RX ADMIN — LOPERAMIDE HYDROCHLORIDE 2 MG: 2 CAPSULE ORAL at 11:08

## 2025-04-28 RX ADMIN — ACETAMINOPHEN 325MG 650 MG: 325 TABLET ORAL at 08:27

## 2025-04-28 RX ADMIN — PANTOPRAZOLE SODIUM 40 MG: 40 TABLET, DELAYED RELEASE ORAL at 05:03

## 2025-04-28 RX ADMIN — GABAPENTIN 300 MG: 300 CAPSULE ORAL at 08:27

## 2025-04-28 RX ADMIN — Medication 500 UNITS: at 13:35

## 2025-04-28 RX ADMIN — FERROUS SULFATE TAB 325 MG (65 MG ELEMENTAL FE) 325 MG: 325 (65 FE) TAB at 08:26

## 2025-04-28 RX ADMIN — ATORVASTATIN CALCIUM 80 MG: 80 TABLET, FILM COATED ORAL at 08:27

## 2025-04-28 RX ADMIN — LOPERAMIDE HYDROCHLORIDE 2 MG: 2 CAPSULE ORAL at 08:27

## 2025-04-28 RX ADMIN — ASPIRIN 81 MG CHEWABLE TABLET 81 MG: 81 TABLET CHEWABLE at 08:26

## 2025-04-28 RX ADMIN — MAGNESIUM SULFATE HEPTAHYDRATE 2 G: 40 INJECTION, SOLUTION INTRAVENOUS at 08:28

## 2025-04-28 RX ADMIN — Medication 10 ML: at 08:28

## 2025-04-28 NOTE — PROGRESS NOTES
"   LOS: 2 days     Chief Complaint/ Reason for encounter: Acute renal failure    Subjective   04/27/25 : He is feeling better today, still dizzy with standing but denies any complaints  Still with increased ostomy output Berrios remains in place for retention, urology consult noted  No nausea or vomiting  No shortness of breath chest pain fevers or chills    /28: He looks and feels well denies complaints good oral intake no nausea vomiting shortness of breath chest pain fevers chills cough congestion edema or dysuria    Medical history reviewed:  History of Present Illness    Subjective    History taken from: Chart and patient/family as able    Vital Signs  Temp:  [97.3 °F (36.3 °C)-98.4 °F (36.9 °C)] 97.3 °F (36.3 °C)  Heart Rate:  [] 102  Resp:  [18] 18  BP: ()/(60-85) 131/79       Wt Readings from Last 1 Encounters:   04/28/25 0527 76.5 kg (168 lb 8.7 oz)   04/26/25 0512 77.3 kg (170 lb 6.4 oz)   04/25/25 2112 77.3 kg (170 lb 6.4 oz)       Objective:  Vital signs: (most recent): Blood pressure 131/79, pulse 102, temperature 97.3 °F (36.3 °C), temperature source Oral, resp. rate 18, height 165.1 cm (65\"), weight 76.5 kg (168 lb 8.7 oz), SpO2 99%.                Objective:  General Appearance:  Comfortable, well-appearing, no acute distress   HEENT: Mucous membranes moist, atraumatic  Lungs:  Normal effort and normal respiratory rate.  Breath sounds clear to auscultation: No rhonchi/Rales.  No  respiratory distress.   Heart:  S1, S2 normal.   Abdomen: Abdomen is soft, nontender/nondistended  Extremities: Trace edema of bilateral lower extremities  Skin:  Warm and dry       Results Review:    Intake/Output:     Intake/Output Summary (Last 24 hours) at 4/28/2025 1107  Last data filed at 4/28/2025 0527  Gross per 24 hour   Intake --   Output 550 ml   Net -550 ml         DATA:  Radiology and Labs:  The following labs independently reviewed by me. Additional labs ordered for tomorrow a.m.  Interval notes, " Subjective   Patient ID: Nilton Philip is a 17 y.o. female who presents for Diabetes.  Today she is accompanied by alone.     Nilton is here for a follow up appointment. She has a past medical history significant for type 2 diabetes since age 10, hyperlipidemia, obstructive sleep apnea, and ADHD.  Her blood pressure today is 112/79.   Her BMI is 43.91.    Diabetes  - reporting having some nausea and vomiting over last 2-3 weeks, but it is improving. We will continue Trulicity for now and see if symptoms continue to improve. Also reports having episodes of dizziness at night.. but is not sure what her glucose is at the time.  Asked her to keep a log of symptoms and glucose readings and follow up in office in 2 weeks... we may need to adjust insulin dosage if Trulicity is improving her glucose control  - Had A1c done at lab this am, awaiting arons  - current medications : dulaglutide (Trulicity) 1.5 mg/0.5 mL pen injector injection, Lantus Solostar U-100 Insulin 100 unit/mL (3 mL) pen 50 units at HS, insulin aspart (NovoLOG Flexpen U-100 Insulin) 100 unit/mL (3 mL) pen 5units before each meal if bs over 150. She has not needed to use any  due to glucose being more controlled  - previously tried Metformin did not tolerate due to diarrhea, ozempic was not covered by insurance  - statin: Lipitor 20 mg added      Diabetes  She presents for her follow-up diabetic visit. She has type 2 diabetes mellitus. Her disease course has been stable. Hypoglycemia symptoms include dizziness. There are no hypoglycemic complications. Symptoms are stable. Risk factors for coronary artery disease include diabetes mellitus, dyslipidemia and obesity. Current diabetic treatment includes insulin injections and diet. She is compliant with treatment most of the time. Her weight is stable. She has not had a previous visit with a dietitian. She participates in exercise intermittently. Her home blood glucose trend is decreasing steadily. Her  "breakfast blood glucose is taken between 7-8 am. Her breakfast blood glucose range is generally  mg/dl. Her lunch blood glucose is taken between 12-1 pm. Her lunch blood glucose range is generally  mg/dl. Her dinner blood glucose is taken between 6-7 pm. Her dinner blood glucose range is generally  mg/dl. Her bedtime blood glucose is taken between 10-11 pm. Her bedtime blood glucose range is generally 70-90 mg/dl. Her overall blood glucose range is 110-130 mg/dl. An ACE inhibitor/angiotensin II receptor blocker is not being taken.       Review of Systems   Constitutional: Negative.    HENT: Negative.     Respiratory: Negative.     Cardiovascular: Negative.    Gastrointestinal: Negative.    Genitourinary: Negative.    Musculoskeletal: Negative.    Skin: Negative.    Allergic/Immunologic: Negative.    Neurological:  Positive for dizziness.   Hematological: Negative.    Psychiatric/Behavioral: Negative.         Objective   /79 (BP Location: Left arm)   Pulse 95   Ht 1.778 m (5' 10\")   Wt 139 kg (306 lb)   SpO2 98%   BMI 43.91 kg/m²   BSA: 2.62 meters squared  Growth percentiles: 99 %ile (Z= 2.25) based on [facility] [place][note 2] (Girls, 2-20 Years) Stature-for-age data based on Stature recorded on 11/11/2024. >99 %ile (Z= 2.81) based on [facility] [place][note 2] (Girls, 2-20 Years) weight-for-age data using data from 11/11/2024.     Physical Exam  Vitals and nursing note reviewed.   Constitutional:       Appearance: Normal appearance. She is normal weight.   HENT:      Head: Normocephalic.      Nose: Nose normal.      Mouth/Throat:      Mouth: Mucous membranes are moist.      Pharynx: Oropharynx is clear.   Eyes:      Extraocular Movements: Extraocular movements intact.      Conjunctiva/sclera: Conjunctivae normal.      Pupils: Pupils are equal, round, and reactive to light.   Cardiovascular:      Rate and Rhythm: Normal rate and regular rhythm.      Pulses: Normal pulses.      Heart sounds: Normal heart sounds.   Pulmonary:      " renal ultrasound with normal-sized kidneys no hydronephrosis bilateral renal cysts chart personally reviewed by me.   Discussed with patient and family at bedside    Risk/ complexity of medical care/ medical decision making moderate complexity of MARY and electrolyte abnormalities    Labs:   Recent Results (from the past 24 hours)   POC Glucose Once    Collection Time: 04/27/25 11:29 AM    Specimen: Blood   Result Value Ref Range    Glucose 138 (H) 70 - 130 mg/dL   POC Glucose Once    Collection Time: 04/27/25  4:38 PM    Specimen: Blood   Result Value Ref Range    Glucose 119 70 - 130 mg/dL   POC Glucose Once    Collection Time: 04/27/25  9:11 PM    Specimen: Blood   Result Value Ref Range    Glucose 120 70 - 130 mg/dL   Renal Function Panel    Collection Time: 04/28/25  5:03 AM    Specimen: Blood   Result Value Ref Range    Glucose 115 (H) 65 - 99 mg/dL    BUN 16 8 - 23 mg/dL    Creatinine 1.21 0.76 - 1.27 mg/dL    Sodium 136 136 - 145 mmol/L    Potassium 4.5 3.5 - 5.2 mmol/L    Chloride 104 98 - 107 mmol/L    CO2 22.0 22.0 - 29.0 mmol/L    Calcium 9.8 8.6 - 10.5 mg/dL    Albumin 3.9 3.5 - 5.2 g/dL    Phosphorus 3.3 2.5 - 4.5 mg/dL    Anion Gap 10.0 5.0 - 15.0 mmol/L    BUN/Creatinine Ratio 13.2 7.0 - 25.0    eGFR 64.0 >60.0 mL/min/1.73   Magnesium    Collection Time: 04/28/25  5:03 AM    Specimen: Blood   Result Value Ref Range    Magnesium 1.4 (L) 1.6 - 2.4 mg/dL   POC Glucose Once    Collection Time: 04/28/25  6:14 AM    Specimen: Blood   Result Value Ref Range    Glucose 130 70 - 130 mg/dL       Radiology:  Pertinent radiology studies were reviewed as described above      Medications have been reviewed separately in chart review medication tab      ASSESSMENT:  MARY, prerenal, improving  Hyperkalemia, resolved  Hyponatremia, hypovolemic, improved  Metabolic acidosis, stabilizing  Urinary retention - rosado in place.  Urology following  Dizziness, with near syncope    Orthostatic hypotension    History of prostate  "Effort: Pulmonary effort is normal.      Breath sounds: Normal breath sounds.   Abdominal:      General: Abdomen is flat. Bowel sounds are normal.      Palpations: Abdomen is soft.   Musculoskeletal:         General: Normal range of motion.      Cervical back: Normal range of motion and neck supple.   Skin:     General: Skin is warm and dry.      Capillary Refill: Capillary refill takes less than 2 seconds.   Neurological:      General: No focal deficit present.      Mental Status: She is alert. Mental status is at baseline.   Psychiatric:         Mood and Affect: Mood normal.         Behavior: Behavior normal.         Thought Content: Thought content normal.         Judgment: Judgment normal.       /79 (BP Location: Left arm)   Pulse 95   Ht 1.778 m (5' 10\")   Wt 139 kg (306 lb)   SpO2 98%   BMI 43.91 kg/m²    Lab Results   Component Value Date    GLUCOSE 200 (H) 04/29/2023    CALCIUM 9.4 04/29/2023     04/29/2023    K 4.6 04/29/2023    CO2 26 04/29/2023     04/29/2023    BUN 9 04/29/2023    CREATININE 0.67 04/29/2023        Assessment/Plan   Problem List Items Addressed This Visit       DMII (diabetes mellitus, type 2) (Multi)    Relevant Medications    blood-glucose sensor (FreeStyle Batsheva 3 Sensor) device    atorvastatin (Lipitor) 20 mg tablet    Other Relevant Orders    Miscellaneous DME     Other Visit Diagnoses       Moderate persistent reactive airway disease without complication ([facility])        Relevant Medications    albuterol 90 mcg/actuation inhaler        It was great to see you today!  Please continue taking your prescribed medications.   Refill have been sent to your pharmacy.    Please start Lipitor  RTC in 2 weeks    " cancer    Type 2 diabetes mellitus without complication, without long-term current use of insulin    Chronic heart failure with preserved ejection fraction    Rectal adenocarcinoma with high ostomy output    Hyperkalemia, improved  New hypomagnesemia           PLAN:   MARY likely due to retention, hypotension and high ostomy outpt   Creatinine stable today but has been slowly improving, nearing baseline creatinine of 0.8-0.9  Potassium level has normalized  Renal US with no hydro   Berrios in place, voiding trial per urology  Remain off ace/arb and metformin at this time and monitor BP  BP fairly stable on current regimen  Urine studies with minimal proteinuria, prerenal electrolytes     Monitor electrolytes and volume closely   Dose all medications for GFR around 60  Stable for discharge anytime from renal standpoint      Ok Abbasi MD  [facility]   Office phone number: [phone]  Answering service phone number: [phone]    04/28/25  11:07 EDT    Dictation performed using Dragon dictation software

## 2025-04-28 NOTE — OUTREACH NOTE
Prep Survey      Flowsheet Row Responses   [facility] facility patient discharged from? [facility]   Is LACE score < 7 ? No   Eligibility [facility]   Date of Admission 04/25/25   Date of Discharge 04/28/25   Discharge Disposition Home or Self Care   Discharge diagnosis Orthostatic hypotension   Does the patient have one of the following disease processes/diagnoses(primary or secondary)? Other   Does the patient have Home health ordered? No   Is there a DME ordered? No   Prep survey completed? Yes            DIMITRI A - Registered Nurse

## 2025-04-28 NOTE — CASE MANAGEMENT/SOCIAL WORK
Case Management Discharge Note      Final Note: Home via family, no additional CCP needs.         Selected Continued Care - Admitted Since 4/25/2025       Destination    No services have been selected for the patient.                Durable Medical Equipment    No services have been selected for the patient.                Dialysis/Infusion    No services have been selected for the patient.                Home Medical Care    No services have been selected for the patient.                Therapy    No services have been selected for the patient.                Community Resources    No services have been selected for the patient.                Community & DME    No services have been selected for the patient.                    Selected Continued Care - Prior Encounters Includes continued care and service providers with selected services from prior encounters from 1/25/2025 to 4/28/2025      Discharged on 3/24/2025 Admission date: 3/19/2025 - Discharge disposition: Home-Health Care [facility]      Home Medical Care       Service Provider Services Address Phone Fax Patient Preferred    [facility], [facility] [address], [address] [address] [phone] --                          Transportation Services  Private: Car    Final Discharge Disposition Code: 01 - home or self-care

## 2025-04-28 NOTE — PROGRESS NOTES
Gastroenterology   Inpatient Progress Note    Reason for Follow Up: Increased ileostomy output    Subjective     Interval History:   Reports improvement of ostomy output, states he emptied his ostomy bag 3-4 times yesterday and stool is more thick.  Tolerating diet.  Feels that current ostomy output is manageable until his ostomy reversal next week.      Current Facility-Administered Medications:     acetaminophen (TYLENOL) tablet 650 mg, 650 mg, Oral, Q4H PRN, 650 mg at 04/27/25 0934 **OR** acetaminophen (TYLENOL) 160 MG/5ML oral solution 650 mg, 650 mg, Oral, Q4H PRN **OR** acetaminophen (TYLENOL) suppository 650 mg, 650 mg, Rectal, Q4H PRN, Na Gallego MD    aspirin chewable tablet 81 mg, 81 mg, Oral, Daily, Na Gallego MD, 81 mg at 04/27/25 0801    atorvastatin (LIPITOR) tablet 80 mg, 80 mg, Oral, Daily, Na Gallego MD, 80 mg at 04/27/25 0801    sennosides-docusate (PERICOLACE) 8.6-50 MG per tablet 2 tablet, 2 tablet, Oral, BID PRN **AND** polyethylene glycol (MIRALAX) packet 17 g, 17 g, Oral, Daily PRN **AND** bisacodyl (DULCOLAX) EC tablet 5 mg, 5 mg, Oral, Daily PRN **AND** bisacodyl (DULCOLAX) suppository 10 mg, 10 mg, Rectal, Daily PRN, Na Gallego MD    calcium carbonate (TUMS) chewable tablet 500 mg (200 mg elemental), 2 tablet, Oral, TID PRN, Félix De La Rosa MD, 2 tablet at 04/27/25 1023    dextrose (D50W) (25 g/50 mL) IV injection 25 g, 25 g, Intravenous, Q15 Min PRN, Na Gallego MD    dextrose (GLUTOSE) oral gel 15 g, 15 g, Oral, Q15 Min PRN, Na Gallego MD    DULoxetine (CYMBALTA) DR capsule 60 mg, 60 mg, Oral, Nightly, Na Gallego MD, 60 mg at 04/27/25 2034    ferrous sulfate tablet 325 mg, 325 mg, Oral, Daily With Breakfast, Na Gallego MD, 325 mg at 04/27/25 0801    gabapentin (NEURONTIN) capsule 300 mg, 300 mg, Oral, TID, Na Gallego MD, 300 mg at 04/27/25 2035    glucagon (GLUCAGEN)  injection 1 mg, 1 mg, Intramuscular, Q15 Min PRN, Na Gallego MD    heparin injection 500 Units, 5 mL, Intravenous, PRN, Félix De La Rosa MD    hydrALAZINE (APRESOLINE) injection 10 mg, 10 mg, Intravenous, Q6H PRN, Ok Abbasi MD    insulin lispro (HUMALOG/ADMELOG) injection 2-7 Units, 2-7 Units, Subcutaneous, 4x Daily AC & at Bedtime, Na Gallego MD, 2 Units at 04/26/25 1202    loperamide (IMODIUM) capsule 2 mg, 2 mg, Oral, 4x Daily AC & at Bedtime, Fareed Gardner MD, 2 mg at 04/27/25 2034    melatonin tablet 2.5 mg, 2.5 mg, Oral, Nightly PRN, Na Gallego MD    nitroglycerin (NITROSTAT) SL tablet 0.4 mg, 0.4 mg, Sublingual, Q5 Min PRN, Ann Olivares MD    ondansetron ODT (ZOFRAN-ODT) disintegrating tablet 4 mg, 4 mg, Oral, Q6H PRN **OR** ondansetron (ZOFRAN) injection 4 mg, 4 mg, Intravenous, Q6H PRN, Na Gallego MD, 4 mg at 04/25/25 2313    pantoprazole (PROTONIX) EC tablet 40 mg, 40 mg, Oral, Q AM, Félix De La Rosa MD, 40 mg at 04/28/25 0503    sodium chloride 0.9 % flush 10 mL, 10 mL, Intravenous, Q12H, Félix De La Rosa MD, 10 mL at 04/27/25 2035    sodium chloride 0.9 % flush 10 mL, 10 mL, Intravenous, PRN, Félix De La Rosa MD    sodium chloride 0.9 % flush 20 mL, 20 mL, Intravenous, PRN, Félix De La Rosa MD    sodium chloride 0.9 % infusion 40 mL, 40 mL, Intravenous, PRN, Félix De La Rosa MD    tamsulosin (FLOMAX) 24 hr capsule 0.4 mg, 0.4 mg, Oral, Nightly, Stingl, Na Encinas MD, 0.4 mg at 04/27/25 2035      Review of Systems   Constitutional: Negative.    HENT: Negative.     Eyes: Negative.    Respiratory: Negative.     Cardiovascular: Negative.    Gastrointestinal: Negative.    Endocrine: Negative.    Genitourinary: Negative.    Musculoskeletal: Negative.    Skin: Negative.    Allergic/Immunologic: Negative.    Neurological: Negative.    Hematological: Negative.     Psychiatric/Behavioral: Negative.           Objective     Vital Signs  Temp:  [97.9 °F (36.6 °C)-98.4 °F (36.9 °C)] 98.2 °F (36.8 °C)  Heart Rate:  [] 127  Resp:  [18] 18  BP: ()/(60-85) 107/71  Body mass index is 28.05 kg/m².    Intake/Output Summary (Last 24 hours) at 4/28/2025 0782  Last data filed at 4/28/2025 0527  Gross per 24 hour   Intake --   Output 550 ml   Net -550 ml     No intake/output data recorded.       Physical Exam  Vitals reviewed.   Constitutional:       Appearance: Normal appearance.   HENT:      Head: Normocephalic.      Nose: Nose normal.   Eyes:      Pupils: Pupils are equal, round, and reactive to light.   Cardiovascular:      Rate and Rhythm: Normal rate.      Pulses: Normal pulses.   Pulmonary:      Effort: Pulmonary effort is normal.      Breath sounds: Normal breath sounds.   Abdominal:      Palpations: Abdomen is soft.      Comments: Ostomy   Musculoskeletal:         General: Normal range of motion.      Cervical back: Normal range of motion.   Skin:     General: Skin is warm and dry.   Neurological:      General: No focal deficit present.      Mental Status: He is alert and oriented to person, place, and time.   Psychiatric:         Mood and Affect: Mood normal.            Results Review      Results from last 7 days   Lab Units 04/27/25  0404 04/26/25  0511 04/25/25  1741   WBC 10*3/mm3 10.69 8.01 7.05   HEMOGLOBIN g/dL 12.5* 13.0 14.2   HEMATOCRIT % 36.7* 39.0 42.3   PLATELETS 10*3/mm3 209 221 286     Results from last 7 days   Lab Units 04/28/25  0503 04/27/25  0404 04/26/25  1100 04/26/25  0511 04/25/25  1741   SODIUM mmol/L 136 134*  --  131* 136   POTASSIUM mmol/L 4.5 5.4* 4.4 7.3* 6.2*   CHLORIDE mmol/L 104 104  --  107 103   CO2 mmol/L 22.0 20.0*  --  18.0* 20.8*   BUN mg/dL 16 20  --  35* 35*   CREATININE mg/dL 1.21 1.22  --  1.95* 2.29*   CALCIUM mg/dL 9.8 9.8  --  9.9 10.7*   BILIRUBIN mg/dL  --   --   --   --  0.6   ALK PHOS U/L  --   --   --   --  139*   ALT  "(SGPT) U/L  --   --   --   --  22   AST (SGOT) U/L  --   --   --   --  19   GLUCOSE mg/dL 115* 138*  --  169* 135*         No results found for: \"LIPASE\"    Radiology:  US Renal Bilateral   Final Result   No hydronephrosis or evidence for acute abnormality.  There   are bilateral renal cysts and there is a 4 mm right interpolar renal   stone. Urinary bladder is mostly decompressed about a Berrios catheter.       This report was finalized on 4/26/2025 4:31 PM by Truong Delatorre M.D   on Workstation: [id number]          XR Spine Thoracic 2 View   Final Result   No evidence for fracture or acute abnormality of the   thoracic spine. Multilevel bridging endplate spur formation within the   thoracic spine with partial ankylosis of the thoracic spine.           This report was finalized on 4/26/2025 2:22 PM by Truong Delatorre M.D   on Workstation: [id number]          XR Chest 1 View   Final Result   Small dense left upper lobe nodular opacity corresponding   with a calcified granuloma on prior FDG PET/CT. No other focal   consolidation. No pleural effusion or pneumothorax. Normal size   cardiomediastinal select. Right IJ port with tip overlying the caudal   SVC. No focal osseous abnormality.       This report was finalized on 4/25/2025 5:53 PM by Dr. Félix Smith M.D on Workstation: [id number]                Assessment & Plan     Active Hospital Problems    Diagnosis     **Orthostatic hypotension     Hyperkalemia     MARY (acute kidney injury)     Urine retention     Near syncope     History of rectal cancer     Type 2 diabetes mellitus without complication, without long-term current use of insulin     Chronic heart failure with preserved ejection fraction     History of prostate cancer        Assessment:  T3 rectal cancer s/p low anterior resection with neoadjuvant chemotherapy  - Plan is for reversal of ostomy May 5, 2025  High output ostomy - improved  Hypotension and dehydration  Acute kidney " injury      Results Reviewed:  Progress Notes by Fareed Gardner MD (04/27/2025 11:38)       Plan:  Continue scheduled Imodium  Nephrology following for electrolyte imbalance and MARY  GI will see as needed, call with questions      I discussed the patients findings and my recommendations with patient.             BOBBY Lambert  [facility] [address]  Office: [phone]

## 2025-04-28 NOTE — TELEPHONE ENCOUNTER
Caller: DEMARCUS DERAS    Relationship: Emergency Contact    Best call back number: [phone]     Who are you requesting to speak with (clinical staff, provider,  specific staff member): YOUSIF BERRIOS     What was the call regarding: PATIENT IS CURRENTLY IN HOSPITAL AND SHE WANTS TO SPEAK TO YOUSIF ABOUT WHAT'S GOING IN AND UPCOMING APPOINTMENTS PLEASE CALL AND ADVISE

## 2025-04-28 NOTE — PROGRESS NOTES
Name: Kevin Garsia ADMIT: 2025   : 1955  PCP: Priti Oliveira APRN    MRN: [id number] LOS: 1 days   AGE/SEX: 69 y.o. male  ROOM: [facility]     Subjective   Subjective   He is feeling a lot better. Irais firming up some.     Much less dizzy when up though was very symptomatic last night       Objective   Objective   Vital Signs  Temp:  [97.9 °F (36.6 °C)-98.4 °F (36.9 °C)] 98.4 °F (36.9 °C)  Heart Rate:  [] 107  Resp:  [18] 18  BP: (151-158)/(78-82) 151/82  SpO2:  [96 %-99 %] 98 %  on   ;   Device (Oxygen Therapy): room air  Body mass index is 28.36 kg/m².  Physical Exam  Vitals reviewed.   Constitutional:       General: He is not in acute distress.  Cardiovascular:      Rate and Rhythm: Normal rate and regular rhythm.   Pulmonary:      Effort: No respiratory distress.      Breath sounds: Normal breath sounds. No wheezing.   Abdominal:      General: There is no distension.      Palpations: Abdomen is soft.      Tenderness: There is no abdominal tenderness.      Comments: Ostomy present   Musculoskeletal:      Right lower leg: No edema.      Left lower leg: No edema.   Skin:     General: Skin is warm and dry.   Neurological:      Mental Status: He is alert and oriented to person, place, and time.   Psychiatric:         Mood and Affect: Mood normal.       Results Review     I reviewed the patient's new clinical results.  Results from last 7 days   Lab Units 25  0404 25  0511 25  1741   WBC 10*3/mm3 10.69 8.01 7.05   HEMOGLOBIN g/dL 12.5* 13.0 14.2   PLATELETS 10*3/mm3 209 221 286     Results from last 7 days   Lab Units 25  0404 25  1100 25  0511 25  1741   SODIUM mmol/L 134*  --  131* 136   POTASSIUM mmol/L 5.4* 4.4 7.3* 6.2*   CHLORIDE mmol/L 104  --  107 103   CO2 mmol/L 20.0*  --  18.0* 20.8*   BUN mg/dL 20  --  35* 35*   CREATININE mg/dL 1.22  --  1.95* 2.29*   GLUCOSE mg/dL 138*  --  169* 135*   EGFR mL/min/1.73 63.4  --  36.1* 29.8*      Results from last 7 days   Lab Units 04/25/25  1741   ALBUMIN g/dL 4.6   BILIRUBIN mg/dL 0.6   ALK PHOS U/L 139*   AST (SGOT) U/L 19   ALT (SGPT) U/L 22     Results from last 7 days   Lab Units 04/27/25  0404 04/26/25  0511 04/25/25  1741   CALCIUM mg/dL 9.8 9.9 10.7*   ALBUMIN g/dL  --   --  4.6   MAGNESIUM mg/dL  --   --  2.1       Glucose   Date/Time Value Ref Range Status   04/27/2025 2111 120 70 - 130 mg/dL Final   04/27/2025 1638 119 70 - 130 mg/dL Final   04/27/2025 1129 138 (H) 70 - 130 mg/dL Final   04/27/2025 0622 131 (H) 70 - 130 mg/dL Final   04/26/2025 2036 118 70 - 130 mg/dL Final   04/26/2025 1754 148 (H) 70 - 130 mg/dL Final   04/26/2025 1142 154 (H) 70 - 130 mg/dL Final       US Renal Bilateral  Result Date: 4/26/2025  No hydronephrosis or evidence for acute abnormality.  There are bilateral renal cysts and there is a 4 mm right interpolar renal stone. Urinary bladder is mostly decompressed about a Berrios catheter.  This report was finalized on 4/26/2025 4:31 PM by Truong Delatorre M.D on Workstation: [id number]      XR Spine Thoracic 2 View  Result Date: 4/26/2025  No evidence for fracture or acute abnormality of the thoracic spine. Multilevel bridging endplate spur formation within the thoracic spine with partial ankylosis of the thoracic spine.   This report was finalized on 4/26/2025 2:22 PM by Truong Delatorre M.D on Workstation: [id number]        I have personally reviewed all medications:  Scheduled Medications  aspirin, 81 mg, Oral, Daily  atorvastatin, 80 mg, Oral, Daily  DULoxetine, 60 mg, Oral, Nightly  ferrous sulfate, 325 mg, Oral, Daily With Breakfast  gabapentin, 300 mg, Oral, TID  insulin lispro, 2-7 Units, Subcutaneous, 4x Daily AC & at Bedtime  loperamide, 2 mg, Oral, 4x Daily AC & at Bedtime  pantoprazole, 40 mg, Oral, Q AM  sodium chloride, 10 mL, Intravenous, Q12H  tamsulosin, 0.4 mg, Oral, Nightly    Infusions   Diet  Diet: Diabetic, Renal; Consistent Carbohydrate; Low  Potassium; Fluid Consistency: Thin (IDDSI 0)    I have personally reviewed:  [x]  Laboratory   []  Microbiology   [x]  Radiology   [x]  EKG/Telemetry  [x]  Cardiology/Vascular   []  Pathology    []  Records       Assessment/Plan     Active Hospital Problems    Diagnosis  POA    **Orthostatic hypotension [I95.1]  Yes    Hyperkalemia [E87.5]  Yes    MARY (acute kidney injury) [N17.9]  Yes    Urine retention [R33.9]  Yes    Near syncope [R55]  Yes    History of rectal cancer [Z85.048]  Yes    Type 2 diabetes mellitus without complication, without long-term current use of insulin [E11.9]  Yes    Chronic heart failure with preserved ejection fraction [I50.32]  Yes    History of prostate cancer [Z85.46]  Not Applicable      Resolved Hospital Problems   No resolved problems to display.       69 y.o. male with history of rectal cancer status post recent LAR with diverting loop ileostomy 3/19 admitted with Orthostatic hypotension, near syncope and MARY with hyperkalemia    Hyperkalemia: Better on recheck yest but back up some this morning. Verified renal diet. Will cont to follow, give Lokelma if needed  - Would permanently discontinue losartan.    MARY likely multifactorial.  Could be some postobstructive uropathy along with some prerenal cause from high output ostomy.  - IVF per renal MD  - cont Imodium for ostomy output  - Continue Berrios catheter and Flomax.  If cont to improve tomorrow will do voiding trial    Orthostatic hypotension/near syncope should resolve with hydration. Never checked today    DM 2, very well ctrl at present. SSI as needed. Metformin on hold    History of diastolic CHF so we will monitor fluid status very closely.    Rectal cancer status post neoadjuvant chemo followed by recent low anterior resection and ostomy placement.  Oncology signed off        SCDs  Disposition: ?Home tomorrow if improving      Félix De La Rosa MD  [facility]  04/27/25  23:30 EDT

## 2025-04-28 NOTE — DISCHARGE SUMMARY
Patient Name: Kevin Garsia  : 1955  MRN: [id number]    Date of Admission: 2025  Date of Discharge:  2025  Primary Care Physician: Priti Oliveira APRN      Chief Complaint:   Hypotension and Dizziness      Discharge Diagnoses     Active Hospital Problems    Diagnosis  POA    **Orthostatic hypotension [I95.1]  Yes    Urine retention [R33.9]  Yes    Near syncope [R55]  Yes    History of rectal cancer [Z85.048]  Yes    Type 2 diabetes mellitus without complication, without long-term current use of insulin [E11.9]  Yes    Chronic heart failure with preserved ejection fraction [I50.32]  Yes    History of prostate cancer [Z85.46]  Not Applicable      Resolved Hospital Problems    Diagnosis Date Resolved POA    Hyperkalemia [E87.5] 2025 Yes    MARY (acute kidney injury) [N17.9] 2025 Yes        Hospital Course     69 y.o. male with history of rectal cancer status post recent LAR with diverting loop ileostomy 3/19 admitted with Orthostatic hypotension, near syncope and MARY with hyperkalemia.  See H&P for details.  He had been having some higher output from his ostomy and has been taking loperamide.  He was on losartan at home which was held of course.  He was given Lokelma in addition to calcium gluconate, dextrose/insulin and bicarbonate.  Potassium did trend downward.  He was hydrated and nephrology was consulted as well to assist with management of the electrolyte issues and fluid status.  He was markedly orthostatic but this has responded to fluids.  He still does have some degree of orthostasis but is no longer symptomatic with it.  Plans are for reversal of his ostomy next week which should help with maintenance of his fluid status.  Should also note that he had some significant urine retention on admission with over 500 mL of urine in his bladder and required Berrios catheter placement.  He was seen by urology who recommended void trial prior to discharge so Berrios was removed  this morning.  If he cannot urinate will replace Berrios and have him follow-up with urology in the outpatient setting for further management.  He was already on Flomax prior to admission and sees Dr. Chaudhari.  He stable for discharge home today with continued outpatient management.      Day of Discharge     Subjective:  No events.  Not as dizzy now.    Physical Exam:  Temp:  [97.3 °F (36.3 °C)-98.4 °F (36.9 °C)] 97.3 °F (36.3 °C)  Heart Rate:  [] 102  Resp:  [18] 18  BP: ()/(60-85) 131/79  Body mass index is 28.05 kg/m².  Physical Exam  Vitals reviewed.   Constitutional:       General: He is not in acute distress.  Cardiovascular:      Rate and Rhythm: Normal rate and regular rhythm.   Pulmonary:      Effort: No respiratory distress.      Breath sounds: Normal breath sounds. No wheezing.   Abdominal:      General: There is no distension.      Palpations: Abdomen is soft.      Tenderness: There is no abdominal tenderness.      Comments: Ostomy present   Musculoskeletal:      Right lower leg: No edema.      Left lower leg: No edema.   Skin:     General: Skin is warm and dry.   Neurological:      Mental Status: He is alert and oriented to person, place, and time.   Psychiatric:         Mood and Affect: Mood normal.         Consultants     Consult Orders (all) (From admission, onward)       Start     Ordered    04/26/25 1121  Inpatient Urology Consult  Once        Specialty:  Urology  Provider:  Chong Cade MD    04/26/25 1120    04/26/25 0930  Inpatient Gastroenterology Consult  Once        Specialty:  Gastroenterology  Provider:  Luh Hair MD    04/26/25 0930    04/26/25 0702  Inpatient Nephrology Consult  STAT        Specialty:  Nephrology  Provider:  Ok Abbasi MD    04/26/25 0701    04/25/25 1945  Inpatient Nephrology Consult  Once,   Status:  Canceled        Specialty:  Nephrology  Provider:  Ok Abbasi MD    04/25/25 1945 04/25/25 1945  Inpatient Hematology &  Oncology Consult  Once        Specialty:  Hematology and Oncology  Provider:  Kevin Nagel MD    04/25/25 1945                  Procedures       Imaging Results (All)       Procedure Component Value Units Date/Time    US Renal Bilateral [[id number]] Collected: 04/26/25 1619     Updated: 04/26/25 1634    Narrative:      RENAL SONOGRAM     HISTORY: Urinary retention.     COMPARISON: CT abdomen and pelvis 10/22/2024.     FINDINGS: The right kidney measures 10 x 5.6 x 5.4 cm. There is a 2.6 m  right lower pole renal cyst. A 4 mm right interpolar stone is present.  There is no hydronephrosis.     The left kidney measures 11.9 x 4.8 x 5.2 cm. There is a left posterior  1.4 cm cyst. No hydronephrosis. Urinary bladder is mostly decompressed  about a Berrios catheter.       Impression:      No hydronephrosis or evidence for acute abnormality.  There  are bilateral renal cysts and there is a 4 mm right interpolar renal  stone. Urinary bladder is mostly decompressed about a Berrios catheter.     This report was finalized on 4/26/2025 4:31 PM by Truong Delatorre M.D  on Workstation: [id number]       XR Spine Thoracic 2 View [[id number]] Collected: 04/26/25 1417     Updated: 04/26/25 1426    Narrative:      XR SPINE THORACIC 2 VW-     HISTORY: Fall. Hit back.     COMPARISON: CT chest 11/08/2024, CT abdomen and pelvis 10/22/2024.     FINDINGS: There is anterior endplate bridging spur formation throughout  the thoracic spine which is ankylosed. The vertebral body heights appear  within normal limits. No fracture plane is demonstrated and there is no  evidence for defect in the ankylosis. There is a right Mediport with tip  in the right atrium. Aortic vascular calcifications are present.       Impression:      No evidence for fracture or acute abnormality of the  thoracic spine. Multilevel bridging endplate spur formation within the  thoracic spine with partial ankylosis of the thoracic spine.        This report was finalized  on 4/26/2025 2:22 PM by Truong Delatorre M.D  on Workstation: [id number]       XR Chest 1 View [[id number]] Collected: 04/25/25 1750     Updated: 04/25/25 1756    Narrative:      XR CHEST 1 VW-     INDICATION: Near syncope     COMPARISON: Chest radiograph 11/22/2024 and FDG PET/CT 11/25/2024       Impression:      Small dense left upper lobe nodular opacity corresponding  with a calcified granuloma on prior FDG PET/CT. No other focal  consolidation. No pleural effusion or pneumothorax. Normal size  cardiomediastinal select. Right IJ port with tip overlying the caudal  SVC. No focal osseous abnormality.     This report was finalized on 4/25/2025 5:53 PM by Dr. Félix Smith M.D on Workstation: [id number]                 Pertinent Labs     Results from last 7 days   Lab Units 04/27/25  0404 04/26/25  0511 04/25/25  1741   WBC 10*3/mm3 10.69 8.01 7.05   HEMOGLOBIN g/dL 12.5* 13.0 14.2   PLATELETS 10*3/mm3 209 221 286     Results from last 7 days   Lab Units 04/28/25  0503 04/27/25  0404 04/26/25  1100 04/26/25  0511 04/25/25  1741   SODIUM mmol/L 136 134*  --  131* 136   POTASSIUM mmol/L 4.5 5.4* 4.4 7.3* 6.2*   CHLORIDE mmol/L 104 104  --  107 103   CO2 mmol/L 22.0 20.0*  --  18.0* 20.8*   BUN mg/dL 16 20  --  35* 35*   CREATININE mg/dL 1.21 1.22  --  1.95* 2.29*   GLUCOSE mg/dL 115* 138*  --  169* 135*   EGFR mL/min/1.73 64.0 63.4  --  36.1* 29.8*     Results from last 7 days   Lab Units 04/28/25  0503 04/25/25  1741   ALBUMIN g/dL 3.9 4.6   BILIRUBIN mg/dL  --  0.6   ALK PHOS U/L  --  139*   AST (SGOT) U/L  --  19   ALT (SGPT) U/L  --  22     Results from last 7 days   Lab Units 04/28/25  0503 04/27/25  0404 04/26/25  0511 04/25/25  1741   CALCIUM mg/dL 9.8 9.8 9.9 10.7*   ALBUMIN g/dL 3.9  --   --  4.6   MAGNESIUM mg/dL 1.4*  --   --  2.1   PHOSPHORUS mg/dL 3.3  --   --   --        Results from last 7 days   Lab Units 04/25/25  1847 04/25/25  1741   HSTROP T ng/L 17 19     Results from last 7 days   Lab Units  "04/27/25  0527   SODIUM UR mmol/L 23   CREATININE UR mg/dL 174.0   PROTEIN TOTAL URINE mg/dL 35.2         Invalid input(s): \"LDLCALC\"          Test Results Pending at Discharge     Pending Results       None              Discharge Details        Discharge Medications        Continue These Medications        Instructions Start Date   acetaminophen 500 MG tablet  Commonly known as: TYLENOL   1,000 mg, Oral, Every 6 Hours PRN      aspirin 81 MG chewable tablet   81 mg, Daily      atorvastatin 80 MG tablet  Commonly known as: LIPITOR   80 mg, Oral, Daily      DULoxetine 60 MG capsule  Commonly known as: CYMBALTA   60 mg, Oral, Daily      ferrous sulfate 325 (65 Fe) MG tablet   325 mg, Daily With Breakfast      gabapentin 300 MG capsule  Commonly known as: NEURONTIN   600 mg, Oral, 3 Times Daily      ondansetron 8 MG tablet  Commonly known as: ZOFRAN   8 mg, Oral, 3 Times Daily PRN      tamsulosin 0.4 MG capsule 24 hr capsule  Commonly known as: FLOMAX   1 capsule, Every Night at Bedtime      VITAMIN B 12 PO   1 tablet, Daily      vitamin D 1.25 MG (93670 UT) capsule capsule  Commonly known as: ERGOCALCIFEROL   50,000 Units, Oral, Weekly             Stop These Medications      loperamide 2 MG capsule  Commonly known as: IMODIUM     losartan 25 MG tablet  Commonly known as: COZAAR     metFORMIN 1000 MG tablet  Commonly known as: GLUCOPHAGE     metoprolol tartrate 25 MG tablet  Commonly known as: LOPRESSOR              No Known Allergies    Discharge Disposition:  Home or Self Care      Discharge Diet:  Diet Order   Procedures    Diet: Diabetic, Renal; Consistent Carbohydrate; Low Potassium; Fluid Consistency: Thin (IDDSI 0)       Discharge Activity:       CODE STATUS:    Code Status and Medical Interventions: CPR (Attempt to Resuscitate); Full   Ordered at: 04/25/25 1923     Code Status (Patient has no pulse and is not breathing):    CPR (Attempt to Resuscitate)     Medical Interventions (Patient has pulse or is " breathing):    Full       Future Appointments   Date Time Provider Department Center   4/29/2025  2:15 PM Priti Oliveira APRN [facility]   5/12/2025  8:00 AM [facility] CARD HOLTER ROOM  [facility] CARDI [facility]   5/12/2025 10:45 AM Osvaldo Lopes MD [facility] CD [id number] [facility]   5/27/2025 10:45 AM [facility]   5/27/2025 11:20 AM Kevin Nagel MD [facility]   5/27/2025 11:45 [facility]   9/22/2025  9:00 AM Nayana Baltazar MD NEK [facility] SLPM None   3/18/2026  1:30 PM Jd Hay MD [facility] N ESPT [facility]      Follow-up Information       Priti Oliveira APRN .    Specialty: Nurse Practitioner  Contact information:  [address] [place], [place] [id number] [phone]                             Time Spent on Discharge:  Greater than 30 minutes      Félix De La Rosa MD  [facility]  04/28/25  11:07 EDT

## 2025-04-28 NOTE — NURSING NOTE
"   04/28/25 0959   Colostomy RLQ   Placement date: If unknown, DO NOT use \"Add Comment\" note/Placement time: If unknown, DO NOT use \"Add Comment\" note: 04/25/25 2000   Location: RLQ   Stomal Appliance 2 piece;Clean;Dry;Intact;Drainable   Stoma Appearance round;rosebud appearance;moist;red;protruding above skin level   Peristomal Assessment MAC   Stoma Function flatus;stool   Stool Color brown, dark   Stool Consistency soft   Treatment Placement checked     OMS: Follow up Ostomy care, existing Ostomy appliance remains intact, good seal noted, no leaks, changed yesterday according to the patient. Ostomy. Jeannie 2-piece 2 1/4 appliance in placed.  Supplies left in the room.  Patient is independent. OMN follow up 1 time a week    "

## 2025-04-29 ENCOUNTER — OFFICE VISIT (OUTPATIENT)
Dept: FAMILY MEDICINE CLINIC | Facility: CLINIC | Age: 70
End: 2025-04-29
Payer: MEDICARE

## 2025-04-29 ENCOUNTER — TRANSITIONAL CARE MANAGEMENT TELEPHONE ENCOUNTER (OUTPATIENT)
Dept: CALL CENTER | Facility: HOSPITAL | Age: 70
End: 2025-04-29
Payer: MEDICARE

## 2025-04-29 VITALS
HEIGHT: 65 IN | SYSTOLIC BLOOD PRESSURE: 134 MMHG | HEART RATE: 98 BPM | OXYGEN SATURATION: 98 % | BODY MASS INDEX: 28.39 KG/M2 | DIASTOLIC BLOOD PRESSURE: 88 MMHG | TEMPERATURE: 98.4 F | WEIGHT: 170.4 LBS

## 2025-04-29 DIAGNOSIS — I95.89 OTHER SPECIFIED HYPOTENSION: ICD-10-CM

## 2025-04-29 DIAGNOSIS — Z09 HOSPITAL DISCHARGE FOLLOW-UP: Primary | ICD-10-CM

## 2025-04-29 NOTE — OUTREACH NOTE
Call Center TCM Note      Flowsheet Row Responses   [facility] patient discharged from? [facility]   Does the patient have one of the following disease processes/diagnoses(primary or secondary)? Other   TCM attempt successful? Yes   Comments Patient is currently attending appt with PCP within TCM time frame, fulfilling TCM requirements. TCM complete.   Does the patient have an appointment with their PCP within 7-14 days of discharge? Yes   TCM call completed? Yes   Wrap up additional comments Patient is currently attending appt with PCP within TCM time frame, fulfilling TCM requirements. TCM complete.            Marti FLORES - Registered Nurse    4/29/2025, 14:22 EDT

## 2025-04-29 NOTE — TELEPHONE ENCOUNTER
Please reach out to patient to make a hospital discharge follow up appt at discharge and I will be more than happy to discuss care plan.      Thank you.     Jd informed, coming in this afternoon

## 2025-04-29 NOTE — PROGRESS NOTES
Transitional Care Follow Up Visit  Subjective     Kevin Garsia is a 69 y.o. male who presents for a transitional care management visit.    Within 48 business hours after discharge our office contacted him via telephone to coordinate his care and needs.      I reviewed and discussed the details of that call along with the discharge summary, hospital problems, inpatient lab results, inpatient diagnostic studies, and consultation reports with Kevin.     Current outpatient and discharge medications have been reconciled for the patient.  Reviewed by: BOBBY Portillo          5/8/2025     6:58 PM   Date of TCM Phone Call   [facility]   Date of Admission 5/5/2025   Date of Discharge 5/8/2025   Discharge Disposition Home or Self Care     Risk for Readmission (LACE) Score: 13 (5/8/2025  6:00 AM)      History of Present Illness  Patient presents to the office today for a hospital follow up on orthostatic hypotension, near syncope and MARY with hyperkalemia.   Symptoms have resolved. Patient is without chest pain, shortness of air. He denies dizziness, fever or chills. Blood pressure controlled at 134/88.             Course During Hospital Stay:  4/25/2025-4/28/2025     The following portions of the patient's history were reviewed and updated as appropriate: allergies, current medications, past family history, past medical history, past social history, past surgical history, and problem list.    Review of Systems   Constitutional:  Negative for activity change.   HENT:  Negative for congestion.    Eyes:  Negative for discharge.   Respiratory:  Negative for cough, choking and shortness of breath.    Cardiovascular:  Negative for chest pain, palpitations and leg swelling.   Gastrointestinal:  Negative for abdominal distention.   Endocrine: Negative for cold intolerance.   Musculoskeletal:  Negative for arthralgias.   Skin:  Negative for rash.   Allergic/Immunologic: Negative for environmental  "allergies.   Neurological:  Negative for dizziness.   Psychiatric/Behavioral:  The patient is not nervous/anxious.        Objective   /88 (BP Location: Left arm, Patient Position: Sitting, Cuff Size: Adult)   Pulse 98   Temp 98.4 °F (36.9 °C)   Ht 165.1 cm (65\")   Wt 77.3 kg (170 lb 6.4 oz)   SpO2 98%   BMI 28.36 kg/m²   Physical Exam  Constitutional:       General: He is not in acute distress.     Appearance: Normal appearance.   HENT:      Head: Normocephalic.   Eyes:      Pupils: Pupils are equal, round, and reactive to light.   Cardiovascular:      Rate and Rhythm: Normal rate.      Pulses: Normal pulses.      Heart sounds: Normal heart sounds.   Pulmonary:      Effort: Pulmonary effort is normal.      Breath sounds: Normal breath sounds.   Abdominal:      General: Bowel sounds are normal.      Palpations: Abdomen is soft.   Musculoskeletal:         General: Normal range of motion.      Cervical back: Normal range of motion and neck supple.   Skin:     General: Skin is warm.   Neurological:      General: No focal deficit present.      Mental Status: He is alert and oriented to person, place, and time.   Psychiatric:         Mood and Affect: Mood normal.         Behavior: Behavior normal.         Thought Content: Thought content normal.         Judgment: Judgment normal.         Assessment & Plan   Problems Addressed this Visit          Cardiac and Vasculature    Arterial hypotension       Health Encounters    Hospital discharge follow-up - Primary     Diagnoses         Codes Comments      Hospital discharge follow-up    -  Primary ICD-10-CM: Z09  ICD-9-CM: V67.59       Other specified hypotension     ICD-10-CM: I95.89  ICD-9-CM: 458.8                All symptoms have resolved at today's visit         "

## 2025-04-30 ENCOUNTER — PATIENT OUTREACH (OUTPATIENT)
Dept: OTHER | Facility: HOSPITAL | Age: 70
End: 2025-04-30
Payer: MEDICARE

## 2025-05-05 ENCOUNTER — ANESTHESIA (OUTPATIENT)
Dept: PERIOP | Facility: HOSPITAL | Age: 70
End: 2025-05-05
Payer: MEDICARE

## 2025-05-05 ENCOUNTER — READMISSION MANAGEMENT (OUTPATIENT)
Dept: CALL CENTER | Facility: HOSPITAL | Age: 70
End: 2025-05-05
Payer: MEDICARE

## 2025-05-05 ENCOUNTER — ANESTHESIA EVENT (OUTPATIENT)
Dept: PERIOP | Facility: HOSPITAL | Age: 70
End: 2025-05-05
Payer: MEDICARE

## 2025-05-05 ENCOUNTER — HOSPITAL ENCOUNTER (INPATIENT)
Facility: HOSPITAL | Age: 70
LOS: 3 days | Discharge: HOME | End: 2025-05-08
Attending: SURGERY | Admitting: SURGERY
Payer: MEDICARE

## 2025-05-05 DIAGNOSIS — C20 RECTAL ADENOCARCINOMA: ICD-10-CM

## 2025-05-05 LAB
GLUCOSE BLDC GLUCOMTR-MCNC: 119 MG/DL (ref 70–130)
GLUCOSE BLDC GLUCOMTR-MCNC: 84 MG/DL (ref 70–130)

## 2025-05-05 PROCEDURE — 25010000002 FENTANYL CITRATE (PF) 50 MCG/ML SOLUTION: Performed by: NURSE ANESTHETIST, CERTIFIED REGISTERED

## 2025-05-05 PROCEDURE — 25010000002 ONDANSETRON PER 1 MG: Performed by: NURSE ANESTHETIST, CERTIFIED REGISTERED

## 2025-05-05 PROCEDURE — 25010000002 DEXAMETHASONE SODIUM PHOSPHATE 20 MG/5ML SOLUTION: Performed by: NURSE ANESTHETIST, CERTIFIED REGISTERED

## 2025-05-05 PROCEDURE — 25010000002 MIDAZOLAM PER 1 MG: Performed by: ANESTHESIOLOGY

## 2025-05-05 PROCEDURE — 0DBB0ZZ EXCISION OF ILEUM, OPEN APPROACH: ICD-10-PCS | Performed by: SURGERY

## 2025-05-05 PROCEDURE — 25010000002 LIDOCAINE 2% SOLUTION: Performed by: NURSE ANESTHETIST, CERTIFIED REGISTERED

## 2025-05-05 PROCEDURE — 44625 REPAIR BOWEL OPENING: CPT | Performed by: SURGERY

## 2025-05-05 PROCEDURE — 25010000002 BUPIVACAINE LIPOSOME 1.3 % SUSPENSION 20 ML VIAL: Performed by: SURGERY

## 2025-05-05 PROCEDURE — 25010000002 SUGAMMADEX 200 MG/2ML SOLUTION: Performed by: NURSE ANESTHETIST, CERTIFIED REGISTERED

## 2025-05-05 PROCEDURE — 25010000002 HYDROMORPHONE PER 4 MG: Performed by: NURSE ANESTHETIST, CERTIFIED REGISTERED

## 2025-05-05 PROCEDURE — 25810000003 LACTATED RINGERS PER 1000 ML: Performed by: ANESTHESIOLOGY

## 2025-05-05 PROCEDURE — 44625 REPAIR BOWEL OPENING: CPT | Performed by: PHYSICIAN ASSISTANT

## 2025-05-05 PROCEDURE — 88304 TISSUE EXAM BY PATHOLOGIST: CPT | Performed by: SURGERY

## 2025-05-05 PROCEDURE — 25010000002 CEFOXITIN PER 1 G: Performed by: SURGERY

## 2025-05-05 PROCEDURE — 82948 REAGENT STRIP/BLOOD GLUCOSE: CPT

## 2025-05-05 PROCEDURE — S0260 H&P FOR SURGERY: HCPCS | Performed by: SURGERY

## 2025-05-05 PROCEDURE — 25010000002 PROPOFOL 10 MG/ML EMULSION: Performed by: NURSE ANESTHETIST, CERTIFIED REGISTERED

## 2025-05-05 PROCEDURE — 25010000002 FAMOTIDINE 10 MG/ML SOLUTION: Performed by: ANESTHESIOLOGY

## 2025-05-05 DEVICE — ENDOPATH ECHELON ENDOSCOPIC LINEAR CUTTER RELOADS, WHITE, 60MM
Type: IMPLANTABLE DEVICE | Site: ABDOMEN | Status: FUNCTIONAL
Brand: ECHELON ENDOPATH

## 2025-05-05 RX ORDER — SODIUM CHLORIDE 0.9 % (FLUSH) 0.9 %
3 SYRINGE (ML) INJECTION EVERY 12 HOURS SCHEDULED
Status: DISCONTINUED | OUTPATIENT
Start: 2025-05-05 | End: 2025-05-05 | Stop reason: HOSPADM

## 2025-05-05 RX ORDER — TAMSULOSIN HYDROCHLORIDE 0.4 MG/1
0.4 CAPSULE ORAL DAILY
Status: DISCONTINUED | OUTPATIENT
Start: 2025-05-06 | End: 2025-05-08 | Stop reason: HOSPADM

## 2025-05-05 RX ORDER — ATROPINE SULFATE 0.4 MG/ML
0.4 INJECTION, SOLUTION INTRAMUSCULAR; INTRAVENOUS; SUBCUTANEOUS ONCE AS NEEDED
Status: DISCONTINUED | OUTPATIENT
Start: 2025-05-05 | End: 2025-05-05 | Stop reason: HOSPADM

## 2025-05-05 RX ORDER — DEXAMETHASONE SODIUM PHOSPHATE 4 MG/ML
INJECTION, SOLUTION INTRA-ARTICULAR; INTRALESIONAL; INTRAMUSCULAR; INTRAVENOUS; SOFT TISSUE AS NEEDED
Status: DISCONTINUED | OUTPATIENT
Start: 2025-05-05 | End: 2025-05-05 | Stop reason: SURG

## 2025-05-05 RX ORDER — FENTANYL CITRATE 50 UG/ML
INJECTION, SOLUTION INTRAMUSCULAR; INTRAVENOUS AS NEEDED
Status: DISCONTINUED | OUTPATIENT
Start: 2025-05-05 | End: 2025-05-05 | Stop reason: SURG

## 2025-05-05 RX ORDER — ATORVASTATIN CALCIUM 80 MG/1
80 TABLET, FILM COATED ORAL DAILY
Status: DISCONTINUED | OUTPATIENT
Start: 2025-05-05 | End: 2025-05-08 | Stop reason: HOSPADM

## 2025-05-05 RX ORDER — FENTANYL CITRATE 50 UG/ML
50 INJECTION, SOLUTION INTRAMUSCULAR; INTRAVENOUS
Status: DISCONTINUED | OUTPATIENT
Start: 2025-05-05 | End: 2025-05-05 | Stop reason: HOSPADM

## 2025-05-05 RX ORDER — PROMETHAZINE HYDROCHLORIDE 25 MG/1
25 TABLET ORAL ONCE AS NEEDED
Status: DISCONTINUED | OUTPATIENT
Start: 2025-05-05 | End: 2025-05-05 | Stop reason: HOSPADM

## 2025-05-05 RX ORDER — LABETALOL HYDROCHLORIDE 5 MG/ML
5 INJECTION, SOLUTION INTRAVENOUS
Status: DISCONTINUED | OUTPATIENT
Start: 2025-05-05 | End: 2025-05-05 | Stop reason: HOSPADM

## 2025-05-05 RX ORDER — OXYCODONE HYDROCHLORIDE 5 MG/1
5 TABLET ORAL EVERY 4 HOURS PRN
Status: DISCONTINUED | OUTPATIENT
Start: 2025-05-05 | End: 2025-05-08 | Stop reason: HOSPADM

## 2025-05-05 RX ORDER — SODIUM CHLORIDE, SODIUM LACTATE, POTASSIUM CHLORIDE, CALCIUM CHLORIDE 600; 310; 30; 20 MG/100ML; MG/100ML; MG/100ML; MG/100ML
9 INJECTION, SOLUTION INTRAVENOUS CONTINUOUS
Status: DISCONTINUED | OUTPATIENT
Start: 2025-05-05 | End: 2025-05-05

## 2025-05-05 RX ORDER — DROPERIDOL 2.5 MG/ML
0.62 INJECTION, SOLUTION INTRAMUSCULAR; INTRAVENOUS
Status: DISCONTINUED | OUTPATIENT
Start: 2025-05-05 | End: 2025-05-05 | Stop reason: HOSPADM

## 2025-05-05 RX ORDER — ONDANSETRON 4 MG/1
4 TABLET, ORALLY DISINTEGRATING ORAL EVERY 6 HOURS PRN
Status: DISCONTINUED | OUTPATIENT
Start: 2025-05-05 | End: 2025-05-08 | Stop reason: HOSPADM

## 2025-05-05 RX ORDER — NALOXONE HCL 0.4 MG/ML
0.2 VIAL (ML) INJECTION AS NEEDED
Status: DISCONTINUED | OUTPATIENT
Start: 2025-05-05 | End: 2025-05-05 | Stop reason: HOSPADM

## 2025-05-05 RX ORDER — FENTANYL CITRATE 50 UG/ML
50 INJECTION, SOLUTION INTRAMUSCULAR; INTRAVENOUS ONCE AS NEEDED
Status: DISCONTINUED | OUTPATIENT
Start: 2025-05-05 | End: 2025-05-05 | Stop reason: HOSPADM

## 2025-05-05 RX ORDER — HYDROMORPHONE HYDROCHLORIDE 1 MG/ML
0.5 INJECTION, SOLUTION INTRAMUSCULAR; INTRAVENOUS; SUBCUTANEOUS
Status: DISCONTINUED | OUTPATIENT
Start: 2025-05-05 | End: 2025-05-05 | Stop reason: HOSPADM

## 2025-05-05 RX ORDER — ACETAMINOPHEN 500 MG
1000 TABLET ORAL EVERY 6 HOURS
Status: DISCONTINUED | OUTPATIENT
Start: 2025-05-05 | End: 2025-05-08 | Stop reason: HOSPADM

## 2025-05-05 RX ORDER — FAMOTIDINE 10 MG/ML
20 INJECTION, SOLUTION INTRAVENOUS ONCE
Status: COMPLETED | OUTPATIENT
Start: 2025-05-05 | End: 2025-05-05

## 2025-05-05 RX ORDER — ENOXAPARIN SODIUM 100 MG/ML
40 INJECTION SUBCUTANEOUS DAILY
Status: DISCONTINUED | OUTPATIENT
Start: 2025-05-06 | End: 2025-05-08 | Stop reason: HOSPADM

## 2025-05-05 RX ORDER — DULOXETIN HYDROCHLORIDE 60 MG/1
60 CAPSULE, DELAYED RELEASE ORAL DAILY
Status: DISCONTINUED | OUTPATIENT
Start: 2025-05-05 | End: 2025-05-08 | Stop reason: HOSPADM

## 2025-05-05 RX ORDER — HYDRALAZINE HYDROCHLORIDE 20 MG/ML
5 INJECTION INTRAMUSCULAR; INTRAVENOUS
Status: DISCONTINUED | OUTPATIENT
Start: 2025-05-05 | End: 2025-05-05 | Stop reason: HOSPADM

## 2025-05-05 RX ORDER — DIPHENHYDRAMINE HYDROCHLORIDE 50 MG/ML
12.5 INJECTION, SOLUTION INTRAMUSCULAR; INTRAVENOUS
Status: DISCONTINUED | OUTPATIENT
Start: 2025-05-05 | End: 2025-05-05 | Stop reason: HOSPADM

## 2025-05-05 RX ORDER — FLUMAZENIL 0.1 MG/ML
0.2 INJECTION INTRAVENOUS AS NEEDED
Status: DISCONTINUED | OUTPATIENT
Start: 2025-05-05 | End: 2025-05-05 | Stop reason: HOSPADM

## 2025-05-05 RX ORDER — MIDAZOLAM HYDROCHLORIDE 1 MG/ML
0.5 INJECTION, SOLUTION INTRAMUSCULAR; INTRAVENOUS
Status: DISCONTINUED | OUTPATIENT
Start: 2025-05-05 | End: 2025-05-05 | Stop reason: HOSPADM

## 2025-05-05 RX ORDER — HYDROCODONE BITARTRATE AND ACETAMINOPHEN 5; 325 MG/1; MG/1
1 TABLET ORAL ONCE AS NEEDED
Status: DISCONTINUED | OUTPATIENT
Start: 2025-05-05 | End: 2025-05-05 | Stop reason: HOSPADM

## 2025-05-05 RX ORDER — ONDANSETRON 2 MG/ML
4 INJECTION INTRAMUSCULAR; INTRAVENOUS EVERY 6 HOURS PRN
Status: DISCONTINUED | OUTPATIENT
Start: 2025-05-05 | End: 2025-05-08 | Stop reason: HOSPADM

## 2025-05-05 RX ORDER — ONDANSETRON 2 MG/ML
INJECTION INTRAMUSCULAR; INTRAVENOUS AS NEEDED
Status: DISCONTINUED | OUTPATIENT
Start: 2025-05-05 | End: 2025-05-05 | Stop reason: SURG

## 2025-05-05 RX ORDER — LIDOCAINE HYDROCHLORIDE 10 MG/ML
0.5 INJECTION, SOLUTION INFILTRATION; PERINEURAL ONCE AS NEEDED
Status: DISCONTINUED | OUTPATIENT
Start: 2025-05-05 | End: 2025-05-05 | Stop reason: HOSPADM

## 2025-05-05 RX ORDER — SODIUM CHLORIDE 0.9 % (FLUSH) 0.9 %
3-10 SYRINGE (ML) INJECTION AS NEEDED
Status: DISCONTINUED | OUTPATIENT
Start: 2025-05-05 | End: 2025-05-05 | Stop reason: HOSPADM

## 2025-05-05 RX ORDER — PROMETHAZINE HYDROCHLORIDE 25 MG/1
25 SUPPOSITORY RECTAL ONCE AS NEEDED
Status: DISCONTINUED | OUTPATIENT
Start: 2025-05-05 | End: 2025-05-05 | Stop reason: HOSPADM

## 2025-05-05 RX ORDER — IPRATROPIUM BROMIDE AND ALBUTEROL SULFATE 2.5; .5 MG/3ML; MG/3ML
3 SOLUTION RESPIRATORY (INHALATION) ONCE AS NEEDED
Status: DISCONTINUED | OUTPATIENT
Start: 2025-05-05 | End: 2025-05-05 | Stop reason: HOSPADM

## 2025-05-05 RX ORDER — ROCURONIUM BROMIDE 10 MG/ML
INJECTION, SOLUTION INTRAVENOUS AS NEEDED
Status: DISCONTINUED | OUTPATIENT
Start: 2025-05-05 | End: 2025-05-05 | Stop reason: SURG

## 2025-05-05 RX ORDER — EPHEDRINE SULFATE 50 MG/ML
5 INJECTION, SOLUTION INTRAVENOUS ONCE AS NEEDED
Status: DISCONTINUED | OUTPATIENT
Start: 2025-05-05 | End: 2025-05-05 | Stop reason: HOSPADM

## 2025-05-05 RX ORDER — HYDROMORPHONE HYDROCHLORIDE 1 MG/ML
0.5 INJECTION, SOLUTION INTRAMUSCULAR; INTRAVENOUS; SUBCUTANEOUS
Status: DISCONTINUED | OUTPATIENT
Start: 2025-05-05 | End: 2025-05-08 | Stop reason: HOSPADM

## 2025-05-05 RX ORDER — PROPOFOL 10 MG/ML
VIAL (ML) INTRAVENOUS AS NEEDED
Status: DISCONTINUED | OUTPATIENT
Start: 2025-05-05 | End: 2025-05-05 | Stop reason: SURG

## 2025-05-05 RX ORDER — OXYCODONE AND ACETAMINOPHEN 7.5; 325 MG/1; MG/1
1 TABLET ORAL EVERY 4 HOURS PRN
Status: DISCONTINUED | OUTPATIENT
Start: 2025-05-05 | End: 2025-05-05 | Stop reason: HOSPADM

## 2025-05-05 RX ORDER — OXYCODONE HYDROCHLORIDE 5 MG/1
10 TABLET ORAL EVERY 4 HOURS PRN
Status: DISCONTINUED | OUTPATIENT
Start: 2025-05-05 | End: 2025-05-08 | Stop reason: HOSPADM

## 2025-05-05 RX ORDER — SODIUM CHLORIDE, SODIUM LACTATE, POTASSIUM CHLORIDE, CALCIUM CHLORIDE 600; 310; 30; 20 MG/100ML; MG/100ML; MG/100ML; MG/100ML
50 INJECTION, SOLUTION INTRAVENOUS CONTINUOUS
Status: DISCONTINUED | OUTPATIENT
Start: 2025-05-05 | End: 2025-05-07

## 2025-05-05 RX ORDER — METHOCARBAMOL 750 MG/1
750 TABLET, FILM COATED ORAL 4 TIMES DAILY
Status: DISCONTINUED | OUTPATIENT
Start: 2025-05-05 | End: 2025-05-08 | Stop reason: HOSPADM

## 2025-05-05 RX ORDER — ONDANSETRON 2 MG/ML
4 INJECTION INTRAMUSCULAR; INTRAVENOUS ONCE AS NEEDED
Status: DISCONTINUED | OUTPATIENT
Start: 2025-05-05 | End: 2025-05-05 | Stop reason: HOSPADM

## 2025-05-05 RX ORDER — GABAPENTIN 300 MG/1
600 CAPSULE ORAL 3 TIMES DAILY
Status: DISCONTINUED | OUTPATIENT
Start: 2025-05-05 | End: 2025-05-08 | Stop reason: HOSPADM

## 2025-05-05 RX ORDER — MAGNESIUM HYDROXIDE 1200 MG/15ML
LIQUID ORAL AS NEEDED
Status: DISCONTINUED | OUTPATIENT
Start: 2025-05-05 | End: 2025-05-05 | Stop reason: HOSPADM

## 2025-05-05 RX ORDER — LIDOCAINE HYDROCHLORIDE 20 MG/ML
INJECTION, SOLUTION INFILTRATION; PERINEURAL AS NEEDED
Status: DISCONTINUED | OUTPATIENT
Start: 2025-05-05 | End: 2025-05-05 | Stop reason: SURG

## 2025-05-05 RX ADMIN — SODIUM CHLORIDE, POTASSIUM CHLORIDE, SODIUM LACTATE AND CALCIUM CHLORIDE 9 ML/HR: 600; 310; 30; 20 INJECTION, SOLUTION INTRAVENOUS at 12:31

## 2025-05-05 RX ADMIN — FAMOTIDINE 20 MG: 10 INJECTION INTRAVENOUS at 10:57

## 2025-05-05 RX ADMIN — PROPOFOL 200 MG: 10 INJECTION, EMULSION INTRAVENOUS at 12:44

## 2025-05-05 RX ADMIN — DULOXETINE 60 MG: 60 CAPSULE, DELAYED RELEASE ORAL at 20:17

## 2025-05-05 RX ADMIN — DEXAMETHASONE SODIUM PHOSPHATE 10 MG: 4 INJECTION, SOLUTION INTRAMUSCULAR; INTRAVENOUS at 12:59

## 2025-05-05 RX ADMIN — ACETAMINOPHEN 1000 MG: 500 TABLET, FILM COATED ORAL at 20:17

## 2025-05-05 RX ADMIN — GABAPENTIN 600 MG: 300 CAPSULE ORAL at 20:20

## 2025-05-05 RX ADMIN — ONDANSETRON 4 MG: 2 INJECTION, SOLUTION INTRAMUSCULAR; INTRAVENOUS at 13:46

## 2025-05-05 RX ADMIN — FENTANYL CITRATE 50 MCG: 50 INJECTION, SOLUTION INTRAMUSCULAR; INTRAVENOUS at 14:39

## 2025-05-05 RX ADMIN — OXYCODONE HYDROCHLORIDE 10 MG: 5 TABLET ORAL at 15:10

## 2025-05-05 RX ADMIN — ACETAMINOPHEN 1000 MG: 500 TABLET, FILM COATED ORAL at 14:25

## 2025-05-05 RX ADMIN — METHOCARBAMOL TABLETS 750 MG: 750 TABLET, COATED ORAL at 20:17

## 2025-05-05 RX ADMIN — GABAPENTIN 600 MG: 300 CAPSULE ORAL at 17:59

## 2025-05-05 RX ADMIN — HYDROMORPHONE HYDROCHLORIDE 0.5 MG: 1 INJECTION, SOLUTION INTRAMUSCULAR; INTRAVENOUS; SUBCUTANEOUS at 14:40

## 2025-05-05 RX ADMIN — ROCURONIUM BROMIDE 50 MG: 10 INJECTION, SOLUTION INTRAVENOUS at 12:44

## 2025-05-05 RX ADMIN — MIDAZOLAM 0.5 MG: 1 INJECTION INTRAMUSCULAR; INTRAVENOUS at 10:57

## 2025-05-05 RX ADMIN — ATORVASTATIN CALCIUM 80 MG: 80 TABLET, FILM COATED ORAL at 17:59

## 2025-05-05 RX ADMIN — SUGAMMADEX 200 MG: 100 INJECTION, SOLUTION INTRAVENOUS at 13:46

## 2025-05-05 RX ADMIN — FENTANYL CITRATE 50 MCG: 50 INJECTION, SOLUTION INTRAMUSCULAR; INTRAVENOUS at 14:27

## 2025-05-05 RX ADMIN — FENTANYL CITRATE 50 MCG: 50 INJECTION, SOLUTION INTRAMUSCULAR; INTRAVENOUS at 12:44

## 2025-05-05 RX ADMIN — LIDOCAINE HYDROCHLORIDE 100 MG: 20 INJECTION, SOLUTION INFILTRATION; PERINEURAL at 12:44

## 2025-05-05 RX ADMIN — CEFOXITIN SODIUM 2 G: 2 POWDER, FOR SOLUTION INTRAVENOUS at 12:30

## 2025-05-05 RX ADMIN — METHOCARBAMOL TABLETS 750 MG: 750 TABLET, COATED ORAL at 17:58

## 2025-05-05 RX ADMIN — HYDROMORPHONE HYDROCHLORIDE 0.5 MG: 1 INJECTION, SOLUTION INTRAMUSCULAR; INTRAVENOUS; SUBCUTANEOUS at 14:27

## 2025-05-05 NOTE — ANESTHESIA PROCEDURE NOTES
Airway  Reason: elective    Date/Time: 5/5/2025 12:48 PM  Airway not difficult    General Information and Staff    Patient location during procedure: OR  Anesthesiologist: Chong Kendrick MD  CRNA/CAA: Margarita Falcon CRNA    Indications and Patient Condition  Indications for airway management: airway protection    Preoxygenated: yes    Mask difficulty assessment: 2 - vent by mask + OA or adjuvant +/- NMBA    Final Airway Details    Final airway type: endotracheal airway      Successful airway: ETT  Cuffed: yes   Successful intubation technique: direct laryngoscopy  Adjuncts used in placement: intubating stylet  Endotracheal tube insertion site: oral  Blade: Conner  Blade size: 2  ETT size (mm): 7.5  Cormack-Lehane Classification: grade I - full view of glottis  Placement verified by: chest auscultation and capnometry   Measured from: lips  ETT/EBT  to lips (cm): 21  Number of attempts at approach: 1  Assessment: lips, teeth, and gum same as pre-op and atraumatic intubation    Additional Comments  Atraumatic, MOP to cuff, BSBE, no change to dentition, secured with tape

## 2025-05-05 NOTE — ANESTHESIA PREPROCEDURE EVALUATION
Anesthesia Evaluation     Patient summary reviewed   NPO Solid Status: > 8 hours  NPO Liquid Status: > 2 hours           Airway   Mallampati: III  TM distance: >3 FB  Neck ROM: full  Possible difficult intubation  Comment: Difficult Airway: NoFinal Airway Type: endotracheal airwayMask Difficulty Assessment: 2 - vent by mask + OA or adjuvant +/- NMBAFinal Endotracheal Airway: ETTCuffed: YesCormack-Lehane Classification: grade I - full view of glottisTechnique Used For Successful Placement: direct laryngoscopyDevices/Methods Used in Placement: intubating stylet, cricoid pressureInsertion Site: oralBlade Type: MacintoshBlade Size: 4ETT Size (mm): 7.5Number of Attempts at Approach: 1     Dental - normal exam     Pulmonary    (+) ,sleep apnea on CPAP  Cardiovascular   Exercise tolerance: good (4-7 METS)    Rhythm: regular    (+) hypertension, CHF Systolic <55%, hyperlipidemia      Neuro/Psych  (+) TIA  GI/Hepatic/Renal/Endo    (+) obesity, GERD, PUD, diabetes mellitus    Musculoskeletal     (+) neck pain  Abdominal    Substance History      OB/GYN          Other      history of cancer active                    Anesthesia Plan    ASA 3     general     intravenous induction     Anesthetic plan, risks, benefits, and alternatives have been provided, discussed and informed consent has been obtained with: patient.      CODE STATUS:

## 2025-05-05 NOTE — PLAN OF CARE
Goal Outcome Evaluation:              Outcome Evaluation: VSS. IV fluids infusing. Significant other at bedside. Abd incison with border dressing. Voided. SCDs on. On Clear Liquid diet. IS at bedside.

## 2025-05-05 NOTE — H&P
Colorectal & General Surgery  History and Physical    Patient: Kevin Garsia  YOB: 1955  MRN: [id number]      Assessment  Kevin Garsia is a 69 y.o. male with rectal cancer status post low anterior resection with diverting loop ileostomy creation who presents for closure of his ileostomy today.  Discussed risk, benefits, alternatives to procedure.  Informed consent obtained.    Plan  Proceed with closure of diverting loop ileostomy      History of Present Illness   Kevin Garsia is a 69 y.o. male with rectal cancer who presents for closure of his diverting loop ileostomy today    Past Medical History   Past Medical History:   Diagnosis Date    Anemia     Aphasia     Arthritis     CTS (carpal tunnel syndrome)     Depression     Diabetes mellitus     GERD (gastroesophageal reflux disease)     History of carotid stenosis     HX LEFT CAROTID ENDARTERECTOMY    History of prostate cancer 2012    HX SEED, RADIATION    History of radiation therapy     History of transient ischemic attack (TIA)     S/P CAROTID ENDARTERECTOMY 2019    Hyperlipidemia     Hypertension     Multiple gastric ulcers     Neuropathy     Rectal adenocarcinoma 2024    Seasonal allergies     Sleep apnea     cpap    Stroke     Tattoos     TIA (transient ischemic attack)         Past Surgical History   Past Surgical History:   Procedure Laterality Date    ANGIOPLASTY CAROTID ARTERY      APPENDECTOMY      CAROTID ENDARTERECTOMY Left     COLON RESECTION N/A 03/19/2025    Procedure: Laparoscopic Converted to Open Low Anterior Resection, Ostomy Creation, and Appendetomy;  Surgeon: Naeem Rai MD;  Location: [facility] MAIN OR;  Service: General;  Laterality: N/A;    COLONOSCOPY N/A 10/18/2024    Procedure: COLONOSCOPY TO CECUM/TI WITH BIOPSIES;  Surgeon: Marcus Christine Jr., MD;  Location: [facility] ENDOSCOPY;  Service: General;  Laterality: N/A;  PRE-SCREENING, FAMILY HX COLON CANCER  POST-DIVERTICULOSIS, RECTAL MASS    ENDOSCOPY       FOOT SURGERY      INSERTION PROSTATE RADIATION SEED      LUMBAR EPIDURAL INJECTION N/A 02/14/2025    Procedure: L4/L5 LUMBAR EPIDURAL STEROID INJECTION CPT: 85982;  Surgeon: Vandana Ordonez MD;  Location: [facility] MAIN OR;  Service: Pain Management;  Laterality: N/A;    TOOTH EXTRACTION  2025    VENOUS ACCESS DEVICE (PORT) INSERTION N/A 11/22/2024    Procedure: INSERTION VENOUS ACCESS DEVICE;  Surgeon: Naeem Rai MD;  Location: [facility] MAIN OR;  Service: General;  Laterality: N/A;       Social History  Social History     Socioeconomic History    Marital status: Significant Other   Tobacco Use    Smoking status: Former     Types: Cigarettes    Smokeless tobacco: Never    Tobacco comments:     QUIT 1990     1 TO 3 PPD X 20 YEARS    Vaping Use    Vaping status: Former    Substances: CBD   Substance and Sexual Activity    Alcohol use: No    Drug use: Not Currently    Sexual activity: Defer       Family History  Family History   Problem Relation Age of Onset    Bone cancer Mother     COPD Father     Hypertension Father     Stroke Father         TIA    Bone cancer Father         smoker    Lung cancer Father     Diabetes Father     No Known Problems Sister     No Known Problems Brother     No Known Problems Maternal Grandmother     No Known Problems Maternal Grandfather     No Known Problems Paternal Grandmother     Colon cancer Paternal Grandfather     Mental retardation Brother     Malig Hyperthermia Neg Hx        Review of Systems  Negative except as documented in the HPI.     Allergies  No Known Allergies    Medications    Current Facility-Administered Medications:     cefOXItin (MEFOXIN) 2 g in sodium chloride 0.9 % 100 mL MBP, 2 g, Intravenous, Once, Naeem Rai MD    fentaNYL citrate (PF) (SUBLIMAZE) injection 50 mcg, 50 mcg, Intravenous, Once PRN, Sal Mckeon MD    lactated ringers infusion, 9 mL/hr, Intravenous, Continuous, Sal Mckeon MD    lidocaine (XYLOCAINE) 1 %  injection 0.5 mL, 0.5 mL, Intradermal, Once PRN, aSl Mckeon MD    midazolam (VERSED) injection 0.5 mg, 0.5 mg, Intravenous, Q5 Min PRN, Sal Mckeon MD, 0.5 mg at 05/05/25 1057    sodium chloride 0.9 % flush 3 mL, 3 mL, Intravenous, Q12H, Sal Mckeon MD    sodium chloride 0.9 % flush 3-10 mL, 3-10 mL, Intravenous, PRN, Sal Mckeon MD    Vital Signs  Vitals:    05/05/25 1049   BP: 140/72   Pulse: 71   Resp: 18   Temp: 97.8 °F (36.6 °C)   SpO2: 96%        Physical Exam  Constitutional: Resting comfortably, no acute distress  Neck: Supple, trachea midline  Respiratory: No increased work of breathing, Symmetric excursion  Cardiovascular: Well pefursed, no jugular venous distention evident   Abdominal: Ileostomy in place soft, non-tender, non-distended  Lymphatics: No cervical or suprascapular adenopathy  Skin: Warm, dry, no rash on visualized skin surfaces  Musculoskeletal: Symmetric strength, no obvious gross abnormalities  Psychiatric: Alert and oriented ×3, normal affect            Red Rai MD  Colorectal & General Surgery  [facility]    [address], [address]  [place], [place], [address]  P: [phone]  F: [phone]

## 2025-05-05 NOTE — OP NOTE
Colorectal & General Surgery  Operative Report    Patient: Kevin Garsia  YOB: 1955  MRN: [id number]  DATE OF PROCEDURE: 05/05/25     PREOPERATIVE DIAGNOSIS:  Ileostomy status  History adenocarcinoma of the rectum    POSTOPERATIVE DIAGNOSIS:  Same    PROCEDURE:  Closure of diverting loop ileostomy with intestinal resection    FINDINGS:  Healthy appearing ileostomy with some mild tension pulling it back into the abdomen.  Side-to-side antiperistaltic anastomosis created.    SURGEON:  Red Rai MD    ASSISTANT:  Assistant: Ok Paris PA-C was responsible for performing the following activities: Retraction, Suction, Irrigation, Suturing, Closing, and Placing Dressing and their skilled assistance was necessary for the success of this case.     ANESTHESIA:  General-endotracheal    EBL:  15 mL    SPECIMEN:  Ileostomy    OPERATIVE DESCRIPTION:  The patient was brought to the operating room under the care of the nursing staff.  The patient was placed on the operating room table in the supine position where anesthesia was induced.  The patient was then prepped and positioned in the usual sterile fashion.  A standardized timeout was then performed.    Elliptical incision created on the ileostomy.  Subcutaneous tissue was dissected away from the ileum.  The fascia was incised at its junction with the ileum.  Eventually, the peritoneal cavity was entered and the entirety of the ileostomy was mobilized away from the fascia.  Intra-abdominal adhesions were taken down bluntly to allow adequate extracorporealization of the loops of small bowel.  The mesentery was incised at the proximal and distal ends of the ileostomy.  Bowel was divided with white load of the [place] 60 stapler.  An enterotomy was created in both ends of the bowel.  60 mm white load of the [place] 60 stapler was used to create a common channel.  The staple line was complete and hemostatic.  The remaining common enterotomy was closed  with an inner layer of running 3-0 chromic suture in an outer layer of 2-0 silk Lembert sutures.  The anastomosis widely patent.  The mesenteric defect was tiny and was not closed.  The bowel was placed back within the peritoneal cavity.  Gloves and instruments were changed.  Posterior fascia was closed with 0 PDS suture.  The anterior fascia closed with interrupted 0 PDS suture in figure-of-eight fashion.  Lidocaine and Exparel were injected.  Skin reapproximated with surgical staples.    All needle, sponge, and instrument counts were correct at the end of the case.    The patient tolerated the procedure well and was transferred to the postanesthesia care unit in stable condition.    Red Rai M.D.  Colorectal & General Surgery  [facility] [place], [facility]    [address], [address]  [place], [place], [address]  P: [phone]  F: [phone]

## 2025-05-05 NOTE — ANESTHESIA POSTPROCEDURE EVALUATION
"Patient: Kevin Garsia    Procedure Summary       Date: 05/05/25 Room / Location: [facility] OR [address] [place] [address] MAIN OR    Anesthesia Start: 1237 Anesthesia Stop: 1404    Procedure: Closure of diverting loop ileostomy (Abdomen) Diagnosis:       Rectal adenocarcinoma      (Rectal adenocarcinoma [C20])    Surgeons: Naeem Rai MD Provider: Chong Kendrick MD    Anesthesia Type: general ASA Status: 3            Anesthesia Type: general    Vitals  Vitals Value Taken Time   /79 05/05/25 16:15   Temp 36.6 °C (97.8 °F) 05/05/25 16:08   Pulse 81 05/05/25 16:21   Resp 16 05/05/25 16:15   SpO2 98 % 05/05/25 16:21   Vitals shown include unfiled device data.        Post Anesthesia Care and Evaluation    Patient location during evaluation: bedside  Patient participation: complete - patient participated  Level of consciousness: awake  Pain management: adequate    Airway patency: patent  Anesthetic complications: No anesthetic complications    Cardiovascular status: acceptable  Respiratory status: acceptable  Hydration status: acceptable    Comments: /79   Pulse 75   Temp 36.6 °C (97.8 °F) (Oral)   Resp 16   Ht 165.1 cm (65\")   Wt 77.1 kg (170 lb)   SpO2 100%   BMI 28.29 kg/m²     "

## 2025-05-06 LAB
ANION GAP SERPL CALCULATED.3IONS-SCNC: 11.8 MMOL/L (ref 5–15)
BASOPHILS # BLD AUTO: 0.01 10*3/MM3 (ref 0–0.2)
BASOPHILS NFR BLD AUTO: 0.1 % (ref 0–1.5)
BUN SERPL-MCNC: 13 MG/DL (ref 8–23)
BUN/CREAT SERPL: 12.6 (ref 7–25)
CALCIUM SPEC-SCNC: 9.3 MG/DL (ref 8.6–10.5)
CHLORIDE SERPL-SCNC: 102 MMOL/L (ref 98–107)
CO2 SERPL-SCNC: 23.2 MMOL/L (ref 22–29)
CREAT SERPL-MCNC: 1.03 MG/DL (ref 0.76–1.27)
DEPRECATED RDW RBC AUTO: 51.1 FL (ref 37–54)
EGFRCR SERPLBLD CKD-EPI 2021: 77.7 ML/MIN/1.73
EOSINOPHIL # BLD AUTO: 0 10*3/MM3 (ref 0–0.4)
EOSINOPHIL NFR BLD AUTO: 0 % (ref 0.3–6.2)
ERYTHROCYTE [DISTWIDTH] IN BLOOD BY AUTOMATED COUNT: 14.3 % (ref 12.3–15.4)
GLUCOSE SERPL-MCNC: 134 MG/DL (ref 65–99)
HCT VFR BLD AUTO: 33.9 % (ref 37.5–51)
HGB BLD-MCNC: 11.2 G/DL (ref 13–17.7)
IMM GRANULOCYTES # BLD AUTO: 0.05 10*3/MM3 (ref 0–0.05)
IMM GRANULOCYTES NFR BLD AUTO: 0.6 % (ref 0–0.5)
LYMPHOCYTES # BLD AUTO: 0.82 10*3/MM3 (ref 0.7–3.1)
LYMPHOCYTES NFR BLD AUTO: 9.2 % (ref 19.6–45.3)
MCH RBC QN AUTO: 32.7 PG (ref 26.6–33)
MCHC RBC AUTO-ENTMCNC: 33 G/DL (ref 31.5–35.7)
MCV RBC AUTO: 98.8 FL (ref 79–97)
MONOCYTES # BLD AUTO: 0.31 10*3/MM3 (ref 0.1–0.9)
MONOCYTES NFR BLD AUTO: 3.5 % (ref 5–12)
NEUTROPHILS NFR BLD AUTO: 7.7 10*3/MM3 (ref 1.7–7)
NEUTROPHILS NFR BLD AUTO: 86.6 % (ref 42.7–76)
NRBC BLD AUTO-RTO: 0 /100 WBC (ref 0–0.2)
PLATELET # BLD AUTO: 282 10*3/MM3 (ref 140–450)
PMV BLD AUTO: 10 FL (ref 6–12)
POTASSIUM SERPL-SCNC: 5 MMOL/L (ref 3.5–5.2)
RBC # BLD AUTO: 3.43 10*6/MM3 (ref 4.14–5.8)
SODIUM SERPL-SCNC: 137 MMOL/L (ref 136–145)
WBC NRBC COR # BLD AUTO: 8.89 10*3/MM3 (ref 3.4–10.8)

## 2025-05-06 PROCEDURE — 99024 POSTOP FOLLOW-UP VISIT: CPT | Performed by: SURGERY

## 2025-05-06 PROCEDURE — 85025 COMPLETE CBC W/AUTO DIFF WBC: CPT | Performed by: SURGERY

## 2025-05-06 PROCEDURE — 80048 BASIC METABOLIC PNL TOTAL CA: CPT | Performed by: SURGERY

## 2025-05-06 PROCEDURE — 25010000002 ENOXAPARIN PER 10 MG: Performed by: SURGERY

## 2025-05-06 RX ORDER — NYSTATIN 100000 [USP'U]/ML
5 SUSPENSION ORAL 4 TIMES DAILY
Status: DISCONTINUED | OUTPATIENT
Start: 2025-05-06 | End: 2025-05-08 | Stop reason: HOSPADM

## 2025-05-06 RX ADMIN — METHOCARBAMOL TABLETS 750 MG: 750 TABLET, COATED ORAL at 20:22

## 2025-05-06 RX ADMIN — OXYCODONE HYDROCHLORIDE 10 MG: 5 TABLET ORAL at 08:17

## 2025-05-06 RX ADMIN — ACETAMINOPHEN 1000 MG: 500 TABLET, FILM COATED ORAL at 20:22

## 2025-05-06 RX ADMIN — GABAPENTIN 600 MG: 300 CAPSULE ORAL at 08:16

## 2025-05-06 RX ADMIN — METHOCARBAMOL TABLETS 750 MG: 750 TABLET, COATED ORAL at 08:17

## 2025-05-06 RX ADMIN — ATORVASTATIN CALCIUM 80 MG: 80 TABLET, FILM COATED ORAL at 08:17

## 2025-05-06 RX ADMIN — NYSTATIN 500000 UNITS: 100000 SUSPENSION ORAL at 17:08

## 2025-05-06 RX ADMIN — GABAPENTIN 600 MG: 300 CAPSULE ORAL at 20:22

## 2025-05-06 RX ADMIN — NYSTATIN 500000 UNITS: 100000 SUSPENSION ORAL at 20:22

## 2025-05-06 RX ADMIN — METHOCARBAMOL TABLETS 750 MG: 750 TABLET, COATED ORAL at 13:08

## 2025-05-06 RX ADMIN — DULOXETINE 60 MG: 60 CAPSULE, DELAYED RELEASE ORAL at 08:17

## 2025-05-06 RX ADMIN — OXYCODONE HYDROCHLORIDE 10 MG: 5 TABLET ORAL at 20:21

## 2025-05-06 RX ADMIN — ACETAMINOPHEN 1000 MG: 500 TABLET, FILM COATED ORAL at 03:00

## 2025-05-06 RX ADMIN — OXYCODONE HYDROCHLORIDE 5 MG: 5 TABLET ORAL at 16:11

## 2025-05-06 RX ADMIN — GABAPENTIN 600 MG: 300 CAPSULE ORAL at 16:09

## 2025-05-06 RX ADMIN — ENOXAPARIN SODIUM 40 MG: 100 INJECTION SUBCUTANEOUS at 08:18

## 2025-05-06 RX ADMIN — METHOCARBAMOL TABLETS 750 MG: 750 TABLET, COATED ORAL at 17:08

## 2025-05-06 RX ADMIN — TAMSULOSIN HYDROCHLORIDE 0.4 MG: 0.4 CAPSULE ORAL at 08:17

## 2025-05-06 RX ADMIN — ACETAMINOPHEN 1000 MG: 500 TABLET, FILM COATED ORAL at 08:17

## 2025-05-06 NOTE — PLAN OF CARE
Goal Outcome Evaluation:      Pt in for surgery, tolerating clear, diet bumped to full liquid, awaiting BM, pain well controlled, vitals stable, bed low, wheels locked, call light in reach

## 2025-05-06 NOTE — OUTREACH NOTE
Medical Week 2 Survey      Flowsheet Row Responses   [facility] patient discharged from? [place]   Does the patient have one of the following disease processes/diagnoses(primary or secondary)? Other   Week 2 attempt successful? No   Unsuccessful attempts Attempt 1   Revoke Readmitted            WILLIAMS POWER - Registered Nurse

## 2025-05-06 NOTE — PROGRESS NOTES
Colorectal & General Surgery  Progress Note    Patient: Kevin Garsia  YOB: 1955  MRN: [id number]      Assessment  Kevin Garsia is a 69 y.o. male who is postoperative day 1 from closure of his diverting loop ileostomy.  Overall doing well.  Afebrile with no tachycardia.  Benign abdominal exam.  Passing flatus.    Due to the difficult nature of his operation, we will plan to slowly advance his diet.  We will start with full liquids today.  Anticipate conversion to regular food tomorrow and potential home tomorrow afternoon versus Thursday.    Subjective  No significant events.  Feels well.  Pain well-controlled.  Ambulating.  Tolerating diet.  Having flatus but no bowel function yet.    Objective    Vitals:    05/06/25 1222   BP: 133/76   Pulse: 81   Resp: 16   Temp:    SpO2: 98%       Physical Exam  Constitutional: Well-developed well-nourished, no acute distress  Neck: Supple, trachea midline  Respiratory: No increased work of breathing, Symmetric excursion  Cardiovascular: Well pefursed, no jugular venous distention evident   Abdominal: Incision in good order.  Soft, non-tender, non-distended  Skin: Warm, dry, no rash on visualized skin surfaces  Psychiatric: Alert and oriented ×3, normal affect     Laboratory Results  I have personally reviewed CBC with WC 8, hemoglobin 11, platelet 282.  BMP with creatinine 1.0, bicarb 22.    Radiology  None to review         Red Rai MD  Colorectal & General Surgery  [facility]    [address], [address]  [place], [place], [address]  P: [phone]  F: [phone]

## 2025-05-06 NOTE — PLAN OF CARE
Goal Outcome Evaluation:            Patient alert and oriented, on o2@2L, up ad anastasia, right chest port not accessed, 20g to right forearm, LR infusing @ 50ml/hr. A.m. labs drawn this morning, hx colon cancer, tylenol, dilaudid, zofran, and oxycodone all ordered for pain and nausea. Clear liquid diet, DM II, right lower abd border dressing in place, robaxin 500 ordered as muscle relaxer. Pt here for ileostomy takedown. Plan of care alex de los santos.

## 2025-05-07 LAB
CYTO UR: NORMAL
LAB AP CASE REPORT: NORMAL
PATH REPORT.FINAL DX SPEC: NORMAL
PATH REPORT.GROSS SPEC: NORMAL

## 2025-05-07 PROCEDURE — 25010000002 ENOXAPARIN PER 10 MG: Performed by: SURGERY

## 2025-05-07 PROCEDURE — 99024 POSTOP FOLLOW-UP VISIT: CPT | Performed by: SURGERY

## 2025-05-07 PROCEDURE — 25810000003 LACTATED RINGERS PER 1000 ML: Performed by: SURGERY

## 2025-05-07 RX ADMIN — ENOXAPARIN SODIUM 40 MG: 100 INJECTION SUBCUTANEOUS at 08:41

## 2025-05-07 RX ADMIN — NYSTATIN 500000 UNITS: 100000 SUSPENSION ORAL at 12:21

## 2025-05-07 RX ADMIN — NYSTATIN 500000 UNITS: 100000 SUSPENSION ORAL at 20:23

## 2025-05-07 RX ADMIN — OXYCODONE HYDROCHLORIDE 10 MG: 5 TABLET ORAL at 05:11

## 2025-05-07 RX ADMIN — NYSTATIN 500000 UNITS: 100000 SUSPENSION ORAL at 08:43

## 2025-05-07 RX ADMIN — OXYCODONE HYDROCHLORIDE 5 MG: 5 TABLET ORAL at 20:22

## 2025-05-07 RX ADMIN — TAMSULOSIN HYDROCHLORIDE 0.4 MG: 0.4 CAPSULE ORAL at 08:41

## 2025-05-07 RX ADMIN — ATORVASTATIN CALCIUM 80 MG: 80 TABLET, FILM COATED ORAL at 08:41

## 2025-05-07 RX ADMIN — ACETAMINOPHEN 1000 MG: 500 TABLET, FILM COATED ORAL at 08:41

## 2025-05-07 RX ADMIN — METHOCARBAMOL TABLETS 750 MG: 750 TABLET, COATED ORAL at 20:22

## 2025-05-07 RX ADMIN — SODIUM CHLORIDE, POTASSIUM CHLORIDE, SODIUM LACTATE AND CALCIUM CHLORIDE 50 ML/HR: 600; 310; 30; 20 INJECTION, SOLUTION INTRAVENOUS at 12:22

## 2025-05-07 RX ADMIN — GABAPENTIN 600 MG: 300 CAPSULE ORAL at 20:23

## 2025-05-07 RX ADMIN — GABAPENTIN 600 MG: 300 CAPSULE ORAL at 08:41

## 2025-05-07 RX ADMIN — GABAPENTIN 600 MG: 300 CAPSULE ORAL at 15:19

## 2025-05-07 RX ADMIN — ACETAMINOPHEN 1000 MG: 500 TABLET, FILM COATED ORAL at 20:22

## 2025-05-07 RX ADMIN — ACETAMINOPHEN 1000 MG: 500 TABLET, FILM COATED ORAL at 15:19

## 2025-05-07 RX ADMIN — DULOXETINE 60 MG: 60 CAPSULE, DELAYED RELEASE ORAL at 08:41

## 2025-05-07 RX ADMIN — METHOCARBAMOL TABLETS 750 MG: 750 TABLET, COATED ORAL at 17:14

## 2025-05-07 RX ADMIN — METHOCARBAMOL TABLETS 750 MG: 750 TABLET, COATED ORAL at 12:21

## 2025-05-07 RX ADMIN — METHOCARBAMOL TABLETS 750 MG: 750 TABLET, COATED ORAL at 08:41

## 2025-05-07 RX ADMIN — NYSTATIN 500000 UNITS: 100000 SUSPENSION ORAL at 17:14

## 2025-05-07 NOTE — PLAN OF CARE
Goal Outcome Evaluation:         A&Ox4, calm compliant. Dressing CDI, changed to 2x2 dry gauze and tegaderm r/t dried drainage/blood on covederm. Incision closed with staples, no existing/new drainage. Patient pain 7/10, PRN pain medication administered per MAR x2. Pt ambulatory in card x1. Wife at bedside. Patient bowel sounds active, passing flatus although no BM at this time. RN will continue to monitor.

## 2025-05-07 NOTE — PLAN OF CARE
Goal Outcome Evaluation: a&o x4, calm and cooperative with care, tolerating fulls so advanced to regular diet for dinner this evening, iv saline locked, ambulated around unit, passing gas but no BM, no prns required so far this shift, vitals stable--require 2L NC when sleeping      BM x1 this shift

## 2025-05-07 NOTE — PROGRESS NOTES
Closure of loop ileostomy    Tolerating full liquid diet.  No nausea or vomiting.  Passing flatus but no bowel movement yet.    Afebrile, vital signs stable.  Abdomen soft, dressing dry.    Doing well postop  Await bowel function  Likely home tomorrow

## 2025-05-07 NOTE — CASE MANAGEMENT/SOCIAL WORK
Discharge Planning Assessment  [facility]     Patient Name: Kevin Garsia  MRN: [id number]  Today's Date: 5/7/2025    Admit Date: 5/5/2025    Plan: Home with family to transport.   Discharge Needs Assessment       Row Name 05/07/25 1216       Living Environment    People in Home significant other    Current Living Arrangements home    Primary Care Provided by self    Provides Primary Care For no one    Family Caregiver if Needed significant other       Transition Planning    Patient/Family Anticipates Transition to home with family    Patient/Family Anticipated Services at Transition none    Transportation Anticipated family or friend will provide       Discharge Needs Assessment    Readmission Within the Last 30 Days no previous admission in last 30 days    Equipment Currently Used at Home cane, straight    Concerns to be Addressed no discharge needs identified;denies needs/concerns at this time    Anticipated Changes Related to Illness none    Equipment Needed After Discharge none                   Discharge Plan       Row Name 05/07/25 1216       Plan    Plan Home with family to transport.    Patient/Family in Agreement with Plan yes    Plan Comments CCP met with pt at bedside. Introduced self and role. Facesheet information and pharmacy verified. Pt uses [facility] on [address]. Pt lives with his significant other in a 2 EVA SS home. Pt drives, is IADLs and has a cane that he uses PRN. Pt has used [facility] in the past. Pt has no history of SNF. Pt does not have a living will. Pt is enrolled in [facility]. Pt denies trouble affording or managing medications. Pt denies need for HH or DME. Plan will be to return home with family to transport. CCP will continue to follow for any DC needs. RLutes RN/CCP                  Selected Continued Care - Prior Encounters Includes continued care and service providers with selected services from prior encounters from 2/4/2025 to 5/7/2025      Discharged on 3/24/2025 Admission date:  3/19/2025 - Discharge disposition: Home-Health Care [facility] [place]      Home Medical Care       Service Provider Services Address Phone Fax Patient Preferred    [facility], [facility], Home Health Services [address], [place] [address] [phone] --                          Expected Discharge Date and Time       Expected Discharge Date Expected Discharge Time    May 7, 2025            Demographic Summary       Row Name 05/07/25 1215       General Information    Admission Type inpatient    Arrived From home    Required Notices Provided Important Message from Medicare    Referral Source case finding;admission list    Preferred Language English       Contact Information    Permission Granted to Share Info With                    Functional Status       Row Name 05/07/25 1215       Functional Status    Usual Activity Tolerance good    Current Activity Tolerance good       Physical Activity    On average, how many days per week do you engage in moderate to strenuous exercise (like a brisk walk)? Pt Declined    On average, how many minutes do you engage in exercise at this level? Pt Declined       Assessment of Health Literacy    How often do you have someone help you read hospital materials? Never    How often do you have problems learning about your medical condition because of difficulty understanding written information? Never    How often do you have a problem understanding what is told to you about your medical condition? Never    How confident are you filling out medical forms by yourself? Extremely    Health Literacy Excellent                    Rody Marcial RN

## 2025-05-08 ENCOUNTER — READMISSION MANAGEMENT (OUTPATIENT)
Dept: CALL CENTER | Facility: HOSPITAL | Age: 70
End: 2025-05-08
Payer: MEDICARE

## 2025-05-08 VITALS
OXYGEN SATURATION: 98 % | SYSTOLIC BLOOD PRESSURE: 129 MMHG | DIASTOLIC BLOOD PRESSURE: 82 MMHG | RESPIRATION RATE: 18 BRPM | HEIGHT: 65 IN | BODY MASS INDEX: 28.32 KG/M2 | WEIGHT: 170 LBS | TEMPERATURE: 98.1 F | HEART RATE: 94 BPM

## 2025-05-08 PROCEDURE — 99024 POSTOP FOLLOW-UP VISIT: CPT | Performed by: SURGERY

## 2025-05-08 PROCEDURE — 25010000002 ENOXAPARIN PER 10 MG: Performed by: SURGERY

## 2025-05-08 RX ORDER — METHOCARBAMOL 750 MG/1
750 TABLET, FILM COATED ORAL 4 TIMES DAILY
Qty: 28 TABLET | Refills: 0 | Status: SHIPPED | OUTPATIENT
Start: 2025-05-08 | End: 2025-05-15

## 2025-05-08 RX ORDER — NYSTATIN 100000 [USP'U]/ML
500000 SUSPENSION ORAL 4 TIMES DAILY
Qty: 60 ML | Refills: 1 | Status: SHIPPED | OUTPATIENT
Start: 2025-05-08 | End: 2025-05-13 | Stop reason: SDUPTHER

## 2025-05-08 RX ORDER — OXYCODONE HYDROCHLORIDE 5 MG/1
5 TABLET ORAL EVERY 4 HOURS PRN
Qty: 12 TABLET | Refills: 0 | Status: SHIPPED | OUTPATIENT
Start: 2025-05-08 | End: 2025-05-12

## 2025-05-08 RX ADMIN — OXYCODONE HYDROCHLORIDE 10 MG: 5 TABLET ORAL at 04:22

## 2025-05-08 RX ADMIN — TAMSULOSIN HYDROCHLORIDE 0.4 MG: 0.4 CAPSULE ORAL at 08:14

## 2025-05-08 RX ADMIN — GABAPENTIN 600 MG: 300 CAPSULE ORAL at 15:16

## 2025-05-08 RX ADMIN — ATORVASTATIN CALCIUM 80 MG: 80 TABLET, FILM COATED ORAL at 08:14

## 2025-05-08 RX ADMIN — METHOCARBAMOL TABLETS 750 MG: 750 TABLET, COATED ORAL at 08:14

## 2025-05-08 RX ADMIN — ACETAMINOPHEN 1000 MG: 500 TABLET, FILM COATED ORAL at 08:14

## 2025-05-08 RX ADMIN — OXYCODONE HYDROCHLORIDE 10 MG: 5 TABLET ORAL at 08:14

## 2025-05-08 RX ADMIN — NYSTATIN 500000 UNITS: 100000 SUSPENSION ORAL at 11:19

## 2025-05-08 RX ADMIN — GABAPENTIN 600 MG: 300 CAPSULE ORAL at 08:14

## 2025-05-08 RX ADMIN — METHOCARBAMOL TABLETS 750 MG: 750 TABLET, COATED ORAL at 11:19

## 2025-05-08 RX ADMIN — DULOXETINE 60 MG: 60 CAPSULE, DELAYED RELEASE ORAL at 08:14

## 2025-05-08 RX ADMIN — NYSTATIN 500000 UNITS: 100000 SUSPENSION ORAL at 08:13

## 2025-05-08 RX ADMIN — ENOXAPARIN SODIUM 40 MG: 100 INJECTION SUBCUTANEOUS at 08:14

## 2025-05-08 RX ADMIN — OXYCODONE HYDROCHLORIDE 10 MG: 5 TABLET ORAL at 15:27

## 2025-05-08 RX ADMIN — OXYCODONE HYDROCHLORIDE 10 MG: 5 TABLET ORAL at 00:24

## 2025-05-08 RX ADMIN — ACETAMINOPHEN 1000 MG: 500 TABLET, FILM COATED ORAL at 15:16

## 2025-05-08 NOTE — CASE MANAGEMENT/SOCIAL WORK
Case Management Discharge Note      Final Note: Home.         Selected Continued Care - Admitted Since 5/5/2025       Destination    No services have been selected for the patient.                Durable Medical Equipment    No services have been selected for the patient.                Dialysis/Infusion    No services have been selected for the patient.                Home Medical Care    No services have been selected for the patient.                Therapy    No services have been selected for the patient.                Community Resources    No services have been selected for the patient.                Community & DME    No services have been selected for the patient.                    Selected Continued Care - Prior Encounters Includes continued care and service providers with selected services from prior encounters from 2/4/2025 to 5/8/2025      Discharged on 3/24/2025 Admission date: 3/19/2025 - [place]charge [place] [facility] [place]      Home Medical Care       Service Provider Services Address Phone Fax Patient Preferred    [facility] [place], [facility] Rehabilitation, Home Health Services [address] [place] [address], [facility] [address] [address] [phone] --                          Transportation Services  Private: Car    Final Discharge Disposition Code: 01 - home or self-care

## 2025-05-08 NOTE — DISCHARGE SUMMARY
Colorectal & General Surgery  Discharge Summary    DATE OF ADMIT:    04/03/2025     DATE OF DISCHARGE:    05/08/25     DIAGNOSIS:    Rectal adenocarcinoma  Ileostomy status    PROCEDURES:    Closure of loop ileostomy    FINAL PATHOLOGY:    Benign ileostomy    SUMMARY OF HOSPITAL COURSE:     He was admitted postoperatively and did well.  Return of bowel function occurred by postoperative day 2 and he was discharged on postoperative day 3.    PHYSICAL EXAM  Constitutional: Resting comfortably, no acute distress  Neck: Supple, trachea midline  Respiratory: No increased work of breathing, Symmetric excursion  Cardiovascular: Well pefursed, no jugular venous distention evident   Abdominal: Incision in good order.  Soft, non-tender, non-distended  Lymphatics: No cervical or suprascapular adenopathy  Skin: Warm, dry, no rash on visualized skin surfaces  Musculoskeletal: Symmetric strength, no obvious gross abnormalities  Psychiatric: Alert and oriented ×3, normal affect      DIET: Regular, as tolerated    ACTIVITY: No lifting more than 10 pounds for 6 weeks    MEDICATIONS: Refer to MAR    FOLLOW-UP: 1 to 2 weeks in the office    Red Rai MD  Colorectal & General Surgery  [facility] [place]    [address] [place] [address], [address]  [place], [place], [address]  P: [phone]  F: [phone]

## 2025-05-08 NOTE — OUTREACH NOTE
Prep Survey      Flowsheet Row Responses   [facility] patient discharged from? [place]   Is LACE score < 7 ? No   Eligibility [facility]   Date of Admission 05/05/25   Date of Discharge 05/08/25   Discharge Disposition Home or Self Care   Discharge diagnosis Rectal adenocarcinoma   Does the patient have one of the following disease processes/diagnoses(primary or secondary)? Other   Does the patient have Home health ordered? No   Is there a DME ordered? No   Prep survey completed? Yes            Cassidy THOMAS - Registered Nurse

## 2025-05-08 NOTE — PLAN OF CARE
Goal Outcome Evaluation:         Pt A&Ox4. Calm, compliant. Ambulatory in room adlib. C/O 8 out of 10 pain abdominal/surgical. PRN oxycodone administered per MAR (x1 dose 5 mg, x1 dose 10 mg). Patient rested well inbetween care, no signs of non-verbal pain indicators post PRN pain medication. 1 large loose BM occurred at 0052. RN witnessed. Patient c/o gas pain/cramps although reports improvement throughout shift. Hopeful for D/C in AM. RN WCTM.

## 2025-05-08 NOTE — DISCHARGE INSTRUCTIONS
POST OP RECOMMENDATIONS  Dr. Red Rai  [phone]  Discharge Instructions    Activity  You should get up and move about several times daily to reduce the risk of developing a blood clot in your legs.   No lifting more than 10 pounds for 6 weeks  Diet  Avoid raw fruits and vegetables  Avoid tough meats like steak and pork  If you have an ileostomy, avoid concentrated sugary drinks (Gatorade, soft drinks, milk products)  At your follow-up appointment, we can discuss returning to a normal diet  Dressings  The glue on your incisions will fall off on its own in 1-2 weeks.  You do not need to pack any of your incisions  Bathing  It's ok to shower anytime.   Do not submerge your incisions (bath, pool, lake, ocean, etc.) for 3 weeks.  You can wash your incisions with warm soapy water very gently and pat dry  Pain Management  Unless you have been told otherwise, it's ok to take Tylenol 1000 mg every 6 hours as needed.  I have provided you a prescription for a narcotic pain medication. Take this as needed.   I have also provided you a muscle relaxer if you were still using it in the hospital. Take this scheduled for a couple days, and then you can transition to taking it only as needed.   Please call the office if you have intense pain that is not relieved.   Blood clot prevention  You should be up walking multiple times each day to prevent blood clots from forming.   If I sent you home on a low-dose blood thinner shot, please take those every day until you run out.   Driving  Do not drive while taking narcotic pain medications.   Before driving, make sure that you are able to move and rotate appropriately to keep you and the rest of us safe on the road.   Follow up appointment  My office will call you with a follow up appointment if you do not have one already. It will be in 2-3 weeks.   If you do not hear from my office in 1 week, please call [phone] to ask about scheduling.   Remember to contact me for any of  the following:   Fever > 101 degrees  Severe pain that cannot be controlled by taking your pain pills  Severe nausea or vomiting that cannot be controlled by taking your nausea pills  Significant bleeding of your incisions  Drainage that has a bad smell or is yellow or green in appearance  Any other questions or concerns

## 2025-05-09 ENCOUNTER — TELEPHONE (OUTPATIENT)
Dept: SURGERY | Facility: CLINIC | Age: 70
End: 2025-05-09
Payer: MEDICARE

## 2025-05-09 ENCOUNTER — TRANSITIONAL CARE MANAGEMENT TELEPHONE ENCOUNTER (OUTPATIENT)
Dept: CALL CENTER | Facility: HOSPITAL | Age: 70
End: 2025-05-09
Payer: MEDICARE

## 2025-05-09 NOTE — OUTREACH NOTE
Call Center TCM Note      Flowsheet Row Responses   [facility] patient discharged from? [place]   Does the patient have one of the following disease processes/diagnoses(primary or secondary)? Other   TCM attempt successful? Yes  [VR- contacts +]   Call start time 1222   Call end time 1228   Discharge diagnosis Rectal adenocarcinoma   Meds reviewed with patient/caregiver? Yes   Is the patient having any side effects they believe may be caused by any medication additions or changes? No   Does the patient have all medications ordered at discharge? Yes   Is the patient taking all medications as directed (includes completed medication regime)? Yes   Comments Declined HOSP DC FU appt and will FU with surgeon.Pt will call if need arises for PCP appt.   Does the patient have an appointment with their PCP within 7-14 days of discharge? No   Nursing Interventions Patient declined scheduling/rescheduling appointment at this time   Has home health visited the patient within 72 hours of discharge? N/A   Psychosocial issues? No   Did the patient receive a copy of their discharge instructions? Yes   Nursing interventions Reviewed instructions with patient   What is the patient's perception of their health status since discharge? Same   Is the patient/caregiver able to teach back signs and symptoms related to disease process for when to call PCP? Yes   Is the patient/caregiver able to teach back signs and symptoms related to disease process for when to call 911? Yes   Is the patient/caregiver able to teach back the hierarchy of who to call/visit for symptoms/problems? PCP, Specialist, Home health nurse, Urgent Care, ED, 911 Yes   TCM call completed? Yes   Wrap up additional comments Spoke with BARB and she reports Pt is in alot of pain. Reports Pt is taking 2 pain pills together. Advised on the pain med instructions and advised that she call the surgeon office to discuss pain control. NGOC.   Call end time 1228             IQRA BOWERS - Registered Nurse    5/9/2025, 12:29 EDT

## 2025-05-09 NOTE — TELEPHONE ENCOUNTER
-Pt's significant other(Belem) left a voicemail stating pt was prescribed with oxycodone and muscle relaxer every 4 hrs after ostomy reversal surgery on 05/05/2025. He was taking 2 oxy while in hospital, but it does not seem to be effective with the pain. She's requesting if pain med can be changed or up the dosage.

## 2025-05-09 NOTE — PROGRESS NOTES
PCP:  Priti Oliveira APRN                                                                         ROOM:____________    : 1955  AGE: 69 y.o.    ALLERGIES:Patient has no known allergies.    LAST OV:______________________ LAST EKG:_______________ LAST WEIGHT:   Wt Readings from Last 1 Encounters:   25 77.1 kg (170 lb)        BP Readings from Last 3 Encounters:   25 129/82   25 134/88   25 131/79        WT: ____________  BP: __________ HR ______   02% _______      CHEST PAIN: YES OR NO                                           LIGHTHEADED: YES OR NO    SOA: YES OR NO                    FATIGUE: YES OR NO    PALPITATIONS: YES OR NO                                      EDEMA: YES OR NO    SLEEP APNEA: YES OR NO                                     CPAP     OR    BIPAP                                                  SMOKING:   Social History     Socioeconomic History    Marital status: Significant Other   Tobacco Use    Smoking status: Former     Types: Cigarettes    Smokeless tobacco: Never    Tobacco comments:     QUIT      1 TO 3 PPD X 20 YEARS    Vaping Use    Vaping status: Former    Substances: CBD   Substance and Sexual Activity    Alcohol use: No    Drug use: Not Currently    Sexual activity: Defer

## 2025-05-12 ENCOUNTER — OFFICE VISIT (OUTPATIENT)
Dept: CARDIOLOGY | Age: 70
End: 2025-05-12
Payer: MEDICARE

## 2025-05-12 ENCOUNTER — HOSPITAL ENCOUNTER (OUTPATIENT)
Dept: CARDIOLOGY | Facility: HOSPITAL | Age: 70
Discharge: HOME | End: 2025-05-12
Admitting: NURSE PRACTITIONER
Payer: MEDICARE

## 2025-05-12 VITALS
SYSTOLIC BLOOD PRESSURE: 126 MMHG | BODY MASS INDEX: 29.82 KG/M2 | HEART RATE: 80 BPM | DIASTOLIC BLOOD PRESSURE: 66 MMHG | HEIGHT: 65 IN | OXYGEN SATURATION: 98 % | WEIGHT: 179 LBS

## 2025-05-12 DIAGNOSIS — I45.10 RBBB: ICD-10-CM

## 2025-05-12 DIAGNOSIS — I25.84 CALCIFICATION OF NATIVE CORONARY ARTERY: ICD-10-CM

## 2025-05-12 DIAGNOSIS — R00.2 PALPITATION: ICD-10-CM

## 2025-05-12 DIAGNOSIS — I25.10 CALCIFICATION OF NATIVE CORONARY ARTERY: ICD-10-CM

## 2025-05-12 DIAGNOSIS — Z98.890 S/P CAROTID ENDARTERECTOMY: Primary | ICD-10-CM

## 2025-05-12 DIAGNOSIS — I10 ESSENTIAL HYPERTENSION: ICD-10-CM

## 2025-05-12 PROBLEM — I95.9 ARTERIAL HYPOTENSION: Status: ACTIVE | Noted: 2025-04-25

## 2025-05-12 PROCEDURE — 93246 EXT ECG>7D<15D RECORDING: CPT

## 2025-05-12 PROCEDURE — 99204 OFFICE O/P NEW MOD 45 MIN: CPT | Performed by: INTERNAL MEDICINE

## 2025-05-12 RX ORDER — LOSARTAN POTASSIUM 25 MG/1
25 TABLET ORAL DAILY
COMMUNITY

## 2025-05-12 NOTE — PROGRESS NOTES
PATIENTINFORMATION    Date of Office Visit: 2025  Encounter Provider: Osvaldo Lopes MD  Place of Service: [facility] CARDIOLOGY  Patient Name: Kevin Garsia  : 1955    Subjective:     Encounter Date:2025      Patient ID: Kevin Garsia is a 69 y.o. male.    Chief Complaint   Patient presents with    Palpitations    Hypertension       HPI  Mr. Garsia is a very pleasant 70 yo gentleman who came to cardiac clinic accompanied by his fiancée for evaluation of abnormal EKG.  He received neoadjuvant therapy for rectal adenocarcinoma  detected on screening colonoscopy last year.  Colostomy reversed on 2025.  He is going to get another round of chemotherapy next month.  Still recovering from surgery.  Noted to have right bundle branch block that prompted cardiology visit.  He has had right bundle branch block since at least .  He had left internal carotid artery CEA in 2019.  He had cardiology workup done.  He is fairly active at baseline and denies recent exertional chest pain, significant shortness of breath, orthopnea, PND, palpitations, presyncope or extremity swelling.  Other than hypertension and carotid artery stenosis no other known cardiovascular illness.      ROS  All systems reviewed and negative except as noted in HPI.    Past Medical History:   Diagnosis Date    Anemia     Aphasia     Arthritis     CTS (carpal tunnel syndrome)     Depression     Diabetes mellitus     GERD (gastroesophageal reflux disease)     History of carotid stenosis     HX LEFT CAROTID ENDARTERECTOMY    History of prostate cancer     HX SEED, RADIATION    History of radiation therapy     History of transient ischemic attack (TIA)     S/P CAROTID ENDARTERECTOMY     Hyperlipidemia     Hypertension     Multiple gastric ulcers     Neuropathy     Rectal adenocarcinoma     Seasonal allergies     Sleep apnea     cpap    Stroke     Tattoos     TIA (transient ischemic attack)         Past Surgical History:   Procedure Laterality Date    ANGIOPLASTY CAROTID ARTERY      APPENDECTOMY      CAROTID ENDARTERECTOMY Left     COLON RESECTION N/A 03/19/2025    Procedure: Laparoscopic Converted to Open Low Anterior Resection, Ostomy Creation, and Appendetomy;  Surgeon: Naeem Rai MD;  Location: [facility] MAIN OR;  Service: General;  Laterality: N/A;    COLONOSCOPY N/A 10/18/2024    Procedure: COLONOSCOPY TO CECUM/TI WITH BIOPSIES;  Surgeon: Marcus Christine Jr., MD;  Location: [facility] ENDOSCOPY;  Service: General;  Laterality: N/A;  PRE-SCREENING, FAMILY HX COLON CANCER  POST-DIVERTICULOSIS, RECTAL MASS    ENDOSCOPY      FOOT SURGERY      ILEOSTOMY CLOSURE N/A 5/5/2025    Procedure: Closure of diverting loop ileostomy;  Surgeon: Naeem Rai MD;  Location: [facility] MAIN OR;  Service: General;  Laterality: N/A;    INSERTION PROSTATE RADIATION SEED      LUMBAR EPIDURAL INJECTION N/A 02/14/2025    Procedure: L4/L5 LUMBAR EPIDURAL STEROID INJECTION CPT: 03040;  Surgeon: Vandana Ordonez MD;  [place] [facility] [place];  Service: Pain Management;  Laterality: N/A;    TOOTH EXTRACTION  2025    VENOUS ACCESS DEVICE (PORT) INSERTION N/A 11/22/2024    Procedure: INSERTION VENOUS ACCESS DEVICE;  Surgeon: Naeem Rai MD;  Location: [facility] MAIN OR;  Service: General;  Laterality: N/A;       Social History     Socioeconomic History    Marital status: Significant Other   Tobacco Use    Smoking status: Former     Types: Cigarettes    Smokeless tobacco: Never    Tobacco comments:     QUIT 1990     1 TO 3 PPD X 20 YEARS    Vaping Use    Vaping status: Former    Substances: CBD   Substance and Sexual Activity    Alcohol use: No    Drug use: Not Currently    Sexual activity: Defer       Family History   Problem Relation Age of Onset    Bone cancer Mother     Heart disease Father     COPD Father     Hypertension Father     Stroke Father         TIA    Bone cancer Father         smoker    Lung  "cancer Father     Diabetes Father     No Known Problems Sister     No Known Problems Brother     Mental retardation Brother     No Known Problems Maternal Grandmother     No Known Problems Maternal Grandfather     No Known Problems Paternal Grandmother     Colon cancer Paternal Grandfather     Malig Hyperthermia Neg Hx          Procedures       Objective:     /66 (BP Location: Left arm, Patient Position: Sitting, Cuff Size: Adult)   Pulse 80   Ht 165.1 cm (65\")   Wt 81.2 kg (179 lb)   SpO2 98%   BMI 29.79 kg/m²  Body mass index is 29.79 kg/m².     Constitutional:       General: Not in acute distress.     Appearance: Well-developed. Not diaphoretic.   Eyes:      Pupils: Pupils are equal, round, and reactive to light.   HENT:      Head: Normocephalic and atraumatic.   Neck:      Thyroid: No thyromegaly.   Pulmonary:      Effort: Pulmonary effort is normal. No respiratory distress.      Breath sounds: Normal breath sounds. No wheezing. No rales.   Chest:      Chest wall: Not tender to palpatation.   Cardiovascular:      Normal rate. Regular rhythm.      No gallop.    Pulses:     Intact distal pulses.   Edema:     Peripheral edema absent.   Abdominal:      General: Bowel sounds are normal. There is no distension.      Palpations: Abdomen is soft.      Tenderness: There is no guarding.   Musculoskeletal: Normal range of motion.         General: No deformity.      Cervical back: Normal range of motion and neck supple. Skin:     General: Skin is warm and dry.      Findings: No rash.   Neurological:      Mental Status: Alert and oriented to person, place, and time.      Cranial Nerves: No cranial nerve deficit.      Deep Tendon Reflexes: Reflexes are normal and symmetric.   Psychiatric:         Judgment: Judgment normal.         Review Of Data: I have reviewed pertinent recent labs, images and documents and pertinent findings included in HPI or assessment below.    Lipid Panel          7/16/2024    14:14   Lipid " Panel   Total Cholesterol 99    Triglycerides 232    HDL Cholesterol 25    VLDL Cholesterol 37    LDL Cholesterol  37          Assessment/Plan:   Right bundle branch block-not new  Extensive coronary calcification-no exertional chest discomfort.  Status post left CEA in 2019.  Mixed hyperlipidemia  Essential hypertension  Status post neoadjuvant therapy for well-differentiated rectal adenocarcinoma detected on colonoscopy.  Received 6 cycles of FOLFOX that he completed in February 2025 followed by a low anterior resection with diverting loop ileostomy and appendectomy in March 2025.  Ileostomy reversal is on on 5/5/2025.  Getting another round of chemo in June 2025.    Extensive coronary calcification.  On high intensity statin and aspirin after CEA  I will send him for myocardial perfusion study.  Blood pressure control is excellent.  He will start to walk regularly.    Diagnosis and plan of care discussed with patient and verbalized understanding.            Your medication list            Accurate as of May 12, 2025  9:45 AM. If you have any questions, ask your nurse or doctor.                CHANGE how you take these medications        Instructions Last Dose Given Next Dose Due   methocarbamol 750 MG tablet  Commonly known as: ROBAXIN  What changed:   when to take this  reasons to take this      Take 1 tablet by mouth 4 (Four) Times a Day for 7 days.       nystatin 100,000 unit/mL suspension  Commonly known as: MYCOSTATIN  What changed:   when to take this  reasons to take this      Swish and swallow 5 mL 4 (Four) Times a Day for 5 days.       vitamin D 1.25 MG (50527 UT) capsule capsule  Commonly known as: ERGOCALCIFEROL  What changed: additional instructions      TAKE 1 CAPSULE BY MOUTH 1 TIME EVERY WEEK              CONTINUE taking these medications        Instructions Last Dose Given Next Dose Due   acetaminophen 500 MG tablet  Commonly known as: TYLENOL      Take 2 tablets by mouth Every 6 (Six) Hours As  Needed for Mild Pain.       aspirin 81 MG chewable tablet      Chew 1 tablet Daily. HELD FOR OR       atorvastatin 80 MG tablet  Commonly known as: LIPITOR      Take 1 tablet by mouth Daily.       DULoxetine 60 MG capsule  Commonly known as: CYMBALTA      Take 1 capsule by mouth Daily.       ferrous sulfate 325 (65 Fe) MG tablet      Take 1 tablet by mouth Daily With Breakfast.       gabapentin 300 MG capsule  Commonly known as: NEURONTIN      Take 2 capsules by mouth 3 (Three) Times a Day.       losartan 25 MG tablet  Commonly known as: COZAAR      Take 1 tablet by mouth Daily.       ondansetron 8 MG tablet  Commonly known as: ZOFRAN      Take 1 tablet by mouth 3 (Three) Times a Day As Needed for Nausea or Vomiting.       oxyCODONE 5 MG immediate release tablet  Commonly known as: ROXICODONE      Take 1 tablet by mouth Every 4 (Four) Hours As Needed for Moderate Pain.       tamsulosin 0.4 MG capsule 24 hr capsule  Commonly known as: FLOMAX      Take 1 capsule by mouth every night at bedtime.       VITAMIN B 12 PO      Take 1 tablet by mouth Daily. HOLD PER MD JESSY Lopes MD  05/12/25  09:45 EDT

## 2025-05-13 ENCOUNTER — TELEPHONE (OUTPATIENT)
Dept: SURGERY | Facility: CLINIC | Age: 70
End: 2025-05-13
Payer: MEDICARE

## 2025-05-13 RX ORDER — NYSTATIN 100000 [USP'U]/ML
500000 SUSPENSION ORAL 4 TIMES DAILY
Qty: 60 ML | Refills: 1 | Status: SHIPPED | OUTPATIENT
Start: 2025-05-13 | End: 2025-05-18

## 2025-05-13 NOTE — TELEPHONE ENCOUNTER
Patient left a voice mail asking for a refill of Nystatin.  He is still having the problem with his tongue.  Please send to [facility].

## 2025-05-13 NOTE — TELEPHONE ENCOUNTER
Patient called stating he is a bit constipated and would like to know what he can do.  Advised patient he can take Miralax once or twice daily, and he drink plenty of water to stay well hydrated.   Advised he could try a stool softener as well if there is no movement after a few days.  He voiced understanding.

## 2025-05-14 ENCOUNTER — TELEPHONE (OUTPATIENT)
Dept: SURGERY | Facility: CLINIC | Age: 70
End: 2025-05-14
Payer: MEDICARE

## 2025-05-14 NOTE — TELEPHONE ENCOUNTER
I called and discussed with him.  No reason to bring him in the office.  All of his questions were answered.

## 2025-05-14 NOTE — TELEPHONE ENCOUNTER
Pt s/p ileostomy closure 05/05 and called asking if a small amount of leakage is normal - bld and fluid - no smell - no other issues.  PO appt on 05/22/25.

## 2025-05-15 ENCOUNTER — TELEPHONE (OUTPATIENT)
Dept: CARDIOLOGY | Age: 70
End: 2025-05-15
Payer: MEDICARE

## 2025-05-19 ENCOUNTER — HOSPITAL ENCOUNTER (OUTPATIENT)
Dept: CARDIOLOGY | Facility: HOSPITAL | Age: 70
Discharge: HOME | End: 2025-05-19
Admitting: INTERNAL MEDICINE
Payer: MEDICARE

## 2025-05-19 DIAGNOSIS — I25.84 CALCIFICATION OF NATIVE CORONARY ARTERY: ICD-10-CM

## 2025-05-19 DIAGNOSIS — I10 ESSENTIAL HYPERTENSION: ICD-10-CM

## 2025-05-19 DIAGNOSIS — I25.10 CALCIFICATION OF NATIVE CORONARY ARTERY: ICD-10-CM

## 2025-05-19 LAB
BH CV NUCLEAR PRIOR STUDY: 2
BH CV REST NUCLEAR ISOTOPE DOSE: 11.4 MCI
BH CV STRESS BP STAGE 1: NORMAL
BH CV STRESS BP STAGE 2: NORMAL
BH CV STRESS DURATION MIN STAGE 1: 3
BH CV STRESS DURATION MIN STAGE 2: 1
BH CV STRESS DURATION SEC STAGE 1: 0
BH CV STRESS DURATION SEC STAGE 2: 38
BH CV STRESS GRADE STAGE 1: 10
BH CV STRESS GRADE STAGE 2: 12
BH CV STRESS HR STAGE 1: 124
BH CV STRESS HR STAGE 2: 140
BH CV STRESS METS STAGE 1: 5
BH CV STRESS METS STAGE 2: 6.5
BH CV STRESS NUCLEAR ISOTOPE DOSE: 34.8 MCI
BH CV STRESS PROTOCOL 1: NORMAL
BH CV STRESS RECOVERY BP: NORMAL MMHG
BH CV STRESS RECOVERY HR: 94 BPM
BH CV STRESS SPEED STAGE 1: 1.7
BH CV STRESS SPEED STAGE 2: 2.5
BH CV STRESS STAGE 1: 1
BH CV STRESS STAGE 2: 2
MAXIMAL PREDICTED HEART RATE: 149 BPM
PERCENT MAX PREDICTED HR: 93.96 %
SPECT HRT GATED+EF W RNC IV: 61 %
STRESS BASELINE BP: NORMAL MMHG
STRESS BASELINE HR: 82 BPM
STRESS PERCENT HR: 111 %
STRESS POST ESTIMATED WORKLOAD: 6.5 METS
STRESS POST EXERCISE DUR MIN: 4 MIN
STRESS POST EXERCISE DUR SEC: 38 SEC
STRESS POST PEAK BP: NORMAL MMHG
STRESS POST PEAK HR: 140 BPM
STRESS TARGET HR: 127 BPM

## 2025-05-19 PROCEDURE — 34310000005 TECHNETIUM TETROFOSMIN KIT: Performed by: INTERNAL MEDICINE

## 2025-05-19 PROCEDURE — 93017 CV STRESS TEST TRACING ONLY: CPT

## 2025-05-19 PROCEDURE — A9502 TC99M TETROFOSMIN: HCPCS | Performed by: INTERNAL MEDICINE

## 2025-05-19 PROCEDURE — 78452 HT MUSCLE IMAGE SPECT MULT: CPT

## 2025-05-19 RX ADMIN — TETROFOSMIN 1 DOSE: 1.38 INJECTION, POWDER, LYOPHILIZED, FOR SOLUTION INTRAVENOUS at 07:55

## 2025-05-19 RX ADMIN — TETROFOSMIN 1 DOSE: 1.38 INJECTION, POWDER, LYOPHILIZED, FOR SOLUTION INTRAVENOUS at 09:00

## 2025-05-20 ENCOUNTER — READMISSION MANAGEMENT (OUTPATIENT)
Dept: CALL CENTER | Facility: HOSPITAL | Age: 70
End: 2025-05-20
Payer: MEDICARE

## 2025-05-20 ENCOUNTER — TELEPHONE (OUTPATIENT)
Dept: SURGERY | Facility: CLINIC | Age: 70
End: 2025-05-20
Payer: MEDICARE

## 2025-05-20 NOTE — OUTREACH NOTE
Medical Week 2 Survey      Flowsheet Row Responses   [facility] patient discharged from? [place]   Does the patient have one of the following disease processes/diagnoses(primary or secondary)? Other   Week 2 attempt successful? No   Unsuccessful attempts Attempt 2            Pili Hyman Registered Nurse

## 2025-05-20 NOTE — TELEPHONE ENCOUNTER
"Called pt and relayed Dr. Rai's advice \"He can take 2 pills at a time. That is fine\". Pt stated that he is feeling much better and pain decreased and he was taking 1 oxy daily.   "

## 2025-05-22 ENCOUNTER — OFFICE VISIT (OUTPATIENT)
Dept: SURGERY | Facility: CLINIC | Age: 70
End: 2025-05-22
Payer: MEDICARE

## 2025-05-22 VITALS
SYSTOLIC BLOOD PRESSURE: 138 MMHG | BODY MASS INDEX: 29.39 KG/M2 | HEIGHT: 65 IN | HEART RATE: 73 BPM | DIASTOLIC BLOOD PRESSURE: 84 MMHG | OXYGEN SATURATION: 98 % | WEIGHT: 176.4 LBS

## 2025-05-22 DIAGNOSIS — C20 RECTAL ADENOCARCINOMA: Primary | ICD-10-CM

## 2025-05-22 PROCEDURE — 99024 POSTOP FOLLOW-UP VISIT: CPT | Performed by: SURGERY

## 2025-05-22 NOTE — PROGRESS NOTES
Colorectal & General Surgery  Post - Op    Patient: Kevin Garsia  YOB: 1955  MRN: [id number]      Assessment  Kevin Garsia is a 69 y.o. male with T3 N2 rectal adenocarcinoma status post total neoadjuvant therapy and eventual anterior resection who presents today in postoperative follow-up after his closure with diverting loop ileostomy.    Recovering very well from surgery.  His incision is healing nicely and has just a little bit of granulation laxity.    He is okay to restart chemotherapy.    I will plan to see him in the office after conclusion of his adjuvant therapy.    History of Present Illness   Kevin Garsia is a 69 y.o. male who presents close for follow-up no complaints today.    Vital Signs  Vitals:    05/22/25 1053   BP: 138/84   Pulse: 73   SpO2: 98%        Physical Exam  Constitutional: Resting comfortably, no acute distress  Neck: Supple, trachea midline  Respiratory: No increased work of breathing, Symmetric excursion  Cardiovascular: Well pefursed, no jugular venous distention evident   Abdominal:  Soft, non-tender, non-distended.  Incision healing well.  Lymphatics: No cervical or suprascapular adenopathy  Skin: Warm, dry, no rash on visualized skin surfaces  Musculoskeletal: Symmetric strength, no obvious gross abnormalities  Psychiatric: Alert and oriented ×3, normal affect       Pathology  I have personally reviewed pathology results with the patient demonstrating normal ileum.    Red Rai MD  Colorectal & General Surgery  [facility]    [facility], [address]  [place], [place], [address]  P: [phone]  F: [phone]

## 2025-05-23 ENCOUNTER — TELEPHONE (OUTPATIENT)
Dept: FAMILY MEDICINE CLINIC | Facility: CLINIC | Age: 70
End: 2025-05-23
Payer: MEDICARE

## 2025-05-23 ENCOUNTER — TELEPHONE (OUTPATIENT)
Dept: CARDIOLOGY | Age: 70
End: 2025-05-23
Payer: MEDICARE

## 2025-05-23 ENCOUNTER — HOSPITAL ENCOUNTER (EMERGENCY)
Facility: HOSPITAL | Age: 70
Discharge: HOME | End: 2025-05-24
Attending: EMERGENCY MEDICINE
Payer: MEDICARE

## 2025-05-23 ENCOUNTER — TELEPHONE (OUTPATIENT)
Dept: FAMILY MEDICINE CLINIC | Facility: CLINIC | Age: 70
End: 2025-05-23

## 2025-05-23 DIAGNOSIS — I10 HYPERTENSION, UNSPECIFIED TYPE: Primary | ICD-10-CM

## 2025-05-23 LAB
ALBUMIN SERPL-MCNC: 4 G/DL (ref 3.5–5.2)
ALBUMIN/GLOB SERPL: 1.6 G/DL
ALP SERPL-CCNC: 93 U/L (ref 39–117)
ALT SERPL W P-5'-P-CCNC: 11 U/L (ref 1–41)
ANION GAP SERPL CALCULATED.3IONS-SCNC: 8 MMOL/L (ref 5–15)
AST SERPL-CCNC: 16 U/L (ref 1–40)
BASOPHILS # BLD AUTO: 0.05 10*3/MM3 (ref 0–0.2)
BASOPHILS NFR BLD AUTO: 0.6 % (ref 0–1.5)
BILIRUB SERPL-MCNC: 0.4 MG/DL (ref 0–1.2)
BUN SERPL-MCNC: 7 MG/DL (ref 8–23)
BUN/CREAT SERPL: 8 (ref 7–25)
CALCIUM SPEC-SCNC: 9.5 MG/DL (ref 8.6–10.5)
CHLORIDE SERPL-SCNC: 104 MMOL/L (ref 98–107)
CO2 SERPL-SCNC: 30 MMOL/L (ref 22–29)
CREAT SERPL-MCNC: 0.87 MG/DL (ref 0.76–1.27)
DEPRECATED RDW RBC AUTO: 49.8 FL (ref 37–54)
EGFRCR SERPLBLD CKD-EPI 2021: 92.2 ML/MIN/1.73
EOSINOPHIL # BLD AUTO: 0.44 10*3/MM3 (ref 0–0.4)
EOSINOPHIL NFR BLD AUTO: 5.6 % (ref 0.3–6.2)
ERYTHROCYTE [DISTWIDTH] IN BLOOD BY AUTOMATED COUNT: 14.3 % (ref 12.3–15.4)
GLOBULIN UR ELPH-MCNC: 2.5 GM/DL
GLUCOSE SERPL-MCNC: 98 MG/DL (ref 65–99)
HCT VFR BLD AUTO: 32.3 % (ref 37.5–51)
HGB BLD-MCNC: 10.8 G/DL (ref 13–17.7)
IMM GRANULOCYTES # BLD AUTO: 0.02 10*3/MM3 (ref 0–0.05)
IMM GRANULOCYTES NFR BLD AUTO: 0.3 % (ref 0–0.5)
LYMPHOCYTES # BLD AUTO: 2.18 10*3/MM3 (ref 0.7–3.1)
LYMPHOCYTES NFR BLD AUTO: 27.9 % (ref 19.6–45.3)
MCH RBC QN AUTO: 32.3 PG (ref 26.6–33)
MCHC RBC AUTO-ENTMCNC: 33.4 G/DL (ref 31.5–35.7)
MCV RBC AUTO: 96.7 FL (ref 79–97)
MONOCYTES # BLD AUTO: 0.58 10*3/MM3 (ref 0.1–0.9)
MONOCYTES NFR BLD AUTO: 7.4 % (ref 5–12)
NEUTROPHILS NFR BLD AUTO: 4.54 10*3/MM3 (ref 1.7–7)
NEUTROPHILS NFR BLD AUTO: 58.2 % (ref 42.7–76)
NRBC BLD AUTO-RTO: 0 /100 WBC (ref 0–0.2)
PLATELET # BLD AUTO: 332 10*3/MM3 (ref 140–450)
PMV BLD AUTO: 10.1 FL (ref 6–12)
POTASSIUM SERPL-SCNC: 4.7 MMOL/L (ref 3.5–5.2)
PROT SERPL-MCNC: 6.5 G/DL (ref 6–8.5)
RBC # BLD AUTO: 3.34 10*6/MM3 (ref 4.14–5.8)
SODIUM SERPL-SCNC: 142 MMOL/L (ref 136–145)
TROPONIN T SERPL HS-MCNC: 13 NG/L
WBC NRBC COR # BLD AUTO: 7.81 10*3/MM3 (ref 3.4–10.8)

## 2025-05-23 PROCEDURE — 99283 EMERGENCY DEPT VISIT LOW MDM: CPT

## 2025-05-23 PROCEDURE — 93005 ELECTROCARDIOGRAM TRACING: CPT | Performed by: EMERGENCY MEDICINE

## 2025-05-23 PROCEDURE — 80053 COMPREHEN METABOLIC PANEL: CPT | Performed by: EMERGENCY MEDICINE

## 2025-05-23 PROCEDURE — 36415 COLL VENOUS BLD VENIPUNCTURE: CPT

## 2025-05-23 PROCEDURE — 93010 ELECTROCARDIOGRAM REPORT: CPT | Performed by: INTERNAL MEDICINE

## 2025-05-23 PROCEDURE — 85025 COMPLETE CBC W/AUTO DIFF WBC: CPT | Performed by: EMERGENCY MEDICINE

## 2025-05-23 PROCEDURE — 84484 ASSAY OF TROPONIN QUANT: CPT | Performed by: EMERGENCY MEDICINE

## 2025-05-23 RX ORDER — HYDRALAZINE HYDROCHLORIDE 20 MG/ML
10 INJECTION INTRAMUSCULAR; INTRAVENOUS ONCE
Status: COMPLETED | OUTPATIENT
Start: 2025-05-24 | End: 2025-05-24

## 2025-05-23 RX ORDER — LOPERAMIDE HYDROCHLORIDE 2 MG/1
2 CAPSULE ORAL 4 TIMES DAILY PRN
COMMUNITY

## 2025-05-23 RX ORDER — CLONIDINE HYDROCHLORIDE 0.1 MG/1
0.1 TABLET ORAL ONCE
Status: COMPLETED | OUTPATIENT
Start: 2025-05-23 | End: 2025-05-23

## 2025-05-23 RX ADMIN — CLONIDINE HYDROCHLORIDE 0.1 MG: 0.1 TABLET ORAL at 22:21

## 2025-05-23 NOTE — TELEPHONE ENCOUNTER
Dr. Lopes,    Pt called and asked for the results of his stress test. Is there anything I can go over with him?    Thank you,    Hali Riddle, RN  Triage [facility]  05/23/25 13:27 EDT

## 2025-05-23 NOTE — TELEPHONE ENCOUNTER
Caller: Kevin Garsia    Relationship to patient: Self    Best call back number: [phone]    Patient is needing: PATIENTS BLOOD PRESSURE HAS WENT DOWN, IT IS /89

## 2025-05-23 NOTE — TELEPHONE ENCOUNTER
Patient called in today with elevated BP of 182/100 after taking BP meds 45 mins ago. He reports NO chest pain, SOA, or headache. He states that he feels fine. Please advise.

## 2025-05-23 NOTE — PROGRESS NOTES
REASON FOR FOLLOW UP:  stage III mid to upper well-differentiated rectal adenocarcinoma (cT3, cN2 CM0.).    History of Present Illness   He returns today April 14, 2025.  On 3/20/2025 he underwent a low anterior resection with diverting loop ileostomy and appendectomy.  Fortunately his postoperative course was uneventful though he developed a high output ileostomy that required loperamide to slow it down.  Pathology revealed a pT2 N0 well-differentiated mid rectal adenocarcinoma with treatment effect.    He was seen back April 3, 2025 having high output through his ostomy and it was felt that it would be reasonable to move forward with closure of his diverting loop ileostomy in 4 weeks.  It was discussed that chemotherapy adjuvantly would be held until he recovered postoperatively.    He is next seen April 14, 2025.  His ostomy still has variable output but he has been able to manage this and he is quite willing to proceed as above per his subsequent surgery and reconsideration of adjuvant therapy postop.    He continued to see Dr. Rai in 5/3/2025 - 5/8/2025 he underwent closure of loop ileostomy.  He was seen back in follow-up 5/22/2025 and was felt a candidate to restart chemotherapy.    He followed with cardiology with negative stress testing, asked to continue statin and aspirin.    He did present to the ER 5/23/2025 with accelerated hypertension, weakness and mild headache eventually responsive to medications.  Troponin was negative as was normal assessment of his kidney function.    He is next seen in office 5/27/2025 to restart chemotherapy to start adjuvant FOLFOX x 6.  We have discussed proceeding with therapy as laboratory studies become available this a.m. and the patient wishes to do so.    ONCOLOGY HISTORY:  The patient is a 69-year-old male who is referred for recently diagnosed stage III mid to upper well-differentiated rectal adenocarcinoma (cT3, cN2 CM0.).    Patient undergone a screening  colonoscopy 10/18/2024 demonstrating a circumferential mass involving the rectum extending from 10 cm to 12 cm with biopsy of 15 cm consistent with invasive well-differentiated adenocarcinoma with ulceration necroinflammatory debris.    This was felt to be microsatellite stable by IHC as well as including MSI by PCR.    If follow-up with infectious disease 11/1/2024 there being concern about resuming hypersexual activity and that he start preexposure prophylaxis.  He last been seen July 2024 on Descovy still in relationship with his partner and currently monogamous with been having bowel changes underwent colonoscopy with the above diagnosis.  As result of his need for therapy Descovy was discontinued and the issue to be reevaluated once he was successfully treated.    MRI of the pelvis performed 11/4/2024 demonstrates a high rectal mass extending to the proximal sigmoid colon representing a T3d N2 rectal tumor, side effect invasion on the superior aspect of the mass approximates between the mesorectum and peritoneal cavity?    CT chest 11/8 demonstrates pleural plaques along the peripheral aspect of both the right lower lobes.  These are of uncertain significance.    The patient was next seen 11/14/2024 by colorectal surgery without evidence of obstruction or bleeding.  He was thought a excellent candidate for neoadjuvant chemotherapy followed by mesorectal excision and adjuvant chemotherapy-comparable to the prospect trial.  There was concern about the location of the tumor extending down to the rectum and the avoidance of radiation therapy since the tumor appeared to be resectable.  Was the role of laparoscopic low anterior resection total mesorectal excision and creation of a diverting loop ileostomy temporarily discussed.    As resulted above the patient is now referred to discuss this above approach.  He is seen with his significant other and we have discussed his findings in great detail from initial  diagnosis and and subsequent surgical assessment leading to the recommendation of neoadjuvant chemotherapy given in the fashion of the PROSPECT Trial which was designed to determine whether neoadjuvant chemotherapy could be used as an alternative to neoadjuvant chemoRT.  This study demonstrated that similar disease-free survival and overall survival was similar to neoadjuvant CRT alone for eligible patients.  This would include the use of 6 cycles of FOLFOX chemotherapy followed by reassessment and if a clinical response and 20% or more was seen then RT would be admitted, proceeding to surgical resection and subsequent adjuvant chemotherapy.    He returns 12/2/2024, for Cycle 1 Day 1 FOLFOX. He does not have any new concerns today. He remains fatigued and experiences intermittent lightheadedness. He denies shortness of breath. He denies abdominal pain. His stools are black, but he denies rome blood. Despite starting oral iron, his hemoglobin has decreased further. He is not tolerating the oral iron at this time. Patient was started on Feraheme.    Patient returns on 12/16/2024 for cycle 2 FOLFOX.  He reports he is tolerated treatment well thus far and denying any nausea, vomiting, changes to his bowels.  He did have blood in his stool following cycle 1 1 time.  That has not reoccurred.  He did start Feraheme the day of disconnect with cycle 1 and has had improvement in his hemoglobin.  He will be due for his second dose of Feraheme today.  He denies increased neuropathy.    The patient is next evaluated 12/30/2024 for his third cycle of FOLFOX.  He has undergone Feraheme given 12/4/2024 and 12/16/2024.  He is feeling reasonably good without neuropathic symptoms.  We have discussed his scheduling and timing as he proceeds through his treatment including subsequent repeat MR pelvis after his 6 cycle of FOLFOX.    He returns 1/13/2025 for follow up and treatment.  He has been positive for COVID since his last office  visit and called in at which time we did start him on paxlovid.  He reports on starting the Paxlovid he was much improved.  He does have a scant cough at times with no lingering shortness of breath.  Report neuropathy lasting approximately 3 days following last treatment that was not always associated with cold intolerance.  This is now resolved and had involved his hands primarily.  We went on to proceed with cycle 4 FOLFOX, obtained ionized calcium, PTH, vitamin D and PTH related peptide testing.  The studies include a PTH of 25, ionized calcium of 1.41, PTH related peptide less than 2.0, vitamin D level 67.1.    He is next seen 1/27/2025 for cycle #5 FOLFOX out of 6 planned prior to repeat MRI.  He is doing well with treatment with minimal neuropathy which recovers quickly, no additional fatigue and is without prolonged cytopenias.  He is trying to plan a wedding in and around his surgery and may need dental extractions soon.  He is also having back pain after recent exercising and is pursuing an epidural.    The patient is seen 2/26/2025 improved from previous and is status post MRI of the pelvis 2/18/2025 demonstrating a response to therapy improved from previous from 11/4/2024 with a radiologic estimate of T3c compared to T3 DM2.  In review of this study enlarging tumor signal extends superiorly from the mass in close approximation between the mesorectum and peritoneal cavity and the previously seen suspicious lymph nodes appear to have improved from previous.    He returns today April 14, 2025.  On 3/20/2025 he underwent a low anterior resection with diverting loop ileostomy and appendectomy.  Fortunately his postoperative course was uneventful though he developed a high output ileostomy that required loperamide to slow it down.  Pathology revealed a pT2 N0 well-differentiated mid rectal adenocarcinoma with treatment effect.    He was seen back April 3, 2025 having high output through his ostomy and it was  felt that it would be reasonable to move forward with closure of his diverting loop ileostomy in 4 weeks.  It was discussed that chemotherapy adjuvantly would be held until he recovered postoperatively.    He is next seen April 14, 2025.  His ostomy still has variable output but he has been able to manage this and he is quite willing to proceed as above per his subsequent surgery and reconsideration of adjuvant therapy postop.    He continued to see Dr. Rai in 5/3/2025 - 5/8/2025 he underwent closure of loop ileostomy.  He was seen back in follow-up 5/22/2025 and was felt a candidate to restart chemotherapy.    He followed with cardiology with negative stress testing, asked to continue statin and aspirin.    He did present to the ER 5/23/2025 with accelerated hypertension, weakness and mild headache eventually responsive to medications.  Troponin was negative as was normal assessment of his kidney function.    He is next seen in office 5/27/2025 to restart chemotherapy to start adjuvant FOLFOX x 6.        Past Surgical History:   Procedure Laterality Date    ANGIOPLASTY CAROTID ARTERY      APPENDECTOMY      CAROTID ENDARTERECTOMY Left     COLON RESECTION N/A 03/19/2025    Procedure: Laparoscopic Converted to Open Low Anterior Resection, Ostomy Creation, and Appendetomy;  Surgeon: Naeem Rai MD;  Location: [facility] OR;  Service: General;  Laterality: N/A;    COLONOSCOPY N/A 10/18/2024    Procedure: COLONOSCOPY TO CECUM/TI WITH BIOPSIES;  Surgeon: Marcus Christine Jr., MD;  Location: [facility] ENDOSCOPY;  Service: General;  Laterality: N/A;  PRE-SCREENING, FAMILY HX COLON CANCER  POST-DIVERTICULOSIS, RECTAL MASS    ENDOSCOPY      FOOT SURGERY      ILEOSTOMY CLOSURE N/A 5/5/2025    Procedure: Closure of diverting loop ileostomy;  Surgeon: Naeem Rai MD;  Location: [facility] OR;  Service: General;  Laterality: N/A;    INSERTION PROSTATE RADIATION SEED      LUMBAR EPIDURAL INJECTION N/A  02/14/2025    Procedure: L4/L5 LUMBAR EPIDURAL STEROID INJECTION CPT: 66733;  Surgeon: Vandana Ordonez MD;  [place] [facility] [place];  Service: Pain Management;  Laterality: N/A;    TOOTH EXTRACTION  2025    VENOUS ACCESS DEVICE (PORT) INSERTION N/A 11/22/2024    Procedure: INSERTION VENOUS ACCESS DEVICE;  Surgeon: Naeem Rai MD;  Location: [facility] MAIN OR;  Service: General;  Laterality: N/A;        Current Outpatient Medications on File Prior to Visit   Medication Sig Dispense Refill    acetaminophen (TYLENOL) 500 MG tablet Take 2 tablets by mouth Every 6 (Six) Hours As Needed for Mild Pain.      aspirin 81 MG chewable tablet Chew 1 tablet Daily. HELD FOR OR      atorvastatin (LIPITOR) 80 MG tablet Take 1 tablet by mouth Daily. 30 tablet 3    Cyanocobalamin (VITAMIN B 12 PO) Take 1 tablet by mouth Daily. HOLD PER MD INSTR      DULoxetine (CYMBALTA) 60 MG capsule Take 1 capsule by mouth Daily. 90 capsule 3    ferrous sulfate 325 (65 Fe) MG tablet Take 1 tablet by mouth Daily With Breakfast.      gabapentin (NEURONTIN) 300 MG capsule Take 2 capsules by mouth 3 (Three) Times a Day. 540 capsule 3    loperamide (IMODIUM) 2 MG capsule Take 1 capsule by mouth 4 (Four) Times a Day As Needed for Diarrhea.      losartan (COZAAR) 25 MG tablet Take 4 tablets by mouth Daily.      ondansetron (ZOFRAN) 8 MG tablet Take 1 tablet by mouth 3 (Three) Times a Day As Needed for Nausea or Vomiting. 30 tablet 5    tamsulosin (FLOMAX) 0.4 MG capsule 24 hr capsule Take 1 capsule by mouth every night at bedtime.      vitamin D (ERGOCALCIFEROL) 1.25 MG (76311 UT) capsule capsule TAKE 1 CAPSULE BY MOUTH 1 TIME EVERY WEEK (Patient taking differently: Take 1 capsule by mouth 1 (One) Time Per Week. SATURDAYS) 12 capsule 0     No current facility-administered medications on file prior to visit.        ALLERGIES:  No Known Allergies     Social History     Socioeconomic History    Marital status: Significant Other   Tobacco Use     "Smoking status: Former     Types: Cigarettes    Smokeless tobacco: Never    Tobacco comments:     QUIT 1990     1 TO 3 PPD X 20 YEARS    Vaping Use    Vaping status: Former    Substances: CBD   Substance and Sexual Activity    Alcohol use: No    Drug use: Not Currently    Sexual activity: Defer        Family History   Problem Relation Age of Onset    Bone cancer Mother     Heart disease Father     COPD Father     Hypertension Father     Stroke Father         TIA    Bone cancer Father         smoker    Lung cancer Father     Diabetes Father     No Known Problems Sister     No Known Problems Brother     Mental retardation Brother     No Known Problems Maternal Grandmother     No Known Problems Maternal Grandfather     No Known Problems Paternal Grandmother     Colon cancer Paternal Grandfather     Malig Hyperthermia Neg Hx           Objective     Vitals:    05/27/25 0820   BP: 138/76   Pulse: 64   Temp: 97.8 °F (36.6 °C)   TempSrc: Skin   SpO2: 95%   Weight: 78.4 kg (172 lb 14.4 oz)   Height: 165.1 cm (65\")   PainSc: 0-No pain             5/27/2025     8:21 AM   Current Status   ECOG score 0       Physical Exam  Constitutional:       Appearance: Normal appearance.   Cardiovascular:      Rate and Rhythm: Normal rate.      Heart sounds: Normal heart sounds.   Pulmonary:      Effort: Pulmonary effort is normal.      Breath sounds: Normal breath sounds.   Abdominal:      Palpations: Abdomen is soft.   Skin:     General: Skin is warm and dry.   Neurological:      Mental Status: He is oriented to person, place, and time.         RECENT LABS:  Results from last 7 days   Lab Units 05/23/25  2218   WBC 10*3/mm3 7.81   NEUTROS ABS 10*3/mm3 4.54   HEMOGLOBIN g/dL 10.8*   HEMATOCRIT % 32.3*   PLATELETS 10*3/mm3 332         Results from last 7 days   Lab Units 05/23/25  2218   SODIUM mmol/L 142   POTASSIUM mmol/L 4.7   CHLORIDE mmol/L 104   CO2 mmol/L 30.0*   BUN mg/dL 7*   CREATININE mg/dL 0.87   CALCIUM mg/dL 9.5   ALBUMIN g/dL " 4.0   BILIRUBIN mg/dL 0.4   ALK PHOS U/L 93   ALT (SGPT) U/L 11   AST (SGOT) U/L 16   GLUCOSE mg/dL 98           Assessment & Plan   *Stage III mid to upper well differentiated rectal adenocarcinoma (cT3, cN2, cM0)     69-year-old male with a history of of diabetes, CHF GE reflux hyperlipidemia, hypertension, TIA, possible CVA as well as a history of prostate cancer status post XRT.  He had been recently having a change in bowel habit ultimately leading to additional assessment.  This led to a screening colonoscopy 10/18/2024demonstrating a circumferential mass involving the rectum extending from 10 cm to 12 cm with biopsy of 15 cm consistent with invasive well-differentiated adenocarcinoma with ulceration necroinflammatory debris. This was felt to be microsatellite stable by IHC as well as including MSI by PCR.  MRI of the pelvis performed 11/4/2024 demonstrates a high rectal mass extending to the proximal sigmoid colon representing a T3d N2 rectal tumor, side effect invasion on the superior aspect of the mass approximates between the mesorectum and peritoneal cavity?  Additional staging 11/8/2024 includes a CT of the chest demonstrating small pleural plaques along the posterior aspect of both the right lower lobes of uncertain significance.  There are no other substantial findings though we have discussed this as leading to a PET/CT to complete his staging.  11/14/2024 by colorectal surgery, Dr. Naeem Rai, without evidence of obstruction or bleeding.  He was thought a excellent candidate for neoadjuvant chemotherapy followed by mesorectal excision and adjuvant chemotherapy-comparable to the PROSPECT trial.  There was concern about the location of the tumor extending down to the rectum and the avoidance of radiation therapy since the tumor appeared to be resectable.  There was also the concern of his previous history of radiation therapy.  Further discussed was the role of laparoscopic low anterior resection,  total mesorectal excision, and creation of a diverting loop ileostomy temporarily utilized also discussed.  11/20/2024 and we have reviewed the PROSPECT Trial which was designed to determine whether neoadjuvant chemotherapy could be used as an alternative to neoadjuvant chemoRT.  This study demonstrated that similar disease-free survival and overall survival was similar to neoadjuvant CRT alone for eligible patients.  This would include the use of 6 cycles of FOLFOX chemotherapy followed by reassessment and if a clinical response and 20% or more was seen then RT would be admitted, proceeding to surgical resection and subsequent adjuvant chemotherapy.  After discussion the patient agrees to proceed.  12/2/2024: Proceed with C1D1 FOLFOX. Hemoglobin has declined to 9.9 today secondary to malignancy- ongoing bleeding and worsening iron deficiency. He has been taking oral iron, however, is not tolerating this well, therefore will plan for IV iron pending insurance approval.   12/16/2024: Proceed with cycle 2-day 1 FOLFOX today.  Patient is tolerating well.  The patient is next evaluated 12/30/2024 for his third cycle of FOLFOX.  He has undergone Feraheme given 12/4/2024 and 12/16/2024.  He is feeling reasonably good without neuropathic symptoms.  We have discussed his scheduling and timing as he proceeds through his treatment including subsequent repeat MR pelvis after his 6 cycle of FOLFOX.  1/13/2025 Returns for cycle 4 FOLFOX today and was COVID positive 1/6/2025 and treated with Paxlovid and thereafter improved.  He has a scant cough remaining otherwise clear lung sounds.  Will proceed with treatment today.  Patient seen 1/27/2025 recovered from COVID, generally improved performance status and plans for cycle 5 FOLFOX at a 6 planned.  2/10/205 proceed with cycle 6 FOLFOX.  Following 6 cycle of neoadjuvant therapy will proceed with MRI for surgical planning.  The patient was referred today to Dr. Rai  Subsequent  repeat repeat MRI of the pelvis 2/18/2025 demonstrating a response to therapy improved from previous from 11/4/2024 with a radiologic estimate of T3c compared to T3 DM2.  In review of this study enlarging tumor signal extends superiorly from the mass in close approximation between the mesorectum and peritoneal cavity and the previously seen suspicious lymph nodes appear to have improved from previous.  Surgical evaluation anticipated with Dr. Rai 2/27/2025  3/13/2025: He is scheduled for surgery with Dr. Rai on 3/19/2025  He returns today April 14, 2025.  On 3/20/2025 he underwent a low anterior resection with diverting loop ileostomy and appendectomy.  Fortunately his postoperative course was uneventful though he developed a high output ileostomy that required loperamide to slow it down.  Pathology revealed a pT2 N0 well-differentiated mid rectal adenocarcinoma with treatment effect.  He was seen back April 3, 2025 having high output through his ostomy and it was felt that it would be reasonable to move forward with closure of his diverting loop ileostomy in 4 weeks.  It was discussed that chemotherapy adjuvantly would be held until he recovered postoperatively.  He is next seen April 14, 2025.  His ostomy still has variable output but he has been able to manage this and he is quite willing to proceed as above per his subsequent surgery and reconsideration of adjuvant therapy postop.  He continued to see Dr. Rai in 5/3/2025 - 5/8/2025 he underwent closure of loop ileostomy.  He was seen back in follow-up 5/22/2025 and was felt a candidate to restart chemotherapy.  He followed with cardiology with negative stress testing, asked to continue statin and aspirin.  He did present to the ER 5/23/2025 with accelerated hypertension, weakness and mild headache eventually responsive to medications.  Troponin was negative as was normal assessment of his kidney function.  He is next seen in office 5/27/2025 to  restart chemotherapy to start adjuvant FOLFOX x 6.    *Anemia   12/2/2024: Hemoglobin 9.9 today. Patient is not tolerating oral iron, therefore, will plan to proceed with IV iron.    12/16/2024 hemoglobin has improved today to 10.8.  Proceeded with the second dose of Feraheme  Reassessment 414/25-H&H 11.5 and 35.2  Reassessment 5/23/2025-H&H of 10.8 and 32.3    *COVID positive 1/6/2025 trreated with paxlovid  Resolved symptoms 1/27/2025    *Hypercalcemia  1/13/2025 calcium 11 with normal albumin.  Discussed with Dr. Nagel and additional labs added including ionized calcium, PTH, PTH related peptide and vitamin D level as he has been on high-dose vitamin D.  Asked patient to hold vitamin D supplement until additional labs have resulted.  Will also recheck CMP on Wednesday at disconnect.  Subsequent ionized calcium, PTH, vitamin D and PTH related peptide testing.  The studies include a PTH of 25, ionized calcium of 1.41, PTH related peptide less than 2.0, vitamin D level 67.1.  Repeat calcium 1/20/2025 at 9.1  2/10/2025 calcium normal at 9.2  5/23/2025 calcium 9.5    Plan:     *Plan to proceed with adjuvant chemotherapy-FOLFOX x 6  *Laboratory studies pending review  *2 weeks NP, FOLFOX-2/6.  *Patient agreeable this plan and follow-up      Kevin Nagel MD  05/27/2025

## 2025-05-24 VITALS
OXYGEN SATURATION: 96 % | DIASTOLIC BLOOD PRESSURE: 92 MMHG | WEIGHT: 172 LBS | SYSTOLIC BLOOD PRESSURE: 155 MMHG | BODY MASS INDEX: 28.66 KG/M2 | HEART RATE: 76 BPM | RESPIRATION RATE: 16 BRPM | HEIGHT: 65 IN | TEMPERATURE: 98.5 F

## 2025-05-24 LAB
GEN 5 1HR TROPONIN T REFLEX: 13 NG/L
QT INTERVAL: 443 MS
QTC INTERVAL: 479 MS
TROPONIN T NUMERIC DELTA: 0 NG/L

## 2025-05-24 PROCEDURE — 25010000002 HYDRALAZINE PER 20 MG: Performed by: EMERGENCY MEDICINE

## 2025-05-24 PROCEDURE — 96374 THER/PROPH/DIAG INJ IV PUSH: CPT

## 2025-05-24 RX ADMIN — HYDRALAZINE HYDROCHLORIDE 10 MG: 20 INJECTION INTRAMUSCULAR; INTRAVENOUS at 00:12

## 2025-05-24 NOTE — ED PROVIDER NOTES
EMERGENCY DEPARTMENT ENCOUNTER    Room Number:  14/14  PCP: Priti Oliveira APRN  Patient Care Team:  Priti Oliveira APRN as PCP - General (Nurse Practitioner)  Wellington Chaudhari MD as Consulting Physician (Urology)  Jimmy Kwon MD as Consulting Physician (Infectious Diseases)  Jeremie Krueger II, MD as Consulting Physician (Hematology and Oncology)  Kevin Nagel MD as Consulting Physician (Hematology and Oncology)  Naeem Rai MD as Referring Physician (Colon and Rectal Surgery)  Osvaldo Lopes MD as Consulting Physician (Cardiology)  Karma Collins, RN as Nurse Navigator (Oncology)   Independent Historians: Patient, family    HPI:  Chief Complaint: Hypertension    A complete HPI/ROS/PMH/PSH/SH/FH are unobtainable due to: None    Chronic or social conditions impacting patient care (Social Determinants of Health): None  (Financial Resource Strain / Food Insecurity / Transportation Needs / Physical Activity / Stress / Social Connections / Intimate Partner Violence / Housing Stability)    Context: Kevin Garsia is a 69 y.o. male who presents to the ED c/o acute hypertension today.  Patient states that blood pressures been above 190 systolic all day.  Describes some weakness mild headache earlier today that has since resolved.  No vision changes no chest pain or shortness of breath no tingling or weakness.  Patient states that his doctor has been adjusting his blood pressure medicine recently.  He has no acute complaints.    Review of prior external notes (non-ED) -and- Review of prior external test results outside of this encounter: Patient's cardiology note from 5/12/2025    Prescription drug monitoring program review:         PAST MEDICAL HISTORY  Active Ambulatory Problems     Diagnosis Date Noted    Essential hypertension     Hyperlipidemia     History of prostate cancer     Non morbid obesity due to excess calories 05/31/2017    Vitamin D deficiency  04/04/2019    TIA (transient ischemic attack) 07/24/2019    S/P carotid endarterectomy 07/24/2019    Type 2 diabetes mellitus without complication, without long-term current use of insulin 07/24/2019    Chronic heart failure with preserved ejection fraction 07/06/2019    Arthritis 07/30/2019    Sleep apnea 07/30/2019    Hospital discharge follow-up 02/07/2022    Cervical spinal stenosis 02/07/2022    Stroke-like symptoms 06/07/2022    Spinal stenosis, lumbar region, without neurogenic claudication 09/12/2022    Protruded lumbar disc 09/12/2022    Chronic right-sided low back pain without sciatica 11/22/2022    Neck pain 11/22/2022    Cervical spondylosis without myelopathy 11/22/2022    Bilateral carpal tunnel syndrome 11/22/2022    Generalized joint pain 03/28/2023    HIV infection 03/28/2023    Medicare annual wellness visit, subsequent 03/29/2023    Other fatigue 05/16/2023    Chronic left shoulder pain 05/16/2023    Change in mole 10/25/2023    Chronic right shoulder pain 07/16/2024    Seasonal allergies 07/16/2024    Acute non-recurrent maxillary sinusitis 07/21/2024    Hypercalcemia 07/24/2024    Screening for colon cancer 07/25/2024    Localized swelling, mass, and lump of head 09/17/2024    History of rectal cancer 11/15/2024    Fitting and adjustment of vascular catheter 11/21/2024    Iron deficiency anemia 12/02/2024    Adverse effect of iron 12/02/2024    Lumbar radiculopathy 02/04/2025    RBBB 04/08/2025    Palpitation 04/08/2025    Arterial hypotension 04/25/2025    Urine retention 04/26/2025    Near syncope 04/26/2025    Rectal adenocarcinoma 05/05/2025    Calcification of native coronary artery 05/12/2025     Resolved Ambulatory Problems     Diagnosis Date Noted    Hyperkalemia 04/26/2025    MARY (acute kidney injury) 04/26/2025     Past Medical History:   Diagnosis Date    Anemia     Aphasia     CTS (carpal tunnel syndrome)     Depression     Diabetes mellitus     GERD (gastroesophageal reflux  disease)     History of carotid stenosis     History of radiation therapy     History of transient ischemic attack (TIA)     Hypertension     Multiple gastric ulcers     Neuropathy     Stroke     Tattoos          PAST SURGICAL HISTORY  Past Surgical History:   Procedure Laterality Date    ANGIOPLASTY CAROTID ARTERY      APPENDECTOMY      CAROTID ENDARTERECTOMY Left     COLON RESECTION N/A 03/19/2025    Procedure: Laparoscopic Converted to Open Low Anterior Resection, Ostomy Creation, and Appendetomy;  Surgeon: Naeem Rai MD;  Location: [facility] MAIN OR;  Service: General;  Laterality: N/A;    COLONOSCOPY N/A 10/18/2024    Procedure: COLONOSCOPY TO CECUM/TI WITH BIOPSIES;  Surgeon: Marcus Christine Jr., MD;  Location: [facility] ENDOSCOPY;  Service: General;  Laterality: N/A;  PRE-SCREENING, FAMILY HX COLON CANCER  POST-DIVERTICULOSIS, RECTAL MASS    ENDOSCOPY      FOOT SURGERY      ILEOSTOMY CLOSURE N/A 5/5/2025    Procedure: Closure of diverting loop ileostomy;  Surgeon: Naeem Rai MD;  Location: [facility] MAIN OR;  Service: General;  Laterality: N/A;    INSERTION PROSTATE RADIATION SEED      LUMBAR EPIDURAL INJECTION N/A 02/14/2025    Procedure: L4/L5 LUMBAR EPIDURAL STEROID INJECTION CPT: 51086;  Surgeon: Vandana Ordonez MD;  Location: [facility] MAIN OR;  Service: Pain Management;  Laterality: N/A;    TOOTH EXTRACTION  2025    VENOUS ACCESS DEVICE (PORT) INSERTION N/A 11/22/2024    Procedure: INSERTION VENOUS ACCESS DEVICE;  Surgeon: Naeem Rai MD;  Location: [facility] MAIN OR;  Service: General;  Laterality: N/A;         FAMILY HISTORY  Family History   Problem Relation Age of Onset    Bone cancer Mother     Heart disease Father     COPD Father     Hypertension Father     Stroke Father         TIA    Bone cancer Father         smoker    Lung cancer Father     Diabetes Father     No Known Problems Sister     No Known Problems Brother     Mental retardation Brother     No Known Problems  Maternal Grandmother     No Known Problems Maternal Grandfather     No Known Problems Paternal Grandmother     Colon cancer Paternal Grandfather     Malig Hyperthermia Neg Hx          SOCIAL HISTORY  Social History     Socioeconomic History    Marital status: Significant Other   Tobacco Use    Smoking status: Former     Types: Cigarettes    Smokeless tobacco: Never    Tobacco comments:     QUIT 1990     1 TO 3 PPD X 20 YEARS    Vaping Use    Vaping status: Former    Substances: CBD   Substance and Sexual Activity    Alcohol use: No    Drug use: Not Currently    Sexual activity: Defer         ALLERGIES  Patient has no known allergies.        REVIEW OF SYSTEMS  Review of Systems  Included in HPI  All systems reviewed and negative except for those discussed in HPI.      PHYSICAL EXAM    I have reviewed the triage vital signs and nursing notes.    ED Triage Vitals [05/23/25 1934]   Temp Heart Rate Resp BP SpO2   98.5 °F (36.9 °C) 86 18 177/98 96 %      Temp src Heart Rate Source Patient Position BP Location FiO2 (%)   Oral -- Sitting Right arm --       Physical Exam  GENERAL: alert, no acute distress  SKIN: Warm, dry  HENT: Normocephalic, atraumatic  EYES: no scleral icterus  CV: regular rhythm, regular rate  RESPIRATORY: normal effort, lungs clear  ABDOMEN: soft, nontender, nondistended  MUSCULOSKELETAL: no deformity  NEURO: alert, moves all extremities, follows commands                                                               LAB RESULTS  Recent Results (from the past 24 hours)   ECG 12 Lead Other; Hypertension    Collection Time: 05/23/25 10:13 PM   Result Value Ref Range    QT Interval 443 ms    QTC Interval 479 ms   Comprehensive Metabolic Panel    Collection Time: 05/23/25 10:18 PM    Specimen: Blood   Result Value Ref Range    Glucose 98 65 - 99 mg/dL    BUN 7 (L) 8 - 23 mg/dL    Creatinine 0.87 0.76 - 1.27 mg/dL    Sodium 142 136 - 145 mmol/L    Potassium 4.7 3.5 - 5.2 mmol/L    Chloride 104 98 - 107 mmol/L     CO2 30.0 (H) 22.0 - 29.0 mmol/L    Calcium 9.5 8.6 - 10.5 mg/dL    Total Protein 6.5 6.0 - 8.5 g/dL    Albumin 4.0 3.5 - 5.2 g/dL    ALT (SGPT) 11 1 - 41 U/L    AST (SGOT) 16 1 - 40 U/L    Alkaline Phosphatase 93 39 - 117 U/L    Total Bilirubin 0.4 0.0 - 1.2 mg/dL    Globulin 2.5 gm/dL    A/G Ratio 1.6 g/dL    BUN/Creatinine Ratio 8.0 7.0 - 25.0    Anion Gap 8.0 5.0 - 15.0 mmol/L    eGFR 92.2 >60.0 mL/min/1.73   High Sensitivity Troponin T    Collection Time: 05/23/25 10:18 PM    Specimen: Blood   Result Value Ref Range    HS Troponin T 13 <22 ng/L   CBC Auto Differential    Collection Time: 05/23/25 10:18 PM    Specimen: Blood   Result Value Ref Range    WBC 7.81 3.40 - 10.80 10*3/mm3    RBC 3.34 (L) 4.14 - 5.80 10*6/mm3    Hemoglobin 10.8 (L) 13.0 - 17.7 g/dL    Hematocrit 32.3 (L) 37.5 - 51.0 %    MCV 96.7 79.0 - 97.0 fL    MCH 32.3 26.6 - 33.0 pg    MCHC 33.4 31.5 - 35.7 g/dL    RDW 14.3 12.3 - 15.4 %    RDW-SD 49.8 37.0 - 54.0 fl    MPV 10.1 6.0 - 12.0 fL    Platelets 332 140 - 450 10*3/mm3    Neutrophil % 58.2 42.7 - 76.0 %    Lymphocyte % 27.9 19.6 - 45.3 %    Monocyte % 7.4 5.0 - 12.0 %    Eosinophil % 5.6 0.3 - 6.2 %    Basophil % 0.6 0.0 - 1.5 %    Immature Grans % 0.3 0.0 - 0.5 %    Neutrophils, Absolute 4.54 1.70 - 7.00 10*3/mm3    Lymphocytes, Absolute 2.18 0.70 - 3.10 10*3/mm3    Monocytes, Absolute 0.58 0.10 - 0.90 10*3/mm3    Eosinophils, Absolute 0.44 (H) 0.00 - 0.40 10*3/mm3    Basophils, Absolute 0.05 0.00 - 0.20 10*3/mm3    Immature Grans, Absolute 0.02 0.00 - 0.05 10*3/mm3    nRBC 0.0 0.0 - 0.2 /100 WBC   High Sensitivity Troponin T 1Hr    Collection Time: 05/23/25 11:33 PM    Specimen: Blood   Result Value Ref Range    HS Troponin T 13 <22 ng/L    Troponin T Numeric Delta 0 Abnormal if >/=3 ng/L       I ordered the above labs and independently reviewed the results.        RADIOLOGY  No Radiology Exams Resulted Within Past 24 Hours    I ordered the above noted radiological studies. Reviewed  by me and discussed with radiologist.  See dictation for official radiology interpretation.      PROCEDURES    Procedures      MEDICATIONS GIVEN IN ER    Medications   cloNIDine (CATAPRES) tablet 0.1 mg (0.1 mg Oral Given 5/23/25 2221)   hydrALAZINE (APRESOLINE) injection 10 mg (10 mg Intravenous Given 5/24/25 0012)         ORDERS PLACED DURING THIS VISIT:  Orders Placed This Encounter   Procedures    Comprehensive Metabolic Panel    High Sensitivity Troponin T    CBC Auto Differential    High Sensitivity Troponin T 1Hr    ECG 12 Lead Other; Hypertension    CBC & Differential         PROGRESS, DATA ANALYSIS, CONSULTS, AND MEDICAL DECISION MAKING    All labs have been independently interpreted by me.  All radiology studies have been reviewed by me and discussed with radiologist dictating the report.   EKG's independently viewed and interpreted by me.  Discussion below represents my analysis of pertinent findings related to patient's condition, differential diagnosis, treatment plan and final disposition.    Differential diagnosis includes but is not limited to:  Neuromuscular weakness   CVA  Hemorrhagic stroke  Multiple sclerosis  Amyotrophic Lateral Sclerosis (ALS) (UMN & LMN)  Spinal and bulbar muscular atrophy (Sylvain's syndrome)  Spinal cord disease: Infection (Epidural abscess)  Infarction/ischemia  Trauma (Spinal Cord Syndromes)  Inflammation (Transverse Myelitis)  Degenerative (Spinal muscular atrophy)  Tumor  Peripheral nerve disease: Guillain-Head Waters syndrome  Toxins ([place]a)  Tick paralysis  Diabetic peripheral neuropathy  NMJ disease: Myasthenia gravis crisis  Botulism  Organophosphate toxicity  Lambert-Eaton myasthenic syndrome  Rhabdomyolysis  Dermatomyositis  Polymyositis  Alcoholic myopathy  Non-neuromuscular weakness   ACS  Arrhythmia/Syncope  Severe infection/Sepsis  Hypoglycemia  Periodic paralysis (electrolyte disturbance, K, Mg, Ca)   Hypokalemic periodic paralysis  Thyrotoxic periodic  paralysis  Respiratory failure  Symptomatic Anemia  Severe dehydration  Hypothyroidism  Polypharmacy  Malignancy      .    Clinical Scores:              ED Course as of 05/24/25 0728   Fri May 23, 2025   2204 First look: Patient complains of elevated blood pressure since early this morning.  His primary care provider has recently been adjusting his blood pressure medications.  Because of this, he checks his blood pressure multiple times daily.  His blood pressure was approximately 190/90 this morning.  His blood pressure improved after he took his usual dose of losartan 100 mg.  However throughout the day, his blood pressure began increasing again.  He developed a mild headache and felt somewhat weak so he came to the ED.  He denies vision changes, chest pain, shortness of breath, or new numbness/tingling/weakness in his extremities.    Exam: Heart is regular rate and rhythm.  Respiratory effort is normal.  Lungs are clear.  Abdomen is soft and nontender.  Speech is clear and fluent.  Follows commands.    Blood pressure is currently 197/102.  He will be given a dose of clonidine.  Patient's care will be assumed by the oncoming physician. [WH]   8509 EKG          EKG time: 2213  Rhythm/Rate: Sinus rhythm rate 70  P waves and OR: Normal  QRS, axis: Left anterior fascicular block and right bundle branch block  ST and T waves: Normal    Interpreted Contemporaneously by me, independently viewed [TJ]   Sat May 24, 2025   0021 Troponin T Numeric Delta: 0 [TJ]   0021 Creatinine: 0.87 [TJ]   0137 Pressure improved.  Workup negative.  Will discharge home.  Patient will follow-up with primary care doctor. [TJ]      ED Course User Index  [TJ] Trace Fraser MD  [WH] Sal Meza MD               PPE: The patient wore a mask and I wore an N95 mask throughout the entire patient encounter.       AS OF 07:28 EDT VITALS:    BP - 155/92  HR - 76  TEMP - 98.5 °F (36.9 °C) (Oral)  O2 SATS - 96%        DIAGNOSIS  Final  diagnoses:   Hypertension, unspecified type         DISPOSITION  ED Disposition       ED Disposition   Discharge    Condition   Stable    Comment   --                  Note Disclaimer: At [facility], we believe that sharing information builds trust and better relationships. You are receiving this note because you recently visited [facility]. It is possible you will see health information before a provider has talked with you about it. This kind of information can be easy to misunderstand. To help you fully understand what it means for your health, we urge you to discuss this note with your provider.         Trace Fraser MD  05/24/25 0731

## 2025-05-27 ENCOUNTER — INFUSION (OUTPATIENT)
Dept: ONCOLOGY | Facility: HOSPITAL | Age: 70
End: 2025-05-27
Payer: MEDICARE

## 2025-05-27 ENCOUNTER — OFFICE VISIT (OUTPATIENT)
Dept: ONCOLOGY | Facility: CLINIC | Age: 70
End: 2025-05-27
Payer: MEDICARE

## 2025-05-27 VITALS
HEART RATE: 64 BPM | WEIGHT: 172.9 LBS | SYSTOLIC BLOOD PRESSURE: 138 MMHG | DIASTOLIC BLOOD PRESSURE: 76 MMHG | OXYGEN SATURATION: 95 % | HEIGHT: 65 IN | TEMPERATURE: 97.8 F | BODY MASS INDEX: 28.81 KG/M2

## 2025-05-27 DIAGNOSIS — Z85.048 HISTORY OF RECTAL CANCER: ICD-10-CM

## 2025-05-27 DIAGNOSIS — Z85.048 HISTORY OF RECTAL CANCER: Primary | ICD-10-CM

## 2025-05-27 LAB
ALBUMIN SERPL-MCNC: 4.3 G/DL (ref 3.5–5.2)
ALBUMIN/GLOB SERPL: 1.7 G/DL
ALP SERPL-CCNC: 87 U/L (ref 39–117)
ALT SERPL W P-5'-P-CCNC: 11 U/L (ref 1–41)
ANION GAP SERPL CALCULATED.3IONS-SCNC: 10.3 MMOL/L (ref 5–15)
AST SERPL-CCNC: 14 U/L (ref 1–40)
BASOPHILS # BLD AUTO: 0.05 10*3/MM3 (ref 0–0.2)
BASOPHILS NFR BLD AUTO: 0.8 % (ref 0–1.5)
BILIRUB SERPL-MCNC: 0.4 MG/DL (ref 0–1.2)
BUN SERPL-MCNC: 14 MG/DL (ref 8–23)
BUN/CREAT SERPL: 14.7 (ref 7–25)
CALCIUM SPEC-SCNC: 9.6 MG/DL (ref 8.6–10.5)
CHLORIDE SERPL-SCNC: 101 MMOL/L (ref 98–107)
CO2 SERPL-SCNC: 29.7 MMOL/L (ref 22–29)
CREAT SERPL-MCNC: 0.95 MG/DL (ref 0.76–1.27)
DEPRECATED RDW RBC AUTO: 53.6 FL (ref 37–54)
EGFRCR SERPLBLD CKD-EPI 2021: 85.6 ML/MIN/1.73
EOSINOPHIL # BLD AUTO: 0.35 10*3/MM3 (ref 0–0.4)
EOSINOPHIL NFR BLD AUTO: 5.7 % (ref 0.3–6.2)
ERYTHROCYTE [DISTWIDTH] IN BLOOD BY AUTOMATED COUNT: 14.6 % (ref 12.3–15.4)
GLOBULIN UR ELPH-MCNC: 2.5 GM/DL
GLUCOSE SERPL-MCNC: 88 MG/DL (ref 65–99)
HCT VFR BLD AUTO: 34.9 % (ref 37.5–51)
HGB BLD-MCNC: 11.1 G/DL (ref 13–17.7)
IMM GRANULOCYTES # BLD AUTO: 0.01 10*3/MM3 (ref 0–0.05)
IMM GRANULOCYTES NFR BLD AUTO: 0.2 % (ref 0–0.5)
LYMPHOCYTES # BLD AUTO: 1.77 10*3/MM3 (ref 0.7–3.1)
LYMPHOCYTES NFR BLD AUTO: 28.8 % (ref 19.6–45.3)
MCH RBC QN AUTO: 31.9 PG (ref 26.6–33)
MCHC RBC AUTO-ENTMCNC: 31.8 G/DL (ref 31.5–35.7)
MCV RBC AUTO: 100.3 FL (ref 79–97)
MONOCYTES # BLD AUTO: 0.45 10*3/MM3 (ref 0.1–0.9)
MONOCYTES NFR BLD AUTO: 7.3 % (ref 5–12)
NEUTROPHILS NFR BLD AUTO: 3.51 10*3/MM3 (ref 1.7–7)
NEUTROPHILS NFR BLD AUTO: 57.2 % (ref 42.7–76)
NRBC BLD AUTO-RTO: 0 /100 WBC (ref 0–0.2)
PLATELET # BLD AUTO: 305 10*3/MM3 (ref 140–450)
PMV BLD AUTO: 10 FL (ref 6–12)
POTASSIUM SERPL-SCNC: 4 MMOL/L (ref 3.5–5.2)
PROT SERPL-MCNC: 6.8 G/DL (ref 6–8.5)
RBC # BLD AUTO: 3.48 10*6/MM3 (ref 4.14–5.8)
SODIUM SERPL-SCNC: 141 MMOL/L (ref 136–145)
WBC NRBC COR # BLD AUTO: 6.14 10*3/MM3 (ref 3.4–10.8)

## 2025-05-27 PROCEDURE — 80053 COMPREHEN METABOLIC PANEL: CPT | Performed by: INTERNAL MEDICINE

## 2025-05-27 PROCEDURE — 25010000002 DEXAMETHASONE SODIUM PHOSPHATE 100 MG/10ML SOLUTION: Performed by: INTERNAL MEDICINE

## 2025-05-27 PROCEDURE — 25810000003 SODIUM CHLORIDE 0.9 % SOLUTION 250 ML FLEX CONT: Performed by: INTERNAL MEDICINE

## 2025-05-27 PROCEDURE — 25010000002 FOSAPREPITANT PER 1 MG: Performed by: INTERNAL MEDICINE

## 2025-05-27 PROCEDURE — G0498 CHEMO EXTEND IV INFUS W/PUMP: HCPCS

## 2025-05-27 PROCEDURE — 25010000002 LEUCOVORIN CALCIUM PER 50 MG: Performed by: INTERNAL MEDICINE

## 2025-05-27 PROCEDURE — 25010000002 OXALIPLATIN PER 0.5 MG: Performed by: INTERNAL MEDICINE

## 2025-05-27 PROCEDURE — 96368 THER/DIAG CONCURRENT INF: CPT

## 2025-05-27 PROCEDURE — 25010000003 DEXTROSE 5 % SOLUTION 250 ML FLEX CONT: Performed by: INTERNAL MEDICINE

## 2025-05-27 PROCEDURE — 25010000002 FLUOROURACIL PER 500 MG: Performed by: INTERNAL MEDICINE

## 2025-05-27 PROCEDURE — 25010000002 PALONOSETRON PER 25 MCG: Performed by: INTERNAL MEDICINE

## 2025-05-27 PROCEDURE — 85025 COMPLETE CBC W/AUTO DIFF WBC: CPT | Performed by: INTERNAL MEDICINE

## 2025-05-27 PROCEDURE — 96411 CHEMO IV PUSH ADDL DRUG: CPT

## 2025-05-27 PROCEDURE — 96367 TX/PROPH/DG ADDL SEQ IV INF: CPT

## 2025-05-27 PROCEDURE — 25010000003 DEXTROSE 5 % SOLUTION: Performed by: INTERNAL MEDICINE

## 2025-05-27 PROCEDURE — 96413 CHEMO IV INFUSION 1 HR: CPT

## 2025-05-27 PROCEDURE — 96375 TX/PRO/DX INJ NEW DRUG ADDON: CPT

## 2025-05-27 PROCEDURE — 96415 CHEMO IV INFUSION ADDL HR: CPT

## 2025-05-27 RX ORDER — FLUOROURACIL 50 MG/ML
400 INJECTION, SOLUTION INTRAVENOUS ONCE
Status: COMPLETED | OUTPATIENT
Start: 2025-05-27 | End: 2025-05-27

## 2025-05-27 RX ORDER — PALONOSETRON 0.05 MG/ML
0.25 INJECTION, SOLUTION INTRAVENOUS ONCE
Status: COMPLETED | OUTPATIENT
Start: 2025-05-27 | End: 2025-05-27

## 2025-05-27 RX ORDER — DIPHENHYDRAMINE HYDROCHLORIDE 50 MG/ML
50 INJECTION, SOLUTION INTRAMUSCULAR; INTRAVENOUS AS NEEDED
Status: CANCELLED | OUTPATIENT
Start: 2025-05-27

## 2025-05-27 RX ORDER — FLUOROURACIL 50 MG/ML
400 INJECTION, SOLUTION INTRAVENOUS ONCE
Status: CANCELLED | OUTPATIENT
Start: 2025-05-27

## 2025-05-27 RX ORDER — HYDROCORTISONE SODIUM SUCCINATE 100 MG/2ML
100 INJECTION INTRAMUSCULAR; INTRAVENOUS AS NEEDED
Status: CANCELLED | OUTPATIENT
Start: 2025-05-27

## 2025-05-27 RX ORDER — DEXTROSE MONOHYDRATE 50 MG/ML
20 INJECTION, SOLUTION INTRAVENOUS ONCE
Status: COMPLETED | OUTPATIENT
Start: 2025-05-27 | End: 2025-05-27

## 2025-05-27 RX ORDER — PALONOSETRON 0.05 MG/ML
0.25 INJECTION, SOLUTION INTRAVENOUS ONCE
Status: CANCELLED | OUTPATIENT
Start: 2025-05-27

## 2025-05-27 RX ORDER — DEXTROSE MONOHYDRATE 50 MG/ML
20 INJECTION, SOLUTION INTRAVENOUS ONCE
Status: CANCELLED | OUTPATIENT
Start: 2025-05-27

## 2025-05-27 RX ORDER — FAMOTIDINE 10 MG/ML
20 INJECTION, SOLUTION INTRAVENOUS AS NEEDED
Status: CANCELLED | OUTPATIENT
Start: 2025-05-27

## 2025-05-27 RX ADMIN — OXALIPLATIN 165 MG: 5 INJECTION, SOLUTION INTRAVENOUS at 11:05

## 2025-05-27 RX ADMIN — LEUCOVORIN CALCIUM 770 MG: 350 INJECTION, POWDER, LYOPHILIZED, FOR SUSPENSION INTRAMUSCULAR; INTRAVENOUS at 11:04

## 2025-05-27 RX ADMIN — FLUOROURACIL 770 MG: 50 INJECTION, SOLUTION INTRAVENOUS at 13:11

## 2025-05-27 RX ADMIN — DEXAMETHASONE SODIUM PHOSPHATE 12 MG: 10 INJECTION, SOLUTION INTRAMUSCULAR; INTRAVENOUS at 09:52

## 2025-05-27 RX ADMIN — FOSAPREPITANT 100 ML: 150 INJECTION, POWDER, LYOPHILIZED, FOR SOLUTION INTRAVENOUS at 09:15

## 2025-05-27 RX ADMIN — FLUOROURACIL 4610 MG: 50 INJECTION, SOLUTION INTRAVENOUS at 13:17

## 2025-05-27 RX ADMIN — DEXTROSE MONOHYDRATE 20 ML/HR: 50 INJECTION, SOLUTION INTRAVENOUS at 09:13

## 2025-05-27 RX ADMIN — PALONOSETRON HYDROCHLORIDE 0.25 MG: 0.25 INJECTION INTRAVENOUS at 09:13

## 2025-05-27 NOTE — TELEPHONE ENCOUNTER
Yes.  Chart review shows the following:      Of note, patient was seen in ED on 5/23 and directed to f/u with PCP for high blood pressure:  ED with Trace Fraser MD (05/23/2025)     Please let me know if there is anything else you would like me to do for this patient.    Thank you,  Priti COLE RN  Triage Nurse LUCY  05/27/25   12:00 EDT

## 2025-05-27 NOTE — NURSING NOTE
Pt arrived for D1C7 Folfox after being seen by Dr Nagel.  Per Dr Nagel instructed ok to start pre meds while labs pending. Pt tolerated today's treatment without incident and 5FU given slow IVP via D5W with brisk blood return noted before, during and after administration.  Pt discharged in stable condition with 5FU chemo ball securely attached with both clamps open and pt aware to return Thursday for unhook.

## 2025-05-27 NOTE — LETTER
May 27, 2025     BOBBY Portillo  [address]  [place] [address]    Patient: Kevin Garsia   YOB: 1955   Date of Visit: 5/27/2025     Dear BOBBY Portillo:       Thank you for referring Kevin Garsia to me for evaluation. Below are the relevant portions of my assessment and plan of care.    If you have questions, please do not hesitate to call me. I look forward to following Kevin along with you.         Sincerely,        Kevin Nagel MD        CC: MD Shona Pascual Michael D., MD  05/27/25 0849  Sign when Signing Visit  REASON FOR FOLLOW UP:  stage III mid to upper well-differentiated rectal adenocarcinoma (cT3, cN2 CM0.).    History of Present Illness   He returns today April 14, 2025.  On 3/20/2025 he underwent a low anterior resection with diverting loop ileostomy and appendectomy.  Fortunately his postoperative course was uneventful though he developed a high output ileostomy that required loperamide to slow it down.  Pathology revealed a pT2 N0 well-differentiated mid rectal adenocarcinoma with treatment effect.    He was seen back April 3, 2025 having high output through his ostomy and it was felt that it would be reasonable to move forward with closure of his diverting loop ileostomy in 4 weeks.  It was discussed that chemotherapy adjuvantly would be held until he recovered postoperatively.    He is next seen April 14, 2025.  His ostomy still has variable output but he has been able to manage this and he is quite willing to proceed as above per his subsequent surgery and reconsideration of adjuvant therapy postop.    He continued to see Dr. Rai in 5/3/2025 - 5/8/2025 he underwent closure of loop ileostomy.  He was seen back in follow-up 5/22/2025 and was felt a candidate to restart chemotherapy.    He followed with cardiology with negative stress testing, asked to continue statin and aspirin.    He did present to the ER  5/23/2025 with accelerated hypertension, weakness and mild headache eventually responsive to medications.  Troponin was negative as was normal assessment of his kidney function.    He is next seen in office 5/27/2025 to restart chemotherapy to start adjuvant FOLFOX x 6.  We have discussed proceeding with therapy as laboratory studies become available this a.m. and the patient wishes to do so.    ONCOLOGY HISTORY:  The patient is a 69-year-old male who is referred for recently diagnosed stage III mid to upper well-differentiated rectal adenocarcinoma (cT3, cN2 CM0.).    Patient undergone a screening colonoscopy 10/18/2024 demonstrating a circumferential mass involving the rectum extending from 10 cm to 12 cm with biopsy of 15 cm consistent with invasive well-differentiated adenocarcinoma with ulceration necroinflammatory debris.    This was felt to be microsatellite stable by IHC as well as including MSI by PCR.    If follow-up with infectious disease 11/1/2024 there being concern about resuming hypersexual activity and that he start preexposure prophylaxis.  He last been seen July 2024 on Descovy still in relationship with his partner and currently monogamous with been having bowel changes underwent colonoscopy with the above diagnosis.  As result of his need for therapy Descovy was discontinued and the issue to be reevaluated once he was successfully treated.    MRI of the pelvis performed 11/4/2024 demonstrates a high rectal mass extending to the proximal sigmoid colon representing a T3d N2 rectal tumor, side effect invasion on the superior aspect of the mass approximates between the mesorectum and peritoneal cavity?    CT chest 11/8 demonstrates pleural plaques along the peripheral aspect of both the right lower lobes.  These are of uncertain significance.    The patient was next seen 11/14/2024 by colorectal surgery without evidence of obstruction or bleeding.  He was thought a excellent candidate for  neoadjuvant chemotherapy followed by mesorectal excision and adjuvant chemotherapy-comparable to the prospect trial.  There was concern about the location of the tumor extending down to the rectum and the avoidance of radiation therapy since the tumor appeared to be resectable.  Was the role of laparoscopic low anterior resection total mesorectal excision and creation of a diverting loop ileostomy temporarily discussed.    As resulted above the patient is now referred to discuss this above approach.  He is seen with his significant other and we have discussed his findings in great detail from initial diagnosis and and subsequent surgical assessment leading to the recommendation of neoadjuvant chemotherapy given in the fashion of the PROSPECT Trial which was designed to determine whether neoadjuvant chemotherapy could be used as an alternative to neoadjuvant chemoRT.  This study demonstrated that similar disease-free survival and overall survival was similar to neoadjuvant CRT alone for eligible patients.  This would include the use of 6 cycles of FOLFOX chemotherapy followed by reassessment and if a clinical response and 20% or more was seen then RT would be admitted, proceeding to surgical resection and subsequent adjuvant chemotherapy.    He returns 12/2/2024, for Cycle 1 Day 1 FOLFOX. He does not have any new concerns today. He remains fatigued and experiences intermittent lightheadedness. He denies shortness of breath. He denies abdominal pain. His stools are black, but he denies rome blood. Despite starting oral iron, his hemoglobin has decreased further. He is not tolerating the oral iron at this time. Patient was started on Feraheme.    Patient returns on 12/16/2024 for cycle 2 FOLFOX.  He reports he is tolerated treatment well thus far and denying any nausea, vomiting, changes to his bowels.  He did have blood in his stool following cycle 1 1 time.  That has not reoccurred.  He did start Feraheme the day of  disconnect with cycle 1 and has had improvement in his hemoglobin.  He will be due for his second dose of Feraheme today.  He denies increased neuropathy.    The patient is next evaluated 12/30/2024 for his third cycle of FOLFOX.  He has undergone Feraheme given 12/4/2024 and 12/16/2024.  He is feeling reasonably good without neuropathic symptoms.  We have discussed his scheduling and timing as he proceeds through his treatment including subsequent repeat MR pelvis after his 6 cycle of FOLFOX.    He returns 1/13/2025 for follow up and treatment.  He has been positive for COVID since his last office visit and called in at which time we did start him on paxlovid.  He reports on starting the Paxlovid he was much improved.  He does have a scant cough at times with no lingering shortness of breath.  Report neuropathy lasting approximately 3 days following last treatment that was not always associated with cold intolerance.  This is now resolved and had involved his hands primarily.  We went on to proceed with cycle 4 FOLFOX, obtained ionized calcium, PTH, vitamin D and PTH related peptide testing.  The studies include a PTH of 25, ionized calcium of 1.41, PTH related peptide less than 2.0, vitamin D level 67.1.    He is next seen 1/27/2025 for cycle #5 FOLFOX out of 6 planned prior to repeat MRI.  He is doing well with treatment with minimal neuropathy which recovers quickly, no additional fatigue and is without prolonged cytopenias.  He is trying to plan a wedding in and around his surgery and may need dental extractions soon.  He is also having back pain after recent exercising and is pursuing an epidural.    The patient is seen 2/26/2025 improved from previous and is status post MRI of the pelvis 2/18/2025 demonstrating a response to therapy improved from previous from 11/4/2024 with a radiologic estimate of T3c compared to T3 DM2.  In review of this study enlarging tumor signal extends superiorly from the mass in  close approximation between the mesorectum and peritoneal cavity and the previously seen suspicious lymph nodes appear to have improved from previous.    He returns today April 14, 2025.  On 3/20/2025 he underwent a low anterior resection with diverting loop ileostomy and appendectomy.  Fortunately his postoperative course was uneventful though he developed a high output ileostomy that required loperamide to slow it down.  Pathology revealed a pT2 N0 well-differentiated mid rectal adenocarcinoma with treatment effect.    He was seen back April 3, 2025 having high output through his ostomy and it was felt that it would be reasonable to move forward with closure of his diverting loop ileostomy in 4 weeks.  It was discussed that chemotherapy adjuvantly would be held until he recovered postoperatively.    He is next seen April 14, 2025.  His ostomy still has variable output but he has been able to manage this and he is quite willing to proceed as above per his subsequent surgery and reconsideration of adjuvant therapy postop.    He continued to see Dr. Rai in 5/3/2025 - 5/8/2025 he underwent closure of loop ileostomy.  He was seen back in follow-up 5/22/2025 and was felt a candidate to restart chemotherapy.    He followed with cardiology with negative stress testing, asked to continue statin and aspirin.    He did present to the ER 5/23/2025 with accelerated hypertension, weakness and mild headache eventually responsive to medications.  Troponin was negative as was normal assessment of his kidney function.    He is next seen in office 5/27/2025 to restart chemotherapy to start adjuvant FOLFOX x 6.        Past Surgical History:   Procedure Laterality Date   • ANGIOPLASTY CAROTID ARTERY     • APPENDECTOMY     • CAROTID ENDARTERECTOMY Left    • COLON RESECTION N/A 03/19/2025    Procedure: Laparoscopic Converted to Open Low Anterior Resection, Ostomy Creation, and Appendetomy;  Surgeon: Naeem Rai MD;   Location: [facility] MAIN OR;  Service: General;  Laterality: N/A;   • COLONOSCOPY N/A 10/18/2024    Procedure: COLONOSCOPY TO CECUM/TI WITH BIOPSIES;  Surgeon: Marcus Christine Jr., MD;  Location: [facility] ENDOSCOPY;  Service: General;  Laterality: N/A;  PRE-SCREENING, FAMILY HX COLON CANCER  POST-DIVERTICULOSIS, RECTAL MASS   • ENDOSCOPY     • FOOT SURGERY     • ILEOSTOMY CLOSURE N/A 5/5/2025    Procedure: Closure of diverting loop ileostomy;  Surgeon: Naeem Rai MD;  Location: [facility] MAIN OR;  Service: General;  Laterality: N/A;   • INSERTION PROSTATE RADIATION SEED     • LUMBAR EPIDURAL INJECTION N/A 02/14/2025    Procedure: L4/L5 LUMBAR EPIDURAL STEROID INJECTION CPT: 82078;  Surgeon: Vandana Ordonez MD;  Location[place] [facility] [place];  Service: Pain Management;  Laterality: N/A;   • TOOTH EXTRACTION  2025   • VENOUS ACCESS DEVICE (PORT) INSERTION N/A 11/22/2024    Procedure: INSERTION VENOUS ACCESS DEVICE;  Surgeon: Naeem Rai MD;  Location: [facility] MAIN OR;  Service: General;  Laterality: N/A;        Current Outpatient Medications on File Prior to Visit   Medication Sig Dispense Refill   • acetaminophen (TYLENOL) 500 MG tablet Take 2 tablets by mouth Every 6 (Six) Hours As Needed for Mild Pain.     • aspirin 81 MG chewable tablet Chew 1 tablet Daily. HELD FOR OR     • atorvastatin (LIPITOR) 80 MG tablet Take 1 tablet by mouth Daily. 30 tablet 3   • Cyanocobalamin (VITAMIN B 12 PO) Take 1 tablet by mouth Daily. HOLD PER MD INSTR     • DULoxetine (CYMBALTA) 60 MG capsule Take 1 capsule by mouth Daily. 90 capsule 3   • ferrous sulfate 325 (65 Fe) MG tablet Take 1 tablet by mouth Daily With Breakfast.     • gabapentin (NEURONTIN) 300 MG capsule Take 2 capsules by mouth 3 (Three) Times a Day. 540 capsule 3   • loperamide (IMODIUM) 2 MG capsule Take 1 capsule by mouth 4 (Four) Times a Day As Needed for Diarrhea.     • losartan (COZAAR) 25 MG tablet Take 4 tablets by mouth Daily.     • ondansetron  "(ZOFRAN) 8 MG tablet Take 1 tablet by mouth 3 (Three) Times a Day As Needed for Nausea or Vomiting. 30 tablet 5   • tamsulosin (FLOMAX) 0.4 MG capsule 24 hr capsule Take 1 capsule by mouth every night at bedtime.     • vitamin D (ERGOCALCIFEROL) 1.25 MG (74835 UT) capsule capsule TAKE 1 CAPSULE BY MOUTH 1 TIME EVERY WEEK (Patient taking differently: Take 1 capsule by mouth 1 (One) Time Per Week. SATURDAYS) 12 capsule 0     No current facility-administered medications on file prior to visit.        ALLERGIES:  No Known Allergies     Social History     Socioeconomic History   • Marital status: Significant Other   Tobacco Use   • Smoking status: Former     Types: Cigarettes   • Smokeless tobacco: Never   • Tobacco comments:     QUIT 1990 1 TO 3 PPD X 20 YEARS    Vaping Use   • Vaping status: Former   • Substances: CBD   Substance and Sexual Activity   • Alcohol use: No   • Drug use: Not Currently   • Sexual activity: Defer        Family History   Problem Relation Age of Onset   • Bone cancer Mother    • Heart disease Father    • COPD Father    • Hypertension Father    • Stroke Father         TIA   • Bone cancer Father         smoker   • Lung cancer Father    • Diabetes Father    • No Known Problems Sister    • No Known Problems Brother    • Mental retardation Brother    • No Known Problems Maternal Grandmother    • No Known Problems Maternal Grandfather    • No Known Problems Paternal Grandmother    • Colon cancer Paternal Grandfather    • Malig Hyperthermia Neg Hx           Objective    Vitals:    05/27/25 0820   BP: 138/76   Pulse: 64   Temp: 97.8 °F (36.6 °C)   TempSrc: Skin   SpO2: 95%   Weight: 78.4 kg (172 lb 14.4 oz)   Height: 165.1 cm (65\")   PainSc: 0-No pain             5/27/2025     8:21 AM   Current Status   ECOG score 0       Physical Exam  Constitutional:       Appearance: Normal appearance.   Cardiovascular:      Rate and Rhythm: Normal rate.      Heart sounds: Normal heart sounds.   Pulmonary:      " Effort: Pulmonary effort is normal.      Breath sounds: Normal breath sounds.   Abdominal:      Palpations: Abdomen is soft.   Skin:     General: Skin is warm and dry.   Neurological:      Mental Status: He is oriented to person, place, and time.         RECENT LABS:  Results from last 7 days   Lab Units 05/23/25  2218   WBC 10*3/mm3 7.81   NEUTROS ABS 10*3/mm3 4.54   HEMOGLOBIN g/dL 10.8*   HEMATOCRIT % 32.3*   PLATELETS 10*3/mm3 332         Results from last 7 days   Lab Units 05/23/25  2218   SODIUM mmol/L 142   POTASSIUM mmol/L 4.7   CHLORIDE mmol/L 104   CO2 mmol/L 30.0*   BUN mg/dL 7*   CREATININE mg/dL 0.87   CALCIUM mg/dL 9.5   ALBUMIN g/dL 4.0   BILIRUBIN mg/dL 0.4   ALK PHOS U/L 93   ALT (SGPT) U/L 11   AST (SGOT) U/L 16   GLUCOSE mg/dL 98           Assessment & Plan  *Stage III mid to upper well differentiated rectal adenocarcinoma (cT3, cN2, cM0)     69-year-old male with a history of of diabetes, CHF GE reflux hyperlipidemia, hypertension, TIA, possible CVA as well as a history of prostate cancer status post XRT.  He had been recently having a change in bowel habit ultimately leading to additional assessment.  This led to a screening colonoscopy 10/18/2024demonstrating a circumferential mass involving the rectum extending from 10 cm to 12 cm with biopsy of 15 cm consistent with invasive well-differentiated adenocarcinoma with ulceration necroinflammatory debris. This was felt to be microsatellite stable by IHC as well as including MSI by PCR.  MRI of the pelvis performed 11/4/2024 demonstrates a high rectal mass extending to the proximal sigmoid colon representing a T3d N2 rectal tumor, side effect invasion on the superior aspect of the mass approximates between the mesorectum and peritoneal cavity?  Additional staging 11/8/2024 includes a CT of the chest demonstrating small pleural plaques along the posterior aspect of both the right lower lobes of uncertain significance.  There are no other  substantial findings though we have discussed this as leading to a PET/CT to complete his staging.  11/14/2024 by colorectal surgery, Dr. Naeem Rai, without evidence of obstruction or bleeding.  He was thought a excellent candidate for neoadjuvant chemotherapy followed by mesorectal excision and adjuvant chemotherapy-comparable to the PROSPECT trial.  There was concern about the location of the tumor extending down to the rectum and the avoidance of radiation therapy since the tumor appeared to be resectable.  There was also the concern of his previous history of radiation therapy.  Further discussed was the role of laparoscopic low anterior resection, total mesorectal excision, and creation of a diverting loop ileostomy temporarily utilized also discussed.  11/20/2024 and we have reviewed the PROSPECT Trial which was designed to determine whether neoadjuvant chemotherapy could be used as an alternative to neoadjuvant chemoRT.  This study demonstrated that similar disease-free survival and overall survival was similar to neoadjuvant CRT alone for eligible patients.  This would include the use of 6 cycles of FOLFOX chemotherapy followed by reassessment and if a clinical response and 20% or more was seen then RT would be admitted, proceeding to surgical resection and subsequent adjuvant chemotherapy.  After discussion the patient agrees to proceed.  12/2/2024: Proceed with C1D1 FOLFOX. Hemoglobin has declined to 9.9 today secondary to malignancy- ongoing bleeding and worsening iron deficiency. He has been taking oral iron, however, is not tolerating this well, therefore will plan for IV iron pending insurance approval.   12/16/2024: Proceed with cycle 2-day 1 FOLFOX today.  Patient is tolerating well.  The patient is next evaluated 12/30/2024 for his third cycle of FOLFOX.  He has undergone Feraheme given 12/4/2024 and 12/16/2024.  He is feeling reasonably good without neuropathic symptoms.  We have discussed his  scheduling and timing as he proceeds through his treatment including subsequent repeat MR pelvis after his 6 cycle of FOLFOX.  1/13/2025 Returns for cycle 4 FOLFOX today and was COVID positive 1/6/2025 and treated with Paxlovid and thereafter improved.  He has a scant cough remaining otherwise clear lung sounds.  Will proceed with treatment today.  Patient seen 1/27/2025 recovered from COVID, generally improved performance status and plans for cycle 5 FOLFOX at a 6 planned.  2/10/205 proceed with cycle 6 FOLFOX.  Following 6 cycle of neoadjuvant therapy will proceed with MRI for surgical planning.  The patient was referred today to Dr. Rai  Subsequent repeat repeat MRI of the pelvis 2/18/2025 demonstrating a response to therapy improved from previous from 11/4/2024 with a radiologic estimate of T3c compared to T3 DM2.  In review of this study enlarging tumor signal extends superiorly from the mass in close approximation between the mesorectum and peritoneal cavity and the previously seen suspicious lymph nodes appear to have improved from previous.  Surgical evaluation anticipated with Dr. Rai 2/27/2025  3/13/2025: He is scheduled for surgery with Dr. Rai on 3/19/2025  He returns today April 14, 2025.  On 3/20/2025 he underwent a low anterior resection with diverting loop ileostomy and appendectomy.  Fortunately his postoperative course was uneventful though he developed a high output ileostomy that required loperamide to slow it down.  Pathology revealed a pT2 N0 well-differentiated mid rectal adenocarcinoma with treatment effect.  He was seen back April 3, 2025 having high output through his ostomy and it was felt that it would be reasonable to move forward with closure of his diverting loop ileostomy in 4 weeks.  It was discussed that chemotherapy adjuvantly would be held until he recovered postoperatively.  He is next seen April 14, 2025.  His ostomy still has variable output but he has been able to  manage this and he is quite willing to proceed as above per his subsequent surgery and reconsideration of adjuvant therapy postop.  He continued to see Dr. Rai in 5/3/2025 - 5/8/2025 he underwent closure of loop ileostomy.  He was seen back in follow-up 5/22/2025 and was felt a candidate to restart chemotherapy.  He followed with cardiology with negative stress testing, asked to continue statin and aspirin.  He did present to the ER 5/23/2025 with accelerated hypertension, weakness and mild headache eventually responsive to medications.  Troponin was negative as was normal assessment of his kidney function.  He is next seen in office 5/27/2025 to restart chemotherapy to start adjuvant FOLFOX x 6.    *Anemia   12/2/2024: Hemoglobin 9.9 today. Patient is not tolerating oral iron, therefore, will plan to proceed with IV iron.    12/16/2024 hemoglobin has improved today to 10.8.  Proceeded with the second dose of Feraheme  Reassessment 414/25-H&H 11.5 and 35.2  Reassessment 5/23/2025-H&H of 10.8 and 32.3    *COVID positive 1/6/2025 trreated with paxlovid  Resolved symptoms 1/27/2025    *Hypercalcemia  1/13/2025 calcium 11 with normal albumin.  Discussed with Dr. Nagel and additional labs added including ionized calcium, PTH, PTH related peptide and vitamin D level as he has been on high-dose vitamin D.  Asked patient to hold vitamin D supplement until additional labs have resulted.  Will also recheck CMP on Wednesday at disconnect.  Subsequent ionized calcium, PTH, vitamin D and PTH related peptide testing.  The studies include a PTH of 25, ionized calcium of 1.41, PTH related peptide less than 2.0, vitamin D level 67.1.  Repeat calcium 1/20/2025 at 9.1  2/10/2025 calcium normal at 9.2  5/23/2025 calcium 9.5    Plan:     *Plan to proceed with adjuvant chemotherapy-FOLFOX x 6  *Laboratory studies pending review  *2 weeks NP, FOLFOX-2/6.  *Patient agreeable this plan and follow-up      Kevin Nagel,  MD  05/27/2025

## 2025-05-29 ENCOUNTER — INFUSION (OUTPATIENT)
Dept: ONCOLOGY | Facility: HOSPITAL | Age: 70
End: 2025-05-29
Payer: MEDICARE

## 2025-05-29 ENCOUNTER — READMISSION MANAGEMENT (OUTPATIENT)
Dept: CALL CENTER | Facility: HOSPITAL | Age: 70
End: 2025-05-29
Payer: MEDICARE

## 2025-05-29 DIAGNOSIS — Z45.2 FITTING AND ADJUSTMENT OF VASCULAR CATHETER: ICD-10-CM

## 2025-05-29 DIAGNOSIS — Z85.048 HISTORY OF RECTAL CANCER: Primary | ICD-10-CM

## 2025-05-29 PROCEDURE — 25010000002 HEPARIN LOCK FLUSH PER 10 UNITS: Performed by: INTERNAL MEDICINE

## 2025-05-29 RX ORDER — SODIUM CHLORIDE 0.9 % (FLUSH) 0.9 %
10 SYRINGE (ML) INJECTION AS NEEDED
Status: DISCONTINUED | OUTPATIENT
Start: 2025-05-29 | End: 2025-05-29 | Stop reason: HOSPADM

## 2025-05-29 RX ORDER — SODIUM CHLORIDE 0.9 % (FLUSH) 0.9 %
10 SYRINGE (ML) INJECTION AS NEEDED
OUTPATIENT
Start: 2025-05-29

## 2025-05-29 RX ORDER — HEPARIN SODIUM (PORCINE) LOCK FLUSH IV SOLN 100 UNIT/ML 100 UNIT/ML
500 SOLUTION INTRAVENOUS AS NEEDED
Status: DISCONTINUED | OUTPATIENT
Start: 2025-05-29 | End: 2025-05-29 | Stop reason: HOSPADM

## 2025-05-29 RX ORDER — HEPARIN SODIUM (PORCINE) LOCK FLUSH IV SOLN 100 UNIT/ML 100 UNIT/ML
500 SOLUTION INTRAVENOUS AS NEEDED
OUTPATIENT
Start: 2025-05-29

## 2025-05-29 RX ADMIN — Medication 10 ML: at 14:10

## 2025-05-29 RX ADMIN — Medication 500 UNITS: at 14:10

## 2025-05-29 NOTE — OUTREACH NOTE
Medical Week 3 Survey      Flowsheet Row Responses   [facility] patient discharged from? [place]   Does the patient have one of the following disease processes/diagnoses(primary or secondary)? Other   Week 3 attempt successful? No   Unsuccessful attempts Attempt 1            WILLIAMS POWER - Registered Nurse

## 2025-06-03 ENCOUNTER — READMISSION MANAGEMENT (OUTPATIENT)
Dept: CALL CENTER | Facility: HOSPITAL | Age: 70
End: 2025-06-03
Payer: MEDICARE

## 2025-06-03 LAB
CV ZIO BASELINE AVG BPM: 80 BPM
CV ZIO BASELINE BPM HIGH: 143 BPM
CV ZIO BASELINE BPM LOW: 54 BPM
CV ZIO DEVICE ANALYSIS TIME: NORMAL
CV ZIO ECT SVE COUNT: 1506 EPISODES
CV ZIO ECT SVE CPLT COUNT: 33 EPISODES
CV ZIO ECT SVE CPLT FREQ: NORMAL
CV ZIO ECT SVE FREQ: NORMAL
CV ZIO ECT SVE TPLT COUNT: 7 EPISODES
CV ZIO ECT SVE TPLT FREQ: NORMAL
CV ZIO ECT VE COUNT: 186 EPISODES
CV ZIO ECT VE CPLT COUNT: 4 EPISODES
CV ZIO ECT VE CPLT FREQ: NORMAL
CV ZIO ECT VE FREQ: NORMAL
CV ZIO ECT VE TPLT COUNT: 0 EPISODES
CV ZIO ECT VE TPLT FREQ: 0
CV ZIO ECTOPIC SVE COUPLET RAW PERCENT: 0 %
CV ZIO ECTOPIC SVE ISOLATED PERCENT: 0.1 %
CV ZIO ECTOPIC SVE TRIPLET RAW PERCENT: 0 %
CV ZIO ECTOPIC VE COUPLET RAW PERCENT: 0 %
CV ZIO ECTOPIC VE ISOLATED PERCENT: 0.01 %
CV ZIO ECTOPIC VE TRIPLET RAW PERCENT: 0 %
CV ZIO ENROLLMENT END: NORMAL
CV ZIO ENROLLMENT START: NORMAL
CV ZIO PATIENT EVENTS DIARIES: 0
CV ZIO PATIENT EVENTS TRIGGERS: 1
CV ZIO PAUSE COUNT: 0
CV ZIO PRESCRIPTION STATUS: NORMAL
CV ZIO SVT AVG BPM: 115 BPM
CV ZIO SVT BPM HIGH: 143 BPM
CV ZIO SVT BPM LOW: 86 BPM
CV ZIO SVT COUNT: 3
CV ZIO SVT F EPI AVG BPM: 133 BPM
CV ZIO SVT F EPI BEATS: 12 BEATS
CV ZIO SVT F EPI BPM HIGH: 143 BPM
CV ZIO SVT F EPI BPM LOW: 121 BPM
CV ZIO SVT F EPI DUR: 5.8 SEC
CV ZIO SVT F EPI END: NORMAL
CV ZIO SVT F EPI START: NORMAL
CV ZIO SVT L EPI AVG BPM: 97 BPM
CV ZIO SVT L EPI BEATS: 11 BEATS
CV ZIO SVT L EPI BPM HIGH: 109 BPM
CV ZIO SVT L EPI BPM LOW: 86 BPM
CV ZIO SVT L EPI DUR: 6.9 SEC
CV ZIO SVT L EPI END: NORMAL
CV ZIO SVT L EPI START: NORMAL
CV ZIO TOTAL  ENROLLMENT PERIOD: NORMAL
CV ZIO VT AVG BPM: 130 BPM
CV ZIO VT BPM HIGH: 141 BPM
CV ZIO VT BPM LOW: 112 BPM
CV ZIO VT COUNT: 1
CV ZIO VT F EPI AVG BPM: 130
CV ZIO VT F EPI BEATS: 10 BEATS
CV ZIO VT F EPI BPM HIGH: 141
CV ZIO VT F EPI BPM LOW: 112
CV ZIO VT F EPI DUR: 5 SEC
CV ZIO VT F EPI END: NORMAL
CV ZIO VT F EPI START: NORMAL
CV ZIO VT L EPI AVG BPM: 130
CV ZIO VT L EPI BEATS: 10 BEATS
CV ZIO VT L EPI BPM HIGH: 141 BPM
CV ZIO VT L EPI BPM LOW: 112 BPM
CV ZIO VT L EPI DUR: 5
CV ZIO VT L EPI END: NORMAL
CV ZIO VT L EPI START: NORMAL

## 2025-06-03 NOTE — OUTREACH NOTE
Medical Week 3 Survey      Flowsheet Row Responses   [facility] patient discharged from? [place]   Does the patient have one of the following disease processes/diagnoses(primary or secondary)? Other   Week 3 attempt successful? No   Unsuccessful attempts Attempt 2   Revoke Warren BOWERS - Registered Nurse

## 2025-06-04 ENCOUNTER — TELEPHONE (OUTPATIENT)
Dept: ONCOLOGY | Facility: CLINIC | Age: 70
End: 2025-06-04
Payer: MEDICARE

## 2025-06-04 ENCOUNTER — LAB (OUTPATIENT)
Dept: LAB | Facility: HOSPITAL | Age: 70
End: 2025-06-04
Payer: MEDICARE

## 2025-06-04 DIAGNOSIS — R35.0 URINARY FREQUENCY: ICD-10-CM

## 2025-06-04 DIAGNOSIS — R35.0 URINARY FREQUENCY: Primary | ICD-10-CM

## 2025-06-04 DIAGNOSIS — R39.15 URGENCY OF URINATION: ICD-10-CM

## 2025-06-04 DIAGNOSIS — R30.0 BURNING WITH URINATION: ICD-10-CM

## 2025-06-04 LAB
BACTERIA UR QL AUTO: NEGATIVE /HPF
BILIRUB UR QL STRIP: NEGATIVE
CLARITY UR: CLEAR
COLOR UR: YELLOW
GLUCOSE UR STRIP-MCNC: NEGATIVE MG/DL
HGB UR QL STRIP.AUTO: NEGATIVE
KETONES UR QL STRIP: NEGATIVE
LEUKOCYTE ESTERASE UR QL STRIP.AUTO: NEGATIVE
NITRITE UR QL STRIP: NEGATIVE
PH UR STRIP.AUTO: 6 [PH] (ref 4.5–8)
PROT UR QL STRIP: ABNORMAL
RBC # UR STRIP: NORMAL /HPF
REF LAB TEST METHOD: NORMAL
SP GR UR STRIP: 1.02 (ref 1–1.03)
SQUAMOUS #/AREA URNS HPF: NORMAL /HPF
UROBILINOGEN UR QL STRIP: ABNORMAL
WBC # UR STRIP: NORMAL /HPF

## 2025-06-04 PROCEDURE — 81001 URINALYSIS AUTO W/SCOPE: CPT

## 2025-06-04 NOTE — TELEPHONE ENCOUNTER
Call to patient to see if he is having any more symptoms besides the burning and urinary frequency, urgency.  Patient denies fever or other symptoms.  Let him know he will need to come in for urinalysis today. Patient agreeable to come at 2:30 PM to [facility].

## 2025-06-04 NOTE — TELEPHONE ENCOUNTER
Provider: Dr. Nagel  Caller: Kevin Garsia  Relationship to Patient: Self  Call Back Phone Number: [phone]  Reason for Call: Pt called stated he has burning and urinary frequency, and urgency. Pt not sure if it's because he is out of medication, or if it's a side effect of chemo. Please advise.

## 2025-06-04 NOTE — TELEPHONE ENCOUNTER
Call back to Mr. Garsia to let him know the urinalysis was negative.  Let him know that Dr. Nagel wanted to instruct him to try AZO over the counter for the urinary symptoms.  Patient verbalized understanding and appreciation for the call.

## 2025-06-05 DIAGNOSIS — E78.5 HYPERLIPIDEMIA, UNSPECIFIED HYPERLIPIDEMIA TYPE: ICD-10-CM

## 2025-06-05 RX ORDER — ATORVASTATIN CALCIUM 80 MG/1
80 TABLET, FILM COATED ORAL DAILY
Qty: 30 TABLET | Refills: 3 | Status: SHIPPED | OUTPATIENT
Start: 2025-06-05

## 2025-06-06 NOTE — TELEPHONE ENCOUNTER
Dc'd 10/24-but I am not sure why. Age and other contributing factors? Want to be sure before I call him.

## 2025-06-06 NOTE — PROGRESS NOTES
REASON FOR FOLLOW UP:  stage III mid to upper well-differentiated rectal adenocarcinoma (cT3, cN2 CM0.).    History of Present Illness    The patient is a 69 y.o. male with the above-mentioned history, who returns to the office today in anticipation of his second cycle of adjuvant chemotherapy with FOLFOX.  He reports he tolerated the resumption of chemotherapy very well.  He does have some cold sensitivity secondary to oxaliplatin.  He also reports some intermittent funny sensation in the bottom of his feet as though he is walking on sponges.  He denies any pain or numbness.  He has no issues with his balance.  He reports his bowels are moving very well.  He has no nausea.    ONCOLOGY HISTORY:  The patient is a 69-year-old male who is referred for recently diagnosed stage III mid to upper well-differentiated rectal adenocarcinoma (cT3, cN2 CM0.).    Patient undergone a screening colonoscopy 10/18/2024 demonstrating a circumferential mass involving the rectum extending from 10 cm to 12 cm with biopsy of 15 cm consistent with invasive well-differentiated adenocarcinoma with ulceration necroinflammatory debris.    This was felt to be microsatellite stable by IHC as well as including MSI by PCR.    If follow-up with infectious disease 11/1/2024 there being concern about resuming hypersexual activity and that he start preexposure prophylaxis.  He last been seen July 2024 on Descovy still in relationship with his partner and currently monogamous with been having bowel changes underwent colonoscopy with the above diagnosis.  As result of his need for therapy Descovy was discontinued and the issue to be reevaluated once he was successfully treated.    MRI of the pelvis performed 11/4/2024 demonstrates a high rectal mass extending to the proximal sigmoid colon representing a T3d N2 rectal tumor, side effect invasion on the superior aspect of the mass approximates between the mesorectum and peritoneal cavity?    CT chest  11/8 demonstrates pleural plaques along the peripheral aspect of both the right lower lobes.  These are of uncertain significance.    The patient was next seen 11/14/2024 by colorectal surgery without evidence of obstruction or bleeding.  He was thought a excellent candidate for neoadjuvant chemotherapy followed by mesorectal excision and adjuvant chemotherapy-comparable to the prospect trial.  There was concern about the location of the tumor extending down to the rectum and the avoidance of radiation therapy since the tumor appeared to be resectable.  Was the role of laparoscopic low anterior resection total mesorectal excision and creation of a diverting loop ileostomy temporarily discussed.    As resulted above the patient is now referred to discuss this above approach.  He is seen with his significant other and we have discussed his findings in great detail from initial diagnosis and and subsequent surgical assessment leading to the recommendation of neoadjuvant chemotherapy given in the fashion of the PROSPECT Trial which was designed to determine whether neoadjuvant chemotherapy could be used as an alternative to neoadjuvant chemoRT.  This study demonstrated that similar disease-free survival and overall survival was similar to neoadjuvant CRT alone for eligible patients.  This would include the use of 6 cycles of FOLFOX chemotherapy followed by reassessment and if a clinical response and 20% or more was seen then RT would be admitted, proceeding to surgical resection and subsequent adjuvant chemotherapy.    He returns 12/2/2024, for Cycle 1 Day 1 FOLFOX. He does not have any new concerns today. He remains fatigued and experiences intermittent lightheadedness. He denies shortness of breath. He denies abdominal pain. His stools are black, but he denies rome blood. Despite starting oral iron, his hemoglobin has decreased further. He is not tolerating the oral iron at this time. Patient was started on  Feraheme.    Patient returns on 12/16/2024 for cycle 2 FOLFOX.  He reports he is tolerated treatment well thus far and denying any nausea, vomiting, changes to his bowels.  He did have blood in his stool following cycle 1 1 time.  That has not reoccurred.  He did start Feraheme the day of disconnect with cycle 1 and has had improvement in his hemoglobin.  He will be due for his second dose of Feraheme today.  He denies increased neuropathy.    The patient is next evaluated 12/30/2024 for his third cycle of FOLFOX.  He has undergone Feraheme given 12/4/2024 and 12/16/2024.  He is feeling reasonably good without neuropathic symptoms.  We have discussed his scheduling and timing as he proceeds through his treatment including subsequent repeat MR pelvis after his 6 cycle of FOLFOX.    He returns 1/13/2025 for follow up and treatment.  He has been positive for COVID since his last office visit and called in at which time we did start him on paxlovid.  He reports on starting the Paxlovid he was much improved.  He does have a scant cough at times with no lingering shortness of breath.  Report neuropathy lasting approximately 3 days following last treatment that was not always associated with cold intolerance.  This is now resolved and had involved his hands primarily.  We went on to proceed with cycle 4 FOLFOX, obtained ionized calcium, PTH, vitamin D and PTH related peptide testing.  The studies include a PTH of 25, ionized calcium of 1.41, PTH related peptide less than 2.0, vitamin D level 67.1.    He is next seen 1/27/2025 for cycle #5 FOLFOX out of 6 planned prior to repeat MRI.  He is doing well with treatment with minimal neuropathy which recovers quickly, no additional fatigue and is without prolonged cytopenias.  He is trying to plan a wedding in and around his surgery and may need dental extractions soon.  He is also having back pain after recent exercising and is pursuing an epidural.    The patient is seen  2/26/2025 improved from previous and is status post MRI of the pelvis 2/18/2025 demonstrating a response to therapy improved from previous from 11/4/2024 with a radiologic estimate of T3c compared to T3 DM2.  In review of this study enlarging tumor signal extends superiorly from the mass in close approximation between the mesorectum and peritoneal cavity and the previously seen suspicious lymph nodes appear to have improved from previous.    He returns today April 14, 2025.  On 3/20/2025 he underwent a low anterior resection with diverting loop ileostomy and appendectomy.  Fortunately his postoperative course was uneventful though he developed a high output ileostomy that required loperamide to slow it down.  Pathology revealed a pT2 N0 well-differentiated mid rectal adenocarcinoma with treatment effect.    He was seen back April 3, 2025 having high output through his ostomy and it was felt that it would be reasonable to move forward with closure of his diverting loop ileostomy in 4 weeks.  It was discussed that chemotherapy adjuvantly would be held until he recovered postoperatively.    He is next seen April 14, 2025.  His ostomy still has variable output but he has been able to manage this and he is quite willing to proceed as above per his subsequent surgery and reconsideration of adjuvant therapy postop.    He continued to see Dr. Rai in 5/3/2025 - 5/8/2025 he underwent closure of loop ileostomy.  He was seen back in follow-up 5/22/2025 and was felt a candidate to restart chemotherapy.    He followed with cardiology with negative stress testing, asked to continue statin and aspirin.    He did present to the ER 5/23/2025 with accelerated hypertension, weakness and mild headache eventually responsive to medications.  Troponin was negative as was normal assessment of his kidney function.    He is next seen in office 5/27/2025 to restart chemotherapy to start adjuvant FOLFOX x 6.        Past Surgical History:    Procedure Laterality Date    ANGIOPLASTY CAROTID ARTERY      APPENDECTOMY      CAROTID ENDARTERECTOMY Left     COLON RESECTION N/A 03/19/2025    Procedure: Laparoscopic Converted to Open Low Anterior Resection, Ostomy Creation, and Appendetomy;  Surgeon: Naeem Rai MD;  Location: [facility] MAIN OR;  Service: General;  Laterality: N/A;    COLONOSCOPY N/A 10/18/2024    Procedure: COLONOSCOPY TO CECUM/TI WITH BIOPSIES;  Surgeon: Marcus Christine Jr., MD;  Location: [facility] ENDOSCOPY;  Service: General;  Laterality: N/A;  PRE-SCREENING, FAMILY HX COLON CANCER  POST-DIVERTICULOSIS, RECTAL MASS    ENDOSCOPY      FOOT SURGERY      ILEOSTOMY CLOSURE N/A 5/5/2025    Procedure: Closure of diverting loop ileostomy;  Surgeon: Naeem Rai MD;  Location: [facility] MAIN OR;  Service: General;  Laterality: N/A;    INSERTION PROSTATE RADIATION SEED      LUMBAR EPIDURAL INJECTION N/A 02/14/2025    Procedure: L4/L5 LUMBAR EPIDURAL STEROID INJECTION CPT: 16594;  Surgeon: Vandana Ordonez MD;  Location: [facility] MAIN OR;  Service: Pain Management;  Laterality: N/A;    TOOTH EXTRACTION  2025    VENOUS ACCESS DEVICE (PORT) INSERTION N/A 11/22/2024    Procedure: INSERTION VENOUS ACCESS DEVICE;  Surgeon: Naeem Rai MD;  Location: [facility] MAIN OR;  Service: General;  Laterality: N/A;        Current Outpatient Medications on File Prior to Visit   Medication Sig Dispense Refill    acetaminophen (TYLENOL) 500 MG tablet Take 2 tablets by mouth Every 6 (Six) Hours As Needed for Mild Pain.      aspirin 81 MG chewable tablet Chew 1 tablet Daily. HELD FOR OR      atorvastatin (LIPITOR) 80 MG tablet Take 1 tablet by mouth Daily. 30 tablet 3    Cyanocobalamin (VITAMIN B 12 PO) Take 1 tablet by mouth Daily. HOLD PER MD INSTR      DULoxetine (CYMBALTA) 60 MG capsule Take 1 capsule by mouth Daily. 90 capsule 3    ferrous sulfate 325 (65 Fe) MG tablet Take 1 tablet by mouth Daily With Breakfast.      gabapentin (NEURONTIN)  "300 MG capsule Take 2 capsules by mouth 3 (Three) Times a Day. 540 capsule 3    loperamide (IMODIUM) 2 MG capsule Take 1 capsule by mouth 4 (Four) Times a Day As Needed for Diarrhea.      ondansetron (ZOFRAN) 8 MG tablet Take 1 tablet by mouth 3 (Three) Times a Day As Needed for Nausea or Vomiting. 30 tablet 5    tamsulosin (FLOMAX) 0.4 MG capsule 24 hr capsule Take 1 capsule by mouth every night at bedtime.      vitamin D (ERGOCALCIFEROL) 1.25 MG (32172 UT) capsule capsule TAKE 1 CAPSULE BY MOUTH 1 TIME EVERY WEEK (Patient taking differently: Take 1 capsule by mouth 1 (One) Time Per Week. SATURDAYS) 12 capsule 0    [DISCONTINUED] losartan (COZAAR) 25 MG tablet Take 4 tablets by mouth Daily.       No current facility-administered medications on file prior to visit.        ALLERGIES:  No Known Allergies     Social History     Socioeconomic History    Marital status: Significant Other   Tobacco Use    Smoking status: Former     Types: Cigarettes    Smokeless tobacco: Never    Tobacco comments:     QUIT 1990     1 TO 3 PPD X 20 YEARS    Vaping Use    Vaping status: Former    Substances: CBD   Substance and Sexual Activity    Alcohol use: No    Drug use: Not Currently    Sexual activity: Defer        Family History   Problem Relation Age of Onset    Bone cancer Mother     Heart disease Father     COPD Father     Hypertension Father     Stroke Father         TIA    Bone cancer Father         smoker    Lung cancer Father     Diabetes Father     No Known Problems Sister     No Known Problems Brother     Mental retardation Brother     No Known Problems Maternal Grandmother     No Known Problems Maternal Grandfather     No Known Problems Paternal Grandmother     Colon cancer Paternal Grandfather     Malig Hyperthermia Neg Hx           Objective     Vitals:    06/09/25 0831   BP: 162/79   Pulse: 74   Resp: 16   Temp: 98.6 °F (37 °C)   TempSrc: Oral   SpO2: 95%   Weight: 79.4 kg (175 lb)   Height: 165.1 cm (65\")   PainSc: 0-No " pain         6/9/2025     8:43 AM   Current Status   ECOG score 0       Physical Exam  Constitutional:       Appearance: Normal appearance.   Cardiovascular:      Rate and Rhythm: Normal rate.      Heart sounds: Normal heart sounds.   Pulmonary:      Effort: Pulmonary effort is normal.      Breath sounds: Normal breath sounds.   Abdominal:      Palpations: Abdomen is soft.   Skin:     General: Skin is warm and dry.   Neurological:      Mental Status: He is oriented to person, place, and time.         RECENT LABS:  Results from last 7 days   Lab Units 06/09/25  0833   WBC 10*3/mm3 6.84   NEUTROS ABS 10*3/mm3 4.06   HEMOGLOBIN g/dL 11.1*   HEMATOCRIT % 36.4*   PLATELETS 10*3/mm3 190       Results from last 7 days   Lab Units 06/09/25  0833   SODIUM mmol/L 140   POTASSIUM mmol/L 4.1   CHLORIDE mmol/L 102   CO2 mmol/L 27.6   BUN mg/dL 7.3*   CREATININE mg/dL 0.85   CALCIUM mg/dL 9.3   ALBUMIN g/dL 4.3   BILIRUBIN mg/dL 0.3   ALK PHOS U/L 104   ALT (SGPT) U/L 68*   AST (SGOT) U/L 52*   GLUCOSE mg/dL 99           Assessment & Plan   *Stage III mid to upper well differentiated rectal adenocarcinoma (cT3, cN2, cM0)     69-year-old male with a history of of diabetes, CHF GE reflux hyperlipidemia, hypertension, TIA, possible CVA as well as a history of prostate cancer status post XRT.  He had been recently having a change in bowel habit ultimately leading to additional assessment.  This led to a screening colonoscopy 10/18/2024demonstrating a circumferential mass involving the rectum extending from 10 cm to 12 cm with biopsy of 15 cm consistent with invasive well-differentiated adenocarcinoma with ulceration necroinflammatory debris. This was felt to be microsatellite stable by IHC as well as including MSI by PCR.  MRI of the pelvis performed 11/4/2024 demonstrates a high rectal mass extending to the proximal sigmoid colon representing a T3d N2 rectal tumor, side effect invasion on the superior aspect of the mass approximates  between the mesorectum and peritoneal cavity?  Additional staging 11/8/2024 includes a CT of the chest demonstrating small pleural plaques along the posterior aspect of both the right lower lobes of uncertain significance.  There are no other substantial findings though we have discussed this as leading to a PET/CT to complete his staging.  11/14/2024 by colorectal surgery, Dr. Naeem Rai, without evidence of obstruction or bleeding.  He was thought a excellent candidate for neoadjuvant chemotherapy followed by mesorectal excision and adjuvant chemotherapy-comparable to the PROSPECT trial.  There was concern about the location of the tumor extending down to the rectum and the avoidance of radiation therapy since the tumor appeared to be resectable.  There was also the concern of his previous history of radiation therapy.  Further discussed was the role of laparoscopic low anterior resection, total mesorectal excision, and creation of a diverting loop ileostomy temporarily utilized also discussed.  11/20/2024 and we have reviewed the PROSPECT Trial which was designed to determine whether neoadjuvant chemotherapy could be used as an alternative to neoadjuvant chemoRT.  This study demonstrated that similar disease-free survival and overall survival was similar to neoadjuvant CRT alone for eligible patients.  This would include the use of 6 cycles of FOLFOX chemotherapy followed by reassessment and if a clinical response and 20% or more was seen then RT would be admitted, proceeding to surgical resection and subsequent adjuvant chemotherapy.  After discussion the patient agrees to proceed.  12/2/2024: Proceed with C1D1 FOLFOX. Hemoglobin has declined to 9.9 today secondary to malignancy- ongoing bleeding and worsening iron deficiency. He has been taking oral iron, however, is not tolerating this well, therefore will plan for IV iron pending insurance approval.   12/16/2024: Proceed with cycle 2-day 1 FOLFOX today.   Patient is tolerating well.  The patient is next evaluated 12/30/2024 for his third cycle of FOLFOX.  He has undergone Feraheme given 12/4/2024 and 12/16/2024.  He is feeling reasonably good without neuropathic symptoms.  We have discussed his scheduling and timing as he proceeds through his treatment including subsequent repeat MR pelvis after his 6 cycle of FOLFOX.  1/13/2025 Returns for cycle 4 FOLFOX today and was COVID positive 1/6/2025 and treated with Paxlovid and thereafter improved.  He has a scant cough remaining otherwise clear lung sounds.  Will proceed with treatment today.  Patient seen 1/27/2025 recovered from COVID, generally improved performance status and plans for cycle 5 FOLFOX at a 6 planned.  2/10/205 proceed with cycle 6 FOLFOX.  Following 6 cycle of neoadjuvant therapy will proceed with MRI for surgical planning.  The patient was referred today to Dr. Rai  Subsequent repeat repeat MRI of the pelvis 2/18/2025 demonstrating a response to therapy improved from previous from 11/4/2024 with a radiologic estimate of T3c compared to T3 DM2.  In review of this study enlarging tumor signal extends superiorly from the mass in close approximation between the mesorectum and peritoneal cavity and the previously seen suspicious lymph nodes appear to have improved from previous.  Surgical evaluation anticipated with Dr. Rai 2/27/2025  3/13/2025: He is scheduled for surgery with Dr. Rai on 3/19/2025  He returns today April 14, 2025.  On 3/20/2025 he underwent a low anterior resection with diverting loop ileostomy and appendectomy.  Fortunately his postoperative course was uneventful though he developed a high output ileostomy that required loperamide to slow it down.  Pathology revealed a pT2 N0 well-differentiated mid rectal adenocarcinoma with treatment effect.  He was seen back April 3, 2025 having high output through his ostomy and it was felt that it would be reasonable to move forward with  closure of his diverting loop ileostomy in 4 weeks.  It was discussed that chemotherapy adjuvantly would be held until he recovered postoperatively.  He is next seen April 14, 2025.  His ostomy still has variable output but he has been able to manage this and he is quite willing to proceed as above per his subsequent surgery and reconsideration of adjuvant therapy postop.  He continued to see Dr. Rai in 5/3/2025 - 5/8/2025 he underwent closure of loop ileostomy.  He was seen back in follow-up 5/22/2025 and was felt a candidate to restart chemotherapy.  He followed with cardiology with negative stress testing, asked to continue statin and aspirin.  He did present to the ER 5/23/2025 with accelerated hypertension, weakness and mild headache eventually responsive to medications.  Troponin was negative as was normal assessment of his kidney function.  He is next seen in office 5/27/2025 to restart chemotherapy to start adjuvant FOLFOX x 6.  6/9/2025 proceed with cycle 2 adjuvant FOLFOX    *Anemia   12/2/2024: Hemoglobin 9.9 today. Patient is not tolerating oral iron, therefore, will plan to proceed with IV iron.    12/16/2024 hemoglobin has improved today to 10.8.  Proceeded with the second dose of Feraheme  Reassessment 414/25-H&H 11.5 and 35.2  6/9/2025 hemoglobin 11.1    *COVID positive 1/6/2025 trreated with paxlovid  Resolved symptoms 1/27/2025    *Hypercalcemia  1/13/2025 calcium 11 with normal albumin.  Discussed with Dr. Nagel and additional labs added including ionized calcium, PTH, PTH related peptide and vitamin D level as he has been on high-dose vitamin D.  Asked patient to hold vitamin D supplement until additional labs have resulted.  Will also recheck CMP on Wednesday at [facility].  Subsequent ionized calcium, PTH, vitamin D and PTH related peptide testing.  The studies include a PTH of 25, ionized calcium of 1.41, PTH related peptide less than 2.0, vitamin D level 67.1.  Repeat calcium 1/20/2025  at 9.1  2/10/2025 calcium normal at 9.2  6/9/2025 calcium remains normal at 9.3    Plan:   Proceed today with FOLFOX, cycle 8 (cycle 2 in the adjuvant setting)  Losartan 100 mg daily refilled as the patient has not yet heard from his primary care provider and has run out of this medication.  A 1 month supply was provided and this will otherwise be managed by PCP  Continue to monitor neuropathy closely  Return in 2 weeks in 4 weeks for CBC, CMP, MD or NP follow-up and continued FOLFOX    Patient is on a high dose medication requiring close monitoring for toxicity    Nena Dacosta, BOBBY  06/09/2025

## 2025-06-09 ENCOUNTER — INFUSION (OUTPATIENT)
Dept: ONCOLOGY | Facility: HOSPITAL | Age: 70
End: 2025-06-09
Payer: MEDICARE

## 2025-06-09 ENCOUNTER — OFFICE VISIT (OUTPATIENT)
Dept: ONCOLOGY | Facility: CLINIC | Age: 70
End: 2025-06-09
Payer: MEDICARE

## 2025-06-09 VITALS
DIASTOLIC BLOOD PRESSURE: 79 MMHG | BODY MASS INDEX: 29.16 KG/M2 | RESPIRATION RATE: 16 BRPM | WEIGHT: 175 LBS | HEART RATE: 74 BPM | HEIGHT: 65 IN | OXYGEN SATURATION: 95 % | TEMPERATURE: 98.6 F | SYSTOLIC BLOOD PRESSURE: 162 MMHG

## 2025-06-09 DIAGNOSIS — C20 RECTAL ADENOCARCINOMA: Primary | ICD-10-CM

## 2025-06-09 DIAGNOSIS — Z79.899 HIGH RISK MEDICATION USE: ICD-10-CM

## 2025-06-09 DIAGNOSIS — Z85.048 HISTORY OF RECTAL CANCER: Primary | ICD-10-CM

## 2025-06-09 DIAGNOSIS — Z85.048 HISTORY OF RECTAL CANCER: ICD-10-CM

## 2025-06-09 LAB
ALBUMIN SERPL-MCNC: 4.3 G/DL (ref 3.5–5.2)
ALBUMIN/GLOB SERPL: 2 G/DL
ALP SERPL-CCNC: 104 U/L (ref 39–117)
ALT SERPL W P-5'-P-CCNC: 68 U/L (ref 1–41)
ANION GAP SERPL CALCULATED.3IONS-SCNC: 10.4 MMOL/L (ref 5–15)
AST SERPL-CCNC: 52 U/L (ref 1–40)
BASOPHILS # BLD AUTO: 0.06 10*3/MM3 (ref 0–0.2)
BASOPHILS NFR BLD AUTO: 0.9 % (ref 0–1.5)
BILIRUB SERPL-MCNC: 0.3 MG/DL (ref 0–1.2)
BUN SERPL-MCNC: 7.3 MG/DL (ref 8–23)
BUN/CREAT SERPL: 8.6 (ref 7–25)
CALCIUM SPEC-SCNC: 9.3 MG/DL (ref 8.6–10.5)
CHLORIDE SERPL-SCNC: 102 MMOL/L (ref 98–107)
CO2 SERPL-SCNC: 27.6 MMOL/L (ref 22–29)
CREAT SERPL-MCNC: 0.85 MG/DL (ref 0.76–1.27)
DEPRECATED RDW RBC AUTO: 54.2 FL (ref 37–54)
EGFRCR SERPLBLD CKD-EPI 2021: 92.9 ML/MIN/1.73
EOSINOPHIL # BLD AUTO: 0.3 10*3/MM3 (ref 0–0.4)
EOSINOPHIL NFR BLD AUTO: 4.4 % (ref 0.3–6.2)
ERYTHROCYTE [DISTWIDTH] IN BLOOD BY AUTOMATED COUNT: 14.8 % (ref 12.3–15.4)
GLOBULIN UR ELPH-MCNC: 2.2 GM/DL
GLUCOSE SERPL-MCNC: 99 MG/DL (ref 65–99)
HCT VFR BLD AUTO: 36.4 % (ref 37.5–51)
HGB BLD-MCNC: 11.1 G/DL (ref 13–17.7)
IMM GRANULOCYTES # BLD AUTO: 0.02 10*3/MM3 (ref 0–0.05)
IMM GRANULOCYTES NFR BLD AUTO: 0.3 % (ref 0–0.5)
LYMPHOCYTES # BLD AUTO: 1.78 10*3/MM3 (ref 0.7–3.1)
LYMPHOCYTES NFR BLD AUTO: 26 % (ref 19.6–45.3)
MCH RBC QN AUTO: 30.7 PG (ref 26.6–33)
MCHC RBC AUTO-ENTMCNC: 30.5 G/DL (ref 31.5–35.7)
MCV RBC AUTO: 100.8 FL (ref 79–97)
MONOCYTES # BLD AUTO: 0.62 10*3/MM3 (ref 0.1–0.9)
MONOCYTES NFR BLD AUTO: 9.1 % (ref 5–12)
NEUTROPHILS NFR BLD AUTO: 4.06 10*3/MM3 (ref 1.7–7)
NEUTROPHILS NFR BLD AUTO: 59.3 % (ref 42.7–76)
NRBC BLD AUTO-RTO: 0 /100 WBC (ref 0–0.2)
PLATELET # BLD AUTO: 190 10*3/MM3 (ref 140–450)
PMV BLD AUTO: 9.5 FL (ref 6–12)
POTASSIUM SERPL-SCNC: 4.1 MMOL/L (ref 3.5–5.2)
PROT SERPL-MCNC: 6.5 G/DL (ref 6–8.5)
RBC # BLD AUTO: 3.61 10*6/MM3 (ref 4.14–5.8)
SODIUM SERPL-SCNC: 140 MMOL/L (ref 136–145)
WBC NRBC COR # BLD AUTO: 6.84 10*3/MM3 (ref 3.4–10.8)

## 2025-06-09 PROCEDURE — 3078F DIAST BP <80 MM HG: CPT | Performed by: NURSE PRACTITIONER

## 2025-06-09 PROCEDURE — 85025 COMPLETE CBC W/AUTO DIFF WBC: CPT

## 2025-06-09 PROCEDURE — 96367 TX/PROPH/DG ADDL SEQ IV INF: CPT

## 2025-06-09 PROCEDURE — 99214 OFFICE O/P EST MOD 30 MIN: CPT | Performed by: NURSE PRACTITIONER

## 2025-06-09 PROCEDURE — 96368 THER/DIAG CONCURRENT INF: CPT

## 2025-06-09 PROCEDURE — 1160F RVW MEDS BY RX/DR IN RCRD: CPT | Performed by: NURSE PRACTITIONER

## 2025-06-09 PROCEDURE — 25010000002 PALONOSETRON PER 25 MCG: Performed by: NURSE PRACTITIONER

## 2025-06-09 PROCEDURE — 25010000002 LEUCOVORIN CALCIUM PER 50 MG: Performed by: NURSE PRACTITIONER

## 2025-06-09 PROCEDURE — 96375 TX/PRO/DX INJ NEW DRUG ADDON: CPT

## 2025-06-09 PROCEDURE — 25010000002 FOSAPREPITANT PER 1 MG: Performed by: NURSE PRACTITIONER

## 2025-06-09 PROCEDURE — 96413 CHEMO IV INFUSION 1 HR: CPT

## 2025-06-09 PROCEDURE — G0498 CHEMO EXTEND IV INFUS W/PUMP: HCPCS

## 2025-06-09 PROCEDURE — 25010000002 DEXAMETHASONE SODIUM PHOSPHATE 100 MG/10ML SOLUTION: Performed by: NURSE PRACTITIONER

## 2025-06-09 PROCEDURE — G2211 COMPLEX E/M VISIT ADD ON: HCPCS | Performed by: NURSE PRACTITIONER

## 2025-06-09 PROCEDURE — 80053 COMPREHEN METABOLIC PANEL: CPT

## 2025-06-09 PROCEDURE — 3077F SYST BP >= 140 MM HG: CPT | Performed by: NURSE PRACTITIONER

## 2025-06-09 PROCEDURE — 96415 CHEMO IV INFUSION ADDL HR: CPT

## 2025-06-09 PROCEDURE — 96411 CHEMO IV PUSH ADDL DRUG: CPT

## 2025-06-09 PROCEDURE — 1159F MED LIST DOCD IN RCRD: CPT | Performed by: NURSE PRACTITIONER

## 2025-06-09 PROCEDURE — 25810000003 SODIUM CHLORIDE 0.9 % SOLUTION 250 ML FLEX CONT: Performed by: NURSE PRACTITIONER

## 2025-06-09 PROCEDURE — 25010000002 OXALIPLATIN PER 0.5 MG: Performed by: NURSE PRACTITIONER

## 2025-06-09 PROCEDURE — 25010000003 DEXTROSE 5 % SOLUTION 250 ML FLEX CONT: Performed by: NURSE PRACTITIONER

## 2025-06-09 PROCEDURE — 1126F AMNT PAIN NOTED NONE PRSNT: CPT | Performed by: NURSE PRACTITIONER

## 2025-06-09 PROCEDURE — 25010000002 FLUOROURACIL PER 500 MG: Performed by: NURSE PRACTITIONER

## 2025-06-09 RX ORDER — FLUOROURACIL 50 MG/ML
400 INJECTION, SOLUTION INTRAVENOUS ONCE
Status: COMPLETED | OUTPATIENT
Start: 2025-06-09 | End: 2025-06-09

## 2025-06-09 RX ORDER — DEXTROSE MONOHYDRATE 50 MG/ML
20 INJECTION, SOLUTION INTRAVENOUS ONCE
Status: CANCELLED | OUTPATIENT
Start: 2025-06-09

## 2025-06-09 RX ORDER — DIPHENHYDRAMINE HYDROCHLORIDE 50 MG/ML
50 INJECTION, SOLUTION INTRAMUSCULAR; INTRAVENOUS AS NEEDED
Status: CANCELLED | OUTPATIENT
Start: 2025-06-09

## 2025-06-09 RX ORDER — LOSARTAN POTASSIUM 100 MG/1
100 TABLET ORAL DAILY
Qty: 30 TABLET | Refills: 0 | Status: SHIPPED | OUTPATIENT
Start: 2025-06-09

## 2025-06-09 RX ORDER — DEXTROSE MONOHYDRATE 50 MG/ML
20 INJECTION, SOLUTION INTRAVENOUS ONCE
Status: COMPLETED | OUTPATIENT
Start: 2025-06-09 | End: 2025-06-09

## 2025-06-09 RX ORDER — PALONOSETRON 0.05 MG/ML
0.25 INJECTION, SOLUTION INTRAVENOUS ONCE
Status: CANCELLED | OUTPATIENT
Start: 2025-06-09

## 2025-06-09 RX ORDER — PALONOSETRON 0.05 MG/ML
0.25 INJECTION, SOLUTION INTRAVENOUS ONCE
Status: COMPLETED | OUTPATIENT
Start: 2025-06-09 | End: 2025-06-09

## 2025-06-09 RX ORDER — HYDROCORTISONE SODIUM SUCCINATE 100 MG/2ML
100 INJECTION INTRAMUSCULAR; INTRAVENOUS AS NEEDED
Status: CANCELLED | OUTPATIENT
Start: 2025-06-09

## 2025-06-09 RX ORDER — FAMOTIDINE 10 MG/ML
20 INJECTION, SOLUTION INTRAVENOUS AS NEEDED
Status: CANCELLED | OUTPATIENT
Start: 2025-06-09

## 2025-06-09 RX ORDER — FLUOROURACIL 50 MG/ML
400 INJECTION, SOLUTION INTRAVENOUS ONCE
Status: CANCELLED | OUTPATIENT
Start: 2025-06-09

## 2025-06-09 RX ADMIN — LEUCOVORIN CALCIUM 770 MG: 350 INJECTION, POWDER, LYOPHILIZED, FOR SUSPENSION INTRAMUSCULAR; INTRAVENOUS at 09:57

## 2025-06-09 RX ADMIN — FLUOROURACIL 770 MG: 50 INJECTION, SOLUTION INTRAVENOUS at 12:00

## 2025-06-09 RX ADMIN — FOSAPREPITANT 100 ML: 150 INJECTION, POWDER, LYOPHILIZED, FOR SOLUTION INTRAVENOUS at 09:24

## 2025-06-09 RX ADMIN — FLUOROURACIL 4610 MG: 50 INJECTION, SOLUTION INTRAVENOUS at 12:00

## 2025-06-09 RX ADMIN — PALONOSETRON 0.25 MG: 0.05 INJECTION, SOLUTION INTRAVENOUS at 09:06

## 2025-06-09 RX ADMIN — DEXTROSE 20 ML/HR: 50 INJECTION, SOLUTION INTRAVENOUS at 09:06

## 2025-06-09 RX ADMIN — OXALIPLATIN 165 MG: 5 INJECTION, SOLUTION INTRAVENOUS at 09:57

## 2025-06-09 RX ADMIN — DEXAMETHASONE SODIUM PHOSPHATE 12 MG: 10 INJECTION, SOLUTION INTRAMUSCULAR; INTRAVENOUS at 09:07

## 2025-06-11 ENCOUNTER — INFUSION (OUTPATIENT)
Dept: ONCOLOGY | Facility: HOSPITAL | Age: 70
End: 2025-06-11
Payer: MEDICARE

## 2025-06-11 DIAGNOSIS — Z45.2 FITTING AND ADJUSTMENT OF VASCULAR CATHETER: ICD-10-CM

## 2025-06-11 DIAGNOSIS — Z85.048 HISTORY OF RECTAL CANCER: Primary | ICD-10-CM

## 2025-06-11 PROCEDURE — 25010000002 HEPARIN LOCK FLUSH PER 10 UNITS: Performed by: INTERNAL MEDICINE

## 2025-06-11 RX ORDER — SODIUM CHLORIDE 0.9 % (FLUSH) 0.9 %
10 SYRINGE (ML) INJECTION AS NEEDED
Status: DISCONTINUED | OUTPATIENT
Start: 2025-06-11 | End: 2025-06-11 | Stop reason: HOSPADM

## 2025-06-11 RX ORDER — SODIUM CHLORIDE 0.9 % (FLUSH) 0.9 %
10 SYRINGE (ML) INJECTION AS NEEDED
OUTPATIENT
Start: 2025-06-11

## 2025-06-11 RX ORDER — HEPARIN SODIUM (PORCINE) LOCK FLUSH IV SOLN 100 UNIT/ML 100 UNIT/ML
500 SOLUTION INTRAVENOUS AS NEEDED
OUTPATIENT
Start: 2025-06-11

## 2025-06-11 RX ORDER — HEPARIN SODIUM (PORCINE) LOCK FLUSH IV SOLN 100 UNIT/ML 100 UNIT/ML
500 SOLUTION INTRAVENOUS AS NEEDED
Status: DISCONTINUED | OUTPATIENT
Start: 2025-06-11 | End: 2025-06-11 | Stop reason: HOSPADM

## 2025-06-11 RX ADMIN — Medication 500 UNITS: at 10:14

## 2025-06-11 RX ADMIN — Medication 10 ML: at 10:14

## 2025-06-12 ENCOUNTER — OFFICE VISIT (OUTPATIENT)
Dept: FAMILY MEDICINE CLINIC | Facility: CLINIC | Age: 70
End: 2025-06-12
Payer: MEDICARE

## 2025-06-12 VITALS
HEART RATE: 68 BPM | DIASTOLIC BLOOD PRESSURE: 78 MMHG | SYSTOLIC BLOOD PRESSURE: 136 MMHG | HEIGHT: 65 IN | BODY MASS INDEX: 28.66 KG/M2 | OXYGEN SATURATION: 97 % | TEMPERATURE: 98.2 F | WEIGHT: 172 LBS

## 2025-06-12 DIAGNOSIS — Z01.30 BLOOD PRESSURE CHECK: ICD-10-CM

## 2025-06-12 DIAGNOSIS — I10 ESSENTIAL HYPERTENSION: Primary | ICD-10-CM

## 2025-06-12 NOTE — PROGRESS NOTES
"Chief Complaint  Hypertension (Blood pressure check. Pt was in hospital a few weeks ago with Hypertension. )    Subjective        Kevin Garsia presents to [facility]    History of Present Illness  69 year old male presents for hypertension. Currently on losartan 100 mg daily for hypertension.  Blood pressure is borderline in clinic at 136/78. Has home BP log with readings of 151/89, 161/91, 172/95, 183/100, 126/84. Reports his blood pressure readings have been fluctuating up and down. Denies chest pain and shortness of breath. Denies headache, blurry vision, light headedness or dizziness. Pt should follow healthy diet high in lean proteins such as chicken and fish. Have diet high in whole grains, fiber, fruits, and vegetables. Limit fried foods, red meats, and sugar. Pt should exercise 3-4 days/week. We will trial dosing at 50mg every morning and 50mg every evening.  He is to closely monitor his blood pressure and report elevated readings or return to clinic sooner than scheduled.  Will have him return in 2 weeks for recheck with blood pressure log that is attached to AVS.        Objective   Vital Signs:  /78 (BP Location: Left arm, Patient Position: Sitting, Cuff Size: Adult)   Pulse 68   Temp 98.2 °F (36.8 °C) (Temporal)   Ht 165.1 cm (65\")   Wt 78 kg (172 lb)   SpO2 97%   BMI 28.62 kg/m²   Estimated body mass index is 28.62 kg/m² as calculated from the following:    Height as of this encounter: 165.1 cm (65\").    Weight as of this encounter: 78 kg (172 lb).        Physical Exam  Constitutional:       Appearance: Normal appearance.   HENT:      Head: Normocephalic.      Right Ear: Tympanic membrane, ear canal and external ear normal.      Left Ear: Tympanic membrane, ear canal and external ear normal.      Nose: Nose normal.      Mouth/Throat:      Mouth: Mucous membranes are moist.      Pharynx: Oropharynx is clear.   Eyes:      Conjunctiva/sclera: Conjunctivae " normal.      Pupils: Pupils are equal, round, and reactive to light.   Cardiovascular:      Rate and Rhythm: Normal rate and regular rhythm.      Pulses: Normal pulses.   Pulmonary:      Effort: Pulmonary effort is normal.      Breath sounds: Normal breath sounds.   Abdominal:      General: Abdomen is flat.   Musculoskeletal:         General: Normal range of motion.      Cervical back: Normal range of motion.   Skin:     General: Skin is warm.   Neurological:      General: No focal deficit present.      Mental Status: He is alert and oriented to person, place, and time.   Psychiatric:         Mood and Affect: Mood normal.         Behavior: Behavior normal.        Result Review :                Assessment and Plan   Diagnoses and all orders for this visit:    1. Essential hypertension (Primary)  Assessment & Plan:  Hypertension is stable and controlled  Medication changes per orders.  Limit sodium intake, increase exercise, monitor BP at home.  Blood pressure will be reassessed 2 weeks.  RTC with BP log attached to AVS      2. Blood pressure check             Follow Up   Return in about 2 weeks (around 6/26/2025) for Recheck BP.  Patient was given instructions and counseling regarding his condition or for health maintenance advice. Please see specific information pulled into the AVS if appropriate.

## 2025-06-13 DIAGNOSIS — E11.9 TYPE 2 DIABETES MELLITUS WITHOUT COMPLICATION, WITHOUT LONG-TERM CURRENT USE OF INSULIN: ICD-10-CM

## 2025-06-13 DIAGNOSIS — E78.5 HYPERLIPIDEMIA, UNSPECIFIED HYPERLIPIDEMIA TYPE: ICD-10-CM

## 2025-06-13 NOTE — TELEPHONE ENCOUNTER
PATIENT WOULD LIKE A 90 DAY SUPPLY ALSO WOULD LIKE THE PRESCRIPTION TO GO TO:    [facility] [id number] - [facility] [address] [facility] AT [facility] [address] - [phone] [facility] [phone]  [phone]

## 2025-06-13 NOTE — TELEPHONE ENCOUNTER
Caller: Kevin Garsia    Relationship: Self    Best call back number: [phone]     Requested Prescriptions:   Requested Prescriptions     Pending Prescriptions Disp Refills    losartan (COZAAR) 100 MG tablet 30 tablet 0     Sig: Take 1 tablet by mouth Daily.    atorvastatin (LIPITOR) 80 MG tablet 30 tablet 3     Sig: Take 1 tablet by mouth Daily.        Pharmacy where request should be sent: [facility] [id number] [address] & [facility] [phone] [facility] [phone]      Last office visit with prescribing clinician: 4/29/2025   Last telemedicine visit with prescribing clinician: Visit date not found   Next office visit with prescribing clinician: 7/9/2025     Additional details provided by patient: PATIENT WOULD LIKE A 90 DAY SUPPLY    Does the patient have less than a 3 day supply:  [x] Yes  [] No    Would you like a call back once the refill request has been completed: [] Yes [] No    If the office needs to give you a call back, can they leave a voicemail: [] Yes [] No    Raji Manuel Rep   06/13/25 12:13 EDT

## 2025-06-13 NOTE — ASSESSMENT & PLAN NOTE
Hypertension is stable and controlled  Medication changes per orders.  Limit sodium intake, increase exercise, monitor BP at home.  Blood pressure will be reassessed 2 weeks.  RTC with BP log attached to AVS

## 2025-06-17 ENCOUNTER — EXTERNAL PBMM DATA (OUTPATIENT)
Dept: PHARMACY | Facility: OTHER | Age: 70
End: 2025-06-17
Payer: MEDICARE

## 2025-06-17 RX ORDER — LOSARTAN POTASSIUM 100 MG/1
100 TABLET ORAL DAILY
Qty: 30 TABLET | Refills: 0 | OUTPATIENT
Start: 2025-06-17

## 2025-06-17 RX ORDER — ATORVASTATIN CALCIUM 80 MG/1
80 TABLET, FILM COATED ORAL DAILY
Qty: 30 TABLET | Refills: 3 | OUTPATIENT
Start: 2025-06-17

## 2025-06-17 NOTE — TELEPHONE ENCOUNTER
Hub staff attempted to follow warm transfer process and was unsuccessful     Caller: Kevin Garsia    Relationship to patient: Self    Best call back number:     [phone]       Patient is needing: PATIENT CALLIN GTO FOLLOW UP ON HIS MED REFILLS. STATED HE IS COMPLETELY OUT, AND IN NEED OF THESE AS WELL AS HIS METFORMIN (HUB SEES MENTIONED IN SEPARATE ENCOUNTER).    PLEASE CALL TO UPDATE, AND CONFIRM OR DISCUSS REFILL REQUEST.

## 2025-06-18 DIAGNOSIS — E78.5 HYPERLIPIDEMIA, UNSPECIFIED HYPERLIPIDEMIA TYPE: ICD-10-CM

## 2025-06-18 RX ORDER — METOPROLOL TARTRATE 25 MG/1
25 TABLET, FILM COATED ORAL DAILY
Qty: 90 TABLET | Refills: 1 | Status: SHIPPED | OUTPATIENT
Start: 2025-06-18 | End: 2025-06-20 | Stop reason: ALTCHOICE

## 2025-06-18 RX ORDER — ATORVASTATIN CALCIUM 80 MG/1
80 TABLET, FILM COATED ORAL DAILY
Qty: 90 TABLET | Refills: 1 | Status: SHIPPED | OUTPATIENT
Start: 2025-06-18 | End: 2025-06-20 | Stop reason: SDUPTHER

## 2025-06-18 RX ORDER — LOSARTAN POTASSIUM 100 MG/1
100 TABLET ORAL DAILY
Qty: 90 TABLET | Refills: 1 | Status: SHIPPED | OUTPATIENT
Start: 2025-06-18

## 2025-06-18 NOTE — TELEPHONE ENCOUNTER
Patient calling to check status of two medication that pharmacy stated that they never got.     losartan (COZAAR) 100 MG tablet   atorvastatin (LIPITOR) 80 MG tablet     Also requesting refill on metformin that I don't see on med list. He takes 1000 mg twice a day and wants 90 day supply, I asked who prescribed it and he said that on the bottle it says Priti Oliveira.       Medications pending

## 2025-06-19 ENCOUNTER — APPOINTMENT (OUTPATIENT)
Dept: GENERAL RADIOLOGY | Facility: HOSPITAL | Age: 70
End: 2025-06-19
Payer: MEDICARE

## 2025-06-19 ENCOUNTER — HOSPITAL ENCOUNTER (EMERGENCY)
Facility: HOSPITAL | Age: 70
Discharge: HOME | End: 2025-06-19
Attending: EMERGENCY MEDICINE
Payer: MEDICARE

## 2025-06-19 ENCOUNTER — TELEPHONE (OUTPATIENT)
Dept: FAMILY MEDICINE CLINIC | Facility: CLINIC | Age: 70
End: 2025-06-19

## 2025-06-19 ENCOUNTER — TELEPHONE (OUTPATIENT)
Dept: FAMILY MEDICINE CLINIC | Facility: CLINIC | Age: 70
End: 2025-06-19
Payer: MEDICARE

## 2025-06-19 VITALS
SYSTOLIC BLOOD PRESSURE: 144 MMHG | TEMPERATURE: 97.9 F | BODY MASS INDEX: 28.16 KG/M2 | HEIGHT: 65 IN | WEIGHT: 169 LBS | DIASTOLIC BLOOD PRESSURE: 72 MMHG | OXYGEN SATURATION: 98 % | RESPIRATION RATE: 16 BRPM | HEART RATE: 67 BPM

## 2025-06-19 DIAGNOSIS — I15.9 SECONDARY HYPERTENSION: Primary | ICD-10-CM

## 2025-06-19 DIAGNOSIS — R55 NEAR SYNCOPE: ICD-10-CM

## 2025-06-19 DIAGNOSIS — R51.9 NONINTRACTABLE HEADACHE, UNSPECIFIED CHRONICITY PATTERN, UNSPECIFIED HEADACHE TYPE: ICD-10-CM

## 2025-06-19 LAB
ALBUMIN SERPL-MCNC: 4.4 G/DL (ref 3.5–5.2)
ALBUMIN/GLOB SERPL: 1.6 G/DL
ALP SERPL-CCNC: 129 U/L (ref 39–117)
ALT SERPL W P-5'-P-CCNC: 154 U/L (ref 1–41)
ANION GAP SERPL CALCULATED.3IONS-SCNC: 12.7 MMOL/L (ref 5–15)
AST SERPL-CCNC: 110 U/L (ref 1–40)
BASOPHILS # BLD AUTO: 0.04 10*3/MM3 (ref 0–0.2)
BASOPHILS NFR BLD AUTO: 1 % (ref 0–1.5)
BILIRUB SERPL-MCNC: 0.4 MG/DL (ref 0–1.2)
BUN SERPL-MCNC: 7 MG/DL (ref 8–23)
BUN/CREAT SERPL: 6.8 (ref 7–25)
CALCIUM SPEC-SCNC: 10 MG/DL (ref 8.6–10.5)
CHLORIDE SERPL-SCNC: 101 MMOL/L (ref 98–107)
CO2 SERPL-SCNC: 30.3 MMOL/L (ref 22–29)
CREAT SERPL-MCNC: 1.03 MG/DL (ref 0.76–1.27)
DEPRECATED RDW RBC AUTO: 54.8 FL (ref 37–54)
EGFRCR SERPLBLD CKD-EPI 2021: 77.7 ML/MIN/1.73
EOSINOPHIL # BLD AUTO: 0.2 10*3/MM3 (ref 0–0.4)
EOSINOPHIL NFR BLD AUTO: 5 % (ref 0.3–6.2)
ERYTHROCYTE [DISTWIDTH] IN BLOOD BY AUTOMATED COUNT: 14.8 % (ref 12.3–15.4)
GEN 5 1HR TROPONIN T REFLEX: 8 NG/L
GLOBULIN UR ELPH-MCNC: 2.7 GM/DL
GLUCOSE SERPL-MCNC: 168 MG/DL (ref 65–99)
HCT VFR BLD AUTO: 39.2 % (ref 37.5–51)
HGB BLD-MCNC: 12.6 G/DL (ref 13–17.7)
HOLD SPECIMEN: NORMAL
HOLD SPECIMEN: NORMAL
IMM GRANULOCYTES # BLD AUTO: 0.03 10*3/MM3 (ref 0–0.05)
IMM GRANULOCYTES NFR BLD AUTO: 0.7 % (ref 0–0.5)
LYMPHOCYTES # BLD AUTO: 1.6 10*3/MM3 (ref 0.7–3.1)
LYMPHOCYTES NFR BLD AUTO: 39.6 % (ref 19.6–45.3)
MCH RBC QN AUTO: 32.1 PG (ref 26.6–33)
MCHC RBC AUTO-ENTMCNC: 32.1 G/DL (ref 31.5–35.7)
MCV RBC AUTO: 99.7 FL (ref 79–97)
MONOCYTES # BLD AUTO: 0.49 10*3/MM3 (ref 0.1–0.9)
MONOCYTES NFR BLD AUTO: 12.1 % (ref 5–12)
NEUTROPHILS NFR BLD AUTO: 1.68 10*3/MM3 (ref 1.7–7)
NEUTROPHILS NFR BLD AUTO: 41.6 % (ref 42.7–76)
NRBC BLD AUTO-RTO: 0 /100 WBC (ref 0–0.2)
PLATELET # BLD AUTO: 202 10*3/MM3 (ref 140–450)
PMV BLD AUTO: 9.3 FL (ref 6–12)
POTASSIUM SERPL-SCNC: 4.3 MMOL/L (ref 3.5–5.2)
POTASSIUM SERPL-SCNC: 5.5 MMOL/L (ref 3.5–5.2)
PROT SERPL-MCNC: 7.1 G/DL (ref 6–8.5)
QT INTERVAL: 448 MS
QTC INTERVAL: 460 MS
RBC # BLD AUTO: 3.93 10*6/MM3 (ref 4.14–5.8)
SODIUM SERPL-SCNC: 144 MMOL/L (ref 136–145)
TROPONIN T NUMERIC DELTA: -1 NG/L
TROPONIN T SERPL HS-MCNC: 9 NG/L
WBC NRBC COR # BLD AUTO: 4.04 10*3/MM3 (ref 3.4–10.8)
WHOLE BLOOD HOLD COAG: NORMAL
WHOLE BLOOD HOLD SPECIMEN: NORMAL

## 2025-06-19 PROCEDURE — 84484 ASSAY OF TROPONIN QUANT: CPT | Performed by: NURSE PRACTITIONER

## 2025-06-19 PROCEDURE — 84132 ASSAY OF SERUM POTASSIUM: CPT | Performed by: NURSE PRACTITIONER

## 2025-06-19 PROCEDURE — 36415 COLL VENOUS BLD VENIPUNCTURE: CPT

## 2025-06-19 PROCEDURE — 85025 COMPLETE CBC W/AUTO DIFF WBC: CPT

## 2025-06-19 PROCEDURE — 99284 EMERGENCY DEPT VISIT MOD MDM: CPT

## 2025-06-19 PROCEDURE — 93005 ELECTROCARDIOGRAM TRACING: CPT | Performed by: NURSE PRACTITIONER

## 2025-06-19 PROCEDURE — 71045 X-RAY EXAM CHEST 1 VIEW: CPT

## 2025-06-19 PROCEDURE — 93010 ELECTROCARDIOGRAM REPORT: CPT | Performed by: INTERNAL MEDICINE

## 2025-06-19 PROCEDURE — 80053 COMPREHEN METABOLIC PANEL: CPT | Performed by: EMERGENCY MEDICINE

## 2025-06-19 RX ORDER — SODIUM CHLORIDE 0.9 % (FLUSH) 0.9 %
10 SYRINGE (ML) INJECTION AS NEEDED
Status: DISCONTINUED | OUTPATIENT
Start: 2025-06-19 | End: 2025-06-19 | Stop reason: HOSPADM

## 2025-06-19 RX ORDER — AMLODIPINE BESYLATE 5 MG/1
2.5 TABLET ORAL ONCE
Status: COMPLETED | OUTPATIENT
Start: 2025-06-19 | End: 2025-06-19

## 2025-06-19 RX ORDER — AMLODIPINE BESYLATE 2.5 MG/1
2.5 TABLET ORAL DAILY
Qty: 30 TABLET | Refills: 1 | Status: SHIPPED | OUTPATIENT
Start: 2025-06-19 | End: 2025-06-20 | Stop reason: SDUPTHER

## 2025-06-19 RX ADMIN — AMLODIPINE BESYLATE 2.5 MG: 5 TABLET ORAL at 12:53

## 2025-06-19 NOTE — TELEPHONE ENCOUNTER
Patient's wife Belem called into the office to report patient's blood pressures have been significantly elevated. Patient's wife Belem stated when patient woke up this morning his blood pressure was 190s/100s. She said patient took 100mg of Losartan and rechecked his blood pressure which was 176/100s. Advised patient's wife per verbal release for this office that patient needed to go to ER and be evaluated since his blood pressure was still significantly elevated. Patient's wife voiced an understanding and stated she was going to take him.

## 2025-06-19 NOTE — ED PROVIDER NOTES
EMERGENCY DEPARTMENT ENCOUNTER  Room Number:  01/01  PCP: Priti Oliveira APRN  Independent Historians: Patient      HPI:  Chief Complaint: had concerns including Hypertension.     A complete HPI/ROS/PMH/PSH/SH/FH are unobtainable due to:       Context: Kevin Garsia is a pleasant afebtrile 69 y.o.  male with a medical history of hypertension, diabetes and sleep apnea who presents to the ED c/o acute headache and elevated blood pressures    States felt well yesterday, checked BP which was 170s/80s systolic  Today checked /101 around 9:30 this am  Called pcp who told to come to the ED  Took losartan 100mg at 9:30, he typically takes 50 mg twice daily  Took metoprolol this a.m. as well  No recent n/v/d, no chest tightness  Felt a little near syncopal today he denies any chest pain or shortness of breath.  No leg swelling or recent travel.        Review of prior external notes (non-ED) -and- Review of prior external test results outside of this encounter: Office visit 6/12/2025 seen by BOBBY Stewart for hypertension, home medications include Lipitor 80, aspirin 81, losartan 100 mg, metoprolol tartrate 25 mg daily    Prescription drug monitoring program review:         PAST MEDICAL HISTORY  Active Ambulatory Problems     Diagnosis Date Noted    Essential hypertension     Hyperlipidemia     History of prostate cancer     Non morbid obesity due to excess calories 05/31/2017    Vitamin D deficiency 04/04/2019    TIA (transient ischemic attack) 07/24/2019    S/P carotid endarterectomy 07/24/2019    Type 2 diabetes mellitus without complication, without long-term current use of insulin 07/24/2019    Chronic heart failure with preserved ejection fraction 07/06/2019    Arthritis 07/30/2019    Sleep apnea 07/30/2019    Hospital discharge follow-up 02/07/2022    Cervical spinal stenosis 02/07/2022    Stroke-like symptoms 06/07/2022    Spinal stenosis, lumbar region, without neurogenic claudication  09/12/2022    Protruded lumbar disc 09/12/2022    Chronic right-sided low back pain without sciatica 11/22/2022    Neck pain 11/22/2022    Cervical spondylosis without myelopathy 11/22/2022    Bilateral carpal tunnel syndrome 11/22/2022    Generalized joint pain 03/28/2023    HIV infection 03/28/2023    Medicare annual wellness visit, subsequent 03/29/2023    Other fatigue 05/16/2023    Chronic left shoulder pain 05/16/2023    Change in mole 10/25/2023    Chronic right shoulder pain 07/16/2024    Seasonal allergies 07/16/2024    Acute non-recurrent maxillary sinusitis 07/21/2024    Hypercalcemia 07/24/2024    Screening for colon cancer 07/25/2024    Localized swelling, mass, and lump of head 09/17/2024    History of rectal cancer 11/15/2024    Fitting and adjustment of vascular catheter 11/21/2024    Iron deficiency anemia 12/02/2024    Adverse effect of iron 12/02/2024    Lumbar radiculopathy 02/04/2025    RBBB 04/08/2025    Palpitation 04/08/2025    Arterial hypotension 04/25/2025    Urine retention 04/26/2025    Near syncope 04/26/2025    Rectal adenocarcinoma 05/05/2025    Calcification of native coronary artery 05/12/2025     Resolved Ambulatory Problems     Diagnosis Date Noted    Hyperkalemia 04/26/2025    MARY (acute kidney injury) 04/26/2025     Past Medical History:   Diagnosis Date    Anemia     Aphasia     CTS (carpal tunnel syndrome)     Depression     Diabetes mellitus     GERD (gastroesophageal reflux disease)     History of carotid stenosis     History of radiation therapy     History of transient ischemic attack (TIA)     Hypertension     Multiple gastric ulcers     Neuropathy     Stroke     Tattoos          PAST SURGICAL HISTORY  Past Surgical History:   Procedure Laterality Date    ANGIOPLASTY CAROTID ARTERY      APPENDECTOMY      CAROTID ENDARTERECTOMY Left     COLON RESECTION N/A 03/19/2025    Procedure: Laparoscopic Converted to Open Low Anterior Resection, Ostomy Creation, and Appendetomy;   Surgeon: Naeem Rai MD;  Location: [facility] MAIN OR;  Service: General;  Laterality: N/A;    COLONOSCOPY N/A 10/18/2024    Procedure: COLONOSCOPY TO CECUM/TI WITH BIOPSIES;  Surgeon: Marcus Christine Jr., MD;  Location: [facility] ENDOSCOPY;  Service: General;  Laterality: N/A;  PRE-SCREENING, FAMILY HX COLON CANCER  POST-DIVERTICULOSIS, RECTAL MASS    ENDOSCOPY      FOOT SURGERY      ILEOSTOMY CLOSURE N/A 5/5/2025    Procedure: Closure of diverting loop ileostomy;  Surgeon: Naeem Rai MD;  Location: [facility] MAIN OR;  Service: General;  Laterality: N/A;    INSERTION PROSTATE RADIATION SEED      LUMBAR EPIDURAL INJECTION N/A 02/14/2025    Procedure: L4/L5 LUMBAR EPIDURAL STEROID INJECTION CPT: 57434;  Surgeon: Vandana Ordonez MD;  [place] [facility] [place]  Service: Pain Management;  Laterality: N/A;    TOOTH EXTRACTION  2025    VENOUS ACCESS DEVICE (PORT) INSERTION N/A 11/22/2024    Procedure: INSERTION VENOUS ACCESS DEVICE;  Surgeon: Naeem Rai MD;  Location: [facility] MAIN OR;  Service: General;  Laterality: N/A;         FAMILY HISTORY  Family History   Problem Relation Age of Onset    Bone cancer Mother     Heart disease Father     COPD Father     Hypertension Father     Stroke Father         TIA    Bone cancer Father         smoker    Lung cancer Father     Diabetes Father     No Known Problems Sister     No Known Problems Brother     Mental retardation Brother     No Known Problems Maternal Grandmother     No Known Problems Maternal Grandfather     No Known Problems Paternal Grandmother     Colon cancer Paternal Grandfather     Malig Hyperthermia Neg Hx          SOCIAL HISTORY  Social History     Socioeconomic History    Marital status: Significant Other   Tobacco Use    Smoking status: Former     Types: Cigarettes     Passive exposure: Past    Smokeless tobacco: Never    Tobacco comments:     QUIT 1990     1 TO 3 PPD X 20 YEARS    Vaping Use    Vaping status: Former    Substances:  CBD   Substance and Sexual Activity    Alcohol use: No    Drug use: Not Currently    Sexual activity: Defer       Chronic or social conditions impacting patient care (Social Determinants of Health):  Social Drivers of Health     Tobacco Use: Medium Risk (6/12/2025)    Patient History     Smoking Tobacco Use: Former     Smokeless Tobacco Use: Never     Passive Exposure: Past   Alcohol Use: Not At Risk (5/5/2025)    AUDIT-C     Frequency of Alcohol Consumption: Never     Average Number of Drinks: Patient does not drink     Frequency of Binge Drinking: Never   Financial Resource Strain: Not on file   Food Insecurity: Not on file   Transportation Needs: Not on file   Physical Activity: Patient Declined (5/7/2025)    Exercise Vital Sign     Days of Exercise per Week: Patient declined     Minutes of Exercise per Session: Patient declined   Stress: Not on file   Social Connections: Not on file   Interpersonal Safety: Not At Risk (6/19/2025)    Abuse Screen     Unsafe at Home or Work/School: no     Feels Threatened by Someone?: no     Does Anyone Keep You from Contacting Others or Doint Things Outside the Home?: no     Physical Sign of Abuse Present: no   Depression: Not at risk (6/9/2025)    PHQ-2     PHQ-2 Score: 0   Housing Stability: Unknown (5/7/2025)    Housing Stability     Current Living Arrangements: home     Potentially Unsafe Housing Conditions: Not on file   Utilities: Not on file   Health Literacy: Unknown (5/7/2025)    Education     Help with school or training?: Not on file     Preferred Language: English   Employment: Not on file   Disabilities: Not At Risk (5/5/2025)    Disabilities     Concentrating, Remembering, or Making Decisions Difficulty: no     Doing Errands Independently Difficulty: no       ALLERGIES  Patient has no known allergies.      REVIEW OF SYSTEMS  Review of Systems  Included in HPI  All systems reviewed and negative except for those discussed in HPI.      PHYSICAL EXAM    I have reviewed  the triage vital signs and nursing notes.    ED Triage Vitals   Temp Heart Rate Resp BP SpO2   06/19/25 1142 06/19/25 1142 06/19/25 1142 06/19/25 1148 06/19/25 1142   97.9 °F (36.6 °C) 79 16 163/93 99 %      Temp src Heart Rate Source Patient Position BP Location FiO2 (%)   -- -- -- -- --              Physical Exam  GENERAL: Appears older than stated age, alert, no acute distress, pleasant, smiling, well-appearing  SKIN: Warm, dry and intact  HENT: Normocephalic, atraumatic  EYES: no scleral icterus, no injection, EOMI  CV: regular rhythm, regular rate, no murmur or peripheral edema  RESPIRATORY: normal effort, lungs clear  ABDOMEN: soft, nontender, mild abdominal obesity, no organomegaly, no rebound or guarding  MUSCULOSKELETAL: no deformity, atraumatic exam, no calf swelling or tenderness  NEURO: alert, moves all extremities, follows commands            LAB RESULTS  Recent Results (from the past 24 hours)   Comprehensive Metabolic Panel    Collection Time: 06/19/25 11:58 AM    Specimen: Arm, Left; Blood   Result Value Ref Range    Glucose 168 (H) 65 - 99 mg/dL    BUN 7.0 (L) 8.0 - 23.0 mg/dL    Creatinine 1.03 0.76 - 1.27 mg/dL    Sodium 144 136 - 145 mmol/L    Potassium 5.5 (H) 3.5 - 5.2 mmol/L    Chloride 101 98 - 107 mmol/L    CO2 30.3 (H) 22.0 - 29.0 mmol/L    Calcium 10.0 8.6 - 10.5 mg/dL    Total Protein 7.1 6.0 - 8.5 g/dL    Albumin 4.4 3.5 - 5.2 g/dL    ALT (SGPT) 154 (H) 1 - 41 U/L    AST (SGOT) 110 (H) 1 - 40 U/L    Alkaline Phosphatase 129 (H) 39 - 117 U/L    Total Bilirubin 0.4 0.0 - 1.2 mg/dL    Globulin 2.7 gm/dL    A/G Ratio 1.6 g/dL    BUN/Creatinine Ratio 6.8 (L) 7.0 - 25.0    Anion Gap 12.7 5.0 - 15.0 mmol/L    eGFR 77.7 >60.0 mL/min/1.73   Green Top (Gel)    Collection Time: 06/19/25 11:58 AM   Result Value Ref Range    Extra Tube Hold for add-ons.    Lavender Top    Collection Time: 06/19/25 11:58 AM   Result Value Ref Range    Extra Tube hold for add-on    Gold Top - SST    Collection Time:  06/19/25 11:58 AM   Result Value Ref Range    Extra Tube Hold for add-ons.    Light Blue Top    Collection Time: 06/19/25 11:58 AM   Result Value Ref Range    Extra Tube Hold for add-ons.    CBC Auto Differential    Collection Time: 06/19/25 11:58 AM    Specimen: Arm, Left; Blood   Result Value Ref Range    WBC 4.04 3.40 - 10.80 10*3/mm3    RBC 3.93 (L) 4.14 - 5.80 10*6/mm3    Hemoglobin 12.6 (L) 13.0 - 17.7 g/dL    Hematocrit 39.2 37.5 - 51.0 %    MCV 99.7 (H) 79.0 - 97.0 fL    MCH 32.1 26.6 - 33.0 pg    MCHC 32.1 31.5 - 35.7 g/dL    RDW 14.8 12.3 - 15.4 %    RDW-SD 54.8 (H) 37.0 - 54.0 fl    MPV 9.3 6.0 - 12.0 fL    Platelets 202 140 - 450 10*3/mm3    Neutrophil % 41.6 (L) 42.7 - 76.0 %    Lymphocyte % 39.6 19.6 - 45.3 %    Monocyte % 12.1 (H) 5.0 - 12.0 %    Eosinophil % 5.0 0.3 - 6.2 %    Basophil % 1.0 0.0 - 1.5 %    Immature Grans % 0.7 (H) 0.0 - 0.5 %    Neutrophils, Absolute 1.68 (L) 1.70 - 7.00 10*3/mm3    Lymphocytes, Absolute 1.60 0.70 - 3.10 10*3/mm3    Monocytes, Absolute 0.49 0.10 - 0.90 10*3/mm3    Eosinophils, Absolute 0.20 0.00 - 0.40 10*3/mm3    Basophils, Absolute 0.04 0.00 - 0.20 10*3/mm3    Immature Grans, Absolute 0.03 0.00 - 0.05 10*3/mm3    nRBC 0.0 0.0 - 0.2 /100 WBC   High Sensitivity Troponin T    Collection Time: 06/19/25 11:58 AM    Specimen: Arm, Left; Blood   Result Value Ref Range    HS Troponin T 9 <22 ng/L   ECG 12 Lead Chest Pain    Collection Time: 06/19/25 12:27 PM   Result Value Ref Range    QT Interval 448 ms    QTC Interval 460 ms   High Sensitivity Troponin T 1Hr    Collection Time: 06/19/25 12:58 PM    Specimen: Blood   Result Value Ref Range    HS Troponin T 8 <22 ng/L    Troponin T Numeric Delta -1 Abnormal if >/=3 ng/L   Potassium    Collection Time: 06/19/25 12:58 PM    Specimen: Blood   Result Value Ref Range    Potassium 4.3 3.5 - 5.2 mmol/L         RADIOLOGY  XR Chest 1 View  Result Date: 6/19/2025  XR CHEST 1 VW-  HISTORY: Male who is 69 years-old, chest pain   TECHNIQUE: Frontal view of the chest  COMPARISON: 4/25/2025  FINDINGS: Right chest port appears stable. Aorta is calcified. Heart size is normal. Pulmonary vasculature is unremarkable. No focal pulmonary consolidation, pleural effusion, or pneumothorax. No acute osseous process.      No evidence for acute pulmonary process. Follow-up as clinical indications persist.  This report was finalized on 6/19/2025 1:13 PM by Dr. Emmett Hartman M.D on Workstation: [id number]          MEDICATIONS GIVEN IN ER  Medications   sodium chloride 0.9 % flush 10 mL (has no administration in time range)   amLODIPine (NORVASC) tablet 2.5 mg (2.5 mg Oral Given 6/19/25 1253)         ORDERS PLACED DURING THIS VISIT:  Orders Placed This Encounter   Procedures    XR Chest 1 View    [facility] Draw    Comprehensive Metabolic Panel    CBC Auto Differential    High Sensitivity Troponin T    High Sensitivity Troponin T 1Hr    Potassium    Continuous Pulse Oximetry    Vital Signs    Oxygen Therapy- Nasal Cannula; Titrate 1-6 LPM Per SpO2; 90 - 95%    ECG 12 Lead Chest Pain    ECG 12 Lead Chest Pain    Insert Peripheral IV    CBC & Differential    Green Top (Gel)    Lavender Top    Gold Top - SST    Light Blue Top         OUTPATIENT MEDICATION MANAGEMENT:  Current Facility-Administered Medications Ordered in Epic   Medication Dose Route Frequency Provider Last Rate Last Admin    sodium chloride 0.9 % flush 10 mL  10 mL Intravenous PRN Goldie Garsia APRN         Current Outpatient Medications Ordered in Epic   Medication Sig Dispense Refill    acetaminophen (TYLENOL) 500 MG tablet Take 2 tablets by mouth Every 6 (Six) Hours As Needed for Mild Pain.      amLODIPine (NORVASC) 2.5 MG tablet Take 1 tablet by mouth Daily. 30 tablet 1    aspirin 81 MG chewable tablet Chew 1 tablet Daily. HELD FOR OR      atorvastatin (LIPITOR) 80 MG tablet Take 1 tablet by mouth Daily. 90 tablet 1    Cyanocobalamin (VITAMIN B 12 PO) Take 1 tablet by mouth Daily. HOLD PER MD  INSTR      DULoxetine (CYMBALTA) 60 MG capsule Take 1 capsule by mouth Daily. 90 capsule 3    ferrous sulfate 325 (65 Fe) MG tablet Take 1 tablet by mouth Daily With Breakfast.      gabapentin (NEURONTIN) 300 MG capsule Take 2 capsules by mouth 3 (Three) Times a Day. 540 capsule 3    loperamide (IMODIUM) 2 MG capsule Take 1 capsule by mouth 4 (Four) Times a Day As Needed for Diarrhea.      losartan (COZAAR) 100 MG tablet Take 1 tablet by mouth Daily. 90 tablet 1    metFORMIN (GLUCOPHAGE) 1000 MG tablet Take 1 tablet by mouth 2 (Two) Times a Day With Meals. 90 tablet 1    metoprolol tartrate (LOPRESSOR) 25 MG tablet Take 1 tablet by mouth Daily. 90 tablet 1    ondansetron (ZOFRAN) 8 MG tablet Take 1 tablet by mouth 3 (Three) Times a Day As Needed for Nausea or Vomiting. 30 tablet 5    tamsulosin (FLOMAX) 0.4 MG capsule 24 hr capsule Take 1 capsule by mouth every night at bedtime.      vitamin D (ERGOCALCIFEROL) 1.25 MG (26841 UT) capsule capsule TAKE 1 CAPSULE BY MOUTH 1 TIME EVERY WEEK (Patient taking differently: Take 1 capsule by mouth 1 (One) Time Per Week. SATURDAYS) 12 capsule 0         PROCEDURES  Procedures            PROGRESS, DATA ANALYSIS, CONSULTS, AND MEDICAL DECISION MAKING  All labs have been independently interpreted by me.  All radiology studies have been reviewed by me. All EKG's have been independently viewed and interpreted by me.  Discussion below represents my analysis of pertinent findings related to patient's condition, differential diagnosis, treatment plan and final disposition.    Differential diagnosis includes but is not limited to pulmonary embolism, ACS, hypertensive urgency, CVA, tension headache, anxiety, pheochromocytoma, renal artery stenosis.    Clinical Scores:                                       ED Course as of 06/19/25 1448   Thu Jun 19, 2025   1326 XR Chest 1 View  Per my independent interpretation is negative for thorax, no lobar infiltrates []   1441 Potassium: 4.3 []    1442 ECG 12 Lead Chest Pain  Per my interpretation is sinus rhythm rate of 63, QTc 460, no ST elevations noted no peaked T waves, suspect potassium of 5.3 on initial CMP is erroneous [AH]   1446 Discussed test results and treatment options with patient, he reports that he feels better at this time, offered admission which he declines.  Reports he has an appointment with his primary care provider in the morning.  His systolic blood pressures in the 140s, he has no headache or dizziness.  Will plan to DC him with a prescription for amlodipine to start taking this tomorrow as he was given a dose here and tolerated this well.  States he has a cardiologist who he plans to follow-up with as well.  Given strict return precautions and verbalizes understanding. [AH]      ED Course User Index  [AH] Ady GoldieBOBBY moffett             AS OF 14:48 EDT VITALS:    BP - 144/72  HR - 67  TEMP - 97.9 °F (36.6 °C)  O2 SATS - 98%    COMPLEXITY OF CARE  Admission was considered but after careful review of the patient's presentation, physical examination, diagnostic results, and response to treatment the patient may be safely discharged with outpatient follow-up.      DIAGNOSIS  Final diagnoses:   Secondary hypertension   Nonintractable headache, unspecified chronicity pattern, unspecified headache type   Near syncope         DISPOSITION  ED Disposition       ED Disposition   Discharge    Condition   Stable    Comment   --                Please note that portions of this document were completed with a voice recognition program.    Note Disclaimer: At [facility], we believe that sharing information builds trust and better relationships. You are receiving this note because you recently visited [facility]. It is possible you will see health information before a provider has talked with you about it. This kind of information can be easy to misunderstand. To help you fully understand what it means for your health, we urge you to discuss  this note with your provider.         Goldie Garsia, APRN  06/19/25 1802

## 2025-06-20 ENCOUNTER — OFFICE VISIT (OUTPATIENT)
Dept: FAMILY MEDICINE CLINIC | Facility: CLINIC | Age: 70
End: 2025-06-20
Payer: MEDICARE

## 2025-06-20 VITALS
HEART RATE: 80 BPM | BODY MASS INDEX: 28.99 KG/M2 | DIASTOLIC BLOOD PRESSURE: 80 MMHG | OXYGEN SATURATION: 98 % | HEIGHT: 65 IN | SYSTOLIC BLOOD PRESSURE: 160 MMHG | WEIGHT: 174 LBS | TEMPERATURE: 98.3 F

## 2025-06-20 DIAGNOSIS — I10 ESSENTIAL HYPERTENSION: Primary | ICD-10-CM

## 2025-06-20 DIAGNOSIS — E78.5 HYPERLIPIDEMIA, UNSPECIFIED HYPERLIPIDEMIA TYPE: ICD-10-CM

## 2025-06-20 RX ORDER — AMLODIPINE BESYLATE 10 MG/1
10 TABLET ORAL DAILY
Qty: 90 TABLET | Refills: 3 | Status: SHIPPED | OUTPATIENT
Start: 2025-06-20

## 2025-06-20 RX ORDER — ATORVASTATIN CALCIUM 80 MG/1
80 TABLET, FILM COATED ORAL DAILY
Qty: 90 TABLET | Refills: 1 | Status: SHIPPED | OUTPATIENT
Start: 2025-06-20

## 2025-06-20 NOTE — PROGRESS NOTES
Patient Name: Kevin Garsia  :   1955 Age: 69 y.o.  MRN:   [id number]    Date of Visit:  2025   Physician:  Ean Conklin MD      Mr. Kevin Garsia is an established patient of Priti Oliveira who presents today to discuss elevated BP. His wife is with him to provide additional information. His history is most notable for the following; see problem list and other chart data below for further details, reviewed, and updated today.     HTN  DM2  Rectal cancer, s/o ostomy now reversed, completed chemotx  History of prostate cancer  PUD  GERD  S/p carotid endarterectomy  HLD    Past Medical History:   Diagnosis Date    Anemia     Aphasia     Arthritis     CTS (carpal tunnel syndrome)     Depression     Diabetes mellitus     GERD (gastroesophageal reflux disease)     History of carotid stenosis     HX LEFT CAROTID ENDARTERECTOMY    History of prostate cancer     HX SEED, RADIATION    History of radiation therapy     History of transient ischemic attack (TIA)     S/P CAROTID ENDARTERECTOMY     Hyperlipidemia     Hypertension     Multiple gastric ulcers     Neuropathy     Rectal adenocarcinoma     Seasonal allergies     Sleep apnea     cpap    Stroke     Tattoos     TIA (transient ischemic attack)        Outpatient Medications Prior to Visit   Medication Sig Dispense Refill    acetaminophen (TYLENOL) 500 MG tablet Take 2 tablets by mouth Every 6 (Six) Hours As Needed for Mild Pain.      aspirin 81 MG chewable tablet Chew 1 tablet Daily. HELD FOR OR      Cyanocobalamin (VITAMIN B 12 PO) Take 1 tablet by mouth Daily. HOLD PER MD JIMÉNEZ      DULoxetine (CYMBALTA) 60 MG capsule Take 1 capsule by mouth Daily. 90 capsule 3    ferrous sulfate 325 (65 Fe) MG tablet Take 1 tablet by mouth Daily With Breakfast.      gabapentin (NEURONTIN) 300 MG capsule Take 2 capsules by mouth 3 (Three) Times a Day. 540 capsule 3    loperamide (IMODIUM) 2 MG capsule Take 1 capsule by mouth 4 (Four) Times a  Day As Needed for Diarrhea.      losartan (COZAAR) 100 MG tablet Take 1 tablet by mouth Daily. 90 tablet 1    metFORMIN (GLUCOPHAGE) 1000 MG tablet Take 1 tablet by mouth 2 (Two) Times a Day With Meals. 90 tablet 1    ondansetron (ZOFRAN) 8 MG tablet Take 1 tablet by mouth 3 (Three) Times a Day As Needed for Nausea or Vomiting. 30 tablet 5    tamsulosin (FLOMAX) 0.4 MG capsule 24 hr capsule Take 1 capsule by mouth every night at bedtime.      vitamin D (ERGOCALCIFEROL) 1.25 MG (71915 UT) capsule capsule TAKE 1 CAPSULE BY MOUTH 1 TIME EVERY WEEK (Patient taking differently: Take 1 capsule by mouth 1 (One) Time Per Week. SATURDAYS) 12 capsule 0    amLODIPine (NORVASC) 2.5 MG tablet Take 1 tablet by mouth Daily. 30 tablet 1    atorvastatin (LIPITOR) 80 MG tablet Take 1 tablet by mouth Daily. 90 tablet 1    metoprolol tartrate (LOPRESSOR) 25 MG tablet Take 1 tablet by mouth Daily. 90 tablet 1     No facility-administered medications prior to visit.       No Known Allergies    Vitals:    06/20/25 1000   BP: 160/80   Pulse: 80   Temp: 98.3 °F (36.8 °C)   SpO2: 98%       Problems discussed today:    HTN / elevated BP  Mr Garsia presents to address increasing BP. He has a history of HTN, has been on losartan + metoprolol, apparently had problems with low BP some months ago  related to ostomy output issues and had BP meds reduced. However his ostomy is now reserved and he has not had any further issues with dehydration, hypovolemia, or low BP. His BP has been gradually increasing and his losartan dosage has been resumed at 100mg but this does not really seem to have impacted BP much. Over the past few weeks he has had persistent BP readings in the 160-180 /  range, without any associated cardiac or other symptoms.    He was seen in the ER for this yesterday, labs all unremarkable, was started on Norvasc 2.5mg and has been on this just 1 day without any demonstrable impact on BP as yet. He recalls being on Norvasc at  "10mg years ago and is unsure why this was stopped.    Plan: BP remains elevated, I think that after a number of recent health issues we are seeing his baseline HTN return to his typical BP levels. Nothing to suggest any acute process, no \"HTN urgency\" issues. Discussed general management aspects of HTN. He has predictable day to day BP variation, advised him that vascular changes with age often lead to some SBP lability and his readings seem consistent with predictable levels of variation. At this point I think we just need to adjust his baseline HTN regimen and continue routine monitoring.    With DM2 he should probably remain on Losartan; he does not seem to have any compelling reason to need BB and it does not seem to have much impact on BP so I advised them to d/c metoprolol for now, and we will resume Norvasc at 10mg, asked them to keep us updated on BP levels. No other interventions needed for now.    Diagnoses and all orders for this visit:     Diagnoses and all orders for this visit:    1. Essential hypertension (Primary)  -     amLODIPine (NORVASC) 10 MG tablet; Take 1 tablet by mouth Daily.  Dispense: 90 tablet; Refill: 3    2. Hyperlipidemia, unspecified hyperlipidemia type  -     atorvastatin (LIPITOR) 80 MG tablet; Take 1 tablet by mouth Daily.  Dispense: 90 tablet; Refill: 1         The following portions of the patient's history were reviewed and updated as appropriate: allergies, current medications, past family history, past medical history, past social history, past surgical history, and problem list.     Ean Conklin MD  Electronically signed by Ean Conklin MD on 06/20/25 10:14 EDT.    "

## 2025-06-23 ENCOUNTER — OFFICE VISIT (OUTPATIENT)
Dept: ONCOLOGY | Facility: CLINIC | Age: 70
End: 2025-06-23
Payer: MEDICARE

## 2025-06-23 ENCOUNTER — CLINICAL SUPPORT (OUTPATIENT)
Dept: OTHER | Facility: HOSPITAL | Age: 70
End: 2025-06-23
Payer: MEDICARE

## 2025-06-23 ENCOUNTER — INFUSION (OUTPATIENT)
Dept: ONCOLOGY | Facility: HOSPITAL | Age: 70
End: 2025-06-23
Payer: MEDICARE

## 2025-06-23 VITALS
SYSTOLIC BLOOD PRESSURE: 132 MMHG | HEIGHT: 65 IN | WEIGHT: 173.3 LBS | TEMPERATURE: 97.4 F | BODY MASS INDEX: 28.87 KG/M2 | HEART RATE: 67 BPM | RESPIRATION RATE: 17 BRPM | DIASTOLIC BLOOD PRESSURE: 71 MMHG | OXYGEN SATURATION: 97 %

## 2025-06-23 DIAGNOSIS — Z85.048 HISTORY OF RECTAL CANCER: Primary | ICD-10-CM

## 2025-06-23 DIAGNOSIS — Z85.048 HISTORY OF RECTAL CANCER: ICD-10-CM

## 2025-06-23 DIAGNOSIS — Z79.899 HIGH RISK MEDICATION USE: ICD-10-CM

## 2025-06-23 DIAGNOSIS — D50.9 IRON DEFICIENCY ANEMIA, UNSPECIFIED IRON DEFICIENCY ANEMIA TYPE: ICD-10-CM

## 2025-06-23 DIAGNOSIS — C20 RECTAL ADENOCARCINOMA: Primary | ICD-10-CM

## 2025-06-23 LAB
ALBUMIN SERPL-MCNC: 4.2 G/DL (ref 3.5–5.2)
ALBUMIN/GLOB SERPL: 1.8 G/DL
ALP SERPL-CCNC: 115 U/L (ref 39–117)
ALT SERPL W P-5'-P-CCNC: 109 U/L (ref 1–41)
ANION GAP SERPL CALCULATED.3IONS-SCNC: 10.4 MMOL/L (ref 5–15)
AST SERPL-CCNC: 48 U/L (ref 1–40)
BASOPHILS # BLD AUTO: 0.05 10*3/MM3 (ref 0–0.2)
BASOPHILS NFR BLD AUTO: 1.1 % (ref 0–1.5)
BILIRUB SERPL-MCNC: 0.5 MG/DL (ref 0–1.2)
BUN SERPL-MCNC: 10.1 MG/DL (ref 8–23)
BUN/CREAT SERPL: 12 (ref 7–25)
CALCIUM SPEC-SCNC: 9.6 MG/DL (ref 8.6–10.5)
CHLORIDE SERPL-SCNC: 104 MMOL/L (ref 98–107)
CO2 SERPL-SCNC: 27.6 MMOL/L (ref 22–29)
CREAT SERPL-MCNC: 0.84 MG/DL (ref 0.76–1.27)
DEPRECATED RDW RBC AUTO: 55.8 FL (ref 37–54)
EGFRCR SERPLBLD CKD-EPI 2021: 93.2 ML/MIN/1.73
EOSINOPHIL # BLD AUTO: 0.26 10*3/MM3 (ref 0–0.4)
EOSINOPHIL NFR BLD AUTO: 5.7 % (ref 0.3–6.2)
ERYTHROCYTE [DISTWIDTH] IN BLOOD BY AUTOMATED COUNT: 15.4 % (ref 12.3–15.4)
FERRITIN SERPL-MCNC: 131 NG/ML (ref 30–400)
GLOBULIN UR ELPH-MCNC: 2.4 GM/DL
GLUCOSE SERPL-MCNC: 112 MG/DL (ref 65–99)
HCT VFR BLD AUTO: 35.5 % (ref 37.5–51)
HGB BLD-MCNC: 11.3 G/DL (ref 13–17.7)
IMM GRANULOCYTES # BLD AUTO: 0.02 10*3/MM3 (ref 0–0.05)
IMM GRANULOCYTES NFR BLD AUTO: 0.4 % (ref 0–0.5)
IRON 24H UR-MRATE: 58 MCG/DL (ref 59–158)
IRON SATN MFR SERPL: 16 % (ref 20–50)
LYMPHOCYTES # BLD AUTO: 1.31 10*3/MM3 (ref 0.7–3.1)
LYMPHOCYTES NFR BLD AUTO: 28.8 % (ref 19.6–45.3)
MCH RBC QN AUTO: 31.6 PG (ref 26.6–33)
MCHC RBC AUTO-ENTMCNC: 31.8 G/DL (ref 31.5–35.7)
MCV RBC AUTO: 99.2 FL (ref 79–97)
MONOCYTES # BLD AUTO: 0.51 10*3/MM3 (ref 0.1–0.9)
MONOCYTES NFR BLD AUTO: 11.2 % (ref 5–12)
NEUTROPHILS NFR BLD AUTO: 2.4 10*3/MM3 (ref 1.7–7)
NEUTROPHILS NFR BLD AUTO: 52.8 % (ref 42.7–76)
NRBC BLD AUTO-RTO: 0 /100 WBC (ref 0–0.2)
PLATELET # BLD AUTO: 153 10*3/MM3 (ref 140–450)
PMV BLD AUTO: 9.7 FL (ref 6–12)
POTASSIUM SERPL-SCNC: 3.8 MMOL/L (ref 3.5–5.2)
PROT SERPL-MCNC: 6.6 G/DL (ref 6–8.5)
RBC # BLD AUTO: 3.58 10*6/MM3 (ref 4.14–5.8)
SODIUM SERPL-SCNC: 142 MMOL/L (ref 136–145)
TIBC SERPL-MCNC: 367 MCG/DL (ref 298–536)
TRANSFERRIN SERPL-MCNC: 246 MG/DL (ref 200–360)
WBC NRBC COR # BLD AUTO: 4.55 10*3/MM3 (ref 3.4–10.8)

## 2025-06-23 PROCEDURE — 82728 ASSAY OF FERRITIN: CPT | Performed by: NURSE PRACTITIONER

## 2025-06-23 PROCEDURE — 96415 CHEMO IV INFUSION ADDL HR: CPT

## 2025-06-23 PROCEDURE — 85025 COMPLETE CBC W/AUTO DIFF WBC: CPT | Performed by: INTERNAL MEDICINE

## 2025-06-23 PROCEDURE — 96375 TX/PRO/DX INJ NEW DRUG ADDON: CPT

## 2025-06-23 PROCEDURE — 99214 OFFICE O/P EST MOD 30 MIN: CPT | Performed by: NURSE PRACTITIONER

## 2025-06-23 PROCEDURE — 25810000003 SODIUM CHLORIDE 0.9 % SOLUTION 500 ML FLEX CONT: Performed by: NURSE PRACTITIONER

## 2025-06-23 PROCEDURE — 25010000002 FLUOROURACIL PER 500 MG: Performed by: NURSE PRACTITIONER

## 2025-06-23 PROCEDURE — 96411 CHEMO IV PUSH ADDL DRUG: CPT

## 2025-06-23 PROCEDURE — 25010000002 PALONOSETRON PER 25 MCG: Performed by: NURSE PRACTITIONER

## 2025-06-23 PROCEDURE — 25010000003 DEXTROSE 5 % SOLUTION 250 ML FLEX CONT: Performed by: NURSE PRACTITIONER

## 2025-06-23 PROCEDURE — 80053 COMPREHEN METABOLIC PANEL: CPT | Performed by: INTERNAL MEDICINE

## 2025-06-23 PROCEDURE — G0498 CHEMO EXTEND IV INFUS W/PUMP: HCPCS

## 2025-06-23 PROCEDURE — 96368 THER/DIAG CONCURRENT INF: CPT

## 2025-06-23 PROCEDURE — 25010000002 DEXAMETHASONE SODIUM PHOSPHATE 100 MG/10ML SOLUTION: Performed by: NURSE PRACTITIONER

## 2025-06-23 PROCEDURE — 83540 ASSAY OF IRON: CPT | Performed by: NURSE PRACTITIONER

## 2025-06-23 PROCEDURE — 25010000002 LEUCOVORIN CALCIUM PER 50 MG: Performed by: NURSE PRACTITIONER

## 2025-06-23 PROCEDURE — 25010000002 FOSAPREPITANT PER 1 MG: Performed by: NURSE PRACTITIONER

## 2025-06-23 PROCEDURE — G2211 COMPLEX E/M VISIT ADD ON: HCPCS | Performed by: NURSE PRACTITIONER

## 2025-06-23 PROCEDURE — 3078F DIAST BP <80 MM HG: CPT | Performed by: NURSE PRACTITIONER

## 2025-06-23 PROCEDURE — 96367 TX/PROPH/DG ADDL SEQ IV INF: CPT

## 2025-06-23 PROCEDURE — 25010000002 OXALIPLATIN PER 0.5 MG: Performed by: NURSE PRACTITIONER

## 2025-06-23 PROCEDURE — 84466 ASSAY OF TRANSFERRIN: CPT | Performed by: NURSE PRACTITIONER

## 2025-06-23 PROCEDURE — 1126F AMNT PAIN NOTED NONE PRSNT: CPT | Performed by: NURSE PRACTITIONER

## 2025-06-23 PROCEDURE — 96413 CHEMO IV INFUSION 1 HR: CPT

## 2025-06-23 PROCEDURE — 3075F SYST BP GE 130 - 139MM HG: CPT | Performed by: NURSE PRACTITIONER

## 2025-06-23 RX ORDER — HYDROCORTISONE SODIUM SUCCINATE 100 MG/2ML
100 INJECTION INTRAMUSCULAR; INTRAVENOUS AS NEEDED
Status: CANCELLED | OUTPATIENT
Start: 2025-06-23

## 2025-06-23 RX ORDER — PALONOSETRON 0.05 MG/ML
0.25 INJECTION, SOLUTION INTRAVENOUS ONCE
Status: COMPLETED | OUTPATIENT
Start: 2025-06-23 | End: 2025-06-23

## 2025-06-23 RX ORDER — FLUOROURACIL 50 MG/ML
400 INJECTION, SOLUTION INTRAVENOUS ONCE
Status: COMPLETED | OUTPATIENT
Start: 2025-06-23 | End: 2025-06-23

## 2025-06-23 RX ORDER — FLUOROURACIL 50 MG/ML
400 INJECTION, SOLUTION INTRAVENOUS ONCE
Status: CANCELLED | OUTPATIENT
Start: 2025-06-23

## 2025-06-23 RX ORDER — PALONOSETRON 0.05 MG/ML
0.25 INJECTION, SOLUTION INTRAVENOUS ONCE
Status: CANCELLED | OUTPATIENT
Start: 2025-06-23

## 2025-06-23 RX ORDER — FAMOTIDINE 10 MG/ML
20 INJECTION, SOLUTION INTRAVENOUS AS NEEDED
Status: CANCELLED | OUTPATIENT
Start: 2025-06-23

## 2025-06-23 RX ORDER — DIPHENHYDRAMINE HYDROCHLORIDE 50 MG/ML
50 INJECTION, SOLUTION INTRAMUSCULAR; INTRAVENOUS AS NEEDED
Status: CANCELLED | OUTPATIENT
Start: 2025-06-23

## 2025-06-23 RX ORDER — DEXTROSE MONOHYDRATE 50 MG/ML
20 INJECTION, SOLUTION INTRAVENOUS ONCE
Status: COMPLETED | OUTPATIENT
Start: 2025-06-23 | End: 2025-06-23

## 2025-06-23 RX ORDER — DEXTROSE MONOHYDRATE 50 MG/ML
20 INJECTION, SOLUTION INTRAVENOUS ONCE
Status: CANCELLED | OUTPATIENT
Start: 2025-06-23

## 2025-06-23 RX ADMIN — OXALIPLATIN 165 MG: 5 INJECTION, SOLUTION INTRAVENOUS at 11:33

## 2025-06-23 RX ADMIN — DEXTROSE 20 ML/HR: 50 INJECTION, SOLUTION INTRAVENOUS at 10:43

## 2025-06-23 RX ADMIN — DEXAMETHASONE SODIUM PHOSPHATE 12 MG: 10 INJECTION, SOLUTION INTRAMUSCULAR; INTRAVENOUS at 10:43

## 2025-06-23 RX ADMIN — FLUOROURACIL 770 MG: 50 INJECTION, SOLUTION INTRAVENOUS at 13:59

## 2025-06-23 RX ADMIN — PALONOSETRON 0.25 MG: 0.05 INJECTION, SOLUTION INTRAVENOUS at 10:43

## 2025-06-23 RX ADMIN — FOSAPREPITANT 100 ML: 150 INJECTION, POWDER, LYOPHILIZED, FOR SOLUTION INTRAVENOUS at 11:01

## 2025-06-23 RX ADMIN — LEUCOVORIN CALCIUM 770 MG: 350 INJECTION, POWDER, LYOPHILIZED, FOR SUSPENSION INTRAMUSCULAR; INTRAVENOUS at 11:33

## 2025-06-23 RX ADMIN — FLUOROURACIL 4610 MG: 50 INJECTION, SOLUTION INTRAVENOUS at 13:59

## 2025-06-23 NOTE — PROGRESS NOTES
REASON FOR FOLLOW UP:  stage III mid to upper well-differentiated rectal adenocarcinoma (cT3, cN2 CM0.).    History of Present Illness    The patient is a 69 y.o. male with the above-mentioned history, who returns to the office today in anticipation of his third cycle of adjuvant chemotherapy with FOLFOX.  Continues to tolerate therapy well.  He has no worsening neuropathy.  He has no fevers or chills, signs or symptoms of infection.  He is eating and drinking adequately.  He continues to have some bowel irregularities which has been an issue since surgery.  He was recommended he begin Benefiber which he has not yet tried.    ONCOLOGY HISTORY:  The patient is a 69-year-old male who is referred for recently diagnosed stage III mid to upper well-differentiated rectal adenocarcinoma (cT3, cN2 CM0.).    Patient undergone a screening colonoscopy 10/18/2024 demonstrating a circumferential mass involving the rectum extending from 10 cm to 12 cm with biopsy of 15 cm consistent with invasive well-differentiated adenocarcinoma with ulceration necroinflammatory debris.    This was felt to be microsatellite stable by IHC as well as including MSI by PCR.    If follow-up with infectious disease 11/1/2024 there being concern about resuming hypersexual activity and that he start preexposure prophylaxis.  He last been seen July 2024 on Descovy still in relationship with his partner and currently monogamous with been having bowel changes underwent colonoscopy with the above diagnosis.  As result of his need for therapy Descovy was discontinued and the issue to be reevaluated once he was successfully treated.    MRI of the pelvis performed 11/4/2024 demonstrates a high rectal mass extending to the proximal sigmoid colon representing a T3d N2 rectal tumor, side effect invasion on the superior aspect of the mass approximates between the mesorectum and peritoneal cavity?    CT chest 11/8 demonstrates pleural plaques along the peripheral  aspect of both the right lower lobes.  These are of uncertain significance.    The patient was next seen 11/14/2024 by colorectal surgery without evidence of obstruction or bleeding.  He was thought a excellent candidate for neoadjuvant chemotherapy followed by mesorectal excision and adjuvant chemotherapy-comparable to the prospect trial.  There was concern about the location of the tumor extending down to the rectum and the avoidance of radiation therapy since the tumor appeared to be resectable.  Was the role of laparoscopic low anterior resection total mesorectal excision and creation of a diverting loop ileostomy temporarily discussed.    As resulted above the patient is now referred to discuss this above approach.  He is seen with his significant other and we have discussed his findings in great detail from initial diagnosis and and subsequent surgical assessment leading to the recommendation of neoadjuvant chemotherapy given in the fashion of the PROSPECT Trial which was designed to determine whether neoadjuvant chemotherapy could be used as an alternative to neoadjuvant chemoRT.  This study demonstrated that similar disease-free survival and overall survival was similar to neoadjuvant CRT alone for eligible patients.  This would include the use of 6 cycles of FOLFOX chemotherapy followed by reassessment and if a clinical response and 20% or more was seen then RT would be admitted, proceeding to surgical resection and subsequent adjuvant chemotherapy.    He returns 12/2/2024, for Cycle 1 Day 1 FOLFOX. He does not have any new concerns today. He remains fatigued and experiences intermittent lightheadedness. He denies shortness of breath. He denies abdominal pain. His stools are black, but he denies rome blood. Despite starting oral iron, his hemoglobin has decreased further. He is not tolerating the oral iron at this time. Patient was started on Feraheme.    Patient returns on 12/16/2024 for cycle 2 FOLFOX.   He reports he is tolerated treatment well thus far and denying any nausea, vomiting, changes to his bowels.  He did have blood in his stool following cycle 1 1 time.  That has not reoccurred.  He did start Feraheme the day of disconnect with cycle 1 and has had improvement in his hemoglobin.  He will be due for his second dose of Feraheme today.  He denies increased neuropathy.    The patient is next evaluated 12/30/2024 for his third cycle of FOLFOX.  He has undergone Feraheme given 12/4/2024 and 12/16/2024.  He is feeling reasonably good without neuropathic symptoms.  We have discussed his scheduling and timing as he proceeds through his treatment including subsequent repeat MR pelvis after his 6 cycle of FOLFOX.    He returns 1/13/2025 for follow up and treatment.  He has been positive for COVID since his last office visit and called in at which time we did start him on paxlovid.  He reports on starting the Paxlovid he was much improved.  He does have a scant cough at times with no lingering shortness of breath.  Report neuropathy lasting approximately 3 days following last treatment that was not always associated with cold intolerance.  This is now resolved and had involved his hands primarily.  We went on to proceed with cycle 4 FOLFOX, obtained ionized calcium, PTH, vitamin D and PTH related peptide testing.  The studies include a PTH of 25, ionized calcium of 1.41, PTH related peptide less than 2.0, vitamin D level 67.1.    He is next seen 1/27/2025 for cycle #5 FOLFOX out of 6 planned prior to repeat MRI.  He is doing well with treatment with minimal neuropathy which recovers quickly, no additional fatigue and is without prolonged cytopenias.  He is trying to plan a wedding in and around his surgery and may need dental extractions soon.  He is also having back pain after recent exercising and is pursuing an epidural.    The patient is seen 2/26/2025 improved from previous and is status post MRI of the pelvis  2/18/2025 demonstrating a response to therapy improved from previous from 11/4/2024 with a radiologic estimate of T3c compared to T3 DM2.  In review of this study enlarging tumor signal extends superiorly from the mass in close approximation between the mesorectum and peritoneal cavity and the previously seen suspicious lymph nodes appear to have improved from previous.    He returns today April 14, 2025.  On 3/20/2025 he underwent a low anterior resection with diverting loop ileostomy and appendectomy.  Fortunately his postoperative course was uneventful though he developed a high output ileostomy that required loperamide to slow it down.  Pathology revealed a pT2 N0 well-differentiated mid rectal adenocarcinoma with treatment effect.    He was seen back April 3, 2025 having high output through his ostomy and it was felt that it would be reasonable to move forward with closure of his diverting loop ileostomy in 4 weeks.  It was discussed that chemotherapy adjuvantly would be held until he recovered postoperatively.    He is next seen April 14, 2025.  His ostomy still has variable output but he has been able to manage this and he is quite willing to proceed as above per his subsequent surgery and reconsideration of adjuvant therapy postop.    He continued to see Dr. Rai in 5/3/2025 - 5/8/2025 he underwent closure of loop ileostomy.  He was seen back in follow-up 5/22/2025 and was felt a candidate to restart chemotherapy.    He followed with cardiology with negative stress testing, asked to continue statin and aspirin.    He did present to the ER 5/23/2025 with accelerated hypertension, weakness and mild headache eventually responsive to medications.  Troponin was negative as was normal assessment of his kidney function.    He is next seen in office 5/27/2025 to restart chemotherapy to start adjuvant FOLFOX x 6.        Past Surgical History:   Procedure Laterality Date    ANGIOPLASTY CAROTID ARTERY       APPENDECTOMY      CAROTID ENDARTERECTOMY Left     COLON RESECTION N/A 03/19/2025    Procedure: Laparoscopic Converted to Open Low Anterior Resection, Ostomy Creation, and Appendetomy;  Surgeon: Naeem Rai MD;  Location:  [facility];  Service: General;  Laterality: N/A;    COLONOSCOPY N/A 10/18/2024    Procedure: COLONOSCOPY TO CECUM/TI WITH BIOPSIES;  Surgeon: Marcus Christine Jr., MD;  Location: [facility] ENDOSCOPY;  Service: General;  Laterality: N/A;  PRE-SCREENING, FAMILY HX COLON CANCER  POST-DIVERTICULOSIS, RECTAL MASS    ENDOSCOPY      FOOT SURGERY      ILEOSTOMY CLOSURE N/A 5/5/2025    Procedure: Closure of diverting loop ileostomy;  Surgeon: Naeem Rai MD;  Location: [facility] MAIN OR;  Service: General;  Laterality: N/A;    INSERTION PROSTATE RADIATION SEED      LUMBAR EPIDURAL INJECTION N/A 02/14/2025    Procedure: L4/L5 LUMBAR EPIDURAL STEROID INJECTION CPT: 24663;  Surgeon: Vandana Ordonez MD;  [place] [facility] [place]  Service: Pain Management;  Laterality: N/A;    TOOTH EXTRACTION  2025    VENOUS ACCESS DEVICE (PORT) INSERTION N/A 11/22/2024    Procedure: INSERTION VENOUS ACCESS DEVICE;  Surgeon: Naeem Rai MD;  Location: [facility] MAIN OR;  Service: General;  Laterality: N/A;        Current Outpatient Medications on File Prior to Visit   Medication Sig Dispense Refill    acetaminophen (TYLENOL) 500 MG tablet Take 2 tablets by mouth Every 6 (Six) Hours As Needed for Mild Pain.      amLODIPine (NORVASC) 10 MG tablet Take 1 tablet by mouth Daily. 90 tablet 3    aspirin 81 MG chewable tablet Chew 1 tablet Daily. HELD FOR OR      atorvastatin (LIPITOR) 80 MG tablet Take 1 tablet by mouth Daily. 90 tablet 1    Cyanocobalamin (VITAMIN B 12 PO) Take 1 tablet by mouth Daily. HOLD PER MD INSTR      DULoxetine (CYMBALTA) 60 MG capsule Take 1 capsule by mouth Daily. 90 capsule 3    ferrous sulfate 325 (65 Fe) MG tablet Take 1 tablet by mouth Daily With Breakfast.       gabapentin (NEURONTIN) 300 MG capsule Take 2 capsules by mouth 3 (Three) Times a Day. 540 capsule 3    loperamide (IMODIUM) 2 MG capsule Take 1 capsule by mouth 4 (Four) Times a Day As Needed for Diarrhea.      losartan (COZAAR) 100 MG tablet Take 1 tablet by mouth Daily. 90 tablet 1    metFORMIN (GLUCOPHAGE) 1000 MG tablet Take 1 tablet by mouth 2 (Two) Times a Day With Meals. 90 tablet 1    ondansetron (ZOFRAN) 8 MG tablet Take 1 tablet by mouth 3 (Three) Times a Day As Needed for Nausea or Vomiting. 30 tablet 5    tamsulosin (FLOMAX) 0.4 MG capsule 24 hr capsule Take 1 capsule by mouth every night at bedtime.      vitamin D (ERGOCALCIFEROL) 1.25 MG (79425 UT) capsule capsule TAKE 1 CAPSULE BY MOUTH 1 TIME EVERY WEEK (Patient taking differently: Take 1 capsule by mouth 1 (One) Time Per Week. SATURDAYS) 12 capsule 0     No current facility-administered medications on file prior to visit.        ALLERGIES:  No Known Allergies     Social History     Socioeconomic History    Marital status: Significant Other   Tobacco Use    Smoking status: Former     Types: Cigarettes     Passive exposure: Past    Smokeless tobacco: Never    Tobacco comments:     QUIT 1990     1 TO 3 PPD X 20 YEARS    Vaping Use    Vaping status: Some Days    Substances: CBD   Substance and Sexual Activity    Alcohol use: No    Drug use: Not Currently    Sexual activity: Defer        Family History   Problem Relation Age of Onset    Bone cancer Mother     Heart disease Father     COPD Father     Hypertension Father     Stroke Father         TIA    Bone cancer Father         smoker    Lung cancer Father     Diabetes Father     No Known Problems Sister     No Known Problems Brother     Mental retardation Brother     No Known Problems Maternal Grandmother     No Known Problems Maternal Grandfather     No Known Problems Paternal Grandmother     Colon cancer Paternal Grandfather     Malig Hyperthermia Neg Hx           Objective     Vitals:    06/23/25  "0936   BP: 132/71   Pulse: 67   Resp: 17   Temp: 97.4 °F (36.3 °C)   TempSrc: Oral   SpO2: 97%   Weight: 78.6 kg (173 lb 4.8 oz)   Height: 165 cm (64.96\")   PainSc: 0-No pain         6/23/2025     9:39 AM   Current Status   ECOG score 0       Physical Exam  Constitutional:       Appearance: Normal appearance.   Cardiovascular:      Rate and Rhythm: Normal rate and regular rhythm.      Heart sounds: Normal heart sounds.   Pulmonary:      Effort: Pulmonary effort is normal.      Breath sounds: Normal breath sounds.   Abdominal:      Palpations: Abdomen is soft.   Skin:     General: Skin is warm and dry.   Neurological:      Mental Status: He is oriented to person, place, and time.         RECENT LABS:  Results from last 7 days   Lab Units 06/23/25  0916 06/19/25  1158   WBC 10*3/mm3 4.55 4.04   NEUTROS ABS 10*3/mm3 2.40 1.68*   HEMOGLOBIN g/dL 11.3* 12.6*   HEMATOCRIT % 35.5* 39.2   PLATELETS 10*3/mm3 153 202       Results from last 7 days   Lab Units 06/23/25  0916 06/19/25  1258 06/19/25  1158   SODIUM mmol/L 142  --  144   POTASSIUM mmol/L 3.8 4.3 5.5*   CHLORIDE mmol/L 104  --  101   CO2 mmol/L 27.6  --  30.3*   BUN mg/dL 10.1  --  7.0*   CREATININE mg/dL 0.84  --  1.03   CALCIUM mg/dL 9.6  --  10.0   ALBUMIN g/dL 4.2  --  4.4   BILIRUBIN mg/dL 0.5  --  0.4   ALK PHOS U/L 115  --  129*   ALT (SGPT) U/L 109*  --  154*   AST (SGOT) U/L 48*  --  110*   GLUCOSE mg/dL 112*  --  168*   FERRITIN ng/mL 131.00  --   --    IRON mcg/dL 58*  --   --    TIBC mcg/dL 367  --   --            Assessment & Plan   *Stage III mid to upper well differentiated rectal adenocarcinoma (cT3, cN2, cM0)     69-year-old male with a history of of diabetes, CHF GE reflux hyperlipidemia, hypertension, TIA, possible CVA as well as a history of prostate cancer status post XRT.  He had been recently having a change in bowel habit ultimately leading to additional assessment.  This led to a screening colonoscopy 10/18/2024demonstrating a " circumferential mass involving the rectum extending from 10 cm to 12 cm with biopsy of 15 cm consistent with invasive well-differentiated adenocarcinoma with ulceration necroinflammatory debris. This was felt to be microsatellite stable by IHC as well as including MSI by PCR.  MRI of the pelvis performed 11/4/2024 demonstrates a high rectal mass extending to the proximal sigmoid colon representing a T3d N2 rectal tumor, side effect invasion on the superior aspect of the mass approximates between the mesorectum and peritoneal cavity?  Additional staging 11/8/2024 includes a CT of the chest demonstrating small pleural plaques along the posterior aspect of both the right lower lobes of uncertain significance.  There are no other substantial findings though we have discussed this as leading to a PET/CT to complete his staging.  11/14/2024 by colorectal surgery, Dr. Naeem Rai, without evidence of obstruction or bleeding.  He was thought a excellent candidate for neoadjuvant chemotherapy followed by mesorectal excision and adjuvant chemotherapy-comparable to the PROSPECT trial.  There was concern about the location of the tumor extending down to the rectum and the avoidance of radiation therapy since the tumor appeared to be resectable.  There was also the concern of his previous history of radiation therapy.  Further discussed was the role of laparoscopic low anterior resection, total mesorectal excision, and creation of a diverting loop ileostomy temporarily utilized also discussed.  11/20/2024 and we have reviewed the PROSPECT Trial which was designed to determine whether neoadjuvant chemotherapy could be used as an alternative to neoadjuvant chemoRT.  This study demonstrated that similar disease-free survival and overall survival was similar to neoadjuvant CRT alone for eligible patients.  This would include the use of 6 cycles of FOLFOX chemotherapy followed by reassessment and if a clinical response and 20% or  more was seen then RT would be admitted, proceeding to surgical resection and subsequent adjuvant chemotherapy.  After discussion the patient agrees to proceed.  12/2/2024: Proceed with C1D1 FOLFOX. Hemoglobin has declined to 9.9 today secondary to malignancy- ongoing bleeding and worsening iron deficiency. He has been taking oral iron, however, is not tolerating this well, therefore will plan for IV iron pending insurance approval.   12/16/2024: Proceed with cycle 2-day 1 FOLFOX today.  Patient is tolerating well.  The patient is next evaluated 12/30/2024 for his third cycle of FOLFOX.  He has undergone Feraheme given 12/4/2024 and 12/16/2024.  He is feeling reasonably good without neuropathic symptoms.  We have discussed his scheduling and timing as he proceeds through his treatment including subsequent repeat MR pelvis after his 6 cycle of FOLFOX.  1/13/2025 Returns for cycle 4 FOLFOX today and was COVID positive 1/6/2025 and treated with Paxlovid and thereafter improved.  He has a scant cough remaining otherwise clear lung sounds.  Will proceed with treatment today.  Patient seen 1/27/2025 recovered from COVID, generally improved performance status and plans for cycle 5 FOLFOX at a 6 planned.  2/10/205 proceed with cycle 6 FOLFOX.  Following 6 cycle of neoadjuvant therapy will proceed with MRI for surgical planning.  The patient was referred today to Dr. Rai  Subsequent repeat repeat MRI of the pelvis 2/18/2025 demonstrating a response to therapy improved from previous from 11/4/2024 with a radiologic estimate of T3c compared to T3 DM2.  In review of this study enlarging tumor signal extends superiorly from the mass in close approximation between the mesorectum and peritoneal cavity and the previously seen suspicious lymph nodes appear to have improved from previous.  Surgical evaluation anticipated with Dr. Rai 2/27/2025  3/13/2025: He is scheduled for surgery with Dr. Rai on 3/19/2025  He returns  today April 14, 2025.  On 3/20/2025 he underwent a low anterior resection with diverting loop ileostomy and appendectomy.  Fortunately his postoperative course was uneventful though he developed a high output ileostomy that required loperamide to slow it down.  Pathology revealed a pT2 N0 well-differentiated mid rectal adenocarcinoma with treatment effect.  He was seen back April 3, 2025 having high output through his ostomy and it was felt that it would be reasonable to move forward with closure of his diverting loop ileostomy in 4 weeks.  It was discussed that chemotherapy adjuvantly would be held until he recovered postoperatively.  He is next seen April 14, 2025.  His ostomy still has variable output but he has been able to manage this and he is quite willing to proceed as above per his subsequent surgery and reconsideration of adjuvant therapy postop.  He continued to see Dr. Rai in 5/3/2025 - 5/8/2025 he underwent closure of loop ileostomy.  He was seen back in follow-up 5/22/2025 and was felt a candidate to restart chemotherapy.  He followed with cardiology with negative stress testing, asked to continue statin and aspirin.  He did present to the ER 5/23/2025 with accelerated hypertension, weakness and mild headache eventually responsive to medications.  Troponin was negative as was normal assessment of his kidney function.  He is next seen in office 5/27/2025 to restart chemotherapy to start adjuvant FOLFOX x 6.  6/9/2025 proceed with cycle 2 adjuvant FOLFOX  6/23/2025 proceed with cycle 3 adjuvant FOLFOX    *Anemia   12/2/2024: Hemoglobin 9.9 today. Patient is not tolerating oral iron, therefore, will plan to proceed with IV iron.    12/16/2024 hemoglobin has improved today to 10.8.  Proceeded with the second dose of Feraheme  Reassessment 414/25-H&H 11.5 and 35.2  6/23/2025 hemoglobin 11.3 nutritional studies without deficit, worsening anemia likely secondary to chemotherapy    *COVID positive 1/6/2025  trreated with paxlovid  Resolved symptoms 1/27/2025    *Hypercalcemia  1/13/2025 calcium 11 with normal albumin.  Discussed with Dr. Nagel and additional labs added including ionized calcium, PTH, PTH related peptide and vitamin D level as he has been on high-dose vitamin D.  Asked patient to hold vitamin D supplement until additional labs have resulted.  Will also recheck CMP on Wednesday at [facility].  Subsequent ionized calcium, PTH, vitamin D and PTH related peptide testing.  The studies include a PTH of 25, ionized calcium of 1.41, PTH related peptide less than 2.0, vitamin D level 67.1.  Repeat calcium 1/20/2025 at 9.1  2/10/2025 calcium normal at 9.2  6/23/2025 calcium is normal at 9.6      *Elevated liver function studies  Likely secondary to oxaliplatin  No dose adjustments required at this time though we will continue to monitor every 2 weeks  6/23/2025 AST 48, , alkaline phosphatase 115, total bilirubin normal at 0.5    Plan:   Proceed today with FOLFOX, cycle 9 (cycle 3 in the adjuvant setting)  Anemia workup today without nutritional deficit.  We reviewed this is likely secondary to chemotherapy  Continue to monitor neuropathy closely  Return in 2 weeks in 4 weeks for CBC, CMP, MD or NP follow-up and continued FOLFOX    Patient is on a high dose medication requiring close monitoring for toxicity today's plan of care and LFTs were discussed and reviewed with Dr. Nagel who is in agreement to proceed with treatment    Nena Dacosta, APRN  06/23/2025

## 2025-06-23 NOTE — PROGRESS NOTES
OUTPATIENT ONCOLOGY NUTRITION FOLLOW UP    Patient Name: Kevin Garsia  YOB: 1955  MRN: [id number]  Assessment Date: 6/23/2025    COMMENTS:  Follow up in infusion today. Receiving FOLFOX today. Patient had closure of the loop ileostomy 5/3-5/8. Chemotherapy resumed 5/27.   Reports tolerating this well without any issues. Appetite is good. Weight stable.  Meds and labs reviewed.    Will continue to follow up.         Reason for Assessment Follow up     Diagnosis/Problem   Stage III rectal cancer   Treatment Plan Chemotherapy FOLFOX   Frequency Q 2 weeks x 6 weeks   Goal of cancer treatment Neoadjuvant   Comments:          Encounter Information        Nutrition/Diet History:  Eating a sugar free diet, no nutrition issues   Oral Nutrition Supplements:    Factors/Symptoms Affecting Intake: Other   Comments:      Medical/Surgical History Past Medical History:   Diagnosis Date    Anemia     Aphasia     Arthritis     CTS (carpal tunnel syndrome)     Depression     Diabetes mellitus     GERD (gastroesophageal reflux disease)     History of carotid stenosis     HX LEFT CAROTID ENDARTERECTOMY    History of prostate cancer 2012    HX SEED, RADIATION    History of radiation therapy     History of transient ischemic attack (TIA)     S/P CAROTID ENDARTERECTOMY 2019    Hyperlipidemia     Hypertension     Multiple gastric ulcers     Neuropathy     Rectal adenocarcinoma 2024    Seasonal allergies     Sleep apnea     cpap    Stroke     Tattoos     TIA (transient ischemic attack)        Past Surgical History:   Procedure Laterality Date    ANGIOPLASTY CAROTID ARTERY      APPENDECTOMY      CAROTID ENDARTERECTOMY Left     COLON RESECTION N/A 03/19/2025    Procedure: Laparoscopic Converted to Open Low Anterior Resection, Ostomy Creation, and Appendetomy;  Surgeon: Naeem Rai MD;  Location: [facility];  Service: General;  Laterality: N/A;    COLONOSCOPY N/A 10/18/2024    Procedure: COLONOSCOPY TO  "CECUM/TI WITH BIOPSIES;  Surgeon: Marcus Christine Jr., MD;  Location: [facility] ENDOSCOPY;  Service: General;  Laterality: N/A;  PRE-SCREENING, FAMILY HX COLON CANCER  POST-DIVERTICULOSIS, RECTAL MASS    ENDOSCOPY      FOOT SURGERY      ILEOSTOMY CLOSURE N/A 5/5/2025    Procedure: Closure of diverting loop ileostomy;  Surgeon: Naeem Rai MD;  [facility];  Service: General;  Laterality: N/A;    INSERTION PROSTATE RADIATION SEED      LUMBAR EPIDURAL INJECTION N/A 02/14/2025    Procedure: L4/L5 LUMBAR EPIDURAL STEROID INJECTION CPT: 99587;  Surgeon: Vandana Ordonez MD;  Location: [facility] MAIN OR;  Service: Pain Management;  Laterality: N/A;    TOOTH EXTRACTION  2025    VENOUS ACCESS DEVICE (PORT) INSERTION N/A 11/22/2024    Procedure: INSERTION VENOUS ACCESS DEVICE;  Surgeon: Naeem Rai MD;  Location: [facility] MAIN OR;  Service: General;  Laterality: N/A;               Anthropometrics        Current Height Ht Readings from Last 1 Encounters:   06/23/25 165 cm (64.96\")      Current Weight Wt Readings from Last 1 Encounters:   06/23/25 78.6 kg (173 lb 4.8 oz)      BMI  23.5   Usual Body Weight (UBW) 185-190lb   Weight Change/Trend Loss   Weight History Wt Readings from Last 30 Encounters:   06/23/25 0936 78.6 kg (173 lb 4.8 oz)   06/20/25 1000 78.9 kg (174 lb)   06/19/25 1142 76.7 kg (169 lb)   06/12/25 1441 78 kg (172 lb)   06/09/25 0831 79.4 kg (175 lb)   05/27/25 0820 78.4 kg (172 lb 14.4 oz)   05/23/25 1934 78 kg (172 lb)   05/22/25 1053 80 kg (176 lb 6.4 oz)   05/12/25 0841 81.2 kg (179 lb)   05/05/25 1049 77.1 kg (170 lb)   04/29/25 1412 77.3 kg (170 lb 6.4 oz)   04/28/25 0527 76.5 kg (168 lb 8.7 oz)   04/26/25 0512 77.3 kg (170 lb 6.4 oz)   04/25/25 2112 77.3 kg (170 lb 6.4 oz)   04/24/25 1240 77.2 kg (170 lb 3.2 oz)   04/16/25 1408 77.6 kg (171 lb)   04/14/25 0834 78.3 kg (172 lb 11.2 oz)   04/07/25 1614 78.9 kg (174 lb)   04/03/25 1101 77.9 kg (171 lb 12.8 oz)   03/21/25 0507 " "85.5 kg (188 lb 7.9 oz)   03/17/25 1254 85.5 kg (188 lb 6.4 oz)   03/14/25 1251 85.3 kg (188 lb)   03/13/25 1542 85.1 kg (187 lb 11.2 oz)   02/27/25 1452 85.3 kg (188 lb)   02/26/25 1201 84.1 kg (185 lb 4.8 oz)   02/14/25 0935 81.2 kg (179 lb)   02/10/25 0902 84.6 kg (186 lb 9.6 oz)   02/04/25 0808 85.6 kg (188 lb 12.8 oz)   01/27/25 0753 85 kg (187 lb 6.4 oz)   01/13/25 0858 83.9 kg (184 lb 14.4 oz)   12/30/24 0919 85.2 kg (187 lb 12.8 oz)   12/16/24 0837 84.6 kg (186 lb 8 oz)          Medications           Current medications: Cyanocobalamin, DULoxetine, acetaminophen, amLODIPine, aspirin, atorvastatin, ferrous sulfate, gabapentin, loperamide, losartan, metFORMIN, ondansetron, tamsulosin, and vitamin D                 Tests/Procedures        Tests/Procedures No new tests/procedures     Labs       Pertinent Labs    Results from last 7 days   Lab Units 06/23/25  0916 06/19/25  1258 06/19/25  1158   SODIUM mmol/L 142  --  144   POTASSIUM mmol/L 3.8 4.3 5.5*   CHLORIDE mmol/L 104  --  101   CO2 mmol/L 27.6  --  30.3*   BUN mg/dL 10.1  --  7.0*   CREATININE mg/dL 0.84  --  1.03   CALCIUM mg/dL 9.6  --  10.0   BILIRUBIN mg/dL 0.5  --  0.4   ALK PHOS U/L 115  --  129*   ALT (SGPT) U/L 109*  --  154*   AST (SGOT) U/L 48*  --  110*   GLUCOSE mg/dL 112*  --  168*     Results from last 7 days   Lab Units 06/23/25  0916   HEMOGLOBIN g/dL 11.3*   HEMATOCRIT % 35.5*   WBC 10*3/mm3 4.55   ALBUMIN g/dL 4.2     Results from last 7 days   Lab Units 06/23/25  0916 06/19/25  1158   PLATELETS 10*3/mm3 153 202     No results found for: \"COVID19\"  Lab Results   Component Value Date    HGBA1C 6.7 (H) 07/16/2024          Physical Findings        Physical Appearance alert, oriented     Edema  not assessed   Gastrointestinal colostomy   Tubes/Lines/Drains Implantable Port   Oral/Mouth Cavity WNL   Skin Rash       Estimated/Assessed Needs        Energy Requirements    Height for Calculation      Weight for Calculation 86 kg   Method for " Estimation  25 kcal/kg   EST Needs (kcal/day) 2150 kcals per day       Protein Requirements    Weight for Calculation 86 kg   EST Protein Needs (g/kg) 1.0 - 1.2 gm/kg   EST Daily Needs (g/day)  gms per day       Fluid Requirements     Method for Estimation 1 mL/kcal    Estimated Needs (mL/day) 2100 mLs per day         NUTRITION INTERVENTION / PLAN OF CARE      Intervention Goal(s) Maintain nutrition status, Provide information, Meet estimated needs, Disease management/therapy, Tolerate PO , Maintain intake, and No significant weight loss         RD Intervention/Action Encouraged intake, Follow Tx progress, Recommended/ordered         Recommendations:       PO Diet Consume calorie and protein dense healthy, sugar free foods, 6 small meals      Supplements Hydration beverages      Snacks       Other    New Prescription Ordered? No, recommend   --      Monitor/Evaluation Follow up as needed   Education Education provided   --    Electronically signed by:  Kanika Berry RD, LD  06/23/25 14:14 EDT

## 2025-06-25 ENCOUNTER — INFUSION (OUTPATIENT)
Dept: ONCOLOGY | Facility: HOSPITAL | Age: 70
End: 2025-06-25
Payer: MEDICARE

## 2025-06-25 DIAGNOSIS — Z85.048 HISTORY OF RECTAL CANCER: Primary | ICD-10-CM

## 2025-06-25 DIAGNOSIS — Z45.2 FITTING AND ADJUSTMENT OF VASCULAR CATHETER: ICD-10-CM

## 2025-06-25 PROCEDURE — 25010000002 HEPARIN LOCK FLUSH PER 10 UNITS: Performed by: INTERNAL MEDICINE

## 2025-06-25 PROCEDURE — 96523 IRRIG DRUG DELIVERY DEVICE: CPT

## 2025-06-25 RX ORDER — HEPARIN SODIUM (PORCINE) LOCK FLUSH IV SOLN 100 UNIT/ML 100 UNIT/ML
500 SOLUTION INTRAVENOUS AS NEEDED
OUTPATIENT
Start: 2025-06-25

## 2025-06-25 RX ORDER — SODIUM CHLORIDE 0.9 % (FLUSH) 0.9 %
10 SYRINGE (ML) INJECTION AS NEEDED
Status: DISCONTINUED | OUTPATIENT
Start: 2025-06-25 | End: 2025-06-25 | Stop reason: HOSPADM

## 2025-06-25 RX ORDER — HEPARIN SODIUM (PORCINE) LOCK FLUSH IV SOLN 100 UNIT/ML 100 UNIT/ML
500 SOLUTION INTRAVENOUS AS NEEDED
Status: DISCONTINUED | OUTPATIENT
Start: 2025-06-25 | End: 2025-06-25 | Stop reason: HOSPADM

## 2025-06-25 RX ORDER — SODIUM CHLORIDE 0.9 % (FLUSH) 0.9 %
10 SYRINGE (ML) INJECTION AS NEEDED
OUTPATIENT
Start: 2025-06-25

## 2025-06-25 RX ADMIN — Medication 500 UNITS: at 12:15

## 2025-06-25 RX ADMIN — Medication 10 ML: at 12:14

## 2025-07-03 NOTE — PROGRESS NOTES
REASON FOR FOLLOW UP:  stage III mid to upper well-differentiated rectal adenocarcinoma (cT3, cN2 CM0.).    History of Present Illness    The patient is a 70 y.o. male with the above-mentioned history, who returns to the office today in anticipation of his fourth cycle of adjuvant chemotherapy with FOLFOX.  Continues to tolerate therapy well.  We have discussed his symptoms and, again, he has no worsening neuropathy.  He has no fevers or chills, signs or symptoms of infection.  He is eating and drinking adequately.  He continues to have some bowel irregularities which has been an issue since surgery.  He does now use fiber supplements periodically.       He feels he is tolerating treatment overall very well at this time through and is hopeful to complete this schedule as planned.    ONCOLOGY HISTORY:  The patient is a 70-year-old male who is referred for recently diagnosed stage III mid to upper well-differentiated rectal adenocarcinoma (cT3, cN2 CM0.).    Patient undergone a screening colonoscopy 10/18/2024 demonstrating a circumferential mass involving the rectum extending from 10 cm to 12 cm with biopsy of 15 cm consistent with invasive well-differentiated adenocarcinoma with ulceration necroinflammatory debris.    This was felt to be microsatellite stable by IHC as well as including MSI by PCR.    If follow-up with infectious disease 11/1/2024 there being concern about resuming hypersexual activity and that he start preexposure prophylaxis.  He last been seen July 2024 on Descovy still in relationship with his partner and currently monogamous with been having bowel changes underwent colonoscopy with the above diagnosis.  As result of his need for therapy Descovy was discontinued and the issue to be reevaluated once he was successfully treated.    MRI of the pelvis performed 11/4/2024 demonstrates a high rectal mass extending to the proximal sigmoid colon representing a T3d N2 rectal tumor, side effect invasion  on the superior aspect of the mass approximates between the mesorectum and peritoneal cavity?    CT chest 11/8 demonstrates pleural plaques along the peripheral aspect of both the right lower lobes.  These are of uncertain significance.    The patient was next seen 11/14/2024 by colorectal surgery without evidence of obstruction or bleeding.  He was thought a excellent candidate for neoadjuvant chemotherapy followed by mesorectal excision and adjuvant chemotherapy-comparable to the prospect trial.  There was concern about the location of the tumor extending down to the rectum and the avoidance of radiation therapy since the tumor appeared to be resectable.  Was the role of laparoscopic low anterior resection total mesorectal excision and creation of a diverting loop ileostomy temporarily discussed.    As resulted above the patient is now referred to discuss this above approach.  He is seen with his significant other and we have discussed his findings in great detail from initial diagnosis and and subsequent surgical assessment leading to the recommendation of neoadjuvant chemotherapy given in the fashion of the PROSPECT Trial which was designed to determine whether neoadjuvant chemotherapy could be used as an alternative to neoadjuvant chemoRT.  This study demonstrated that similar disease-free survival and overall survival was similar to neoadjuvant CRT alone for eligible patients.  This would include the use of 6 cycles of FOLFOX chemotherapy followed by reassessment and if a clinical response and 20% or more was seen then RT would be admitted, proceeding to surgical resection and subsequent adjuvant chemotherapy.    He returns 12/2/2024, for Cycle 1 Day 1 FOLFOX. He does not have any new concerns today. He remains fatigued and experiences intermittent lightheadedness. He denies shortness of breath. He denies abdominal pain. His stools are black, but he denies rome blood. Despite starting oral iron, his  hemoglobin has decreased further. He is not tolerating the oral iron at this time. Patient was started on Feraheme.    Patient returns on 12/16/2024 for cycle 2 FOLFOX.  He reports he is tolerated treatment well thus far and denying any nausea, vomiting, changes to his bowels.  He did have blood in his stool following cycle 1 1 time.  That has not reoccurred.  He did start Feraheme the day of disconnect with cycle 1 and has had improvement in his hemoglobin.  He will be due for his second dose of Feraheme today.  He denies increased neuropathy.    The patient is next evaluated 12/30/2024 for his third cycle of FOLFOX.  He has undergone Feraheme given 12/4/2024 and 12/16/2024.  He is feeling reasonably good without neuropathic symptoms.  We have discussed his scheduling and timing as he proceeds through his treatment including subsequent repeat MR pelvis after his 6 cycle of FOLFOX.    He returns 1/13/2025 for follow up and treatment.  He has been positive for COVID since his last office visit and called in at which time we did start him on paxlovid.  He reports on starting the Paxlovid he was much improved.  He does have a scant cough at times with no lingering shortness of breath.  Report neuropathy lasting approximately 3 days following last treatment that was not always associated with cold intolerance.  This is now resolved and had involved his hands primarily.  We went on to proceed with cycle 4 FOLFOX, obtained ionized calcium, PTH, vitamin D and PTH related peptide testing.  The studies include a PTH of 25, ionized calcium of 1.41, PTH related peptide less than 2.0, vitamin D level 67.1.    He is next seen 1/27/2025 for cycle #5 FOLFOX out of 6 planned prior to repeat MRI.  He is doing well with treatment with minimal neuropathy which recovers quickly, no additional fatigue and is without prolonged cytopenias.  He is trying to plan a wedding in and around his surgery and may need dental extractions soon.  He  is also having back pain after recent exercising and is pursuing an epidural.    The patient is seen 2/26/2025 improved from previous and is status post MRI of the pelvis 2/18/2025 demonstrating a response to therapy improved from previous from 11/4/2024 with a radiologic estimate of T3c compared to T3 DM2.  In review of this study enlarging tumor signal extends superiorly from the mass in close approximation between the mesorectum and peritoneal cavity and the previously seen suspicious lymph nodes appear to have improved from previous.    He returns today April 14, 2025.  On 3/20/2025 he underwent a low anterior resection with diverting loop ileostomy and appendectomy.  Fortunately his postoperative course was uneventful though he developed a high output ileostomy that required loperamide to slow it down.  Pathology revealed a pT2 N0 well-differentiated mid rectal adenocarcinoma with treatment effect.    He was seen back April 3, 2025 having high output through his ostomy and it was felt that it would be reasonable to move forward with closure of his diverting loop ileostomy in 4 weeks.  It was discussed that chemotherapy adjuvantly would be held until he recovered postoperatively.    He is next seen April 14, 2025.  His ostomy still has variable output but he has been able to manage this and he is quite willing to proceed as above per his subsequent surgery and reconsideration of adjuvant therapy postop.    He continued to see Dr. Rai in 5/3/2025 - 5/8/2025 he underwent closure of loop ileostomy.  He was seen back in follow-up 5/22/2025 and was felt a candidate to restart chemotherapy.    He followed with cardiology with negative stress testing, asked to continue statin and aspirin.    He did present to the ER 5/23/2025 with accelerated hypertension, weakness and mild headache eventually responsive to medications.  Troponin was negative as was normal assessment of his kidney function.    He was next seen in  office 5/27/2025 to restart chemotherapy to start adjuvant FOLFOX x 6.        Past Surgical History:   Procedure Laterality Date    ANGIOPLASTY CAROTID ARTERY      APPENDECTOMY      CAROTID ENDARTERECTOMY Left     COLON RESECTION N/A 03/19/2025    Procedure: Laparoscopic Converted to Open Low Anterior Resection, Ostomy Creation, and Appendetomy;  Surgeon: Naeem Rai MD;  Location: [facility] MAIN OR;  Service: General;  Laterality: N/A;    COLONOSCOPY N/A 10/18/2024    Procedure: COLONOSCOPY TO CECUM/TI WITH BIOPSIES;  Surgeon: Marcus Christine Jr., MD;  Location: [facility] ENDOSCOPY;  Service: General;  Laterality: N/A;  PRE-SCREENING, FAMILY HX COLON CANCER  POST-DIVERTICULOSIS, RECTAL MASS    ENDOSCOPY      FOOT SURGERY      ILEOSTOMY CLOSURE N/A 5/5/2025    Procedure: Closure of diverting loop ileostomy;  Surgeon: Naeem Rai MD;  Location: [facility] MAIN OR;  Service: General;  Laterality: N/A;    INSERTION PROSTATE RADIATION SEED      LUMBAR EPIDURAL INJECTION N/A 02/14/2025    Procedure: L4/L5 LUMBAR EPIDURAL STEROID INJECTION CPT: 64862;  Surgeon: Vandana Ordonez MD;  Location: [facility] MAIN OR;  Service: Pain Management;  Laterality: N/A;    TOOTH EXTRACTION  2025    VENOUS ACCESS DEVICE (PORT) INSERTION N/A 11/22/2024    Procedure: INSERTION VENOUS ACCESS DEVICE;  Surgeon: Naeem Rai MD;  Location: [facility] MAIN OR;  Service: General;  Laterality: N/A;        Current Outpatient Medications on File Prior to Visit   Medication Sig Dispense Refill    acetaminophen (TYLENOL) 500 MG tablet Take 2 tablets by mouth Every 6 (Six) Hours As Needed for Mild Pain.      amLODIPine (NORVASC) 10 MG tablet Take 1 tablet by mouth Daily. 90 tablet 3    aspirin 81 MG chewable tablet Chew 1 tablet Daily. HELD FOR OR      atorvastatin (LIPITOR) 80 MG tablet Take 1 tablet by mouth Daily. 90 tablet 1    Cyanocobalamin (VITAMIN B 12 PO) Take 1 tablet by mouth Daily. HOLD PER MD INSTR      DULoxetine  (CYMBALTA) 60 MG capsule Take 1 capsule by mouth Daily. 90 capsule 3    ferrous sulfate 325 (65 Fe) MG tablet Take 1 tablet by mouth Daily With Breakfast.      gabapentin (NEURONTIN) 300 MG capsule Take 2 capsules by mouth 3 (Three) Times a Day. 540 capsule 3    loperamide (IMODIUM) 2 MG capsule Take 1 capsule by mouth 4 (Four) Times a Day As Needed for Diarrhea.      losartan (COZAAR) 100 MG tablet Take 1 tablet by mouth Daily. 90 tablet 1    metFORMIN (GLUCOPHAGE) 1000 MG tablet Take 1 tablet by mouth 2 (Two) Times a Day With Meals. 90 tablet 1    ondansetron (ZOFRAN) 8 MG tablet Take 1 tablet by mouth 3 (Three) Times a Day As Needed for Nausea or Vomiting. 30 tablet 5    tamsulosin (FLOMAX) 0.4 MG capsule 24 hr capsule Take 1 capsule by mouth every night at bedtime.      vitamin D (ERGOCALCIFEROL) 1.25 MG (03631 UT) capsule capsule TAKE 1 CAPSULE BY MOUTH 1 TIME EVERY WEEK (Patient taking differently: Take 1 capsule by mouth 1 (One) Time Per Week. SATURDAYS) 12 capsule 0     No current facility-administered medications on file prior to visit.        ALLERGIES:  No Known Allergies     Social History     Socioeconomic History    Marital status: Significant Other   Tobacco Use    Smoking status: Former     Types: Cigarettes     Passive exposure: Past    Smokeless tobacco: Never    Tobacco comments:     QUIT 1990     1 TO 3 PPD X 20 YEARS    Vaping Use    Vaping status: Some Days    Substances: CBD   Substance and Sexual Activity    Alcohol use: No    Drug use: Not Currently    Sexual activity: Defer        Family History   Problem Relation Age of Onset    Bone cancer Mother     Heart disease Father     COPD Father     Hypertension Father     Stroke Father         TIA    Bone cancer Father         smoker    Lung cancer Father     Diabetes Father     No Known Problems Sister     No Known Problems Brother     Mental retardation Brother     No Known Problems Maternal Grandmother     No Known Problems Maternal  Grandfather     No Known Problems Paternal Grandmother     Colon cancer Paternal Grandfather     Malig Hyperthermia Neg Hx           Objective     There were no vitals filed for this visit.        6/23/2025     9:39 AM   Current Status   ECOG score 0       Physical Exam  Constitutional:       Appearance: Normal appearance.   Cardiovascular:      Rate and Rhythm: Normal rate and regular rhythm.      Heart sounds: Normal heart sounds.   Pulmonary:      Effort: Pulmonary effort is normal.      Breath sounds: Normal breath sounds.   Abdominal:      Palpations: Abdomen is soft.   Skin:     General: Skin is warm and dry.   Neurological:      Mental Status: He is oriented to person, place, and time.       RECENT LABS:  WBC   Date Value Ref Range Status   07/07/2025 5.31 3.40 - 10.80 10*3/mm3 Final   09/17/2024 7.02 3.40 - 10.80 10*3/mm3 Final   06/08/2022 7.85 4.5 - 11.0 10*3/uL Final     RBC   Date Value Ref Range Status   07/07/2025 3.64 (L) 4.14 - 5.80 10*6/mm3 Final   09/17/2024 4.31 4.14 - 5.80 10*6/mm3 Final   06/08/2022 4.02 (L) 4.5 - 5.9 10*6/uL Final     Hemoglobin   Date Value Ref Range Status   07/07/2025 11.5 (L) 13.0 - 17.7 g/dL Final   06/08/2022 11.8 (L) 13.5 - 17.5 g/dL Final     Hematocrit   Date Value Ref Range Status   07/07/2025 35.9 (L) 37.5 - 51.0 % Final   06/08/2022 36.6 (L) 41.0 - 53.0 % Final     MCV   Date Value Ref Range Status   07/07/2025 98.6 (H) 79.0 - 97.0 fL Final   06/08/2022 91.0 80.0 - 100.0 fL Final     MCH   Date Value Ref Range Status   07/07/2025 31.6 26.6 - 33.0 pg Final   06/08/2022 29.4 26.0 - 34.0 pg Final     MCHC   Date Value Ref Range Status   07/07/2025 32.0 31.5 - 35.7 g/dL Final   06/08/2022 32.2 31.0 - 37.0 g/dL Final     RDW   Date Value Ref Range Status   07/07/2025 15.9 (H) 12.3 - 15.4 % Final   06/08/2022 14.5 12.0 - 16.8 % Final     RDW-SD   Date Value Ref Range Status   07/07/2025 55.3 (H) 37.0 - 54.0 fl Final     MPV   Date Value Ref Range Status   07/07/2025 9.7  6.0 - 12.0 fL Final   06/08/2022 9.5 8.4 - 12.4 fL Final     Platelets   Date Value Ref Range Status   07/07/2025 151 140 - 450 10*3/mm3 Final   06/08/2022 315 140 - 440 10*3/uL Final     Neutrophil Rel %   Date Value Ref Range Status   06/08/2022 61.8 45 - 80 % Final     Neutrophil %   Date Value Ref Range Status   07/07/2025 50.5 42.7 - 76.0 % Final     Lymphocyte Rel %   Date Value Ref Range Status   06/08/2022 26.2 15 - 50 % Final     Lymphocyte %   Date Value Ref Range Status   07/07/2025 33.3 19.6 - 45.3 % Final     Monocyte Rel %   Date Value Ref Range Status   06/08/2022 6.2 0 - 15 % Final     Monocyte %   Date Value Ref Range Status   07/07/2025 10.4 5.0 - 12.0 % Final     Eosinophil %   Date Value Ref Range Status   07/07/2025 4.7 0.3 - 6.2 % Final   06/08/2022 4.7 0 - 7 % Final     Basophil Rel %   Date Value Ref Range Status   06/08/2022 0.8 0 - 2 % Final     Basophil %   Date Value Ref Range Status   07/07/2025 0.9 0.0 - 1.5 % Final     Immature Grans %   Date Value Ref Range Status   07/07/2025 0.2 0.0 - 0.5 % Final   06/08/2022 0.3 0.0 - 1.0 % Final     Neutrophils Absolute   Date Value Ref Range Status   06/08/2022 4.85 2.0 - 8.8 10*3/uL Final     Neutrophils, Absolute   Date Value Ref Range Status   07/07/2025 2.68 1.70 - 7.00 10*3/mm3 Final     Lymphocytes Absolute   Date Value Ref Range Status   06/08/2022 2.06 0.7 - 5.5 10*3/uL Final     Lymphocytes, Absolute   Date Value Ref Range Status   07/07/2025 1.77 0.70 - 3.10 10*3/mm3 Final     Monocytes Absolute   Date Value Ref Range Status   06/08/2022 0.49 0.0 - 1.7 10*3/uL Final     Monocytes, Absolute   Date Value Ref Range Status   07/07/2025 0.55 0.10 - 0.90 10*3/mm3 Final     Eosinophils Absolute   Date Value Ref Range Status   06/08/2022 0.37 0.0 - 0.8 10*3/uL Final     Eosinophils, Absolute   Date Value Ref Range Status   07/07/2025 0.25 0.00 - 0.40 10*3/mm3 Final     Basophils Absolute   Date Value Ref Range Status   06/08/2022 0.06 0.0 - 0.2  10*3/uL Final     Basophils, Absolute   Date Value Ref Range Status   07/07/2025 0.05 0.00 - 0.20 10*3/mm3 Final     Immature Grans, Absolute   Date Value Ref Range Status   07/07/2025 0.01 0.00 - 0.05 10*3/mm3 Final   06/08/2022 0.02 0.00 - 0.10 10*3/uL Final     nRBC   Date Value Ref Range Status   07/07/2025 0.0 0.0 - 0.2 /100 WBC Final       Assessment & Plan   *Stage III mid to upper well differentiated rectal adenocarcinoma (cT3, cN2, cM0)     70-year-old male with a history of of diabetes, CHF GE reflux hyperlipidemia, hypertension, TIA, possible CVA as well as a history of prostate cancer status post XRT.  He had been recently having a change in bowel habit ultimately leading to additional assessment.  This led to a screening colonoscopy 10/18/2024demonstrating a circumferential mass involving the rectum extending from 10 cm to 12 cm with biopsy of 15 cm consistent with invasive well-differentiated adenocarcinoma with ulceration necroinflammatory debris. This was felt to be microsatellite stable by IHC as well as including MSI by PCR.  MRI of the pelvis performed 11/4/2024 demonstrates a high rectal mass extending to the proximal sigmoid colon representing a T3d N2 rectal tumor, side effect invasion on the superior aspect of the mass approximates between the mesorectum and peritoneal cavity?  Additional staging 11/8/2024 includes a CT of the chest demonstrating small pleural plaques along the posterior aspect of both the right lower lobes of uncertain significance.  There are no other substantial findings though we have discussed this as leading to a PET/CT to complete his staging.  11/14/2024 by colorectal surgery, Dr. Naeem Rai, without evidence of obstruction or bleeding.  He was thought a excellent candidate for neoadjuvant chemotherapy followed by mesorectal excision and adjuvant chemotherapy-comparable to the PROSPECT trial.  There was concern about the location of the tumor extending down to the  rectum and the avoidance of radiation therapy since the tumor appeared to be resectable.  There was also the concern of his previous history of radiation therapy.  Further discussed was the role of laparoscopic low anterior resection, total mesorectal excision, and creation of a diverting loop ileostomy temporarily utilized also discussed.  11/20/2024 and we have reviewed the PROSPECT Trial which was designed to determine whether neoadjuvant chemotherapy could be used as an alternative to neoadjuvant chemoRT.  This study demonstrated that similar disease-free survival and overall survival was similar to neoadjuvant CRT alone for eligible patients.  This would include the use of 6 cycles of FOLFOX chemotherapy followed by reassessment and if a clinical response and 20% or more was seen then RT would be admitted, proceeding to surgical resection and subsequent adjuvant chemotherapy.  After discussion the patient agrees to proceed.  12/2/2024: Proceed with C1D1 FOLFOX. Hemoglobin has declined to 9.9 today secondary to malignancy- ongoing bleeding and worsening iron deficiency. He has been taking oral iron, however, is not tolerating this well, therefore will plan for IV iron pending insurance approval.   12/16/2024: Proceed with cycle 2-day 1 FOLFOX today.  Patient is tolerating well.  The patient is next evaluated 12/30/2024 for his third cycle of FOLFOX.  He has undergone Feraheme given 12/4/2024 and 12/16/2024.  He is feeling reasonably good without neuropathic symptoms.  We have discussed his scheduling and timing as he proceeds through his treatment including subsequent repeat MR pelvis after his 6 cycle of FOLFOX.  1/13/2025 Returns for cycle 4 FOLFOX today and was COVID positive 1/6/2025 and treated with Paxlovid and thereafter improved.  He has a scant cough remaining otherwise clear lung sounds.  Will proceed with treatment today.  Patient seen 1/27/2025 recovered from COVID, generally improved performance  status and plans for cycle 5 FOLFOX at a 6 planned.  2/10/205 proceed with cycle 6 FOLFOX.  Following 6 cycle of neoadjuvant therapy will proceed with MRI for surgical planning.  The patient was referred today to Dr. Rai  Subsequent repeat repeat MRI of the pelvis 2/18/2025 demonstrating a response to therapy improved from previous from 11/4/2024 with a radiologic estimate of T3c compared to T3 DM2.  In review of this study enlarging tumor signal extends superiorly from the mass in close approximation between the mesorectum and peritoneal cavity and the previously seen suspicious lymph nodes appear to have improved from previous.  Surgical evaluation anticipated with Dr. Rai 2/27/2025  3/13/2025: He is scheduled for surgery with Dr. Rai on 3/19/2025  He returns today April 14, 2025.  On 3/20/2025 he underwent a low anterior resection with diverting loop ileostomy and appendectomy.  Fortunately his postoperative course was uneventful though he developed a high output ileostomy that required loperamide to slow it down.  Pathology revealed a pT2 N0 well-differentiated mid rectal adenocarcinoma with treatment effect.  He was seen back April 3, 2025 having high output through his ostomy and it was felt that it would be reasonable to move forward with closure of his diverting loop ileostomy in 4 weeks.  It was discussed that chemotherapy adjuvantly would be held until he recovered postoperatively.  He is next seen April 14, 2025.  His ostomy still has variable output but he has been able to manage this and he is quite willing to proceed as above per his subsequent surgery and reconsideration of adjuvant therapy postop.  He continued to see Dr. Rai in 5/3/2025 - 5/8/2025 he underwent closure of loop ileostomy.  He was seen back in follow-up 5/22/2025 and was felt a candidate to restart chemotherapy.  He followed with cardiology with negative stress testing, asked to continue statin and aspirin.  He did  present to the ER 5/23/2025 with accelerated hypertension, weakness and mild headache eventually responsive to medications.  Troponin was negative as was normal assessment of his kidney function.  He is next seen in office 5/27/2025 to restart chemotherapy to start adjuvant FOLFOX x 6.  6/9/2025 proceed with cycle 2 adjuvant FOLFOX  6/23/2025 proceed with cycle 3 adjuvant FOLFOX  7/7/2025-cycle 4 adjuvant FOLFOX without dose adjustment    *Anemia   12/2/2024: Hemoglobin 9.9 today. Patient is not tolerating oral iron, therefore, will plan to proceed with IV iron.    12/16/2024 hemoglobin has improved today to 10.8.  Proceeded with the second dose of Feraheme  Reassessment 414/25-H&H 11.5 and 35.2  6/23/2025 hemoglobin 11.3 nutritional studies without deficit, worsening anemia likely secondary to chemotherapy  Reviewed 7/7/2025-H&H 11.5 35.9    *COVID positive 1/6/2025 trreated with paxlovid  Resolved symptoms 1/27/2025    *Hypercalcemia  1/13/2025 calcium 11 with normal albumin.  Discussed with Dr. Nagel and additional labs added including ionized calcium, PTH, PTH related peptide and vitamin D level as he has been on high-dose vitamin D.  Asked patient to hold vitamin D supplement until additional labs have resulted.  Will also recheck CMP on Wednesday at [facility].  Subsequent ionized calcium, PTH, vitamin D and PTH related peptide testing.  The studies include a PTH of 25, ionized calcium of 1.41, PTH related peptide less than 2.0, vitamin D level 67.1.  Repeat calcium 1/20/2025 at 9.1  2/10/2025 calcium normal at 9.2  6/23/2025 calcium is normal at 9.6  7/7/2025 calcium 9.8      *Elevated liver function studies  Likely secondary to oxaliplatin  No dose adjustments required at this time though we will continue to monitor every 2 weeks  6/23/2025 AST 48, , alkaline phosphatase 115, total bilirubin normal at 0.5  7/7/2025 improvement with ALT 99, AST 95, total bilirubin 0.4    Plan:   Proceed today with  FOLFOX, cycle 10 (cycle 4 in the adjuvant setting)  Anemia workup today without nutritional deficit.  We reviewed this is likely secondary to chemotherapy  Continue to monitor neuropathy closely  Return in 2 weeks-NP, FOLFOX 5/6 - 11/12    Kevin Nagel MD  07/07/2025

## 2025-07-07 ENCOUNTER — INFUSION (OUTPATIENT)
Dept: ONCOLOGY | Facility: HOSPITAL | Age: 70
End: 2025-07-07
Payer: MEDICARE

## 2025-07-07 ENCOUNTER — OFFICE VISIT (OUTPATIENT)
Dept: ONCOLOGY | Facility: CLINIC | Age: 70
End: 2025-07-07
Payer: MEDICARE

## 2025-07-07 VITALS
HEART RATE: 82 BPM | RESPIRATION RATE: 16 BRPM | HEIGHT: 65 IN | DIASTOLIC BLOOD PRESSURE: 77 MMHG | BODY MASS INDEX: 29.14 KG/M2 | TEMPERATURE: 98.1 F | OXYGEN SATURATION: 96 % | WEIGHT: 174.9 LBS | SYSTOLIC BLOOD PRESSURE: 137 MMHG

## 2025-07-07 DIAGNOSIS — Z85.048 HISTORY OF RECTAL CANCER: Primary | ICD-10-CM

## 2025-07-07 DIAGNOSIS — C20 RECTAL ADENOCARCINOMA: ICD-10-CM

## 2025-07-07 LAB
ALBUMIN SERPL-MCNC: 4.4 G/DL (ref 3.5–5.2)
ALBUMIN/GLOB SERPL: 1.8 G/DL
ALP SERPL-CCNC: 114 U/L (ref 39–117)
ALT SERPL W P-5'-P-CCNC: 99 U/L (ref 1–41)
ANION GAP SERPL CALCULATED.3IONS-SCNC: 10.4 MMOL/L (ref 5–15)
AST SERPL-CCNC: 95 U/L (ref 1–40)
BASOPHILS # BLD AUTO: 0.05 10*3/MM3 (ref 0–0.2)
BASOPHILS NFR BLD AUTO: 0.9 % (ref 0–1.5)
BILIRUB SERPL-MCNC: 0.4 MG/DL (ref 0–1.2)
BUN SERPL-MCNC: 12 MG/DL (ref 8–23)
BUN/CREAT SERPL: 14.3 (ref 7–25)
CALCIUM SPEC-SCNC: 9.8 MG/DL (ref 8.6–10.5)
CHLORIDE SERPL-SCNC: 103 MMOL/L (ref 98–107)
CO2 SERPL-SCNC: 26.6 MMOL/L (ref 22–29)
CREAT SERPL-MCNC: 0.84 MG/DL (ref 0.76–1.27)
DEPRECATED RDW RBC AUTO: 55.3 FL (ref 37–54)
EGFRCR SERPLBLD CKD-EPI 2021: 92.7 ML/MIN/1.73
EOSINOPHIL # BLD AUTO: 0.25 10*3/MM3 (ref 0–0.4)
EOSINOPHIL NFR BLD AUTO: 4.7 % (ref 0.3–6.2)
ERYTHROCYTE [DISTWIDTH] IN BLOOD BY AUTOMATED COUNT: 15.9 % (ref 12.3–15.4)
GLOBULIN UR ELPH-MCNC: 2.4 GM/DL
GLUCOSE SERPL-MCNC: 90 MG/DL (ref 65–99)
HCT VFR BLD AUTO: 35.9 % (ref 37.5–51)
HGB BLD-MCNC: 11.5 G/DL (ref 13–17.7)
IMM GRANULOCYTES # BLD AUTO: 0.01 10*3/MM3 (ref 0–0.05)
IMM GRANULOCYTES NFR BLD AUTO: 0.2 % (ref 0–0.5)
LYMPHOCYTES # BLD AUTO: 1.77 10*3/MM3 (ref 0.7–3.1)
LYMPHOCYTES NFR BLD AUTO: 33.3 % (ref 19.6–45.3)
MCH RBC QN AUTO: 31.6 PG (ref 26.6–33)
MCHC RBC AUTO-ENTMCNC: 32 G/DL (ref 31.5–35.7)
MCV RBC AUTO: 98.6 FL (ref 79–97)
MONOCYTES # BLD AUTO: 0.55 10*3/MM3 (ref 0.1–0.9)
MONOCYTES NFR BLD AUTO: 10.4 % (ref 5–12)
NEUTROPHILS NFR BLD AUTO: 2.68 10*3/MM3 (ref 1.7–7)
NEUTROPHILS NFR BLD AUTO: 50.5 % (ref 42.7–76)
NRBC BLD AUTO-RTO: 0 /100 WBC (ref 0–0.2)
PLATELET # BLD AUTO: 151 10*3/MM3 (ref 140–450)
PMV BLD AUTO: 9.7 FL (ref 6–12)
POTASSIUM SERPL-SCNC: 4 MMOL/L (ref 3.5–5.2)
PROT SERPL-MCNC: 6.8 G/DL (ref 6–8.5)
RBC # BLD AUTO: 3.64 10*6/MM3 (ref 4.14–5.8)
SODIUM SERPL-SCNC: 140 MMOL/L (ref 136–145)
WBC NRBC COR # BLD AUTO: 5.31 10*3/MM3 (ref 3.4–10.8)

## 2025-07-07 PROCEDURE — 25810000003 SODIUM CHLORIDE 0.9 % SOLUTION 250 ML FLEX CONT: Performed by: INTERNAL MEDICINE

## 2025-07-07 PROCEDURE — 96415 CHEMO IV INFUSION ADDL HR: CPT

## 2025-07-07 PROCEDURE — 25010000002 PALONOSETRON PER 25 MCG: Performed by: INTERNAL MEDICINE

## 2025-07-07 PROCEDURE — 3078F DIAST BP <80 MM HG: CPT | Performed by: INTERNAL MEDICINE

## 2025-07-07 PROCEDURE — 80053 COMPREHEN METABOLIC PANEL: CPT

## 2025-07-07 PROCEDURE — 25010000002 LEUCOVORIN CALCIUM PER 50 MG: Performed by: INTERNAL MEDICINE

## 2025-07-07 PROCEDURE — 96413 CHEMO IV INFUSION 1 HR: CPT

## 2025-07-07 PROCEDURE — 3075F SYST BP GE 130 - 139MM HG: CPT | Performed by: INTERNAL MEDICINE

## 2025-07-07 PROCEDURE — 85025 COMPLETE CBC W/AUTO DIFF WBC: CPT

## 2025-07-07 PROCEDURE — 96368 THER/DIAG CONCURRENT INF: CPT

## 2025-07-07 PROCEDURE — 96375 TX/PRO/DX INJ NEW DRUG ADDON: CPT

## 2025-07-07 PROCEDURE — 99214 OFFICE O/P EST MOD 30 MIN: CPT | Performed by: INTERNAL MEDICINE

## 2025-07-07 PROCEDURE — 25010000002 DEXAMETHASONE SODIUM PHOSPHATE 100 MG/10ML SOLUTION: Performed by: INTERNAL MEDICINE

## 2025-07-07 PROCEDURE — G0498 CHEMO EXTEND IV INFUS W/PUMP: HCPCS

## 2025-07-07 PROCEDURE — 1126F AMNT PAIN NOTED NONE PRSNT: CPT | Performed by: INTERNAL MEDICINE

## 2025-07-07 PROCEDURE — 96411 CHEMO IV PUSH ADDL DRUG: CPT

## 2025-07-07 PROCEDURE — 25010000002 FLUOROURACIL PER 500 MG: Performed by: INTERNAL MEDICINE

## 2025-07-07 PROCEDURE — 96367 TX/PROPH/DG ADDL SEQ IV INF: CPT

## 2025-07-07 PROCEDURE — 25010000003 DEXTROSE 5 % SOLUTION 250 ML FLEX CONT: Performed by: INTERNAL MEDICINE

## 2025-07-07 PROCEDURE — 25010000002 OXALIPLATIN PER 0.5 MG: Performed by: INTERNAL MEDICINE

## 2025-07-07 PROCEDURE — 25010000002 FOSAPREPITANT PER 1 MG: Performed by: INTERNAL MEDICINE

## 2025-07-07 RX ORDER — HYDROCORTISONE SODIUM SUCCINATE 100 MG/2ML
100 INJECTION INTRAMUSCULAR; INTRAVENOUS AS NEEDED
Status: CANCELLED | OUTPATIENT
Start: 2025-07-07

## 2025-07-07 RX ORDER — DEXTROSE MONOHYDRATE 50 MG/ML
20 INJECTION, SOLUTION INTRAVENOUS ONCE
Status: COMPLETED | OUTPATIENT
Start: 2025-07-07 | End: 2025-07-07

## 2025-07-07 RX ORDER — PALONOSETRON 0.05 MG/ML
0.25 INJECTION, SOLUTION INTRAVENOUS ONCE
Status: COMPLETED | OUTPATIENT
Start: 2025-07-07 | End: 2025-07-07

## 2025-07-07 RX ORDER — FAMOTIDINE 10 MG/ML
20 INJECTION, SOLUTION INTRAVENOUS AS NEEDED
Status: CANCELLED | OUTPATIENT
Start: 2025-07-07

## 2025-07-07 RX ORDER — DIPHENHYDRAMINE HYDROCHLORIDE 50 MG/ML
50 INJECTION, SOLUTION INTRAMUSCULAR; INTRAVENOUS AS NEEDED
Status: CANCELLED | OUTPATIENT
Start: 2025-07-07

## 2025-07-07 RX ORDER — PALONOSETRON 0.05 MG/ML
0.25 INJECTION, SOLUTION INTRAVENOUS ONCE
Status: CANCELLED | OUTPATIENT
Start: 2025-07-07

## 2025-07-07 RX ORDER — DEXTROSE MONOHYDRATE 50 MG/ML
20 INJECTION, SOLUTION INTRAVENOUS ONCE
Status: CANCELLED | OUTPATIENT
Start: 2025-07-07

## 2025-07-07 RX ORDER — FLUOROURACIL 50 MG/ML
400 INJECTION, SOLUTION INTRAVENOUS ONCE
Status: COMPLETED | OUTPATIENT
Start: 2025-07-07 | End: 2025-07-07

## 2025-07-07 RX ORDER — FLUOROURACIL 50 MG/ML
400 INJECTION, SOLUTION INTRAVENOUS ONCE
Status: CANCELLED | OUTPATIENT
Start: 2025-07-07

## 2025-07-07 RX ADMIN — DEXAMETHASONE SODIUM PHOSPHATE 12 MG: 10 INJECTION, SOLUTION INTRAMUSCULAR; INTRAVENOUS at 10:21

## 2025-07-07 RX ADMIN — PALONOSETRON 0.25 MG: 0.05 INJECTION, SOLUTION INTRAVENOUS at 10:21

## 2025-07-07 RX ADMIN — FOSAPREPITANT 100 ML: 150 INJECTION, POWDER, LYOPHILIZED, FOR SOLUTION INTRAVENOUS at 10:39

## 2025-07-07 RX ADMIN — LEUCOVORIN CALCIUM 770 MG: 350 INJECTION, POWDER, LYOPHILIZED, FOR SUSPENSION INTRAMUSCULAR; INTRAVENOUS at 11:13

## 2025-07-07 RX ADMIN — OXALIPLATIN 165 MG: 5 INJECTION, SOLUTION INTRAVENOUS at 11:13

## 2025-07-07 RX ADMIN — FLUOROURACIL 770 MG: 50 INJECTION, SOLUTION INTRAVENOUS at 13:24

## 2025-07-07 RX ADMIN — DEXTROSE 20 ML/HR: 50 INJECTION, SOLUTION INTRAVENOUS at 10:21

## 2025-07-07 RX ADMIN — SODIUM CHLORIDE 4610 MG: 9 INJECTION, SOLUTION INTRAVENOUS at 13:24

## 2025-07-09 ENCOUNTER — INFUSION (OUTPATIENT)
Dept: ONCOLOGY | Facility: HOSPITAL | Age: 70
End: 2025-07-09
Payer: MEDICARE

## 2025-07-09 DIAGNOSIS — Z45.2 FITTING AND ADJUSTMENT OF VASCULAR CATHETER: ICD-10-CM

## 2025-07-09 DIAGNOSIS — Z85.048 HISTORY OF RECTAL CANCER: Primary | ICD-10-CM

## 2025-07-09 PROCEDURE — 25010000002 HEPARIN LOCK FLUSH PER 10 UNITS: Performed by: INTERNAL MEDICINE

## 2025-07-09 RX ORDER — HEPARIN SODIUM (PORCINE) LOCK FLUSH IV SOLN 100 UNIT/ML 100 UNIT/ML
500 SOLUTION INTRAVENOUS AS NEEDED
OUTPATIENT
Start: 2025-07-09

## 2025-07-09 RX ORDER — HEPARIN SODIUM (PORCINE) LOCK FLUSH IV SOLN 100 UNIT/ML 100 UNIT/ML
500 SOLUTION INTRAVENOUS AS NEEDED
Status: DISCONTINUED | OUTPATIENT
Start: 2025-07-09 | End: 2025-07-09 | Stop reason: HOSPADM

## 2025-07-09 RX ORDER — SODIUM CHLORIDE 0.9 % (FLUSH) 0.9 %
10 SYRINGE (ML) INJECTION AS NEEDED
OUTPATIENT
Start: 2025-07-09

## 2025-07-09 RX ORDER — SODIUM CHLORIDE 0.9 % (FLUSH) 0.9 %
10 SYRINGE (ML) INJECTION AS NEEDED
Status: DISCONTINUED | OUTPATIENT
Start: 2025-07-09 | End: 2025-07-09 | Stop reason: HOSPADM

## 2025-07-09 RX ADMIN — Medication 500 UNITS: at 12:07

## 2025-07-09 RX ADMIN — Medication 10 ML: at 12:09

## 2025-07-17 NOTE — PROGRESS NOTES
REASON FOR FOLLOW UP:  stage III mid to upper well-differentiated rectal adenocarcinoma (cT3, cN2 CM0.).    History of Present Illness    The patient is a 70 y.o. male with the above-mentioned history, who returns to the office today in anticipation of his fifth cycle of adjuvant chemotherapy with FOLFOX.  He continues to tolerate treatment well.  He has had improvement in his bowel movements over the past 2 weeks.  No worsening neuropathy.  Weight stable.    ONCOLOGY HISTORY:  The patient is a 70-year-old male who is referred for recently diagnosed stage III mid to upper well-differentiated rectal adenocarcinoma (cT3, cN2 CM0.).    Patient undergone a screening colonoscopy 10/18/2024 demonstrating a circumferential mass involving the rectum extending from 10 cm to 12 cm with biopsy of 15 cm consistent with invasive well-differentiated adenocarcinoma with ulceration necroinflammatory debris.    This was felt to be microsatellite stable by IHC as well as including MSI by PCR.    If follow-up with infectious disease 11/1/2024 there being concern about resuming hypersexual activity and that he start preexposure prophylaxis.  He last been seen July 2024 on Descovy still in relationship with his partner and currently monogamous with been having bowel changes underwent colonoscopy with the above diagnosis.  As result of his need for therapy Descovy was discontinued and the issue to be reevaluated once he was successfully treated.    MRI of the pelvis performed 11/4/2024 demonstrates a high rectal mass extending to the proximal sigmoid colon representing a T3d N2 rectal tumor, side effect invasion on the superior aspect of the mass approximates between the mesorectum and peritoneal cavity?    CT chest 11/8 demonstrates pleural plaques along the peripheral aspect of both the right lower lobes.  These are of uncertain significance.    The patient was next seen 11/14/2024 by colorectal surgery without evidence of obstruction  or bleeding.  He was thought a excellent candidate for neoadjuvant chemotherapy followed by mesorectal excision and adjuvant chemotherapy-comparable to the prospect trial.  There was concern about the location of the tumor extending down to the rectum and the avoidance of radiation therapy since the tumor appeared to be resectable.  Was the role of laparoscopic low anterior resection total mesorectal excision and creation of a diverting loop ileostomy temporarily discussed.    As resulted above the patient is now referred to discuss this above approach.  He is seen with his significant other and we have discussed his findings in great detail from initial diagnosis and and subsequent surgical assessment leading to the recommendation of neoadjuvant chemotherapy given in the fashion of the PROSPECT Trial which was designed to determine whether neoadjuvant chemotherapy could be used as an alternative to neoadjuvant chemoRT.  This study demonstrated that similar disease-free survival and overall survival was similar to neoadjuvant CRT alone for eligible patients.  This would include the use of 6 cycles of FOLFOX chemotherapy followed by reassessment and if a clinical response and 20% or more was seen then RT would be admitted, proceeding to surgical resection and subsequent adjuvant chemotherapy.    He returns 12/2/2024, for Cycle 1 Day 1 FOLFOX. He does not have any new concerns today. He remains fatigued and experiences intermittent lightheadedness. He denies shortness of breath. He denies abdominal pain. His stools are black, but he denies rome blood. Despite starting oral iron, his hemoglobin has decreased further. He is not tolerating the oral iron at this time. Patient was started on Feraheme.    Patient returns on 12/16/2024 for cycle 2 FOLFOX.  He reports he is tolerated treatment well thus far and denying any nausea, vomiting, changes to his bowels.  He did have blood in his stool following cycle 1 1 time.   That has not reoccurred.  He did start Feraheme the day of disconnect with cycle 1 and has had improvement in his hemoglobin.  He will be due for his second dose of Feraheme today.  He denies increased neuropathy.    The patient is next evaluated 12/30/2024 for his third cycle of FOLFOX.  He has undergone Feraheme given 12/4/2024 and 12/16/2024.  He is feeling reasonably good without neuropathic symptoms.  We have discussed his scheduling and timing as he proceeds through his treatment including subsequent repeat MR pelvis after his 6 cycle of FOLFOX.    He returns 1/13/2025 for follow up and treatment.  He has been positive for COVID since his last office visit and called in at which time we did start him on paxlovid.  He reports on starting the Paxlovid he was much improved.  He does have a scant cough at times with no lingering shortness of breath.  Report neuropathy lasting approximately 3 days following last treatment that was not always associated with cold intolerance.  This is now resolved and had involved his hands primarily.  We went on to proceed with cycle 4 FOLFOX, obtained ionized calcium, PTH, vitamin D and PTH related peptide testing.  The studies include a PTH of 25, ionized calcium of 1.41, PTH related peptide less than 2.0, vitamin D level 67.1.    He is next seen 1/27/2025 for cycle #5 FOLFOX out of 6 planned prior to repeat MRI.  He is doing well with treatment with minimal neuropathy which recovers quickly, no additional fatigue and is without prolonged cytopenias.  He is trying to plan a wedding in and around his surgery and may need dental extractions soon.  He is also having back pain after recent exercising and is pursuing an epidural.    The patient is seen 2/26/2025 improved from previous and is status post MRI of the pelvis 2/18/2025 demonstrating a response to therapy improved from previous from 11/4/2024 with a radiologic estimate of T3c compared to T3 DM2.  In review of this study  enlarging tumor signal extends superiorly from the mass in close approximation between the mesorectum and peritoneal cavity and the previously seen suspicious lymph nodes appear to have improved from previous.    He returns today April 14, 2025.  On 3/20/2025 he underwent a low anterior resection with diverting loop ileostomy and appendectomy.  Fortunately his postoperative course was uneventful though he developed a high output ileostomy that required loperamide to slow it down.  Pathology revealed a pT2 N0 well-differentiated mid rectal adenocarcinoma with treatment effect.    He was seen back April 3, 2025 having high output through his ostomy and it was felt that it would be reasonable to move forward with closure of his diverting loop ileostomy in 4 weeks.  It was discussed that chemotherapy adjuvantly would be held until he recovered postoperatively.    He is next seen April 14, 2025.  His ostomy still has variable output but he has been able to manage this and he is quite willing to proceed as above per his subsequent surgery and reconsideration of adjuvant therapy postop.    He continued to see Dr. Rai in 5/3/2025 - 5/8/2025 he underwent closure of loop ileostomy.  He was seen back in follow-up 5/22/2025 and was felt a candidate to restart chemotherapy.    He followed with cardiology with negative stress testing, asked to continue statin and aspirin.    He did present to the ER 5/23/2025 with accelerated hypertension, weakness and mild headache eventually responsive to medications.  Troponin was negative as was normal assessment of his kidney function.    He was next seen in office 5/27/2025 to restart chemotherapy to start adjuvant FOLFOX x 6.    Past Surgical History:   Procedure Laterality Date    ANGIOPLASTY CAROTID ARTERY      APPENDECTOMY      CAROTID ENDARTERECTOMY Left     COLON RESECTION N/A 03/19/2025    Procedure: Laparoscopic Converted to Open Low Anterior Resection, Ostomy Creation, and  Appendetomy;  Surgeon: Naeem Rai MD;  Location:  [facility];  Service: General;  Laterality: N/A;    COLONOSCOPY N/A 10/18/2024    Procedure: COLONOSCOPY TO CECUM/TI WITH BIOPSIES;  Surgeon: Marcus Christine Jr., MD;  Location: [facility] ENDOSCOPY;  Service: General;  Laterality: N/A;  PRE-SCREENING, FAMILY HX COLON CANCER  POST-DIVERTICULOSIS, RECTAL MASS    ENDOSCOPY      FOOT SURGERY      ILEOSTOMY CLOSURE N/A 5/5/2025    Procedure: Closure of diverting loop ileostomy;  Surgeon: Naeem Rai MD;  [facility] MA[facility] OR;  Service: General;  Laterality: N/A;    INSERTION PROSTATE RADIATION SEED      LUMBAR EPIDURAL INJECTION N/A 02/14/2025    Procedure: L4/L5 LUMBAR EPIDURAL STEROID INJECTION CPT: 06644;  Surgeon: Vandana Ordonez MD;  [place] [facility];  Service: Pain Management;  Laterality: N/A;    TOOTH EXTRACTION  2025    VENOUS ACCESS DEVICE (PORT) INSERTION N/A 11/22/2024    Procedure: INSERTION VENOUS ACCESS DEVICE;  Surgeon: Naeem Rai MD;  Location: [facility] MAIN OR;  Service: General;  Laterality: N/A;        Current Outpatient Medications on File Prior to Visit   Medication Sig Dispense Refill    acetaminophen (TYLENOL) 500 MG tablet Take 2 tablets by mouth Every 6 (Six) Hours As Needed for Mild Pain.      amLODIPine (NORVASC) 10 MG tablet Take 1 tablet by mouth Daily. 90 tablet 3    aspirin 81 MG chewable tablet Chew 1 tablet Daily. HELD FOR OR      atorvastatin (LIPITOR) 80 MG tablet Take 1 tablet by mouth Daily. 90 tablet 1    Cyanocobalamin (VITAMIN B 12 PO) Take 1 tablet by mouth Daily. HOLD PER MD INSTR      DULoxetine (CYMBALTA) 60 MG capsule Take 1 capsule by mouth Daily. 90 capsule 3    ferrous sulfate 325 (65 Fe) MG tablet Take 1 tablet by mouth Daily With Breakfast.      gabapentin (NEURONTIN) 300 MG capsule Take 2 capsules by mouth 3 (Three) Times a Day. 540 capsule 3    loperamide (IMODIUM) 2 MG capsule Take 1 capsule by mouth 4 (Four) Times a  Day As Needed for Diarrhea.      losartan (COZAAR) 100 MG tablet Take 1 tablet by mouth Daily. 90 tablet 1    metFORMIN (GLUCOPHAGE) 1000 MG tablet Take 1 tablet by mouth 2 (Two) Times a Day With Meals. 90 tablet 1    ondansetron (ZOFRAN) 8 MG tablet Take 1 tablet by mouth 3 (Three) Times a Day As Needed for Nausea or Vomiting. 30 tablet 5    tamsulosin (FLOMAX) 0.4 MG capsule 24 hr capsule Take 1 capsule by mouth every night at bedtime.      vitamin D (ERGOCALCIFEROL) 1.25 MG (32313 UT) capsule capsule TAKE 1 CAPSULE BY MOUTH 1 TIME EVERY WEEK (Patient taking differently: Take 1 capsule by mouth 1 (One) Time Per Week. SATURDAYS) 12 capsule 0     Current Facility-Administered Medications on File Prior to Visit   Medication Dose Route Frequency Provider Last Rate Last Admin    [COMPLETED] dexAMETHasone (DECADRON) IVPB 12 mg  12 mg Intravenous Once Kevin Nagel MD   Stopped at 07/21/25 1111    [COMPLETED] dextrose (D5W) 5 % infusion  20 mL/hr Intravenous Once Kevin Nagel MD 20 mL/hr at 07/21/25 1024 20 mL/hr at 07/21/25 1024    fluorouracil (ADRUCIL) 4,610 mg in sodium chloride 0.9 % 240 mL chemo infusion - FOR HOME USE  2,400 mg/m2 (Treatment Plan Recorded) Intravenous Once Kevin Nagel MD        fluorouracil (ADRUCIL) chemo injection 770 mg  400 mg/m2 (Treatment Plan Recorded) Intravenous Once Kevin Nagel MD        [COMPLETED] FOSAPREPITANT 150 MG/100ML NORMAL SALINE (CBC) IVPB 100 mL 100 mL  150 mg Intravenous Once Kevin Nagel MD   Stopped at 07/21/25 1056    leucovorin 770 mg in dextrose (D5W) 5 % 288.5 mL IVPB  400 mg/m2 (Treatment Plan Recorded) Intravenous Once Kevin Nagel  mL/hr at 07/21/25 1115 770 mg at 07/21/25 1115    OXALIplatin (ELOXATIN) 165 mg in dextrose (D5W) 5 % 283 mL chemo IVPB  85 mg/m2 (Treatment Plan Recorded) Intravenous Once Kevin Nagel  mL/hr at 07/21/25 1115 165 mg at 07/21/25 1115    [COMPLETED] palonosetron (ALOXI)  "injection 0.25 mg  0.25 mg Intravenous Once Kevin Nagel MD   0.25 mg at 07/21/25 1024        ALLERGIES:  No Known Allergies     Social History     Socioeconomic History    Marital status: Significant Other   Tobacco Use    Smoking status: Former     Types: Cigarettes     Passive exposure: Past    Smokeless tobacco: Never    Tobacco comments:     QUIT 1990     1 TO 3 PPD X 20 YEARS    Vaping Use    Vaping status: Some Days    Substances: CBD   Substance and Sexual Activity    Alcohol use: No    Drug use: Not Currently    Sexual activity: Defer        Family History   Problem Relation Age of Onset    Bone cancer Mother     Heart disease Father     COPD Father     Hypertension Father     Stroke Father         TIA    Bone cancer Father         smoker    Lung cancer Father     Diabetes Father     No Known Problems Sister     No Known Problems Brother     Mental retardation Brother     No Known Problems Maternal Grandmother     No Known Problems Maternal Grandfather     No Known Problems Paternal Grandmother     Colon cancer Paternal Grandfather     Malig Hyperthermia Neg Hx           Objective     Vitals:    07/21/25 0929   BP: 132/77   Pulse: 86   Resp: 16   Temp: 97.8 °F (36.6 °C)   TempSrc: Oral   SpO2: 98%   Weight: 80.1 kg (176 lb 9.6 oz)   Height: 165 cm (64.96\")   PainSc: 0-No pain           7/21/2025     9:32 AM   Current Status   ECOG score 0       Physical Exam  Constitutional:       Appearance: Normal appearance.   Cardiovascular:      Rate and Rhythm: Normal rate and regular rhythm.      Heart sounds: Normal heart sounds.   Pulmonary:      Effort: Pulmonary effort is normal.      Breath sounds: Normal breath sounds.   Abdominal:      Palpations: Abdomen is soft.   Skin:     General: Skin is warm and dry.   Neurological:      Mental Status: He is oriented to person, place, and time.         RECENT LABS:  Results from last 7 days   Lab Units 07/21/25  0918   WBC 10*3/mm3 5.84   NEUTROS ABS 10*3/mm3 3.14 "   HEMOGLOBIN g/dL 11.3*   HEMATOCRIT % 35.2*   PLATELETS 10*3/mm3 141     Results from last 7 days   Lab Units 07/21/25  0918   SODIUM mmol/L 139   POTASSIUM mmol/L 4.2   CHLORIDE mmol/L 101   CO2 mmol/L 26.6   BUN mg/dL 11.2   CREATININE mg/dL 0.87   CALCIUM mg/dL 9.7   ALBUMIN g/dL 4.4   BILIRUBIN mg/dL 0.5   ALK PHOS U/L 107   ALT (SGPT) U/L 58*   AST (SGOT) U/L 50*   GLUCOSE mg/dL 98               Assessment & Plan   *Stage III mid to upper well differentiated rectal adenocarcinoma (cT3, cN2, cM0)     70-year-old male with a history of of diabetes, CHF GE reflux hyperlipidemia, hypertension, TIA, possible CVA as well as a history of prostate cancer status post XRT.  He had been recently having a change in bowel habit ultimately leading to additional assessment.  This led to a screening colonoscopy 10/18/2024demonstrating a circumferential mass involving the rectum extending from 10 cm to 12 cm with biopsy of 15 cm consistent with invasive well-differentiated adenocarcinoma with ulceration necroinflammatory debris. This was felt to be microsatellite stable by IHC as well as including MSI by PCR.  MRI of the pelvis performed 11/4/2024 demonstrates a high rectal mass extending to the proximal sigmoid colon representing a T3d N2 rectal tumor, side effect invasion on the superior aspect of the mass approximates between the mesorectum and peritoneal cavity?  Additional staging 11/8/2024 includes a CT of the chest demonstrating small pleural plaques along the posterior aspect of both the right lower lobes of uncertain significance.  There are no other substantial findings though we have discussed this as leading to a PET/CT to complete his staging.  11/14/2024 by colorectal surgery, Dr. Naeem Rai, without evidence of obstruction or bleeding.  He was thought a excellent candidate for neoadjuvant chemotherapy followed by mesorectal excision and adjuvant chemotherapy-comparable to the PROSPECT trial.  There was  concern about the location of the tumor extending down to the rectum and the avoidance of radiation therapy since the tumor appeared to be resectable.  There was also the concern of his previous history of radiation therapy.  Further discussed was the role of laparoscopic low anterior resection, total mesorectal excision, and creation of a diverting loop ileostomy temporarily utilized also discussed.  11/20/2024 and we have reviewed the PROSPECT Trial which was designed to determine whether neoadjuvant chemotherapy could be used as an alternative to neoadjuvant chemoRT.  This study demonstrated that similar disease-free survival and overall survival was similar to neoadjuvant CRT alone for eligible patients.  This would include the use of 6 cycles of FOLFOX chemotherapy followed by reassessment and if a clinical response and 20% or more was seen then RT would be admitted, proceeding to surgical resection and subsequent adjuvant chemotherapy.  After discussion the patient agrees to proceed.  12/2/2024: Proceed with C1D1 FOLFOX. Hemoglobin has declined to 9.9 today secondary to malignancy- ongoing bleeding and worsening iron deficiency. He has been taking oral iron, however, is not tolerating this well, therefore will plan for IV iron pending insurance approval.   12/16/2024: Proceed with cycle 2-day 1 FOLFOX today.  Patient is tolerating well.  The patient is next evaluated 12/30/2024 for his third cycle of FOLFOX.  He has undergone Feraheme given 12/4/2024 and 12/16/2024.  He is feeling reasonably good without neuropathic symptoms.  We have discussed his scheduling and timing as he proceeds through his treatment including subsequent repeat MR pelvis after his 6 cycle of FOLFOX.  1/13/2025 Returns for cycle 4 FOLFOX today and was COVID positive 1/6/2025 and treated with Paxlovid and thereafter improved.  He has a scant cough remaining otherwise clear lung sounds.  Will proceed with treatment today.  Patient seen  1/27/2025 recovered from COVID, generally improved performance status and plans for cycle 5 FOLFOX at a 6 planned.  2/10/205 proceed with cycle 6 FOLFOX.  Following 6 cycle of neoadjuvant therapy will proceed with MRI for surgical planning.  The patient was referred today to Dr. Rai  Subsequent repeat repeat MRI of the pelvis 2/18/2025 demonstrating a response to therapy improved from previous from 11/4/2024 with a radiologic estimate of T3c compared to T3 DM2.  In review of this study enlarging tumor signal extends superiorly from the mass in close approximation between the mesorectum and peritoneal cavity and the previously seen suspicious lymph nodes appear to have improved from previous.  Surgical evaluation anticipated with Dr. Rai 2/27/2025  3/13/2025: He is scheduled for surgery with Dr. Rai on 3/19/2025  He returns today April 14, 2025.  On 3/20/2025 he underwent a low anterior resection with diverting loop ileostomy and appendectomy.  Fortunately his postoperative course was uneventful though he developed a high output ileostomy that required loperamide to slow it down.  Pathology revealed a pT2 N0 well-differentiated mid rectal adenocarcinoma with treatment effect.  He was seen back April 3, 2025 having high output through his ostomy and it was felt that it would be reasonable to move forward with closure of his diverting loop ileostomy in 4 weeks.  It was discussed that chemotherapy adjuvantly would be held until he recovered postoperatively.  He is next seen April 14, 2025.  His ostomy still has variable output but he has been able to manage this and he is quite willing to proceed as above per his subsequent surgery and reconsideration of adjuvant therapy postop.  He continued to see Dr. Rai in 5/3/2025 - 5/8/2025 he underwent closure of loop ileostomy.  He was seen back in follow-up 5/22/2025 and was felt a candidate to restart chemotherapy.  He followed with cardiology with negative  stress testing, asked to continue statin and aspirin.  He did present to the ER 5/23/2025 with accelerated hypertension, weakness and mild headache eventually responsive to medications.  Troponin was negative as was normal assessment of his kidney function.  He is next seen in office 5/27/2025 to restart chemotherapy to start adjuvant FOLFOX x 6.  7/21/2025: Proceed with cycle 5 of adjuvant FOLFOX.  Patient tolerating well.    *Anemia   12/2/2024: Hemoglobin 9.9 today. Patient is not tolerating oral iron, therefore, will plan to proceed with IV iron.    12/16/2024 hemoglobin has improved today to 10.8.  Proceeded with the second dose of Feraheme  6/23/2025 hemoglobin 11.3 nutritional studies without deficit, worsening anemia likely secondary to chemotherapy  7/21/2025: hgb 11.3    *COVID positive 1/6/2025 trreated with paxlovid  Resolved symptoms 1/27/2025    *Hypercalcemia  1/13/2025 calcium 11 with normal albumin.  Discussed with Dr. Nagel and additional labs added including ionized calcium, PTH, PTH related peptide and vitamin D level as he has been on high-dose vitamin D.  Asked patient to hold vitamin D supplement until additional labs have resulted.  Will also recheck CMP on Wednesday at [facility].  Subsequent ionized calcium, PTH, vitamin D and PTH related peptide testing.  The studies include a PTH of 25, ionized calcium of 1.41, PTH related peptide less than 2.0, vitamin D level 67.1.  Repeat calcium 1/20/2025 at 9.1  7/21/2025: calcium 9.7      *Elevated liver function studies  Likely secondary to oxaliplatin  No dose adjustments required at this time though we will continue to monitor every 2 weeks  7/21/2025:AST 50, ALT 58, alkaline phosphatase 107, bilirubin 0.5    Plan:   Proceed today with FOLFOX, cycle 11 (cycle 5 in the adjuvant setting)  Continue to monitor neuropathy closely  Return in 2 weeks-NP, final FOLFOX    BOBBY Alfredo  07/21/2025

## 2025-07-21 ENCOUNTER — OFFICE VISIT (OUTPATIENT)
Dept: ONCOLOGY | Facility: CLINIC | Age: 70
End: 2025-07-21
Payer: MEDICARE

## 2025-07-21 ENCOUNTER — INFUSION (OUTPATIENT)
Dept: ONCOLOGY | Facility: HOSPITAL | Age: 70
End: 2025-07-21
Payer: MEDICARE

## 2025-07-21 VITALS
OXYGEN SATURATION: 98 % | HEART RATE: 86 BPM | TEMPERATURE: 97.8 F | SYSTOLIC BLOOD PRESSURE: 132 MMHG | RESPIRATION RATE: 16 BRPM | DIASTOLIC BLOOD PRESSURE: 77 MMHG | HEIGHT: 65 IN | BODY MASS INDEX: 29.42 KG/M2 | WEIGHT: 176.6 LBS

## 2025-07-21 DIAGNOSIS — Z79.899 HIGH RISK MEDICATION USE: ICD-10-CM

## 2025-07-21 DIAGNOSIS — Z85.048 HISTORY OF RECTAL CANCER: ICD-10-CM

## 2025-07-21 DIAGNOSIS — Z85.048 HISTORY OF RECTAL CANCER: Primary | ICD-10-CM

## 2025-07-21 DIAGNOSIS — D64.9 ANEMIA, UNSPECIFIED TYPE: ICD-10-CM

## 2025-07-21 DIAGNOSIS — C20 RECTAL ADENOCARCINOMA: Primary | ICD-10-CM

## 2025-07-21 LAB
ALBUMIN SERPL-MCNC: 4.4 G/DL (ref 3.5–5.2)
ALBUMIN/GLOB SERPL: 1.8 G/DL
ALP SERPL-CCNC: 107 U/L (ref 39–117)
ALT SERPL W P-5'-P-CCNC: 58 U/L (ref 1–41)
ANION GAP SERPL CALCULATED.3IONS-SCNC: 11.4 MMOL/L (ref 5–15)
AST SERPL-CCNC: 50 U/L (ref 1–40)
BASOPHILS # BLD AUTO: 0.06 10*3/MM3 (ref 0–0.2)
BASOPHILS NFR BLD AUTO: 1 % (ref 0–1.5)
BILIRUB SERPL-MCNC: 0.5 MG/DL (ref 0–1.2)
BUN SERPL-MCNC: 11.2 MG/DL (ref 8–23)
BUN/CREAT SERPL: 12.9 (ref 7–25)
CALCIUM SPEC-SCNC: 9.7 MG/DL (ref 8.6–10.5)
CHLORIDE SERPL-SCNC: 101 MMOL/L (ref 98–107)
CO2 SERPL-SCNC: 26.6 MMOL/L (ref 22–29)
CREAT SERPL-MCNC: 0.87 MG/DL (ref 0.76–1.27)
DEPRECATED RDW RBC AUTO: 60.7 FL (ref 37–54)
EGFRCR SERPLBLD CKD-EPI 2021: 91.7 ML/MIN/1.73
EOSINOPHIL # BLD AUTO: 0.25 10*3/MM3 (ref 0–0.4)
EOSINOPHIL NFR BLD AUTO: 4.3 % (ref 0.3–6.2)
ERYTHROCYTE [DISTWIDTH] IN BLOOD BY AUTOMATED COUNT: 17.2 % (ref 12.3–15.4)
GLOBULIN UR ELPH-MCNC: 2.4 GM/DL
GLUCOSE SERPL-MCNC: 98 MG/DL (ref 65–99)
HCT VFR BLD AUTO: 35.2 % (ref 37.5–51)
HGB BLD-MCNC: 11.3 G/DL (ref 13–17.7)
IMM GRANULOCYTES # BLD AUTO: 0.02 10*3/MM3 (ref 0–0.05)
IMM GRANULOCYTES NFR BLD AUTO: 0.3 % (ref 0–0.5)
LYMPHOCYTES # BLD AUTO: 1.56 10*3/MM3 (ref 0.7–3.1)
LYMPHOCYTES NFR BLD AUTO: 26.7 % (ref 19.6–45.3)
MCH RBC QN AUTO: 31.4 PG (ref 26.6–33)
MCHC RBC AUTO-ENTMCNC: 32.1 G/DL (ref 31.5–35.7)
MCV RBC AUTO: 97.8 FL (ref 79–97)
MONOCYTES # BLD AUTO: 0.81 10*3/MM3 (ref 0.1–0.9)
MONOCYTES NFR BLD AUTO: 13.9 % (ref 5–12)
NEUTROPHILS NFR BLD AUTO: 3.14 10*3/MM3 (ref 1.7–7)
NEUTROPHILS NFR BLD AUTO: 53.8 % (ref 42.7–76)
NRBC BLD AUTO-RTO: 0 /100 WBC (ref 0–0.2)
PLATELET # BLD AUTO: 141 10*3/MM3 (ref 140–450)
PMV BLD AUTO: 9.7 FL (ref 6–12)
POTASSIUM SERPL-SCNC: 4.2 MMOL/L (ref 3.5–5.2)
PROT SERPL-MCNC: 6.8 G/DL (ref 6–8.5)
RBC # BLD AUTO: 3.6 10*6/MM3 (ref 4.14–5.8)
SODIUM SERPL-SCNC: 139 MMOL/L (ref 136–145)
WBC NRBC COR # BLD AUTO: 5.84 10*3/MM3 (ref 3.4–10.8)

## 2025-07-21 PROCEDURE — 96411 CHEMO IV PUSH ADDL DRUG: CPT

## 2025-07-21 PROCEDURE — 25010000002 OXALIPLATIN PER 0.5 MG: Performed by: INTERNAL MEDICINE

## 2025-07-21 PROCEDURE — 25010000002 DEXAMETHASONE SODIUM PHOSPHATE 100 MG/10ML SOLUTION: Performed by: INTERNAL MEDICINE

## 2025-07-21 PROCEDURE — G0498 CHEMO EXTEND IV INFUS W/PUMP: HCPCS

## 2025-07-21 PROCEDURE — 25010000003 DEXTROSE 5 % SOLUTION: Performed by: INTERNAL MEDICINE

## 2025-07-21 PROCEDURE — 25010000002 PALONOSETRON PER 25 MCG: Performed by: INTERNAL MEDICINE

## 2025-07-21 PROCEDURE — 96375 TX/PRO/DX INJ NEW DRUG ADDON: CPT

## 2025-07-21 PROCEDURE — 25010000002 LEUCOVORIN CALCIUM PER 50 MG: Performed by: INTERNAL MEDICINE

## 2025-07-21 PROCEDURE — 25010000003 DEXTROSE 5 % SOLUTION 250 ML FLEX CONT: Performed by: INTERNAL MEDICINE

## 2025-07-21 PROCEDURE — 96368 THER/DIAG CONCURRENT INF: CPT

## 2025-07-21 PROCEDURE — 80053 COMPREHEN METABOLIC PANEL: CPT

## 2025-07-21 PROCEDURE — 25010000002 FOSAPREPITANT PER 1 MG: Performed by: INTERNAL MEDICINE

## 2025-07-21 PROCEDURE — 25810000003 SODIUM CHLORIDE 0.9 % SOLUTION 250 ML FLEX CONT: Performed by: INTERNAL MEDICINE

## 2025-07-21 PROCEDURE — 25010000002 FLUOROURACIL PER 500 MG: Performed by: INTERNAL MEDICINE

## 2025-07-21 PROCEDURE — 96415 CHEMO IV INFUSION ADDL HR: CPT

## 2025-07-21 PROCEDURE — 96413 CHEMO IV INFUSION 1 HR: CPT

## 2025-07-21 PROCEDURE — 85025 COMPLETE CBC W/AUTO DIFF WBC: CPT

## 2025-07-21 PROCEDURE — 96367 TX/PROPH/DG ADDL SEQ IV INF: CPT

## 2025-07-21 RX ORDER — FLUOROURACIL 50 MG/ML
400 INJECTION, SOLUTION INTRAVENOUS ONCE
Status: COMPLETED | OUTPATIENT
Start: 2025-07-21 | End: 2025-07-21

## 2025-07-21 RX ORDER — HYDROCORTISONE SODIUM SUCCINATE 100 MG/2ML
100 INJECTION INTRAMUSCULAR; INTRAVENOUS AS NEEDED
Status: CANCELLED | OUTPATIENT
Start: 2025-07-21

## 2025-07-21 RX ORDER — FAMOTIDINE 10 MG/ML
20 INJECTION, SOLUTION INTRAVENOUS AS NEEDED
Status: CANCELLED | OUTPATIENT
Start: 2025-07-21

## 2025-07-21 RX ORDER — DEXTROSE MONOHYDRATE 50 MG/ML
20 INJECTION, SOLUTION INTRAVENOUS ONCE
Status: CANCELLED | OUTPATIENT
Start: 2025-07-21

## 2025-07-21 RX ORDER — PALONOSETRON 0.05 MG/ML
0.25 INJECTION, SOLUTION INTRAVENOUS ONCE
Status: CANCELLED | OUTPATIENT
Start: 2025-07-21

## 2025-07-21 RX ORDER — PALONOSETRON 0.05 MG/ML
0.25 INJECTION, SOLUTION INTRAVENOUS ONCE
Status: COMPLETED | OUTPATIENT
Start: 2025-07-21 | End: 2025-07-21

## 2025-07-21 RX ORDER — DEXTROSE MONOHYDRATE 50 MG/ML
20 INJECTION, SOLUTION INTRAVENOUS ONCE
Status: COMPLETED | OUTPATIENT
Start: 2025-07-21 | End: 2025-07-21

## 2025-07-21 RX ORDER — DIPHENHYDRAMINE HYDROCHLORIDE 50 MG/ML
50 INJECTION, SOLUTION INTRAMUSCULAR; INTRAVENOUS AS NEEDED
Status: CANCELLED | OUTPATIENT
Start: 2025-07-21

## 2025-07-21 RX ORDER — FLUOROURACIL 50 MG/ML
400 INJECTION, SOLUTION INTRAVENOUS ONCE
Status: CANCELLED | OUTPATIENT
Start: 2025-07-21

## 2025-07-21 RX ADMIN — FLUOROURACIL 770 MG: 50 INJECTION, SOLUTION INTRAVENOUS at 13:27

## 2025-07-21 RX ADMIN — PALONOSETRON 0.25 MG: 0.05 INJECTION, SOLUTION INTRAVENOUS at 10:24

## 2025-07-21 RX ADMIN — DEXAMETHASONE SODIUM PHOSPHATE 12 MG: 10 INJECTION, SOLUTION INTRAMUSCULAR; INTRAVENOUS at 10:56

## 2025-07-21 RX ADMIN — LEUCOVORIN CALCIUM 770 MG: 350 INJECTION, POWDER, LYOPHILIZED, FOR SUSPENSION INTRAMUSCULAR; INTRAVENOUS at 11:15

## 2025-07-21 RX ADMIN — FOSAPREPITANT 100 ML: 150 INJECTION, POWDER, LYOPHILIZED, FOR SOLUTION INTRAVENOUS at 10:24

## 2025-07-21 RX ADMIN — SODIUM CHLORIDE 4610 MG: 9 INJECTION, SOLUTION INTRAVENOUS at 13:26

## 2025-07-21 RX ADMIN — OXALIPLATIN 165 MG: 5 INJECTION, SOLUTION INTRAVENOUS at 11:15

## 2025-07-21 RX ADMIN — DEXTROSE MONOHYDRATE 20 ML/HR: 50 INJECTION, SOLUTION INTRAVENOUS at 10:24

## 2025-07-23 ENCOUNTER — INFUSION (OUTPATIENT)
Dept: ONCOLOGY | Facility: HOSPITAL | Age: 70
End: 2025-07-23
Payer: MEDICARE

## 2025-07-23 DIAGNOSIS — Z45.2 FITTING AND ADJUSTMENT OF VASCULAR CATHETER: ICD-10-CM

## 2025-07-23 DIAGNOSIS — Z85.048 HISTORY OF RECTAL CANCER: Primary | ICD-10-CM

## 2025-07-23 PROCEDURE — 25010000002 HEPARIN LOCK FLUSH PER 10 UNITS: Performed by: INTERNAL MEDICINE

## 2025-07-23 RX ORDER — HEPARIN SODIUM (PORCINE) LOCK FLUSH IV SOLN 100 UNIT/ML 100 UNIT/ML
500 SOLUTION INTRAVENOUS AS NEEDED
OUTPATIENT
Start: 2025-07-23

## 2025-07-23 RX ORDER — SODIUM CHLORIDE 0.9 % (FLUSH) 0.9 %
10 SYRINGE (ML) INJECTION AS NEEDED
Status: DISCONTINUED | OUTPATIENT
Start: 2025-07-23 | End: 2025-07-23 | Stop reason: HOSPADM

## 2025-07-23 RX ORDER — SODIUM CHLORIDE 0.9 % (FLUSH) 0.9 %
10 SYRINGE (ML) INJECTION AS NEEDED
OUTPATIENT
Start: 2025-07-23

## 2025-07-23 RX ORDER — HEPARIN SODIUM (PORCINE) LOCK FLUSH IV SOLN 100 UNIT/ML 100 UNIT/ML
500 SOLUTION INTRAVENOUS AS NEEDED
Status: DISCONTINUED | OUTPATIENT
Start: 2025-07-23 | End: 2025-07-23 | Stop reason: HOSPADM

## 2025-07-23 RX ADMIN — Medication 500 UNITS: at 11:23

## 2025-07-23 RX ADMIN — Medication 10 ML: at 11:25

## 2025-08-04 ENCOUNTER — OFFICE VISIT (OUTPATIENT)
Dept: ONCOLOGY | Facility: CLINIC | Age: 70
End: 2025-08-04
Payer: MEDICARE

## 2025-08-04 ENCOUNTER — INFUSION (OUTPATIENT)
Dept: ONCOLOGY | Facility: HOSPITAL | Age: 70
End: 2025-08-04
Payer: MEDICARE

## 2025-08-04 ENCOUNTER — CLINICAL SUPPORT (OUTPATIENT)
Dept: OTHER | Facility: HOSPITAL | Age: 70
End: 2025-08-04
Payer: MEDICARE

## 2025-08-04 VITALS
HEIGHT: 65 IN | BODY MASS INDEX: 29.06 KG/M2 | SYSTOLIC BLOOD PRESSURE: 121 MMHG | WEIGHT: 174.4 LBS | DIASTOLIC BLOOD PRESSURE: 70 MMHG | HEART RATE: 86 BPM | OXYGEN SATURATION: 96 % | TEMPERATURE: 98.4 F | RESPIRATION RATE: 17 BRPM

## 2025-08-04 DIAGNOSIS — Z85.048 HISTORY OF RECTAL CANCER: Primary | ICD-10-CM

## 2025-08-04 DIAGNOSIS — Z79.899 HIGH RISK MEDICATION USE: Primary | ICD-10-CM

## 2025-08-04 DIAGNOSIS — C20 RECTAL ADENOCARCINOMA: ICD-10-CM

## 2025-08-04 DIAGNOSIS — Z85.048 HISTORY OF RECTAL CANCER: ICD-10-CM

## 2025-08-04 LAB
ALBUMIN SERPL-MCNC: 4.3 G/DL (ref 3.5–5.2)
ALBUMIN/GLOB SERPL: 1.9 G/DL
ALP SERPL-CCNC: 113 U/L (ref 39–117)
ALT SERPL W P-5'-P-CCNC: 38 U/L (ref 1–41)
ANION GAP SERPL CALCULATED.3IONS-SCNC: 13.4 MMOL/L (ref 5–15)
AST SERPL-CCNC: 34 U/L (ref 1–40)
BASOPHILS # BLD AUTO: 0.05 10*3/MM3 (ref 0–0.2)
BASOPHILS NFR BLD AUTO: 0.8 % (ref 0–1.5)
BILIRUB SERPL-MCNC: 0.5 MG/DL (ref 0–1.2)
BUN SERPL-MCNC: 9.7 MG/DL (ref 8–23)
BUN/CREAT SERPL: 10.4 (ref 7–25)
CALCIUM SPEC-SCNC: 9.6 MG/DL (ref 8.6–10.5)
CHLORIDE SERPL-SCNC: 102 MMOL/L (ref 98–107)
CO2 SERPL-SCNC: 23.6 MMOL/L (ref 22–29)
CREAT SERPL-MCNC: 0.93 MG/DL (ref 0.76–1.27)
DEPRECATED RDW RBC AUTO: 62.2 FL (ref 37–54)
EGFRCR SERPLBLD CKD-EPI 2021: 87.2 ML/MIN/1.73
EOSINOPHIL # BLD AUTO: 0.28 10*3/MM3 (ref 0–0.4)
EOSINOPHIL NFR BLD AUTO: 4.8 % (ref 0.3–6.2)
ERYTHROCYTE [DISTWIDTH] IN BLOOD BY AUTOMATED COUNT: 17.4 % (ref 12.3–15.4)
GLOBULIN UR ELPH-MCNC: 2.3 GM/DL
GLUCOSE SERPL-MCNC: 95 MG/DL (ref 65–99)
HCT VFR BLD AUTO: 33.5 % (ref 37.5–51)
HGB BLD-MCNC: 10.7 G/DL (ref 13–17.7)
IMM GRANULOCYTES # BLD AUTO: 0.02 10*3/MM3 (ref 0–0.05)
IMM GRANULOCYTES NFR BLD AUTO: 0.3 % (ref 0–0.5)
LYMPHOCYTES # BLD AUTO: 1.73 10*3/MM3 (ref 0.7–3.1)
LYMPHOCYTES NFR BLD AUTO: 29.4 % (ref 19.6–45.3)
MCH RBC QN AUTO: 31.6 PG (ref 26.6–33)
MCHC RBC AUTO-ENTMCNC: 31.9 G/DL (ref 31.5–35.7)
MCV RBC AUTO: 98.8 FL (ref 79–97)
MONOCYTES # BLD AUTO: 0.61 10*3/MM3 (ref 0.1–0.9)
MONOCYTES NFR BLD AUTO: 10.4 % (ref 5–12)
NEUTROPHILS NFR BLD AUTO: 3.2 10*3/MM3 (ref 1.7–7)
NEUTROPHILS NFR BLD AUTO: 54.3 % (ref 42.7–76)
NRBC BLD AUTO-RTO: 0 /100 WBC (ref 0–0.2)
PLATELET # BLD AUTO: 150 10*3/MM3 (ref 140–450)
PMV BLD AUTO: 9.6 FL (ref 6–12)
POTASSIUM SERPL-SCNC: 4.3 MMOL/L (ref 3.5–5.2)
PROT SERPL-MCNC: 6.6 G/DL (ref 6–8.5)
RBC # BLD AUTO: 3.39 10*6/MM3 (ref 4.14–5.8)
SODIUM SERPL-SCNC: 139 MMOL/L (ref 136–145)
WBC NRBC COR # BLD AUTO: 5.89 10*3/MM3 (ref 3.4–10.8)

## 2025-08-04 PROCEDURE — 99214 OFFICE O/P EST MOD 30 MIN: CPT | Performed by: NURSE PRACTITIONER

## 2025-08-04 PROCEDURE — 96375 TX/PRO/DX INJ NEW DRUG ADDON: CPT

## 2025-08-04 PROCEDURE — 80053 COMPREHEN METABOLIC PANEL: CPT

## 2025-08-04 PROCEDURE — 25010000002 OXALIPLATIN PER 0.5 MG: Performed by: NURSE PRACTITIONER

## 2025-08-04 PROCEDURE — 96368 THER/DIAG CONCURRENT INF: CPT

## 2025-08-04 PROCEDURE — 1126F AMNT PAIN NOTED NONE PRSNT: CPT | Performed by: NURSE PRACTITIONER

## 2025-08-04 PROCEDURE — 96413 CHEMO IV INFUSION 1 HR: CPT

## 2025-08-04 PROCEDURE — 25010000002 DEXAMETHASONE SODIUM PHOSPHATE 100 MG/10ML SOLUTION: Performed by: NURSE PRACTITIONER

## 2025-08-04 PROCEDURE — 25010000002 PALONOSETRON PER 25 MCG: Performed by: NURSE PRACTITIONER

## 2025-08-04 PROCEDURE — 96411 CHEMO IV PUSH ADDL DRUG: CPT

## 2025-08-04 PROCEDURE — 96415 CHEMO IV INFUSION ADDL HR: CPT

## 2025-08-04 PROCEDURE — 96367 TX/PROPH/DG ADDL SEQ IV INF: CPT

## 2025-08-04 PROCEDURE — 25010000002 FOSAPREPITANT PER 1 MG: Performed by: NURSE PRACTITIONER

## 2025-08-04 PROCEDURE — 3078F DIAST BP <80 MM HG: CPT | Performed by: NURSE PRACTITIONER

## 2025-08-04 PROCEDURE — 25810000003 SODIUM CHLORIDE 0.9 % SOLUTION 250 ML FLEX CONT: Performed by: NURSE PRACTITIONER

## 2025-08-04 PROCEDURE — 85025 COMPLETE CBC W/AUTO DIFF WBC: CPT

## 2025-08-04 PROCEDURE — 25010000002 FLUOROURACIL PER 500 MG: Performed by: NURSE PRACTITIONER

## 2025-08-04 PROCEDURE — 25010000003 DEXTROSE 5 % SOLUTION: Performed by: NURSE PRACTITIONER

## 2025-08-04 PROCEDURE — G2211 COMPLEX E/M VISIT ADD ON: HCPCS | Performed by: NURSE PRACTITIONER

## 2025-08-04 PROCEDURE — 3074F SYST BP LT 130 MM HG: CPT | Performed by: NURSE PRACTITIONER

## 2025-08-04 PROCEDURE — 1160F RVW MEDS BY RX/DR IN RCRD: CPT | Performed by: NURSE PRACTITIONER

## 2025-08-04 PROCEDURE — 25010000003 DEXTROSE 5 % SOLUTION 250 ML FLEX CONT: Performed by: NURSE PRACTITIONER

## 2025-08-04 PROCEDURE — G0498 CHEMO EXTEND IV INFUS W/PUMP: HCPCS

## 2025-08-04 PROCEDURE — 25010000002 LEUCOVORIN CALCIUM PER 50 MG: Performed by: NURSE PRACTITIONER

## 2025-08-04 PROCEDURE — 1159F MED LIST DOCD IN RCRD: CPT | Performed by: NURSE PRACTITIONER

## 2025-08-04 RX ORDER — FLUOROURACIL 50 MG/ML
400 INJECTION, SOLUTION INTRAVENOUS ONCE
Status: COMPLETED | OUTPATIENT
Start: 2025-08-04 | End: 2025-08-04

## 2025-08-04 RX ORDER — FLUOROURACIL 50 MG/ML
400 INJECTION, SOLUTION INTRAVENOUS ONCE
Status: CANCELLED | OUTPATIENT
Start: 2025-08-04

## 2025-08-04 RX ORDER — DEXTROSE MONOHYDRATE 50 MG/ML
20 INJECTION, SOLUTION INTRAVENOUS ONCE
Status: COMPLETED | OUTPATIENT
Start: 2025-08-04 | End: 2025-08-04

## 2025-08-04 RX ORDER — DEXTROSE MONOHYDRATE 50 MG/ML
20 INJECTION, SOLUTION INTRAVENOUS ONCE
Status: CANCELLED | OUTPATIENT
Start: 2025-08-04

## 2025-08-04 RX ORDER — FAMOTIDINE 10 MG/ML
20 INJECTION, SOLUTION INTRAVENOUS AS NEEDED
Status: CANCELLED | OUTPATIENT
Start: 2025-08-04

## 2025-08-04 RX ORDER — PALONOSETRON 0.05 MG/ML
0.25 INJECTION, SOLUTION INTRAVENOUS ONCE
Status: COMPLETED | OUTPATIENT
Start: 2025-08-04 | End: 2025-08-04

## 2025-08-04 RX ORDER — PALONOSETRON 0.05 MG/ML
0.25 INJECTION, SOLUTION INTRAVENOUS ONCE
Status: CANCELLED | OUTPATIENT
Start: 2025-08-04

## 2025-08-04 RX ORDER — DIPHENHYDRAMINE HYDROCHLORIDE 50 MG/ML
50 INJECTION, SOLUTION INTRAMUSCULAR; INTRAVENOUS AS NEEDED
Status: CANCELLED | OUTPATIENT
Start: 2025-08-04

## 2025-08-04 RX ORDER — HYDROCORTISONE SODIUM SUCCINATE 100 MG/2ML
100 INJECTION INTRAMUSCULAR; INTRAVENOUS AS NEEDED
Status: CANCELLED | OUTPATIENT
Start: 2025-08-04

## 2025-08-04 RX ADMIN — FLUOROURACIL 770 MG: 50 INJECTION, SOLUTION INTRAVENOUS at 14:16

## 2025-08-04 RX ADMIN — SODIUM CHLORIDE 12 MG: 9 INJECTION, SOLUTION INTRAVENOUS at 11:53

## 2025-08-04 RX ADMIN — PALONOSETRON 0.25 MG: 0.05 INJECTION, SOLUTION INTRAVENOUS at 11:23

## 2025-08-04 RX ADMIN — SODIUM CHLORIDE 4610 MG: 9 INJECTION, SOLUTION INTRAVENOUS at 14:15

## 2025-08-04 RX ADMIN — LEUCOVORIN CALCIUM 770 MG: 350 INJECTION, POWDER, LYOPHILIZED, FOR SUSPENSION INTRAMUSCULAR; INTRAVENOUS at 12:11

## 2025-08-04 RX ADMIN — DEXTROSE MONOHYDRATE 20 ML/HR: 50 INJECTION, SOLUTION INTRAVENOUS at 11:23

## 2025-08-04 RX ADMIN — SODIUM CHLORIDE 100 ML: 9 INJECTION, SOLUTION INTRAVENOUS at 11:23

## 2025-08-04 RX ADMIN — OXALIPLATIN 165 MG: 5 INJECTION, SOLUTION INTRAVENOUS at 12:11

## 2025-08-06 ENCOUNTER — INFUSION (OUTPATIENT)
Dept: ONCOLOGY | Facility: HOSPITAL | Age: 70
End: 2025-08-06
Payer: MEDICARE

## 2025-08-06 DIAGNOSIS — Z45.2 FITTING AND ADJUSTMENT OF VASCULAR CATHETER: ICD-10-CM

## 2025-08-06 DIAGNOSIS — Z85.048 HISTORY OF RECTAL CANCER: Primary | ICD-10-CM

## 2025-08-06 PROCEDURE — 96523 IRRIG DRUG DELIVERY DEVICE: CPT

## 2025-08-06 PROCEDURE — 25010000002 HEPARIN LOCK FLUSH PER 10 UNITS: Performed by: INTERNAL MEDICINE

## 2025-08-06 RX ORDER — SODIUM CHLORIDE 0.9 % (FLUSH) 0.9 %
10 SYRINGE (ML) INJECTION AS NEEDED
Status: DISCONTINUED | OUTPATIENT
Start: 2025-08-06 | End: 2025-08-06 | Stop reason: HOSPADM

## 2025-08-06 RX ORDER — SODIUM CHLORIDE 0.9 % (FLUSH) 0.9 %
10 SYRINGE (ML) INJECTION AS NEEDED
OUTPATIENT
Start: 2025-08-06

## 2025-08-06 RX ORDER — HEPARIN SODIUM (PORCINE) LOCK FLUSH IV SOLN 100 UNIT/ML 100 UNIT/ML
500 SOLUTION INTRAVENOUS AS NEEDED
OUTPATIENT
Start: 2025-08-06

## 2025-08-06 RX ORDER — HEPARIN SODIUM (PORCINE) LOCK FLUSH IV SOLN 100 UNIT/ML 100 UNIT/ML
500 SOLUTION INTRAVENOUS AS NEEDED
Status: DISCONTINUED | OUTPATIENT
Start: 2025-08-06 | End: 2025-08-06 | Stop reason: HOSPADM

## 2025-08-06 RX ADMIN — Medication 10 ML: at 11:34

## 2025-08-06 RX ADMIN — Medication 500 UNITS: at 11:31

## 2025-08-13 ENCOUNTER — HOSPITAL ENCOUNTER (OUTPATIENT)
Dept: CT IMAGING | Facility: HOSPITAL | Age: 70
Discharge: HOME | End: 2025-08-13
Admitting: NURSE PRACTITIONER
Payer: MEDICARE

## 2025-08-13 DIAGNOSIS — C20 RECTAL ADENOCARCINOMA: ICD-10-CM

## 2025-08-13 PROCEDURE — 25510000001 IOPAMIDOL 61 % SOLUTION: Performed by: NURSE PRACTITIONER

## 2025-08-13 PROCEDURE — 74177 CT ABD & PELVIS W/CONTRAST: CPT

## 2025-08-13 PROCEDURE — 71260 CT THORAX DX C+: CPT

## 2025-08-13 PROCEDURE — 25510000002 DIATRIZOATE MEGLUMINE & SODIUM PER 1 ML: Performed by: NURSE PRACTITIONER

## 2025-08-13 RX ORDER — DIATRIZOATE MEGLUMINE AND DIATRIZOATE SODIUM 660; 100 MG/ML; MG/ML
30 SOLUTION ORAL; RECTAL ONCE
Status: COMPLETED | OUTPATIENT
Start: 2025-08-13 | End: 2025-08-13

## 2025-08-13 RX ORDER — IOPAMIDOL 612 MG/ML
85 INJECTION, SOLUTION INTRAVASCULAR
Status: COMPLETED | OUTPATIENT
Start: 2025-08-13 | End: 2025-08-13

## 2025-08-13 RX ADMIN — DIATRIZOATE MEGLUMINE AND DIATRIZOATE SODIUM 30 ML: 660; 100 SOLUTION ORAL; RECTAL at 10:07

## 2025-08-13 RX ADMIN — IOPAMIDOL 85 ML: 612 INJECTION, SOLUTION INTRAVENOUS at 10:06

## 2025-08-20 ENCOUNTER — OFFICE VISIT (OUTPATIENT)
Dept: ONCOLOGY | Facility: CLINIC | Age: 70
End: 2025-08-20
Payer: MEDICARE

## 2025-08-20 ENCOUNTER — LAB (OUTPATIENT)
Dept: LAB | Facility: HOSPITAL | Age: 70
End: 2025-08-20
Payer: MEDICARE

## 2025-08-20 VITALS
SYSTOLIC BLOOD PRESSURE: 126 MMHG | HEIGHT: 65 IN | OXYGEN SATURATION: 97 % | WEIGHT: 174.8 LBS | BODY MASS INDEX: 29.12 KG/M2 | HEART RATE: 79 BPM | TEMPERATURE: 98.3 F | DIASTOLIC BLOOD PRESSURE: 75 MMHG

## 2025-08-20 DIAGNOSIS — Z85.048 HISTORY OF RECTAL CANCER: Primary | ICD-10-CM

## 2025-08-20 LAB
FOLATE SERPL-MCNC: >20 NG/ML (ref 4.78–24.2)
VIT B12 BLD-MCNC: 547 PG/ML (ref 211–946)

## 2025-08-20 PROCEDURE — 82607 VITAMIN B-12: CPT | Performed by: NURSE PRACTITIONER

## 2025-08-20 PROCEDURE — 82746 ASSAY OF FOLIC ACID SERUM: CPT | Performed by: NURSE PRACTITIONER

## (undated) DEVICE — Device

## (undated) DEVICE — SUT SILK 2/0 SH CR8 18IN CR8 C012D

## (undated) DEVICE — PENCL SMOKE/EVAC MEGADYNE TELESCP 10FT

## (undated) DEVICE — YANKAUER,BULB TIP,W/O VENT,RIGID,STERILE: Brand: MEDLINE

## (undated) DEVICE — DEV SUT GRSPR CLOSUR 15CM 14G

## (undated) DEVICE — SUT PDS 0 CT1 36IN Z346H

## (undated) DEVICE — NDL EPID TUOHY 18G 31/2IN

## (undated) DEVICE — SUT GUT CHRM 3/0 SH 36IN G172H

## (undated) DEVICE — DBD-DRAPE,LAP,CHOLE,W/TROUGHS,STERILE: Brand: MEDLINE

## (undated) DEVICE — PATIENT RETURN ELECTRODE, SINGLE-USE, CONTACT QUALITY MONITORING, ADULT, WITH 9FT CORD, FOR PATIENTS WEIGING OVER 33LBS. (15KG): Brand: MEGADYNE

## (undated) DEVICE — Device: Brand: PORTEX

## (undated) DEVICE — SUT SILK 2/0 TIES 18IN A185H

## (undated) DEVICE — LAPAROVUE VISIBILITY SYSTEM LAPAROSCOPIC SOLUTIONS: Brand: LAPAROVUE

## (undated) DEVICE — SUT PROLN 2/0 SH 36IN 8523H

## (undated) DEVICE — ECHELON FLEX POWERED PLUS ARTICULATING ENDOSCOPIC LINEAR CUTTER , 60MM: Brand: ECHELON FLEX

## (undated) DEVICE — SINGLE-USE BIOPSY FORCEPS: Brand: RADIAL JAW 4

## (undated) DEVICE — SUT SILK 0 TIES 18IN SA66G

## (undated) DEVICE — TUBING, SUCTION, 1/4" X 10', STRAIGHT: Brand: MEDLINE

## (undated) DEVICE — PK PROC MAJ 40

## (undated) DEVICE — ECHELON FLEX 60 ENDOPATH STAPLER COMPACT ARTICULATING ENDOSCOPIC LINEAR CUTTER: Brand: ECHELON  ENDOPATH

## (undated) DEVICE — SUT MNCRYL PLS ANTIB UD 4/0 PS2 18IN

## (undated) DEVICE — ENDOCUT SCISSOR TIP, DISPOSABLE: Brand: RENEW

## (undated) DEVICE — SYR LL TP 10ML STRL

## (undated) DEVICE — SUT MNCRYL 2/0 SH 27IN Y417H

## (undated) DEVICE — SENSR O2 OXIMAX FNGR A/ 18IN NONSTR

## (undated) DEVICE — 3M™ IOBAN™ 2 ANTIMICROBIAL INCISE DRAPE 6650EZ: Brand: IOBAN™ 2

## (undated) DEVICE — TOWEL,OR,DSP,ST,BLUE,STD,4/PK,20PK/CS: Brand: MEDLINE

## (undated) DEVICE — DISPOSABLE GRASPER CARTRIDGE: Brand: DIRECT DRIVE REPOSABLE GRASPERS

## (undated) DEVICE — SUT VIC 0 UR6 27IN VCP603H

## (undated) DEVICE — LN SMPL CO2 SHTRM SD STREAM W/M LUER

## (undated) DEVICE — ADAPT CLN BIOGUARD AIR/H2O DISP

## (undated) DEVICE — APPL CHLORAPREP HI/LITE 26ML ORNG

## (undated) DEVICE — SOL NACL 0.9PCT 1000ML

## (undated) DEVICE — SUT VIC 3/0 CTI 36IN J944H

## (undated) DEVICE — GLV SURG PREMIERPRO ORTHO LTX PF SZ7 BRN

## (undated) DEVICE — DEV OPN LIGASURE CRV 180D 36MM 13.5CM  1P/U

## (undated) DEVICE — CANN O2 ETCO2 FITS ALL CONN CO2 SMPL A/ 7IN DISP LF

## (undated) DEVICE — RESERVOIR,SUCTION,100CC,SILICONE: Brand: MEDLINE

## (undated) DEVICE — TROCAR: Brand: KII FIOS FIRST ENTRY

## (undated) DEVICE — GLV SURG TRIUMPH PF LTX 7 STRL

## (undated) DEVICE — PROXIMATE RH ROTATING HEAD SKIN STAPLERS (35 REGULAR) CONTAINS 35 STAINLESS STEEL STAPLES: Brand: PROXIMATE

## (undated) DEVICE — SUT SILK 3/0 TIES 18IN A184H

## (undated) DEVICE — NEEDLE, QUINCKE, 22GX5": Brand: MEDLINE

## (undated) DEVICE — RETRACTABLE L-HOOK LAPAROSCOPIC SEALER/DIVIDER: Brand: LIGASURE

## (undated) DEVICE — SUT PDS 3/0 SH 27IN DYED Z316H

## (undated) DEVICE — LOU LAP SIGMOID COLON: Brand: MEDLINE INDUSTRIES, INC.

## (undated) DEVICE — INSUFFLATION TUBING SET, ENDOFLATOR 50: Brand: N.A.

## (undated) DEVICE — ELECTRD BLD MEGADYNE EZCLEAN STD 2.75IN XLNG

## (undated) DEVICE — 1LYRTR 16FR10ML100%SIL UMS SNP: Brand: MEDLINE INDUSTRIES, INC.

## (undated) DEVICE — TROCAR: Brand: KII OPTICAL ACCESS SYSTEM

## (undated) DEVICE — KT ORCA ORCAPOD DISP STRL

## (undated) DEVICE — LAPAROSCOPIC SMOKE FILTRATION SYSTEM: Brand: PALL LAPAROSHIELD® PLUS LAPAROSCOPIC SMOKE FILTRATION SYSTEM

## (undated) DEVICE — COVER,MAYO STAND,STERILE: Brand: MEDLINE

## (undated) DEVICE — TROCAR: Brand: KII SLEEVE

## (undated) DEVICE — ENDOPATH PNEUMONEEDLE INSUFFLATION NEEDLES WITH LUER LOCK CONNECTORS 120MM: Brand: ENDOPATH

## (undated) DEVICE — WOUND RETRACTOR AND PROTECTOR: Brand: ALEXIS O WOUND PROTECTOR-RETRACTOR

## (undated) DEVICE — EPIDURAL TRAY: Brand: MEDLINE INDUSTRIES, INC.

## (undated) DEVICE — DRSNG WND BORDR/ADHS NONADHR/GZ LF 4X4IN STRL

## (undated) DEVICE — EXOFIN PRECISION PEN HIGH VISCOSITY TOPICAL SKIN ADHESIVE: Brand: EXOFIN PRECISION PEN, 1G